# Patient Record
Sex: MALE | Race: WHITE | NOT HISPANIC OR LATINO | Employment: OTHER | URBAN - METROPOLITAN AREA
[De-identification: names, ages, dates, MRNs, and addresses within clinical notes are randomized per-mention and may not be internally consistent; named-entity substitution may affect disease eponyms.]

---

## 2018-08-30 ENCOUNTER — TRANSFERRED RECORDS (OUTPATIENT)
Dept: HEALTH INFORMATION MANAGEMENT | Facility: CLINIC | Age: 68
End: 2018-08-30

## 2019-04-17 ENCOUNTER — HOSPITAL ENCOUNTER (INPATIENT)
Facility: CLINIC | Age: 69
LOS: 12 days | Discharge: HOME | DRG: 885 | End: 2019-04-30
Attending: EMERGENCY MEDICINE | Admitting: PSYCHIATRY & NEUROLOGY
Payer: COMMERCIAL

## 2019-04-17 DIAGNOSIS — F20.0 PARANOID SCHIZOPHRENIA (H): Primary | ICD-10-CM

## 2019-04-17 DIAGNOSIS — F29 PSYCHOSIS, UNSPECIFIED PSYCHOSIS TYPE (H): ICD-10-CM

## 2019-04-17 PROCEDURE — 99285 EMERGENCY DEPT VISIT HI MDM: CPT | Mod: 25

## 2019-04-17 PROCEDURE — 90791 PSYCH DIAGNOSTIC EVALUATION: CPT

## 2019-04-17 PROCEDURE — 99285 EMERGENCY DEPT VISIT HI MDM: CPT | Mod: Z6 | Performed by: EMERGENCY MEDICINE

## 2019-04-17 ASSESSMENT — ENCOUNTER SYMPTOMS
DYSPHORIC MOOD: 1
FEVER: 0

## 2019-04-17 NOTE — ED NOTES
Bed: HW02  Expected date: 4/17/19  Expected time: 5:57 PM  Means of arrival:   Comments:  Elizabeth 518. 69M/ on hold. C&C.
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
## 2019-04-18 PROBLEM — F29 PSYCHOSIS (H): Status: ACTIVE | Noted: 2019-04-18

## 2019-04-18 LAB
ALBUMIN SERPL-MCNC: 3.7 G/DL (ref 3.4–5)
ALP SERPL-CCNC: 87 U/L (ref 40–150)
ALT SERPL W P-5'-P-CCNC: 23 U/L (ref 0–70)
ANION GAP SERPL CALCULATED.3IONS-SCNC: 4 MMOL/L (ref 3–14)
AST SERPL W P-5'-P-CCNC: 15 U/L (ref 0–45)
BASOPHILS # BLD AUTO: 0 10E9/L (ref 0–0.2)
BASOPHILS NFR BLD AUTO: 0.4 %
BILIRUB SERPL-MCNC: 0.8 MG/DL (ref 0.2–1.3)
BUN SERPL-MCNC: 16 MG/DL (ref 7–30)
CALCIUM SERPL-MCNC: 8.3 MG/DL (ref 8.5–10.1)
CHLORIDE SERPL-SCNC: 106 MMOL/L (ref 94–109)
CO2 SERPL-SCNC: 29 MMOL/L (ref 20–32)
CREAT SERPL-MCNC: 0.8 MG/DL (ref 0.66–1.25)
DIFFERENTIAL METHOD BLD: NORMAL
EOSINOPHIL # BLD AUTO: 0.2 10E9/L (ref 0–0.7)
EOSINOPHIL NFR BLD AUTO: 2 %
ERYTHROCYTE [DISTWIDTH] IN BLOOD BY AUTOMATED COUNT: 12.4 % (ref 10–15)
GFR SERPL CREATININE-BSD FRML MDRD: >90 ML/MIN/{1.73_M2}
GLUCOSE SERPL-MCNC: 93 MG/DL (ref 70–99)
HCT VFR BLD AUTO: 46.9 % (ref 40–53)
HGB BLD-MCNC: 16.2 G/DL (ref 13.3–17.7)
IMM GRANULOCYTES # BLD: 0 10E9/L (ref 0–0.4)
IMM GRANULOCYTES NFR BLD: 0.4 %
LYMPHOCYTES # BLD AUTO: 2.4 10E9/L (ref 0.8–5.3)
LYMPHOCYTES NFR BLD AUTO: 29.9 %
MCH RBC QN AUTO: 32.1 PG (ref 26.5–33)
MCHC RBC AUTO-ENTMCNC: 34.5 G/DL (ref 31.5–36.5)
MCV RBC AUTO: 93 FL (ref 78–100)
MONOCYTES # BLD AUTO: 0.8 10E9/L (ref 0–1.3)
MONOCYTES NFR BLD AUTO: 9.5 %
NEUTROPHILS # BLD AUTO: 4.6 10E9/L (ref 1.6–8.3)
NEUTROPHILS NFR BLD AUTO: 57.8 %
NRBC # BLD AUTO: 0 10*3/UL
NRBC BLD AUTO-RTO: 0 /100
PLATELET # BLD AUTO: 229 10E9/L (ref 150–450)
POTASSIUM SERPL-SCNC: 3.9 MMOL/L (ref 3.4–5.3)
PROT SERPL-MCNC: 6.7 G/DL (ref 6.8–8.8)
RBC # BLD AUTO: 5.05 10E12/L (ref 4.4–5.9)
SODIUM SERPL-SCNC: 139 MMOL/L (ref 133–144)
WBC # BLD AUTO: 8 10E9/L (ref 4–11)

## 2019-04-18 PROCEDURE — 99222 1ST HOSP IP/OBS MODERATE 55: CPT | Mod: AI | Performed by: NURSE PRACTITIONER

## 2019-04-18 PROCEDURE — 25000132 ZZH RX MED GY IP 250 OP 250 PS 637: Performed by: PSYCHIATRY & NEUROLOGY

## 2019-04-18 PROCEDURE — 12400002 ZZH R&B MH SENIOR/ADOLESCENT

## 2019-04-18 PROCEDURE — 85025 COMPLETE CBC W/AUTO DIFF WBC: CPT | Performed by: EMERGENCY MEDICINE

## 2019-04-18 PROCEDURE — 80053 COMPREHEN METABOLIC PANEL: CPT | Performed by: EMERGENCY MEDICINE

## 2019-04-18 PROCEDURE — 25000132 ZZH RX MED GY IP 250 OP 250 PS 637: Performed by: NURSE PRACTITIONER

## 2019-04-18 PROCEDURE — 99207 ZZC CDG-MDM COMPONENT: MEETS LOW - DOWN CODED: CPT | Performed by: NURSE PRACTITIONER

## 2019-04-18 RX ORDER — HALOPERIDOL 10 MG/1
10 TABLET ORAL 2 TIMES DAILY
Status: DISCONTINUED | OUTPATIENT
Start: 2019-04-18 | End: 2019-04-19

## 2019-04-18 RX ORDER — PROPRANOLOL HYDROCHLORIDE 10 MG/1
10 TABLET ORAL 3 TIMES DAILY PRN
Status: DISCONTINUED | OUTPATIENT
Start: 2019-04-18 | End: 2019-04-30 | Stop reason: HOSPADM

## 2019-04-18 RX ORDER — ASPIRIN 81 MG/1
81 TABLET, CHEWABLE ORAL DAILY
Status: DISCONTINUED | OUTPATIENT
Start: 2019-04-18 | End: 2019-04-18

## 2019-04-18 RX ORDER — OLANZAPINE 10 MG/2ML
10 INJECTION, POWDER, FOR SOLUTION INTRAMUSCULAR
Status: DISCONTINUED | OUTPATIENT
Start: 2019-04-18 | End: 2019-04-30 | Stop reason: HOSPADM

## 2019-04-18 RX ORDER — HALOPERIDOL 5 MG/1
5 TABLET ORAL AT BEDTIME
Status: DISCONTINUED | OUTPATIENT
Start: 2019-04-18 | End: 2019-04-18

## 2019-04-18 RX ORDER — TRAZODONE HYDROCHLORIDE 100 MG/1
100 TABLET ORAL AT BEDTIME
Status: ON HOLD | COMMUNITY
End: 2019-04-29

## 2019-04-18 RX ORDER — BENZTROPINE MESYLATE 0.5 MG/1
1 TABLET ORAL 2 TIMES DAILY
Status: DISCONTINUED | OUTPATIENT
Start: 2019-04-18 | End: 2019-04-30 | Stop reason: HOSPADM

## 2019-04-18 RX ORDER — LEVOTHYROXINE SODIUM 50 UG/1
50 TABLET ORAL
Status: ON HOLD | COMMUNITY
End: 2019-04-29

## 2019-04-18 RX ORDER — BENZTROPINE MESYLATE 1 MG/1
2 TABLET ORAL 2 TIMES DAILY
Status: DISCONTINUED | OUTPATIENT
Start: 2019-04-18 | End: 2019-04-18

## 2019-04-18 RX ORDER — ASPIRIN 81 MG/1
81 TABLET, CHEWABLE ORAL DAILY
Status: ON HOLD | COMMUNITY
End: 2019-04-29

## 2019-04-18 RX ORDER — BENZTROPINE MESYLATE 1 MG/1
1 TABLET ORAL 2 TIMES DAILY
Status: DISCONTINUED | OUTPATIENT
Start: 2019-04-18 | End: 2019-04-18

## 2019-04-18 RX ORDER — MINERAL OIL/I-PROP MYR/WATER
LOTION (ML) TOPICAL
Status: ON HOLD | COMMUNITY
End: 2019-04-29

## 2019-04-18 RX ORDER — POLYETHYLENE GLYCOL 3350 17 G
2-4 POWDER IN PACKET (EA) ORAL
Status: DISCONTINUED | OUTPATIENT
Start: 2019-04-18 | End: 2019-04-30 | Stop reason: HOSPADM

## 2019-04-18 RX ORDER — OLANZAPINE 10 MG/2ML
5 INJECTION, POWDER, FOR SOLUTION INTRAMUSCULAR
Status: DISCONTINUED | OUTPATIENT
Start: 2019-04-18 | End: 2019-04-18

## 2019-04-18 RX ORDER — CARBOXYMETHYLCELLULOSE SODIUM 5 MG/ML
1 SOLUTION/ DROPS OPHTHALMIC 4 TIMES DAILY
Status: DISCONTINUED | OUTPATIENT
Start: 2019-04-18 | End: 2019-04-30 | Stop reason: HOSPADM

## 2019-04-18 RX ORDER — LEVOTHYROXINE SODIUM 50 UG/1
50 TABLET ORAL
Status: DISCONTINUED | OUTPATIENT
Start: 2019-04-18 | End: 2019-04-30 | Stop reason: HOSPADM

## 2019-04-18 RX ORDER — BENZTROPINE MESYLATE 1 MG/1
1 TABLET ORAL 2 TIMES DAILY
Status: ON HOLD | COMMUNITY
End: 2019-04-29

## 2019-04-18 RX ORDER — OLANZAPINE 5 MG/1
5 TABLET ORAL
Status: DISCONTINUED | OUTPATIENT
Start: 2019-04-18 | End: 2019-04-18

## 2019-04-18 RX ORDER — ALUMINA, MAGNESIA, AND SIMETHICONE 2400; 2400; 240 MG/30ML; MG/30ML; MG/30ML
30 SUSPENSION ORAL EVERY 4 HOURS PRN
Status: DISCONTINUED | OUTPATIENT
Start: 2019-04-18 | End: 2019-04-30 | Stop reason: HOSPADM

## 2019-04-18 RX ORDER — ACETAMINOPHEN 325 MG/1
650 TABLET ORAL EVERY 4 HOURS PRN
Status: DISCONTINUED | OUTPATIENT
Start: 2019-04-18 | End: 2019-04-30 | Stop reason: HOSPADM

## 2019-04-18 RX ORDER — OLANZAPINE 10 MG/1
10 TABLET ORAL
Status: DISCONTINUED | OUTPATIENT
Start: 2019-04-18 | End: 2019-04-30 | Stop reason: HOSPADM

## 2019-04-18 RX ORDER — TRAZODONE HYDROCHLORIDE 50 MG/1
100 TABLET, FILM COATED ORAL AT BEDTIME
Status: DISCONTINUED | OUTPATIENT
Start: 2019-04-18 | End: 2019-04-30 | Stop reason: HOSPADM

## 2019-04-18 RX ORDER — CARBOXYMETHYLCELLULOSE SODIUM 5 MG/ML
1 SOLUTION/ DROPS OPHTHALMIC 4 TIMES DAILY
Status: ON HOLD | COMMUNITY
End: 2019-04-29

## 2019-04-18 RX ORDER — HALOPERIDOL 5 MG/1
5 TABLET ORAL AT BEDTIME
Status: ON HOLD | COMMUNITY
End: 2019-04-29

## 2019-04-18 RX ORDER — ASPIRIN 81 MG/1
81 TABLET ORAL DAILY
Status: DISCONTINUED | OUTPATIENT
Start: 2019-04-19 | End: 2019-04-30 | Stop reason: HOSPADM

## 2019-04-18 RX ORDER — MULTIPLE VITAMINS W/ MINERALS TAB 9MG-400MCG
1 TAB ORAL DAILY
Status: ON HOLD | COMMUNITY
End: 2019-04-29

## 2019-04-18 RX ORDER — DIPHENHYDRAMINE HCL 25 MG
25 CAPSULE ORAL 3 TIMES DAILY PRN
Status: DISCONTINUED | OUTPATIENT
Start: 2019-04-18 | End: 2019-04-30 | Stop reason: HOSPADM

## 2019-04-18 RX ORDER — BISACODYL 10 MG
10 SUPPOSITORY, RECTAL RECTAL DAILY PRN
Status: DISCONTINUED | OUTPATIENT
Start: 2019-04-18 | End: 2019-04-30 | Stop reason: HOSPADM

## 2019-04-18 RX ORDER — TRAZODONE HYDROCHLORIDE 50 MG/1
50 TABLET, FILM COATED ORAL
Status: DISCONTINUED | OUTPATIENT
Start: 2019-04-18 | End: 2019-04-30 | Stop reason: HOSPADM

## 2019-04-18 RX ORDER — BENZTROPINE MESYLATE 0.5 MG/1
1 TABLET ORAL 2 TIMES DAILY PRN
Status: DISCONTINUED | OUTPATIENT
Start: 2019-04-18 | End: 2019-04-30 | Stop reason: HOSPADM

## 2019-04-18 RX ORDER — MULTIPLE VITAMINS W/ MINERALS TAB 9MG-400MCG
1 TAB ORAL DAILY
Status: DISCONTINUED | OUTPATIENT
Start: 2019-04-18 | End: 2019-04-30 | Stop reason: HOSPADM

## 2019-04-18 RX ORDER — HYDROXYZINE HYDROCHLORIDE 25 MG/1
25 TABLET, FILM COATED ORAL EVERY 4 HOURS PRN
Status: DISCONTINUED | OUTPATIENT
Start: 2019-04-18 | End: 2019-04-30 | Stop reason: HOSPADM

## 2019-04-18 RX ADMIN — CARBOXYMETHYLCELLULOSE SODIUM 1 DROP: 5 SOLUTION/ DROPS OPHTHALMIC at 21:06

## 2019-04-18 RX ADMIN — CARBOXYMETHYLCELLULOSE SODIUM 1 DROP: 5 SOLUTION/ DROPS OPHTHALMIC at 17:06

## 2019-04-18 RX ADMIN — TRAZODONE HYDROCHLORIDE 100 MG: 50 TABLET ORAL at 21:06

## 2019-04-18 RX ADMIN — BENZTROPINE MESYLATE 1 MG: 1 TABLET ORAL at 08:23

## 2019-04-18 RX ADMIN — ASPIRIN 81 MG CHEWABLE TABLET 81 MG: 81 TABLET CHEWABLE at 08:23

## 2019-04-18 RX ADMIN — CARBOXYMETHYLCELLULOSE SODIUM 1 DROP: 5 SOLUTION/ DROPS OPHTHALMIC at 08:24

## 2019-04-18 RX ADMIN — BENZTROPINE MESYLATE 1 MG: 1 TABLET ORAL at 21:06

## 2019-04-18 RX ADMIN — MULTIPLE VITAMINS W/ MINERALS TAB 1 TABLET: TAB at 08:24

## 2019-04-18 RX ADMIN — HALOPERIDOL 10 MG: 10 TABLET ORAL at 12:06

## 2019-04-18 RX ADMIN — PROPRANOLOL HYDROCHLORIDE 10 MG: 10 TABLET ORAL at 13:11

## 2019-04-18 RX ADMIN — HALOPERIDOL 10 MG: 10 TABLET ORAL at 21:06

## 2019-04-18 ASSESSMENT — ACTIVITIES OF DAILY LIVING (ADL)
RETIRED_COMMUNICATION: 0-->UNDERSTANDS/COMMUNICATES WITHOUT DIFFICULTY
COGNITION: 0 - NO COGNITION ISSUES REPORTED
DRESS: INDEPENDENT
HYGIENE/GROOMING: INDEPENDENT
TOILETING: 0-->INDEPENDENT
TRANSFERRING: 0-->INDEPENDENT
DRESS: 0-->INDEPENDENT
FALL_HISTORY_WITHIN_LAST_SIX_MONTHS: NO
LAUNDRY: WITH SUPERVISION
BATHING: 0-->INDEPENDENT
SWALLOWING: 0-->SWALLOWS FOODS/LIQUIDS WITHOUT DIFFICULTY
AMBULATION: 0-->INDEPENDENT
RETIRED_EATING: 0-->INDEPENDENT
ORAL_HYGIENE: INDEPENDENT

## 2019-04-18 ASSESSMENT — MIFFLIN-ST. JEOR: SCORE: 1791.41
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
## 2019-04-18 NOTE — CONSULTS
"  Internal Medicine Consult - Initial Visit       Ger Bashir MRN# [id number]   YOB: 1950 Age: 69 year old   Date of Admission: 4/17/2019  PCP: Elizabeth Leo  Date of Service: 4/18/2019    Referring Provider: Raheel Doyle MD  Reason for Consult: Admission H&P       Attempted to see pt today in [facility].  He is laying on the seat watching TV.  I ask if he is Ger and he says \"No, I'm not, go away\".  Asked how he is feeling today and says \"leave me alone, go away\".  Pt is declining Internal Medicine visit/assessment at this time. Last 24 hr vitals and labs reviewed.  No recommendations at this time.  Resume home Synthroid and ASA.  Please feel free to re-consult medicine if any acute issues arise.           Iesha Garsia, CNP, APRN  Internal Medicine [facility] Hospitalist  [facility]  Pager [phone]      "
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
## 2019-04-18 NOTE — PROGRESS NOTES
Left voice message with pt's , Ramon, to determine if the ACT team plans to revoke pt's provisional discharge.  They intend to file with  The court today.

## 2019-04-18 NOTE — PLAN OF CARE
BEHAVIORAL TEAM DISCUSSION    Participants: Lexis Hester DNP, Traci Burton RN, Annie Moe, [facility], Jewels Gómez, OT  Progress: New admit  Continued Stay Criteria/Rationale: Psychosis  Medical/Physical: See medical notes  Precautions:   Behavioral Orders   Procedures    Assault precautions    Code 1 - Restrict to Unit    Elopement precautions    Fall precautions    Routine Programming     As clinically indicated    Sexual precautions    Single Room    Status 15     Every 15 minutes.     Plan: The plan is to assess the patient for mental health and medication needs.  The patient will be prescribed medications to treat the identified symptoms.  Upon discharge the patient will be referred to services as appropriate based on the assessment.  Rationale for change in precautions or plan: N/A

## 2019-04-18 NOTE — PROGRESS NOTES
"Pt presents with angry, irritable affect and mood is congruent. Pt is inconsistently medication compliant this shift- agreed to take all meds except synthroid this morning, then initially refused haldol but later agreed to take PO. Pt has been visible in the milieu watching TV most of the shift. Pt does not attend groups. Pt is not social with peers or staff. Pt declined to speak with internal med provider, and when this writer attempts to engage pt is dismissive, stating \"leave me alone, get out of here\". Pt shows substantial tremor in upper extremities for which he takes cogentin. PRN propranolol and benadryl also ordered to address this. Propranolol administered at around 1300, with some minor decrease in tremor noted around 1345. Pt also appeared calmer and less irritable at that time. Pt's BP runs near low-normal range, thus nursing will encourage fluids, continue to monitor VS and be aware of parameters for PRN use. Pt will only drink bottled water because \"the YAHIR messed with that stuff\" (the unit water machine). Diet order modified to include bottled water with each meal to ensure adequate hydration.   No other concerns at this time. Nursing will continue to monitor and assess.   "

## 2019-04-18 NOTE — PROGRESS NOTES
SPIRITUAL HEALTH SERVICES    [facility] [address]  ON-CALL VISIT/TRIAGE      REFERRAL SOURCE: Epic consult for emotional support/patient/family request at admission for chaplaincy support    Based on notes in chart of pt being newly arrived to unit, on sexual precautions and isolative/not participating in milieu, I agreed with nurse that a full 24 hours on unit before our reassessment/possible visit with patient would be most appropriate.     PLAN: Refer to on-call  for triage tomorrow with unit staff.                                                                                                                            Dawn Esposito MDiv, [facility]  Lead , Adult Behavioral Health  Pager [phone]
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
## 2019-04-19 LAB
CHOLEST SERPL-MCNC: 148 MG/DL
HDLC SERPL-MCNC: 32 MG/DL
LDLC SERPL CALC-MCNC: 89 MG/DL
NONHDLC SERPL-MCNC: 116 MG/DL
TRIGL SERPL-MCNC: 136 MG/DL
TSH SERPL DL<=0.005 MIU/L-ACNC: 1.92 MU/L (ref 0.4–4)

## 2019-04-19 PROCEDURE — 84443 ASSAY THYROID STIM HORMONE: CPT | Performed by: PSYCHIATRY & NEUROLOGY

## 2019-04-19 PROCEDURE — 25000132 ZZH RX MED GY IP 250 OP 250 PS 637: Performed by: PSYCHIATRY & NEUROLOGY

## 2019-04-19 PROCEDURE — 99232 SBSQ HOSP IP/OBS MODERATE 35: CPT | Performed by: NURSE PRACTITIONER

## 2019-04-19 PROCEDURE — G0177 OPPS/PHP; TRAIN & EDUC SERV: HCPCS

## 2019-04-19 PROCEDURE — 25000132 ZZH RX MED GY IP 250 OP 250 PS 637: Performed by: NURSE PRACTITIONER

## 2019-04-19 PROCEDURE — 80061 LIPID PANEL: CPT | Performed by: PSYCHIATRY & NEUROLOGY

## 2019-04-19 PROCEDURE — 36415 COLL VENOUS BLD VENIPUNCTURE: CPT | Performed by: PSYCHIATRY & NEUROLOGY

## 2019-04-19 PROCEDURE — 12400001 ZZH R&B MH UMMC

## 2019-04-19 RX ORDER — OLANZAPINE 10 MG/2ML
10 INJECTION, POWDER, FOR SOLUTION INTRAMUSCULAR 2 TIMES DAILY
Status: ACTIVE | OUTPATIENT
Start: 2019-04-19 | End: 2019-04-22

## 2019-04-19 RX ORDER — HALOPERIDOL 10 MG/1
10 TABLET ORAL 2 TIMES DAILY
Status: COMPLETED | OUTPATIENT
Start: 2019-04-19 | End: 2019-04-21

## 2019-04-19 RX ORDER — OLANZAPINE 10 MG/2ML
10 INJECTION, POWDER, FOR SOLUTION INTRAMUSCULAR AT BEDTIME
Status: DISCONTINUED | OUTPATIENT
Start: 2019-04-22 | End: 2019-04-30 | Stop reason: HOSPADM

## 2019-04-19 RX ORDER — HALOPERIDOL DECANOATE 100 MG/ML
100 INJECTION INTRAMUSCULAR
Status: DISCONTINUED | OUTPATIENT
Start: 2019-04-22 | End: 2019-04-29

## 2019-04-19 RX ORDER — HALOPERIDOL DECANOATE 100 MG/ML
100 INJECTION INTRAMUSCULAR
Status: DISCONTINUED | OUTPATIENT
Start: 2019-04-22 | End: 2019-04-19

## 2019-04-19 RX ORDER — HALOPERIDOL 10 MG/1
10 TABLET ORAL AT BEDTIME
Status: DISCONTINUED | OUTPATIENT
Start: 2019-04-22 | End: 2019-04-30 | Stop reason: HOSPADM

## 2019-04-19 RX ADMIN — BENZTROPINE MESYLATE 1 MG: 1 TABLET ORAL at 08:22

## 2019-04-19 RX ADMIN — HALOPERIDOL 10 MG: 10 TABLET ORAL at 19:09

## 2019-04-19 RX ADMIN — ASPIRIN 81 MG: 81 TABLET, COATED ORAL at 08:22

## 2019-04-19 RX ADMIN — CARBOXYMETHYLCELLULOSE SODIUM 1 DROP: 5 SOLUTION/ DROPS OPHTHALMIC at 12:51

## 2019-04-19 RX ADMIN — CARBOXYMETHYLCELLULOSE SODIUM 1 DROP: 5 SOLUTION/ DROPS OPHTHALMIC at 08:22

## 2019-04-19 RX ADMIN — PROPRANOLOL HYDROCHLORIDE 10 MG: 10 TABLET ORAL at 12:51

## 2019-04-19 RX ADMIN — LEVOTHYROXINE SODIUM 50 MCG: 50 TABLET ORAL at 08:21

## 2019-04-19 RX ADMIN — BENZTROPINE MESYLATE 1 MG: 1 TABLET ORAL at 19:09

## 2019-04-19 RX ADMIN — MULTIPLE VITAMINS W/ MINERALS TAB 1 TABLET: TAB at 08:21

## 2019-04-19 RX ADMIN — HALOPERIDOL 10 MG: 10 TABLET ORAL at 08:21

## 2019-04-19 RX ADMIN — OLANZAPINE 10 MG: 10 TABLET, FILM COATED ORAL at 17:24

## 2019-04-19 RX ADMIN — TRAZODONE HYDROCHLORIDE 100 MG: 50 TABLET ORAL at 20:54

## 2019-04-19 ASSESSMENT — ACTIVITIES OF DAILY LIVING (ADL)
DRESS: INDEPENDENT
ORAL_HYGIENE: INDEPENDENT
LAUNDRY: UNABLE TO COMPLETE
ORAL_HYGIENE: INDEPENDENT
HYGIENE/GROOMING: INDEPENDENT
DRESS: SCRUBS (BEHAVIORAL HEALTH)
HYGIENE/GROOMING: INDEPENDENT
LAUNDRY: UNABLE TO COMPLETE

## 2019-04-19 NOTE — PROGRESS NOTES
04/19/19 1404   Behavioral Health   Hallucinations denies / not responding to hallucinations   Thinking poor concentration   Orientation person: oriented;date: oriented;place: oriented;time: oriented   Memory baseline memory   Insight insight appropriate to situation   Judgement intact   Eye Contact at examiner   Affect full range affect   Mood mood is calm   Physical Appearance/Attire attire appropriate to age and situation   Hygiene neglected grooming - unclean body, hair, teeth   Suicidality other (see comments)  (Denies)   1. Wish to be Dead No   2. Non-Specific Active Suicidal Thoughts  No   Self Injury other (see comment)  (Denies)   Elopement   (None observed)   Activity other (see comment)  (Visible in the milieu and groups)   Speech coherent;clear   Medication Sensitivity no observed side effects;no stated side effects   Psychomotor / Gait balanced;steady   Activities of Daily Living   Hygiene/Grooming independent   Oral Hygiene independent   Dress independent   Laundry unable to complete   Room Organization independent     Ger had a calm and engaged shift this morning. He was observed spending time in the milieu, reading, writing, and socializing with peers. He attended several of the groups this morning. He denied SI, SIB, hallucinations. He denied anxiety but he did endorse depression. He rated it a 7/10. He said that the doctors continue to give him Haldol and he started a new medication as well. He is experiencing tremors and says the new medication should help reduce those. No other side effects noted. Staff had to redirect and set firm boundaries with Ger as he would lean over the desk and try to grab a pen. He is using his spiraled notebooks to write in while in the lounge with staff supervision. Ger did not have any behavioral issues today.

## 2019-04-19 NOTE — PROGRESS NOTES
"Pt is increasingly agitated. Writer approached pt asking him to come get his 1600 medications. Pt began screaming at writer saying \"I don't want it. You take it, you drink it, you eat it, I won't take it!\" Writer began leaving his room and he said \"Come in here let me see you! Get over here.\" Writer responded \"I will not respond to you when you are treating me this way.\" Pt then followed charur, began posturing and again refused his medications. Pt was then given 10 mg of zyprexa PRN and asked to stay in his room and dinner would be brought to him.   "

## 2019-04-19 NOTE — PROGRESS NOTES
Pt transferred to station 12 NB for the safety of the vulnerable patients on [facility] due to pt's intrusiveness, hypersexuality, and agitation. Pt finished dinner, Ticket to Ride was printed, and belongings were brought with pt during transfer to station 12 with security and three staff members.

## 2019-04-19 NOTE — PROGRESS NOTES
"[facility], [place]   Psychiatric Progress Note        Interim History:   From H&P: Ger Bashir is a 69 year old male with history of schizoaffective disorder, bipolar type, history of noncompliance with medications, multiple court commitments and Jarvice.  Patient was recently hospitalized at [facility] for about 3 weeks, and was discharged on April 1.  During this hospitalization, the patient's commitment and Wu were extended.  His Wu medications include Haldol, Zyprexa, Clozaril, and Invega.  During the last hospitalization, the patient was discharged on oral Haldol 10 mg at bedtime in addition to Haldol Decanoate 100 mg every 2 weeks, and Cogentin 1 mg twice a day.  His last Haldol Decanoate injection was while hospitalised on March 23.  He was scheduled for another injection on April 8, on outpatient basis however, he never received it.  He also stopped taking his oral medication.  The treatment team at [facility] suspected underlying dementia.  His ACT team also reported that the patient may not be able to live independently anymore, due to cognitive decline.  He has been having significant hand tremors that improved with decreased dose of Haldol.    The patient's care was discussed with the treatment team during the daily team meeting and/or staff's chart notes were reviewed.  Staff report patient was irritable and threatening in the morning, calm, ad appropriate in the evening. Did not attend groups. Patient is eating well and taking medications as prescribed. Slept all night and part of the evening.      Met with patient. Calmer than yesterday. Did not make treats, however repeated that he wants to be discharged no later than tomorrow. Denies having any mental health issues, including hearing voices, delusions and paranoia about the government watching him. Denies anxiety, and depression \"I am not depressed, I am suppressed\".          Medications:       aspirin  81 mg Oral " Daily     benztropine  1 mg Oral BID     carboxymethylcellulose PF  1 drop Both Eyes 4x Daily     eucerin   Topical BID     haloperidol  10 mg Oral BID    Or     OLANZapine  10 mg Intramuscular BID     levothyroxine  50 mcg Oral QAM AC     multivitamin w/minerals  1 tablet Oral Daily     traZODone  100 mg Oral At Bedtime          Allergies:     Allergies   Allergen Reactions     Peas GI Disturbance     Black eyed peas - vomiting          Labs:     Recent Results (from the past 672 hour(s))   CBC with platelets differential    Collection Time: 04/18/19 12:15 AM   Result Value Ref Range    WBC 8.0 4.0 - 11.0 10e9/L    RBC Count 5.05 4.4 - 5.9 10e12/L    Hemoglobin 16.2 13.3 - 17.7 g/dL    Hematocrit 46.9 40.0 - 53.0 %    MCV 93 78 - 100 fl    MCH 32.1 26.5 - 33.0 pg    MCHC 34.5 31.5 - 36.5 g/dL    RDW 12.4 10.0 - 15.0 %    Platelet Count 229 150 - 450 10e9/L    Diff Method Automated Method     % Neutrophils 57.8 %    % Lymphocytes 29.9 %    % Monocytes 9.5 %    % Eosinophils 2.0 %    % Basophils 0.4 %    % Immature Granulocytes 0.4 %    Nucleated RBCs 0 0 /100    Absolute Neutrophil 4.6 1.6 - 8.3 10e9/L    Absolute Lymphocytes 2.4 0.8 - 5.3 10e9/L    Absolute Monocytes 0.8 0.0 - 1.3 10e9/L    Absolute Eosinophils 0.2 0.0 - 0.7 10e9/L    Absolute Basophils 0.0 0.0 - 0.2 10e9/L    Abs Immature Granulocytes 0.0 0 - 0.4 10e9/L    Absolute Nucleated RBC 0.0    Comprehensive metabolic panel    Collection Time: 04/18/19 12:15 AM   Result Value Ref Range    Sodium 139 133 - 144 mmol/L    Potassium 3.9 3.4 - 5.3 mmol/L    Chloride 106 94 - 109 mmol/L    Carbon Dioxide 29 20 - 32 mmol/L    Anion Gap 4 3 - 14 mmol/L    Glucose 93 70 - 99 mg/dL    Urea Nitrogen 16 7 - 30 mg/dL    Creatinine 0.80 0.66 - 1.25 mg/dL    GFR Estimate >90 >60 mL/min/[1.73_m2]    GFR Estimate If Black >90 >60 mL/min/[1.73_m2]    Calcium 8.3 (L) 8.5 - 10.1 mg/dL    Bilirubin Total 0.8 0.2 - 1.3 mg/dL    Albumin 3.7 3.4 - 5.0 g/dL    Protein Total 6.7  (L) 6.8 - 8.8 g/dL    Alkaline Phosphatase 87 40 - 150 U/L    ALT 23 0 - 70 U/L    AST 15 0 - 45 U/L   Lipid panel    Collection Time: 04/19/19  6:42 AM   Result Value Ref Range    Cholesterol 148 <200 mg/dL    Triglycerides 136 <150 mg/dL    HDL Cholesterol 32 (L) >39 mg/dL    LDL Cholesterol Calculated 89 <100 mg/dL    Non HDL Cholesterol 116 <130 mg/dL            Psychiatric Examination:   Temp: 97.2  F (36.2  C) Temp src: Tympanic BP: 119/69 Pulse: 89 Heart Rate: 86 Resp: 16 SpO2: 92 % O2 Device: None (Room air)    Weight is 227 lbs 4.8 oz  Body mass index is 33.57 kg/m .    Appearance: well groomed, awake, alert, partly cooperative, mild distress and normal weight  Attitude:  somewhat cooperative  Eye Contact:  fair  Mood:  good  Affect:  intensity is heightened  Speech:  pressured speech  Psychomotor Behavior:  no evidence of tardive dyskinesia, dystonia, or tics , hand tremors are significant.   Throught Process:  illogical  Associations:  no loose associations  Thought Content:  no evidence of suicidal ideation or homicidal ideation, no auditory hallucinations present, no visual hallucinations present and delusions and paranoia are present.   Insight:  limited  Judgement:  limited  Oriented to:  time, person, and place  Attention Span and Concentration:  fair  Recent and Remote Memory:  fair         Precautions:     Behavioral Orders   Procedures     Assault precautions     Code 1 - Restrict to Unit     Elopement precautions     Fall precautions     Routine Programming     As clinically indicated     Sexual precautions     Single Room     Status 15     Every 15 minutes.          DIagnoses:   1.  Schizoaffective disorder, bipolar type, with paranoia and delusions  2.  Tobacco use disorder  3.  Alcohol use disorder in sustained remission         Plan:   The patient is a 69 years old  male who was admitted with increased paranoia, agitation, threatening behaviors, and sexual inappropriateness.  The  patient does not want to stay on the unit, he is threatening to harm the staff if they tried to give him an injection.  He is irritable and does not want to respond to questions.  Denies mental health symptoms.  Denies history of taking any medications.  The patient is aware that he is under court commitment and Wu and the staff will be giving him the injections if he refuses the oral Haldol.  Medication will include:     --Restart Haldol 10 mg twice a day. Will decrease to hs on Monday. Zyprexa will be given if the patient refuses Haldol. Patient is Jarviced.   --Will restart Haldol Decanoate 100 mg, on Monday, 4/22/19  --Will continue taking Cogentin 1 mg twice a day.    --Will order as needed Cogentin, Benadryl, and propranolol for significant hand tremors and agitation.    --Blood work was reviewed.    --The patient was consulted on nature of illness and treatment options.   --Care was coordinated with the treatment team.      Lexis ANTHONY CNP  Date: 04/19/19  Time: 3:41 PM       Disposition Plan   Reason for ongoing admission: is unable to care for self due to schizoaffective disorder decompensation  Disposition: TBD  Estimated length of stay: 7-10 days.   Legal Status:  Provisional discharge was revoked. The patient is Jarviced for Zyprexa, Haldol, Clozaril and Invega.   Discharge will be granted once the symptoms improve.     Lexis ANTHONY CNP  Date: 04/19/19  Time: 3:32 PM

## 2019-04-19 NOTE — PROGRESS NOTES
Pt had a good shift. Pt attended and participated in group. Pt spent majority of shift in his room sleeping. Pt did not receive any visits during shift. Pt was respectful to staff and other pts. Pt did not have any concerns or complaints during shift.      04/18/19 2230   Behavioral Health   Hallucinations denies / not responding to hallucinations   Thinking poor concentration   Orientation person: oriented;place: oriented;date: oriented;time: oriented   Memory baseline memory   Insight insight appropriate to situation   Judgement intact   Eye Contact at examiner   Affect blunted, flat   Mood mood is calm   Physical Appearance/Attire attire appropriate to age and situation   Suicidality other (see comments)  (Non stated)   1. Wish to be Dead No   2. Non-Specific Active Suicidal Thoughts  No   Self Injury other (see comment)  (Non stated)   Speech clear;coherent   Psychomotor / Gait balanced;steady   Activities of Daily Living   Hygiene/Grooming independent   Oral Hygiene independent   Dress independent   Laundry with supervision   Room Organization independent

## 2019-04-19 NOTE — PLAN OF CARE
INITIAL OT NOTE  Problem: OT General Care Plan  Goal: OT Goal 1  Will attend OT groups and participate actively in all OT opportunities. Will assess and set goals.    Pt attended 1 out of 2 OT groups offered. Pt actively participated in occupational therapy clinic. Pt was able to ask for assistance as needed, and initiated a novel creative expression task with assistance in sequencing and task set-up. Pt demonstrated good attention to task, and appropriately requested assistance in planning and problem solving for his task. Intermittently social with peers and writer throughout, and frequently asked for feedback on his task. Calm and cooperative throughout this group. Will continue to assess. Initial assessment to be completed upon additional group participation.

## 2019-04-19 NOTE — PROGRESS NOTES
"Writer introduced herself to pt as his nurse this evening. Pt asked what time writer got off. After responding with 11:30, pt said \"come to my room then\" with a wink.   "

## 2019-04-19 NOTE — PROGRESS NOTES
Pt requested/recieved prn propanolol 10 mg for tremors. Pt allowed manual blood pressure to be taken 124/62. Pt appears calmer and less tense than early in the shift. Pt attended OT group this AM. Pt has been in lounge most of the shift when not in programming. Pt has been watching the music channel on TV.

## 2019-04-19 NOTE — PROGRESS NOTES
"SPIRITUAL HEALTH SERVICES  [facility] ([facility] 3B  ON-CALL VISIT     REFERRAL SOURCE: I did visit patient Ger goldberg after noon per triaged referral. Pt was sitting alone writing something. When I introduced myself as the on-call , pt was so excited and said, \"Yes I do need a  support. Can you please pray for me? Today is Good Friday and I need your blessing and prayer now.\"      Patient is very alert about the Roman Catholic calendar and so interested regarding the Holy Week the Roman Catholic ritual. After a brief conversation, I offered him a prayer based on his own personal prayer request he told me to pray. After the prayer was over, pt felt connected and appreciated the  visit. I also told him that, the unit  will follow him by request and he is agree on that.     PLAN: The unit  will follow up the pt to provide spiritual care as needed.     Samaria Lozada M.Div. ([place]), M.Th., D.Min., [facility]  Staff   Pager [phone]    "

## 2019-04-19 NOTE — PROGRESS NOTES
Patient arrived to Station 12 with security.  He was eventually cooperative with a clothing search, but was paranoid of staff.  He was given a tour of the unit and requested supplies for ADLs.  Patient had no further questions for staff.    Patient presents as tense with a labile mood, but no aggression.

## 2019-04-20 PROCEDURE — 25000132 ZZH RX MED GY IP 250 OP 250 PS 637: Performed by: PSYCHIATRY & NEUROLOGY

## 2019-04-20 PROCEDURE — 25000132 ZZH RX MED GY IP 250 OP 250 PS 637: Performed by: NURSE PRACTITIONER

## 2019-04-20 PROCEDURE — 12400001 ZZH R&B MH UMMC

## 2019-04-20 RX ADMIN — BENZTROPINE MESYLATE 1 MG: 1 TABLET ORAL at 19:15

## 2019-04-20 RX ADMIN — BENZTROPINE MESYLATE 1 MG: 1 TABLET ORAL at 08:45

## 2019-04-20 RX ADMIN — WHITE PETROLATUM: 1.75 OINTMENT TOPICAL at 08:49

## 2019-04-20 RX ADMIN — ASPIRIN 81 MG: 81 TABLET, COATED ORAL at 08:44

## 2019-04-20 RX ADMIN — HALOPERIDOL 10 MG: 10 TABLET ORAL at 19:15

## 2019-04-20 RX ADMIN — CARBOXYMETHYLCELLULOSE SODIUM 1 DROP: 5 SOLUTION/ DROPS OPHTHALMIC at 15:47

## 2019-04-20 RX ADMIN — CARBOXYMETHYLCELLULOSE SODIUM 1 DROP: 5 SOLUTION/ DROPS OPHTHALMIC at 19:15

## 2019-04-20 RX ADMIN — MULTIPLE VITAMINS W/ MINERALS TAB 1 TABLET: TAB at 08:45

## 2019-04-20 RX ADMIN — LEVOTHYROXINE SODIUM 50 MCG: 50 TABLET ORAL at 08:45

## 2019-04-20 RX ADMIN — CARBOXYMETHYLCELLULOSE SODIUM 1 DROP: 5 SOLUTION/ DROPS OPHTHALMIC at 13:44

## 2019-04-20 RX ADMIN — HALOPERIDOL 10 MG: 10 TABLET ORAL at 08:44

## 2019-04-20 RX ADMIN — CARBOXYMETHYLCELLULOSE SODIUM 1 DROP: 5 SOLUTION/ DROPS OPHTHALMIC at 08:44

## 2019-04-20 ASSESSMENT — ACTIVITIES OF DAILY LIVING (ADL)
HYGIENE/GROOMING: INDEPENDENT
ORAL_HYGIENE: INDEPENDENT
DRESS: INDEPENDENT

## 2019-04-21 PROCEDURE — 25000132 ZZH RX MED GY IP 250 OP 250 PS 637: Performed by: NURSE PRACTITIONER

## 2019-04-21 PROCEDURE — 12400001 ZZH R&B MH UMMC

## 2019-04-21 PROCEDURE — 25000132 ZZH RX MED GY IP 250 OP 250 PS 637: Performed by: PSYCHIATRY & NEUROLOGY

## 2019-04-21 RX ADMIN — HALOPERIDOL 10 MG: 10 TABLET ORAL at 20:48

## 2019-04-21 RX ADMIN — CARBOXYMETHYLCELLULOSE SODIUM 1 DROP: 5 SOLUTION/ DROPS OPHTHALMIC at 20:48

## 2019-04-21 RX ADMIN — WHITE PETROLATUM: 1.75 OINTMENT TOPICAL at 16:24

## 2019-04-21 RX ADMIN — TRAZODONE HYDROCHLORIDE 100 MG: 50 TABLET ORAL at 20:48

## 2019-04-21 RX ADMIN — WHITE PETROLATUM 1 G: 1.75 OINTMENT TOPICAL at 08:37

## 2019-04-21 RX ADMIN — LEVOTHYROXINE SODIUM 50 MCG: 50 TABLET ORAL at 08:37

## 2019-04-21 RX ADMIN — MULTIPLE VITAMINS W/ MINERALS TAB 1 TABLET: TAB at 08:37

## 2019-04-21 RX ADMIN — CARBOXYMETHYLCELLULOSE SODIUM 1 DROP: 5 SOLUTION/ DROPS OPHTHALMIC at 08:37

## 2019-04-21 RX ADMIN — BENZTROPINE MESYLATE 1 MG: 1 TABLET ORAL at 20:48

## 2019-04-21 RX ADMIN — ASPIRIN 81 MG: 81 TABLET, COATED ORAL at 08:37

## 2019-04-21 RX ADMIN — HALOPERIDOL 10 MG: 10 TABLET ORAL at 08:37

## 2019-04-21 RX ADMIN — CARBOXYMETHYLCELLULOSE SODIUM 2 DROP: 5 SOLUTION/ DROPS OPHTHALMIC at 16:27

## 2019-04-21 RX ADMIN — BENZTROPINE MESYLATE 1 MG: 1 TABLET ORAL at 08:37

## 2019-04-21 ASSESSMENT — ACTIVITIES OF DAILY LIVING (ADL)
DRESS: INDEPENDENT
ORAL_HYGIENE: PROMPTS
DRESS: INDEPENDENT
HYGIENE/GROOMING: PROMPTS
HYGIENE/GROOMING: PROMPTS
ORAL_HYGIENE: INDEPENDENT
LAUNDRY: UNABLE TO COMPLETE

## 2019-04-21 NOTE — PLAN OF CARE
"48 hour nursing assessment    SI/SIB/HI: pt denies  Hallucinations: pt denies.   Depression: pt denies  Anxiety: pt denies  Other symptoms reported: none. Pt states \"I'm happy\"    Shift summary:  Pt presents as calm, pleasant and cooperative with cares. Pt is eating and drinking well, and denies any physical concerns. Pt spent most of the shift in his room, and was not observed responding to internal stimuli. Pt denies hallucinations. Pt's hand tremor appears to have decreased significantly. Pt's BP remains stable. Pt has not made any inappropriate remarks toward anyone and has shown no aggression. Pt continues to show some slight paranoia (will only drink bottled water).    Pt asked writer to contact [facility] and inquire about an OT project T-shirt he started there. Writer called the unit, and they do not have access to the OT room projects today, but will leave a message for OT tomorrow. Pt would like to continue working on the T-shirt project while here.   No other concerns at this time. Nursing will continue to monitor and assess.   "

## 2019-04-21 NOTE — PROGRESS NOTES
"Pt in room almost entire shift. Declined vitals staying, \"I have the heart of an athlete, my blood pressure is 60 over something.\" Pt initially declined to check in, but later approached staff to talk. Pt had pressured speech and asked staff rapid fire questions about where they live and what they drive. Pt had very poor insight and judgement, stating they were in hospital because, \"some bitch named Sakina is trying to get me committed because she's a gold digger.\" Pt states they are doing great and are hoping to discharge back home as soon as possible. Endorses depression he only attributes to being on unit and offers no other concerns or complaints. No behavioral     04/20/19 2100   Behavioral Health   Hallucinations denies / not responding to hallucinations   Thinking paranoid   Orientation person: oriented;place: oriented   Memory other (see comment)  (HANNY)   Insight poor   Judgement impaired   Eye Contact at examiner   Affect irritable   Mood depressed   Physical Appearance/Attire disheveled   Hygiene neglected grooming - unclean body, hair, teeth   Suicidality other (see comments)  (denies )   1. Wish to be Dead No   2. Non-Specific Active Suicidal Thoughts  No   Self Injury other (see comment)  (denies)   Activity isolative   Speech pressured;rambling   Medication Sensitivity no stated side effects;no observed side effects   Psychomotor / Gait balanced;steady   Psycho Education   Type of Intervention 1:1 intervention   Response participates, initiates socially appropriate   Hours 0.5   Treatment Detail check in    Activities of Daily Living   Hygiene/Grooming independent   Oral Hygiene independent   Dress independent   Room Organization independent   Activity   Activity Assistance Provided independent    concerns this shift.   "

## 2019-04-22 PROCEDURE — 12400001 ZZH R&B MH UMMC

## 2019-04-22 PROCEDURE — 25000132 ZZH RX MED GY IP 250 OP 250 PS 637: Performed by: NURSE PRACTITIONER

## 2019-04-22 PROCEDURE — 99233 SBSQ HOSP IP/OBS HIGH 50: CPT | Performed by: PSYCHIATRY & NEUROLOGY

## 2019-04-22 PROCEDURE — 25000132 ZZH RX MED GY IP 250 OP 250 PS 637: Performed by: PSYCHIATRY & NEUROLOGY

## 2019-04-22 PROCEDURE — 25000128 H RX IP 250 OP 636: Performed by: NURSE PRACTITIONER

## 2019-04-22 RX ADMIN — MULTIPLE VITAMINS W/ MINERALS TAB 1 TABLET: TAB at 08:04

## 2019-04-22 RX ADMIN — HALOPERIDOL DECANOATE 100 MG: 100 INJECTION INTRAMUSCULAR at 08:35

## 2019-04-22 RX ADMIN — BENZTROPINE MESYLATE 1 MG: 1 TABLET ORAL at 08:04

## 2019-04-22 RX ADMIN — HALOPERIDOL 10 MG: 10 TABLET ORAL at 19:08

## 2019-04-22 RX ADMIN — WHITE PETROLATUM: 1.75 OINTMENT TOPICAL at 08:32

## 2019-04-22 RX ADMIN — LEVOTHYROXINE SODIUM 50 MCG: 50 TABLET ORAL at 08:04

## 2019-04-22 RX ADMIN — BENZTROPINE MESYLATE 1 MG: 1 TABLET ORAL at 19:08

## 2019-04-22 RX ADMIN — CARBOXYMETHYLCELLULOSE SODIUM 1 DROP: 5 SOLUTION/ DROPS OPHTHALMIC at 19:08

## 2019-04-22 RX ADMIN — CARBOXYMETHYLCELLULOSE SODIUM 1 DROP: 5 SOLUTION/ DROPS OPHTHALMIC at 08:04

## 2019-04-22 RX ADMIN — ASPIRIN 81 MG: 81 TABLET, COATED ORAL at 08:04

## 2019-04-22 ASSESSMENT — ACTIVITIES OF DAILY LIVING (ADL)
LAUNDRY: UNABLE TO COMPLETE
DRESS: SCRUBS (BEHAVIORAL HEALTH)
HYGIENE/GROOMING: PROMPTS
ORAL_HYGIENE: PROMPTS
DRESS: SCRUBS (BEHAVIORAL HEALTH)
ORAL_HYGIENE: PROMPTS
HYGIENE/GROOMING: PROMPTS
LAUNDRY: UNABLE TO COMPLETE

## 2019-04-22 NOTE — PLAN OF CARE
Problem: Behavioral Disturbance  Goal: Behavioral Disturbance  Outcome: Improving    Patient was  calm and cooperative with staff.  He was isolative to his room, but utilizing headphones to help pass the time.  He had a flat affect.  Pressured speech at times.  He denied all mental health concerns.  He had no overtly delusional statements, but appeared guarded.  He drank water that writer provided for him (per notes, patient had been drinking bottled water).  He contracted for safety and felt safe on the unit.  He had no questions for his treatment team.  He stated that he did not like his Wu medications, but realized that he had to take them because they are court ordered.      Compliant with AM meds and to Haldol Dec.  Denied acute medical concerns and medication side effects.  No tremor observed.

## 2019-04-22 NOTE — PROGRESS NOTES
"[facility], [place]   Psychiatric Progress Note        Interim History:   The patient's care was discussed with the treatment team during the daily team meeting and/or staff's chart notes were reviewed.  Staff report patient was irritable and threatening after admission to station 3B and was subsequently transferred to station 12 on the evening of 4/19. He remains grandiose with very poor insight and judgment. He reports feeling \"great,\" and does not feel hospitalization is indicated. He endorses depression, though attributes this to being in hospital. At times, he is calm, pleasant, and cooperative with cares. He is eating and drinking well. Hand tremors appear to have decreased significantly. Adherent with Haldol Dec this AM.     Upon interview with the patient, he states that he is in the hospital because \"of a misunderstanding with Odalys.\" When asked to elaborate, he said \"She wrote up a request for me to go to the hospital because she is working for an insane criminal!\" When asked who he was referring to, he said \"Dr. Zepeda.\" He added that he recently brought \"the solutions for end stage COPD, Bipolar Disorder, and the cure for paranoid schizophrenia\" to Dr. Zepeda's office. He said \"I set them down, and you know what he did? He increased my meds.\" When asked why, he said \"Well I am sure he thought I was insane, but he just isn't used to dealing with a genius like me!\" He requests that I discontinue his Haldol Dec due to perceived side effects of tremor in his hands. He does not think it has been at all helpful. He denies AH/VH/paranoia. He denies SI/HI.          Medications:       aspirin  81 mg Oral Daily     benztropine  1 mg Oral BID     carboxymethylcellulose PF  1 drop Both Eyes 4x Daily     eucerin   Topical BID     haloperidol  10 mg Oral At Bedtime    Or     OLANZapine  10 mg Intramuscular At Bedtime     haloperidol decanoate  100 mg Intramuscular Q30 Days     " levothyroxine  50 mcg Oral QAM AC     multivitamin w/minerals  1 tablet Oral Daily     traZODone  100 mg Oral At Bedtime          Allergies:     Allergies   Allergen Reactions     Peas GI Disturbance     Black eyed peas - vomiting          Labs:     Recent Results (from the past 672 hour(s))   CBC with platelets differential    Collection Time: 04/18/19 12:15 AM   Result Value Ref Range    WBC 8.0 4.0 - 11.0 10e9/L    RBC Count 5.05 4.4 - 5.9 10e12/L    Hemoglobin 16.2 13.3 - 17.7 g/dL    Hematocrit 46.9 40.0 - 53.0 %    MCV 93 78 - 100 fl    MCH 32.1 26.5 - 33.0 pg    MCHC 34.5 31.5 - 36.5 g/dL    RDW 12.4 10.0 - 15.0 %    Platelet Count 229 150 - 450 10e9/L    Diff Method Automated Method     % Neutrophils 57.8 %    % Lymphocytes 29.9 %    % Monocytes 9.5 %    % Eosinophils 2.0 %    % Basophils 0.4 %    % Immature Granulocytes 0.4 %    Nucleated RBCs 0 0 /100    Absolute Neutrophil 4.6 1.6 - 8.3 10e9/L    Absolute Lymphocytes 2.4 0.8 - 5.3 10e9/L    Absolute Monocytes 0.8 0.0 - 1.3 10e9/L    Absolute Eosinophils 0.2 0.0 - 0.7 10e9/L    Absolute Basophils 0.0 0.0 - 0.2 10e9/L    Abs Immature Granulocytes 0.0 0 - 0.4 10e9/L    Absolute Nucleated RBC 0.0    Comprehensive metabolic panel    Collection Time: 04/18/19 12:15 AM   Result Value Ref Range    Sodium 139 133 - 144 mmol/L    Potassium 3.9 3.4 - 5.3 mmol/L    Chloride 106 94 - 109 mmol/L    Carbon Dioxide 29 20 - 32 mmol/L    Anion Gap 4 3 - 14 mmol/L    Glucose 93 70 - 99 mg/dL    Urea Nitrogen 16 7 - 30 mg/dL    Creatinine 0.80 0.66 - 1.25 mg/dL    GFR Estimate >90 >60 mL/min/[1.73_m2]    GFR Estimate If Black >90 >60 mL/min/[1.73_m2]    Calcium 8.3 (L) 8.5 - 10.1 mg/dL    Bilirubin Total 0.8 0.2 - 1.3 mg/dL    Albumin 3.7 3.4 - 5.0 g/dL    Protein Total 6.7 (L) 6.8 - 8.8 g/dL    Alkaline Phosphatase 87 40 - 150 U/L    ALT 23 0 - 70 U/L    AST 15 0 - 45 U/L   Lipid panel    Collection Time: 04/19/19  6:42 AM   Result Value Ref Range    Cholesterol 148 <200  mg/dL    Triglycerides 136 <150 mg/dL    HDL Cholesterol 32 (L) >39 mg/dL    LDL Cholesterol Calculated 89 <100 mg/dL    Non HDL Cholesterol 116 <130 mg/dL   TSH with free T4 reflex and/or T3 as indicated    Collection Time: 04/19/19  6:42 AM   Result Value Ref Range    TSH 1.92 0.40 - 4.00 mU/L            Psychiatric Examination:   Temp: 97  F (36.1  C) Temp src: Oral BP: 105/67 Pulse: 66   Resp: 16 SpO2: 95 % O2 Device: None (Room air)    Weight is 227 lbs 4.8 oz  Body mass index is 33.57 kg/m .    Appearance: well groomed, awake, alert, partly cooperative, mild distress and normal weight  Attitude:  somewhat cooperative  Eye Contact:  fair  Mood:  good  Affect:  intensity is heightened  Speech:  pressured speech  Psychomotor Behavior:  no evidence of tardive dyskinesia, dystonia, or tics , hand tremors are significant, though staff notice overall improvement.   Throught Process:  Linear, though illogical  Associations:  no loose associations  Thought Content:  no evidence of suicidal ideation or homicidal ideation, no auditory hallucinations present, no visual hallucinations present and delusions and paranoia ARE present. Patient is grandiose.  Insight:  limited  Judgement:  limited  Oriented to:  time, person, and place  Attention Span and Concentration:  fair  Recent and Remote Memory:  fair            Precautions:     Behavioral Orders   Procedures     Assault precautions     Code 1 - Restrict to Unit     Elopement precautions     Fall precautions     Routine Programming     As clinically indicated     Sexual precautions     Single Room     Status 15     Every 15 minutes.          DIagnoses:   1.  Schizoaffective disorder, bipolar type, with paranoia and delusions  2.  Tobacco use disorder  3.  Alcohol use disorder in sustained remission           Assessment/ Plan:   From H&P: Ger Bashir is a 69 year old male with history of schizoaffective disorder, bipolar type, history of noncompliance with  medications, multiple court commitments and Jarvice.  [place] [facility] and was discharged on April 1.  During this hospitalization, the patient's commitment and Wu were extended.  His Wu medications include Haldol, Zyprexa, Clozaril, and Invega.  During the last hospitalization, the patient was discharged on oral Haldol 10 mg at bedtime in addition to Haldol Decanoate 100 mg every 2 weeks, and Cogentin 1 mg twice a day.  His last Haldol Decanoate injection was while hospitalised on March 23.  He was scheduled for another injection on April 8, on outpatient basis however, he never received it.  He also stopped taking his oral medication.  [place] [facility] [place].  His ACT team also reported that the patient may not be able to live independently anymore, due to cognitive decline.  He has been having significant hand tremors that improved with decreased dose of Haldol.      Target psychiatric symptoms and interventions:  --Restarted Haldol 10 mg twice a day. Decrease to QHS starting today. Zyprexa will be given if the patient refuses Haldol. Patient is Jarvised.   --Restart Haldol Decanoate 100 mg, on Monday, 4/22/19  --Will continue Cogentin 1 mg twice a day.    --Ordered as needed Cogentin, Benadryl, and propranolol for significant hand tremors and agitation.    Medical Problems and Treatments:  Patient denies acute medical concerns    Behavioral/Psychological/Social:  Continue to encourage participation in groups    Legal: Under commitment with Wu    Safety:  - Continue precautions as noted above  - Status 15 minute checks    Disposition: Pending clinical stabilization.    Callie Prather MD  [facility] Psychiatry

## 2019-04-22 NOTE — PROGRESS NOTES
Pt spent the entire evening in his room. He was calm and cooperative but isolative. There were no concerns regarding elopement nor aggression. No SI/SIB/HI nor any AVH noted.     04/21/19 2200   Behavioral Health   Hallucinations denies / not responding to hallucinations   Orientation person: oriented   Memory baseline memory   Insight poor   Judgement impaired   Eye Contact at examiner   Affect blunted, flat   Mood mood is calm   Physical Appearance/Attire untidy   Hygiene neglected grooming - unclean body, hair, teeth   Suicidality other (see comments)  (none stated)   1. Wish to be Dead No   2. Non-Specific Active Suicidal Thoughts  No   Self Injury other (see comment)  (none stated nor observed)   Activity isolative;withdrawn;other (see comment)  (in room the whole shift)   Medication Sensitivity no stated side effects;no observed side effects   Psychomotor / Gait balanced;steady   Activities of Daily Living   Hygiene/Grooming prompts   Oral Hygiene prompts   Dress independent   Room Organization independent

## 2019-04-23 PROCEDURE — 25000132 ZZH RX MED GY IP 250 OP 250 PS 637: Performed by: NURSE PRACTITIONER

## 2019-04-23 PROCEDURE — 25000132 ZZH RX MED GY IP 250 OP 250 PS 637: Performed by: PSYCHIATRY & NEUROLOGY

## 2019-04-23 PROCEDURE — 99232 SBSQ HOSP IP/OBS MODERATE 35: CPT | Performed by: PSYCHIATRY & NEUROLOGY

## 2019-04-23 PROCEDURE — 12400001 ZZH R&B MH UMMC

## 2019-04-23 RX ADMIN — BENZTROPINE MESYLATE 1 MG: 1 TABLET ORAL at 08:39

## 2019-04-23 RX ADMIN — HALOPERIDOL 10 MG: 10 TABLET ORAL at 20:36

## 2019-04-23 RX ADMIN — MULTIPLE VITAMINS W/ MINERALS TAB 1 TABLET: TAB at 08:40

## 2019-04-23 RX ADMIN — WHITE PETROLATUM: 1.75 OINTMENT TOPICAL at 16:14

## 2019-04-23 RX ADMIN — TRAZODONE HYDROCHLORIDE 100 MG: 50 TABLET ORAL at 20:36

## 2019-04-23 RX ADMIN — CARBOXYMETHYLCELLULOSE SODIUM 1 DROP: 5 SOLUTION/ DROPS OPHTHALMIC at 16:13

## 2019-04-23 RX ADMIN — CARBOXYMETHYLCELLULOSE SODIUM 1 DROP: 5 SOLUTION/ DROPS OPHTHALMIC at 20:36

## 2019-04-23 RX ADMIN — BENZTROPINE MESYLATE 1 MG: 1 TABLET ORAL at 20:36

## 2019-04-23 RX ADMIN — CARBOXYMETHYLCELLULOSE SODIUM 1 DROP: 5 SOLUTION/ DROPS OPHTHALMIC at 11:53

## 2019-04-23 RX ADMIN — CARBOXYMETHYLCELLULOSE SODIUM 1 DROP: 5 SOLUTION/ DROPS OPHTHALMIC at 08:40

## 2019-04-23 RX ADMIN — ASPIRIN 81 MG: 81 TABLET, COATED ORAL at 08:40

## 2019-04-23 RX ADMIN — WHITE PETROLATUM: 1.75 OINTMENT TOPICAL at 08:40

## 2019-04-23 ASSESSMENT — ACTIVITIES OF DAILY LIVING (ADL)
DRESS: SCRUBS (BEHAVIORAL HEALTH)
ORAL_HYGIENE: PROMPTS;INDEPENDENT
LAUNDRY: UNABLE TO COMPLETE
ORAL_HYGIENE: INDEPENDENT
DRESS: SCRUBS (BEHAVIORAL HEALTH)
HYGIENE/GROOMING: INDEPENDENT
HYGIENE/GROOMING: INDEPENDENT;PROMPTS

## 2019-04-23 NOTE — PROGRESS NOTES
"[facility], [place], [place]   Psychiatric Progress Note  Hospital Day: 5        Interim History:   The patient's care was discussed with the treatment team during the daily team meeting and/or staff's chart notes were reviewed.  Staff report patient was isolative to his room throughout the evening.  He had no other.  Patient was overall calm and cooperative.  No seclusions or restraints.     Upon interview with the patient, he was quite pleasant and more cooperative. He said that his mood is \"excellent.\" He acknowledges that \"things are better\" than when he first arrived to the hospital. When asked to elaborate, he said \"Well the food, drinks, and TV. Also, the attention I get from staff.\" He does not believe that Haldol has been helpful and again asks me to consider discontinuing it. He said \"I am not mentally retarded or ill.\" He said \"the medication doesn't help, but whatever makes the medical community happy.\" He said that he feels ready to leave the hospital. He said \"I feel like being out in the world' I like my freedom.\" He is less agitated today. Denies side effects today, including tremors. He denies AH/VH/paranoia. He denies SI/HI.          Medications:       aspirin  81 mg Oral Daily     benztropine  1 mg Oral BID     carboxymethylcellulose PF  1 drop Both Eyes 4x Daily     eucerin   Topical BID     haloperidol  10 mg Oral At Bedtime    Or     OLANZapine  10 mg Intramuscular At Bedtime     haloperidol decanoate  100 mg Intramuscular Q30 Days     levothyroxine  50 mcg Oral QAM AC     multivitamin w/minerals  1 tablet Oral Daily     traZODone  100 mg Oral At Bedtime          Allergies:     Allergies   Allergen Reactions     Peas GI Disturbance     Black eyed peas - vomiting          Labs:   No results found for this or any previous visit (from the past 24 hour(s)).       Psychiatric Examination:     /67   Pulse 66   Temp 97  F (36.1  C) (Oral)   Resp 16   Ht 1.753 m (5' 9\")   " "Wt 103.1 kg (227 lb 4.8 oz)   SpO2 95%   BMI 33.57 kg/m    Weight is 227 lbs 4.8 oz  Body mass index is 33.57 kg/m .    Weight over time:  Vitals:    04/18/19 0209   Weight: 103.1 kg (227 lb 4.8 oz)       Orthostatic Vitals       Most Recent      Sitting Orthostatic /67 04/22 0830    Sitting Orthostatic Pulse (bpm) 66 04/22 0830            Cardiometabolic risk assessment. 04/23/19      Reviewed patient profile for cardiometabolic risk factors    Date taken /Value  REFERENCE RANGE   Abdominal Obesity  (Waist Circumference)   See nursing flowsheet Women ?35 in (88 cm)   Men ?40 in (102 cm)      Triglycerides  Triglycerides   Date Value Ref Range Status   04/19/2019 136 <150 mg/dL Final       ?150 mg/dL (1.7 mmol/L) or current treatment for elevated triglycerides   HDL cholesterol  HDL Cholesterol   Date Value Ref Range Status   04/19/2019 32 (L) >39 mg/dL Final   ]   Women <50 mg/dL (1.3 mmol/L) in women or current treatment for low HDL cholesterol  Men <40 mg/dL (1 mmol/L) in men or current treatment for low HDL cholesterol     Fasting plasma glucose (FPG) Lab Results   Component Value Date    GLC 93 04/18/2019      FPG ?100 mg/dL (5.6 mmol/L) or treatment for elevated blood glucose   Blood pressure  BP Readings from Last 3 Encounters:   04/22/19 105/67   05/25/12 119/55   04/20/12 117/78    Blood pressure ?130/85 mmHg or treatment for elevated blood pressure   Family History  See family history     Appearance: well groomed, awake, alert, lying in bed calmly watching TV  Attitude:  cooperative  Eye Contact:  good  Mood: \"excellent\"  Affect:  mood congruent  Speech: normal rate and volume  Psychomotor Behavior:  no evidence of tardive dyskinesia, dystonia, or tics , hand tremors are significant, though staff notice overall improvement.   Throught Process:  Linear, though illogical at times  Associations:  no loose associations  Thought Content:  no evidence of suicidal ideation or homicidal ideation, no " auditory hallucinations present, no visual hallucinations present and delusions and paranoia ARE present. Patient is grandiose at times.  Insight:  limited  Judgement:  fair  Oriented to:  time, person, and place  Attention Span and Concentration:  fair  Recent and Remote Memory:  fair         Clinical Global Impressions  First:  Considering your total clinical experience with this particular patient population, how severe are the patient's symptoms at this time?: 7 (04/18/19 0825)  Compared to the patient's condition at the START of treatment, this patient's condition is:: 7 (04/18/19 0825)  Most recent:  Considering your total clinical experience with this particular patient population, how severe are the patient's symptoms at this time?: 7 (04/18/19 0825)  Compared to the patient's condition at the START of treatment, this patient's condition is:: 7 (04/18/19 0825)           Precautions:     Behavioral Orders   Procedures     Assault precautions     Code 1 - Restrict to Unit     Elopement precautions     Fall precautions     Routine Programming     As clinically indicated     Sexual precautions     Single Room     Status 15     Every 15 minutes.          Diagnoses:     1.  Schizoaffective disorder, bipolar type, with paranoia and delusions  2.  Tobacco use disorder  3.  Alcohol use disorder in sustained remission         Assessment & Plan:     From H&P: Ger Bashir is a 69 year old male with history of schizoaffective disorder, bipolar type, history of noncompliance with medications, multiple court commitments and Jarvice.  Patient was recently hospitalized at [facility] [place] for about 3 weeks, and was discharged on April 1.  During this hospitalization, the patient's commitment and Wu were extended.  His Wu medications include Haldol, Zyprexa, Clozaril, and Invega.  During the last hospitalization, the patient was discharged on oral Haldol 10 mg at bedtime in addition to Haldol Decanoate 100 mg every 2  weeks, and Cogentin 1 mg twice a day.  His last Haldol Decanoate injection was while hospitalised on March 23.  He was scheduled for another injection on April 8, on outpatient basis however, he never received it.  He also stopped taking his oral medication.  [place] [facility] [place]  His ACT team also reported that the patient may not be able to live independently anymore, due to cognitive decline.  He has been having significant hand tremors that improved with decreased dose of Haldol.        Target psychiatric symptoms and interventions:  --Restarted Haldol 10 mg twice a day. Decreased to QHS starting on 4/22. Zyprexa will be given if the patient refuses Haldol. Patient is Jarvised.   --Restarted Haldol Decanoate 100 mg, on Monday, 4/22/19  --Will continue Cogentin 1 mg twice a day.    --Ordered as needed Cogentin, Benadryl, and propranolol for significant hand tremors and agitation.     Medical Problems and Treatments:  ACT team have voiced concerns about underlying dementia. May consider neuropsychological testing if patient willing to participate.      Behavioral/Psychological/Social:  Continue to encourage participation in groups     Legal: Under commitment with Wu     Safety:  - Continue precautions as noted above  - Status 15 minute checks     Disposition: Pending clinical stabilization.     Callie Prather MD  [facility] Psychiatry

## 2019-04-23 NOTE — PROGRESS NOTES
"Patient refused scheduled thyroid medication stating \" I don't take anything for my thyroid\". He did take this medication the past two days. Otherwise was pleasant with no overt grandiosity or irritability this morning but has been isolating to his room. Showering was encouraged, however patient declined.   "

## 2019-04-23 NOTE — PLAN OF CARE
"  Problem: OT General Care Plan  Goal: OT Goal 1  Description  Will attend OT groups and participate actively in all OT opportunities. Will assess and set goals.    Pt consistently attends all OT groups and continues to have good participation and has been in a pleasant mood.  Pt has discussed how his ACT team helps him, expressed appreciation of all they do for him, though acknowledged he had to learn how to work with them, and having them in his business.  Pt expressed some concern with his court date tomorrow, has no intention to attend, plus thinks it wouldn't be wise to be in the same public location as members of his ACT team incase anything were happen (in the sense he is looking out for everyone's safety).    Pt will seem to have clear thinking, then the more he shares, his paranoia becomes more evident.  Pt shared what he believes is some \"funny business\" related to the government with court dates that were set for him, and talked about how twice during the Obama administration a second was added to the time, and found a relation between these things (writer lost the connection in his explanation).     Outcome: Improving     "

## 2019-04-23 NOTE — PROGRESS NOTES
Patient was isolative to his room throughout the evening. He ate dinner and was observed leaving his room to grab refreshments from the refrigerator in the lounge. He had no visitors this evening. Overall Patient was calm; no seclusions or restraints. Patient did not shower this evening.        04/22/19 2100   Behavioral Health   Hallucinations denies / not responding to hallucinations   Orientation person: oriented   Memory baseline memory   Judgement impaired   Eye Contact at examiner   Affect blunted, flat   Mood mood is calm   Physical Appearance/Attire untidy;disheveled   Hygiene neglected grooming - unclean body, hair, teeth   Self Injury other (see comment)  (none )   Elopement   (none)   Activity isolative;withdrawn   Medication Sensitivity no stated side effects;no observed side effects   Psychomotor / Gait balanced;steady   Activities of Daily Living   Hygiene/Grooming prompts   Oral Hygiene prompts   Dress scrubs (behavioral health)   Laundry unable to complete   Activity   Activity Assistance Provided independent

## 2019-04-24 PROCEDURE — 25000132 ZZH RX MED GY IP 250 OP 250 PS 637: Performed by: PSYCHIATRY & NEUROLOGY

## 2019-04-24 PROCEDURE — 25000132 ZZH RX MED GY IP 250 OP 250 PS 637: Performed by: NURSE PRACTITIONER

## 2019-04-24 PROCEDURE — 99232 SBSQ HOSP IP/OBS MODERATE 35: CPT | Performed by: PSYCHIATRY & NEUROLOGY

## 2019-04-24 PROCEDURE — 12400001 ZZH R&B MH UMMC

## 2019-04-24 RX ADMIN — MULTIPLE VITAMINS W/ MINERALS TAB 1 TABLET: TAB at 08:07

## 2019-04-24 RX ADMIN — BENZTROPINE MESYLATE 1 MG: 1 TABLET ORAL at 19:13

## 2019-04-24 RX ADMIN — LEVOTHYROXINE SODIUM 50 MCG: 50 TABLET ORAL at 08:07

## 2019-04-24 RX ADMIN — CARBOXYMETHYLCELLULOSE SODIUM 1 DROP: 5 SOLUTION/ DROPS OPHTHALMIC at 19:12

## 2019-04-24 RX ADMIN — HALOPERIDOL 10 MG: 10 TABLET ORAL at 19:13

## 2019-04-24 RX ADMIN — CARBOXYMETHYLCELLULOSE SODIUM 1 DROP: 5 SOLUTION/ DROPS OPHTHALMIC at 08:07

## 2019-04-24 RX ADMIN — ASPIRIN 81 MG: 81 TABLET, COATED ORAL at 08:07

## 2019-04-24 RX ADMIN — CARBOXYMETHYLCELLULOSE SODIUM 1 DROP: 5 SOLUTION/ DROPS OPHTHALMIC at 15:59

## 2019-04-24 RX ADMIN — BENZTROPINE MESYLATE 1 MG: 1 TABLET ORAL at 08:07

## 2019-04-24 ASSESSMENT — ACTIVITIES OF DAILY LIVING (ADL)
ORAL_HYGIENE: INDEPENDENT
DRESS: SCRUBS (BEHAVIORAL HEALTH)
HYGIENE/GROOMING: INDEPENDENT
DRESS: SCRUBS (BEHAVIORAL HEALTH);INDEPENDENT
LAUNDRY: UNABLE TO COMPLETE
ORAL_HYGIENE: INDEPENDENT
HYGIENE/GROOMING: INDEPENDENT
LAUNDRY: UNABLE TO COMPLETE

## 2019-04-24 NOTE — PLAN OF CARE
"48 Hour Nursing Assessment    SI/SIB/HI: denies  Hallucinations: denies  Depression: denies  Anxiety: denies  Other Symptoms: Pt states that his hands \"tremble sometimes\" in response to question about medication side effects.  Writer did not observe any hand trambling.    Shift Summary: Pt spent most of time in room writing, except for dinner time when he came out to the lounge to eat.  He denies all MH symptoms.  When asked if he is anxious he states \"anxious to get out of here.\"  However, he is also \"comfortable here\" as well.  Pt was compliant with medications.  He drank water from his own bottle rather than use cup that was filled by writer.    "

## 2019-04-24 NOTE — PROGRESS NOTES
PT isolative to room withdrawn from staff and peers. Pt denies SI, SIB, HI, AH, VH. No behavioral concerns to report.      04/23/19 3849   Behavioral Health   Hallucinations denies / not responding to hallucinations   Thinking poor concentration;distractable   Orientation person: oriented;place: oriented   Memory baseline memory   Insight poor;denial of illness   Judgement impaired   Eye Contact at examiner   Affect blunted, flat   Mood mood is calm   Physical Appearance/Attire appears stated age;untidy;disheveled   Hygiene neglected grooming - unclean body, hair, teeth   Suicidality other (see comments)  (none stated, none observed)   1. Wish to be Dead No   2. Non-Specific Active Suicidal Thoughts  No   Self Injury other (see comment)  (none stated none observed)   Activity isolative;withdrawn   Speech clear;coherent   Medication Sensitivity no observed side effects;no stated side effects   Psychomotor / Gait balanced;steady   Activities of Daily Living   Hygiene/Grooming independent   Oral Hygiene independent   Dress scrubs (behavioral health)   Laundry unable to complete   Room Organization independent

## 2019-04-24 NOTE — PLAN OF CARE
Problem: Behavioral Disturbance  Goal: Behavioral Disturbance  Outcome: Improving     Patient stated ?I am doing ok.  I am the model convict.?  When writer asked the patient if he felt like a convict, he replied ?No?.  He denied all mental health concerns and would like his haldol discontinued.  He contracted for safety. He did not appear responding to IS.  He stated he felt ?Satiated? with the unit.  He stated he ?forgot? why he was admitted.    Patient was isolative to his room, lying on his bed watching TV.  He took a shower with prompts.    Patient was compliant with his AM medications.  He declined VS check.  He denied acute medical concerns and medication side effects.

## 2019-04-24 NOTE — PROGRESS NOTES
Pt spent the shift in his room, coming out to get his meals and turn in his menu. When talking with pt, pt seemed slow to respond and the conversation was very brief. Pt appeared tense but was calm and cooperative and there were no concerns regarding elopement nor aggression. No SI/SIB/HI nor any AVH noted or observed.     04/23/19 1500   Behavioral Health   Hallucinations denies / not responding to hallucinations   Thinking poor concentration;distractable   Orientation person: oriented;time: oriented   Memory baseline memory   Insight poor;denial of illness   Judgement impaired   Affect blunted, flat;tense   Mood mood is calm   Physical Appearance/Attire untidy;disheveled;appears stated age   Hygiene neglected grooming - unclean body, hair, teeth   Suicidality other (see comments)  (none stated)   1. Wish to be Dead No   2. Non-Specific Active Suicidal Thoughts  No   Self Injury other (see comment)  (none stated nor observed)   Activity withdrawn;isolative   Speech clear;coherent   Medication Sensitivity no observed side effects;no stated side effects   Psychomotor / Gait balanced;steady   Activities of Daily Living   Hygiene/Grooming independent;prompts   Oral Hygiene prompts;independent   Dress scrubs (behavioral health)   Room Organization independent

## 2019-04-24 NOTE — PROGRESS NOTES
"[facility], [place]   Psychiatric Progress Note  Ger Bashir  [id number]  04/24/19    Chief Complaint: Continued medical care          Interim History:   The patient's care was discussed with the treatment team during the daily team meeting and/or staff's chart notes were reviewed.  Staff report patient has been keeping to himself on the unit and slept about 5 hours.    Upon visiting with him he says that he is feeling good he is frustrated by being forced to be in the hospital he does not enjoy taking the haloperidol because of the tremors that he has.  He is not sure of any other medications that were potentially helpful for his symptoms yet did not give him side effects.  He is ready to go back to his apartment he is not hopeless or helpless is not interested in working with the ACT team.  He denies any thoughts of harming himself or others any sleep problems paranoia or any hopelessness or helplessness.  Denies any anxiety or hallucinations or attention or concentration issues.  He does not believe he has noticed any memory deficits per mention by the outpatient treatment team.         Medications:       aspirin  81 mg Oral Daily     benztropine  1 mg Oral BID     carboxymethylcellulose PF  1 drop Both Eyes 4x Daily     eucerin   Topical BID     haloperidol  10 mg Oral At Bedtime    Or     OLANZapine  10 mg Intramuscular At Bedtime     haloperidol decanoate  100 mg Intramuscular Q30 Days     levothyroxine  50 mcg Oral QAM AC     multivitamin w/minerals  1 tablet Oral Daily     traZODone  100 mg Oral At Bedtime          Allergies:     Allergies   Allergen Reactions     Peas GI Disturbance     Black eyed peas - vomiting          Labs:   No results found for this or any previous visit (from the past 24 hour(s)).       Psychiatric Examination:     /67   Pulse 66   Temp 97.9  F (36.6  C) (Tympanic)   Resp 16   Ht 1.753 m (5' 9\")   Wt 103.1 kg (227 lb 4.8 oz)   SpO2 95% "   BMI 33.57 kg/m    Weight is 227 lbs 4.8 oz  Body mass index is 33.57 kg/m .                Sitting Orthostatic BP: 110/66      Sitting Orthostatic Pulse: 74 bpm      Standing Orthostatic BP: 117/79      Standing Orthostatic Pulse: 89 bpm       Weight over time:  Vitals:    04/18/19 0209   Weight: 103.1 kg (227 lb 4.8 oz)       Orthostatic Vitals       Most Recent      Sitting Orthostatic /66 04/23 0830    Sitting Orthostatic Pulse (bpm) 74 04/23 0830    Standing Orthostatic /79 04/23 0830    Standing Orthostatic Pulse (bpm) 89 04/23 0830            Cardiometabolic risk assessment. 04/24/19      Reviewed patient profile for cardiometabolic risk factors    Date taken /Value  REFERENCE RANGE   Abdominal Obesity  (Waist Circumference)   See nursing flowsheet Women ?35 in (88 cm)   Men ?40 in (102 cm)      Triglycerides  Triglycerides   Date Value Ref Range Status   04/19/2019 136 <150 mg/dL Final       ?150 mg/dL (1.7 mmol/L) or current treatment for elevated triglycerides   HDL cholesterol  HDL Cholesterol   Date Value Ref Range Status   04/19/2019 32 (L) >39 mg/dL Final   ]   Women <50 mg/dL (1.3 mmol/L) in women or current treatment for low HDL cholesterol  Men <40 mg/dL (1 mmol/L) in men or current treatment for low HDL cholesterol     Fasting plasma glucose (FPG) Lab Results   Component Value Date    GLC 93 04/18/2019      FPG ?100 mg/dL (5.6 mmol/L) or treatment for elevated blood glucose   Blood pressure  BP Readings from Last 3 Encounters:   04/22/19 105/67   05/25/12 119/55   04/20/12 117/78    Blood pressure ?130/85 mmHg or treatment for elevated blood pressure   Family History  See family history         Ger is a 69-year-old male with white hair he is also overweight.  His speech is of an appropriate rate and tone in his behavior is only notable for tremors.  His affect is neutral.  His mood he describes as good.  His thought content consists of the above without thoughts of harming himself or  others and he does not describe any current delusions.  His thought process is logical without looseness of association.  Does not appear to have abnormal perceptions.  He is alert and aware of his current location and circumstances.  His attention and concentration appear adequate.  His cognition and fund of knowledge appear normal.  His long-term/short-term/remote memory appear intact.  His insight and judgment are both limited.         Precautions:     Behavioral Orders   Procedures     Assault precautions     Code 1 - Restrict to Unit     Elopement precautions     Fall precautions     Routine Programming     As clinically indicated     Sexual precautions     Single Room     Status 15     Every 15 minutes.          DIagnoses:     Schizoaffective disorder bipolar type  Tobacco use disorder  Alcohol use disorder  Rule out major neurocognitive disorder secondary to dementia         Assessment & Plan:     Ger was sent to station 12 for severely intrusive behaviors and threatening other staff on a different unit.  He has not been doing so on this unit and primarily keeps to himself.  He has received his long-acting injectable and appears to tolerating it well other than some tremor as a side effect.  Program conversations with his ACT team about discharge planning and if he will go back to his apartment.    Continue commitment with Bryce  Received haloperidol 100 mg on 4/22    The risks, benefits, alternatives and side effects have been discussed and are understood by the patient and other caregivers.      Sunil Skinner  [facility]      The following document has been created with voice recognition software and may contain unintentional word substitutions.    Non clinically relevant CMS requirements:  Clinical Global Impressions  First:  Considering your total clinical experience with this particular patient population, how severe are the patient's symptoms at this time?: 7 (04/18/19  0825)  Compared to the patient's condition at the START of treatment, this patient's condition is:: 7 (04/18/19 0825)  Most recent:  Considering your total clinical experience with this particular patient population, how severe are the patient's symptoms at this time?: 7 (04/18/19 0825)  Compared to the patient's condition at the START of treatment, this patient's condition is:: 7 (04/18/19 0825)

## 2019-04-24 NOTE — PROGRESS NOTES
This writer spoke with pt's ACT  Odalys. Phone: [phone].  She is going to send over commitment and oliver records since they are not in our system after his transfer.      She said that pt has been refusing to work with their ACT team so they are going to refer him to a new one.  However, they are not sure that will be in place by the time he discharge's.

## 2019-04-25 PROCEDURE — 25000132 ZZH RX MED GY IP 250 OP 250 PS 637: Performed by: NURSE PRACTITIONER

## 2019-04-25 PROCEDURE — 12400001 ZZH R&B MH UMMC

## 2019-04-25 PROCEDURE — 25000132 ZZH RX MED GY IP 250 OP 250 PS 637: Performed by: PSYCHIATRY & NEUROLOGY

## 2019-04-25 RX ADMIN — HALOPERIDOL 10 MG: 10 TABLET ORAL at 20:33

## 2019-04-25 RX ADMIN — CARBOXYMETHYLCELLULOSE SODIUM 1 DROP: 5 SOLUTION/ DROPS OPHTHALMIC at 10:16

## 2019-04-25 RX ADMIN — LEVOTHYROXINE SODIUM 50 MCG: 50 TABLET ORAL at 10:17

## 2019-04-25 RX ADMIN — CARBOXYMETHYLCELLULOSE SODIUM 1 DROP: 5 SOLUTION/ DROPS OPHTHALMIC at 16:12

## 2019-04-25 RX ADMIN — CARBOXYMETHYLCELLULOSE SODIUM 1 DROP: 5 SOLUTION/ DROPS OPHTHALMIC at 13:09

## 2019-04-25 RX ADMIN — ASPIRIN 81 MG: 81 TABLET, COATED ORAL at 10:17

## 2019-04-25 RX ADMIN — MULTIPLE VITAMINS W/ MINERALS TAB 1 TABLET: TAB at 10:17

## 2019-04-25 RX ADMIN — BENZTROPINE MESYLATE 1 MG: 1 TABLET ORAL at 20:33

## 2019-04-25 RX ADMIN — BENZTROPINE MESYLATE 1 MG: 1 TABLET ORAL at 10:16

## 2019-04-25 RX ADMIN — TRAZODONE HYDROCHLORIDE 100 MG: 50 TABLET ORAL at 20:33

## 2019-04-25 ASSESSMENT — ACTIVITIES OF DAILY LIVING (ADL)
HYGIENE/GROOMING: INDEPENDENT
ORAL_HYGIENE: INDEPENDENT
DRESS: SCRUBS (BEHAVIORAL HEALTH)
LAUNDRY: UNABLE TO COMPLETE

## 2019-04-25 ASSESSMENT — MIFFLIN-ST. JEOR: SCORE: 1803.65

## 2019-04-25 NOTE — PROGRESS NOTES
04/24/19 2100   Behavioral Health   Hallucinations denies / not responding to hallucinations   Thinking poor concentration   Orientation person: oriented;place: oriented;date: oriented;time: oriented   Memory baseline memory   Insight denial of illness   Judgement impaired   Eye Contact at examiner   Affect blunted, flat   Mood mood is calm   Physical Appearance/Attire untidy   Hygiene neglected grooming - unclean body, hair, teeth   Suicidality other (see comments)  (Denies)   1. Wish to be Dead No   2. Non-Specific Active Suicidal Thoughts  No   Self Injury other (see comment)  (Denies)   Activity withdrawn;isolative     Pt. Calm and Cooperative with staff. Pt. Appropriate with peers. Pt. Withdrawn and isolative to room throughout shift. Pt. Appears disheveled and older than age. Pt. Sleeping and eating habits are standard. No SI or SIB.

## 2019-04-26 PROCEDURE — 25000132 ZZH RX MED GY IP 250 OP 250 PS 637: Performed by: NURSE PRACTITIONER

## 2019-04-26 PROCEDURE — 99232 SBSQ HOSP IP/OBS MODERATE 35: CPT | Performed by: PSYCHIATRY & NEUROLOGY

## 2019-04-26 PROCEDURE — 12400001 ZZH R&B MH UMMC

## 2019-04-26 PROCEDURE — 25000132 ZZH RX MED GY IP 250 OP 250 PS 637: Performed by: PSYCHIATRY & NEUROLOGY

## 2019-04-26 RX ADMIN — MULTIPLE VITAMINS W/ MINERALS TAB 1 TABLET: TAB at 08:55

## 2019-04-26 RX ADMIN — BENZTROPINE MESYLATE 1 MG: 1 TABLET ORAL at 08:55

## 2019-04-26 RX ADMIN — HALOPERIDOL 10 MG: 10 TABLET ORAL at 20:28

## 2019-04-26 RX ADMIN — BENZTROPINE MESYLATE 1 MG: 1 TABLET ORAL at 20:28

## 2019-04-26 RX ADMIN — CARBOXYMETHYLCELLULOSE SODIUM 1 DROP: 5 SOLUTION/ DROPS OPHTHALMIC at 08:56

## 2019-04-26 RX ADMIN — LEVOTHYROXINE SODIUM 50 MCG: 50 TABLET ORAL at 08:55

## 2019-04-26 RX ADMIN — TRAZODONE HYDROCHLORIDE 100 MG: 50 TABLET ORAL at 20:28

## 2019-04-26 RX ADMIN — ASPIRIN 81 MG: 81 TABLET, COATED ORAL at 08:55

## 2019-04-26 ASSESSMENT — ACTIVITIES OF DAILY LIVING (ADL)
ORAL_HYGIENE: INDEPENDENT
ORAL_HYGIENE: INDEPENDENT
HYGIENE/GROOMING: INDEPENDENT
DRESS: SCRUBS (BEHAVIORAL HEALTH);INDEPENDENT
LAUNDRY: UNABLE TO COMPLETE
DRESS: SCRUBS (BEHAVIORAL HEALTH);INDEPENDENT
HYGIENE/GROOMING: INDEPENDENT

## 2019-04-26 NOTE — PROGRESS NOTES
Pt isolative to room, withdrawn from staff and peers. Pt denies SI, SIB, and HI. Pt eating in good amounts. Pt denies AH, VH. No behavioral concerns to report this evening.      04/25/19 2227   Behavioral Health   Hallucinations denies / not responding to hallucinations   Thinking poor concentration   Orientation person: oriented;date: oriented;place: oriented   Memory baseline memory   Insight poor   Judgement impaired   Eye Contact at examiner   Affect blunted, flat   Mood mood is calm   Physical Appearance/Attire appears stated age;attire appropriate to age and situation   Hygiene neglected grooming - unclean body, hair, teeth   Suicidality other (see comments)  (none stated none observed)   1. Wish to be Dead No   2. Non-Specific Active Suicidal Thoughts  No   Self Injury other (see comment)  (none stated none observed)   Activity withdrawn;isolative   Speech clear;coherent   Medication Sensitivity no observed side effects;no stated side effects   Psychomotor / Gait balanced;steady   Activities of Daily Living   Hygiene/Grooming independent   Oral Hygiene independent   Dress scrubs (behavioral health)   Laundry unable to complete   Room Organization independent

## 2019-04-26 NOTE — PROGRESS NOTES
This writer left  for pt's ACT team worker Odalys - informed her that this patient is doing well and we wanted to inform them we will be looking at a discharge likely next week.

## 2019-04-26 NOTE — PROGRESS NOTES
[facility], [place]   Psychiatric Progress Note  Ger Bashir  [id number]  04/26/19    Chief Complaint: Continued medical care          Interim History:   The patient's care was discussed with the treatment team during the daily team meeting and/or staff's chart notes were reviewed.  Staff report patient has been primarily staying to himself in his room slept about 2 hours overnight.    Upon meeting with him says that he is feeling all right still has problems with tremor from haloperidol.  Is still not interested in working with his ACT team outside of the hospital.  He does understand that he might lose his apartment if his behaviors and symptoms continue.  He denies any thoughts of harming himself or others any hopelessness or helplessness hallucinations or paranoia.  Denies any sadness attention or concentration issues or sleep problems and wants to go back to his apartment.  He does not have any current questions or concerns and is not anxious or scared about anything.  It does not mention any other side effects or problems while being on the unit.  Discussed with him in detriments of being in his room for much of the day that might be having a impact on his sleep at night.  We need to make sure that he is active and still getting some form of exercise.         Medications:       aspirin  81 mg Oral Daily     benztropine  1 mg Oral BID     carboxymethylcellulose PF  1 drop Both Eyes 4x Daily     eucerin   Topical BID     haloperidol  10 mg Oral At Bedtime    Or     OLANZapine  10 mg Intramuscular At Bedtime     haloperidol decanoate  100 mg Intramuscular Q30 Days     levothyroxine  50 mcg Oral QAM AC     multivitamin w/minerals  1 tablet Oral Daily     traZODone  100 mg Oral At Bedtime          Allergies:     Allergies   Allergen Reactions     Peas GI Disturbance     Black eyed peas - vomiting          Labs:   No results found for this or any previous visit (from the past 24  "hour(s)).       Psychiatric Examination:     /69   Pulse 65   Temp 98.4  F (36.9  C) (Tympanic)   Resp 16   Ht 1.753 m (5' 9\")   Wt 104.3 kg (230 lb)   SpO2 94%   BMI 33.97 kg/m    Weight is 230 lbs 0 oz  Body mass index is 33.97 kg/m .                Sitting Orthostatic BP: 110/67      Sitting Orthostatic Pulse: 90 bpm      Standing Orthostatic BP: 110/72      Standing Orthostatic Pulse: 99 bpm       Weight over time:  Vitals:    04/18/19 0209 04/25/19 0954   Weight: 103.1 kg (227 lb 4.8 oz) 104.3 kg (230 lb)       Orthostatic Vitals       Most Recent      Sitting Orthostatic /67 04/25 1900    Sitting Orthostatic Pulse (bpm) 90 04/25 1900    Standing Orthostatic /72 04/25 1900    Standing Orthostatic Pulse (bpm) 99 04/25 1900            Cardiometabolic risk assessment. 04/26/19      Reviewed patient profile for cardiometabolic risk factors    Date taken /Value  REFERENCE RANGE   Abdominal Obesity  (Waist Circumference)   See nursing flowsheet Women ?35 in (88 cm)   Men ?40 in (102 cm)      Triglycerides  Triglycerides   Date Value Ref Range Status   04/19/2019 136 <150 mg/dL Final       ?150 mg/dL (1.7 mmol/L) or current treatment for elevated triglycerides   HDL cholesterol  HDL Cholesterol   Date Value Ref Range Status   04/19/2019 32 (L) >39 mg/dL Final   ]   Women <50 mg/dL (1.3 mmol/L) in women or current treatment for low HDL cholesterol  Men <40 mg/dL (1 mmol/L) in men or current treatment for low HDL cholesterol     Fasting plasma glucose (FPG) Lab Results   Component Value Date    GLC 93 04/18/2019      FPG ?100 mg/dL (5.6 mmol/L) or treatment for elevated blood glucose   Blood pressure  BP Readings from Last 3 Encounters:   04/26/19 108/69   05/25/12 119/55   04/20/12 117/78    Blood pressure ?130/85 mmHg or treatment for elevated blood pressure   Family History  See family history         Ger is a 69-year-old male with white hair he is also overweight.  His speech is of an " appropriate rate and tone in his behavior is only notable for tremors.  His affect is neutral.  His mood he describes as good.  His thought content consists of the above without thoughts of harming himself or others and he does not describe any current delusions.  His thought process is logical without looseness of association.  Does not appear to have abnormal perceptions.  He is alert and aware of his current location and circumstances.  His attention and concentration appear adequate.  His cognition and fund of knowledge appear normal.  His long-term/short-term/remote memory appear intact.  His insight and judgment are both limited.         Precautions:     Behavioral Orders   Procedures     Assault precautions     Code 1 - Restrict to Unit     Elopement precautions     Fall precautions     Routine Programming     As clinically indicated     Sexual precautions     Single Room     Status 15     Every 15 minutes.          DIagnoses:     Schizoaffective disorder bipolar type  Tobacco use disorder  Alcohol use disorder  Rule out major neurocognitive disorder secondary to dementia         Assessment & Plan:     Ger has been doing well on station 12 after being transferred here.  He does find much of his time in his room and we discussed how it might be beneficial for him to be out of bed more during the day so he can sleep better at night.  He is still experiencing some tremors from the haloperidol but it sounds as if that is his baseline and that haloperidol does assist him with his symptoms.  Unclear as to why the ACT team will transfer him to a different team in which he will still not want to work with him.  Discussed how his apartment may kick him out if these behaviors continue.  Feel it is reasonable to discharge him back to his apartment with his ACT team.  If he does lose his housing he will likely be back in the hospital.  No changes were made during my time with him.    Continue commitment with  Bryce  Received haloperidol 100 mg on 4/22    The risks, benefits, alternatives and side effects have been discussed and are understood by the patient and other caregivers.      Suinl Skinner  [facility]      The following document has been created with voice recognition software and may contain unintentional word substitutions.    Non clinically relevant CMS requirements:  Clinical Global Impressions  First:  Considering your total clinical experience with this particular patient population, how severe are the patient's symptoms at this time?: 7 (04/18/19 0825)  Compared to the patient's condition at the START of treatment, this patient's condition is:: 7 (04/18/19 0825)  Most recent:  Considering your total clinical experience with this particular patient population, how severe are the patient's symptoms at this time?: 2 (04/26/19 0744)  Compared to the patient's condition at the START of treatment, this patient's condition is:: 2 (04/26/19 0744)

## 2019-04-26 NOTE — PLAN OF CARE
Patient presents as quiet, guarded, and socially withdrawn.  He is calm, pleasant, and cooperative on approach.  His affect is blunted.  He denies that he is experiencing any manic, psychotic, or depressive symptoms.  He did not make any overtly delusional statements in our interactions today.  He accepted all of his psychiatric medications without incident.  He continues to spend most of his time isolating in his room, napping and resting in bed.  He slept poorly last night with only 2 hours noted on the NOC shift; however, this sleep disturbance appears rooted in poor sleep hygiene.  Ger notes a bilateral hand tremor, which he attributes to Haldol; he reports that this is improving somewhat and currently not bothersome to him.  He denies any acute physical concerns.  VSS.

## 2019-04-27 PROCEDURE — 25000132 ZZH RX MED GY IP 250 OP 250 PS 637: Performed by: NURSE PRACTITIONER

## 2019-04-27 PROCEDURE — 25000132 ZZH RX MED GY IP 250 OP 250 PS 637: Performed by: PSYCHIATRY & NEUROLOGY

## 2019-04-27 PROCEDURE — 12400001 ZZH R&B MH UMMC

## 2019-04-27 RX ADMIN — HALOPERIDOL 10 MG: 10 TABLET ORAL at 20:36

## 2019-04-27 RX ADMIN — BENZTROPINE MESYLATE 1 MG: 1 TABLET ORAL at 08:22

## 2019-04-27 RX ADMIN — LEVOTHYROXINE SODIUM 50 MCG: 50 TABLET ORAL at 08:22

## 2019-04-27 RX ADMIN — MULTIPLE VITAMINS W/ MINERALS TAB 1 TABLET: TAB at 08:22

## 2019-04-27 RX ADMIN — BENZTROPINE MESYLATE 1 MG: 1 TABLET ORAL at 20:36

## 2019-04-27 RX ADMIN — TRAZODONE HYDROCHLORIDE 100 MG: 50 TABLET ORAL at 20:37

## 2019-04-27 RX ADMIN — ASPIRIN 81 MG: 81 TABLET, COATED ORAL at 08:22

## 2019-04-27 ASSESSMENT — ACTIVITIES OF DAILY LIVING (ADL)
ORAL_HYGIENE: INDEPENDENT
LAUNDRY: UNABLE TO COMPLETE
HYGIENE/GROOMING: INDEPENDENT
LAUNDRY: UNABLE TO COMPLETE
DRESS: SCRUBS (BEHAVIORAL HEALTH)
DRESS: SCRUBS (BEHAVIORAL HEALTH)
HYGIENE/GROOMING: INDEPENDENT
ORAL_HYGIENE: INDEPENDENT

## 2019-04-27 NOTE — PROGRESS NOTES
04/27/19 1456   Behavioral Health   Hallucinations denies / not responding to hallucinations;other (see comment)   Thinking distractable;other (see comment)   Orientation place: oriented;person: oriented   Memory baseline memory   Insight poor   Judgement impaired   Eye Contact at examiner   Affect blunted, flat;other (see comments)   Mood mood is calm   Hygiene other (see comment)   1. Wish to be Dead No   2. Non-Specific Active Suicidal Thoughts  No   Activities of Daily Living   Hygiene/Grooming independent   Oral Hygiene independent   Dress scrubs (behavioral health)   Laundry unable to complete   Room Organization independent   Pt was isolative in his room for most of this shift, caming out of his room only to get meals and ask for headphone. He was overall calm and was in a pleasant mood. Patient did not take a shower during this shift and he denies SI/SIB

## 2019-04-27 NOTE — PROGRESS NOTES
Pt isolated to room for majority of this shift. Pt affect was blunted & flat, mood was calm. Pt was bothered when staff asked him to leave his room during room checks, but did comply with staff request a few minutes later. Pt denies SI/SIB/HI and denies AH/VH. There were no other significant behavioral concerns to report during this shift.     04/26/19 6096   Behavioral Health   Hallucinations denies / not responding to hallucinations   Thinking distractable   Orientation person: oriented;place: oriented;date: oriented;time: oriented   Memory baseline memory   Insight poor   Judgement impaired   Eye Contact at examiner   Affect blunted, flat   Mood mood is calm   Physical Appearance/Attire disheveled;untidy   Hygiene neglected grooming - unclean body, hair, teeth   Suicidality other (see comments)  (Denies)   1. Wish to be Dead No   2. Non-Specific Active Suicidal Thoughts  No   Self Injury other (see comment)  (none stated; none observed)   Elopement Statements about wanting to leave   Activity isolative;withdrawn   Speech clear;coherent   Medication Sensitivity no stated side effects;no observed side effects   Psychomotor / Gait balanced;steady   Psycho Education   Type of Intervention 1:1 intervention   Response participates with encouragement   Hours 0.5   Treatment Detail   (check-in)   Activities of Daily Living   Hygiene/Grooming independent   Oral Hygiene independent   Dress scrubs (behavioral health);independent   Laundry unable to complete   Room Organization independent

## 2019-04-28 PROCEDURE — 25000132 ZZH RX MED GY IP 250 OP 250 PS 637: Performed by: PSYCHIATRY & NEUROLOGY

## 2019-04-28 PROCEDURE — 25000132 ZZH RX MED GY IP 250 OP 250 PS 637: Performed by: NURSE PRACTITIONER

## 2019-04-28 PROCEDURE — 12400001 ZZH R&B MH UMMC

## 2019-04-28 RX ADMIN — BENZTROPINE MESYLATE 1 MG: 1 TABLET ORAL at 21:22

## 2019-04-28 RX ADMIN — HALOPERIDOL 10 MG: 10 TABLET ORAL at 21:22

## 2019-04-28 RX ADMIN — LEVOTHYROXINE SODIUM 50 MCG: 50 TABLET ORAL at 08:03

## 2019-04-28 RX ADMIN — MULTIPLE VITAMINS W/ MINERALS TAB 1 TABLET: TAB at 08:03

## 2019-04-28 RX ADMIN — BENZTROPINE MESYLATE 1 MG: 1 TABLET ORAL at 08:03

## 2019-04-28 RX ADMIN — TRAZODONE HYDROCHLORIDE 100 MG: 50 TABLET ORAL at 21:21

## 2019-04-28 RX ADMIN — ASPIRIN 81 MG: 81 TABLET, COATED ORAL at 08:04

## 2019-04-28 ASSESSMENT — ACTIVITIES OF DAILY LIVING (ADL)
DRESS: SCRUBS (BEHAVIORAL HEALTH)
ORAL_HYGIENE: INDEPENDENT
HYGIENE/GROOMING: INDEPENDENT
ORAL_HYGIENE: INDEPENDENT
HYGIENE/GROOMING: INDEPENDENT
DRESS: SCRUBS (BEHAVIORAL HEALTH)
LAUNDRY: UNABLE TO COMPLETE

## 2019-04-28 NOTE — PLAN OF CARE
Problem: Adult Inpatient Plan of Care  Goal: Plan of Care Review  4/27/2019 2107 by Aylin Martin RN  Outcome: Improving  4/27/2019 2105 by Aylin Martin RN  Outcome: Improving  Goal: Patient-Specific Goal (Individualization)  4/27/2019 2108 by Aylin Martin RN  Outcome: Improving  4/27/2019 2107 by Aylin Martin RN  Outcome: Improving  4/27/2019 2105 by Aylin Martin RN  Outcome: Improving  Goal: Absence of Hospital-Acquired Illness or Injury  4/27/2019 2108 by Aylin Martin RN  Outcome: Improving  4/27/2019 2107 by Aylin Martin RN  Outcome: Improving  4/27/2019 2105 by Aylin Martin RN  Outcome: Improving  Goal: Optimal Comfort and Wellbeing  4/27/2019 2108 by Aylin Martin RN  Outcome: Improving  4/27/2019 2107 by Aylin Martin RN  Outcome: Improving  4/27/2019 2105 by Aylin Martin RN  Outcome: Improving  Goal: Readiness for Transition of Care  4/27/2019 2108 by Aylin Martin RN  Outcome: Improving  4/27/2019 2107 by Aylin Martin RN  Outcome: Improving  4/27/2019 2105 by Aylin Martin RN  Outcome: Improving  Goal: Rounds/Family Conference  Outcome: Improving     Problem: General Plan of Care (Inpatient Behavioral)  Goal: Individualization/Patient Specific Goal (IP Behavioral)  Description  The patient and/or their representative will achieve their patient-specific goals related to the plan of care.    The patient-specific goals include:    Reasons you are in the hospital:  1)  Brought in by police    Goals for Discharge:  I want to leave as soon as possible   Outcome: Improving  Goal: Patient Schedule  Description  Patient's Schedule:  Outcome: Improving  Goal: Release of Information  Outcome: Improving  Goal: Team Discussion  Description  Team Plan:  Outcome: Improving  Goal: Discharge Planning  Outcome: Improving  Goal: Physician Review  Outcome: Improving  Goal: Plan of Care Review (Adult,OB,Behavioral)  Description  The patient and/or their representative will  "communicate an understanding of their plan of care.  Outcome: Improving     Problem: Behavioral Disturbance  Goal: Behavioral Disturbance  Description  Signs and symptoms of listed problems will be absent or manageable by discharge or transition of care.  Pt will demonstrate reality based thought process as evidenced by :  1. Able to distinguish between reality and hallucinations/ delusions  2.Absence of delusions and paranoid thoughts.  3.Able to sleep for at least 7 hours / night.  4. Compliant with taking his medications.  5. Agreeable to treatment plan.  6. Participating in Unit programming and activities.  7.Able to socialize with staff and peers.  8.Absence of violent behavior.  9.Afetr care in place   4/27/2019 2108 by Aylin Martin RN  Outcome: Improving  4/27/2019 2107 by Aylin Martin RN  Outcome: Improving  4/27/2019 2105 by Aylin Martin RN  Outcome: Improving     Problem: Suicidal Behavior  Goal: Suicidal Behavior is Absent or Managed  4/27/2019 2107 by Aylin Martin RN  Outcome: Improving  4/27/2019 2105 by Aylin Martin RN  Outcome: Improving     Problem: Adult Behavioral Health Plan of Care  Goal: Team Discussion  Description  Team Plan:  Outcome: Improving  Goal: Plan of Care Review  Outcome: Improving  Goal: Patient-Specific Goal (Individualization)  Outcome: Improving  Goal: Adheres to Safety Considerations for Self and Others  Outcome: Improving  Goal: Optimized Coping Skills in Response to Life Stressors  Outcome: Improving  Goal: Develops/Participates in Therapeutic [place] to Support Successful Transition  Outcome: Improving  Goal: Rounds/Family Conference  Outcome: Improving    Pt has been in  room lying on bed watching TV the entire evening.  He came and got his meal and ate in room.  He is pleasant upon approach.  Does not socialize.  Does not engage in conversation.  He denies psych symptoms.  States he's doing \"Ok\".       "

## 2019-04-28 NOTE — PLAN OF CARE
Problem: Adult Inpatient Plan of Care  Goal: Plan of Care Review  4/27/2019 2107 by Aylin Martin RN  Outcome: Improving  4/27/2019 2105 by Aylin Martin RN  Outcome: Improving  Goal: Patient-Specific Goal (Individualization)  4/27/2019 2108 by Aylin Martin RN  Outcome: Improving  4/27/2019 2107 by Aylin Martin RN  Outcome: Improving  4/27/2019 2105 by Aylin Martin RN  Outcome: Improving  Goal: Absence of Hospital-Acquired Illness or Injury  4/27/2019 2108 by Aylin Martin RN  Outcome: Improving  4/27/2019 2107 by Aylin Martin RN  Outcome: Improving  4/27/2019 2105 by Aylin Martin RN  Outcome: Improving  Goal: Optimal Comfort and Wellbeing  4/27/2019 2242 by Aylin Martin RN  Outcome: Improving  4/27/2019 2108 by Aylin Martin RN  Outcome: Improving  4/27/2019 2107 by Aylin Martin RN  Outcome: Improving  4/27/2019 2105 by Aylin Martin RN  Outcome: Improving  Goal: Readiness for Transition of Care  4/27/2019 2242 by Aylin Martin RN  Outcome: Improving  4/27/2019 2108 by Aylin Martin RN  Outcome: Improving  4/27/2019 2107 by Aylin Martin RN  Outcome: Improving  4/27/2019 2105 by Aylin Martin RN  Outcome: Improving  Goal: Rounds/Family Conference  Outcome: Improving     Problem: General Plan of Care (Inpatient Behavioral)  Goal: Individualization/Patient Specific Goal (IP Behavioral)  Description  The patient and/or their representative will achieve their patient-specific goals related to the plan of care.    The patient-specific goals include:    Reasons you are in the hospital:  1)  Brought in by police    Goals for Discharge:  I want to leave as soon as possible   Outcome: Improving  Goal: Patient Schedule  Description  Patient's Schedule:  Outcome: Improving  Goal: Release of Information  Outcome: Improving  Goal: Team Discussion  Description  Team Plan:  Outcome: Improving  Goal: Discharge Planning  Outcome: Improving  Goal: Physician Review  Outcome:  Improving  Goal: Plan of Care Review (Adult,OB,Behavioral)  Description  The patient and/or their representative will communicate an understanding of their plan of care.  Outcome: Improving     Problem: Behavioral Disturbance  Goal: Behavioral Disturbance  Description  Signs and symptoms of listed problems will be absent or manageable by discharge or transition of care.  Pt will demonstrate reality based thought process as evidenced by :  1. Able to distinguish between reality and hallucinations/ delusions  2.Absence of delusions and paranoid thoughts.  3.Able to sleep for at least 7 hours / night.  4. Compliant with taking his medications.  5. Agreeable to treatment plan.  6. Participating in Unit programming and activities.  7.Able to socialize with staff and peers.  8.Absence of violent behavior.  9.Afetr care in place   4/27/2019 2242 by Aylin Martin RN  Outcome: Improving  4/27/2019 2108 by Aylin Martin RN  Outcome: Improving  4/27/2019 2107 by Aylin Martin RN  Outcome: Improving  4/27/2019 2105 by Aylin Martin RN  Outcome: Improving     Problem: Suicidal Behavior  Goal: Suicidal Behavior is Absent or Managed  4/27/2019 2242 by Aylin Martin RN  Outcome: Improving  4/27/2019 2107 by Aylin Martin RN  Outcome: Improving  4/27/2019 2105 by Aylin Martin RN  Outcome: Improving     Problem: Adult Behavioral Health Plan of Care  Goal: Team Discussion  Description  Team Plan:  Outcome: Improving  Goal: Plan of Care Review  Outcome: Improving  Goal: Patient-Specific Goal (Individualization)  Outcome: Improving  Goal: Adheres to Safety Considerations for Self and Others  Outcome: Improving  Goal: Optimized Coping Skills in Response to Life Stressors  Outcome: Improving  Goal: Develops/Participates in Therapeutic [place] to Support Successful Transition  Outcome: Improving  Goal: Rounds/Family Conference  Outcome: Improving    Pt has been in  room lying on bed watching TV the entire evening.   "He came and got his meal and ate in room.  He is pleasant upon approach.  Does not socialize.  Does not engage in conversation.  He denies psych symptoms.  States he's doing \"Ok\".         "

## 2019-04-29 PROCEDURE — 25000132 ZZH RX MED GY IP 250 OP 250 PS 637: Performed by: PSYCHIATRY & NEUROLOGY

## 2019-04-29 PROCEDURE — 99231 SBSQ HOSP IP/OBS SF/LOW 25: CPT | Performed by: PSYCHIATRY & NEUROLOGY

## 2019-04-29 PROCEDURE — 25000132 ZZH RX MED GY IP 250 OP 250 PS 637: Performed by: NURSE PRACTITIONER

## 2019-04-29 PROCEDURE — 12400001 ZZH R&B MH UMMC

## 2019-04-29 RX ORDER — HALOPERIDOL 5 MG/1
10 TABLET ORAL AT BEDTIME
Qty: 30 TABLET | Refills: 1 | Status: ON HOLD | OUTPATIENT
Start: 2019-04-29 | End: 2019-10-02

## 2019-04-29 RX ORDER — HALOPERIDOL DECANOATE 100 MG/ML
100 INJECTION INTRAMUSCULAR
Status: ON HOLD
Start: 2019-05-06 | End: 2019-10-02

## 2019-04-29 RX ORDER — LEVOTHYROXINE SODIUM 50 UG/1
50 TABLET ORAL DAILY
Qty: 30 TABLET | Refills: 1 | Status: ON HOLD | OUTPATIENT
Start: 2019-04-29 | End: 2019-10-02

## 2019-04-29 RX ORDER — HALOPERIDOL DECANOATE 100 MG/ML
100 INJECTION INTRAMUSCULAR
Status: DISCONTINUED | OUTPATIENT
Start: 2019-05-06 | End: 2019-04-30 | Stop reason: HOSPADM

## 2019-04-29 RX ORDER — TRAZODONE HYDROCHLORIDE 100 MG/1
100 TABLET ORAL AT BEDTIME
Qty: 30 TABLET | Refills: 1 | Status: ON HOLD | OUTPATIENT
Start: 2019-04-29 | End: 2019-10-02

## 2019-04-29 RX ORDER — BENZTROPINE MESYLATE 1 MG/1
1 TABLET ORAL 2 TIMES DAILY
Qty: 60 TABLET | Refills: 1 | Status: ON HOLD | OUTPATIENT
Start: 2019-04-29 | End: 2019-10-02

## 2019-04-29 RX ORDER — MULTIPLE VITAMINS W/ MINERALS TAB 9MG-400MCG
1 TAB ORAL DAILY
Qty: 30 TABLET | Refills: 1 | Status: ON HOLD | OUTPATIENT
Start: 2019-04-29 | End: 2019-10-02

## 2019-04-29 RX ORDER — ASPIRIN 81 MG/1
81 TABLET, CHEWABLE ORAL DAILY
Qty: 30 TABLET | Refills: 1 | Status: ON HOLD | OUTPATIENT
Start: 2019-04-29 | End: 2019-10-02

## 2019-04-29 RX ADMIN — MULTIPLE VITAMINS W/ MINERALS TAB 1 TABLET: TAB at 08:24

## 2019-04-29 RX ADMIN — ASPIRIN 81 MG: 81 TABLET, COATED ORAL at 08:24

## 2019-04-29 RX ADMIN — BENZTROPINE MESYLATE 1 MG: 1 TABLET ORAL at 20:38

## 2019-04-29 RX ADMIN — LEVOTHYROXINE SODIUM 50 MCG: 50 TABLET ORAL at 08:24

## 2019-04-29 RX ADMIN — HALOPERIDOL 10 MG: 10 TABLET ORAL at 20:38

## 2019-04-29 RX ADMIN — TRAZODONE HYDROCHLORIDE 100 MG: 50 TABLET ORAL at 20:38

## 2019-04-29 RX ADMIN — BENZTROPINE MESYLATE 1 MG: 1 TABLET ORAL at 08:25

## 2019-04-29 ASSESSMENT — ACTIVITIES OF DAILY LIVING (ADL)
HYGIENE/GROOMING: INDEPENDENT
HYGIENE/GROOMING: INDEPENDENT
LAUNDRY: UNABLE TO COMPLETE
DRESS: SCRUBS (BEHAVIORAL HEALTH)
ORAL_HYGIENE: INDEPENDENT
ORAL_HYGIENE: INDEPENDENT
DRESS: INDEPENDENT;SCRUBS (BEHAVIORAL HEALTH)

## 2019-04-29 NOTE — PROGRESS NOTES
"[facility] [employer]   Psychiatric Progress Note  Hospital Day: 11        Interim History:   The patient's care was discussed with the treatment team during the daily team meeting and/or staff's chart notes were reviewed.  Staff report patient was isolative to his room due to unit acuity. Patient was overall calm and cooperative, pleasant.  Patient denies SI/SIB/HI. No seclusions or restraints.     Upon interview with the patient, he was quite pleasant and cooperative. Patient is requesting discharge. He denies all mental health symptoms, stating that he feels \"great.\" He does attribute improvement to Haldol. He said that he would be willing to work with his ACT team. He understands that if he declines to meet with them or refuses to take prescribed neuroleptic medications, he may be assessed and brought back to the hospital. He was informed that it would then be likely that inpatient team would advocate for higher level of care. Pt said that he \"much rather work with the ACT team and stay in my apartment than live in a group home.\" He said \"I have no problem working with the ACT team as long as they work with me.\" He continues to report tremor in both hands, though notes improvement since addition of scheduled Cogentin and oral Haldol dose reduction. He denies AH/VH/paranoia. He denies SI/HI. Hoping for discharge tomorrow or Wednesday. Was informed that we would be coordinating with ACT team.          Medications:       aspirin  81 mg Oral Daily     benztropine  1 mg Oral BID     carboxymethylcellulose PF  1 drop Both Eyes 4x Daily     eucerin   Topical BID     haloperidol  10 mg Oral At Bedtime    Or     OLANZapine  10 mg Intramuscular At Bedtime     [START ON 5/6/2019] haloperidol decanoate  100 mg Intramuscular Q14 Days     levothyroxine  50 mcg Oral QAM AC     multivitamin w/minerals  1 tablet Oral Daily     traZODone  100 mg Oral At Bedtime          Allergies:     Allergies " "  Allergen Reactions     Peas GI Disturbance     Black eyed peas - vomiting          Labs:   No results found for this or any previous visit (from the past 24 hour(s)).       Psychiatric Examination:     BP 97/67   Pulse 68   Temp 97.8  F (36.6  C) (Oral)   Resp 18   Ht 1.753 m (5' 9\")   Wt 104.3 kg (230 lb)   SpO2 94%   BMI 33.97 kg/m    Weight is 230 lbs 0 oz  Body mass index is 33.97 kg/m .    Weight over time:  Vitals:    04/18/19 0209 04/25/19 0954   Weight: 103.1 kg (227 lb 4.8 oz) 104.3 kg (230 lb)       Orthostatic Vitals       Most Recent      Sitting Orthostatic /67 04/22 0830    Sitting Orthostatic Pulse (bpm) 66 04/22 0830            Cardiometabolic risk assessment. 04/23/19      Reviewed patient profile for cardiometabolic risk factors    Date taken /Value  REFERENCE RANGE   Abdominal Obesity  (Waist Circumference)   See nursing flowsheet Women ?35 in (88 cm)   Men ?40 in (102 cm)      Triglycerides  Triglycerides   Date Value Ref Range Status   04/19/2019 136 <150 mg/dL Final       ?150 mg/dL (1.7 mmol/L) or current treatment for elevated triglycerides   HDL cholesterol  HDL Cholesterol   Date Value Ref Range Status   04/19/2019 32 (L) >39 mg/dL Final   ]   Women <50 mg/dL (1.3 mmol/L) in women or current treatment for low HDL cholesterol  Men <40 mg/dL (1 mmol/L) in men or current treatment for low HDL cholesterol     Fasting plasma glucose (FPG) Lab Results   Component Value Date    GLC 93 04/18/2019      FPG ?100 mg/dL (5.6 mmol/L) or treatment for elevated blood glucose   Blood pressure  BP Readings from Last 3 Encounters:   04/29/19 97/67   05/25/12 119/55   04/20/12 117/78    Blood pressure ?130/85 mmHg or treatment for elevated blood pressure   Family History  See family history     Appearance: well groomed, awake, alert, lying in bed calmly watching TV, well groomed  Attitude:  cooperative, pleasant, smiling at appropriate times  Eye Contact:  good  Mood: \"great\"  Affect:  mood " congruent  Speech: normal rate and volume  Psychomotor Behavior:  no evidence of tardive dyskinesia, dystonia, or tics , hand tremors are significant, though staff notice overall improvement.   Throught Process:  Linear, goal oriented  Associations:  no loose associations  Thought Content:  no evidence of suicidal ideation or homicidal ideation, no auditory hallucinations present, no visual hallucinations present, no overt delusional thinking or paranoia.   Insight: fair, improved  Judgement:  fair, improved  Oriented to:  time, person, and place  Attention Span and Concentration:  intact  Recent and Remote Memory:  intact       Clinical Global Impressions  First:  Considering your total clinical experience with this particular patient population, how severe are the patient's symptoms at this time?: 7 (04/18/19 0825)  Compared to the patient's condition at the START of treatment, this patient's condition is:: 7 (04/18/19 0825)  Most recent:  Considering your total clinical experience with this particular patient population, how severe are the patient's symptoms at this time?: 7 (04/18/19 0825)  Compared to the patient's condition at the START of treatment, this patient's condition is:: 7 (04/18/19 0825)           Precautions:     Behavioral Orders   Procedures     Assault precautions     Code 1 - Restrict to Unit     Elopement precautions     Fall precautions     Routine Programming     As clinically indicated     Sexual precautions     Single Room     Status 15     Every 15 minutes.          Diagnoses:     1.  Schizoaffective disorder, bipolar type, with paranoia and delusions  2.  Tobacco use disorder  3.  Alcohol use disorder in sustained remission         Assessment & Plan:     From H&P: Ger Bashir is a 69 year old male with history of schizoaffective disorder, bipolar type, history of noncompliance with medications, multiple court commitments and Jarvice.  Patient [facility] for  about 3 weeks, and was discharged on April 1.  During this hospitalization, the patient's commitment and Wu were extended.  His Wu medications include Haldol, Zyprexa, Clozaril, and Invega.  During the last hospitalization, the patient was discharged on oral Haldol 10 mg at bedtime in addition to Haldol Decanoate 100 mg every 2 weeks, and Cogentin 1 mg twice a day.  His last Haldol Decanoate injection was while hospitalised on March 23.  He was scheduled for another injection on April 8, on outpatient basis however, he never received it.  He also stopped taking his oral medication.  The treatment team at [facility] suspected underlying dementia.  His ACT team also reported that the patient may not be able to live independently anymore, due to cognitive decline.  He has been having significant hand tremors that improved with decreased dose of Haldol.        Target psychiatric symptoms and interventions:  --Restarted Haldol 10 mg twice a day. Decreased to QHS starting on 4/22. Zyprexa will be given if the patient refuses Haldol. Patient is Jarvised.   --Restarted Haldol Decanoate 100 mg, on Monday, 4/22/19. Will resume 100 mg [id number].  --Will continue Cogentin 1 mg twice a day.    --Ordered as needed Cogentin, Benadryl, and propranolol for significant hand tremors and agitation.     Medical Problems and Treatments:  No acute medical concerns  ACT team have voiced concerns about underlying dementia. May consider neuropsychological testing on OP basis if patient willing to participate. Pt is alert and oriented on examination.      Behavioral/Psychological/Social:  Continue to encourage participation in groups     Legal: Under commitment with Wu     Safety:  - Continue precautions as noted above  - Status 15 minute checks     Disposition: Pt appears to be nearing his baseline. Possible discharge tomorrow vs Wednesday after coordinating with ACT team.      Callie Prather MD  [facility]

## 2019-04-29 NOTE — PLAN OF CARE
BEHAVIORAL TEAM DISCUSSION    Participants: Dr. Georgiana Prather, Anthony Jama RN, Chris Malcolm RN, ADELAIDA- Eileen Mckenna LMFT, [facility]  Progress: pt at baseline  Continued Stay Criteria/Rationale: pt will likely discharge tomorrow.  Medical/Physical: see medical chart   Precautions:   Behavioral Orders   Procedures    Assault precautions    Code 1 - Restrict to Unit    Elopement precautions    Fall precautions    Routine Programming     As clinically indicated    Sexual precautions    Single Room    Status 15     Every 15 minutes.     Plan: plan is for pt to discharge home to his apt with ACT team support.   Rationale for change in precautions or plan: no change

## 2019-04-29 NOTE — PLAN OF CARE
"Pt was isolative to his room all evening, only coming out briefly to get meal tray. Affect blunted. Reports that his mood is \"okay,\" and denies all mental health symptoms. Overall pleasant on approach, though he is quiet and guarded. He was compliant with most of his scheduled medications, though he refused eye drops and eucerin cream. Denies any medication side effects. Will continue to monitor closely.   "

## 2019-04-29 NOTE — DISCHARGE INSTRUCTIONS
Behavioral Discharge Planning and Instructions      Summary:  You were admitted on 4/17/2019  due to Schizoaffective disorder.  You were treated by Dr. Georgiana Prather MD and discharged on 4/30/2019 from [facility] Home with ACT team support.    You were dually committed to the [facility] and the [facility] on 8/8/2018.  You are being discharged on a Provisional Discharge Agreement which shall remain in effect for the duration of the Commitment.  Your Commitment expires on 8/8/2019.    You were also court ordered to take the medications the doctor ordered for you.     Principal Diagnosis:   Schizoaffective disorder bipolar type  Tobacco use disorder  Alcohol use disorder    Health Care Follow-up Appointments:   - ACT Team - [facility] ACT team  ALTON Crowder, CRC, CPRP -    [address]  [place] [address]  - [phone]  F- [phone]  [facility] TO FAX AVS     - Care Coordinator:  Radha Moore - Phone: [phone]  Attend all scheduled appointments with your outpatient providers. Call at least 24 hours in advance if you need to reschedule an appointment to ensure continued access to your outpatient providers.   Major Treatments, Procedures and Findings:  You were provided with: a psychiatric assessment, assessed for medical stability, medication evaluation and/or management and group therapy    Symptoms to Report: feeling more aggressive, increased confusion, losing more sleep, mood getting worse or thoughts of suicide    Early warning signs can include: increased depression or anxiety sleep disturbances increased thoughts or behaviors of suicide or self-harm  increased unusual thinking, such as paranoia or hearing voices    Safety and Wellness:  Take all medicines as directed.  Make no changes unless your doctor suggests them.      Follow treatment recommendations.  Refrain from alcohol and non-prescribed drugs.   "If there is a concern for safety, call 911.    Resources:   Crisis Intervention: 788.751.8722 or 285-216-0851 (TTY: 272.452.5804).  Call anytime for help.  National [place] on Mental Illness (www.[place].felicia.org): 202.717.8561 or 974-439-6168.  [place] Association for Children's Mental Health (www.[facility].org): 167.169.5399.  Alcoholics Anonymous (www.alcoholics-anonymous.org): Check your phone book for your local chapter.  Suicide Awareness Voices of Education (SAVE) (www.save.org): 798-708-NXOD (3150)  National Suicide Prevention Line (www.mentalhealthmn.org): 199-496-VFDS (8994)  Mental Health Consumer/Survivor Network of [place] (www.mhcsn.net): 336.514.9446 or 639-322-9743  Mental Health Association of [place] (www.mentalhealth.org): 152.834.5317 or 900-672-1360  Self- Management and Recovery Training., SMART-- Toll free: 358.536.2637  www.resmio.org  [facility] Crisis (COPE) Response - Adult 862 603-1389  [facility] Crisis Response - Adult 893 839-9861  Text 4 Life: txt \"LIFE\" to 66522 for immediate support and crisis intervention  Crisis text line: Text \"[place]\" to 111675. Free, confidential, 24/7.  Crisis Intervention: 463.247.6532 or 939-218-7817. Call anytime for help.   [facility] Crisis Team - Child: [phone]  [facility] Crisis Response Team - Child: [phone]      The treatment team has appreciated the opportunity to work with you. If you have any questions or concerns our unit number is [phone].  You may be receiving a follow-up phone call within the next three days from a representative from behavioral health. You have identified the best phone number to reach you as [phone].        "

## 2019-04-29 NOTE — PROGRESS NOTES
04/29/19 1430   Behavioral Health   Hallucinations denies / not responding to hallucinations   Thinking intact   Orientation place: oriented   Memory baseline memory   Insight poor   Judgement intact   Eye Contact at examiner   Affect blunted, flat   Mood mood is calm   Hygiene other (see comment)   1. Wish to be Dead No   2. Non-Specific Active Suicidal Thoughts  No   Activities of Daily Living   Hygiene/Grooming independent   Oral Hygiene independent   Dress scrubs (behavioral health)   Laundry unable to complete   Room Organization independent   Patient was isolative in his room for this shift. He is looking forward to discharge and stated he is overall doing well and feeling fine. He ate all meals without any issues and denies SI/SIB

## 2019-04-30 VITALS
DIASTOLIC BLOOD PRESSURE: 47 MMHG | OXYGEN SATURATION: 94 % | WEIGHT: 230 LBS | HEIGHT: 69 IN | HEART RATE: 42 BPM | BODY MASS INDEX: 34.07 KG/M2 | TEMPERATURE: 97.4 F | RESPIRATION RATE: 16 BRPM | SYSTOLIC BLOOD PRESSURE: 88 MMHG

## 2019-04-30 PROCEDURE — 25000132 ZZH RX MED GY IP 250 OP 250 PS 637: Performed by: NURSE PRACTITIONER

## 2019-04-30 PROCEDURE — 25000132 ZZH RX MED GY IP 250 OP 250 PS 637: Performed by: PSYCHIATRY & NEUROLOGY

## 2019-04-30 PROCEDURE — 99238 HOSP IP/OBS DSCHRG MGMT 30/<: CPT | Performed by: PSYCHIATRY & NEUROLOGY

## 2019-04-30 RX ADMIN — MULTIPLE VITAMINS W/ MINERALS TAB 1 TABLET: TAB at 08:08

## 2019-04-30 RX ADMIN — BENZTROPINE MESYLATE 1 MG: 1 TABLET ORAL at 08:08

## 2019-04-30 RX ADMIN — ASPIRIN 81 MG: 81 TABLET, COATED ORAL at 08:08

## 2019-04-30 RX ADMIN — LEVOTHYROXINE SODIUM 50 MCG: 50 TABLET ORAL at 08:08

## 2019-04-30 ASSESSMENT — ACTIVITIES OF DAILY LIVING (ADL)
HYGIENE/GROOMING: INDEPENDENT
DRESS: INDEPENDENT
ORAL_HYGIENE: INDEPENDENT
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
## 2019-04-30 NOTE — PROGRESS NOTES
Pt had an uneventful shift.   Isolative to room entire shift.  Only visible in milieu when obtaining meal/drink.  No SI/SIB observed.       04/29/19 2100   Behavioral Health   Hallucinations denies / not responding to hallucinations   Insight poor   Judgement impaired   Affect blunted, flat   Physical Appearance/Attire attire appropriate to age and situation   Hygiene neglected grooming - unclean body, hair, teeth   Suicidality other (see comments)  (none observed)   1. Wish to be Dead No   2. Non-Specific Active Suicidal Thoughts  No   Self Injury other (see comment)  (none observed)   Activity isolative;withdrawn   Medication Sensitivity no observed side effects   Psychomotor / Gait balanced;steady   Psycho Education   Type of Intervention 1:1 intervention   Response observes from a distance   Activities of Daily Living   Hygiene/Grooming independent   Oral Hygiene independent   Dress independent;scrubs (behavioral health)   Room Organization independent

## 2019-04-30 NOTE — PROGRESS NOTES
Completed Local Remote Provisional Discharge with the pt.  [facility] is in agreement with discharge home and follow-up with ACT team.     ACT team will check in with him later today.     [facility] contact who completed the Local remote PD.  Andreia Betts, [facility]  Social Work Unit Supervisor  [facility] Psychiat[facility]  [place] [address]  Office [phone]  Cell [phone]

## 2019-04-30 NOTE — PLAN OF CARE
"  Pt presents with poor concentration. Affect blunted. Pt neglecting personal hygiene. Activity isolative and withdrawn. Pt seen to spend majority of time in his room.     Pt explains his appetite is \"fine\" with no issues reported. Pt states his sleep hygiene is \"fine\" with no issues reported. In reference to adverse/side effects to medications, Pt states \"trembling hands.\" Pt seen to have mild tremor. Pt complaint with scheduled Cogentin administration.     Pt denies any acute physical ailments. When asked to rate anxiety on a numerical 0-10 scale, Pt states \"I'm getting out of here today. What do you think?\" When asked to rate depression on a 0-10 scale, Pt states \"good mood.\"     Pt denies SI/SIB/HI. Pt denies AH/VH.     Pt able to identify following support network: \"My ACT team.\"  Pt able to identify following coping skills: \"My golf video and music.\"    Pt denies access to guns.   "

## 2019-09-11 ENCOUNTER — HOSPITAL ENCOUNTER (INPATIENT)
Facility: CLINIC | Age: 69
LOS: 20 days | Discharge: HOME | DRG: 885 | End: 2019-10-02
Attending: EMERGENCY MEDICINE | Admitting: PSYCHIATRY & NEUROLOGY
Payer: COMMERCIAL

## 2019-09-11 DIAGNOSIS — F20.0 PARANOID SCHIZOPHRENIA (H): ICD-10-CM

## 2019-09-11 DIAGNOSIS — F29 PSYCHOSIS, UNSPECIFIED PSYCHOSIS TYPE (H): ICD-10-CM

## 2019-09-11 PROCEDURE — 25000132 ZZH RX MED GY IP 250 OP 250 PS 637: Performed by: EMERGENCY MEDICINE

## 2019-09-11 PROCEDURE — 99285 EMERGENCY DEPT VISIT HI MDM: CPT

## 2019-09-11 RX ORDER — OLANZAPINE 10 MG/1
10 TABLET, ORALLY DISINTEGRATING ORAL ONCE
Status: COMPLETED | OUTPATIENT
Start: 2019-09-11 | End: 2019-09-11

## 2019-09-11 RX ORDER — HALOPERIDOL 5 MG/1
10 TABLET ORAL AT BEDTIME
Status: DISCONTINUED | OUTPATIENT
Start: 2019-09-11 | End: 2019-09-12

## 2019-09-11 RX ADMIN — OLANZAPINE 10 MG: 10 TABLET, ORALLY DISINTEGRATING ORAL at 18:28

## 2019-09-11 ASSESSMENT — ENCOUNTER SYMPTOMS
CARDIOVASCULAR NEGATIVE: 1
RESPIRATORY NEGATIVE: 1
CONFUSION: 1
HYPERACTIVE: 1
MUSCULOSKELETAL NEGATIVE: 1
GASTROINTESTINAL NEGATIVE: 1

## 2019-09-11 ASSESSMENT — MIFFLIN-ST. JEOR: SCORE: 1819.52

## 2019-09-11 NOTE — ED NOTES
Bed: [facility]  Expected date:   Expected time:   Means of arrival:   Comments:  EMS here

## 2019-09-11 NOTE — ED TRIAGE NOTES
Pt lingering at [employer] today without a shirt on, muttering to himself.  [place] PD called who walked patient to his apartment which was found to be a big mess, no food. Patient telling PD that the [facility] has him under surveillance. Continuously talking out, random speech, initially patient wanted to be here then refusing to follow direction to get undressed.

## 2019-09-11 NOTE — ED PROVIDER NOTES
"  History     Chief Complaint:  Paranoid    HPI   Ger Bashir is a 69 year old male who presents to the ED via EMS for evaluation of paranoid behavior. The patient was lingering at [employer] today without a shirt on, muttering to himself. [facility] was called, took the patient back to his apartment, which was found to be a mess with no food. Patient had told PD that the [facility] has him under surveillance. The patient originally wanted to come here, but upon presentation has been refusing to get dressed or follow directions. While in the room, the patient was very random in his speech, talking about Looney Tunes and [employer]og[employer]acters. He states that he wants a cat scan for his head because his \"brain is at a 3\". The patient is continuously shaking and moving. He denies any bodily pain.     Allergies:  The patient has no known drug allergies.     Medications:    The patient reports being off of his medications for \"a while\"    81 mg Aspirin  Cogentin  Haldol  Haldol Decanoate  Synthroid  Thera-vit  Desyrel    Past Medical History:    Schizophrenia  Psychosis    Past Surgical History:    The patient does not have any pertinent past surgical history.    Family History:    No past pertinent family history.    Social History:  Current some day smoker, 2 packs per day.  Negative for alcohol use.  Negative for drug use.  Marital Status:  Single [1]     Review of Systems   Respiratory: Negative.    Cardiovascular: Negative.    Gastrointestinal: Negative.    Genitourinary: Negative.    Musculoskeletal: Negative.    Psychiatric/Behavioral: Positive for confusion. The patient is hyperactive.         Paranoid   All other systems reviewed and are negative.      Physical Exam     Patient Vitals for the past 24 hrs:   BP Temp Temp src Heart Rate Resp SpO2 Height Weight   09/11/19 1756 (!) 140/110 98.3  F (36.8  C) Oral 61 18 99 % 1.778 m (5' 10\") 104.3 kg (230 lb)     Physical Exam  General: Sitting on the floor, poorly " groomed, not wearing a shirt, clothes dirty and torn.   Head:  The scalp, face, and head appear normal  Mouth/Throat: Mucus membranes are moist  CV:  Regular rate    Normal S1 and S2  No pathological murmur   Resp:  Breath sounds clear and equal bilaterally    Non-labored, no retractions or accessory muscle use    No coarseness    No wheezing   GI:  Abdomen is soft, no rigidity    No tenderness to palpation  MS:  No evidence of self injury, no lacerations.  No evidence of trauma.  Skin:  No lacerations  Neuro:  Speech is tangential    No apparent focal deficit.      Uses all extremities equally.  Psych:  Rambling nonsensical speech, not able to participate in interview      No Suicidal thoughts.    No thoughts of harming others.    Appears to be responding to internal stimuli    Emergency Department Course   Interventions:  1828 Zyprexa ODT tab 10 mg PO    Emergency Department Course:  Nursing notes and vitals reviewed. (1809) I performed an exam of the patient as documented above.     Medicine administered as documented above.        Impression & Plan    Medical Decision Making:  Ger Bashir is a 69 year old male who presents for evaluation of paranoid behavior.  They have  a history of previous psychiatric illness and at this point appear decompensated psychiatrically.  A 72 hour hold was  placed and security watch initiated given he is actively hallucinating and does nto respond to redirection.     Currently a risk to himself.  Plan will be admission to inpatient psychiatric care pending availability.  Paperwork filled out.      The patient was seen by DEC who agrees with this assessment.     Diagnosis:    ICD-10-CM   1. Psychosis, unspecified psychosis type (H) F29       Disposition:  The patient was admitted.    Scribe Disclosure:  I, Radha Carson, am serving as a scribe on 9/11/2019 at 6:09 PM to personally document services performed by Lisy Lyman MD based on my observations and the  provider's statements to me.     Radha Carson  9/11/2019    EMERGENCY DEPARTMENT       Lisy Lyman MD  09/11/19 7406

## 2019-09-12 LAB
ALBUMIN SERPL-MCNC: 3.3 G/DL (ref 3.4–5)
ALP SERPL-CCNC: 98 U/L (ref 40–150)
ALT SERPL W P-5'-P-CCNC: 21 U/L (ref 0–70)
AMPHETAMINES UR QL SCN: NEGATIVE
ANION GAP SERPL CALCULATED.3IONS-SCNC: 4 MMOL/L (ref 3–14)
AST SERPL W P-5'-P-CCNC: 15 U/L (ref 0–45)
BARBITURATES UR QL: NEGATIVE
BASOPHILS # BLD AUTO: 0 10E9/L (ref 0–0.2)
BASOPHILS NFR BLD AUTO: 0.3 %
BENZODIAZ UR QL: NEGATIVE
BILIRUB SERPL-MCNC: 1.1 MG/DL (ref 0.2–1.3)
BUN SERPL-MCNC: 15 MG/DL (ref 7–30)
CALCIUM SERPL-MCNC: 9.4 MG/DL (ref 8.5–10.1)
CANNABINOIDS UR QL SCN: NEGATIVE
CHLORIDE SERPL-SCNC: 105 MMOL/L (ref 94–109)
CO2 SERPL-SCNC: 31 MMOL/L (ref 20–32)
COCAINE UR QL: NEGATIVE
CREAT SERPL-MCNC: 0.73 MG/DL (ref 0.66–1.25)
DIFFERENTIAL METHOD BLD: NORMAL
EOSINOPHIL # BLD AUTO: 0.2 10E9/L (ref 0–0.7)
EOSINOPHIL NFR BLD AUTO: 2.5 %
ERYTHROCYTE [DISTWIDTH] IN BLOOD BY AUTOMATED COUNT: 12.6 % (ref 10–15)
GFR SERPL CREATININE-BSD FRML MDRD: >90 ML/MIN/{1.73_M2}
GLUCOSE SERPL-MCNC: 94 MG/DL (ref 70–99)
HCT VFR BLD AUTO: 44.2 % (ref 40–53)
HGB BLD-MCNC: 15.6 G/DL (ref 13.3–17.7)
IMM GRANULOCYTES # BLD: 0 10E9/L (ref 0–0.4)
IMM GRANULOCYTES NFR BLD: 0.3 %
LYMPHOCYTES # BLD AUTO: 1.4 10E9/L (ref 0.8–5.3)
LYMPHOCYTES NFR BLD AUTO: 21.5 %
MCH RBC QN AUTO: 32.4 PG (ref 26.5–33)
MCHC RBC AUTO-ENTMCNC: 35.3 G/DL (ref 31.5–36.5)
MCV RBC AUTO: 92 FL (ref 78–100)
MONOCYTES # BLD AUTO: 0.7 10E9/L (ref 0–1.3)
MONOCYTES NFR BLD AUTO: 11.3 %
NEUTROPHILS # BLD AUTO: 4 10E9/L (ref 1.6–8.3)
NEUTROPHILS NFR BLD AUTO: 64.1 %
NRBC # BLD AUTO: 0 10*3/UL
NRBC BLD AUTO-RTO: 0 /100
OPIATES UR QL SCN: NEGATIVE
PCP UR QL SCN: NEGATIVE
PLATELET # BLD AUTO: 190 10E9/L (ref 150–450)
POTASSIUM SERPL-SCNC: 3.4 MMOL/L (ref 3.4–5.3)
PROT SERPL-MCNC: 6.6 G/DL (ref 6.8–8.8)
RBC # BLD AUTO: 4.82 10E12/L (ref 4.4–5.9)
SODIUM SERPL-SCNC: 140 MMOL/L (ref 133–144)
WBC # BLD AUTO: 6.3 10E9/L (ref 4–11)

## 2019-09-12 PROCEDURE — 99221 1ST HOSP IP/OBS SF/LOW 40: CPT | Performed by: PSYCHIATRY & NEUROLOGY

## 2019-09-12 PROCEDURE — 85025 COMPLETE CBC W/AUTO DIFF WBC: CPT | Performed by: PSYCHIATRY & NEUROLOGY

## 2019-09-12 PROCEDURE — 84443 ASSAY THYROID STIM HORMONE: CPT | Performed by: PSYCHIATRY & NEUROLOGY

## 2019-09-12 PROCEDURE — 25000132 ZZH RX MED GY IP 250 OP 250 PS 637: Performed by: PSYCHIATRY & NEUROLOGY

## 2019-09-12 PROCEDURE — 80053 COMPREHEN METABOLIC PANEL: CPT | Performed by: PSYCHIATRY & NEUROLOGY

## 2019-09-12 PROCEDURE — 80307 DRUG TEST PRSMV CHEM ANLYZR: CPT | Performed by: EMERGENCY MEDICINE

## 2019-09-12 PROCEDURE — 12400000 ZZH R&B MH

## 2019-09-12 RX ORDER — OLANZAPINE 10 MG/1
10 TABLET, ORALLY DISINTEGRATING ORAL ONCE
Status: COMPLETED | OUTPATIENT
Start: 2019-09-12 | End: 2019-09-12

## 2019-09-12 RX ORDER — LEVOTHYROXINE SODIUM 50 UG/1
50 TABLET ORAL DAILY
Status: DISCONTINUED | OUTPATIENT
Start: 2019-09-12 | End: 2019-10-02 | Stop reason: HOSPADM

## 2019-09-12 RX ORDER — OLANZAPINE 10 MG/2ML
10 INJECTION, POWDER, FOR SOLUTION INTRAMUSCULAR EVERY 6 HOURS PRN
Status: DISCONTINUED | OUTPATIENT
Start: 2019-09-12 | End: 2019-10-02 | Stop reason: HOSPADM

## 2019-09-12 RX ORDER — TRAZODONE HYDROCHLORIDE 100 MG/1
100 TABLET ORAL AT BEDTIME
Status: DISCONTINUED | OUTPATIENT
Start: 2019-09-12 | End: 2019-10-02 | Stop reason: HOSPADM

## 2019-09-12 RX ORDER — ASPIRIN 81 MG/1
81 TABLET, CHEWABLE ORAL DAILY
Status: DISCONTINUED | OUTPATIENT
Start: 2019-09-12 | End: 2019-10-02 | Stop reason: HOSPADM

## 2019-09-12 RX ORDER — MULTIPLE VITAMINS W/ MINERALS TAB 9MG-400MCG
1 TAB ORAL DAILY
Status: DISCONTINUED | OUTPATIENT
Start: 2019-09-12 | End: 2019-10-02 | Stop reason: HOSPADM

## 2019-09-12 RX ORDER — OLANZAPINE 10 MG/1
10 TABLET, ORALLY DISINTEGRATING ORAL EVERY 6 HOURS PRN
Status: DISCONTINUED | OUTPATIENT
Start: 2019-09-12 | End: 2019-10-02 | Stop reason: HOSPADM

## 2019-09-12 RX ORDER — BENZTROPINE MESYLATE 1 MG/1
1 TABLET ORAL 2 TIMES DAILY
Status: DISCONTINUED | OUTPATIENT
Start: 2019-09-12 | End: 2019-10-02 | Stop reason: HOSPADM

## 2019-09-12 RX ORDER — HALOPERIDOL 5 MG/1
10 TABLET ORAL AT BEDTIME
Status: DISCONTINUED | OUTPATIENT
Start: 2019-09-12 | End: 2019-10-02 | Stop reason: HOSPADM

## 2019-09-12 RX ADMIN — LEVOTHYROXINE SODIUM 50 MCG: 50 TABLET ORAL at 13:37

## 2019-09-12 RX ADMIN — HALOPERIDOL 10 MG: 5 TABLET ORAL at 20:02

## 2019-09-12 RX ADMIN — BENZTROPINE MESYLATE 1 MG: 1 TABLET ORAL at 20:02

## 2019-09-12 RX ADMIN — TRAZODONE HYDROCHLORIDE 100 MG: 100 TABLET ORAL at 20:02

## 2019-09-12 RX ADMIN — ASPIRIN 81 MG 81 MG: 81 TABLET ORAL at 13:36

## 2019-09-12 RX ADMIN — OLANZAPINE 10 MG: 10 TABLET, ORALLY DISINTEGRATING ORAL at 13:37

## 2019-09-12 RX ADMIN — MULTIPLE VITAMINS W/ MINERALS TAB 1 TABLET: TAB at 13:36

## 2019-09-12 ASSESSMENT — ACTIVITIES OF DAILY LIVING (ADL)
DRESS: 0-->INDEPENDENT
FALL_HISTORY_WITHIN_LAST_SIX_MONTHS: NO
DRESS: SCRUBS (BEHAVIORAL HEALTH)
COGNITION: 2 - DIFFICULTY WITH ORGANIZING THOUGHTS
ORAL_HYGIENE: INDEPENDENT
AMBULATION: 0-->INDEPENDENT
RETIRED_COMMUNICATION: 0-->UNDERSTANDS/COMMUNICATES WITHOUT DIFFICULTY
BATHING: 0-->INDEPENDENT
RETIRED_EATING: 0-->INDEPENDENT
WHICH_OF_THE_ABOVE_FUNCTIONAL_RISKS_HAD_A_RECENT_ONSET_OR_CHANGE?: COGNITION
TOILETING: 0-->INDEPENDENT
SWALLOWING: 0-->SWALLOWS FOODS/LIQUIDS WITHOUT DIFFICULTY
TRANSFERRING: 0-->INDEPENDENT
HYGIENE/GROOMING: HANDWASHING

## 2019-09-12 NOTE — PLAN OF CARE
"69 year old male received from ER in a disorganized psychotic state. Talks about hollering at [employer] and being brought in by the police. Reportedly he was making paranoid and delusional statements in ER. History of schizophrenia and medication compliance issues. Off Mental health medication\"for a while\"  Lives alone in apartment which was found to be a mess with no food. Pre-occupied in appearance. Muttering to himself. Most responses to admit questions were loose thoughts [facility] is [facility].  As he walked into unit began grabbing food off other patient trays-slightly argumentative when redirected.Denies suicidal ideation.     Welcome packet reviewed with patient. Information reviewed includes getting emergency help, preventing infections, understanding your care, using medication safely, reducing falls, preventing pressure ulcers, smoking cessation, powerful choices and Patients Bill of Rights. Pt. given tour of the unit and instruction on use of facility including emergency call light. Program schedule reviewed with patient. Questions regarding the unit addressed. Pt. Search completed and belongings inventoried.     Nursing assessment complete including patient and medication profiles. Risk assessments completed addressing suicide,fall,skin,nutrition and safety issues. Care plan initiated. Assessments reviewed with physician and admit orders received. Video monitoring in progress, Patient Informed.   "

## 2019-09-12 NOTE — PROGRESS NOTES
09/12/19 1238   Patient Belongings   Did you bring any home meds/supplements to the hospital?  No   Patient Belongings locker   Patient Belongings Put in Hospital Secure Location (Security or Locker, etc.) clothing;keys   Belongings Search Yes   Clothing Search Yes   Second Staff Mouna Noyola  Pants  Socks  [place] id Card  Jacket  Keys  Watch  JAcket  $3 ramos  Loose change                   Admission:  I am responsible for any personal items that are not sent to the safe or pharmacy.  [facility] is not responsible for loss, theft or damage of any property in my possession.    Signature:  _________________________________ Date: _______  Time: _____                                              Staff Signature:  ____________________________ Date: ________  Time: _____      2nd Staff person, if patient is unable/unwilling to sign:    Signature: ________________________________ Date: ________  Time: _____     Discharge:  [facility] has returned all of my personal belongings:    Signature: _________________________________ Date: ________  Time: _____                                          Staff Signature:  ____________________________ Date: ________  Time: _____

## 2019-09-12 NOTE — PHARMACY-ADMISSION MEDICATION HISTORY
Admission medication history interview status for the 9/11/2019  admission is complete. See EPIC admission navigator for prior to admission medications     Medication history source reliability:Good    Actions taken by pharmacist (provider contacted, etc): Interviewed patient. He stated that he only takes Aspirin, Levothyroxine, and a Multivitamin. Patient did not recall when was the last time he took his medications    Additional medication history information not noted on PTA med list : Medications were reviewed through Care Everywhere    Medication reconciliation/reorder completed by provider prior to medication history? No    Time spent in this activity: 30 mins    Prior to Admission medications    Medication Sig Last Dose Taking? Auth Provider   aspirin (ASA) 81 MG chewable tablet Take 1 tablet (81 mg) by mouth daily Unknown at Unknown time Yes Georgiana Prather MD   levothyroxine (SYNTHROID/LEVOTHROID) 50 MCG tablet Take 1 tablet (50 mcg) by mouth daily Unknown at Unknown time Yes Georgiana Prather MD   multivitamin w/minerals (THERA-VIT-M) tablet Take 1 tablet by mouth daily Unknown at Unknown time Yes Georgiana Prather MD   Skin Protectants, Misc. (EUCERIN) cream Apply topically 2 times daily   Georgiana Prather MD   benztropine (COGENTIN) 1 MG tablet Take 1 tablet (1 mg) by mouth 2 times daily   Georgiana Prather MD   haloperidol (HALDOL) 5 MG tablet Take 2 tablets (10 mg) by mouth At Bedtime   Georgiana Prather MD   haloperidol decanoate (HALDOL DECANOATE) 100 MG/ML injection Inject 100 mg into the muscle every 14 days Next dose due on 5/6/2019   Georgiana Prather MD   traZODone (DESYREL) 100 MG tablet Take 1 tablet (100 mg) by mouth At Bedtime   Georgiana Prather MD

## 2019-09-12 NOTE — CONSULTS
"Consult Date:  09/12/2019      PSYCHIATRY CONSULTATION      REASON FOR CONSULTATION:  Psychosis.      REQUESTING PHYSICIAN:  Lisy Lyman MD      PRIMARY CARE PHYSICIAN:  None given.      IDENTIFYING DATA:  Ger Bashir is a 69-year-old man who reports he has 1 child and resides in [place] by himself.  He has an established history of mental illness previously characterized as schizophrenia and was brought to [facility] ED via EMS for evaluation of paranoid behavior.  Psychiatry was consulted to render an opinion on his psychosis.  Information was gathered through direct patient contact as well as chart review.      CHIEF COMPLAINT:  \"What do you think?\"      HISTORY OF PRESENT ILLNESS:  Ger Bashir reportedly was lingering at a [employer] yesterday without a shirt on and was said to have been muttering to himself.  Consequently, the [place] Police Department was called and took the patient back to his apartment, which was then found to be in a state of disarray.  He was said to have no food at home.  On further inquiry, he told the police that the [employer] has him under surveillance.  He initially was willing to come to the hospital, but upon presentation, he refused to get dressed or follow directions.  He was noted to be somewhat disgruntled and disorganized in his manner of speaking.  ED records suggest that he was \"talking about Looney Tunes and [place] characters.\"  He indicated to them that he wanted a CAT scan of his head because his \"brain is at a 3.\"  On direct interview, the patient was a very poor historian who got easily agitated with questioning.  He did not provide much by way of the information, but did allude to the [employer] persecuting him.  He could not explain the reason why he had been partially clad at the [employer] and when pressed for more information, he became rather dismissive.  Of note, he was recently on admission at [facility] and was discharged with diagnoses of schizoaffective disorder, bipolar type, tobacco use disorder and alcohol use disorder in sustained remission.  He reportedly has a history of noncompliance with medications, has had multiple court commitments and Wu orders.  [place] [facility] [place], he was discharged with his Wu order extended and he remains on civil commitment.  He is currently Wu'd to take Haldol, Zyprexa, Clozaril and Invega.  The patient was supposed to be taking Haldol Decanoate 100 mg q.2 weeks, Cogentin 1 mg twice a day and Haldol 10 mg daily at bedtime, but the patient reports that he does not like taking Haldol.  It appears he has not been compliant with his prescribed medications and it is unclear when he last received his Haldol Decanoate.  He does admit that he has had multiple medication trials in the past and feels he did best with Zyprexa.      PAST PSYCHIATRIC HISTORY:  The patient reportedly has been hospitalized multiple times including 4 times in 2018, 3 times in 2016, 2 times in 2012 and this will probably his third psychiatric hospitalization in 2019.  He reportedly is managed by an ACT team with a psychiatrist and case management.  Prior medication trials include Zyprexa, Thorazine, Depakote, Haldol, Invega, Prolixin.  It is unclear if his civil commitment was extended after the last 08/18/2019 expiration date.      CHEMICAL USE HISTORY:  The patient admits to past abuse of alcohol, but denies any other illicit drug use.  Upon further inquiry, he tells me he abuses cocaine and opioids, but urine toxicology screen was negative.  He uses 2 packs of cigarettes per day.      PAST MEDICAL HISTORY:  The patient reports being in good physical health and denies any chronic illnesses including diabetes, hypertension, seizure disorder or head trauma.      PAST SURGICAL HISTORY:  Status post left knee surgery.       MEDICATIONS PRIOR TO ADMISSION:  The patient has not been taking any medications, but he is supposed to be on:   1.  Aspirin 81 mg p.o. daily.   2.  Synthroid 50 mcg p.o. daily.   3.  Multivitamin 1 p.o. daily.   4.  Cogentin 1 mg p.o. b.i.d.   5.  Haldol 10 mg p.o. at bedtime.   6.  Haldol Decanoate 100 mg IM every 14 days, last dose 05/06/2019.     7.  Eucerin cream topically b.i.d.   8.  Trazodone 100 mg p.o. at bedtime.      FAMILY PSYCHIATRIC HISTORY:  Unknown.      SOCIAL HISTORY:  The patient reports he resides in [place].  He tells me he has 1 sister.  He states he graduated high school, has never been  and has 1 adult child.  He is currently supported on Social Security Disability benefits.      REVIEW OF SYSTEMS:  A 10-point review of systems was negative apart from the pertinent positives in the history of present illness.      VITAL SIGNS:  Blood pressure 97/67, pulse 70, respirations 16, temperature 97.7, weight 104.3 kg.      MENTAL STATUS EXAMINATION:  This is an older gentleman with graying hair who appears his stated age.  He makes limited eye contact and he is somewhat dismissive.  He is very gruff in his responses and mumbles responses to queries and becomes loud and agitated when clarification is sought.  He averts eye contact and, at other times, he is very intense.  His mood is irritable and his affect is mood congruent with heightened intensity.  He appears internally preoccupied and is observed mumbling to himself.  He does not endorse the presence of auditory or visual hallucinations but appears to be internally preoccupied and paranoid.  His gait and station are within normal limits.  His muscle strength is adequate.  His associations are concrete.  His language is within normal limits.  His recall of recent events is marginal.  Remote recall is difficult to assess.  His impulse control is poor.  He displays poor insight and judgment.  Risk assessment at this time is considered  moderate to high on account of his poor reality testing.      DIAGNOSTIC IMPRESSION:  Jenniffer Bashir is a 69-year-old single father of 1 with established history of schizoaffective disorder, bipolar type, who has a history of medication noncompliance and reportedly has not been on his prescribed medications.  He is managed by an ACT team.  It is unclear if he is still on civil commitment as records suggest that his last commitment was to  in August.  He presents as disgruntled, intermittently agitated and disorganized.  He is paranoid, believing that the [employer] is after him.  He will benefit from psychiatric hospitalization and stabilization on station 77.      ADMITTING DIAGNOSES:   1.  Schizoaffective disorder, bipolar type, multiple episodes in acute phase.   2.  Tobacco use disorder.   3.  Alcohol use disorder in sustained remission.   4.  Medication noncompliance.        PLAN:  Admit to station 77 for psychiatric stabilization.  Resume prior to admission medications.  Offer emergency medications and contact his ACT team for recommendations.      Thanks for the consult.         CRISS YAÑEZ MD             D: 2019   T: 2019   MT: JEANIE      Name:     JENNIFFER BASHIR   MRN:      [id number]        Account:       [id number]   :      1950           Consult Date:  2019      Document: [id number]       cc: Lisy Lyman MD

## 2019-09-12 NOTE — ED PROVIDER NOTES
Received patient in signout awaiting mental health bed availability.  No issues over the night shift.  Patient's slept the majority of shift but now is awake and calmly reading his Bible in bed.  Patient understands awaiting mental health bed availability.  Calm and cooperative at this time.  Patient will be signed out to Dr. Patino for continued care and treatment.     Marium Cespedes MD  09/12/19 0536

## 2019-09-13 LAB — TSH SERPL DL<=0.005 MIU/L-ACNC: 1.41 MU/L (ref 0.4–4)

## 2019-09-13 PROCEDURE — 25000132 ZZH RX MED GY IP 250 OP 250 PS 637: Performed by: PSYCHIATRY & NEUROLOGY

## 2019-09-13 PROCEDURE — 12400000 ZZH R&B MH

## 2019-09-13 PROCEDURE — 99222 1ST HOSP IP/OBS MODERATE 55: CPT | Performed by: PHYSICIAN ASSISTANT

## 2019-09-13 RX ORDER — ACETAMINOPHEN 325 MG/1
325 TABLET ORAL EVERY 4 HOURS PRN
Status: DISCONTINUED | OUTPATIENT
Start: 2019-09-13 | End: 2019-09-13

## 2019-09-13 RX ORDER — ACETAMINOPHEN 325 MG/1
650 TABLET ORAL EVERY 4 HOURS PRN
Status: DISCONTINUED | OUTPATIENT
Start: 2019-09-13 | End: 2019-10-02 | Stop reason: HOSPADM

## 2019-09-13 RX ADMIN — BENZTROPINE MESYLATE 1 MG: 1 TABLET ORAL at 21:01

## 2019-09-13 RX ADMIN — MULTIPLE VITAMINS W/ MINERALS TAB 1 TABLET: TAB at 08:33

## 2019-09-13 RX ADMIN — ASPIRIN 81 MG 81 MG: 81 TABLET ORAL at 08:33

## 2019-09-13 RX ADMIN — LEVOTHYROXINE SODIUM 50 MCG: 50 TABLET ORAL at 08:33

## 2019-09-13 RX ADMIN — BENZTROPINE MESYLATE 1 MG: 1 TABLET ORAL at 08:33

## 2019-09-13 RX ADMIN — Medication: at 08:37

## 2019-09-13 RX ADMIN — TRAZODONE HYDROCHLORIDE 100 MG: 100 TABLET ORAL at 21:01

## 2019-09-13 ASSESSMENT — ACTIVITIES OF DAILY LIVING (ADL)
DRESS: SCRUBS (BEHAVIORAL HEALTH)
DRESS: SCRUBS (BEHAVIORAL HEALTH)
LAUNDRY: UNABLE TO COMPLETE
ORAL_HYGIENE: INDEPENDENT
LAUNDRY: WITH SUPERVISION
HYGIENE/GROOMING: INDEPENDENT
ORAL_HYGIENE: INDEPENDENT
HYGIENE/GROOMING: INDEPENDENT;PROMPTS

## 2019-09-13 NOTE — PROGRESS NOTES
"[facility] Psychiatric Progress Note       Interim History     The patient's care was discussed with the treatment team and chart notes were reviewed. Pt seen on 9/13/19 by both Dr. Conklin and Dr. Conklin's PA student. The PA student meets with patient first. On interview, the patient declares he still feels the [employer] is after him. He states, \"I am going to blow them away in court with a trillion dollars!\"  He acknowledges being brought here via ambulance.  Patient interrupting line of questioning with tangential thoughts.  Patient rambling about \"sucking two dicks, one black and one white.\" When Dr. Conklin comes into interview, the patient proceeds to demonstrate tangential thoughts and paranoia. The patient is unable to recall why he was initially presented to the hospital, stating, \"I'll have to think about that.\" Pt continues to believe the [employer] orchestrated his being sent here to the hospital.     Hospital Course   Per records Ger Bashir reportedly was lingering at a [employer] restaurant prior to admission without a shirt on and was said to have been muttering to himself.  Consequently, the [place] Police Department was called and took the patient back to his apartment, which was then found to be in a state of disarray.  He was said to have no food at home.  On further inquiry, he told the police that the [employer] has him under surveillance.  He initially was willing to come to the hospital, but upon presentation, he refused to get dressed or follow directions.  He was noted to be somewhat disgruntled and disorganized in his manner of speaking.  ED records suggest that he was \"talking about Looney Tunes and [place] characters.\"  He indicated to them that he wanted a CAT scan of his head because his \"brain is at a 3.\"  On direct interview with psychiatrist Dr. Lizbeth Louie, the patient was a very poor historian who got easily agitated with questioning.  He did not provide " much by way of the information, but did allude to the [employer] persecuting him.  He could not explain the reason why he had been partially clad at the [employer] and when pressed for more information, he became rather dismissive.  Of note, he was recently on admission at [facility] and was discharged with diagnoses of schizoaffective disorder, bipolar type, tobacco use disorder and alcohol use disorder in sustained remission.  He reportedly has a history of noncompliance with medications, has had multiple court commitments and Wu orders.  [place] [facility] [place], he was discharged with his Wu order extended and he remains on civil commitment.  He is currently Wu'd to take Haldol, Zyprexa, Clozaril and Invega.  The patient was supposed to be taking Haldol Decanoate 100 mg q.2 weeks, Cogentin 1 mg twice a day and Haldol 10 mg daily at bedtime, but the patient reports that he does not like taking Haldol.  It appears he has not been compliant with his prescribed medications and it is unclear when he last received his Haldol Decanoate.  He does admit that he has had multiple medication trials in the past and feels he did best with Zyprexa. Dr. Louie believed that patient would benefit from psychiatric hospitalization and stabilization on Station 77.      Medications     Current Facility-Administered Medications Ordered in Epic   Medication Dose Route Frequency Last Rate Last Dose     aspirin (ASA) chewable tablet 81 mg  81 mg Oral Daily   81 mg at 09/12/19 1336     benztropine (COGENTIN) tablet 1 mg  1 mg Oral BID   1 mg at 09/12/19 2002     eucerin cream   Topical BID         haloperidol (HALDOL) tablet 10 mg  10 mg Oral At Bedtime   10 mg at 09/12/19 2002     levothyroxine (SYNTHROID/LEVOTHROID) tablet 50 mcg  50 mcg Oral Daily   50 mcg at 09/12/19 1337     multivitamin w/minerals (THERA-VIT-M) tablet 1 tablet  1 tablet Oral  "Daily   1 tablet at 09/12/19 1336     nicotine (NICORETTE) gum 2-4 mg  2-4 mg Buccal Q1H PRN         OLANZapine zydis (zyPREXA) ODT tab 10 mg  10 mg Oral Q6H PRN        Or     OLANZapine (zyPREXA) injection 10 mg  10 mg Intramuscular Q6H PRN         traZODone (DESYREL) tablet 100 mg  100 mg Oral At Bedtime   100 mg at 09/12/19 2002     No current Epic-ordered outpatient medications on file.         Allergies      Allergies   Allergen Reactions     Peas GI Disturbance     Black eyed peas - vomiting        Medical Review of Systems     /65   Pulse 66   Temp 97.7  F (36.5  C) (Oral)   Resp 17   Ht 1.778 m (5' 10\")   Wt 104.3 kg (230 lb)   SpO2 95%   BMI 33.00 kg/m    Body mass index is 33 kg/m .  A 10-point review of systems was performed by Dominguez Conklin MD and is negative, no new findings.      Psychiatric Examination     Appearance Sitting in chair, dressed in hospital scrubs. Appears stated age.   Attitude Cooperative   Orientation Oriented to person, place, time   Eye Contact Poor   Speech Regular rate, rhythm, volume and tone   Language Normal   Psychomotor Behavior Normal   Mood Fine   Affect Irritable    Thought Process Impaired   Associations Intact   Thought Content Patient is currently negative for suicidal ideation, negative for plan or intent, able to contract no self harm and identify barriers to suicide. Positive for psychosis.     Fund of Knowledge Impaired   Insight Impaired   Judgement Impaired   Attention Span & Concentration Poor   Recent & Remote Memory Intact   Gait Normal   Muscle Tone Intact        Labs     Labs reviewed.  Recent Results (from the past 24 hour(s))   Drug abuse screen urine    Collection Time: 09/12/19  9:40 AM   Result Value Ref Range    Amphetamine Qual Urine Negative NEG^Negative    Barbiturates Qual Urine Negative NEG^Negative    Benzodiazepine Qual Urine Negative NEG^Negative    Cannabinoids Qual Urine Negative NEG^Negative    Cocaine Qual Urine Negative " NEG^Negative    Opiates Qualitative Urine Negative NEG^Negative    PCP Qual Urine Negative NEG^Negative   CBC with platelets differential    Collection Time: 09/12/19 10:40 AM   Result Value Ref Range    WBC 6.3 4.0 - 11.0 10e9/L    RBC Count 4.82 4.4 - 5.9 10e12/L    Hemoglobin 15.6 13.3 - 17.7 g/dL    Hematocrit 44.2 40.0 - 53.0 %    MCV 92 78 - 100 fl    MCH 32.4 26.5 - 33.0 pg    MCHC 35.3 31.5 - 36.5 g/dL    RDW 12.6 10.0 - 15.0 %    Platelet Count 190 150 - 450 10e9/L    Diff Method Automated Method     % Neutrophils 64.1 %    % Lymphocytes 21.5 %    % Monocytes 11.3 %    % Eosinophils 2.5 %    % Basophils 0.3 %    % Immature Granulocytes 0.3 %    Nucleated RBCs 0 0 /100    Absolute Neutrophil 4.0 1.6 - 8.3 10e9/L    Absolute Lymphocytes 1.4 0.8 - 5.3 10e9/L    Absolute Monocytes 0.7 0.0 - 1.3 10e9/L    Absolute Eosinophils 0.2 0.0 - 0.7 10e9/L    Absolute Basophils 0.0 0.0 - 0.2 10e9/L    Abs Immature Granulocytes 0.0 0 - 0.4 10e9/L    Absolute Nucleated RBC 0.0    Comprehensive metabolic panel    Collection Time: 09/12/19 10:40 AM   Result Value Ref Range    Sodium 140 133 - 144 mmol/L    Potassium 3.4 3.4 - 5.3 mmol/L    Chloride 105 94 - 109 mmol/L    Carbon Dioxide 31 20 - 32 mmol/L    Anion Gap 4 3 - 14 mmol/L    Glucose 94 70 - 99 mg/dL    Urea Nitrogen 15 7 - 30 mg/dL    Creatinine 0.73 0.66 - 1.25 mg/dL    GFR Estimate >90 >60 mL/min/[1.73_m2]    GFR Estimate If Black >90 >60 mL/min/[1.73_m2]    Calcium 9.4 8.5 - 10.1 mg/dL    Bilirubin Total 1.1 0.2 - 1.3 mg/dL    Albumin 3.3 (L) 3.4 - 5.0 g/dL    Protein Total 6.6 (L) 6.8 - 8.8 g/dL    Alkaline Phosphatase 98 40 - 150 U/L    ALT 21 0 - 70 U/L    AST 15 0 - 45 U/L        Impression   This is a 69 year old male with a previous psychiatric diagnosis that includes schizoaffective disorder, bipolar type, who has a history of medication noncompliance. He was initially presented to [facility] ED on 9/11/19 for evaluation of paranoid behavior and was deemed  appropriate for inpatient level of care. Patient was seen today on Station 77 by Dr. Conklin. The patient demonstrated signs and symptoms of psychosis that consisted of racing thoughts, pressured speech, tangential, disorganized, grandiose delusions, and emotionally liable. At this time, Dr. Conklin will make no medication adjustments, will have patient continue on current regimen.      Diagnoses     1. Schizoaffective disorder, bipolar type, multiple episodes in acute phase  2. Alcohol Use Disorder, in sustained remission      Plan     1. Explained side effects, benefits, and complications of medications to the patient, Pt gave verbal consent.  2. Medication changes: Continue medications.   3. Discussed treatment plan with patient and team.  4. Projected length of stay: 7+ days  5. Contact patients ACT team       Attestation:   I, Luda Patino, am serving as a scribe on 9/13/2019 to document services personally performed by Dominguez Conklin MD based on my observations and the provider's statements to me.    Patient ID:  Name: Ger Bashir    MRN: [id number]  Admission: 9/11/2019   YOB: 1950

## 2019-09-13 NOTE — PROGRESS NOTES
received a call this morning from Merced Velasquez with patient's [facility] ACT team ([phone]). She stated that she was planning on stopping by to see patient sometime today. She did confirm that patient does have his own apartment.  was unable to connect with her when she was here today, so  called and left a message for her to call back to discuss patient's case.  awaiting call back.

## 2019-09-13 NOTE — H&P
Admitted:     09/11/2019      CHIEF COMPLAINT:  Psychosis.      HISTORY OF PRESENT ILLNESS:  Mr. Ger Bashir is a 69-year-old gentleman with a past medical history significant for schizoaffective disorder, polysubstance abuse, ongoing tobacco use, dyslipidemia, hypothyroidism and medication noncompliance, who was brought to the Emergency Department by EMS for evaluation of paranoia.  Per report, the patient was found lingering at [employer] without a shirt on, speaking to himself.  [place] Police were notified and took the patient home to his apartment, which was found to be in disarray.  They observed that the patient seemed paranoid and thought the YAHIR had him under surveillance.  They brought him to the Emergency Department voluntarily where he was placed on a 72-hour hold and admission to the Psychiatry Service was recommended due to active psychosis.  The patient is presently evaluated in his room in the ITC portion of the inpatient Psychiatry Unit.  He is currently eating breakfast on my arrival.  He is pleasant and cooperative.  He reports he has not been taking his medications.  When asked specifically whether he is taking thyroid medicine, he says sometimes in the past.  He says he does follow regularly with an Internal Medicine provider through [facility].  Unclear when he was last seen.  Of note, he has a history of multiple court commitments and Wu order for psychiatric medications.  The last admission I see in his chart was from 03/2019.  He denies any recent illnesses or injuries and denies any current chest pain, shortness of breath, nausea, vomiting, or visual changes.  He is a somewhat difficult historian as he is disorganized.      REVIEW OF SYSTEMS:  A 10-point review of systems was conducted and is negative aside from the information in the HPI.      PAST MEDICAL HISTORY:   1.  Hypothyroidism.   2.  Ongoing tobacco use.   3.  Suspected chronic obstructive pulmonary disease, per chart review.    4.  Hyperlipidemia.   5.  History of polysubstance abuse.   6.  Schizoaffective disorder.      PAST SURGICAL HISTORY:  ACL reconstruction.      ALLERGIES:  PEAS.      PRIOR TO ADMISSION MEDICATIONS:    Medications Prior to Admission   Medication Sig Dispense Refill Last Dose     aspirin (ASA) 81 MG chewable tablet Take 1 tablet (81 mg) by mouth daily 30 tablet 1 Unknown at Unknown time     levothyroxine (SYNTHROID/LEVOTHROID) 50 MCG tablet Take 1 tablet (50 mcg) by mouth daily 30 tablet 1 Unknown at Unknown time     multivitamin w/minerals (THERA-VIT-M) tablet Take 1 tablet by mouth daily 30 tablet 1 Unknown at Unknown time     benztropine (COGENTIN) 1 MG tablet Take 1 tablet (1 mg) by mouth 2 times daily 60 tablet 1      haloperidol (HALDOL) 5 MG tablet Take 2 tablets (10 mg) by mouth At Bedtime 30 tablet 1      haloperidol decanoate (HALDOL DECANOATE) 100 MG/ML injection Inject 100 mg into the muscle every 14 days Next dose due on 5/6/2019        Skin Protectants, Misc. (EUCERIN) cream Apply topically 2 times daily 2 g 1      traZODone (DESYREL) 100 MG tablet Take 1 tablet (100 mg) by mouth At Bedtime 30 tablet 1            FAMILY HISTORY:  Reviewed and noncontributory.      SOCIAL HISTORY:  The patient reports he lives in [place].  He denies any alcohol or drug use.  He is a current every day smoker, smoking 2 packs per day.  He says he does not use alcohol or drugs as he cannot afford it right now.      LABORATORY DATA:  CMP, TSH, CBC largely unremarkable.  UDS is negative.  TSH is 1.14.      PHYSICAL EXAMINATION:   VITAL SIGNS:  Temperature 98 degrees, heart rate 67, blood pressure 112/74, respiratory rate 16, oxygen saturation is 95% on room air.   GENERAL:  The patient is alert, oriented to self.  Does not answer other orientation questions.  He appears comfortable and cooperative.  Eating breakfast at the time of my interview.   HEENT:  EOMI.   ENT:  Mucous membranes are moist.  Poor dentition.    CARDIOVASCULAR:  Regular rate and rhythm, no murmurs appreciated.   RESPIRATORY:  Lungs are clear to auscultation bilaterally, though somewhat diminished throughout.  No increased work of breathing or wheezing.   GASTROINTESTINAL:  Positive bowel sounds.  Abdomen is soft and nontender.   MUSCULOSKELETAL:  The patient moves all 4 extremities spontaneously, no gross deformity.   SKIN:  Warm and dry.   NEUROLOGIC:  No obvious focal deficits.  Face is symmetric.  Speech is clear.      ASSESSMENT AND PLAN:  Mr. Ger jack is a 69-year-old gentleman with a history of schizoaffective disorder, polysubstance abuse, hyperlipidemia, tobacco use and hypothyroidism who was admitted to Inpatient Psychiatry Service after presenting with EMS for concerns of paranoia and psychosis.  Hospitalist Service was consulted for medical evaluation.   1.  Psychosis in the setting of schizoaffective disorder and medication noncompliance.  The patient reports he has not been taking his medications.  Per chart review, he is currently on a [employer] order for Haldol, Zyprexa, Clozaril and Invega.  Laboratory evaluation is largely unremarkable.  Note he did have a CT of his head without contrast in 03/2019 without significant findings.   -- Management is per Psychiatry.   2.  Hypothyroidism.  Unclear whether the patient is being compliant with his levothyroxine, but TSH is within normal limits.  We will continue PTA dose.   3.  Dyslipidemia.  Lipid panel last checked in 04/2019, HDL was slightly low at 32, otherwise without significant abnormality.  He is not on medication.   4.  Ongoing tobacco use with suspected underlying COPD.  This is noted per chart review.  Currently without wheezing or increased work of breathing and sats are 95% on room air.  We will monitor clinically.  Encouraged cessation.  The patient declines nicotine replacement at this time.   5.  Primary prevention.  Continue PTA aspirin.  I do not see a history of heart  disease.   6.  Deep venous thrombosis prophylaxis.  Ambulate.      Hospitalist Service will sign off.  The patient is medically stable.  Please call with any questions or concerns.         KIRSTEN RECINOS MD       As dictated by MYRTLE ENRIQUEZ PA-C            D: 2019   T: 2019   MT: MAREN      Name:     JENNIFFER NEGRO   MRN:      [id number]        Account:      [id number]   :      1950        Admitted:     2019                   Document: [id number]       cc: [facility]

## 2019-09-13 NOTE — PLAN OF CARE
"Pt is very tangential in mood and anxious affect. Thought process and insight is still very disorganized. Pt is paranoid and delusional . He things the [employer] is still coming after him and that he was going to knock them out  in court with a million dollars. During encounter with student doctor and Dr. Conklin, Patient rambling about \"sucking two dicks, one black and one white.\" . Pt has been isolative to his room for most of the shift only coming out for meals.   "

## 2019-09-13 NOTE — PLAN OF CARE
"Blunted, flat brightens upon approach.incoherent words during 1:1, explained he \"Solved his apartment problem by writing an argumentative letter\" He also breaks out into song lyrics during conversation. Spent the majority of shift in room playing solitaire. Ate dinner. Medication compliant.   "

## 2019-09-14 PROCEDURE — 25000132 ZZH RX MED GY IP 250 OP 250 PS 637: Performed by: PSYCHIATRY & NEUROLOGY

## 2019-09-14 PROCEDURE — 12400000 ZZH R&B MH

## 2019-09-14 RX ADMIN — BENZTROPINE MESYLATE 1 MG: 1 TABLET ORAL at 08:12

## 2019-09-14 RX ADMIN — LEVOTHYROXINE SODIUM 50 MCG: 50 TABLET ORAL at 08:12

## 2019-09-14 RX ADMIN — Medication: at 15:43

## 2019-09-14 RX ADMIN — TRAZODONE HYDROCHLORIDE 100 MG: 100 TABLET ORAL at 20:33

## 2019-09-14 RX ADMIN — ACETAMINOPHEN 650 MG: 325 TABLET ORAL at 02:42

## 2019-09-14 RX ADMIN — HALOPERIDOL 10 MG: 5 TABLET ORAL at 20:33

## 2019-09-14 RX ADMIN — OLANZAPINE 10 MG: 10 TABLET, ORALLY DISINTEGRATING ORAL at 01:53

## 2019-09-14 RX ADMIN — MULTIPLE VITAMINS W/ MINERALS TAB 1 TABLET: TAB at 08:13

## 2019-09-14 RX ADMIN — BENZTROPINE MESYLATE 1 MG: 1 TABLET ORAL at 20:33

## 2019-09-14 RX ADMIN — ASPIRIN 81 MG 81 MG: 81 TABLET ORAL at 08:13

## 2019-09-14 ASSESSMENT — ACTIVITIES OF DAILY LIVING (ADL)
LAUNDRY: UNABLE TO COMPLETE
ORAL_HYGIENE: INDEPENDENT
DRESS: SCRUBS (BEHAVIORAL HEALTH)
HYGIENE/GROOMING: INDEPENDENT

## 2019-09-14 NOTE — PLAN OF CARE
Pt presented with a tense affect and calm mood. Insight:poor, thought process: intact, behavior:appropriate. Pt spent time watching TV in the lounge. At times, pt would hit his hair comb on the table repetitively. However, he is redirectable. Respectful to staffs. Denied SI, was med and food compliant.

## 2019-09-14 NOTE — PLAN OF CARE
Towards the end of the shift, pt became more irritable. Pt wants the TV channel to be set to a football channel and refuses to compromise with other patients. Pt was instructed to leave the lounge and go to his room. Pt was told that if he cannot compromise with other patients, he will not be allowed to watch the television any longer.

## 2019-09-14 NOTE — PLAN OF CARE
"Patient presents with a tense affect and calm mood. Thought process disorganized. Insight and judgement impaird. He has been pleasant with staff. Isolative to his room. Playing cards in bed. Writer encouraged pt clean his room and offered to help, but pt responded, \"the cleaning staff will do it.\" Pt was given pizza, but writer found the slice in the corner of his room on the floor.   "

## 2019-09-15 PROCEDURE — 12400000 ZZH R&B MH

## 2019-09-15 PROCEDURE — 25000132 ZZH RX MED GY IP 250 OP 250 PS 637: Performed by: PSYCHIATRY & NEUROLOGY

## 2019-09-15 RX ADMIN — ASPIRIN 81 MG 81 MG: 81 TABLET ORAL at 08:59

## 2019-09-15 RX ADMIN — OLANZAPINE 10 MG: 10 TABLET, ORALLY DISINTEGRATING ORAL at 02:23

## 2019-09-15 RX ADMIN — BENZTROPINE MESYLATE 1 MG: 1 TABLET ORAL at 08:59

## 2019-09-15 RX ADMIN — Medication: at 09:00

## 2019-09-15 RX ADMIN — MULTIPLE VITAMINS W/ MINERALS TAB 1 TABLET: TAB at 08:59

## 2019-09-15 RX ADMIN — LEVOTHYROXINE SODIUM 50 MCG: 50 TABLET ORAL at 09:00

## 2019-09-15 RX ADMIN — HALOPERIDOL 10 MG: 5 TABLET ORAL at 20:46

## 2019-09-15 RX ADMIN — TRAZODONE HYDROCHLORIDE 100 MG: 100 TABLET ORAL at 20:46

## 2019-09-15 RX ADMIN — BENZTROPINE MESYLATE 1 MG: 1 TABLET ORAL at 20:46

## 2019-09-15 ASSESSMENT — ACTIVITIES OF DAILY LIVING (ADL)
ORAL_HYGIENE: INDEPENDENT
DRESS: SCRUBS (BEHAVIORAL HEALTH)
LAUNDRY: UNABLE TO COMPLETE
HYGIENE/GROOMING: INDEPENDENT

## 2019-09-15 NOTE — PLAN OF CARE
Moderately anxious, mood stable and no insight. Thought process disorganized. Behavior appropiate. Watching TV in lounge first few hours of shift , then bed resting, talking to himself and playing card [employer]. Quiet, pleasant, respectful , withdrawn and minimally social . Denied SI . Refused HS Haldol.

## 2019-09-15 NOTE — PLAN OF CARE
Shift Update: Pt mood is calm, affect is tense. Thought process is impaired. Insight is poor. Pt denied suicidal ideation. Pt the day watching football in the lounge. Pt cleaned his room and took a shower upon encouragement. Denied SI, was med and food compliant.    ** Please do not allow the pt to eat his meal in the bedroom.

## 2019-09-16 PROCEDURE — 12400000 ZZH R&B MH

## 2019-09-16 PROCEDURE — 25000132 ZZH RX MED GY IP 250 OP 250 PS 637: Performed by: PSYCHIATRY & NEUROLOGY

## 2019-09-16 RX ADMIN — Medication: at 08:22

## 2019-09-16 RX ADMIN — ASPIRIN 81 MG 81 MG: 81 TABLET ORAL at 08:22

## 2019-09-16 RX ADMIN — MULTIPLE VITAMINS W/ MINERALS TAB 1 TABLET: TAB at 08:22

## 2019-09-16 RX ADMIN — ACETAMINOPHEN 650 MG: 325 TABLET ORAL at 03:47

## 2019-09-16 RX ADMIN — HALOPERIDOL 10 MG: 5 TABLET ORAL at 20:24

## 2019-09-16 RX ADMIN — BENZTROPINE MESYLATE 1 MG: 1 TABLET ORAL at 20:24

## 2019-09-16 RX ADMIN — TRAZODONE HYDROCHLORIDE 100 MG: 100 TABLET ORAL at 20:24

## 2019-09-16 RX ADMIN — BENZTROPINE MESYLATE 1 MG: 1 TABLET ORAL at 08:22

## 2019-09-16 RX ADMIN — OLANZAPINE 10 MG: 10 TABLET, ORALLY DISINTEGRATING ORAL at 03:47

## 2019-09-16 RX ADMIN — LEVOTHYROXINE SODIUM 50 MCG: 50 TABLET ORAL at 08:22

## 2019-09-16 NOTE — PLAN OF CARE
Mood is stable . Affect mildly anxious and sometimes a little irritable. Suspicious if things moved in his room. Responds well to attention and comfort measures . Thought process disorganized. Sometimes talks to himself . Insight is poor . Denied SI. Spent evening watching football. Withdrawn, quiet and minimally social.

## 2019-09-16 NOTE — PLAN OF CARE
BEHAVIORAL TEAM DISCUSSION    Participants: MD, RN, CM, OT, PA  Progress: anxious, irritable, disorganized, isolative, does not socialize with anyone  Anticipated length of stay: unknown  Continued Stay Criteria/Rationale: medication management   Medical/Physical: NA  Precautions:   Behavioral Orders   Procedures    Code 1 - Restrict to Unit    Routine Programming     As clinically indicated    Status 15     Every 15 minutes.     Plan: medication management  Rationale for change in precautions or plan: NA

## 2019-09-16 NOTE — PLAN OF CARE
Anxious and irritable, disorganized, evasive when questions asked, internally preoccupied, refuses to answer questions about how he feels, how he lives and are his future plans. Does not participate in the Mil[facility] , he is isolative.  He is angry.

## 2019-09-16 NOTE — PROGRESS NOTES
"[facility] Psychiatric Progress Note       Interim History     The patient's care was discussed with the treatment team and chart notes were reviewed. Over the weekend, the patient was compliant with medications, minimally social, denied suicidal ideation, and spent majority of his time either watching TV in lounge or bed resting. Pt seen on 9/16/19 by Dr. Conklin. Patient reports he is doing good today and believes he has improved since previous interview (9/13/19). When asked why the patient denied his Haldol medication, the patient states, \"it's the worst excuse for a drug. It makes you shake and your lips tremor.\" Our  will need to check on legal/commitment status. If patient is still under full commitment and Wu, he cannot deny Wu medications.      Hospital Course   Per records Ger Bashir reportedly was lingering at a [employer] restaurant prior to admission without a shirt on and was said to have been muttering to himself.  Consequently, the [place] Police Department was called and took the patient back to his apartment, which was then found to be in a state of disarray.  He was said to have no food at home.  On further inquiry, he told the police that the [employer] has him under surveillance.  He initially was willing to come to the hospital, but upon presentation, he refused to get dressed or follow directions.  He was noted to be somewhat disgruntled and disorganized in his manner of speaking.  ED records suggest that he was \"talking about Looney Tunes and Roby characters.\"  He indicated to them that he wanted a CAT scan of his head because his \"brain is at a 3.\"  On direct interview with psychiatrist Dr. Lizbeth Louie, the patient was a very poor historian who got easily agitated with questioning.  He did not provide much by way of the information, but did allude to the Y[employer] persecuting him.  He could not explain the reason why he had been partially clad " at the [employer] and when pressed for more information, he became rather dismissive.  Of note, he was recently on admission at [facility] and was discharged with diagnoses of schizoaffective disorder, bipolar type, tobacco use disorder and alcohol use disorder in sustained remission.  He reportedly has a history of noncompliance with medications, has had multiple court commitments and Wu orders.  During his last hospitalization at [facility] for about 3 weeks, he was discharged with his Wu order extended and he remains on civil commitment.  He is currently Wu'd to take Haldol, Zyprexa, Clozaril and Invega.  The patient was supposed to be taking Haldol Decanoate 100 mg q.2 weeks, Cogentin 1 mg twice a day and Haldol 10 mg daily at bedtime, but the patient reports that he does not like taking Haldol.  It appears he has not been compliant with his prescribed medications and it is unclear when he last received his Haldol Decanoate.  He does admit that he has had multiple medication trials in the past and feels he did best with Zyprexa. Dr. Louie believed that patient would benefit from psychiatric hospitalization and stabilization on [address].      Medications     Current Facility-Administered Medications Ordered in Epic   Medication Dose Route Frequency Last Rate Last Dose     acetaminophen (TYLENOL) tablet 650 mg  650 mg Oral Q4H PRN   650 mg at 09/16/19 0347     aspirin (ASA) chewable tablet 81 mg  81 mg Oral Daily   81 mg at 09/16/19 0822     benztropine (COGENTIN) tablet 1 mg  1 mg Oral BID   1 mg at 09/16/19 0822     eucerin cream   Topical BID         haloperidol (HALDOL) tablet 10 mg  10 mg Oral At Bedtime   10 mg at 09/15/19 2046     levothyroxine (SYNTHROID/LEVOTHROID) tablet 50 mcg  50 mcg Oral Daily   50 mcg at 09/16/19 0822     multivitamin w/minerals (THERA-VIT-M) tablet 1 tablet  1 tablet Oral Daily   1 tablet at 09/16/19 0822     nicotine  "(NICORETTE) gum 2-4 mg  2-4 mg Buccal Q1H PRN         OLANZapine zydis (zyPREXA) ODT tab 10 mg  10 mg Oral Q6H PRN   10 mg at 09/16/19 0347    Or     OLANZapine (zyPREXA) injection 10 mg  10 mg Intramuscular Q6H PRN         traZODone (DESYREL) tablet 100 mg  100 mg Oral At Bedtime   100 mg at 09/15/19 2046     No current Epic-ordered outpatient medications on file.         Allergies      Allergies   Allergen Reactions     Peas GI Disturbance     Black eyed peas - vomiting        Medical Review of Systems     /66   Pulse 55   Temp 97.3  F (36.3  C) (Oral)   Resp 17   Ht 1.778 m (5' 10\")   Wt 104.3 kg (230 lb)   SpO2 94%   BMI 33.00 kg/m    Body mass index is 33 kg/m .  A 10-point review of systems was performed by Dominguez Conklin MD and is negative, no new findings.      Psychiatric Examination     Appearance Sitting in chair, dressed in hospital scrubs. Appears stated age.   Attitude Cooperative   Orientation Oriented to person, place, time   Eye Contact Poor   Speech Regular rate, rhythm, volume and tone   Language Normal   Psychomotor Behavior Normal   Mood Fine   Affect Irritable    Thought Process Impaired   Associations Intact   Thought Content Patient is currently negative for suicidal ideation, negative for plan or intent, able to contract no self harm and identify barriers to suicide. Positive for psychosis.     Fund of Knowledge Impaired   Insight Impaired   Judgement Impaired   Attention Span & Concentration Poor   Recent & Remote Memory Intact   Gait Normal   Muscle Tone Intact        Labs     Labs reviewed.  No results found for this or any previous visit (from the past 24 hour(s)).     Impression   This is a 69 year old male with a previous psychiatric diagnosis that includes schizoaffective disorder, bipolar type, who has a history of medication noncompliance. He was initially presented to [facility] ED on 9/11/19 for evaluation of paranoid behavior and was deemed appropriate for inpatient " level of care. While meeting with Dr. Conklin this morning, the patient proceeded to demonstrate signs and symptoms of psychosis. He was tangential and his thought process was quite disorganized. We will need to confirm patients commitment/legal status. If patient is currently under full commitment and Wu, he will need to be administered certain medications. Will have  address this.      Diagnoses     1. Schizoaffective disorder, bipolar type, multiple episodes in acute phase  2. Alcohol Use Disorder, in sustained remission      Plan     1. Explained side effects, benefits, and complications of medications to the patient, Pt gave verbal consent.  2. Medication changes: None   3. Discussed treatment plan with patient and team.  4. Projected length of stay: 7+ days    Attestation:   I, Luda Patino, am serving as a scribe on 9/16/2019 to document services personally performed by Dominguez Conklin MD based on my observations and the provider's statements to me.    Patient ID:  Name: Ger Bashir    MRN: [id number]  Admission: 9/11/2019   YOB: 1950

## 2019-09-16 NOTE — PROGRESS NOTES
Michelle Arora RN, [facility] [phone] called for an update.  She will be out to see patient today.  She stated his case was given to [facility] a couple of months ago.  Michelle believes he may have been on an injection medication in the past but apparently he became aggressive and difficult with the nurse administering it.  Patient has been given an oral supply of Invega and Cogentin but refuses to take his medications and has not been taking them for a long time.  She stated it is very difficult for them to assess him because he is angry and swears at them.  They can observe that he is paranoid and suspicious.  His apartment is a mess and his hygiene is poor.  Michelle called him and stated she was coming to see him, he told her he would have to put on his pants.  When she got there his pants were not on.  She stated his shirt was long enough to cover private areas but she still could not stay when he was not appropriately dressed.  Patient's last MI commitment was in February 2018.  The case has been closed.  Michelle believes patient would benefit from injection medication as she feels he will not take oral medications.

## 2019-09-16 NOTE — PROGRESS NOTES
Odalis MANN from ACT Team visited with patient this evening. She told staff outpatient nurse, social work and psychiatrist would like to be part of discharge planning. PEG for ACT team signed.  Psychiatrist: Dr. Fox

## 2019-09-17 PROCEDURE — 25000132 ZZH RX MED GY IP 250 OP 250 PS 637: Performed by: PSYCHIATRY & NEUROLOGY

## 2019-09-17 PROCEDURE — 12400000 ZZH R&B MH

## 2019-09-17 RX ADMIN — BENZTROPINE MESYLATE 1 MG: 1 TABLET ORAL at 20:46

## 2019-09-17 RX ADMIN — BENZTROPINE MESYLATE 1 MG: 1 TABLET ORAL at 07:44

## 2019-09-17 RX ADMIN — ASPIRIN 81 MG 81 MG: 81 TABLET ORAL at 07:44

## 2019-09-17 RX ADMIN — TRAZODONE HYDROCHLORIDE 100 MG: 100 TABLET ORAL at 20:46

## 2019-09-17 RX ADMIN — MULTIPLE VITAMINS W/ MINERALS TAB 1 TABLET: TAB at 07:44

## 2019-09-17 RX ADMIN — LEVOTHYROXINE SODIUM 50 MCG: 50 TABLET ORAL at 07:44

## 2019-09-17 RX ADMIN — HALOPERIDOL 10 MG: 5 TABLET ORAL at 20:46

## 2019-09-17 ASSESSMENT — ACTIVITIES OF DAILY LIVING (ADL)
HYGIENE/GROOMING: INDEPENDENT
DRESS: INDEPENDENT
LAUNDRY: WITH SUPERVISION
ORAL_HYGIENE: INDEPENDENT
HYGIENE/GROOMING: INDEPENDENT
DRESS: INDEPENDENT
ORAL_HYGIENE: INDEPENDENT

## 2019-09-17 ASSESSMENT — MIFFLIN-ST. JEOR: SCORE: 1743.77

## 2019-09-17 NOTE — PROGRESS NOTES
"[facility] Psychiatric Progress Note       Interim History     The patient's care was discussed with the treatment team and chart notes were reviewed. Pt seen on 9/17/19 by Dr. Conklin.      Hospital Course   Per records Ger Bashir reportedly was lingering at a [employer] prior to admission without a shirt on and was said to have been muttering to himself.  Consequently, the [place] [employer] was called and took the patient back to his apartment, which was then found to be in a state of disarray.  He was said to have no food at home.  On further inquiry, he told the police that the [facility] has him under surveillance.  He initially was willing to come to the hospital, but upon presentation, he refused to get dressed or follow directions.  He was noted to be somewhat disgruntled and disorganized in his manner of speaking.  ED records suggest that he was \"talking about Looney Tunes and [place] characters.\"  He indicated to them that he wanted a CAT scan of his head because his \"brain is at a 3.\"  On direct interview with psychiatrist Dr. Lizbeth Louie, the patient was a very poor historian who got easily agitated with questioning.  He did not provide much by way of the information, but did allude to the [employer] persecuting him.  He could not explain the reason why he had been partially clad at the [employer] and when pressed for more information, he became rather dismissive.  Of note, he was recently on admission at [facility] and was discharged with diagnoses of schizoaffective disorder, bipolar type, tobacco use disorder and alcohol use disorder in sustained remission.  He reportedly has a history of noncompliance with medications, has had multiple court commitments and Wu orders.  During his last hospitalization at [facility] for about 3 weeks, he was discharged with his Wu order extended and he remains on civil " commitment.  He is currently Wu'd to take Haldol, Zyprexa, Clozaril and Invega.  The patient was supposed to be taking Haldol Decanoate 100 mg q.2 weeks, Cogentin 1 mg twice a day and Haldol 10 mg daily at bedtime, but the patient reports that he does not like taking Haldol.  It appears he has not been compliant with his prescribed medications and it is unclear when he last received his Haldol Decanoate.  He does admit that he has had multiple medication trials in the past and feels he did best with Zyprexa. Dr. Louie believed that patient would benefit from psychiatric hospitalization and stabilization on [address].      Medications     Current Facility-Administered Medications Ordered in Epic   Medication Dose Route Frequency Last Rate Last Dose     acetaminophen (TYLENOL) tablet 650 mg  650 mg Oral Q4H PRN   650 mg at 09/16/19 0347     aspirin (ASA) chewable tablet 81 mg  81 mg Oral Daily   81 mg at 09/16/19 0822     benztropine (COGENTIN) tablet 1 mg  1 mg Oral BID   1 mg at 09/16/19 2024     eucerin cream   Topical BID         haloperidol (HALDOL) tablet 10 mg  10 mg Oral At Bedtime   10 mg at 09/16/19 2024     levothyroxine (SYNTHROID/LEVOTHROID) tablet 50 mcg  50 mcg Oral Daily   50 mcg at 09/16/19 0822     multivitamin w/minerals (THERA-VIT-M) tablet 1 tablet  1 tablet Oral Daily   1 tablet at 09/16/19 0822     nicotine (NICORETTE) gum 2-4 mg  2-4 mg Buccal Q1H PRN         OLANZapine zydis (zyPREXA) ODT tab 10 mg  10 mg Oral Q6H PRN   10 mg at 09/16/19 0347    Or     OLANZapine (zyPREXA) injection 10 mg  10 mg Intramuscular Q6H PRN         traZODone (DESYREL) tablet 100 mg  100 mg Oral At Bedtime   100 mg at 09/16/19 2024     No current [facility]-ordered outpatient medications on file.         Allergies      Allergies   Allergen Reactions     Peas GI Disturbance     Black eyed peas - vomiting        Medical Review of Systems     /73   Pulse 74   Temp 98  F (36.7  C) (Oral)   Resp 16   Ht 1.778 m  "(5' 10\")   Wt 104.3 kg (230 lb)   SpO2 95%   BMI 33.00 kg/m    Body mass index is 33 kg/m .  A 10-point review of systems was performed by Dominguez Conklin MD and is negative, no new findings.      Psychiatric Examination     Appearance Sitting in chair, dressed in hospital scrubs. Appears stated age.   Attitude Cooperative   Orientation Oriented to person, place, time   Eye Contact Poor   Speech Regular rate, rhythm, volume and tone   Language Normal   Psychomotor Behavior Normal   Mood Fine   Affect Irritable    Thought Process Impaired   Associations Intact   Thought Content Patient is currently negative for suicidal ideation, negative for plan or intent, able to contract no self harm and identify barriers to suicide. Positive for psychosis.     Fund of Knowledge Impaired   Insight Impaired   Judgement Impaired   Attention Span & Concentration Poor   Recent & Remote Memory Intact   Gait Normal   Muscle Tone Intact        Labs     Labs reviewed.  No results found for this or any previous visit (from the past 24 hour(s)).     Impression   This is a 69 year old male with a previous psychiatric diagnosis that includes schizoaffective disorder, bipolar type, who has a history of medication noncompliance. He was initially presented to [facility] ED on 9/11/19 for evaluation of paranoid behavior and was deemed appropriate for inpatient level of care.      Diagnoses     1. Schizoaffective disorder, bipolar type, multiple episodes in acute phase  2. Alcohol Use Disorder, in sustained remission      Plan     1. Explained side effects, benefits, and complications of medications to the patient, Pt gave verbal consent.  2. Medication changes: None   3. Discussed treatment plan with patient and team.  4. Projected length of stay: 7+ days    Attestation:   Luda NGUYEN, am serving as a scribe on 9/17/2019 to document services personally performed by Dominguez Conklin MD based on my observations and the provider's " statements to me.    Patient ID:  Name: Ger Bashir    MRN: [id number]  Admission: 9/11/2019   YOB: 1950

## 2019-09-17 NOTE — PLAN OF CARE
Shift Update: Pt mood is calm. Affect is tense. Thought process is impaired. Insight is poor. Pt denied suicidal ideation. Pt spent the entire shift bed resting. Would only come out for meals and to use the rest room.

## 2019-09-17 NOTE — PLAN OF CARE
Problem: Psychotic Symptoms  Goal: Psychotic Symptoms  Description  Signs and symptoms of listed problems will be absent or manageable.  Outcome: No Change  Flowsheets (Taken 9/17/2019 2790)  Psychotic Symptoms Assessed: anxiety; insight; thought process; mood; affect  Psychotic Symptoms Present: anxiety; thought process; mood; affect; insight  Note:   Pt has been present on the unit and has been watching TV in the lounge. Pt is very superficial with staff and peers and has been cooperative with staff. Upon interaction pt was more delusional and paranoid stating he killed JFK and blew his head off along with stating he was the holy spirit put here by God. Pt denies hearing voices but admits to hearing them in his head. Pt is convoluted  and his thought process is disorganized. Pt has been med complaint here in the hospital but has had touble with taking his medications once discharged Pt has been cooperative and pleasant with no outburst or inappropriate behavior. Nursing to continue to monitor.

## 2019-09-17 NOTE — PROGRESS NOTES
"[facility] Psychiatric Progress Note       Interim History     The patient's care was discussed with the treatment team and chart notes were reviewed. It should be noted that our  spoke to a nurse, Michelle Arora, from [facility] yesterday afternoon to further discuss treatment plan. The patient has been followed by [facility] for the past few months now. At this time, Michelle believes patient would benefit most from injection medications since the patient has been noncompliant with oral medications in the past. Pt seen on 9/17/19 by Dr. Conklin. On interview, the patient states everything is fine and there are no issues. He denies any complications or attaining concerns in regards of his medications. Dr. Conklin will not make any medication adjustments today.      Hospital Course   Per records Ger Bashir reportedly was lingering at a [employer] restaurant prior to admission without a shirt on and was said to have been muttering to himself.  Consequently, the [place] Police Department was called and took the patient back to his apartment, which was then found to be in a state of disarray.  He was said to have no food at home.  On further inquiry, he told the police that the [employer] has him under surveillance.  He initially was willing to come to the hospital, but upon presentation, he refused to get dressed or follow directions.  He was noted to be somewhat disgruntled and disorganized in his manner of speaking.  ED records suggest that he was \"talking about Looney Tunes and [place] characters.\"  He indicated to them that he wanted a CAT scan of his head because his \"brain is at a 3.\"  On direct interview with psychiatrist Dr. Lizbeth Louie, the patient was a very poor historian who got easily agitated with questioning.  He did not provide much by way of the information, but did allude to the [facility] persecuting him.  He could not explain the reason why he had been partially clad " at the [employer] and when pressed for more information, he became rather dismissive.  Of note, he was recently on admission at [facility] and was discharged with diagnoses of schizoaffective disorder, bipolar type, tobacco use disorder and alcohol use disorder in sustained remission.  He reportedly has a history of noncompliance with medications, has had multiple court commitments and Wu orders.  During his [facility] about [facility], he was discharged with his Wu order extended and he remains on civil commitment.  He is currently Wu'd to take Haldol, Zyprexa, Clozaril and Invega.  The patient was supposed to be taking Haldol Decanoate 100 mg q.2 weeks, Cogentin 1 mg twice a day and Haldol 10 mg daily at bedtime, but the patient reports that he does not like taking Haldol.  It appears he has not been compliant with his prescribed medications and it is unclear when he last received his Haldol Decanoate.  He does admit that he has had multiple medication trials in the past and feels he did best with Zyprexa. Dr. Louie believed that patient would benefit from psychiatric hospitalization and stabilization on [address].      Medications     Current Facility-Administered Medications Ordered in Epic   Medication Dose Route Frequency Last Rate Last Dose     acetaminophen (TYLENOL) tablet 650 mg  650 mg Oral Q4H PRN   650 mg at 09/16/19 0347     aspirin (ASA) chewable tablet 81 mg  81 mg Oral Daily   81 mg at 09/16/19 0822     benztropine (COGENTIN) tablet 1 mg  1 mg Oral BID   1 mg at 09/16/19 2024     eucerin cream   Topical BID         haloperidol (HALDOL) tablet 10 mg  10 mg Oral At Bedtime   10 mg at 09/16/19 2024     levothyroxine (SYNTHROID/LEVOTHROID) tablet 50 mcg  50 mcg Oral Daily   50 mcg at 09/16/19 0822     multivitamin w/minerals (THERA-VIT-M) tablet 1 tablet  1 tablet Oral Daily   1 tablet at 09/16/19 0822     nicotine  "(NICORETTE) gum 2-4 mg  2-4 mg Buccal Q1H PRN         OLANZapine zydis (zyPREXA) ODT tab 10 mg  10 mg Oral Q6H PRN   10 mg at 09/16/19 0347    Or     OLANZapine (zyPREXA) injection 10 mg  10 mg Intramuscular Q6H PRN         traZODone (DESYREL) tablet 100 mg  100 mg Oral At Bedtime   100 mg at 09/16/19 2024     No current Epic-ordered outpatient medications on file.         Allergies      Allergies   Allergen Reactions     Peas GI Disturbance     Black eyed peas - vomiting        Medical Review of Systems     /73   Pulse 74   Temp 98  F (36.7  C) (Oral)   Resp 16   Ht 1.778 m (5' 10\")   Wt 104.3 kg (230 lb)   SpO2 95%   BMI 33.00 kg/m    Body mass index is 33 kg/m .  A 10-point review of systems was performed by Dominguez Conklin MD and is negative, no new findings.      Psychiatric Examination     Appearance Sitting in chair, dressed in hospital scrubs. Appears stated age.   Attitude Cooperative   Orientation Oriented to person, place, time   Eye Contact Poor   Speech Regular rate, rhythm, volume and tone   Language Normal   Psychomotor Behavior Normal   Mood Fine   Affect Irritable    Thought Process Impaired   Associations Intact   Thought Content Patient is currently negative for suicidal ideation, negative for plan or intent, able to contract no self harm and identify barriers to suicide. Positive for psychosis.     Fund of Knowledge Impaired   Insight Impaired   Judgement Impaired   Attention Span & Concentration Poor   Recent & Remote Memory Intact   Gait Normal   Muscle Tone Intact        Labs     Labs reviewed.  No results found for this or any previous visit (from the past 24 hour(s)).     Impression   This is a 69 year old male with a previous psychiatric diagnosis that includes schizoaffective disorder, bipolar type, who has a history of medication noncompliance. He was initially presented to [facility] ED on 9/11/19 for evaluation of paranoid behavior and was deemed appropriate for inpatient " level of care. While meeting with Dr. Reddy this morning, the patient continued to present with an impaired and disorganized thought process. His speech was rambled, tangential, and difficult to understand at times, however he was able to deny obtaining any issues or concerns in regards of his current medication regimen. Dr. Conklin made no changes today. It should be noted that patient is no longer under legal commitment however is currently followed by [facility]. Staff members from [facility] believe that patient would benefit most from injection medications due to patient being noncompliant with oral medications in the past.      Diagnoses     1. Schizoaffective disorder, bipolar type, multiple episodes in acute phase  2. Alcohol Use Disorder, in sustained remission      Plan     1. Explained side effects, benefits, and complications of medications to the patient, Pt gave verbal consent.  2. Medication changes: None   3. Discussed treatment plan with patient and team.  4. Projected length of stay: 7+ days    Attestation:   ILuda, am serving as a scribe on 9/17/2019 to document services personally performed by Dominguez Conklin MD based on my observations and the provider's statements to me.    Patient ID:  Name: Ger Bashir    MRN: [id number]  Admission: 9/11/2019   YOB: 1950

## 2019-09-18 PROCEDURE — 12400000 ZZH R&B MH

## 2019-09-18 PROCEDURE — 25000128 H RX IP 250 OP 636: Performed by: PSYCHIATRY & NEUROLOGY

## 2019-09-18 PROCEDURE — 25000132 ZZH RX MED GY IP 250 OP 250 PS 637: Performed by: PSYCHIATRY & NEUROLOGY

## 2019-09-18 RX ADMIN — BENZTROPINE MESYLATE 1 MG: 1 TABLET ORAL at 20:11

## 2019-09-18 RX ADMIN — ASPIRIN 81 MG 81 MG: 81 TABLET ORAL at 08:49

## 2019-09-18 RX ADMIN — PALIPERIDONE PALMITATE 156 MG: 156 INJECTION INTRAMUSCULAR at 15:15

## 2019-09-18 RX ADMIN — BENZTROPINE MESYLATE 1 MG: 1 TABLET ORAL at 08:49

## 2019-09-18 RX ADMIN — HALOPERIDOL 10 MG: 5 TABLET ORAL at 20:11

## 2019-09-18 RX ADMIN — LEVOTHYROXINE SODIUM 50 MCG: 50 TABLET ORAL at 08:49

## 2019-09-18 RX ADMIN — TRAZODONE HYDROCHLORIDE 100 MG: 100 TABLET ORAL at 20:11

## 2019-09-18 RX ADMIN — MULTIPLE VITAMINS W/ MINERALS TAB 1 TABLET: TAB at 08:49

## 2019-09-18 ASSESSMENT — ACTIVITIES OF DAILY LIVING (ADL)
DRESS: INDEPENDENT
LAUNDRY: WITH SUPERVISION
DRESS: INDEPENDENT
ORAL_HYGIENE: INDEPENDENT
ORAL_HYGIENE: INDEPENDENT
LAUNDRY: WITH SUPERVISION
HYGIENE/GROOMING: INDEPENDENT
HYGIENE/GROOMING: INDEPENDENT

## 2019-09-18 NOTE — PLAN OF CARE
Problem: Psychotic Symptoms  Goal: Psychotic Symptoms  Description  Signs and symptoms of listed problems will be absent or manageable.  Outcome: Improving  Flowsheets  Taken 9/17/2019 3644 by Misael Witt  Psychotic Symptoms Assessed: all  Taken 9/18/2019 1420 by Sundar Lawrence  Psychotic Symptoms Present: affect;mood;insight;thought process;orientation;memory deficits  Note:   Pt presents with a blunt affect and calm mood. Pt spent the majority of the shift resting or playing cards in his bed. Pt denies hearing voices but appears to be responding to internal stimuli throughout the shift. Pt has superficial interaction with peers. Pt appears much more lucid but still has disorganized thoughts and delusional statements. Pt ate all of his food and was med compliant. Pt shows difficulty in memory recall. Pt denies Si.

## 2019-09-18 NOTE — PROGRESS NOTES
"[facility] Psychiatric Progress Note       Interim History     The patient's care was discussed with the treatment team and chart notes were reviewed. According to nursing and psych associate notes from yesterday, the patient was somewhat isolative and withdrawal to his room for majority of yesterday. He appeared internally preoccupied, disorganized in thought, with no insight. He proceeded to be compliant with medications and overall cooperative. Pt seen on 9/18/19 by Dr. Conklin. On interview, the patient reports he is doing fine this morning. He has been spending his time watching TV this morning. Dr. Conklin informs the patient about the [facility] Care teams suggestion: patient being administered injections rather than oral medications. The patient is agreeable to this, confirming that he is willing to be compliant with injections. From this, Dr. Conklin will order Invega Sustenna 156mg.      Hospital Course   Per records Ger Bashir reportedly was lingering at a [employer] restaurant prior to admission without a shirt on and was said to have been muttering to himself.  Consequently, the [place] Police Department was called and took the patient back to his apartment, which was then found to be in a state of disarray.  He was said to have no food at home.  On further inquiry, he told the police that the [employer] has him under surveillance.  He initially was willing to come to the hospital, but upon presentation, he refused to get dressed or follow directions.  He was noted to be somewhat disgruntled and disorganized in his manner of speaking.  ED records suggest that he was \"talking about [place]oney Tunes and [place] characters.\"  He indicated to them that he wanted a CAT scan of his head because his \"brain is at a 3.\"  On direct interview with psychiatrist Dr. Lizbeth Louie, the patient was a very poor historian who got easily agitated with questioning.  He did not provide much by way " of the information, but did allude to the [employer] persecuting him.  He could not explain the reason why he had been partially clad at the [employer] and when pressed for more information, he became rather dismissive.  Of note, he was recently on admission at [facility] and was discharged with diagnoses of schizoaffective disorder, bipolar type, tobacco use disorder and alcohol use disorder in sustained remission.  He reportedly has a history of noncompliance with medications, has had multiple court commitments and Wu orders.  During his last hospitalization at [facility] for about 3 weeks, he was discharged with his Wu order extended and he remains on civil commitment.  He is currently Wu'd to take Haldol, Zyprexa, Clozaril and Invega.  The patient was supposed to be taking Haldol Decanoate 100 mg q.2 weeks, Cogentin 1 mg twice a day and Haldol 10 mg daily at bedtime, but the patient reports that he does not like taking Haldol.  It appears he has not been compliant with his prescribed medications and it is unclear when he last received his Haldol Decanoate.  He does admit that he has had multiple medication trials in the past and feels he did best with Zyprexa. Dr. Louie believed that patient would benefit from psychiatric hospitalization and stabilization on [address]. It should be noted that the treatment team was able to solidify that patient is no longer under legal commitment, however is currently followed by [facility]. Staff members from [facility] reported to [address] staff that they believe that patient would benefit most from injection medications due to patient being noncompliant with oral medications in the past.      Medications     Current Facility-Administered Medications Ordered in Epic   Medication Dose Route Frequency Last Rate Last Dose     acetaminophen (TYLENOL) tablet 650 mg  650 mg Oral Q4H PRN   650 mg at 09/16/19 2778     aspirin (ASA)  "chewable tablet 81 mg  81 mg Oral Daily   81 mg at 09/17/19 0744     benztropine (COGENTIN) tablet 1 mg  1 mg Oral BID   1 mg at 09/17/19 2046     eucerin cream   Topical BID         haloperidol (HALDOL) tablet 10 mg  10 mg Oral At Bedtime   10 mg at 09/17/19 2046     levothyroxine (SYNTHROID/LEVOTHROID) tablet 50 mcg  50 mcg Oral Daily   50 mcg at 09/17/19 0744     multivitamin w/minerals (THERA-VIT-M) tablet 1 tablet  1 tablet Oral Daily   1 tablet at 09/17/19 0744     nicotine (NICORETTE) gum 2-4 mg  2-4 mg Buccal Q1H PRN         OLANZapine zydis (zyPREXA) ODT tab 10 mg  10 mg Oral Q6H PRN   10 mg at 09/16/19 0347    Or     OLANZapine (zyPREXA) injection 10 mg  10 mg Intramuscular Q6H PRN         traZODone (DESYREL) tablet 100 mg  100 mg Oral At Bedtime   100 mg at 09/17/19 2046     No current Epic-ordered outpatient medications on file.         Allergies      Allergies   Allergen Reactions     Peas GI Disturbance     Black eyed peas - vomiting        Medical Review of Systems     /74   Pulse 74   Temp 97.8  F (36.6  C) (Oral)   Resp 16   Ht 1.778 m (5' 10\")   Wt 96.8 kg (213 lb 4.8 oz)   SpO2 96%   BMI 30.61 kg/m    Body mass index is 30.61 kg/m .  A 10-point review of systems was performed by Dominguez Conklin MD and is negative, no new findings.      Psychiatric Examination     Appearance Sitting in chair, dressed in hospital scrubs. Appears stated age.   Attitude Cooperative   Orientation Oriented to person, place, time   Eye Contact Poor   Speech Regular rate, rhythm, volume and tone   Language Normal   Psychomotor Behavior Normal   Mood Fine   Affect Irritable    Thought Process Impaired   Associations Intact   Thought Content Patient is currently negative for suicidal ideation, negative for plan or intent, able to contract no self harm and identify barriers to suicide. Positive for psychosis.     Fund of Knowledge Impaired   Insight Impaired   Judgement Impaired   Attention Span & " Concentration Poor   Recent & Remote Memory Intact   Gait Normal   Muscle Tone Intact        Labs     Labs reviewed.  No results found for this or any previous visit (from the past 24 hour(s)).     Impression   This is a 69 year old male with a previous psychiatric diagnosis that includes schizoaffective disorder, bipolar type, who has a history of medication noncompliance. He was initially presented to [facility] ED on 9/11/19 for evaluation of paranoid behavior and was deemed appropriate for inpatient level of care. While meeting with Dr. Conklin this morning, the patient was pleasant and cooperative, however continued to display signs and symptoms of psychosis. Dr. Conklin discussed medications in detail today with patient, noting that the [facility] team would like the patient to be on injections rather than oral medications. The patient was in agreement with this plan, thus Dr. Conklin has ordered Invega Sustenna 156mg.      Diagnoses     1. Schizoaffective disorder, bipolar type, multiple episodes in acute phase  2. Alcohol Use Disorder, in sustained remission      Plan     1. Explained side effects, benefits, and complications of medications to the patient, Pt gave verbal consent.  2. Medication changes: Ordered Invega Sustenna 156mg  3. Discussed treatment plan with patient and team.  4. Projected length of stay: 7+ days    Attestation:   I, Luda Patino, am serving as a scribe on 9/18/2019 to document services personally performed by Dominguez Conklin MD based on my observations and the provider's statements to me.    Patient ID:  Name: Ger Bashir    MRN: [id number]  Admission: 9/11/2019   YOB: 1950

## 2019-09-18 NOTE — PLAN OF CARE
"  Problem: Psychotic Symptoms  Goal: Psychotic Symptoms  Description  Signs and symptoms of listed problems will be absent or manageable.  9/17/2019 2254 by Misael Witt  Flowsheets (Taken 9/17/2019 2254)  Psychotic Symptoms Assessed: all  Psychotic Symptoms Present: insight; thought process; affect; speech  Note:   Patient was withdrawn for most of the shift. Patient was visited by an outpatient treatment staff member around 16:00 however when unit staff asked him how his visit went patient could not recall the encounter. Patient continues to lack insight since when staff asked him about how he was feeling he stated that his main problem was that he \"had not had pussy in 35 years.\" Patient presents with a flat affect and appears tense. Patient continues to present disorganized thoughts and appears internally preoccupied. Patient was medication complaint.     "

## 2019-09-19 PROCEDURE — 25000132 ZZH RX MED GY IP 250 OP 250 PS 637: Performed by: PSYCHIATRY & NEUROLOGY

## 2019-09-19 PROCEDURE — 12400000 ZZH R&B MH

## 2019-09-19 RX ADMIN — ASPIRIN 81 MG 81 MG: 81 TABLET ORAL at 07:43

## 2019-09-19 RX ADMIN — LEVOTHYROXINE SODIUM 50 MCG: 50 TABLET ORAL at 07:43

## 2019-09-19 RX ADMIN — TRAZODONE HYDROCHLORIDE 100 MG: 100 TABLET ORAL at 22:55

## 2019-09-19 RX ADMIN — Medication: at 16:00

## 2019-09-19 RX ADMIN — HALOPERIDOL 10 MG: 5 TABLET ORAL at 22:55

## 2019-09-19 RX ADMIN — BENZTROPINE MESYLATE 1 MG: 1 TABLET ORAL at 07:43

## 2019-09-19 RX ADMIN — MULTIPLE VITAMINS W/ MINERALS TAB 1 TABLET: TAB at 07:43

## 2019-09-19 RX ADMIN — BENZTROPINE MESYLATE 1 MG: 1 TABLET ORAL at 22:54

## 2019-09-19 ASSESSMENT — ACTIVITIES OF DAILY LIVING (ADL)
ORAL_HYGIENE: INDEPENDENT
ORAL_HYGIENE: INDEPENDENT
HYGIENE/GROOMING: INDEPENDENT
DRESS: INDEPENDENT
LAUNDRY: UNABLE TO COMPLETE
DRESS: INDEPENDENT
HYGIENE/GROOMING: INDEPENDENT
LAUNDRY: UNABLE TO COMPLETE

## 2019-09-19 NOTE — PLAN OF CARE
"Pt presents with flat blunt affect and depressed mood. Pt showered without prompts and was visible in the lounge watching tv for most of the day. Polite towards staff and peers alike. Needed to be cut off from excess coffee and sugar intake. When asking Pt how he feels he stated \"better than ever.\" Med compliant, no SI stated.  "

## 2019-09-19 NOTE — PLAN OF CARE
"Patient spent some time in the lounge. Minimally social with peers. Patient asked about leaving this shift and was frustrated that he was not leaving today. He then spent the rest of the shift in his room bed resting. He states he is \"fine\" on 1:1 and did not elaborate further. He denies Voices,SI and HI. Mood is calm. Affect is flat   "

## 2019-09-19 NOTE — PROGRESS NOTES
"[facility] Psychiatric Progress Note       Interim History     The patient's care was discussed with the treatment team and chart notes were reviewed. Pt seen on 9/19/19 by Dr. Conklin. On interview, the patient reports he is doing fine this afternoon. Dr. Conklin informs the patient that requesting to leave the hospital needs to stop. Patient is very much in need of inpatient level of care at this time for further stabilization. Patient acknowledges this and reports he will stop requesting to leave or sign a 12-hour intent. Aside from this, patient denies experiencing any side effects from Invega injection that was administered yesterday evening. When asked if there are any concerns or complications with his overall care here, the patient states, \"I'm doing just fine doctor.\"     Hospital Course   Per records Ger Bashir reportedly was lingering at a [employer] restaurant prior to admission without a shirt on and was said to have been muttering to himself.  Consequently, the [place] [employer] was called and took the patient back to his apartment, which was then found to be in a state of disarray.  He was said to have no food at home.  On further inquiry, he told the police that the [employer] has him under surveillance.  He initially was willing to come to the hospital, but upon presentation, he refused to get dressed or follow directions.  He was noted to be somewhat disgruntled and disorganized in his manner of speaking.  ED records suggest that he was \"talking about Looney Tunes and [place] characters.\"  He indicated to them that he wanted a CAT scan of his head because his \"brain is at a 3.\"  On direct interview with psychiatrist Dr. Lizbeth Louie, the patient was a very poor historian who got easily agitated with questioning.  He did not provide much by way of the information, but did allude to the [employer] persecuting him.  He could not explain the reason why he had been " partially clad at the [employer] and when pressed for more information, he became rather dismissive.  Of note, he was recently on admission at [facility] and was discharged with diagnoses of schizoaffective disorder, bipolar type, tobacco use disorder and alcohol use disorder in sustained remission.  He reportedly has a history of noncompliance with medications, has had multiple court commitments and Wu orders.  During his last hospitalization at [facility] for about 3 weeks, he was discharged with his Wu order extended and he remains on civil commitment.  He is currently Wu'd to take Haldol, Zyprexa, Clozaril and Invega.  The patient was supposed to be taking Haldol Decanoate 100 mg q.2 weeks, Cogentin 1 mg twice a day and Haldol 10 mg daily at bedtime, but the patient reports that he does not like taking Haldol.  It appears he has not been compliant with his prescribed medications and it is unclear when he last received his Haldol Decanoate.  He does admit that he has had multiple medication trials in the past and feels he did best with Zyprexa. Dr. Louie believed that patient would benefit from psychiatric hospitalization and stabilization on [facility] [address]. It should be noted that the treatment team was able to solidify that patient is no longer under legal commitment, however is currently followed by [facility]. Staff members from [facility] reported to [facility] staff that they believe that patient would benefit most from injection medications due to patient being noncompliant with oral medications in the past. From this, Dr. Conklin ordered Invega Sustenna 156mg injection. Patient agreed to be compliant with this injection, thus was administered this injection on 9/18/19.      Medications     Current Facility-Administered Medications Ordered in Epic   Medication Dose Route Frequency Last Rate Last Dose     acetaminophen (TYLENOL) tablet 650 mg  650 mg Oral  "Q4H PRN   650 mg at 09/16/19 0347     aspirin (ASA) chewable tablet 81 mg  81 mg Oral Daily   81 mg at 09/19/19 0743     benztropine (COGENTIN) tablet 1 mg  1 mg Oral BID   1 mg at 09/19/19 0743     eucerin cream   Topical BID         haloperidol (HALDOL) tablet 10 mg  10 mg Oral At Bedtime   10 mg at 09/18/19 2011     levothyroxine (SYNTHROID/LEVOTHROID) tablet 50 mcg  50 mcg Oral Daily   50 mcg at 09/19/19 0743     multivitamin w/minerals (THERA-VIT-M) tablet 1 tablet  1 tablet Oral Daily   1 tablet at 09/19/19 0743     nicotine (NICORETTE) gum 2-4 mg  2-4 mg Buccal Q1H PRN         OLANZapine zydis (zyPREXA) ODT tab 10 mg  10 mg Oral Q6H PRN   10 mg at 09/16/19 0347    Or     OLANZapine (zyPREXA) injection 10 mg  10 mg Intramuscular Q6H PRN         traZODone (DESYREL) tablet 100 mg  100 mg Oral At Bedtime   100 mg at 09/18/19 2011     No current Epic-ordered outpatient medications on file.         Allergies      Allergies   Allergen Reactions     Peas GI Disturbance     Black eyed peas - vomiting        Medical Review of Systems     /72   Pulse 74   Temp 97.4  F (36.3  C) (Oral)   Resp 16   Ht 1.778 m (5' 10\")   Wt 96.8 kg (213 lb 4.8 oz)   SpO2 96%   BMI 30.61 kg/m    Body mass index is 30.61 kg/m .  A 10-point review of systems was performed by Dominguez Conklin MD and is negative, no new findings.      Psychiatric Examination     Appearance Sitting in chair, dressed in hospital scrubs. Appears stated age.   Attitude Cooperative   Orientation Oriented to person, place, time   Eye Contact Poor   Speech Regular rate, rhythm, volume and tone   Language Normal   Psychomotor Behavior Normal   Mood Fine   Affect Irritable    Thought Process Impaired   Associations Intact   Thought Content Patient is currently negative for suicidal ideation, negative for plan or intent, able to contract no self harm and identify barriers to suicide. Positive for psychosis.     Fund of Knowledge Impaired   Insight Impaired "   Judgement Impaired   Attention Span & Concentration Poor   Recent & Remote Memory Intact   Gait Normal   Muscle Tone Intact        Labs     Labs reviewed.  No results found for this or any previous visit (from the past 24 hour(s)).     Impression   This is a 69 year old male with a previous psychiatric diagnosis that includes schizoaffective disorder, bipolar type, who has a history of medication noncompliance. He was initially presented to [facility] ED on 9/11/19 for evaluation of paranoid behavior and was deemed appropriate for inpatient level of care. While meeting with Dr. Conklin this afternoon, the patient denied any issues, concerns, or complications with his overall care or medications. He specifically denied experiencing any side effects from the Invega Sustenna injection administered yesterday evening. Dr. Conklin made no medication adjustments today, continue with scheduled Haldol and Trazodone medications.       Diagnoses     1. Schizoaffective disorder, bipolar type, multiple episodes in acute phase  2. Alcohol Use Disorder, in sustained remission      Plan     1. Explained side effects, benefits, and complications of medications to the patient, Pt gave verbal consent.  2. Medication changes: None   3. Discussed treatment plan with patient and team.  4. Projected length of stay: 7+ days    Attestation:   ILuda, am serving as a scribe on 9/19/2019 to document services personally performed by Dominguez Conklin MD based on my observations and the provider's statements to me.    Patient ID:  Name: Ger Bashir    MRN: [id number]  Admission: 9/11/2019   YOB: 1950

## 2019-09-20 PROCEDURE — 12400000 ZZH R&B MH

## 2019-09-20 PROCEDURE — 25000132 ZZH RX MED GY IP 250 OP 250 PS 637: Performed by: PSYCHIATRY & NEUROLOGY

## 2019-09-20 RX ADMIN — BENZTROPINE MESYLATE 1 MG: 1 TABLET ORAL at 08:32

## 2019-09-20 RX ADMIN — OLANZAPINE 10 MG: 10 TABLET, ORALLY DISINTEGRATING ORAL at 08:32

## 2019-09-20 RX ADMIN — ASPIRIN 81 MG 81 MG: 81 TABLET ORAL at 08:32

## 2019-09-20 RX ADMIN — Medication: at 15:59

## 2019-09-20 RX ADMIN — BENZTROPINE MESYLATE 1 MG: 1 TABLET ORAL at 21:03

## 2019-09-20 RX ADMIN — MULTIPLE VITAMINS W/ MINERALS TAB 1 TABLET: TAB at 08:32

## 2019-09-20 RX ADMIN — HALOPERIDOL 10 MG: 5 TABLET ORAL at 21:03

## 2019-09-20 RX ADMIN — TRAZODONE HYDROCHLORIDE 100 MG: 100 TABLET ORAL at 21:03

## 2019-09-20 RX ADMIN — LEVOTHYROXINE SODIUM 50 MCG: 50 TABLET ORAL at 08:32

## 2019-09-20 ASSESSMENT — ACTIVITIES OF DAILY LIVING (ADL)
HYGIENE/GROOMING: INDEPENDENT
DRESS: INDEPENDENT
ORAL_HYGIENE: INDEPENDENT

## 2019-09-20 NOTE — PLAN OF CARE
Blunt, less irritable, stayed in his room the majority of time, comes out for meals, does not interact with peers.   Complaint with medications.

## 2019-09-20 NOTE — PROGRESS NOTES
"[facility] Psychiatric Progress Note       Interim History     The patient's care was discussed with the treatment team and chart notes were reviewed. Patient continues to spend majority of his time watching TV in the [facility] lounge. He has been isolated and withdrawn, yet pleasant and polite to staff members. Pt seen on 9/20/19 by Dr. Conklin. Upon interview, the patient is found sitting in a chair staring out the window. He has no complaints and even states, \"I like it here, but I rather be free.\" Dr. Conklin asks if patient has ever been tried on the medication Clozapine in the past. Patient is unable to recall. Dr. Conklin reviews this medication in detail, such as its possible side effects, weekly blood draws, [place] company, and its many side effects. After this, the patient states, \"I rather skip that one. Drawing blood is weird.\"       Hospital Course   Per records Ger Bashir reportedly was lingering at a [employer]'s restaurant prior to admission without a shirt on and was said to have been muttering to himself.  Consequently, the [place] Police [employer] was called and took the patient back to his apartment, which was then found to be in a state of disarray.  He was said to have no food at home.  On further inquiry, he told the police that the [facility] has him under surveillance.  He initially was willing to come to the hospital, but upon presentation, he refused to get dressed or follow directions.  He was noted to be somewhat disgruntled and disorganized in his manner of speaking.  ED records suggest that he was \"talking about Looney Tunes and [place] characters.\"  He indicated to them that he wanted a CAT scan of his head because his \"brain is at a 3.\"  On direct interview with psychiatrist Dr. Lizbeth Louie, the patient was a very poor historian who got easily agitated with questioning.  He did not provide much by way of the information, but did allude to the [employer] " persecuting him.  He could not explain the reason why he had been partially clad at the [employer] and when pressed for more information, he became rather dismissive.  Of note, he was recently on admission at [facility] and was discharged with diagnoses of schizoaffective disorder, bipolar type, tobacco use disorder and alcohol use disorder in sustained remission.  He reportedly has a history of noncompliance with medications, has had multiple court commitments and Wu orders.  During his last hospitalization at [facility] for about 3 weeks, he was discharged with his Wu order extended and he remains on civil commitment.  He is currently Wu'd to take Haldol, Zyprexa, Clozaril and Invega.  The patient was supposed to be taking Haldol Decanoate 100 mg q.2 weeks, Cogentin 1 mg twice a day and Haldol 10 mg daily at bedtime, but the patient reports that he does not like taking Haldol.  It appears he has not been compliant with his prescribed medications and it is unclear when he last received his Haldol Decanoate.  He does admit that he has had multiple medication trials in the past and feels he did best with Zyprexa. Dr. Louie believed that patient would benefit from psychiatric hospitalization and stabilization on [facility]. It should be noted that the treatment team was able to solidify that patient is no longer under legal commitment, however is currently followed by [facility]. Staff members from [facility] reported to [facility] staff that they believe that patient would benefit most from injection medications due to patient being noncompliant with oral medications in the past. From this, Dr. Conklin ordered Invega Sustenna 156mg injection. Patient agreed to be compliant with this injection, thus was administered this injection on 9/18/19.      Medications     Current Facility-Administered Medications Ordered in Epic   Medication Dose Route Frequency Last Rate  "Last Dose     acetaminophen (TYLENOL) tablet 650 mg  650 mg Oral Q4H PRN   650 mg at 09/16/19 0347     aspirin (ASA) chewable tablet 81 mg  81 mg Oral Daily   81 mg at 09/19/19 0743     benztropine (COGENTIN) tablet 1 mg  1 mg Oral BID   1 mg at 09/19/19 2254     eucerin cream   Topical BID         haloperidol (HALDOL) tablet 10 mg  10 mg Oral At Bedtime   10 mg at 09/19/19 2255     levothyroxine (SYNTHROID/LEVOTHROID) tablet 50 mcg  50 mcg Oral Daily   50 mcg at 09/19/19 0743     multivitamin w/minerals (THERA-VIT-M) tablet 1 tablet  1 tablet Oral Daily   1 tablet at 09/19/19 0743     nicotine (NICORETTE) gum 2-4 mg  2-4 mg Buccal Q1H PRN         OLANZapine zydis (zyPREXA) ODT tab 10 mg  10 mg Oral Q6H PRN   10 mg at 09/16/19 0347    Or     OLANZapine (zyPREXA) injection 10 mg  10 mg Intramuscular Q6H PRN         traZODone (DESYREL) tablet 100 mg  100 mg Oral At Bedtime   100 mg at 09/19/19 2255     No current Epic-ordered outpatient medications on file.         Allergies      Allergies   Allergen Reactions     Peas GI Disturbance     Black eyed peas - vomiting        Medical Review of Systems     /82   Pulse 73   Temp 97.5  F (36.4  C) (Oral)   Resp 16   Ht 1.778 m (5' 10\")   Wt 96.8 kg (213 lb 4.8 oz)   SpO2 95%   BMI 30.61 kg/m    Body mass index is 30.61 kg/m .  A 10-point review of systems was performed by Dominguez Conklin MD and is negative, no new findings.      Psychiatric Examination     Appearance Sitting in chair, dressed in hospital scrubs. Appears stated age.   Attitude Cooperative   Orientation Oriented to person, place, time   Eye Contact Fair    Speech Regular rate, rhythm, volume and tone   Language Normal   Psychomotor Behavior Normal   Mood Good    Affect Pleasant    Thought Process Impaired   Associations Intact   Thought Content Patient is currently negative for suicidal ideation, negative for plan or intent, able to contract no self harm and identify barriers to suicide. " Positive for psychosis.     Fund of Knowledge Impaired   Insight Impaired   Judgement Impaired   Attention Span & Concentration Poor   Recent & Remote Memory Intact   Gait Normal   Muscle Tone Intact        Labs     Labs reviewed.  No results found for this or any previous visit (from the past 24 hour(s)).     Impression   This is a 69 year old male with a previous psychiatric diagnosis that includes schizoaffective disorder, bipolar type, who has a history of medication noncompliance. He was initially presented to [facility] ED on 9/11/19 for evaluation of paranoid behavior and was deemed appropriate for inpatient level of care. The patient was pleasant, calm, and cooperative throughout today's interview with Dr. Conklin. He denied attaining any complaints or issues, even reporting that he liked it here on [facility] [address]. Dr. Conklin suggested the medication Clozapine during interview, however the patient was against it. No medication adjustments were made today.      Diagnoses     1. Schizoaffective disorder, bipolar type, multiple episodes in acute phase  2. Alcohol Use Disorder, in sustained remission      Plan     1. Explained side effects, benefits, and complications of medications to the patient, Pt gave verbal consent.  2. Medication changes: None   3. Discussed treatment plan with patient and team.  4. Projected length of stay: 7+ days    Attestation:   I, Luda Patino, am serving as a scribe on 9/20/2019 to document services personally performed by Dominguez Conklin MD based on my observations and the provider's statements to me.    Patient ID:  Name: Ger Bashir    MRN: [id number]  Admission: 9/11/2019   YOB: 1950

## 2019-09-20 NOTE — PROGRESS NOTES
called and spoke with Michelle Arora ([phone]) who is a nurse with patient's [facility] ACT team to update her regarding the Invega Sustenna injection which was given on Wednesday. She stated that they have been stopping by to see patient at least twice a week, and she will be coming to the hospital on Monday to re-evaluate patient.  will check in with her after she has seen patient to assess when the ACT team feels that patient is ready to discharge home.

## 2019-09-20 NOTE — PLAN OF CARE
Blunt affect and mood stable . Was visible in lounge watching TV most of the shift. Sometimes in room resting. Withdrawn and quiet. Polite and respectful. No irritability. Had no complaints . Med compliant and denies SI.

## 2019-09-21 PROCEDURE — 25000132 ZZH RX MED GY IP 250 OP 250 PS 637: Performed by: PSYCHIATRY & NEUROLOGY

## 2019-09-21 PROCEDURE — 12400000 ZZH R&B MH

## 2019-09-21 RX ADMIN — ASPIRIN 81 MG 81 MG: 81 TABLET ORAL at 08:31

## 2019-09-21 RX ADMIN — MULTIPLE VITAMINS W/ MINERALS TAB 1 TABLET: TAB at 08:31

## 2019-09-21 RX ADMIN — HALOPERIDOL 10 MG: 5 TABLET ORAL at 20:46

## 2019-09-21 RX ADMIN — LEVOTHYROXINE SODIUM 50 MCG: 50 TABLET ORAL at 08:31

## 2019-09-21 RX ADMIN — BENZTROPINE MESYLATE 1 MG: 1 TABLET ORAL at 20:46

## 2019-09-21 RX ADMIN — BENZTROPINE MESYLATE 1 MG: 1 TABLET ORAL at 08:31

## 2019-09-21 RX ADMIN — Medication: at 08:31

## 2019-09-21 RX ADMIN — TRAZODONE HYDROCHLORIDE 100 MG: 100 TABLET ORAL at 20:46

## 2019-09-21 ASSESSMENT — ACTIVITIES OF DAILY LIVING (ADL)
HYGIENE/GROOMING: INDEPENDENT
ORAL_HYGIENE: INDEPENDENT
DRESS: SCRUBS (BEHAVIORAL HEALTH)

## 2019-09-21 NOTE — PLAN OF CARE
Problem: Psychotic Symptoms  Goal: Psychotic Symptoms  Description  Signs and symptoms of listed problems will be absent or manageable.  9/21/2019 1131 by Raymond Carson RN  Outcome: No Change  Flowsheets (Taken 9/21/2019 1129)  Psychotic Symptoms Assessed: anxiety; insight; thought process; affect; mood; speech; orientation  9/20/2019 2227 by Jovana Coleman  Outcome: No Change  Patient is A&O, presents as distractible, mood is stable, with limited affect. Speech is rambling and tangential. Appears to be responding to internal stimuli. He denies any auditory hallucinations this shift. Displays appropriate behavior.  Pt's ACT TEAM  Nadia STEELE came to visit him this morning and told this writer she thinks patient is at his baseline. She offered suggestions on activities the patient likes to engage in including football and music. He was encourage not to isolate to his room. Pt was agreeable and became more visible in the [facility] lounge. Lacks insight into his situation. Denies any sucidal ideation. Regular diet. Up ad shaila. Denies pain.

## 2019-09-21 NOTE — PLAN OF CARE
"Pt presents with a blunt affect and a calm mood. Pt was isolative and withdrawn and spent the entire shift in his room bed resting except for coming out of his room for a meal. Pt was prompted to use the shower but declined. Pt did endorse auditory hallucinations, stating \"ya I guess I hear the voices\". Pt denies HI and SI.  Impaired thinking, judgement, and insight.   "

## 2019-09-22 PROCEDURE — 25000132 ZZH RX MED GY IP 250 OP 250 PS 637: Performed by: PSYCHIATRY & NEUROLOGY

## 2019-09-22 PROCEDURE — 12400000 ZZH R&B MH

## 2019-09-22 RX ADMIN — Medication: at 09:06

## 2019-09-22 RX ADMIN — HALOPERIDOL 10 MG: 5 TABLET ORAL at 20:44

## 2019-09-22 RX ADMIN — BENZTROPINE MESYLATE 1 MG: 1 TABLET ORAL at 09:04

## 2019-09-22 RX ADMIN — Medication: at 15:37

## 2019-09-22 RX ADMIN — MULTIPLE VITAMINS W/ MINERALS TAB 1 TABLET: TAB at 09:04

## 2019-09-22 RX ADMIN — BENZTROPINE MESYLATE 1 MG: 1 TABLET ORAL at 20:44

## 2019-09-22 RX ADMIN — LEVOTHYROXINE SODIUM 50 MCG: 50 TABLET ORAL at 09:04

## 2019-09-22 RX ADMIN — ASPIRIN 81 MG 81 MG: 81 TABLET ORAL at 09:04

## 2019-09-22 RX ADMIN — TRAZODONE HYDROCHLORIDE 100 MG: 100 TABLET ORAL at 20:44

## 2019-09-22 ASSESSMENT — ACTIVITIES OF DAILY LIVING (ADL)
DRESS: INDEPENDENT
HYGIENE/GROOMING: INDEPENDENT
ORAL_HYGIENE: INDEPENDENT

## 2019-09-22 NOTE — PLAN OF CARE
"Affect blunt, mood calm. Denied feeling depressed or anxious. Denies VAH but hums and grunts to self periodically. Poor insight with impaired judgement and thought process. Stated he had pain in RUE this AM from \"where the [employer] pulled it out of the socket. But you won't believe me so I won't talk about it\". Later denied pain. Isolative in AM, but in lounge to watch \"the greatest \" and [employer] game after. Pt will respond when directly spoken to, but otherwise does not engage with peers or staff. Compliant with medications. Denies SI.    "

## 2019-09-22 NOTE — PLAN OF CARE
Pt presents with blunt affect and calm mood. Pt was isolative early in the shift, but pt came out into the lounge to watch sports. Pt was med compliant and ate all of  his meals. Pt was responding to internal stimuli and was hard talking to himself in his room. Pt's insight is poor. Judgement is impaired. Thought process is impaired.

## 2019-09-23 PROCEDURE — 12400000 ZZH R&B MH

## 2019-09-23 PROCEDURE — 90853 GROUP PSYCHOTHERAPY: CPT

## 2019-09-23 PROCEDURE — 25000132 ZZH RX MED GY IP 250 OP 250 PS 637: Performed by: PSYCHIATRY & NEUROLOGY

## 2019-09-23 RX ADMIN — BENZTROPINE MESYLATE 1 MG: 1 TABLET ORAL at 21:24

## 2019-09-23 RX ADMIN — HALOPERIDOL 10 MG: 5 TABLET ORAL at 21:24

## 2019-09-23 RX ADMIN — Medication: at 15:36

## 2019-09-23 RX ADMIN — TRAZODONE HYDROCHLORIDE 100 MG: 100 TABLET ORAL at 21:24

## 2019-09-23 RX ADMIN — BENZTROPINE MESYLATE 1 MG: 1 TABLET ORAL at 08:30

## 2019-09-23 RX ADMIN — MULTIPLE VITAMINS W/ MINERALS TAB 1 TABLET: TAB at 08:30

## 2019-09-23 RX ADMIN — LEVOTHYROXINE SODIUM 50 MCG: 50 TABLET ORAL at 08:30

## 2019-09-23 RX ADMIN — ASPIRIN 81 MG 81 MG: 81 TABLET ORAL at 08:30

## 2019-09-23 ASSESSMENT — ACTIVITIES OF DAILY LIVING (ADL)
HYGIENE/GROOMING: INDEPENDENT
ORAL_HYGIENE: INDEPENDENT
DRESS: INDEPENDENT
LAUNDRY: WITH SUPERVISION
HYGIENE/GROOMING: INDEPENDENT
ORAL_HYGIENE: INDEPENDENT
LAUNDRY: UNABLE TO COMPLETE
DRESS: INDEPENDENT

## 2019-09-23 NOTE — PROGRESS NOTES
"[facility] Psychiatric Progress Note       Interim History     The patient's care was discussed with the treatment team and chart notes were reviewed. Pt seen on 9/23/19 by Dr. Conklin. When asked if patient has any questions or complaints, the patient states he would like to have a CAT scan done. He states, \"so you can see how many nerve ganglions I have..I've got 27 nerve ganglions and normal people have 3. I was hoping I could see the CAT scan.\" Patients [facility] team nurse, Michelle Arora, presents herself during interview. Michelle states she has worked with the patient for some time now and feels she knows him best out of all the Res[facility] team. At this time, she does not think the patient has been communicating enough to tell where his baseline is. She states, \"we have struggled to see him. He'll go home and say everything is fine, then when I come to administer shots, he refuses.\" Dr. Conklin reviews the longer lasting Invega injection that lasts about 3 months, which could be a better option for patient in the future. Both patient and Michelle agree on this.      Hospital Course   Per records Ger Bashir reportedly was lingering at a [employer] restaurant prior to admission without a shirt on and was said to have been muttering to himself.  Consequently, the [place] [employer] was called and took the patient back to his apartment, which was then found to be in a state of disarray.  He was said to have no food at home.  On further inquiry, he told the police that the [facility] has him under surveillance.  He initially was willing to come to the hospital, but upon presentation, he refused to get dressed or follow directions.  He was noted to be somewhat disgruntled and disorganized in his manner of speaking.  ED records suggest that he was \"talking about Looney Tunes and Roby characters.\"  He indicated to them that he wanted a CAT scan of his head because his \"brain is at a 3.\"  On " direct interview with psychiatrist Dr. Lizbeth Louie, the patient was a very poor historian who got easily agitated with questioning.  He did not provide much by way of the information, but did allude to the [employer] persecuting him.  He could not explain the reason why he had been partially clad at the [employer] and when pressed for more information, he became rather dismissive.  Of note, he was recently on admission at [facility] and was discharged with diagnoses of schizoaffective disorder, bipolar type, tobacco use disorder and alcohol use disorder in sustained remission.  He reportedly has a history of noncompliance with medications, has had multiple court commitments and Wu orders.  During his last hospitalization at [facility] for about 3 weeks, he was discharged with his Wu order extended and he remains on civil commitment.  He is currently Wu'd to take Haldol, Zyprexa, Clozaril and Invega.  The patient was supposed to be taking Haldol Decanoate 100 mg q.2 weeks, Cogentin 1 mg twice a day and Haldol 10 mg daily at bedtime, but the patient reports that he does not like taking Haldol.  It appears he has not been compliant with his prescribed medications and it is unclear when he last received his Haldol Decanoate.  He does admit that he has had multiple medication trials in the past and feels he did best with Zyprexa. Dr. Louie believed that patient would benefit from psychiatric hospitalization and stabilization on [facility]. It should be noted that the treatment team was able to solidify that patient is no longer under legal commitment, however is currently followed by [facility]. Staff members from [facility] reported to [facility] staff that they believe that patient would benefit most from injection medications due to patient being noncompliant with oral medications in the past. From this, Dr. Conklin ordered Invega Sustenna 156mg injection. Patient  "agreed to be compliant with this injection, thus was administered this injection on 9/18/19.      Medications     Current Facility-Administered Medications Ordered in Epic   Medication Dose Route Frequency Last Rate Last Dose     acetaminophen (TYLENOL) tablet 650 mg  650 mg Oral Q4H PRN   650 mg at 09/16/19 0347     aspirin (ASA) chewable tablet 81 mg  81 mg Oral Daily   81 mg at 09/23/19 0830     benztropine (COGENTIN) tablet 1 mg  1 mg Oral BID   1 mg at 09/23/19 0830     eucerin cream   Topical BID         haloperidol (HALDOL) tablet 10 mg  10 mg Oral At Bedtime   10 mg at 09/22/19 2044     levothyroxine (SYNTHROID/LEVOTHROID) tablet 50 mcg  50 mcg Oral Daily   50 mcg at 09/23/19 0830     multivitamin w/minerals (THERA-VIT-M) tablet 1 tablet  1 tablet Oral Daily   1 tablet at 09/23/19 0830     nicotine (NICORETTE) gum 2-4 mg  2-4 mg Buccal Q1H PRN         OLANZapine zydis (zyPREXA) ODT tab 10 mg  10 mg Oral Q6H PRN   10 mg at 09/20/19 0832    Or     OLANZapine (zyPREXA) injection 10 mg  10 mg Intramuscular Q6H PRN         traZODone (DESYREL) tablet 100 mg  100 mg Oral At Bedtime   100 mg at 09/22/19 2044     No current [facility]-ordered outpatient medications on file.         Allergies      Allergies   Allergen Reactions     Peas GI Disturbance     Black eyed peas - vomiting        Medical Review of Systems     BP 96/55   Pulse 62   Temp 98.4  F (36.9  C) (Oral)   Resp 16   Ht 1.778 m (5' 10\")   Wt 96.8 kg (213 lb 4.8 oz)   SpO2 95%   BMI 30.61 kg/m    Body mass index is 30.61 kg/m .  A 10-point review of systems was performed by Dominguez Conklin MD and is negative, no new findings.      Psychiatric Examination     Appearance Sitting in chair, dressed in hospital scrubs. Appears stated age.   Attitude Cooperative   Orientation Oriented to person, place, time   Eye Contact Fair    Speech Regular rate, rhythm, volume and tone   Language Normal   Psychomotor Behavior Normal   Mood Good    Affect Pleasant  "   Thought Process Impaired   Associations Intact   Thought Content Patient is currently negative for suicidal ideation, negative for plan or intent, able to contract no self harm and identify barriers to suicide. Positive for psychosis.     Fund of Knowledge Impaired   Insight Impaired   Judgement Impaired   Attention Span & Concentration Poor   Recent & Remote Memory Intact   Gait Normal   Muscle Tone Intact        Labs     Labs reviewed.  No results found for this or any previous visit (from the past 24 hour(s)).     Impression   This is a 69 year old male with a previous psychiatric diagnosis that includes schizoaffective disorder, bipolar type, who has a history of medication noncompliance. He was initially presented to [facility] ED on 9/11/19 for evaluation of paranoid behavior and was deemed appropriate for inpatient level of care. The patients [facility] nurse, Michelle Arora, was present during today's interview with Dr. Conklin. She had stressed that she did not deem patient appropriate for discharge at this time, being concerned that patient has not been communicating appropriately while here inpatient. Michelle notes that patient has a tendency to declare he is doing well while in the hospital, but then goes home and denies medications, specifically injections. Dr. Conklin suggested that patient be administered a longer last form of Invega injection in the future. Both mei and Michelle were in agreement with this idea.      Diagnoses     1. Schizoaffective disorder, bipolar type, multiple episodes in acute phase  2. Alcohol Use Disorder, in sustained remission      Plan     1. Explained side effects, benefits, and complications of medications to the patient, Pt gave verbal consent.  2. Medication changes: None   3. Discussed treatment plan with patient and team.  4. Projected length of stay: 7+ days  5. Person to contact in regards of patients aftercare plans is Michelle Arora, nurse through [facility], at  [phone]    Attestation:   I, Luda Patino, am serving as a scribe on 9/23/2019 to document services personally performed by Dominguez Conklin MD based on my observations and the provider's statements to me.    Patient ID:  Name: Ger Bashir    MRN: [id number]  Admission: 9/11/2019   YOB: 1950

## 2019-09-23 NOTE — PLAN OF CARE
Pt presents with flat blunt affect and tense mood. Pt was happy to receive headphones and since receiving them has been listening to music alone in his room staring out the window. Pt attended Focus group and has been respectful towards staff/peers throughout the shift. No concerns at this time. Med compliant, no SI stated.

## 2019-09-23 NOTE — PLAN OF CARE
Pt presents with a blunt affect and a calm mood. Pt spent most of the shift watching TV in the lounge. Pt was pleasant with spoken to but only gave short answers to questions. Pt denied feeling anxious or depressed. Pt denies SI. Pt ate meals and was med compliant. Pt's insight is poor and his judgement is impaired.    Breath sounds clear and equal bilaterally.

## 2019-09-23 NOTE — PLAN OF CARE
BEHAVIORAL TEAM DISCUSSION    Participants: MD, RN, PA, OT, CM  Progress: Psychotic, poor insight and judgement, does not initiate conversation with others  Anticipated length of stay: Unknown  Continued Stay Criteria/Rationale: Medication management  Medical/Physical: NA  Precautions:   Behavioral Orders   Procedures    Code 1 - Restrict to Unit    Routine Programming     As clinically indicated    Status 15     Every 15 minutes.     Plan: Investigate longer acting injection medication, encourage group attendance and participation, medication management  Rationale for change in precautions or plan: NA

## 2019-09-24 PROCEDURE — 25000132 ZZH RX MED GY IP 250 OP 250 PS 637: Performed by: PSYCHIATRY & NEUROLOGY

## 2019-09-24 PROCEDURE — 90853 GROUP PSYCHOTHERAPY: CPT

## 2019-09-24 PROCEDURE — 12400000 ZZH R&B MH

## 2019-09-24 RX ADMIN — BENZTROPINE MESYLATE 1 MG: 1 TABLET ORAL at 20:54

## 2019-09-24 RX ADMIN — BENZTROPINE MESYLATE 1 MG: 1 TABLET ORAL at 08:33

## 2019-09-24 RX ADMIN — ASPIRIN 81 MG 81 MG: 81 TABLET ORAL at 08:33

## 2019-09-24 RX ADMIN — MULTIPLE VITAMINS W/ MINERALS TAB 1 TABLET: TAB at 08:33

## 2019-09-24 RX ADMIN — Medication: at 08:33

## 2019-09-24 RX ADMIN — LEVOTHYROXINE SODIUM 50 MCG: 50 TABLET ORAL at 08:33

## 2019-09-24 RX ADMIN — TRAZODONE HYDROCHLORIDE 100 MG: 100 TABLET ORAL at 20:54

## 2019-09-24 ASSESSMENT — ACTIVITIES OF DAILY LIVING (ADL)
HYGIENE/GROOMING: INDEPENDENT
DRESS: PROMPTS
DRESS: INDEPENDENT
LAUNDRY: UNABLE TO COMPLETE
ORAL_HYGIENE: INDEPENDENT
LAUNDRY: WITH SUPERVISION
ORAL_HYGIENE: PROMPTS
HYGIENE/GROOMING: PROMPTS

## 2019-09-24 NOTE — PLAN OF CARE
Pt presents with full range affect and calm mood. Pt spent a majority of the shift isolated in his room. Pt makes his requests known to staff without issue and is polite during all interactions. Needs prompts for all ADLs and doesn't show self-sufficiency in this area. Refused all groups. Pt states he will shower tonight. Med compliant, no SI stated.

## 2019-09-24 NOTE — PROGRESS NOTES
"[facility] Psychiatric Progress Note       Interim History     The patient's care was discussed with the treatment team and chart notes were reviewed. Pt seen on 9/24/19 by Dr. Conklin. Patient reports he is doing well this morning. He is again found staring out his bedroom window, however appears content. He denies obtaining any complications, issues, or complaints with his medications or overall care while here on Station [address]. Dr. Conklin again reminds the patient about the 3 month version of Invega. Patient proceeds to be in agreement with this plan. Will need to speak to pharmacy in regards of this injection.      Hospital Course   Per records Ger Bashir reportedly was lingering at a [employer] prior to admission without a shirt on and was said to have been muttering to himself.  Consequently, the [place] Police Department was called and took the patient back to his apartment, which was then found to be in a state of disarray.  He was said to have no food at home.  On further inquiry, he told the police that the [employer] has him under surveillance.  He initially was willing to come to the hospital, but upon presentation, he refused to get dressed or follow directions.  He was noted to be somewhat disgruntled and disorganized in his manner of speaking.  ED records suggest that he was \"talking about Looney Tunes and [place] characters.\"  He indicated to them that he wanted a CAT scan of his head because his \"brain is at a 3.\"  On direct interview with psychiatrist Dr. Lizbeth Louie, the patient was a very poor historian who got easily agitated with questioning.  He did not provide much by way of the information, but did allude to the [employer] persecuting him.  He could not explain the reason why he had been partially clad at the [employer] and when pressed for more information, he became rather dismissive.  Of note, he was recently on admission at [facility] and was discharged with diagnoses of schizoaffective disorder, bipolar type, tobacco use disorder and alcohol use disorder in sustained remission.  He reportedly has a history of noncompliance with medications, has had multiple court commitments and Wu orders.  During his last hospitalization at [facility] for about 3 weeks, he was discharged with his Wu order extended and he remains on civil commitment.  He is currently Wu'd to take Haldol, Zyprexa, Clozaril and Invega.  The patient was supposed to be taking Haldol Decanoate 100 mg q.2 weeks, Cogentin 1 mg twice a day and Haldol 10 mg daily at bedtime, but the patient reports that he does not like taking Haldol.  It appears he has not been compliant with his prescribed medications and it is unclear when he last received his Haldol Decanoate.  He does admit that he has had multiple medication trials in the past and feels he did best with Zyprexa. Dr. Louie believed that patient would benefit from psychiatric hospitalization and stabilization on [facility]. It should be noted that the treatment team was able to solidify that patient is no longer under legal commitment, however is currently followed by [facility]. Staff members from [facility] reported to [facility] staff that they believe that patient would benefit most from injection medications due to patient being noncompliant with oral medications in the past. From this, Dr. Conklin ordered Invega Sustenna 156mg injection. Patient agreed to be compliant with this injection, thus was administered this injection on 9/18/19. On 9/23, the patients [facility] nurse, Michelle Arora, was present during interview with Dr. Conklin. She had stressed that she did not deem patient appropriate for discharge at that time, being concerned that patient had not been communicating appropriately while inpatient. Michelle noted that patient has a tendency to declare he is doing well while in the  "hospital, but then goes home and denies medications, specifically injections. Dr. Conklin suggested that patient be administered a longer last form of Invega injection in the future. Both patient and Michelle were in agreement with this idea.      Medications     Current Facility-Administered Medications Ordered in Epic   Medication Dose Route Frequency Last Rate Last Dose     acetaminophen (TYLENOL) tablet 650 mg  650 mg Oral Q4H PRN   650 mg at 09/16/19 0347     aspirin (ASA) chewable tablet 81 mg  81 mg Oral Daily   81 mg at 09/24/19 0833     benztropine (COGENTIN) tablet 1 mg  1 mg Oral BID   1 mg at 09/24/19 0833     eucerin cream   Topical BID         haloperidol (HALDOL) tablet 10 mg  10 mg Oral At Bedtime   10 mg at 09/23/19 2124     levothyroxine (SYNTHROID/LEVOTHROID) tablet 50 mcg  50 mcg Oral Daily   50 mcg at 09/24/19 0833     multivitamin w/minerals (THERA-VIT-M) tablet 1 tablet  1 tablet Oral Daily   1 tablet at 09/24/19 0833     nicotine (NICORETTE) gum 2-4 mg  2-4 mg Buccal Q1H PRN         OLANZapine zydis (zyPREXA) ODT tab 10 mg  10 mg Oral Q6H PRN   10 mg at 09/20/19 0832    Or     OLANZapine (zyPREXA) injection 10 mg  10 mg Intramuscular Q6H PRN         traZODone (DESYREL) tablet 100 mg  100 mg Oral At Bedtime   100 mg at 09/23/19 2124     No current [facility]-ordered outpatient medications on file.         Allergies      Allergies   Allergen Reactions     Peas GI Disturbance     Black eyed peas - vomiting        Medical Review of Systems     /79   Pulse 63   Temp 97.7  F (36.5  C) (Oral)   Resp 16   Ht 1.778 m (5' 10\")   Wt 96.8 kg (213 lb 4.8 oz)   SpO2 93%   BMI 30.61 kg/m    Body mass index is 30.61 kg/m .  A 10-point review of systems was performed by Dominguez Conklin MD and is negative, no new findings.      Psychiatric Examination     Appearance Sitting in chair, dressed in hospital scrubs. Appears stated age.   Attitude Cooperative   Orientation Oriented to person, place, time "   Eye Contact Fair    Speech Regular rate, rhythm, volume and tone   Language Normal   Psychomotor Behavior Normal   Mood Good    Affect Pleasant    Thought Process Impaired   Associations Intact   Thought Content Patient is currently negative for suicidal ideation, negative for plan or intent, able to contract no self harm and identify barriers to suicide. Positive for psychosis.     Fund of Knowledge Impaired   Insight Impaired   Judgement Impaired   Attention Span & Concentration Poor   Recent & Remote Memory Intact   Gait Normal   Muscle Tone Intact        Labs     Labs reviewed.  No results found for this or any previous visit (from the past 24 hour(s)).     Impression   This is a 69 year old male with a previous psychiatric diagnosis that includes schizoaffective disorder, bipolar type, who has a history of medication noncompliance. He was initially presented to [facility] ED on 9/11/19 for evaluation of paranoid behavior and was deemed appropriate for inpatient level of care. Patient denied any complications, issues, or complaints this morning. Dr. Conklin and patient again discussed the possibility of patient being administered the 3 month version of Invega. Patient continues to be in agreement with this plan. We will need to be in contact with pharmacy in regards of this.      Diagnoses     1. Schizoaffective disorder, bipolar type, multiple episodes in acute phase  2. Alcohol Use Disorder, in sustained remission      Plan     1. Explained side effects, benefits, and complications of medications to the patient, Pt gave verbal consent.  2. Medication changes: None   3. Discussed treatment plan with patient and team.  4. Projected length of stay: 7+ days  5. Person to contact in regards of patients aftercare plans is Michelle Arora nurse through [facility], at [phone]     Attestation:   Luda NGUYEN, am serving as a scribe on 9/24/2019 to document services personally performed by Dominguez Conklin MD  based on my observations and the provider's statements to me.    Patient ID:  Name: Gre Bashir    MRN: [id number]  Admission: 9/11/2019   YOB: 1950

## 2019-09-24 NOTE — PLAN OF CARE
Problem: Psychotic Symptoms  Goal: Psychotic Symptoms  Description  Signs and symptoms of listed problems will be absent or manageable.  9/23/2019 2219 by Sundar Lawrence  Outcome: Improving  Flowsheets (Taken 9/23/2019 2217)  Psychotic Symptoms Assessed: all  Psychotic Symptoms Present: insight; thought process  Note:   Pt presents with a blunt affect and calm mood. Pt spent the majority of the shift either listening to the headphones in his room or watching tv. Pt was much more social and pleasant this shift than past days. Pt still has mumbling with heavy accent that can be incoherent at times. Pt seemed to be responding to internal stimuli and conversing with himself. Pt ate all of his food and was med compliant. Pt denies Si.

## 2019-09-25 PROCEDURE — 25000132 ZZH RX MED GY IP 250 OP 250 PS 637: Performed by: PSYCHIATRY & NEUROLOGY

## 2019-09-25 PROCEDURE — 12400000 ZZH R&B MH

## 2019-09-25 RX ADMIN — LEVOTHYROXINE SODIUM 50 MCG: 50 TABLET ORAL at 07:39

## 2019-09-25 RX ADMIN — Medication: at 07:40

## 2019-09-25 RX ADMIN — BENZTROPINE MESYLATE 1 MG: 1 TABLET ORAL at 07:39

## 2019-09-25 RX ADMIN — MULTIPLE VITAMINS W/ MINERALS TAB 1 TABLET: TAB at 07:39

## 2019-09-25 RX ADMIN — TRAZODONE HYDROCHLORIDE 100 MG: 100 TABLET ORAL at 20:48

## 2019-09-25 RX ADMIN — ASPIRIN 81 MG 81 MG: 81 TABLET ORAL at 07:39

## 2019-09-25 RX ADMIN — HALOPERIDOL 10 MG: 5 TABLET ORAL at 20:48

## 2019-09-25 RX ADMIN — BENZTROPINE MESYLATE 1 MG: 1 TABLET ORAL at 20:48

## 2019-09-25 ASSESSMENT — ACTIVITIES OF DAILY LIVING (ADL)
ORAL_HYGIENE: INDEPENDENT
HYGIENE/GROOMING: INDEPENDENT
LAUNDRY: WITH SUPERVISION
ORAL_HYGIENE: INDEPENDENT
DRESS: INDEPENDENT
LAUNDRY: WITH SUPERVISION
DRESS: INDEPENDENT
HYGIENE/GROOMING: INDEPENDENT

## 2019-09-25 NOTE — PLAN OF CARE
Pt presents with calm mood and isolative to room and only coming out for meals and request. Pt remains with blunt affect but polite. Pt however still have poor insight and impaired thought process. Pt is in agreement with taking long acting Invega every three months. Act team came to assess pt and results still awaited. Pt stated all they want was for me to accept the Invega every three months. Pt was med compliant except haldol. Denies SI nor any hallucination.

## 2019-09-25 NOTE — PLAN OF CARE
Pt showed this morning. Has been withdrawn to room except for meals and bathroom. Pt denies SI. Was med compliant. Mood is calm, affect is flat with pressured speech. Pt denies AVH, listened to music via headphone all shift. Will continue to monitor for safety.

## 2019-09-26 PROCEDURE — 12400000 ZZH R&B MH

## 2019-09-26 PROCEDURE — 90853 GROUP PSYCHOTHERAPY: CPT

## 2019-09-26 PROCEDURE — 25000132 ZZH RX MED GY IP 250 OP 250 PS 637: Performed by: PSYCHIATRY & NEUROLOGY

## 2019-09-26 RX ADMIN — MULTIPLE VITAMINS W/ MINERALS TAB 1 TABLET: TAB at 08:21

## 2019-09-26 RX ADMIN — ASPIRIN 81 MG 81 MG: 81 TABLET ORAL at 08:21

## 2019-09-26 RX ADMIN — HALOPERIDOL 10 MG: 5 TABLET ORAL at 20:24

## 2019-09-26 RX ADMIN — LEVOTHYROXINE SODIUM 50 MCG: 50 TABLET ORAL at 08:21

## 2019-09-26 RX ADMIN — Medication: at 08:23

## 2019-09-26 RX ADMIN — TRAZODONE HYDROCHLORIDE 100 MG: 100 TABLET ORAL at 20:24

## 2019-09-26 RX ADMIN — BENZTROPINE MESYLATE 1 MG: 1 TABLET ORAL at 20:24

## 2019-09-26 RX ADMIN — BENZTROPINE MESYLATE 1 MG: 1 TABLET ORAL at 08:21

## 2019-09-26 NOTE — PLAN OF CARE
Problem: Psychotic Symptoms  Goal: Psychotic Symptoms  Description  Signs and symptoms of listed problems will be absent or manageable.  Outcome: No Change  Flowsheets (Taken 9/25/2019 2133)  Psychotic Symptoms Assessed: all  Psychotic Symptoms Present: insight; thought process  Note:   Pt presents with full range affect and calm mood. Pt spent the majority of the shift in his room listening to his headphones and looking out the window. Pt is pleasant and cooperative with staff, but withdrawn and minimally social. Pt declined evening programming because he said he just would rather listen to the radio. Pt denies all hallucinations and Si. Pt ate all of his food and was med compliant.

## 2019-09-26 NOTE — PLAN OF CARE
Pt presented with a tense affect and calm mood. Insight:poor, thinking process:impaired, behavior: appropriate. Denied SI and A/V hallucinations. Spent most of his time in his bedroom staring out the window.

## 2019-09-26 NOTE — PROGRESS NOTES
"[facility] Psychiatric Progress Note       Interim History     The patient's care was discussed with the treatment team and chart notes were reviewed. According to nursing and psych associate staff member notes from yesterday, the patient proceeds to be withdrawn to his room. He spends his time listening to music via headphones and looking out his bedroom window. Has been medication compliant, cooperative in overall care, calm in mood, and has denied auditory or visual hallucinations. Pt seen on 9/26/19 by Dr. Conklin. The patient reports he is doing well this afternoon. He denies any issues or complications with his current medications or overall care on Station 77. Dr. Carrera encourages the patient to attend more group sessions throughout the day and would like the patient to be moved to the SDU. Patient is at first hesitant and reports he would like to stay in his ITC room because he has a good view of the road. Dr. Conklin reminds the patient that that is not an appropriate reason to stay on ITC since he is doing much better. Patient finally acknowledges and agrees to move over to SDU.      Hospital Course   Per records Ger Bashir reportedly was lingering at a [employer] restaurant prior to admission without a shirt on and was said to have been muttering to himself.  Consequently, the [place] [employer] was called and took the patient back to his apartment, which was then found to be in a state of disarray.  He was said to have no food at home.  On further inquiry, he told the police that the [facility] has him under surveillance.  He initially was willing to come to the hospital, but upon presentation, he refused to get dressed or follow directions.  He was noted to be somewhat disgruntled and disorganized in his manner of speaking.  ED records suggest that he was \"talking about Looney Tunes and [place] characters.\"  He indicated to them that he wanted a CAT scan of his " "head because his \"brain is at a 3.\"  On direct interview with psychiatrist Dr. Lizbeth Louie, the patient was a very poor historian who got easily agitated with questioning.  He did not provide much by way of the information, but did allude to the [employer] persecuting him.  He could not explain the reason why he had been partially clad at the [employer] and when pressed for more information, he became rather dismissive.  Of note, he was recently on admission at [facility] and was discharged with diagnoses of schizoaffective disorder, bipolar type, tobacco use disorder and alcohol use disorder in sustained remission.  He reportedly has a history of noncompliance with medications, has had multiple court commitments and Wu orders.  During his last hospitalization at [facility] for about 3 weeks, he was discharged with his Wu order extended and he remains on civil commitment.  He is currently Wu'd to take Haldol, Zyprexa, Clozaril and Invega.  The patient was supposed to be taking Haldol Decanoate 100 mg q.2 weeks, Cogentin 1 mg twice a day and Haldol 10 mg daily at bedtime, but the patient reports that he does not like taking Haldol.  It appears he has not been compliant with his prescribed medications and it is unclear when he last received his Haldol Decanoate.  He does admit that he has had multiple medication trials in the past and feels he did best with Zyprexa. Dr. Louie believed that patient would benefit from psychiatric hospitalization and stabilization on Station [address]. It should be noted that the treatment team was able to solidify that patient is no longer under legal commitment, however is currently followed by [facility]. Staff members from [facility] reported to Station [address] staff that they believe that patient would benefit most from injection medications due to patient being noncompliant with oral medications in the past. From this, Dr. Conklin ordered " Invega Sustenna 156mg injection. Patient agreed to be compliant with this injection, thus was administered this injection on 9/18/19. On 9/23, the patients [facility] nurse, Michelle Arora, was present during interview with Dr. Conklin. She had stressed that she did not deem patient appropriate for discharge at that time, being concerned that patient had not been communicating appropriately while inpatient. Michelle noted that patient has a tendency to declare he is doing well while in the hospital, but then goes home and denies medications, specifically injections. Dr. Conklin suggested that patient be administered a longer last form of Invega injection in the future. Both patient and Michelle were in agreement with this idea.      Medications     Current Facility-Administered Medications Ordered in Epic   Medication Dose Route Frequency Last Rate Last Dose     acetaminophen (TYLENOL) tablet 650 mg  650 mg Oral Q4H PRN   650 mg at 09/16/19 0347     aspirin (ASA) chewable tablet 81 mg  81 mg Oral Daily   81 mg at 09/26/19 0821     benztropine (COGENTIN) tablet 1 mg  1 mg Oral BID   1 mg at 09/26/19 0821     eucerin cream   Topical BID         haloperidol (HALDOL) tablet 10 mg  10 mg Oral At Bedtime   10 mg at 09/25/19 2048     levothyroxine (SYNTHROID/LEVOTHROID) tablet 50 mcg  50 mcg Oral Daily   50 mcg at 09/26/19 0821     multivitamin w/minerals (THERA-VIT-M) tablet 1 tablet  1 tablet Oral Daily   1 tablet at 09/26/19 0821     nicotine (NICORETTE) gum 2-4 mg  2-4 mg Buccal Q1H PRN         OLANZapine zydis (zyPREXA) ODT tab 10 mg  10 mg Oral Q6H PRN   10 mg at 09/20/19 0832    Or     OLANZapine (zyPREXA) injection 10 mg  10 mg Intramuscular Q6H PRN         traZODone (DESYREL) tablet 100 mg  100 mg Oral At Bedtime   100 mg at 09/25/19 2048     No current [facility]-ordered outpatient medications on file.         Allergies      Allergies   Allergen Reactions     Peas GI Disturbance     Black eyed peas - vomiting        Medical  "Review of Systems     BP 98/67   Pulse 62   Temp 97.9  F (36.6  C) (Oral)   Resp 16   Ht 1.778 m (5' 10\")   Wt 96.8 kg (213 lb 4.8 oz)   SpO2 93%   BMI 30.61 kg/m    Body mass index is 30.61 kg/m .  A 10-point review of systems was performed by Dominguez Conklin MD and is negative, no new findings.      Psychiatric Examination     Appearance Sitting in chair, dressed in hospital scrubs. Appears stated age.   Attitude Cooperative   Orientation Oriented to person, place, time   Eye Contact Fair    Speech Regular rate, rhythm, volume and tone   Language Normal   Psychomotor Behavior Normal   Mood Good    Affect Pleasant    Thought Process Impaired   Associations Intact   Thought Content Patient is currently negative for suicidal ideation, negative for plan or intent, able to contract no self harm and identify barriers to suicide. Positive for psychosis.     Fund of Knowledge Impaired   Insight Impaired   Judgement Impaired   Attention Span & Concentration Poor   Recent & Remote Memory Intact   Gait Normal   Muscle Tone Intact        Labs     Labs reviewed.  No results found for this or any previous visit (from the past 24 hour(s)).     Impression   This is a 69 year old male with a previous psychiatric diagnosis that includes schizoaffective disorder, bipolar type, who has a history of medication noncompliance. He was initially presented to [facility] ED on 9/11/19 for evaluation of paranoid behavior and was deemed appropriate for inpatient level of care. While meeting with Dr. Conklin this afternoon, the patient was cooperative and pleasant. He denied obtaining any issues or concerns in regards of his medications and overall care while on Station 77. There were no medication adjustments made today. Dr. Conklin proposed that the patient to be moved over to the SDU. Patient was at first hesitant about this, but finally agreed.      Diagnoses     1. Schizoaffective disorder, bipolar type, multiple episodes in " acute phase  2. Alcohol Use Disorder, in sustained remission      Plan     1. Explained side effects, benefits, and complications of medications to the patient, Pt gave verbal consent.  2. Medication changes: None   3. Discussed treatment plan with patient and team.  4. Projected length of stay: 7+ days  5. Person to contact in regards of patients aftercare plans is Michelle Arora, nurse through [facility], at [phone]     Attestation:   I, Luda Patino, am serving as a scribe on 9/26/2019 to document services personally performed by Dominguez Conklin MD based on my observations and the provider's statements to me.    Patient ID:  Name: Ger Bashir    MRN: [id number]  Admission: 9/11/2019   YOB: 1950

## 2019-09-26 NOTE — PLAN OF CARE
BEHAVIORAL TEAM DISCUSSION    Participants: MD, RN, CM, PA, OT   Progress: Improving   Anticipated length of stay: 7+ days  Continued Stay Criteria/Rationale: Need to coordinate aftercare plans with the [facility] ACT team  Medical/Physical: Per hospitalist consult  Precautions: None  Behavioral Orders   Procedures    Code 1 - Restrict to Unit    Routine Programming     As clinically indicated    Status 15     Every 15 minutes.     Plan: Transition to 3 month Invega injection following discharge. Will contact [facility] to assess patient for readiness to discharge.   Rationale for change in precautions or plan: Patient improving and nearing readiness to discharge.

## 2019-09-27 PROCEDURE — 12400000 ZZH R&B MH

## 2019-09-27 PROCEDURE — 25000132 ZZH RX MED GY IP 250 OP 250 PS 637: Performed by: PSYCHIATRY & NEUROLOGY

## 2019-09-27 RX ADMIN — ASPIRIN 81 MG 81 MG: 81 TABLET ORAL at 08:10

## 2019-09-27 RX ADMIN — MULTIPLE VITAMINS W/ MINERALS TAB 1 TABLET: TAB at 08:10

## 2019-09-27 RX ADMIN — LEVOTHYROXINE SODIUM 50 MCG: 50 TABLET ORAL at 08:10

## 2019-09-27 RX ADMIN — BENZTROPINE MESYLATE 1 MG: 1 TABLET ORAL at 08:10

## 2019-09-27 RX ADMIN — HALOPERIDOL 10 MG: 5 TABLET ORAL at 20:44

## 2019-09-27 RX ADMIN — TRAZODONE HYDROCHLORIDE 100 MG: 100 TABLET ORAL at 20:44

## 2019-09-27 RX ADMIN — BENZTROPINE MESYLATE 1 MG: 1 TABLET ORAL at 20:44

## 2019-09-27 RX ADMIN — Medication: at 08:20

## 2019-09-27 ASSESSMENT — ACTIVITIES OF DAILY LIVING (ADL)
LAUNDRY: WITH SUPERVISION
HYGIENE/GROOMING: INDEPENDENT
DRESS: INDEPENDENT
ORAL_HYGIENE: INDEPENDENT
DRESS: INDEPENDENT
LAUNDRY: WITH SUPERVISION
ORAL_HYGIENE: INDEPENDENT
HYGIENE/GROOMING: INDEPENDENT

## 2019-09-27 NOTE — PROGRESS NOTES
"[facility] Psychiatric Progress Note       Interim History     The patient's care was discussed with the treatment team and chart notes were reviewed. Pt seen on 9/27/19 by Dr. Conklin. Patient reports he is doing well this morning. He denies any complications, concerns, or issues with his medications or overall care on Station 77. Dr. Conklin does not wish to make any medication adjustments today. He proceeds to be in agreement with the 3 month Invega injection after discharge. Our  will be in contact with patients ACT team in regards of this.      Hospital Course   Per records Ger Bashir reportedly was lingering at a [employer] prior to admission without a shirt on and was said to have been muttering to himself.  Consequently, the [place] [employer] was called and took the patient back to his apartment, which was then found to be in a state of disarray.  He was said to have no food at home.  On further inquiry, he told the police that the [employer] has him under surveillance.  He initially was willing to come to the hospital, but upon presentation, he refused to get dressed or follow directions.  He was noted to be somewhat disgruntled and disorganized in his manner of speaking.  ED records suggest that he was \"talking about Looney Tunes and [place] characters.\"  He indicated to them that he wanted a CAT scan of his head because his \"brain is at a 3.\"  On direct interview with psychiatrist Dr. Lizbeth Louie, the patient was a very poor historian who got easily agitated with questioning.  He did not provide much by way of the information, but did allude to the [employer] persecuting him.  He could not explain the reason why he had been partially clad at the [employer] and when pressed for more information, he became rather dismissive.  Of note, he was recently on admission at [facility] and was discharged with " diagnoses of schizoaffective disorder, bipolar type, tobacco use disorder and alcohol use disorder in sustained remission.  He reportedly has a history of noncompliance with medications, has had multiple court commitments and Wu orders.  During his last hospitalization at [facility] for about [facility] [place], he was discharged with his Wu order extended and he remains on civil commitment.  He is currently Wu'd to take Haldol, Zyprexa, Clozaril and Invega.  The patient was supposed to be taking Haldol Decanoate 100 mg q.2 weeks, Cogentin 1 mg twice a day and Haldol 10 mg daily at bedtime, but the patient reports that he does not like taking Haldol.  It appears he has not been compliant with his prescribed medications and it is unclear when he last received his Haldol Decanoate.  He does admit that he has had multiple medication trials in the past and feels he did best with Zyprexa. Dr. Louie believed that patient would benefit from psychiatric hospitalization and stabilization on Station 77. It should be noted that the treatment team was able to solidify that patient is no longer under legal commitment, however is currently followed by [facility]. Staff members from [facility] reported to Station [address] staff that they believe that patient would benefit most from injection medications due to patient being noncompliant with oral medications in the past. From this, Dr. Conklin ordered Invega Sustenna 156mg injection. Patient agreed to be compliant with this injection, thus was administered this injection on 9/18/19. On 9/23, the patients [facility] nurse, Michelle Arora, was present during interview with Dr. Conklin. She had stressed that she did not deem patient appropriate for discharge at that time, being concerned that patient had not been communicating appropriately while inpatient. Michelle noted that patient has a tendency to declare he is doing well while in the hospital, but then goes home and denies medications,  "specifically injections. Dr. Conklin suggested that patient be administered a longer last form of Invega injection in the future. Both patient and Michelle were in agreement with this idea.      Medications     Current Facility-Administered Medications Ordered in Epic   Medication Dose Route Frequency Last Rate Last Dose     acetaminophen (TYLENOL) tablet 650 mg  650 mg Oral Q4H PRN   650 mg at 09/16/19 0347     aspirin (ASA) chewable tablet 81 mg  81 mg Oral Daily   81 mg at 09/26/19 0821     benztropine (COGENTIN) tablet 1 mg  1 mg Oral BID   1 mg at 09/26/19 2024     eucerin cream   Topical BID         haloperidol (HALDOL) tablet 10 mg  10 mg Oral At Bedtime   10 mg at 09/26/19 2024     levothyroxine (SYNTHROID/LEVOTHROID) tablet 50 mcg  50 mcg Oral Daily   50 mcg at 09/26/19 0821     multivitamin w/minerals (THERA-VIT-M) tablet 1 tablet  1 tablet Oral Daily   1 tablet at 09/26/19 0821     nicotine (NICORETTE) gum 2-4 mg  2-4 mg Buccal Q1H PRN         OLANZapine zydis (zyPREXA) ODT tab 10 mg  10 mg Oral Q6H PRN   10 mg at 09/20/19 0832    Or     OLANZapine (zyPREXA) injection 10 mg  10 mg Intramuscular Q6H PRN         traZODone (DESYREL) tablet 100 mg  100 mg Oral At Bedtime   100 mg at 09/26/19 2024     No current [facility]-ordered outpatient medications on file.         Allergies      Allergies   Allergen Reactions     Peas GI Disturbance     Black eyed peas - vomiting        Medical Review of Systems     /58   Pulse 70   Temp 97.5  F (36.4  C) (Oral)   Resp 16   Ht 1.778 m (5' 10\")   Wt 96.8 kg (213 lb 4.8 oz)   SpO2 94%   BMI 30.61 kg/m    Body mass index is 30.61 kg/m .  A 10-point review of systems was performed by Dominguez Conklin MD and is negative, no new findings.      Psychiatric Examination     Appearance Sitting in chair, dressed in hospital scrubs. Appears stated age.   Attitude Cooperative   Orientation Oriented to person, place, time   Eye Contact Fair    Speech Regular rate, rhythm, " volume and tone   Language Normal   Psychomotor Behavior Normal   Mood Good    Affect Pleasant    Thought Process Impaired   Associations Intact   Thought Content Patient is currently negative for suicidal ideation, negative for plan or intent, able to contract no self harm and identify barriers to suicide. Positive for psychosis.     Fund of Knowledge Impaired   Insight Impaired   Judgement Impaired   Attention Span & Concentration Poor   Recent & Remote Memory Intact   Gait Normal   Muscle Tone Intact        Labs     Labs reviewed.  No results found for this or any previous visit (from the past 24 hour(s)).     Impression   This is a 69 year old male with a previous psychiatric diagnosis that includes schizoaffective disorder, bipolar type, who has a history of medication noncompliance. He was initially presented to [facility] ED on 9/11/19 for evaluation of paranoid behavior and was deemed appropriate for inpatient level of care. While meeting with Dr. Conklin this morning, the patient was pleasant, cooperative, and calm. He denied any issues, complications, or concerns in regards of his medications or overall care here on Station 77. There were no medication adjustments made today. Plan continues to be for the patient to be administered the longer lasting Invega injection after discharge. Patient proceeds to be in agreement with this plan. Our  will be in contact with patients ACT team in regards of this.      Diagnoses     1. Schizoaffective disorder, bipolar type, multiple episodes in acute phase  2. Alcohol Use Disorder, in sustained remission      Plan     1. Explained side effects, benefits, and complications of medications to the patient, Pt gave verbal consent.  2. Medication changes: None   3. Discussed treatment plan with patient and team.  4. Projected length of stay: 7+ days  5. Person to contact in regards of patients aftercare plans is Michelle Arora, nurse through [facility], at  [phone]     Attestation:   I, Luda Patino, am serving as a scribe on 9/27/2019 to document services personally performed by Dominguez Conklin MD based on my observations and the provider's statements to me.    Patient ID:  Name: Ger Bashir    MRN: [id number]  Admission: 9/11/2019   YOB: 1950

## 2019-09-27 NOTE — PLAN OF CARE
Mood calm, slightly restless at times. Insight and concentration is poor with impaired judgement. Isolative, spent most of shift in room listening to headphones or staring out window. Showered. Will converse when directly spoken to, but does not otherwise engage with peers; instead talking to himself at times. Continues to deny DAVE SI. Compliant with medications.

## 2019-09-27 NOTE — PROGRESS NOTES
Trista, one of the members of patient's [facility] ACT team, came to the hospital today to see patient. She believes that patient is close to his baseline. She feels that the three month injectable medication would be a positive step in helping patient maintain his wellness as medication compliance has been difficult at times. She feels that patient could discharge back to his home next week. Tentative plan is for patient to discharge home on Tuesday, so that his ACT team has time to get things in place to follow him when he returns home. She stated that medications for patient should be sent to [facility]. She said that the nurse would be following up regarding the medications and discharge plans on Monday.

## 2019-09-27 NOTE — PLAN OF CARE
Shift Update: Pt mood is calm. Thought process is impaired. Insight is poor. Pt denies suicidal ideation. Pt attended the activity group and was med,food compliant.

## 2019-09-28 PROCEDURE — 12400000 ZZH R&B MH

## 2019-09-28 PROCEDURE — 25000132 ZZH RX MED GY IP 250 OP 250 PS 637: Performed by: PSYCHIATRY & NEUROLOGY

## 2019-09-28 RX ADMIN — Medication: at 08:00

## 2019-09-28 RX ADMIN — LEVOTHYROXINE SODIUM 50 MCG: 50 TABLET ORAL at 08:00

## 2019-09-28 RX ADMIN — MULTIPLE VITAMINS W/ MINERALS TAB 1 TABLET: TAB at 08:00

## 2019-09-28 RX ADMIN — BENZTROPINE MESYLATE 1 MG: 1 TABLET ORAL at 08:00

## 2019-09-28 RX ADMIN — TRAZODONE HYDROCHLORIDE 100 MG: 100 TABLET ORAL at 20:46

## 2019-09-28 RX ADMIN — ASPIRIN 81 MG 81 MG: 81 TABLET ORAL at 08:00

## 2019-09-28 RX ADMIN — HALOPERIDOL 10 MG: 5 TABLET ORAL at 20:46

## 2019-09-28 RX ADMIN — BENZTROPINE MESYLATE 1 MG: 1 TABLET ORAL at 20:46

## 2019-09-28 ASSESSMENT — ACTIVITIES OF DAILY LIVING (ADL)
ORAL_HYGIENE: INDEPENDENT
HYGIENE/GROOMING: INDEPENDENT
DRESS: INDEPENDENT
DRESS: INDEPENDENT
HYGIENE/GROOMING: INDEPENDENT
ORAL_HYGIENE: INDEPENDENT
LAUNDRY: WITH SUPERVISION

## 2019-09-28 NOTE — PLAN OF CARE
Pt was calm, cooperative and med compliant. Isolated to room for most of the morning. Only came out for food, bathroom privilege and  meds. Was heard singing and giving self motivation speech to himself while paring out the window. Pt denies SI, Pain. Decline showering this shift. Will continue to monitor for safety.

## 2019-09-28 NOTE — PLAN OF CARE
Mood stable with blunt affect.Insight poor.Speech rambling.Isolative and withdrawn.Spent shift in bed listening to headphones or staring out the window.Room messy.  Talking to self at times but denies auditory  hallucinations. Denies SI .  Med compliant.

## 2019-09-29 PROCEDURE — 25000132 ZZH RX MED GY IP 250 OP 250 PS 637: Performed by: PSYCHIATRY & NEUROLOGY

## 2019-09-29 PROCEDURE — 12400000 ZZH R&B MH

## 2019-09-29 RX ADMIN — LEVOTHYROXINE SODIUM 50 MCG: 50 TABLET ORAL at 07:59

## 2019-09-29 RX ADMIN — BENZTROPINE MESYLATE 1 MG: 1 TABLET ORAL at 20:38

## 2019-09-29 RX ADMIN — ASPIRIN 81 MG 81 MG: 81 TABLET ORAL at 07:59

## 2019-09-29 RX ADMIN — TRAZODONE HYDROCHLORIDE 100 MG: 100 TABLET ORAL at 20:38

## 2019-09-29 RX ADMIN — BENZTROPINE MESYLATE 1 MG: 1 TABLET ORAL at 07:59

## 2019-09-29 RX ADMIN — HALOPERIDOL 10 MG: 5 TABLET ORAL at 20:38

## 2019-09-29 RX ADMIN — Medication: at 07:59

## 2019-09-29 RX ADMIN — MULTIPLE VITAMINS W/ MINERALS TAB 1 TABLET: TAB at 07:59

## 2019-09-29 ASSESSMENT — ACTIVITIES OF DAILY LIVING (ADL)
LAUNDRY: WITH SUPERVISION
DRESS: INDEPENDENT
ORAL_HYGIENE: INDEPENDENT
DRESS: INDEPENDENT
HYGIENE/GROOMING: INDEPENDENT
HYGIENE/GROOMING: INDEPENDENT
ORAL_HYGIENE: INDEPENDENT

## 2019-09-29 NOTE — PLAN OF CARE
Patient pleasant and cooperative. Compliant with medications. Isolative to room, listening to music with headphones.

## 2019-09-29 NOTE — PLAN OF CARE
Pt was isolated and withdrawn to his room all day listening to the radio via headphone. Pt denied all psychosis. Was heard talk to self. Was pleasant and cooperative. Mood is calm.

## 2019-09-30 PROCEDURE — 25000132 ZZH RX MED GY IP 250 OP 250 PS 637: Performed by: PSYCHIATRY & NEUROLOGY

## 2019-09-30 PROCEDURE — 12400000 ZZH R&B MH

## 2019-09-30 RX ADMIN — HALOPERIDOL 10 MG: 5 TABLET ORAL at 20:49

## 2019-09-30 RX ADMIN — Medication: at 16:26

## 2019-09-30 RX ADMIN — LEVOTHYROXINE SODIUM 50 MCG: 50 TABLET ORAL at 09:19

## 2019-09-30 RX ADMIN — ASPIRIN 81 MG 81 MG: 81 TABLET ORAL at 09:20

## 2019-09-30 RX ADMIN — BENZTROPINE MESYLATE 1 MG: 1 TABLET ORAL at 09:19

## 2019-09-30 RX ADMIN — BENZTROPINE MESYLATE 1 MG: 1 TABLET ORAL at 20:50

## 2019-09-30 RX ADMIN — TRAZODONE HYDROCHLORIDE 100 MG: 100 TABLET ORAL at 20:50

## 2019-09-30 RX ADMIN — MULTIPLE VITAMINS W/ MINERALS TAB 1 TABLET: TAB at 09:19

## 2019-09-30 ASSESSMENT — ACTIVITIES OF DAILY LIVING (ADL)
ORAL_HYGIENE: INDEPENDENT
HYGIENE/GROOMING: INDEPENDENT
LAUNDRY: WITH SUPERVISION
DRESS: INDEPENDENT

## 2019-09-30 NOTE — PLAN OF CARE
Patient spent the shift in his room listening to the [place] game on headphones. He is pleasant and cooperative with staff.  States he is looking forward to hopefully discharging next week. States he feels he is ready . Mood is calm.Affect is flat but brightens on approach.

## 2019-09-30 NOTE — PROGRESS NOTES
"[facility] Psychiatric Progress Note       Interim History     The patient's care was discussed with the treatment team and chart notes were reviewed. The patient was cooperative, pleasant, medication compliant, and isolative to his room for majority of the weekend. A [facility] ACT team member met with patient on 9/27/19, where it was declared that patient appeared to be at his baseline. The ACT team continues to be in agreement with the three month Invega injection and deem patient ready for discharge on Tuesday. Patient will return back to his home.      Hospital Course   Per records Ger Bashir reportedly was lingering at a [employer] prior to admission without a shirt on and was said to have been muttering to himself.  Consequently, the [place] [employer] was called and took the patient back to his apartment, which was then found to be in a state of disarray.  He was said to have no food at home.  On further inquiry, he told the police that the [employer] has him under surveillance.  He initially was willing to come to the hospital, but upon presentation, he refused to get dressed or follow directions.  He was noted to be somewhat disgruntled and disorganized in his manner of speaking.  ED records suggest that he was \"talking about Looney Tunes and [place] characters.\"  He indicated to them that he wanted a CAT scan of his head because his \"brain is at a 3.\"  On direct interview with psychiatrist Dr. Lizbeth Louie, the patient was a very poor historian who got easily agitated with questioning.  He did not provide much by way of the information, but did allude to the [employer] persecuting him.  He could not explain the reason why he had been partially clad at the [employer] and when pressed for more information, he became rather dismissive.  Of note, he was recently on admission at [facility] and was discharged with diagnoses of " schizoaffective disorder, bipolar type, tobacco use disorder and alcohol use disorder in sustained remission.  He reportedly has a history of noncompliance with medications, has had multiple court commitments and Wu orders.  During his last hospitalization at [facility] for about 3 weeks, he was discharged with his Wu order extended and he remains on civil commitment.  He is currently Wu'd to take Haldol, Zyprexa, Clozaril and Invega.  The patient was supposed to be taking Haldol Decanoate 100 mg q.2 weeks, Cogentin 1 mg twice a day and Haldol 10 mg daily at bedtime, but the patient reports that he does not like taking Haldol.  It appears he has not been compliant with his prescribed medications and it is unclear when he last received his Haldol Decanoate.  He does admit that he has had multiple medication trials in the past and feels he did best with Zyprexa. Dr. Louie believed that patient would benefit from psychiatric hospitalization and stabilization on [address]. It should be noted that the treatment team was able to solidify that patient is no longer under legal commitment, however is currently followed by [facility]. Staff members from [facility] reported to [facility] staff that they believe that patient would benefit most from injection medications due to patient being noncompliant with oral medications in the past. From this, Dr. Conklin ordered Invega Sustenna 156mg injection. Patient agreed to be compliant with this injection, thus was administered this injection on 9/18/19. On 9/23, the patients [facility] nurse, Michelle Arora, was present during interview with Dr. Conklin. She had stressed that she did not deem patient appropriate for discharge at that time, being concerned that patient had not been communicating appropriately while inpatient. Michelle noted that patient has a tendency to declare he is doing well while in the hospital, but then goes home and denies medications, specifically  "injections. Dr. Conklin suggested that patient be administered a longer last form of Invega injection in the future. Both patient and Michelle were in agreement with this idea.      Medications     Current Facility-Administered Medications Ordered in Epic   Medication Dose Route Frequency Last Rate Last Dose     acetaminophen (TYLENOL) tablet 650 mg  650 mg Oral Q4H PRN   650 mg at 09/16/19 0347     aspirin (ASA) chewable tablet 81 mg  81 mg Oral Daily   81 mg at 09/29/19 0759     benztropine (COGENTIN) tablet 1 mg  1 mg Oral BID   1 mg at 09/29/19 2038     eucerin cream   Topical BID         haloperidol (HALDOL) tablet 10 mg  10 mg Oral At Bedtime   10 mg at 09/29/19 2038     levothyroxine (SYNTHROID/LEVOTHROID) tablet 50 mcg  50 mcg Oral Daily   50 mcg at 09/29/19 0759     multivitamin w/minerals (THERA-VIT-M) tablet 1 tablet  1 tablet Oral Daily   1 tablet at 09/29/19 0759     nicotine (NICORETTE) gum 2-4 mg  2-4 mg Buccal Q1H PRN         OLANZapine zydis (zyPREXA) ODT tab 10 mg  10 mg Oral Q6H PRN   10 mg at 09/20/19 0832    Or     OLANZapine (zyPREXA) injection 10 mg  10 mg Intramuscular Q6H PRN         traZODone (DESYREL) tablet 100 mg  100 mg Oral At Bedtime   100 mg at 09/29/19 2038     No current [facility]-ordered outpatient medications on file.         Allergies      Allergies   Allergen Reactions     Peas GI Disturbance     Black eyed peas - vomiting        Medical Review of Systems     /82   Pulse 77   Temp 98.7  F (37.1  C) (Oral)   Resp 16   Ht 1.778 m (5' 10\")   Wt 96.8 kg (213 lb 4.8 oz)   SpO2 97%   BMI 30.61 kg/m    Body mass index is 30.61 kg/m .  A 10-point review of systems was performed by Dominguez Conklin MD and is negative, no new findings.      Psychiatric Examination     Appearance Sitting in chair, dressed in hospital scrubs. Appears stated age.   Attitude Cooperative   Orientation Oriented to person, place, time   Eye Contact Fair    Speech Regular rate, rhythm, volume and tone "   Language Normal   Psychomotor Behavior Normal   Mood Good    Affect Pleasant    Thought Process Impaired   Associations Intact   Thought Content Patient is currently negative for suicidal ideation, negative for plan or intent, able to contract no self harm and identify barriers to suicide. Positive for psychosis.     Fund of Knowledge Impaired   Insight Impaired   Judgement Impaired   Attention Span & Concentration Poor   Recent & Remote Memory Intact   Gait Normal   Muscle Tone Intact        Labs     Labs reviewed.  No results found for this or any previous visit (from the past 24 hour(s)).     Impression   This is a 69 year old male with a previous psychiatric diagnosis that includes schizoaffective disorder, bipolar type, who has a history of medication noncompliance. He was initially presented to [facility] ED on 9/11/19 for evaluation of paranoid behavior and was deemed appropriate for inpatient level of care. A [facility] ACT team member met with patient on 9/27/19, where it was declared that patient appeared to be at his baseline. The ACT team continues to be in agreement with the three month Invega injection and deem patient ready for discharge on Tuesday. Patient will return back to his home.      Diagnoses     1. Schizoaffective disorder, bipolar type, multiple episodes in acute phase  2. Alcohol Use Disorder, in sustained remission      Plan     1. Explained side effects, benefits, and complications of medications to the patient, Pt gave verbal consent.  2. Medication changes: None   3. Discussed treatment plan with patient and team.  4. Projected length of stay: 7+ days  5. Person to contact in regards of patients aftercare plans is Michelle Arora nurse through [facility], at [phone]  6. Medications for patient should be sent to [facility]     Attestation:   Luda NGUYEN, am serving as a scribe on 9/30/2019 to document services personally performed by Dominguez Conklin MD based on my  observations and the provider's statements to me.    Patient ID:  Name: Ger Bashir    MRN: [id number]  Admission: 9/11/2019   YOB: 1950

## 2019-09-30 NOTE — PLAN OF CARE
Full range affect with calm mood. Pleasant and cooperative with staff, but does not engage with peers. Spent most of shift in room listening to music on headphones. Showered. Compliant with medications. Duaneies DEVAUGHN ACOSTA. States he is looking forward to discharge.

## 2019-09-30 NOTE — PLAN OF CARE
BEHAVIORAL TEAM DISCUSSION    Participants: MD, CM, OT, PA, RN  Progress: Nearing baseline  Anticipated length of stay: unknown  Continued Stay Criteria/Rationale: isolative, withdrawn  Medical/Physical: NA  Precautions:   Behavioral Orders   Procedures    Code 1 - Restrict to Unit    Routine Programming     As clinically indicated    Status 15     Every 15 minutes.     Plan: Medication management, will encourage groups  Rationale for change in precautions or plan: NA

## 2019-10-01 PROCEDURE — 12400000 ZZH R&B MH

## 2019-10-01 PROCEDURE — 25000132 ZZH RX MED GY IP 250 OP 250 PS 637: Performed by: PSYCHIATRY & NEUROLOGY

## 2019-10-01 RX ADMIN — Medication: at 15:38

## 2019-10-01 RX ADMIN — HALOPERIDOL 10 MG: 5 TABLET ORAL at 20:13

## 2019-10-01 RX ADMIN — MULTIPLE VITAMINS W/ MINERALS TAB 1 TABLET: TAB at 07:57

## 2019-10-01 RX ADMIN — TRAZODONE HYDROCHLORIDE 100 MG: 100 TABLET ORAL at 20:13

## 2019-10-01 RX ADMIN — BENZTROPINE MESYLATE 1 MG: 1 TABLET ORAL at 20:13

## 2019-10-01 RX ADMIN — LEVOTHYROXINE SODIUM 50 MCG: 50 TABLET ORAL at 07:57

## 2019-10-01 RX ADMIN — Medication: at 07:57

## 2019-10-01 RX ADMIN — ASPIRIN 81 MG 81 MG: 81 TABLET ORAL at 07:57

## 2019-10-01 RX ADMIN — BENZTROPINE MESYLATE 1 MG: 1 TABLET ORAL at 07:57

## 2019-10-01 ASSESSMENT — ACTIVITIES OF DAILY LIVING (ADL)
DRESS: STREET CLOTHES
LAUNDRY: WITH SUPERVISION
ORAL_HYGIENE: INDEPENDENT
DRESS: SCRUBS (BEHAVIORAL HEALTH)
HYGIENE/GROOMING: INDEPENDENT
LAUNDRY: WITH SUPERVISION
HYGIENE/GROOMING: INDEPENDENT
ORAL_HYGIENE: INDEPENDENT

## 2019-10-01 ASSESSMENT — MIFFLIN-ST. JEOR: SCORE: 1749.22

## 2019-10-01 NOTE — PLAN OF CARE
Shift Update: Pt mood is calm. Thought process is fair. Insight is poor. Pt denies suicidal ideation. Pt denies hearing voices and  has a calm affect,med compliant, listens to head phones. Pt states he is looking forward to going home.

## 2019-10-01 NOTE — PLAN OF CARE
Pt presents isolative and withdrawn only coming out of room to use the bathroom and return meal tray. Affect is calm and flat. Pt engages with staff when prompted but does not brighten upon approach. Did state he is looking forward to discharge on 11/1 (Tuesday).

## 2019-10-01 NOTE — PROGRESS NOTES
Had discussed discharge plans with Rhiannon in the morning. When discussed with Dr. Conklin, there were changes in medications and regarding ride home. Spoke with Michelle Arora R.N. for the team- who was actually in [place]. She was clear about the injection. Left messages for 3 different team members regarding the injection and the ride home issue. At 4:30 pm no one from the team had called back to clarify these issues, so discharge tomorrow is the plan.

## 2019-10-01 NOTE — DISCHARGE SUMMARY
"[facility] Psychiatric Discharge Summary      DATE OF ADMISSION: 9/11/2019     DATE OF DISCHARGE: 10/02/19    PRIMARY CARE PHYSICIAN: No Ref-Primary, Physician    IDENTIFICATION: For history, see dictation by Dr. Louie on 9/12/19. For physical summary, see dictation by Trista Fuller PA-C on 9/13/19.     HOSPITAL COURSE:   Ger Bashir reportedly was lingering at a [employer] restaurant prior to admission without a shirt on and was said to have been muttering to himself. Consequently, the [place] Police Department was called and took the patient back to his apartment, which was then found to be in a state of disarray. He was said to have no food at home. On further inquiry, he told the police that the [employer] has him under surveillance. He initially was willing to come to the hospital, but upon presentation, he refused to get dressed or follow directions. He was noted to be somewhat disgruntled and disorganized in his manner of speaking. ED records suggest that he was \"talking about Looney Tunes and [place] characters.\" He indicated to them that he wanted a CAT scan of his head because his \"brain is at a 3.\" On direct interview with psychiatrist Dr. Lizbeth Louie, the patient was a very poor historian who got easily agitated with questioning. He did not provide much by way of the information, but did allude to the [employer] persecuting him. He could not explain the reason why he had been partially clad at the [employer] and when pressed for more information, he became rather dismissive. Of note, he was recently on admission at [facility] and was discharged with diagnoses of schizoaffective disorder, bipolar type, tobacco use disorder and alcohol use disorder in sustained remission. He reportedly has a history of noncompliance with medications, has had multiple court commitments and Wu orders. During his last hospitalization at [facility] for " about 3 weeks, he was discharged with his Wu order extended and he remains on civil commitment. He is currently Wu'd to take Haldol, Zyprexa, Clozaril and Invega. The patient was supposed to be taking Haldol Decanoate 100 mg q.2 weeks, Cogentin 1 mg twice a day and Haldol 10 mg daily at bedtime, but the patient reported that he does not like taking Haldol. It appeared he had not been compliant with his prescribed medications and it was unclear when he last received his Haldol Decanoate. He did admit that he has had multiple medication trials in the past and felt he did best with Zyprexa. Dr. Louie believed that patient would benefit from psychiatric hospitalization and stabilization on [facility]. It should be noted that the treatment team was able to solidify that patient is no longer under legal commitment, however is currently followed [facility]. [facility] that they believe that patient would benefit most from injection medications due to patient being noncompliant with oral medications in the past. From this, Dr. Conklin ordered Invega Sustenna 156mg injection. Patient agreed to be compliant with this injection, thus it was administered on 9/18/19. On 9/23, the patients [facility] nurse, Michelle Arora, was present during interview with Dr. Conklin. She had stressed that she did not deem patient appropriate for discharge at that time, being concerned that patient had not been communicating appropriately while inpatient. Michelle noted that patient has a tendency to declare he is doing well while in the hospital, but then goes home and denies medications, specifically injections. Dr. Conklin suggested that patient be administered a longer last form of Invega injection in the future (Invega Trinza). Both mei and Michelle were in agreement with this idea.     DISCHARGE MENTAL STATUS EXAMINATION:  Appearance Sitting in chair, dressed in hospital scrubs.  "Appears stated age.   Attitude Cooperative   Orientation Oriented to person, place, time   Eye Contact Fair    Speech Regular rate, rhythm, volume and tone   Language Normal   Psychomotor Behavior Normal   Mood Good    Affect Pleasant    Thought Process Impaired   Associations Intact   Thought Content Patient is currently negative for suicidal ideation, negative for plan or intent, able to contract no self harm and identify barriers to suicide. Positive for psychosis.     Fund of Knowledge Impaired   Insight Impaired   Judgement Impaired   Attention Span & Concentration Poor   Recent & Remote Memory Intact   Gait Normal   Muscle Tone Intact       LABORATORY DATA:    Refer to hospitalist admission dictation.  No results found for this or any previous visit (from the past 24 hour(s)).     BP 99/65   Pulse 67   Temp 97.4  F (36.3  C) (Oral)   Resp 18   Ht 1.778 m (5' 10\")   Wt 97.3 kg (214 lb 8 oz)   SpO2 99%   BMI 30.78 kg/m       DISCHARGE MEDICATIONS:      Review of your medicines      UNREVIEWED medicines. Ask your doctor about these medicines      Dose / Directions   aspirin 81 MG chewable tablet  Commonly known as:  ASA  Used for:  Paranoid schizophrenia (H)      Dose:  81 mg  Take 1 tablet (81 mg) by mouth daily  Quantity:  30 tablet  Refills:  1     benztropine 1 MG tablet  Commonly known as:  COGENTIN  Used for:  Paranoid schizophrenia (H)      Dose:  1 mg  Take 1 tablet (1 mg) by mouth 2 times daily  Quantity:  60 tablet  Refills:  1     eucerin cream  Used for:  Paranoid schizophrenia (H)      Apply topically 2 times daily  Quantity:  2 g  Refills:  1     haloperidol 5 MG tablet  Commonly known as:  HALDOL  Used for:  Paranoid schizophrenia (H)      Dose:  10 mg  Take 2 tablets (10 mg) by mouth At Bedtime  Quantity:  30 tablet  Refills:  1     haloperidol decanoate 100 MG/ML injection  Commonly known as:  HALDOL DECANOATE  Used for:  Paranoid schizophrenia (H)      Dose:  100 mg  Inject 100 mg into " the muscle every 14 days Next dose due on 5/6/2019  Refills:  0     levothyroxine 50 MCG tablet  Commonly known as:  SYNTHROID/LEVOTHROID  Used for:  Paranoid schizophrenia (H)      Dose:  50 mcg  Take 1 tablet (50 mcg) by mouth daily  Quantity:  30 tablet  Refills:  1     multivitamin w/minerals tablet  Used for:  Paranoid schizophrenia (H)      Dose:  1 tablet  Take 1 tablet by mouth daily  Quantity:  30 tablet  Refills:  1     traZODone 100 MG tablet  Commonly known as:  DESYREL  Used for:  Paranoid schizophrenia (H)  Ask about: Which instructions should I use?      Dose:  100 mg  Take 1 tablet (100 mg) by mouth At Bedtime  Quantity:  30 tablet  Refills:  1            DISCHARGE DIAGNOSES:  1. Schizoaffective disorder, bipolar type, multiple episodes in acute phase  2. Alcohol Use Disorder, in sustained remission     DISCHARGE PLAN:  1. Continue medications: Haldol 10mg at bedtime and Trazodone 100mg at bedtime  2. Invega Trinza injection due on 10/18/19  3. [facility]. Your ACT team nurse is Michelle Arora RN. Your ACT team  is Merced Velasquez.    DISCHARGE FOLLOW-UP:  See Reconciliation Note     Attestation:   I, Luda Patino, am serving as a scribe on 10/01/2019 to document services personally performed by Dominguez Conklin MD based on my observations and the provider's statements to me.      Patient ID:    Name: Ger Bashir MRN: [id number]  Admission: 9/11/2019  YOB: 1950

## 2019-10-02 VITALS
BODY MASS INDEX: 30.71 KG/M2 | RESPIRATION RATE: 17 BRPM | DIASTOLIC BLOOD PRESSURE: 59 MMHG | HEART RATE: 64 BPM | WEIGHT: 214.5 LBS | OXYGEN SATURATION: 97 % | SYSTOLIC BLOOD PRESSURE: 87 MMHG | TEMPERATURE: 97.5 F | HEIGHT: 70 IN

## 2019-10-02 PROCEDURE — 25000132 ZZH RX MED GY IP 250 OP 250 PS 637: Performed by: PSYCHIATRY & NEUROLOGY

## 2019-10-02 RX ORDER — HALOPERIDOL 5 MG/1
10 TABLET ORAL AT BEDTIME
Qty: 30 TABLET | Refills: 1 | Status: ON HOLD | OUTPATIENT
Start: 2019-10-02 | End: 2019-12-13

## 2019-10-02 RX ORDER — ASPIRIN 81 MG/1
81 TABLET, CHEWABLE ORAL DAILY
Qty: 30 TABLET | Refills: 0 | Status: ON HOLD | OUTPATIENT
Start: 2019-10-02 | End: 2019-11-27

## 2019-10-02 RX ORDER — BENZTROPINE MESYLATE 1 MG/1
1 TABLET ORAL 2 TIMES DAILY
Qty: 60 TABLET | Refills: 0 | Status: ON HOLD | OUTPATIENT
Start: 2019-10-02 | End: 2019-12-13

## 2019-10-02 RX ORDER — TRAZODONE HYDROCHLORIDE 100 MG/1
100 TABLET ORAL AT BEDTIME
Qty: 30 TABLET | Refills: 0 | Status: ON HOLD | OUTPATIENT
Start: 2019-10-02 | End: 2019-12-13

## 2019-10-02 RX ORDER — LEVOTHYROXINE SODIUM 50 UG/1
50 TABLET ORAL DAILY
Qty: 30 TABLET | Refills: 0 | Status: ON HOLD | OUTPATIENT
Start: 2019-10-02 | End: 2019-12-13

## 2019-10-02 RX ORDER — MULTIPLE VITAMINS W/ MINERALS TAB 9MG-400MCG
1 TAB ORAL DAILY
Qty: 30 TABLET | Refills: 0 | Status: ON HOLD | OUTPATIENT
Start: 2019-10-02 | End: 2019-12-13

## 2019-10-02 RX ADMIN — LEVOTHYROXINE SODIUM 50 MCG: 50 TABLET ORAL at 07:49

## 2019-10-02 RX ADMIN — BENZTROPINE MESYLATE 1 MG: 1 TABLET ORAL at 07:50

## 2019-10-02 RX ADMIN — ASPIRIN 81 MG 81 MG: 81 TABLET ORAL at 07:50

## 2019-10-02 RX ADMIN — MULTIPLE VITAMINS W/ MINERALS TAB 1 TABLET: TAB at 07:49

## 2019-10-02 NOTE — PROGRESS NOTES
Spoke with Rhiannon and clarified discharge plan. She is looking into transportation back home for patient and will call back with that information.

## 2019-10-02 NOTE — PROGRESS NOTES
Patient denies suicidal ideation.  Reviewed discharge instructions with the patient and with ACT TEAM member that picked up the patient.  Patient and team member have a copy of the AVS and both verbalize understanding of them. Discharged to home with the staff at 1130.  Belongings returned to patient.

## 2019-10-02 NOTE — PLAN OF CARE
Shift Update: Pt mood is calm, affect is tense. Thought process is impaired. Insight is poor. Pt denied suicidal ideation. Spent most of his time isolative in the bedroom. Was med and food compliant.

## 2019-10-12 ENCOUNTER — HOSPITAL ENCOUNTER (INPATIENT)
Facility: CLINIC | Age: 69
End: 2019-10-12
Attending: PSYCHIATRY & NEUROLOGY | Admitting: PSYCHIATRY & NEUROLOGY
Payer: COMMERCIAL

## 2019-10-12 ENCOUNTER — HOSPITAL ENCOUNTER (EMERGENCY)
Facility: CLINIC | Age: 69
Discharge: PSYCHIATRIC OR SUBSTANCE USE FACILITY | End: 2019-10-12
Attending: EMERGENCY MEDICINE | Admitting: EMERGENCY MEDICINE
Payer: COMMERCIAL

## 2019-10-12 VITALS
TEMPERATURE: 98.5 F | BODY MASS INDEX: 30.06 KG/M2 | OXYGEN SATURATION: 94 % | HEIGHT: 70 IN | WEIGHT: 210 LBS | RESPIRATION RATE: 18 BRPM | DIASTOLIC BLOOD PRESSURE: 66 MMHG | HEART RATE: 64 BPM | SYSTOLIC BLOOD PRESSURE: 91 MMHG

## 2019-10-12 DIAGNOSIS — F29 PSYCHOSIS, UNSPECIFIED PSYCHOSIS TYPE (H): ICD-10-CM

## 2019-10-12 PROBLEM — F17.200 TOBACCO USE DISORDER: Status: ACTIVE | Noted: 2018-02-19

## 2019-10-12 LAB — ALCOHOL BREATH TEST: 0 (ref 0–0.01)

## 2019-10-12 PROCEDURE — 96372 THER/PROPH/DIAG INJ SC/IM: CPT

## 2019-10-12 PROCEDURE — 99285 EMERGENCY DEPT VISIT HI MDM: CPT | Mod: 25

## 2019-10-12 PROCEDURE — 25000128 H RX IP 250 OP 636: Performed by: EMERGENCY MEDICINE

## 2019-10-12 PROCEDURE — 90791 PSYCH DIAGNOSTIC EVALUATION: CPT

## 2019-10-12 PROCEDURE — 82075 ASSAY OF BREATH ETHANOL: CPT

## 2019-10-12 RX ORDER — OLANZAPINE 10 MG/2ML
5 INJECTION, POWDER, FOR SOLUTION INTRAMUSCULAR 2 TIMES DAILY PRN
Status: DISCONTINUED | OUTPATIENT
Start: 2019-10-12 | End: 2019-10-13 | Stop reason: HOSPADM

## 2019-10-12 RX ORDER — OLANZAPINE 5 MG/1
5 TABLET ORAL 2 TIMES DAILY
Status: DISCONTINUED | OUTPATIENT
Start: 2019-10-12 | End: 2019-10-13 | Stop reason: HOSPADM

## 2019-10-12 RX ADMIN — OLANZAPINE 5 MG: 10 INJECTION, POWDER, LYOPHILIZED, FOR SOLUTION INTRAMUSCULAR at 14:12

## 2019-10-12 ASSESSMENT — ENCOUNTER SYMPTOMS: VOMITING: 0

## 2019-10-12 ASSESSMENT — MIFFLIN-ST. JEOR: SCORE: 1728.8

## 2019-10-12 NOTE — ED NOTES
[facility] [place] I called to give report - Rn stated that I needed to call central intake.  Per DEC and central intake - 12 is going back and fourth with supervisors about this placement stating that they have high acuity at 12.

## 2019-10-12 NOTE — ED NOTES
Bed: ED18  Expected date: 10/12/19  Expected time: 11:36 AM  Means of arrival: Ambulance  Comments:  514 69m erratic behavior  ETA 1142

## 2019-10-12 NOTE — ED PROVIDER NOTES
Decompensated schizophrenia.  Awaiting admission. Medically cleared. DONNIE.  Has PRN zyprexa ordered.  Patient was sleeping resting in bed through the duration of my shift after PRN Zyprexa given.  He was transferred via [facility] to [facility] for inpatient psychiatric stabilization.    Jacoby Joiner MD  [employer]  2:58 PM 10/12/19        Jacoby Joiner MD  10/12/19 3151

## 2019-10-12 NOTE — ED PROVIDER NOTES
"  History     Chief Complaint:  Psychiatric Evaluation     The history is provided by the EMS personnel. The history is limited by the condition of the patient.      Ger Bashir is a 69 year old male with a history of schizophrenia and psychosis, who presents via EMS for a psychiatric evaluation. Per EMS, police were called to the patient's apartment after he was banging on other residents doors and harassing residents. After the second call to police, EMS was called to transport the patient here for evaluation. On EMS arrival, the patient's apartment was found to be in disarray, with a strobe light going off and a mattress on the floor of his apartment. Patient did not resist coming into the ED today, though refuses to wear scrubs.     Here, he states that police made an \"error in judgement\" and is asking for a psychiatrist. He reports only taking low dose aspirin daily. He endorses tobacco use and states he consumed [employer] 45 minutes ago, but denies street drugs or marijuana use. Denies vomiting. He denies any other concerns. History is difficult to obtain due to patient's psychosis and flight of ideas and disorganization.     To note, the patient was recently admitted to the hospital on 9/11/19 and discharged on 10/2/19 for mental health.     Allergies:  Haloperidol     Medications:    Aspirin  Cogentin  Haldol  Synthroid  Desyrel     Past Medical History:    Schizophrenia  Psychosis   Alcohol abuse  GERD  Morbid obesity   Dyslipidemia   Hypothyroidism     Past Surgical History:    ACL reconstruction     Family History:    Bipolar     Social History:  Smoking status: current everyday smoker   Alcohol use: no   Drug use: no   PCP: Physician No Ref-Primary  Marital Status:  Single      Review of Systems   Unable to perform ROS: Psychiatric disorder   Gastrointestinal: Negative for vomiting.   Psychiatric/Behavioral: Positive for behavioral problems.       Physical Exam     Patient Vitals for the " "past 24 hrs:   BP Temp Temp src Pulse Resp SpO2 Height Weight   10/12/19 1156 126/75 98.5  F (36.9  C) Oral 103 18 99 % 1.778 m (5' 10\") 95.3 kg (210 lb)        Physical Exam  General: Disheveled, unkempt. Nontoxic.   Head:  Scalp, face, and head appear normal, atraumatic   Eyes:  Pupils are equal, round    Conjunctivae non-injected and sclerae white  ENT:    The external nose is normal    Pinnae are normal    The oropharynx is normal, mucous membranes moist    Uvula is in the midline  Neck:  Normal range of motion    There is no rigidity noted    Trachea is in the midline  CV:  Regular rate and rhythm     Normal S1/S2, no S3/S4    No murmur or rub  Resp:  Lungs are clear and equal bilaterally    There is no tachypnea    No increased work of breathing    No rales, wheezing, or rhonchi  GI:  Abdomen is soft, no rigidity or guarding    No distension, or mass    No tenderness or rebound tenderness   MS:  Normal muscular tone. Full painless ROM of all extremities. Extremities non tender to palpation and atraumatic.     Symmetric motor strength    No lower extremity edema  Skin:  No rash or acute skin lesions noted  Neuro: Awake and alert. No facial droop. Strength 5/5 throughout. Gait normal.     Speech is normal and fluent    Moves all extremities spontaneously  Psych:  Bizarre affect. Patient is grossly disorganized. Significant flight of ideas. Has no insight into current condition. Unable to maintain a train of thought. Patient does not respond to questions about suicidal or homicidal ideation. No definite evidence of response to internal stimuli. Mild psychomotor agitation though patient is redirectable.      Emergency Department Course     Laboratory:  Alcohol POCT: 0.00    Drug abuse screen 77 urine: pending       Emergency Department Course:  1147: Nursing notes and vitals reviewed. I performed an exam of the patient as documented above.     The patient was placed on a hold.     DEC evaluated the patient. "     The patient provided a urine sample here in the emergency department. This was sent for laboratory testing, findings above.      1320: I rechecked the patient and discussed the results of his workup thus far.     The patient will be signed out to my colleague pending psych admission.     Impression & Plan      Medical Decision Making:  Ger Bashir is a 69 year old male with a history of psychiatric disease as noted above, recently discharged from the hospital where he was treated for psychosis, who presents today with completely disorganized behavior, kang psychosis, and unable to care for himself. On my interview with the patient, he was really unable to produce any appropriate thoughts. He was unable to follow a train of thought and had significant flight of ideas and inappropriate responses. Patient was redirectable but gets mildly agitated at times. Patient was previously treated with injection invega when he was in the hospital and has not since had a repeat dose of this. The patient was evaluated by DEC who agreed the patient would require inpatient admission due to his severe disorganization. The patient was placed on a hold by myself. PRN medications for agitation were ordered and the patient was awaiting inpatient psychiatry placement. The patient does not have any evidence of traumatic injury or of other primary organic medical process. The patient was signed out to my partner pending psychiatry admission.     Critical Care time:  none    Diagnosis:    ICD-10-CM    1. Psychosis, unspecified psychosis type (H) F29        Disposition:  Signed out to the oncoming physician.     Melissa NGUYEN, am serving as a scribe at 11:47 AM on 10/12/2019 to document services personally performed by Chris Landaverde MD based on my observations and the provider's statements to me.       Melissa Ingram  10/12/2019    EMERGENCY DEPARTMENT       Chris Landaverde MD  10/12/19 2020

## 2019-10-13 ENCOUNTER — HOSPITAL ENCOUNTER (INPATIENT)
Facility: HOSPITAL | Age: 69
LOS: 17 days | Discharge: HOME | DRG: 885 | End: 2019-10-30
Attending: PSYCHIATRY & NEUROLOGY | Admitting: PSYCHIATRY & NEUROLOGY
Payer: COMMERCIAL

## 2019-10-13 PROCEDURE — 25000128 H RX IP 250 OP 636: Performed by: NURSE PRACTITIONER

## 2019-10-13 PROCEDURE — 99223 1ST HOSP IP/OBS HIGH 75: CPT | Mod: AI | Performed by: NURSE PRACTITIONER

## 2019-10-13 PROCEDURE — 25000132 ZZH RX MED GY IP 250 OP 250 PS 637: Performed by: NURSE PRACTITIONER

## 2019-10-13 PROCEDURE — 12400000 ZZH R&B MH

## 2019-10-13 RX ORDER — LORAZEPAM 2 MG/ML
1 INJECTION INTRAMUSCULAR EVERY 6 HOURS PRN
Status: DISCONTINUED | OUTPATIENT
Start: 2019-10-13 | End: 2019-10-30 | Stop reason: HOSPADM

## 2019-10-13 RX ORDER — HALOPERIDOL 5 MG/ML
10 INJECTION INTRAMUSCULAR EVERY 6 HOURS PRN
Status: DISCONTINUED | OUTPATIENT
Start: 2019-10-13 | End: 2019-10-16

## 2019-10-13 RX ORDER — LEVOTHYROXINE SODIUM 25 UG/1
50 TABLET ORAL
Status: DISCONTINUED | OUTPATIENT
Start: 2019-10-14 | End: 2019-10-30 | Stop reason: HOSPADM

## 2019-10-13 RX ORDER — LORAZEPAM 1 MG/1
1 TABLET ORAL EVERY 6 HOURS PRN
Status: DISCONTINUED | OUTPATIENT
Start: 2019-10-13 | End: 2019-10-30 | Stop reason: HOSPADM

## 2019-10-13 RX ORDER — LORAZEPAM 2 MG/ML
1 INJECTION INTRAMUSCULAR EVERY 6 HOURS PRN
Status: DISCONTINUED | OUTPATIENT
Start: 2019-10-13 | End: 2019-10-13

## 2019-10-13 RX ORDER — ALUMINA, MAGNESIA, AND SIMETHICONE 2400; 2400; 240 MG/30ML; MG/30ML; MG/30ML
30 SUSPENSION ORAL EVERY 4 HOURS PRN
Status: DISCONTINUED | OUTPATIENT
Start: 2019-10-13 | End: 2019-10-30 | Stop reason: HOSPADM

## 2019-10-13 RX ORDER — OLANZAPINE 10 MG/1
10 TABLET, ORALLY DISINTEGRATING ORAL 3 TIMES DAILY PRN
Status: DISCONTINUED | OUTPATIENT
Start: 2019-10-13 | End: 2019-10-16

## 2019-10-13 RX ORDER — TRAZODONE HYDROCHLORIDE 100 MG/1
100 TABLET ORAL AT BEDTIME
Status: DISCONTINUED | OUTPATIENT
Start: 2019-10-13 | End: 2019-10-30 | Stop reason: HOSPADM

## 2019-10-13 RX ORDER — HYDROXYZINE HYDROCHLORIDE 25 MG/1
25-50 TABLET, FILM COATED ORAL EVERY 4 HOURS PRN
Status: DISCONTINUED | OUTPATIENT
Start: 2019-10-13 | End: 2019-10-30 | Stop reason: HOSPADM

## 2019-10-13 RX ORDER — BENZTROPINE MESYLATE 1 MG/1
1 TABLET ORAL 2 TIMES DAILY
Status: DISCONTINUED | OUTPATIENT
Start: 2019-10-13 | End: 2019-10-30 | Stop reason: HOSPADM

## 2019-10-13 RX ORDER — ACETAMINOPHEN 325 MG/1
650 TABLET ORAL EVERY 4 HOURS PRN
Status: DISCONTINUED | OUTPATIENT
Start: 2019-10-13 | End: 2019-10-30 | Stop reason: HOSPADM

## 2019-10-13 RX ORDER — HALOPERIDOL 10 MG/1
10 TABLET ORAL EVERY 6 HOURS PRN
Status: DISCONTINUED | OUTPATIENT
Start: 2019-10-13 | End: 2019-10-16

## 2019-10-13 RX ORDER — MULTIPLE VITAMINS W/ MINERALS TAB 9MG-400MCG
1 TAB ORAL DAILY
Status: DISCONTINUED | OUTPATIENT
Start: 2019-10-14 | End: 2019-10-30 | Stop reason: HOSPADM

## 2019-10-13 RX ORDER — HALOPERIDOL 10 MG/1
10 TABLET ORAL EVERY 6 HOURS PRN
Status: DISCONTINUED | OUTPATIENT
Start: 2019-10-13 | End: 2019-10-13

## 2019-10-13 RX ORDER — OLANZAPINE 10 MG/2ML
10 INJECTION, POWDER, FOR SOLUTION INTRAMUSCULAR 3 TIMES DAILY PRN
Status: DISCONTINUED | OUTPATIENT
Start: 2019-10-13 | End: 2019-10-16

## 2019-10-13 RX ORDER — HALOPERIDOL 10 MG/1
10 TABLET ORAL AT BEDTIME
Status: DISCONTINUED | OUTPATIENT
Start: 2019-10-13 | End: 2019-10-30 | Stop reason: HOSPADM

## 2019-10-13 RX ORDER — ASPIRIN 81 MG/1
81 TABLET, CHEWABLE ORAL DAILY
Status: DISCONTINUED | OUTPATIENT
Start: 2019-10-14 | End: 2019-10-30 | Stop reason: HOSPADM

## 2019-10-13 RX ADMIN — OLANZAPINE 10 MG: 10 TABLET, ORALLY DISINTEGRATING ORAL at 16:06

## 2019-10-13 RX ADMIN — HALOPERIDOL LACTATE 10 MG: 5 INJECTION, SOLUTION INTRAMUSCULAR at 10:35

## 2019-10-13 RX ADMIN — LORAZEPAM 1 MG: 2 INJECTION INTRAMUSCULAR; INTRAVENOUS at 10:35

## 2019-10-13 RX ADMIN — LORAZEPAM 1 MG: 2 INJECTION INTRAMUSCULAR; INTRAVENOUS at 19:20

## 2019-10-13 RX ADMIN — ALUMINUM HYDROXIDE, MAGNESIUM HYDROXIDE, AND DIMETHICONE 30 ML: 400; 400; 40 SUSPENSION ORAL at 19:05

## 2019-10-13 RX ADMIN — OLANZAPINE 10 MG: 10 TABLET, ORALLY DISINTEGRATING ORAL at 04:06

## 2019-10-13 RX ADMIN — HALOPERIDOL LACTATE 10 MG: 5 INJECTION, SOLUTION INTRAMUSCULAR at 19:20

## 2019-10-13 ASSESSMENT — ACTIVITIES OF DAILY LIVING (ADL)
BATHING: 0-->INDEPENDENT
AMBULATION: 0-->INDEPENDENT
DRESS: 0-->INDEPENDENT
DRESS: INDEPENDENT
COGNITION: 2 - DIFFICULTY WITH ORGANIZING THOUGHTS
RETIRED_COMMUNICATION: 0-->UNDERSTANDS/COMMUNICATES WITHOUT DIFFICULTY
FALL_HISTORY_WITHIN_LAST_SIX_MONTHS: NO
WHICH_OF_THE_ABOVE_FUNCTIONAL_RISKS_HAD_A_RECENT_ONSET_OR_CHANGE?: COGNITION
SWALLOWING: 0-->SWALLOWS FOODS/LIQUIDS WITHOUT DIFFICULTY
RETIRED_EATING: 0-->INDEPENDENT
HYGIENE/GROOMING: INDEPENDENT
ORAL_HYGIENE: INDEPENDENT
TRANSFERRING: 0-->INDEPENDENT
TOILETING: 0-->INDEPENDENT

## 2019-10-13 ASSESSMENT — MIFFLIN-ST. JEOR: SCORE: 1717.92

## 2019-10-13 NOTE — PLAN OF CARE
"ADMISSION NOTE    Reason for admission: bizarre behavior, psychosis.  Safety concerns: high fall risk.  Risk for or history of violence: unknown at this time.   Full skin assessment: completed, no open areas, no tattoos.     Patient arrived on unit from [facility] accompanied by security and ambulance drivers on 10/13/2019  1:48 AM.   Status on arrival: disheveled, malodorous, disorganized, labile, refusing to participate in assessment  BP 93/62   Temp 98.4  F (36.9  C) (Tympanic)   Resp 16   Ht 1.778 m (5' 10\")   Wt 94.2 kg (207 lb 9.6 oz)   SpO2 (!) 91%   BMI 29.79 kg/m    Patient given tour of unit and Welcome to  unit papers given to patient, wanding completed, belongings inventoried, and admission assessment completed.   Patient's legal status on arrival is 72 hour hold ending 10/17/19 at 00:01am. Appropriate legal rights discussed with and copy given to patient. Patient Bill of Rights discussed with and copy given to patient.   Patient denies SI, HI, and thoughts of self harm and contracts for safety while on unit.      Admit Note:  Patient admitted to the unit and was at cooperative at times but then at times became uncooperative. He laughed at one moment and then would instantly become agitated then shortly after start to laugh. He said he smokes a lot and when offered a nicotine patch, he said \"fuck you.\" He said also responded this way when offered gum. Patient was told he could sit on his bed during admit questions and he jumped on the bed and laid down. Patient moves quickly and impulsively. He has poor boundaries and will quickly walk up to staff and talk very close to them. At one time, he was observed punching the air in the direction of staff but did not seem agitated. Patient has one visible tooth but when asked if he requires a special diet he said \"fuck no.\" He refused to sign papers and refused to answer questions but would turn it around and questioned this writer. Patient " said he was hungry and was brought a sandwich, pudding and juice. He was asked for his watch and became very irritable. He gave staff the watch and then slammed his door and threw all his food around the room.     04:20 Update: Patient was observed pounding on the glass of the ICU to the nurses station. Just before this he had been yelling but staff couldn't hear what he was saying. Patient agreed to take a 10mg Zyprexa PO. He said he appreciated not having to get a shot. He also had spilled his apple juice all over the floor but had no recollection of it.     06:00 Update: Patient slept very little the shift. Patient slept approximate 1 hour with position changes noted. No complaints of pain and no requests for PRNs. 15 minute checks were completed throughout the night.       07:30 Update: Face to face end of shift report communicated to oncoming RN. Will verbalize that patient continues to be a high fall risk.    Problem: Adult Behavioral Health Plan of Care  Goal: Patient-Specific Goal (Individualization)  Description  Patient will sleep 6-8 hours each night.  Patient will maintain daily ADLs without prompting.     Weaning Care Plan: 10/13/19 Patient is not appropriate to wean at this time. Treatment team to reassess daily.     Outcome: No Change     Problem: Thought Process Alteration  Goal: Optimal Thought Clarity  Description  Patient will participate in nursing assessment daily.  Patient will show some insight into mental health symptoms.  Patient will remain calm and have no impulsive, aggressive outbursts.    Outcome: No Change     Problem: Fall Injury Risk  Goal: Absence of Fall and Fall-Related Injury  Description  Patient will be free of injury from falls while on the unit.    Outcome: No Change

## 2019-10-13 NOTE — PROGRESS NOTES
The original documentation of this note has been permanently suppressed in response to a request for correction.

## 2019-10-13 NOTE — PLAN OF CARE
"  Problem: Adult Behavioral Health Plan of Care  Goal: Patient-Specific Goal (Individualization)  Description  Patient will sleep 6-8 hours each night.  Patient will maintain daily ADLs without prompting.     Weaning Care Plan: 10/13/19 Patient is not appropriate to wean at this time. Treatment team to reassess daily.     10/13/2019 1004 by Jillian Cardenas RN  Outcome: Improving  Note:   Shift Summery:  Patient is up on the unit. He has a labile mood and hyperactive behaviors. He had to be redirected several times from standing on the furniture. Ate well for breakfast meal but took exception to the paper spoon. Began yelling loudly at the television and patient is difficulty to understand as he has no teeth. Patient changing mood and behavior refusing to do something and then agrees to. Making numerous requests of staff. When approached by writer he said \"go away and come back later, I'm busy.\"    1035 Patient agreeable to Haldol 10 mg IM and Ativan 1 mg IM RUOG for high level anxiety and hypomanic behaviors causing risk for harm to self.    1200 cup for urine collection given with instructions on the test. Patient verbalized understanding.    Face to face end of shift report communicated to evening shift RN.     Jillian Cardenas RN  10/13/2019  2:46 PM        Patient's Stated Goal for Shift:  \"no stated goal\"    Goal Status:  Not Met       Problem: Thought Process Alteration  Goal: Optimal Thought Clarity  Description  Patient will participate in nursing assessment daily.  Patient will show some insight into mental health symptoms.  Patient will remain calm and have no impulsive, aggressive outbursts.    10/13/2019 1004 by Jillian Cardenas RN  Outcome: No Change     Problem: Fall Injury Risk  Goal: Absence of Fall and Fall-Related Injury  Description  Patient will be free of injury from falls while on the unit.    10/13/2019 1004 by Jillian Cardenas RN  Outcome: No Change     "

## 2019-10-13 NOTE — ED NOTES
Admit to: [facility] in [place]    Unit: [address]    Phone No.: [phone]    Accepting MD: Dr. Mathur    Report Called: Yes    Ayana Duong RN,.......................................... 10/12/2019   8:38 PM

## 2019-10-13 NOTE — ED NOTES
Pt awake and watching tv. Advised that he will be admitted to Facility ([facility]) in [place]. Pt okay with this plan but declined to sign EMTALA.   At this time pt adamantly refusing to give a urine sample.  Report called and transport.  Ayana Duong RN,.......................................... 10/12/2019   9:00 PM

## 2019-10-13 NOTE — PROVIDER NOTIFICATION
10/13/19 0326   Patient Belongings   Did you bring any home meds/supplements to the hospital?  No   Patient Belongings locker;sent to security per site process   Patient Belongings Remaining with Patient none   Patient Belongings Put in Hospital Secure Location (Security or Locker, etc.) watch;clothing;keys;shoes   Belongings Search Yes   Clothing Search Yes   Second Staff Parviz [employer]     Blue athletic pants, Red Polo Shirt with black thin stripes, white pair of socks, white tennis shoes    Sent to safe:   Black [employer] watch, 3 keys

## 2019-10-13 NOTE — H&P
"Psychiatric Eval/H&P  Patient Name: Ger Bashir   YOB: 1950  Age: 69 year old  [id number]    Primary Physician: No Ref-Primary, Physician   Completed By: Arielle Walker NP     CC: Admitted for psychiatric decompensation    Patient is unable to complete assessment due to level of disorganization. Information is obtained from a brief interview with the patient and review of available records.    HPI   Ger Bashir is a 69 year old single  male who was brought to the [facility] ED by EMS for evaluation of psychosis. Brought in by EMS after police had been called to his apartment building twice due to him disrupting his neighbors. He was going around and banging on doors and harassing other residents. His apartment was found to be in disarray with a strobe light going off and mattress in the middle of the floor. Was recently hospitalized at [facility] and discharged 10/2/19. He has not been compliant with medications since discharge. Noncompliant with assessments in the ED, was verbally agitated when asked numerous questions. Was disorganized and unable to finish his thoughts or track conversation. Was placed on a hold and transferred to behavioral health for further evaluation.     He has been labile since arriving on the unit, observed to be hyperactive and impulsive. Has had to be redirected several times to not stand on the furniture and maintain appropriate boundaries with others. Has been yelling out at the television. He did agree to receive IM Haldol and Ativan this morning for increased agitation and unpredictable behavior. He is laying in bed when I go to see him today. He is very difficult to understand and speech is very mumbled. He does state that his brain is \"an [employer] computer at a 27, and yours is a 3\". He then lifts his hand and gestures to the door \"now get the hell out\". He is unable to provide any meaningful information except to tell me that he " does not take any medications. It appears that on 10/2/19 he was discharged on Haldol 10 mg at bedtime, Cogentin 1 mg BID, and haldol decanoate injection. It is unclear when he received his last injection, will need to clarify tomorrow. He apparently developed a hand tremor when on Haldol 10 mg BID which is said to have greatly improved once dose was cut to just once at bedtime and Cogentin was added.               Medical Review of Systems:   Medical History and ROS  Prior to Admission medications    Medication Sig Start Date End Date Taking? Authorizing Provider   aspirin (ASA) 81 MG chewable tablet Take 1 tablet (81 mg) by mouth daily 10/2/19 11/1/19 Yes Dominguez Conklin MD   benztropine (COGENTIN) 1 MG tablet Take 1 tablet (1 mg) by mouth 2 times daily 10/2/19 11/1/19 Yes Dominguez Conklin MD   haloperidol (HALDOL) 5 MG tablet Take 2 tablets (10 mg) by mouth At Bedtime 10/2/19  Yes Dominguez Conklin MD   levothyroxine (SYNTHROID/LEVOTHROID) 50 MCG tablet Take 1 tablet (50 mcg) by mouth daily 10/2/19  Yes Dominguez Conklin MD   multivitamin w/minerals (THERA-VIT-M) tablet Take 1 tablet by mouth daily 10/2/19 11/1/19 Yes Dominguez Conklin MD   Skin Protectants, Misc. (EUCERIN) cream Apply topically 2 times daily 4/29/19  Yes Georgiana Prather MD   traZODone (DESYREL) 100 MG tablet Take 1 tablet (100 mg) by mouth At Bedtime 10/2/19 11/1/19 Yes Dominguez Conklin MD     Allergies   Allergen Reactions     Peas GI Disturbance     Black eyed peas - vomiting     Haloperidol Anxiety     Past Medical History:   Diagnosis Date     Schizophrenia (H)      No past surgical history on file.    The Review of Systems is negative other than noted in the [facility]     Past Psychiatric History:   Has been hospitalized several times, 4 times in 2018 and I believe that this is his 4th admission in 2019. Was discharged from [facility] on 10/2/19. 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 Previous medications include Zyprexa, Thorazine, Depakote, Haldol decanoate, Clozaril, Abilify, Geodon, Prolixin, Wellbutrin, Effexor, Invega, Prolixin decanoate. History of commitment and rocha, it appears that his most recent commitment ended 8/8/19.       Social History:   Was raised in [place] by his mother. Resides in [place]. Has 1 sister. Graduated high school. Has never been  and has no children. Receives SSDI.      Chemical Use History:   Records show remote history of alcohol and cocaine abuse.      Family Psychiatric History:   Unknown.        Physical Exam- completed 10/12/19 by Dr. Landaverde at [facility]. Reviewed with no notable changes.    Physical Exam  General:          Disheveled, unkempt. Nontoxic.   Head:              Scalp, face, and head appear normal, atraumatic   Eyes:               Pupils are equal, round                          Conjunctivae non-injected and sclerae white  ENT:                The external nose is normal                          Pinnae are normal                          The oropharynx is normal, mucous membranes moist                          Uvula is in the midline  Neck:              Normal range of motion                          There is no rigidity noted                          Trachea is in the midline  CV:                  Regular rate and rhythm                           Normal S1/S2, no S3/S4                          No murmur or rub  Resp:              Lungs are clear and equal bilaterally                          There is no tachypnea                          No increased work of breathing                          No rales, wheezing, or rhonchi  GI:                   Abdomen is soft, no rigidity or guarding                          No distension, or mass                          No tenderness or rebound tenderness   MS:                  Normal muscular tone. Full painless ROM of all extremities. Extremities non tender to palpation  "and atraumatic.                           Symmetric motor strength                          No lower extremity edema  Skin:               No rash or acute skin lesions noted  Neuro: Awake and alert. No facial droop. Strength 5/5 throughout. Gait normal.                           Speech is normal and fluent                          Moves all extremities spontaneously      Review of Systems:  Unable to assess due to level of disorganization.          MSE/PSYCH  PSYCHIATRIC EXAM  /67   Pulse 81   Temp 97.6  F (36.4  C) (Tympanic)   Resp 12   Ht 1.778 m (5' 10\")   Wt 94.2 kg (207 lb 9.6 oz)   SpO2 94%   BMI 29.79 kg/m    -Appearance/Behavior:  Poorly groomed, Disheveled and Appears stated age    -Attitude: intermittent cooperation, irritable  -Motor: normal or unremarkable, difficult to assess as he is in bed  -Gait: not observed as he is in bed   -Abnormal involuntary movements: none observed.  -Mood: irritable and labile.  -Affect: mood congruent  -Speech: mumbled, difficult to understand                -Thought process/associations: Flight of ideas, Loose associations and Rambling.  -Thought content: bizarre delusions  -Perceptual disturbances: denies hallucinations though appears preoccupied              -Suicidal/Homicidal Ideation: denying any suicidal or homicidal ideation  -Judgment: Limited.  -Insight: Limited.  *Orientation: person.  *Memory: likely impaired d/t psychosis  *Attention: easily distracted, poor attention  *Language: fluent, no aphasias, able to repeat phrases and name objects. Vocab intact.  *Fund of information: appropriate for education  *Cognitive functioning estimate: 1 - slightly impaired.     Labs:   Results for orders placed or performed during the hospital encounter of 10/12/19   Alcohol breath test POCT   Result Value Ref Range    Alcohol Breath Test 0.00 0.00 - 0.01          Assessment/Impression: This is a 69 year old yo male with a history of schizoaffective disorder " bipolar type. He was brought to the ED for evaluation of psychosis after police were called on him twice due to disorganized and disruptive behavior at his apartment. He continues to present as disorganized today and is unable to provide me with much meaningful information. He reports that he does not take any medications. He was recently discharged from [facility] on 10/2/19 on Haldol oral at bedtime and Haldol decanoate, though reportedly has not been compliant with these since his discharge. 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 Will need to clarify when next Haldol decanoate injection is due. May also need to consider petition for commitment and possible rocha order, his most recent commitment/rocha  in 2019.     Educated regarding medication indications, risks, benefits, side effects, contraindications and possible interactions. Verbally expressed understanding.     DX:  Schizoaffective disorder- bipolar type     Plan:  Admit to Unit: [facility]  Attending: Arielle Walker CNP  Patient is on a 72 hour mental health hold  Monitor for target symptoms: decrease in psychotic symptoms  Provide a safe environment and therapeutic milieu.   Continue Haldol 10 mg at bedtime  Continue Cogentin 1 mg BID  Call and clarify when next Haldol decanoate injection is due  Labs: Utox, CBC, CMP, TSH  Consider petition for commitment and possible rocha order if refusing medication    Anticipated length of stay: likely > 5 days       GABRIELLE Carlson, CNP

## 2019-10-14 LAB
ALBUMIN SERPL-MCNC: 3.6 G/DL (ref 3.4–5)
ALP SERPL-CCNC: 84 U/L (ref 40–150)
ALT SERPL W P-5'-P-CCNC: 20 U/L (ref 0–70)
ANION GAP SERPL CALCULATED.3IONS-SCNC: 4 MMOL/L (ref 3–14)
AST SERPL W P-5'-P-CCNC: 29 U/L (ref 0–45)
BILIRUB SERPL-MCNC: 0.7 MG/DL (ref 0.2–1.3)
BUN SERPL-MCNC: 12 MG/DL (ref 7–30)
CALCIUM SERPL-MCNC: 8.3 MG/DL (ref 8.5–10.1)
CHLORIDE SERPL-SCNC: 103 MMOL/L (ref 94–109)
CO2 SERPL-SCNC: 28 MMOL/L (ref 20–32)
CREAT SERPL-MCNC: 0.83 MG/DL (ref 0.66–1.25)
ERYTHROCYTE [DISTWIDTH] IN BLOOD BY AUTOMATED COUNT: 12.5 % (ref 10–15)
GFR SERPL CREATININE-BSD FRML MDRD: 90 ML/MIN/{1.73_M2}
GLUCOSE SERPL-MCNC: 100 MG/DL (ref 70–99)
HCT VFR BLD AUTO: 42.9 % (ref 40–53)
HGB BLD-MCNC: 15.3 G/DL (ref 13.3–17.7)
MCH RBC QN AUTO: 31.7 PG (ref 26.5–33)
MCHC RBC AUTO-ENTMCNC: 35.7 G/DL (ref 31.5–36.5)
MCV RBC AUTO: 89 FL (ref 78–100)
PLATELET # BLD AUTO: 213 10E9/L (ref 150–450)
POTASSIUM SERPL-SCNC: 3.9 MMOL/L (ref 3.4–5.3)
PROT SERPL-MCNC: 6.3 G/DL (ref 6.8–8.8)
RBC # BLD AUTO: 4.82 10E12/L (ref 4.4–5.9)
SODIUM SERPL-SCNC: 135 MMOL/L (ref 133–144)
TSH SERPL DL<=0.005 MIU/L-ACNC: 1.97 MU/L (ref 0.4–4)
WBC # BLD AUTO: 6.7 10E9/L (ref 4–11)

## 2019-10-14 PROCEDURE — 36415 COLL VENOUS BLD VENIPUNCTURE: CPT | Performed by: NURSE PRACTITIONER

## 2019-10-14 PROCEDURE — 84443 ASSAY THYROID STIM HORMONE: CPT | Performed by: NURSE PRACTITIONER

## 2019-10-14 PROCEDURE — 85027 COMPLETE CBC AUTOMATED: CPT | Performed by: NURSE PRACTITIONER

## 2019-10-14 PROCEDURE — 80053 COMPREHEN METABOLIC PANEL: CPT | Performed by: NURSE PRACTITIONER

## 2019-10-14 PROCEDURE — 12400000 ZZH R&B MH

## 2019-10-14 PROCEDURE — 25000132 ZZH RX MED GY IP 250 OP 250 PS 637: Performed by: NURSE PRACTITIONER

## 2019-10-14 PROCEDURE — 99233 SBSQ HOSP IP/OBS HIGH 50: CPT | Performed by: NURSE PRACTITIONER

## 2019-10-14 RX ADMIN — ASPIRIN 81 MG 81 MG: 81 TABLET ORAL at 08:18

## 2019-10-14 RX ADMIN — OLANZAPINE 10 MG: 10 TABLET, ORALLY DISINTEGRATING ORAL at 16:49

## 2019-10-14 RX ADMIN — LORAZEPAM 1 MG: 1 TABLET ORAL at 23:22

## 2019-10-14 RX ADMIN — LEVOTHYROXINE SODIUM 50 MCG: 25 TABLET ORAL at 07:40

## 2019-10-14 RX ADMIN — HALOPERIDOL 10 MG: 10 TABLET ORAL at 20:48

## 2019-10-14 RX ADMIN — BENZTROPINE MESYLATE 1 MG: 1 TABLET ORAL at 20:48

## 2019-10-14 RX ADMIN — BENZTROPINE MESYLATE 1 MG: 1 TABLET ORAL at 08:18

## 2019-10-14 RX ADMIN — TRAZODONE HYDROCHLORIDE 100 MG: 100 TABLET ORAL at 20:47

## 2019-10-14 RX ADMIN — MULTIPLE VITAMINS W/ MINERALS TAB 1 TABLET: TAB at 08:18

## 2019-10-14 ASSESSMENT — ACTIVITIES OF DAILY LIVING (ADL)
DRESS: INDEPENDENT
ORAL_HYGIENE: INDEPENDENT
DRESS: SCRUBS (BEHAVIORAL HEALTH);INDEPENDENT
LAUNDRY: UNABLE TO COMPLETE
HYGIENE/GROOMING: INDEPENDENT
ORAL_HYGIENE: INDEPENDENT
HYGIENE/GROOMING: INDEPENDENT

## 2019-10-14 NOTE — PLAN OF CARE
BEHAVIORAL TEAM DISCUSSION    Participants: Arielle Walker NP, Meera Hitchcock LSW,  Adriana Lynch LSW, Deon Ladd LSW, Nitza Mathis RN, Chu Watson RN, Ayala El RN, Anali Ralph OT, Ceci Da Silva OT, Mona Graham OT, Chapito Cardozo [facility]  Progress: new pt, recent discharge from [facility] on 10/3/19  Continued Stay Criteria/Rationale: psychosis, disorganized   Medical/Physical: hypothyroid- synthroid medication   Precautions:   Falls precaution?: Yes, care plan in place  Behavioral Orders   Procedures     Code 1 - Restrict to Unit     Routine Programming     As clinically indicated     Status 15     Every 15 minutes.     Plan: restart medications (was taking haldol and haldol dec), ACT team follows him, last commit/rocha  in August.  Provider may file petition for civil commitment.  May need a rocha.  Rationale for change in precautions or plan:     Active Problems:    Psychosis (H)     Current Facility-Administered Medications:      acetaminophen (TYLENOL) tablet 650 mg, 650 mg, Oral, Q4H PRN, Arielle Walker NP     alum & mag hydroxide-simethicone (MYLANTA ES/MAALOX  ES) suspension 30 mL, 30 mL, Oral, Q4H PRN, Arielle Walker NP, 30 mL at 10/13/19 1905     aspirin (ASA) chewable tablet 81 mg, 81 mg, Oral, Daily, Arielle Walker NP, 81 mg at 10/14/19 0818     benztropine (COGENTIN) tablet 1 mg, 1 mg, Oral, BID, Arielle Walker NP, 1 mg at 10/14/19 0818     eucerin cream, , Topical, BID PRN, Arielle Walker NP     haloperidol lactate (HALDOL) injection 10 mg, 10 mg, Intramuscular, Q6H PRN, 10 mg at 10/13/19 1920 **OR** haloperidol (HALDOL) tablet 10 mg, 10 mg, Oral, Q6H PRN, Arielle Walker NP     haloperidol (HALDOL) tablet 10 mg, 10 mg, Oral, At Bedtime, Arielle Walker NP     hydrOXYzine (ATARAX) tablet 25-50 mg, 25-50 mg, Oral, Q4H PRN, Arielle Walker NP     levothyroxine (SYNTHROID/LEVOTHROID) tablet 50 mcg, 50 mcg, Oral, QAM ACDenise Nicole, NP, 50 mcg at 10/14/19  0740     LORazepam (ATIVAN) injection 1 mg, 1 mg, Intramuscular, Q6H PRN, 1 mg at 10/13/19 1920 **OR** LORazepam (ATIVAN) tablet 1 mg, 1 mg, Oral, Q6H PRN, Arielle Walker NP     magnesium hydroxide (MILK OF MAGNESIA) suspension 30 mL, 30 mL, Oral, At Bedtime PRN, Arielle Walker NP     multivitamin w/minerals (THERA-VIT-M) tablet 1 tablet, 1 tablet, Oral, Daily, Arielle Walker NP, 1 tablet at 10/14/19 0818     nicotine (NICORETTE) gum 2-4 mg, 2-4 mg, Buccal, Q1H PRN, Arielle Walker NP     OLANZapine (zyPREXA) injection 10 mg, 10 mg, Intramuscular, TID PRN **OR** OLANZapine zydis (zyPREXA) ODT tab 10 mg, 10 mg, Oral, TID PRN, Arielle Walker NP, 10 mg at 10/13/19 1606     traZODone (DESYREL) tablet 100 mg, 100 mg, Oral, At Bedtime, Arielle Walker NP

## 2019-10-14 NOTE — PLAN OF CARE
"  Problem: Adult Behavioral Health Plan of Care  Goal: Patient-Specific Goal (Individualization)  Description  Patient will sleep 6-8 hours each night.  Patient will maintain daily ADLs without prompting.     Weaning Care Plan: 10/14/19 Patient is not appropriate to wean at this time. Treatment team to reassess daily.      10/14/2019 1242 by Jillian Cardenas RN  Outcome: Improving  Note:   Shift Summery:  Patient is up on the unit in MHICU. Patient behavior is hyperactive and speech is mumbly and difficult to understand. Patient continues to refuse to give urine for sample of UDS. Patient did allow am blood draw, ate well at his meals. Calmer this morning than previous morning. Patient denies physical problems and pain. Patient did shower this morning. Able to have a conversation with writer. Denies drug use but admits to occassional alcohol use.    Face to face end of shift report communicated to evening shift RN.     Jillian Cardenas RN  10/14/2019  2:21 PM        Patient's Stated Goal for Shift:  \"no stated goal\"    Goal Status:  Met       Problem: Thought Process Alteration  Goal: Optimal Thought Clarity  Description  Patient will participate in nursing assessment daily.  Patient will show some insight into mental health symptoms.  Patient will remain calm and have no impulsive, aggressive outbursts.    10/14/2019 1242 by Jillian Cardenas, RN  Outcome: Improving     "

## 2019-10-14 NOTE — PLAN OF CARE
Face to face end of shift report received from Conchita PEDRAZA RN. Rounding completed and patient observed lying in bed with eyes closed, breathing regular and unlabored.    06:00 Update: Patient appeared to sleep soundly throughout the shift until about 4:30am.  Patient slept approximate 6.5 hours with position changes noted. No requests for any other PRNs. 15 minute checks were completed throughout the night.       07:30 Update: Face to face end of shift report communicated to oncoming RN. Will verbalize that patient continues to be a high fall risk.     Problem: Adult Behavioral Health Plan of Care  Goal: Patient-Specific Goal (Individualization)  Description  Patient will sleep 6-8 hours each night.  Patient will maintain daily ADLs without prompting.     Weaning Care Plan: 10/13/19 Patient is not appropriate to wean at this time. Treatment team to reassess daily.     10/14/2019 0042 by Avila Mckeon RN  Outcome: No Change     Problem: Thought Process Alteration  Goal: Optimal Thought Clarity  Description  Patient will participate in nursing assessment daily.  Patient will show some insight into mental health symptoms.  Patient will remain calm and have no impulsive, aggressive outbursts.    10/14/2019 0042 by Avila Mckeon, RN  Outcome: No Change     Problem: Fall Injury Risk  Goal: Absence of Fall and Fall-Related Injury  Description  Patient will be free of injury from falls while on the unit.    10/14/2019 0042 by Avila Mckeon, RN  Outcome: Improving

## 2019-10-14 NOTE — PLAN OF CARE
"Pt remains in MHICU and is not appropriate for weaning. Pt with garbled,rambling speech which makes it difficult to understand what he is sayig. . By 1530 pt was given po xyprexa for agitation, throwing papers around in room, shower door on floor.  1745 pt ate supper and also threw rice around on floor in room and spilled coffee on wall and floor.Pt went out to tv area and was taking to the tv, at 1905 pt knocking on window and stated \"heartburn heartburn heartburn\" Pt was medicated with maalox for this complaint. 1915: pt yelling and swinging his room door open with force to slam it into the wall, pt did this repeatedly. Security guards and nurses approached pt with prn haldol and ativan. Pt was lying on his bed in his room and instructed staff to \"roll me over and give it to me\" Pt did not resist as staff assisted him onto his side and gave medication. 2000: Pt asleep in his room on his bed, respirations non labored.2100 pt asleep with O2 sats 92% on room air, respirations non labored.2200:pt asleep with even, non labored respirations. Face to face report will be communicated to night shift RN.  "

## 2019-10-14 NOTE — PROGRESS NOTES
"[facility]  Psychiatric Progress Note      Impression:    This is a 69 year old yo male with a history of schizoaffective disorder bipolar type.    Ger is in his room in the ICU when I go to see him today. He has removed his shower door and placed it on the floor of his room. He gestures to this tell me to \"have a seat\" on it when I come in the room. His speech is rapid and rambling. He does tell me today that he does not need to take medications because \"they are poison to the brain\". He is not as irritable today. However he has received 2 doses of PRN Haldol for agitation in the last 24 hours. He did sleep about 6.5 hours last night. Yesterday he had been throwing food around in his room. There was a peanut butter and jelly sandwich scattered throughout his room yesterday when I spoke to him. Last night he threw rice around the room and spilled coffee on the walls and floor. He seems to be having a easier time keeping his food on his tray today. Due to his level of continued disorganization and refusal to take medications I will file a petition for commitment and likely a rocha will be needed.      Educated regarding medication indications, risks, benefits, side effects, contraindications and possible interactions. Verbally expressed understanding.        DIagnoses:   Schizoaffective disorder- bipolar type      Attestation:  Patient has been seen and evaluated by me,  Arielle Walker, BERT          Interim History:   The patient's care was discussed with the treatment team and chart notes were reviewed.          Medications:     Current Facility-Administered Medications Ordered in Epic   Medication Dose Route Frequency Last Rate Last Dose     acetaminophen (TYLENOL) tablet 650 mg  650 mg Oral Q4H PRN         alum & mag hydroxide-simethicone (MYLANTA ES/MAALOX  ES) suspension 30 mL  30 mL Oral Q4H PRN   30 mL at 10/13/19 1905     aspirin (ASA) chewable tablet 81 mg  81 mg Oral Daily   81 mg at " "10/14/19 0818     benztropine (COGENTIN) tablet 1 mg  1 mg Oral BID   1 mg at 10/14/19 0818     eucerin cream   Topical BID PRN         haloperidol lactate (HALDOL) injection 10 mg  10 mg Intramuscular Q6H PRN   10 mg at 10/13/19 1920    Or     haloperidol (HALDOL) tablet 10 mg  10 mg Oral Q6H PRN         haloperidol (HALDOL) tablet 10 mg  10 mg Oral At Bedtime         hydrOXYzine (ATARAX) tablet 25-50 mg  25-50 mg Oral Q4H PRN         levothyroxine (SYNTHROID/LEVOTHROID) tablet 50 mcg  50 mcg Oral QAM AC   50 mcg at 10/14/19 0740     LORazepam (ATIVAN) injection 1 mg  1 mg Intramuscular Q6H PRN   1 mg at 10/13/19 1920    Or     LORazepam (ATIVAN) tablet 1 mg  1 mg Oral Q6H PRN         magnesium hydroxide (MILK OF MAGNESIA) suspension 30 mL  30 mL Oral At Bedtime PRN         multivitamin w/minerals (THERA-VIT-M) tablet 1 tablet  1 tablet Oral Daily   1 tablet at 10/14/19 0818     nicotine (NICORETTE) gum 2-4 mg  2-4 mg Buccal Q1H PRN         OLANZapine (zyPREXA) injection 10 mg  10 mg Intramuscular TID PRN        Or     OLANZapine zydis (zyPREXA) ODT tab 10 mg  10 mg Oral TID PRN   10 mg at 10/13/19 1606     traZODone (DESYREL) tablet 100 mg  100 mg Oral At Bedtime         No current Epic-ordered outpatient medications on file.          10 point ROS reviewed- unable to assess d/t disorganization       Allergies:     Allergies   Allergen Reactions     Peas GI Disturbance     Black eyed peas - vomiting     Haloperidol Anxiety            Psychiatric Examination:   /65   Pulse 84   Temp 97.8  F (36.6  C) (Tympanic)   Resp 16   Ht 1.778 m (5' 10\")   Wt 94.2 kg (207 lb 9.6 oz)   SpO2 (!) 91%   BMI 29.79 kg/m    Weight is 207 lbs 9.6 oz  Body mass index is 29.79 kg/m .    Appearance:  awake, alert, dressed in hospital scrubs and appeared as age stated  Attitude: little more cooperative today  Eye Contact:  fair  Mood: labile  Affect:  mood congruent  Speech:  Mumbling and rambling  Psychomotor Behavior:  no " evidence of tardive dyskinesia, dystonia, or tics  Thought Process:  disorganized and illogical  Associations:  loosening of associations present  Thought Content:  no evidence of suicidal ideation or homicidal ideation and patient appears to be responding to internal stimuli, grandiose  Insight:  limited  Judgment:  limited  Oriented to: person, place  Attention Span and Concentration:  limited d/t mental illness  Recent and Remote Memory:  fair  Fund of Knowledge: appropriate for education  Muscle Strength and Tone: normal  Gait and Station: Normal           Labs:     Results for orders placed or performed during the hospital encounter of 10/13/19 (from the past 24 hour(s))   CBC with platelets   Result Value Ref Range    WBC 6.7 4.0 - 11.0 10e9/L    RBC Count 4.82 4.4 - 5.9 10e12/L    Hemoglobin 15.3 13.3 - 17.7 g/dL    Hematocrit 42.9 40.0 - 53.0 %    MCV 89 78 - 100 fl    MCH 31.7 26.5 - 33.0 pg    MCHC 35.7 31.5 - 36.5 g/dL    RDW 12.5 10.0 - 15.0 %    Platelet Count 213 150 - 450 10e9/L   Comprehensive metabolic panel   Result Value Ref Range    Sodium 135 133 - 144 mmol/L    Potassium 3.9 3.4 - 5.3 mmol/L    Chloride 103 94 - 109 mmol/L    Carbon Dioxide 28 20 - 32 mmol/L    Anion Gap 4 3 - 14 mmol/L    Glucose 100 (H) 70 - 99 mg/dL    Urea Nitrogen 12 7 - 30 mg/dL    Creatinine 0.83 0.66 - 1.25 mg/dL    GFR Estimate 90 >60 mL/min/[1.73_m2]    GFR Estimate If Black >90 >60 mL/min/[1.73_m2]    Calcium 8.3 (L) 8.5 - 10.1 mg/dL    Bilirubin Total 0.7 0.2 - 1.3 mg/dL    Albumin 3.6 3.4 - 5.0 g/dL    Protein Total 6.3 (L) 6.8 - 8.8 g/dL    Alkaline Phosphatase 84 40 - 150 U/L    ALT 20 0 - 70 U/L    AST 29 0 - 45 U/L   TSH   Result Value Ref Range    TSH 1.97 0.40 - 4.00 mU/L            Plan:   Continue current medications- Haldol  Will file petition for commitment and rocha    ELOS: likely > 5 days

## 2019-10-14 NOTE — PLAN OF CARE
Social Service Psychosocial Assessment    Presenting Problem:   Ger Bashir is a 69 year old single  male who was brought to the [facility] ED by EMS for evaluation of psychosis. Brought in by EMS after police had been called to his apartment building twice due to him disrupting his neighbors. He was going around and banging on doors and harassing other residents. His apartment was found to be in disarray with a strobe light going off and mattress in the middle of the floor. Was recently hospitalized at [facility] and discharged 10/2/19. He has not been compliant with medications since discharge. Noncompliant with assessments in the ED, was verbally agitated when asked numerous questions. Was disorganized and unable to finish his thoughts or track conversation. Was placed on a hold and transferred to behavioral health for further evaluation.   Unable to complete assessment on patient, he is disorganized. The following is based on medical notes.   Marital Status:   Single   Spouse / Children:   unknown  Psychiatric TX HX:  Schizoaffective disorder- bipolar type  Suicide Risk Assessment: Patient stated he was not SI today  Access to Lethal Means (explain):  Patient denies access to lethal means  Family Psych HX:   Unknown  A & Ox:  X3  Medication Adherence:  See H&P  Medical Issues:   See H&P  Visual -Motor Functioning:  Good  Communication Skills /Needs:  Disorganized, Answers questions with numbers and aplphabet  Ethnicity:   White     Spirituality/Catholic Affiliation:   unknown   Clergy Request:   No   History:  unknown  Living Situation: patient just stated [facility] s:   Education: Unknown  Financial Situation:   Patient stated social security disability   Occupation:  Unemployed  Leisure & Recreation:  Unknown  Childhood History:   unknown  Trauma Abuse HX:   unknown  Relationship / Sexuality:   unknown  Substance Use/ Abuse:  The patient admits to past abuse  of alcohol, but denies any other illicit drug use.  Upon further inquiry, he tells me he abuses cocaine and opioids, but urine toxicology screen was negative.  He uses 2 packs of cigarettes per day.   Chemical Dependency Treatment HX:  Records show remote history of alcohol and cocaine abuse. The patient admits to past abuse of alcohol, but denies any other illicit drug use.  Upon further inquiry, he tells me he abuses cocaine and opioids, but urine toxicology screen was negative.  He uses 2 packs of cigarettes per day.     Legal Issues:  Unknown  Significant Life Events: unknown   Strengths:   Receives services from ACT TEAM, lives in own Apartment,    Challenges /Limitation:  MH, Disorganized   Patient Support Contact (Include name, relationship, number, and summary of conversation):  None listed  Interventions:       Community-Based Programs ACT TEAM    Medical/Dental Care no primary listed    Home Care - might benefit    CD Evaluation/Rule 25/Aftercare    Medication Management - might benefit     Housing/Placement Lives in an apartment     Case Management Patient stated he has a  and mumbled numbers    Insurance Coverage - [facility]    Financial Assistance receives Social Security disability      Commit/Wu Screening Paper work file with [facility], they verified they received and are going to screen patient     Suicide Risk Assessment - patient denied SI, denied today and during assessment, stated he does not have access to lethal means    High Risk Safety Plan- Talk to supports; Call crisis lines; Go to local ER if feeling suicidal.       GEOVANNI Morse  10/14/2019  12:05 PM

## 2019-10-15 PROCEDURE — 12400000 ZZH R&B MH

## 2019-10-15 PROCEDURE — 25000132 ZZH RX MED GY IP 250 OP 250 PS 637: Performed by: NURSE PRACTITIONER

## 2019-10-15 PROCEDURE — 99233 SBSQ HOSP IP/OBS HIGH 50: CPT | Performed by: NURSE PRACTITIONER

## 2019-10-15 RX ADMIN — HALOPERIDOL 10 MG: 10 TABLET ORAL at 22:15

## 2019-10-15 RX ADMIN — LEVOTHYROXINE SODIUM 50 MCG: 25 TABLET ORAL at 06:36

## 2019-10-15 RX ADMIN — OLANZAPINE 10 MG: 10 TABLET, ORALLY DISINTEGRATING ORAL at 13:10

## 2019-10-15 RX ADMIN — BENZTROPINE MESYLATE 1 MG: 1 TABLET ORAL at 22:15

## 2019-10-15 RX ADMIN — TRAZODONE HYDROCHLORIDE 100 MG: 100 TABLET ORAL at 22:15

## 2019-10-15 RX ADMIN — MULTIPLE VITAMINS W/ MINERALS TAB 1 TABLET: TAB at 08:41

## 2019-10-15 RX ADMIN — ASPIRIN 81 MG 81 MG: 81 TABLET ORAL at 08:40

## 2019-10-15 RX ADMIN — OLANZAPINE 10 MG: 10 TABLET, ORALLY DISINTEGRATING ORAL at 00:16

## 2019-10-15 RX ADMIN — BENZTROPINE MESYLATE 1 MG: 1 TABLET ORAL at 08:40

## 2019-10-15 RX ADMIN — HALOPERIDOL 10 MG: 10 TABLET ORAL at 17:04

## 2019-10-15 ASSESSMENT — ACTIVITIES OF DAILY LIVING (ADL)
ORAL_HYGIENE: INDEPENDENT
DRESS: SCRUBS (BEHAVIORAL HEALTH)
HYGIENE/GROOMING: INDEPENDENT

## 2019-10-15 NOTE — PROGRESS NOTES
"[facility]  Psychiatric Progress Note      Impression:    This is a 69 year old yo male with a history of schizoaffective disorder bipolar type.    Ger is resting in bed when I see him today. He remains in the MHICU due to impulsive behavior. He has been more directable today. However when I attempt to speak with him about medications he does become more agitated stating \"I don't take medication. That's poison\". He is also irritable in conversation when I attempt to find out who his  is. He initially tells me that it is a secret, then states \"he's probably in alf. Or he should be in alf\". He has been unable to provide me information and apparently we have not been able to contact anyone from the [facility] or his ACT team, though I believe messages have been left. It is also unclear whether he can return to his apartment, as he tells me that he is unable to go back \"I have to pack my stuff and move to [facility]\". Due to his level of disorganization, his impulsivity, and his refusal to take medications a petition for commitment and rocha was filed today.     Educated regarding medication indications, risks, benefits, side effects, contraindications and possible interactions. Verbally expressed understanding.        DIagnoses:   Schizoaffective disorder- bipolar type      Attestation:  Patient has been seen and evaluated by me,  Arielle Walker NP          Interim History:   The patient's care was discussed with the treatment team and chart notes were reviewed.          Medications:     Current Facility-Administered Medications Ordered in Epic   Medication Dose Route Frequency Last Rate Last Dose     acetaminophen (TYLENOL) tablet 650 mg  650 mg Oral Q4H PRN         alum & mag hydroxide-simethicone (MYLANTA ES/MAALOX  ES) suspension 30 mL  30 mL Oral Q4H PRN   30 mL at 10/13/19 1905     aspirin (ASA) chewable tablet 81 mg  81 mg Oral Daily   81 mg at 10/14/19 0818     benztropine " "(COGENTIN) tablet 1 mg  1 mg Oral BID   1 mg at 10/14/19 0818     eucerin cream   Topical BID PRN         haloperidol lactate (HALDOL) injection 10 mg  10 mg Intramuscular Q6H PRN   10 mg at 10/13/19 1920    Or     haloperidol (HALDOL) tablet 10 mg  10 mg Oral Q6H PRN         haloperidol (HALDOL) tablet 10 mg  10 mg Oral At Bedtime         hydrOXYzine (ATARAX) tablet 25-50 mg  25-50 mg Oral Q4H PRN         levothyroxine (SYNTHROID/LEVOTHROID) tablet 50 mcg  50 mcg Oral QAM AC   50 mcg at 10/14/19 0740     LORazepam (ATIVAN) injection 1 mg  1 mg Intramuscular Q6H PRN   1 mg at 10/13/19 1920    Or     LORazepam (ATIVAN) tablet 1 mg  1 mg Oral Q6H PRN         magnesium hydroxide (MILK OF MAGNESIA) suspension 30 mL  30 mL Oral At Bedtime PRN         multivitamin w/minerals (THERA-VIT-M) tablet 1 tablet  1 tablet Oral Daily   1 tablet at 10/14/19 0818     nicotine (NICORETTE) gum 2-4 mg  2-4 mg Buccal Q1H PRN         OLANZapine (zyPREXA) injection 10 mg  10 mg Intramuscular TID PRN        Or     OLANZapine zydis (zyPREXA) ODT tab 10 mg  10 mg Oral TID PRN   10 mg at 10/13/19 1606     traZODone (DESYREL) tablet 100 mg  100 mg Oral At Bedtime         No current Epic-ordered outpatient medications on file.          10 point ROS reviewed- unable to assess d/t disorganization       Allergies:     Allergies   Allergen Reactions     Peas GI Disturbance     Black eyed peas - vomiting     Haloperidol Anxiety            Psychiatric Examination:   /91   Pulse 84   Temp 97.8  F (36.6  C) (Tympanic)   Resp 18   Ht 1.778 m (5' 10\")   Wt 94.2 kg (207 lb 9.6 oz)   SpO2 (!) 91%   BMI 29.79 kg/m    Weight is 207 lbs 9.6 oz  Body mass index is 29.79 kg/m .    Appearance: awake, alert, somewhat disheveled  Attitude: intermittently cooperative  Eye Contact:  fair  Mood: labile  Affect: labile  Speech:  Mumbling and rambling  Psychomotor Behavior:  no evidence of tardive dyskinesia, dystonia, or tics  Thought Process:  " disorganized and illogical, little more linear today  Associations:  loosening of associations present  Thought Content:  no evidence of suicidal ideation or homicidal ideation and patient appears to be responding to internal stimuli, grandiose  Insight:  limited  Judgment:  limited  Oriented to: person, place  Attention Span and Concentration:  limited d/t mental illness  Recent and Remote Memory:  fair  Fund of Knowledge: appropriate for education  Muscle Strength and Tone: normal  Gait and Station: Normal           Labs:     No results found for this or any previous visit (from the past 24 hour(s)).         Plan:   Continue current medications- Haldol at HS  Utilize PRN medications for agitation  Will file petition for commitment and rocha- not screened yet    ELOS: likely > 5 days, remains disorganized

## 2019-10-15 NOTE — PLAN OF CARE
"  Problem: Adult Behavioral Health Plan of Care  Goal: Patient-Specific Goal (Individualization)  Description  Patient will sleep 6-8 hours each night.  Patient will maintain daily ADLs without prompting.     Weaning Care Plan: 10/14/19 Patient is not appropriate to wean at this time. Treatment team to reassess daily.      10/15/2019 0955 by Jillian Cardenas, RN  Outcome: Improving  Note:   Shift Summery:  Patient remains in his room this morning and behavior is appropriate. Steady on his feet with no falls. Patient denies physical problems including pain. Patient denies being depressed or hearing voices. Patient is hopeful that he can have headphones for listening to music when he is able to wean from MHICU. Patient ate well for breakfast meal and behavior is calmer than on previous day. He follows redirection given by staff and tells me that he \"feels excellent.\" Patient continues to require MHICU for unpredictable behavior.    1310 Patient slamming hand into other hand and denies that he is threatening offered patient Zyprexa 10 mg po at this time and he accepted.    Received a call from [facility] who states she has been looking for patient for the past three days as he is seen daily by them. Requested that we acquire a PEG for them and she would send us information. On attempt to do so, Patient became increasingly agitated and threw the paper and pen at writer and refused to sign. Passed the PEG and phone numbers on to Deon TEMPLE.    Face to face end of shift report communicated to evening shift RN.     Jillian Cardenas, RN  10/15/2019  1:22 PM        Patient's Stated Goal for Shift:  \"get to use headphones\"    Goal Status:  In Process       Problem: Adult Behavioral Health Plan of Care  Goal: Patient-Specific Goal (Individualization)  Description  Patient will sleep 6-8 hours each night.  Patient will maintain daily ADLs without prompting.     Weaning Care Plan: 10/14/19 Patient is not appropriate to wean at " "this time. Treatment team to reassess daily.      10/15/2019 0955 by Jillian Cardenas, RN  Outcome: Improving  Note:   Shift Summery:  Patient remains in his room this morning and behavior is appropriate. Steady on his feet with no falls. Patient denies physical problems including pain. Patient denies being depressed or hearing voices. Patient is hopeful that he can have headphones for listening to music when he is able to wean from MHICU. Patient ate well for breakfast meal and behavior is calmer than on previous day. He follows redirection given by staff and tells me that he \"feels excellent.\" Patient continues to require MHICU for unpredictable behavior.    Patient's Stated Goal for Shift:  \"get to use headphones\"    Goal Status:  In Process       Problem: Thought Process Alteration  Goal: Optimal Thought Clarity  Description  Patient will participate in nursing assessment daily.  Patient will show some insight into mental health symptoms.  Patient will remain calm and have no impulsive, aggressive outbursts.    10/15/2019 0955 by Jillian Cardenas, RN  Outcome: Improving     Problem: Fall Injury Risk  Goal: Absence of Fall and Fall-Related Injury  Description  Patient will be free of injury from falls while on the unit.    10/15/2019 0955 by Jlilian Cardenas, RN  Outcome: Improving     "

## 2019-10-15 NOTE — PLAN OF CARE
"  Problem: Adult Behavioral Health Plan of Care  Goal: Patient-Specific Goal (Individualization)  Description  Patient will sleep 6-8 hours each night.  Patient will maintain daily ADLs without prompting.     Weaning Care Plan: 10/14/19 Patient is not appropriate to wean at this time. Treatment team to reassess daily.      10/14/2019 2306 by Mel Storm, RN  Outcome: No Change  Note:          Problem: Adult Behavioral Health Plan of Care  Goal: Optimized Coping Skills in Response to Life Stressors  Outcome: No Change     Problem: Thought Process Alteration  Goal: Optimal Thought Clarity  Description  Patient will participate in nursing assessment daily.  Patient will show some insight into mental health symptoms.  Patient will remain calm and have no impulsive, aggressive outbursts.    10/14/2019 2306 by Mel Storm, RN  Outcome: No Change     Problem: Adult Behavioral Health Plan of Care  Goal: Adheres to Safety Considerations for Self and Others  Outcome: Improving     Problem: Fall Injury Risk  Goal: Absence of Fall and Fall-Related Injury  Description  Patient will be free of injury from falls while on the unit.    Outcome: Improving    Patient is seen at start of the shift in MHICU. Patient is in his room, he is asking what city he is in, and would like a letter head, so this was given to him, an envelope. Patient is very thankful. Patient does talk quite a bit whenever nurse speaks with him, however the majority of the conversation is not able to be understood. Patient does say thank you for everything that is given to him. Patient does get up and watch TV, he watches football and get very animated as he watches. Patient does get a dose of Zyprexa 10 mg at 1649 for some agitation, he does well with this and states \" I'll take whatever you give me \". Patient does a fist bump followed by elbow bump, and states  \"right on, come back anytime.\" Patient does eat his dinner, and is able to choose his food for " breakfast. Though patient is very animated and at times overactive, he is pleasant and cooperative.     End of shift hand off report to be given to oncoming RN

## 2019-10-15 NOTE — PLAN OF CARE
"Face to face end of shift report received from Parviz COBB RN. Rounding completed. Patient observed.     Parviz Lewis RN  10/15/2019  12:36 AM      Problem: Adult Behavioral Health Plan of Care  Goal: Patient-Specific Goal (Individualization)  Description  Patient will sleep 6-8 hours each night.  Patient will maintain daily ADLs without prompting.     Weaning Care Plan: 10/14/19 Patient is not appropriate to wean at this time. Treatment team to reassess daily.      10/15/2019 0036 by Parviz Lewis RN  Outcome: No Change  Note:   Pt yelling out in his room not excepting redirection. Pt swearing at staff. 2322 pt accepted Zyprexa PO for agitation. Pt stated \"where is my fucking watch, you better go get it now.\" When told where it was he stated \"well fucking get it.\"    By 0115 pt appeared to be sleeping with eyes closed and respirations regular.     This AM when pt got up he continued to have rambling with pressured speech but has been redirectable.    0700- Pt attempted to hid hair brush in sock. When staff asked for brush back he stated \"I don't know where it went.\" When writer pointed out where brush was pt gave brush back to staff after hesitation.     Problem: Fall Injury Risk  Goal: Absence of Fall and Fall-Related Injury  Description  Patient will be free of injury from falls while on the unit.    10/15/2019 0036 by Parviz Lewis RN  Outcome: Improving   Will continue to monitor.     Face to face end of shift report will be given to oncoming RN.     Parviz Lewis RN  10/15/2019  6:55 AM        "

## 2019-10-15 NOTE — PLAN OF CARE
BEHAVIORAL TEAM DISCUSSION     Participants: Arielle Walker NP, Meera Hitchcock LSW,  Adriana Lynch LSW, Deon Ladd LSW,  Anali Ralph OT, Ceci Da Silva OT, Mona Graham OT  Progress: new pt, recent discharge from [facility] on 10/3/19  Continued Stay Criteria/Rationale: psychosis, disorganized   Medical/Physical: hypothyroid- synthroid medication   Precautions:   Falls precaution?: Yes, care plan in place       Behavioral Orders   Procedures    Code 1 - Restrict to Unit    Routine Programming       As clinically indicated    Status 15       Every 15 minutes.      Plan: restart medications (was taking haldol and haldol dec), ACT team follows him, last commit/rocha  in August.  Provider is filing petition for civil commitment and a rocha.  Rational for change in precautions or plan:      Active Problems:    Psychosis (H)    Current Facility-Administered Medications:     acetaminophen (TYLENOL) tablet 650 mg, 650 mg, Oral, Q4H PRN, Arielle Walker NP    alum & mag hydroxide-simethicone (MYLANTA ES/MAALOX  ES) suspension 30 mL, 30 mL, Oral, Q4H PRN, Arielle Walker NP, 30 mL at 10/13/19 1905    aspirin (ASA) chewable tablet 81 mg, 81 mg, Oral, Daily, Arielle Walker NP, 81 mg at 10/15/19 0840    benztropine (COGENTIN) tablet 1 mg, 1 mg, Oral, BID, Arielle Walker NP, 1 mg at 10/15/19 0840    eucerin cream, , Topical, BID PRN, Arielle Walker NP    haloperidol lactate (HALDOL) injection 10 mg, 10 mg, Intramuscular, Q6H PRN, 10 mg at 10/13/19 1920 **OR** haloperidol (HALDOL) tablet 10 mg, 10 mg, Oral, Q6H PRN, Arielle Walker NP    haloperidol (HALDOL) tablet 10 mg, 10 mg, Oral, At Bedtime, Arielle Walker NP, 10 mg at 10/14/19 2048    hydrOXYzine (ATARAX) tablet 25-50 mg, 25-50 mg, Oral, Q4H PRN, Arielle Walker NP    levothyroxine (SYNTHROID/LEVOTHROID) tablet 50 mcg, 50 mcg, Oral, QAM SHERRY, Arielle Walker, NP, 50 mcg at 10/15/19 0636    LORazepam (ATIVAN) injection 1 mg, 1 mg,  Intramuscular, Q6H PRN, 1 mg at 10/13/19 1920 **OR** LORazepam (ATIVAN) tablet 1 mg, 1 mg, Oral, Q6H PRN, Arielle Walker NP, 1 mg at 10/14/19 2322    magnesium hydroxide (MILK OF MAGNESIA) suspension 30 mL, 30 mL, Oral, At Bedtime PRN, Arielle Walker NP    multivitamin w/minerals (THERA-VIT-M) tablet 1 tablet, 1 tablet, Oral, Daily, Arielle Walker NP, 1 tablet at 10/15/19 0841    nicotine (NICORETTE) gum 2-4 mg, 2-4 mg, Buccal, Q1H PRN, Arielle Walker NP    OLANZapine (zyPREXA) injection 10 mg, 10 mg, Intramuscular, TID PRN **OR** OLANZapine zydis (zyPREXA) ODT tab 10 mg, 10 mg, Oral, TID PRN, Arielle Walker NP, 10 mg at 10/15/19 0016    traZODone (DESYREL) tablet 100 mg, 100 mg, Oral, At Bedtime, Arielle Walker NP, 100 mg at 10/14/19 2047

## 2019-10-16 PROCEDURE — 99233 SBSQ HOSP IP/OBS HIGH 50: CPT | Performed by: NURSE PRACTITIONER

## 2019-10-16 PROCEDURE — 12400000 ZZH R&B MH

## 2019-10-16 PROCEDURE — 25000132 ZZH RX MED GY IP 250 OP 250 PS 637: Performed by: NURSE PRACTITIONER

## 2019-10-16 RX ORDER — OLANZAPINE 10 MG/2ML
10 INJECTION, POWDER, FOR SOLUTION INTRAMUSCULAR 2 TIMES DAILY PRN
Status: DISCONTINUED | OUTPATIENT
Start: 2019-10-16 | End: 2019-10-30 | Stop reason: HOSPADM

## 2019-10-16 RX ORDER — HALOPERIDOL 5 MG/ML
10 INJECTION INTRAMUSCULAR 2 TIMES DAILY PRN
Status: DISCONTINUED | OUTPATIENT
Start: 2019-10-16 | End: 2019-10-30 | Stop reason: HOSPADM

## 2019-10-16 RX ORDER — OLANZAPINE 10 MG/1
10 TABLET, ORALLY DISINTEGRATING ORAL 2 TIMES DAILY PRN
Status: DISCONTINUED | OUTPATIENT
Start: 2019-10-16 | End: 2019-10-30 | Stop reason: HOSPADM

## 2019-10-16 RX ORDER — HALOPERIDOL 10 MG/1
10 TABLET ORAL 2 TIMES DAILY PRN
Status: DISCONTINUED | OUTPATIENT
Start: 2019-10-16 | End: 2019-10-30 | Stop reason: HOSPADM

## 2019-10-16 RX ORDER — HALOPERIDOL 5 MG/1
5 TABLET ORAL
Status: DISCONTINUED | OUTPATIENT
Start: 2019-10-17 | End: 2019-10-30 | Stop reason: HOSPADM

## 2019-10-16 RX ADMIN — OLANZAPINE 10 MG: 10 TABLET, ORALLY DISINTEGRATING ORAL at 02:39

## 2019-10-16 RX ADMIN — TRAZODONE HYDROCHLORIDE 100 MG: 100 TABLET ORAL at 21:38

## 2019-10-16 RX ADMIN — LEVOTHYROXINE SODIUM 50 MCG: 25 TABLET ORAL at 08:09

## 2019-10-16 RX ADMIN — BENZTROPINE MESYLATE 1 MG: 1 TABLET ORAL at 08:09

## 2019-10-16 RX ADMIN — HALOPERIDOL 10 MG: 10 TABLET ORAL at 10:26

## 2019-10-16 RX ADMIN — HALOPERIDOL 10 MG: 10 TABLET ORAL at 21:38

## 2019-10-16 RX ADMIN — ASPIRIN 81 MG 81 MG: 81 TABLET ORAL at 08:09

## 2019-10-16 RX ADMIN — BENZTROPINE MESYLATE 1 MG: 1 TABLET ORAL at 21:38

## 2019-10-16 RX ADMIN — LORAZEPAM 1 MG: 1 TABLET ORAL at 18:11

## 2019-10-16 RX ADMIN — OLANZAPINE 10 MG: 10 TABLET, ORALLY DISINTEGRATING ORAL at 14:27

## 2019-10-16 RX ADMIN — MULTIPLE VITAMINS W/ MINERALS TAB 1 TABLET: TAB at 08:09

## 2019-10-16 RX ADMIN — LORAZEPAM 1 MG: 1 TABLET ORAL at 05:54

## 2019-10-16 ASSESSMENT — ACTIVITIES OF DAILY LIVING (ADL)
DRESS: SCRUBS (BEHAVIORAL HEALTH);INDEPENDENT;PROMPTS
LAUNDRY: UNABLE TO COMPLETE
ORAL_HYGIENE: INDEPENDENT;PROMPTS
DRESS: SCRUBS (BEHAVIORAL HEALTH);INDEPENDENT
HYGIENE/GROOMING: INDEPENDENT;PROMPTS
HYGIENE/GROOMING: INDEPENDENT
ORAL_HYGIENE: INDEPENDENT

## 2019-10-16 NOTE — PLAN OF CARE
Face to face end of shift report received from Adriana ROSAS RN. Rounding completed. Patient observed.     Parviz Lewis RN  10/16/2019  7:41 AM    Problem: Adult Behavioral Health Plan of Care  Goal: Patient-Specific Goal (Individualization)  Description  Patient will sleep 6-8 hours each night.  Patient will maintain daily ADLs without prompting.     Weaning Care Plan: 10/14/19 Patient is not appropriate to wean at this time. Treatment team to reassess daily.      10/16/2019 0741 by Parviz Lewis RN  Outcome: No Change  Note:   Pt yelled in his room and was irritable/agitated with staff at times. Pt did not accept verbal redirection and received PRN Zyprexa at 0239.    Pt appeared to be sleeping for about 3 hours this shift. This AM pt was yelling out and received PRN 0554 ativan for anxiety.     Problem: Fall Injury Risk  Goal: Absence of Fall and Fall-Related Injury  Description  Patient will be free of injury from falls while on the unit.    10/16/2019 0741 by Parviz Lewis RN  Outcome: Improving   Will continue to monitor.     Face to face end of shift report will be given to oncoming PORTIA Lewis RN  10/16/2019  7:42 AM

## 2019-10-16 NOTE — PLAN OF CARE
"Face to face end of shift report communicated from Parviz COBB RN. Pt in MHICU at the start of the shift.     Leticia Araya RN  10/16/2019  7:58 AM       Problem: Adult Behavioral Health Plan of Care  Goal: Patient-Specific Goal (Individualization)  Description  Patient will sleep 6-8 hours each night.  Patient will maintain daily ADLs without prompting.     Weaning Care Plan: 10/14/19 Patient is not appropriate to wean at this time. Treatment team to reassess daily.        Pt in MHICU this shift, pt continues to bang on the window to get staff's attention. Pt was told not to bang on the window, pt was reminded that staff comes to check on him every 15 minutes. Pt stated \" I'm not waiting for them when I can get you and started knocking on the door again.      1130-Pt laying on bed when his NP came to talk to him. Pt calm at this time, tells NP that the haldol has nothing to do with his calm mood. Pt became agitated about not knowing what time it is because his watch was taken away. Pt threatened to tell the  YAHIR.   10/16/2019 0758 by Leticia Araya, RN  Outcome: Improving     Problem: Thought Process Alteration  Goal: Optimal Thought Clarity  Description  Patient will participate in nursing assessment daily.  Patient will show some insight into mental health symptoms.  Patient will remain calm and have no impulsive, aggressive outbursts.      Pt unable to carry on conversation based in reality. Pt asked for coffee this morning, when told that we are out of coffee at the time, pt yelled \" I am God, then asked why staff can't go to the store and get him some.    Pt continued to bang on glass, make loud noises and demand staff to bring him coffee, hairbrush etc. Pt given haldol 10mg at 1026.         10/16/2019 0758 by Leticia Araya, RN  Outcome: Improving     Problem: Fall Injury Risk  Goal: Absence of Fall and Fall-Related Injury  Description  Patient will be free of injury from falls while on the unit.  "     Pt has been free of falls this shift.  10/16/2019 0758 by Leticia Araya, RN  Outcome: Improving    Face to face end of shift report communicated to oncoming RN. Reported that pt is a fall risk.     Leticia Araya, RN  10/16/2019  8:00 AM

## 2019-10-16 NOTE — PLAN OF CARE
Assistant care coordinator Su dixon  called from [facility], and stated that Ger's care coordinator through [facility] is Kira Caicedo [phone]

## 2019-10-16 NOTE — PLAN OF CARE
"Face to face shift report received from MACKENZIE Walsh.     Problem: Adult Behavioral Health Plan of Care  Goal: Patient-Specific Goal (Individualization)  Description  Patient will sleep 6-8 hours each night.  Patient will maintain daily ADLs without prompting.     Weaning Care Plan: 10/14/19 Patient is not appropriate to wean at this time. Treatment team to reassess daily.      10/15/2019 1918 by Adriana Delgado RN  Outcome: No Change   Pt remains in  ICU due to unpredictable and labile behavior. After speaking with screener for [facility] pt became agitated. Observed to be yelling in room and slamming door.  Administered Haldol 10mg PO, after encouragement from staff, at 1700 for agitation. After this pt noticeably calmer. Observed to smile at staff. Speech garbled and hard to understand. Grandiose during conversation, making statements such as he has an \"800 IQ\" and that he is \"one of three gods\". Pt also makes frequent mention of the \"YAHIR\" during conversation. Medication compliant with HS medication after much encouragement. No reports of pain. Good appetite.     Problem: Thought Process Alteration  Goal: Optimal Thought Clarity  Description  Patient will participate in nursing assessment daily.  Patient will show some insight into mental health symptoms.  Patient will remain calm and have no impulsive, aggressive outbursts.    10/15/2019 1918 by Adriana Delgado RN  Outcome: No Change  Poor insight. Agitation this shift.      Problem: Fall Injury Risk  Goal: Absence of Fall and Fall-Related Injury  Description  Patient will be free of injury from falls while on the unit.    10/15/2019 1918 by Adriana Delgado RN  Outcome: Improving  Free from injury this shift.     Face to face end of shift report to be communicated to oncoming RN.            "

## 2019-10-16 NOTE — PROGRESS NOTES
"[facility]  Psychiatric Progress Note      Impression:    This is a 69 year old yo male with a history of schizoaffective disorder bipolar type.    Patient interviewed in room with RN present. He is lying in bed during interview.  Becomes upset about not having his watch and \"have 20/50 vision I can't see that!\" when redirected where he could view time. Stated he was going to \"let the YAHIR know and they'll get you [staff]\".  Conversation is rambling and paranoid in content.  He stated he does not need medication other than the Sun to naturally heal.  When told by staff he had decreased irritability with haldol, patient argumentative about this.  Continues to endorse oral medication as poison and not natural. Unable to wean out of MHICU due to lability , impulsivity and disorganization. Staff report patient was screen by [facility] late yesterday afternoon. Review of records notes patient has taken scheduled and PRN medications. Will change PRN zyprexa and increase haldol to twice a day.     Educated regarding medication indications, risks, benefits, side effects, contraindications and possible interactions. Verbally expressed understanding.        DIagnoses:   Schizoaffective disorder, bipolar type    Attestation:  Patient has been seen and evaluated by me,  GABRIELLE Serrano CNP          Interim History:   The patient's care was discussed with the treatment team and chart notes were reviewed.          Medications:     Current Facility-Administered Medications   Medication     acetaminophen (TYLENOL) tablet 650 mg     alum & mag hydroxide-simethicone (MYLANTA ES/MAALOX  ES) suspension 30 mL     aspirin (ASA) chewable tablet 81 mg     benztropine (COGENTIN) tablet 1 mg     eucerin cream     haloperidol lactate (HALDOL) injection 10 mg    Or     haloperidol (HALDOL) tablet 10 mg     haloperidol (HALDOL) tablet 10 mg     [START ON 10/17/2019] haloperidol (HALDOL) tablet 5 mg     hydrOXYzine (ATARAX) " "tablet 25-50 mg     levothyroxine (SYNTHROID/LEVOTHROID) tablet 50 mcg     LORazepam (ATIVAN) injection 1 mg    Or     LORazepam (ATIVAN) tablet 1 mg     magnesium hydroxide (MILK OF MAGNESIA) suspension 30 mL     multivitamin w/minerals (THERA-VIT-M) tablet 1 tablet     nicotine (NICORETTE) gum 2-4 mg     OLANZapine (zyPREXA) injection 10 mg    Or     OLANZapine zydis (zyPREXA) ODT tab 10 mg     traZODone (DESYREL) tablet 100 mg     No current Epic-ordered outpatient medications on file.      10 point ROS reviewed, negative       Allergies:     Allergies   Allergen Reactions     Peas GI Disturbance     Black eyed peas - vomiting     Haloperidol Anxiety          Psychiatric Examination:   /76   Pulse 76   Temp 97.8  F (36.6  C) (Tympanic)   Resp 14   Ht 1.778 m (5' 10\")   Wt 94.2 kg (207 lb 9.6 oz)   SpO2 95%   BMI 29.79 kg/m    Weight is 207 lbs 9.6 oz  Body mass index is 29.79 kg/m .    Appearance: awake, alert, somewhat disheveled  Attitude: intermittently cooperative  Eye Contact:  fair  Mood: labile  Affect: congruent to mood  Speech:  Mumbling and rambling  Psychomotor Behavior:  no evidence of tardive dyskinesia, dystonia, or tics  Thought Process:  disorganized and illogical, little more linear today  Associations:  loosening of associations present  Thought Content:  no evidence of suicidal ideation or homicidal ideation and patient appears to be responding to internal stimuli, grandiose  Insight:  limited  Judgment:  limited  Oriented to: person, place  Attention Span and Concentration:  limited d/t mental illness  Recent and Remote Memory:  fair  Fund of Knowledge: appropriate for education  Muscle Strength and Tone: normal  Gait and Station: Normal         Labs:     No results found for this or any previous visit (from the past 24 hour(s)).         Plan:   Continue Inpatient Hospitalization  Change Haldol to 5 mg in am and 10 mg at HS  Utilize PRN medications for agitation, decrease prn " times    Petition for commitment and rocah filed, reportedly screen late yesterday afternoon    ELOS: likely > 5 days, for civil commitment process, paranoid and disorganized thoughts along impulsive behaviors

## 2019-10-16 NOTE — PLAN OF CARE
BEHAVIORAL TEAM DISCUSSION     Participants: Naomie Benavidez NP, Meera Hitchcock LSW,  Adriana Lynch LSW, Deon Ladd LSW, [facility] Center[facility], Anali Ralph OT, Ceci Da Silva OT, Mona Graham OT Jessa Mcclure RN, Ayala El RN  Progress: Limited - is complaint with meds, good appetite  Continued Stay Criteria/Rationale: psychosis, disorganized, unpredictable, labile, agitated, yelling, slamming doors, speech garbled and hard to understand, grandiose.   Medical/Physical: hypothyroid- synthroid medication   Precautions:   Falls precaution?: Yes, care plan in place       Behavioral Orders   Procedures     Code 1 - Restrict to Unit     Routine Programming       As clinically indicated     Status 15       Every 15 minutes.      Plan: restart medications (was taking haldol and haldol dec), ACT team follows him, last commit/rocha  in August.  Provider is filing petition for civil commitment and a rocha.  Rational for change in precautions or plan:      Active Problems:    Psychosis (H)    Current Facility-Administered Medications:      acetaminophen (TYLENOL) tablet 650 mg, 650 mg, Oral, Q4H PRN, Arielle Walker, NP     alum & mag hydroxide-simethicone (MYLANTA ES/MAALOX  ES) suspension 30 mL, 30 mL, Oral, Q4H PRN, Arielle Walker, NP, 30 mL at 10/13/19 1905     aspirin (ASA) chewable tablet 81 mg, 81 mg, Oral, Daily, Arielle Walker, NP, 81 mg at 10/16/19 0809     benztropine (COGENTIN) tablet 1 mg, 1 mg, Oral, BID, Arielle Walker, NP, 1 mg at 10/16/19 0809     eucerin cream, , Topical, BID PRN, Arielle Walker, NP     haloperidol lactate (HALDOL) injection 10 mg, 10 mg, Intramuscular, Q6H PRN, 10 mg at 10/13/19 1920 **OR** haloperidol (HALDOL) tablet 10 mg, 10 mg, Oral, Q6H PRN, GranArielle daniel, NP, 10 mg at 10/15/19 1704     haloperidol (HALDOL) tablet 10 mg, 10 mg, Oral, At Bedtime, Aubrey Walkerle, NP, 10 mg at 10/15/19 2215     hydrOXYzine (ATARAX) tablet 25-50 mg, 25-50 mg, Oral, Q4H PRN, Denise,  BERT Guzmán     levothyroxine (SYNTHROID/LEVOTHROID) tablet 50 mcg, 50 mcg, Oral, QAM AC, Arielle Walker NP, 50 mcg at 10/16/19 0809     LORazepam (ATIVAN) injection 1 mg, 1 mg, Intramuscular, Q6H PRN, 1 mg at 10/13/19 1920 **OR** LORazepam (ATIVAN) tablet 1 mg, 1 mg, Oral, Q6H PRN, Arielle Walker NP, 1 mg at 10/16/19 0554     magnesium hydroxide (MILK OF MAGNESIA) suspension 30 mL, 30 mL, Oral, At Bedtime PRN, Arielle Walker NP     multivitamin w/minerals (THERA-VIT-M) tablet 1 tablet, 1 tablet, Oral, Daily, Arielle Walker NP, 1 tablet at 10/16/19 0809     nicotine (NICORETTE) gum 2-4 mg, 2-4 mg, Buccal, Q1H PRN, Arielle Walker NP     OLANZapine (zyPREXA) injection 10 mg, 10 mg, Intramuscular, TID PRN **OR** OLANZapine zydis (zyPREXA) ODT tab 10 mg, 10 mg, Oral, TID PRN, Arielle Walker NP, 10 mg at 10/16/19 0239     traZODone (DESYREL) tablet 100 mg, 100 mg, Oral, At Bedtime, Arielle Walker NP, 100 mg at 10/15/19 3064

## 2019-10-17 PROCEDURE — 25000132 ZZH RX MED GY IP 250 OP 250 PS 637: Performed by: NURSE PRACTITIONER

## 2019-10-17 PROCEDURE — 12400000 ZZH R&B MH

## 2019-10-17 PROCEDURE — 99233 SBSQ HOSP IP/OBS HIGH 50: CPT | Performed by: NURSE PRACTITIONER

## 2019-10-17 RX ADMIN — MULTIPLE VITAMINS W/ MINERALS TAB 1 TABLET: TAB at 08:00

## 2019-10-17 RX ADMIN — ASPIRIN 81 MG 81 MG: 81 TABLET ORAL at 08:01

## 2019-10-17 RX ADMIN — OLANZAPINE 10 MG: 10 TABLET, ORALLY DISINTEGRATING ORAL at 13:03

## 2019-10-17 RX ADMIN — HALOPERIDOL 10 MG: 10 TABLET ORAL at 20:32

## 2019-10-17 RX ADMIN — HALOPERIDOL 5 MG: 5 TABLET ORAL at 08:00

## 2019-10-17 RX ADMIN — LEVOTHYROXINE SODIUM 50 MCG: 25 TABLET ORAL at 06:36

## 2019-10-17 RX ADMIN — ACETAMINOPHEN 650 MG: 325 TABLET, FILM COATED ORAL at 13:03

## 2019-10-17 RX ADMIN — LORAZEPAM 1 MG: 1 TABLET ORAL at 16:12

## 2019-10-17 RX ADMIN — BENZTROPINE MESYLATE 1 MG: 1 TABLET ORAL at 20:32

## 2019-10-17 RX ADMIN — TRAZODONE HYDROCHLORIDE 100 MG: 100 TABLET ORAL at 20:32

## 2019-10-17 RX ADMIN — LORAZEPAM 1 MG: 1 TABLET ORAL at 08:01

## 2019-10-17 RX ADMIN — BENZTROPINE MESYLATE 1 MG: 1 TABLET ORAL at 08:01

## 2019-10-17 ASSESSMENT — ACTIVITIES OF DAILY LIVING (ADL)
ORAL_HYGIENE: INDEPENDENT
DRESS: SCRUBS (BEHAVIORAL HEALTH);INDEPENDENT
ORAL_HYGIENE: INDEPENDENT;PROMPTS
HYGIENE/GROOMING: INDEPENDENT
HYGIENE/GROOMING: INDEPENDENT;PROMPTS
DRESS: SCRUBS (BEHAVIORAL HEALTH)

## 2019-10-17 NOTE — PLAN OF CARE
BEHAVIORAL TEAM DISCUSSION     Participants: Naomie Benavidez NP, Meera Hitchcock LSW,  Adriana Lynch LSW, Deon Ladd LSW, G[facility] Benito [facility], Anali Ralph OT, Mona Graham OT, Ayala El RN  Progress: Limited - is complaint with meds, good appetite  Continued Stay Criteria/Rationale: psychosis, disorganized, unpredictable, labile, agitated, yelling, slamming doors, speech garbled and hard to understand, grandiose. Delusional.  Medical/Physical: hypothyroid- synthroid medication   Precautions:   Falls precaution?: Yes, care plan in place          Behavioral Orders   Procedures    Code 1 - Restrict to Unit    Routine Programming       As clinically indicated    Status 15       Every 15 minutes.      Plan: restart medications (was taking haldol and haldol dec), ACT team follows him, last commit/rocha  in August.  Provider is filing petition for civil commitment and a rocha.  Rational for change in precautions or plan:      Active Problems:    Psychosis (H)      Current Facility-Administered Medications:     acetaminophen (TYLENOL) tablet 650 mg, 650 mg, Oral, Q4H PRN, Arielle Walker NP    alum & mag hydroxide-simethicone (MYLANTA ES/MAALOX  ES) suspension 30 mL, 30 mL, Oral, Q4H PRN, Arielle Walker NP, 30 mL at 10/13/19 1905    aspirin (ASA) chewable tablet 81 mg, 81 mg, Oral, Daily, Arielle Walker NP, 81 mg at 10/17/19 0801    benztropine (COGENTIN) tablet 1 mg, 1 mg, Oral, BID, Arielle Walker NP, 1 mg at 10/17/19 0801    eucerin cream, , Topical, BID PRN, Arielle Walker NP    haloperidol lactate (HALDOL) injection 10 mg, 10 mg, Intramuscular, BID PRN **OR** haloperidol (HALDOL) tablet 10 mg, 10 mg, Oral, BID PRN, Naomie Benavidez Mc, APRN CNP    haloperidol (HALDOL) tablet 10 mg, 10 mg, Oral, At Bedtime, Arielle Walker NP, 10 mg at 10/16/19 2138    haloperidol (HALDOL) tablet 5 mg, 5 mg, Oral, Daily with breakfast, Naomie Benavidez Mc, APRN CNP, 5 mg at 10/17/19 0800    hydrOXYzine (ATARAX)  tablet 25-50 mg, 25-50 mg, Oral, Q4H PRN, Arielle Walker NP    levothyroxine (SYNTHROID/LEVOTHROID) tablet 50 mcg, 50 mcg, Oral, QAM AC, Arielle Walker NP, 50 mcg at 10/17/19 0636    LORazepam (ATIVAN) injection 1 mg, 1 mg, Intramuscular, Q6H PRN, 1 mg at 10/13/19 1920 **OR** LORazepam (ATIVAN) tablet 1 mg, 1 mg, Oral, Q6H PRN, Arielle Walker NP, 1 mg at 10/17/19 0801    magnesium hydroxide (MILK OF MAGNESIA) suspension 30 mL, 30 mL, Oral, At Bedtime PRN, Arielle Walker NP    multivitamin w/minerals (THERA-VIT-M) tablet 1 tablet, 1 tablet, Oral, Daily, Arielle Walker NP, 1 tablet at 10/17/19 0800    nicotine (NICORETTE) gum 2-4 mg, 2-4 mg, Buccal, Q1H PRN, Arielle Walker NP    OLANZapine (zyPREXA) injection 10 mg, 10 mg, Intramuscular, BID PRN **OR** OLANZapine zydis (zyPREXA) ODT tab 10 mg, 10 mg, Oral, BID PRN, Naomie Benavidez Mc, APRN CNP, 10 mg at 10/16/19 1427    traZODone (DESYREL) tablet 100 mg, 100 mg, Oral, At Bedtime, Arielle Walker NP, 100 mg at 10/16/19 2446

## 2019-10-17 NOTE — PROGRESS NOTES
[facility]  Psychiatric Progress Note      Impression:    This is a 69 year old yo male with a history of schizoaffective disorder bipolar type.    Review of chart notes patient has preliminary Court Hearing and Examiner screen scheduled for 10/21/19 and Commitment hearing scheduled for 10/24/19. Patient previously on Civil Commitment. He was observed to sleep approximately 6.75 hours.  Speech continues to be rambling and rapid.  He did receive PRN medication for agitation and anxiety.  Staff observed patient to have mood lability and intermittent anger outbursts.       Review of chart and call from [facility] confirmed patient was to receive Invega Trinza on 10/18/19. Charting notes he agreed to continue Invega CARRASCO and was started on Invega Sustenna 156 mg IM on 9/18/19.  Due to patient tolerating this medication and it being prior to admission, plan to continue Sustenna for four monthly doses prior to considering Trinza start, per Up to Date recommendations.  Will order Invega Sustenna at 234 mg once starting 10/18/19 for every 28 days.    Educated regarding medication indications, risks, benefits, side effects, contraindications and possible interactions. Verbally expressed understanding.        Diagnoses:   Schizoaffective disorder, bipolar type    Attestation:  Patient has been seen and evaluated by me,  GABRIELLE Serrano CNP          Interim History:   The patient's care was discussed with the treatment team and chart notes were reviewed.          Medications:     Current Facility-Administered Medications   Medication     acetaminophen (TYLENOL) tablet 650 mg     alum & mag hydroxide-simethicone (MYLANTA ES/MAALOX  ES) suspension 30 mL     aspirin (ASA) chewable tablet 81 mg     benztropine (COGENTIN) tablet 1 mg     eucerin cream     haloperidol lactate (HALDOL) injection 10 mg    Or     haloperidol (HALDOL) tablet 10 mg     haloperidol (HALDOL) tablet 10 mg     haloperidol (HALDOL) tablet 5 mg      "hydrOXYzine (ATARAX) tablet 25-50 mg     levothyroxine (SYNTHROID/LEVOTHROID) tablet 50 mcg     LORazepam (ATIVAN) injection 1 mg    Or     LORazepam (ATIVAN) tablet 1 mg     magnesium hydroxide (MILK OF MAGNESIA) suspension 30 mL     multivitamin w/minerals (THERA-VIT-M) tablet 1 tablet     nicotine (NICORETTE) gum 2-4 mg     OLANZapine (zyPREXA) injection 10 mg    Or     OLANZapine zydis (zyPREXA) ODT tab 10 mg     [START ON 10/18/2019] paliperidone (INVEGA SUSTENNA) injection CHRISTOS 234 mg     traZODone (DESYREL) tablet 100 mg     No current Epic-ordered outpatient medications on file.      10 point ROS reviewed, negative       Allergies:     Allergies   Allergen Reactions     Peas GI Disturbance     Black eyed peas - vomiting     Haloperidol Anxiety          Psychiatric Examination:   /61   Pulse 79   Temp 98.9  F (37.2  C) (Tympanic)   Resp 16   Ht 1.778 m (5' 10\")   Wt 94.2 kg (207 lb 9.6 oz)   SpO2 94%   BMI 29.79 kg/m    Weight is 207 lbs 9.6 oz  Body mass index is 29.79 kg/m .    Appearance: awake, alert, somewhat disheveled  Attitude: intermittently cooperative  Eye Contact:  fair  Mood: labile  Affect: congruent to mood  Speech:  Mumbling and rambling  Psychomotor Behavior:  no evidence of tardive dyskinesia, dystonia, or tics  Thought Process:  disorganized and illogical  Associations:  loosening of associations present  Thought Content:  no evidence of suicidal ideation or homicidal ideation and patient appears to be responding to internal stimuli, grandiose  Insight:  limited  Judgment:  limited  Oriented to: person, place  Attention Span and Concentration:  limited   Recent and Remote Memory:  fair  Fund of Knowledge: appropriate for education  Muscle Strength and Tone: normal  Gait and Station: Normal         Labs:     No results found for this or any previous visit (from the past 24 hour(s)).         Plan:   Continue Inpatient Hospitalization  Change Haldol to 5 mg in am and 10 mg at " HS  Continue Invega Sustenna at 234 mg IM. Last given on 9/18/19 at 156 mg IM.    Patient has court examination scheduled on 10/21/19 and Commitment hearing on 10/24/19    ELOS: likely > 5 days, for civil commitment process, paranoid and disorganized thoughts along impulsive behaviors

## 2019-10-17 NOTE — PLAN OF CARE
Patient has preliminary Court Hearing and Examiner screening scheduled in [facility] on 10/21/2019 at 2:15 pm     Patient has Commitment Hearing  scheduled 10/24/2019 and time TBD in [facility]

## 2019-10-17 NOTE — PLAN OF CARE
"Problem: Adult Behavioral Health Plan of Care  Goal: Patient-Specific Goal (Individualization)  Description  Patient will sleep 6-8 hours each night.  Patient will maintain daily ADLs without prompting.     Weaning Care Plan: 10/16/19 Patient is not appropriate to wean at this time. Treatment team to reassess daily.       Outcome: No Change  Pt spent most of the shift lying in bed, awake, in the MHICU. Extreme mood lability persists. Pt continues with garbled, rambling speech. Ate 100% of supper meal. Increased agitation and temper outbursts after supper/early evening. PRN Ativan 1 mg PO given at approximately 1810 with fair effect. Slept only approximately 3 hours last night. Did brush his teeth this shift, otherwise presents as disheveled and unkempt.  Received a telephone call from Rhiannon with [facility] and a fax confirming pt's prior to admission medications. Rhiannon did state that pt is due for an injection \"soon,\" but this is not listed on the medication list. According to the Discharge Summary from [facility] on 10/02/19, pt is due for an Invega Trinza injection on 10/18/19.     Rhiannon: [phone]    10/16/19 Received a telephone call from Kelly with the [place] [facility] ([facility]). Per Kelly, pt is on a \"court hold\" as of 1610 today (10/16/19) and he will have a probable cause hearing on Monday (10/21/19). Court will be faxing paperwork with court dates/times.    Problem: Thought Process Alteration  Goal: Optimal Thought Clarity  Description  Patient will participate in nursing assessment daily.  Patient will show some insight into mental health symptoms.  Patient will remain calm and have no impulsive, aggressive outbursts.    Outcome: No Change    Problem: Fall Injury Risk  Goal: Absence of Fall and Fall-Related Injury  Description  Patient will be free of injury from falls while on the unit.    Outcome: No Change  Gait is steady when up in room and MHICU. Pt has remained free from " fall(s) and/or injury this shift.

## 2019-10-17 NOTE — PLAN OF CARE
Face to face end of shift report received from Cody. Rounding completed. Patient observed.     Parviz Lewis RN  10/17/2019  12:23 AM    Problem: Adult Behavioral Health Plan of Care  Goal: Patient-Specific Goal (Individualization)  Description  Patient will sleep 6-8 hours each night.  Patient will maintain daily ADLs without prompting.     Weaning Care Plan: 10/16/19 Patient is not appropriate to wean at this time. Treatment team to reassess daily.       10/17/2019 0023 by Parviz Lewis RN  Outcome: No Change   Pt has been in bed with eyes closed and regular respirations observed all night. Will continue to monitor.    Problem: Fall Injury Risk  Goal: Absence of Fall and Fall-Related Injury  Description  Patient will be free of injury from falls while on the unit.    10/17/2019 0023 by Parviz Lewis RN  Outcome: Improving   Will continue to monitor.    Face to face end of shift report will be given to oncoming RN.     Parviz Lewis RN  10/17/2019  6:34 AM

## 2019-10-17 NOTE — PLAN OF CARE
Problem: Adult Behavioral Health Plan of Care  Goal: Patient-Specific Goal (Individualization)  Description  Patient will sleep 6-8 hours each night.  Patient will maintain daily ADLs without prompting.     Weaning Care Plan: 10/16/19 Patient is not appropriate to wean at this time. Treatment team to reassess daily.       10/17/2019 0946 by Florida Dorantes RN  Outcome: No Change  Note:   Pt. Slept 6.75 hours last night, refusing to shower at this time, is not appropriate to wean to open unit due to unpredictable behaviors, will continue to monitor, has been resting in bed most of morning, ate 100% of breakfast, speech is mumbled, rambling and rapid, difficult to understand most of the time.  0801- Pt. Medicated with ativan 1 mg po for anxiety/agitation.  1303- Pt. Requested/received tylenol 650 mg po for tooth pain, unable to rate.  Medicated with zyprexa 10 mg po for yelling out/agitation.     Problem: Thought Process Alteration  Goal: Optimal Thought Clarity  Description  Patient will participate in nursing assessment daily.  Patient will show some insight into mental health symptoms.  Patient will remain calm and have no impulsive, aggressive outbursts.    10/17/2019 0946 by Florida Dorantes RN  Outcome: No Change   Pt. Knocks on window occasionally so far this shift, occasional loud voice, no aggressive behaviors yet this shift, no insight into mental illness.  Problem: Fall Injury Risk  Goal: Absence of Fall and Fall-Related Injury  Description  Patient will be free of injury from falls while on the unit.    10/17/2019 0946 by Floriad Dorantes RN  Outcome: Improving   Pt. Has remained free from falls, gait is slow and steady.  Face to face end of shift report will be communicated to oncoming afternoon shift RN.     Florida Dorantes RN  10/17/2019  11:28 AM

## 2019-10-18 PROCEDURE — 25000132 ZZH RX MED GY IP 250 OP 250 PS 637: Performed by: NURSE PRACTITIONER

## 2019-10-18 PROCEDURE — 12400000 ZZH R&B MH

## 2019-10-18 PROCEDURE — 99233 SBSQ HOSP IP/OBS HIGH 50: CPT | Performed by: NURSE PRACTITIONER

## 2019-10-18 PROCEDURE — 25000128 H RX IP 250 OP 636: Performed by: NURSE PRACTITIONER

## 2019-10-18 RX ADMIN — HALOPERIDOL 10 MG: 10 TABLET ORAL at 20:26

## 2019-10-18 RX ADMIN — PALIPERIDONE PALMITATE 234 MG: 234 INJECTION INTRAMUSCULAR at 08:57

## 2019-10-18 RX ADMIN — Medication: at 13:57

## 2019-10-18 RX ADMIN — ASPIRIN 81 MG 81 MG: 81 TABLET ORAL at 08:03

## 2019-10-18 RX ADMIN — BENZTROPINE MESYLATE 1 MG: 1 TABLET ORAL at 08:03

## 2019-10-18 RX ADMIN — LEVOTHYROXINE SODIUM 50 MCG: 25 TABLET ORAL at 06:03

## 2019-10-18 RX ADMIN — OLANZAPINE 10 MG: 10 TABLET, ORALLY DISINTEGRATING ORAL at 13:51

## 2019-10-18 RX ADMIN — BENZTROPINE MESYLATE 1 MG: 1 TABLET ORAL at 20:27

## 2019-10-18 RX ADMIN — ACETAMINOPHEN 650 MG: 325 TABLET, FILM COATED ORAL at 02:19

## 2019-10-18 RX ADMIN — ACETAMINOPHEN 650 MG: 325 TABLET, FILM COATED ORAL at 06:23

## 2019-10-18 RX ADMIN — TRAZODONE HYDROCHLORIDE 100 MG: 100 TABLET ORAL at 20:26

## 2019-10-18 RX ADMIN — HALOPERIDOL 5 MG: 5 TABLET ORAL at 08:03

## 2019-10-18 RX ADMIN — MULTIPLE VITAMINS W/ MINERALS TAB 1 TABLET: TAB at 08:03

## 2019-10-18 RX ADMIN — LORAZEPAM 1 MG: 1 TABLET ORAL at 05:33

## 2019-10-18 ASSESSMENT — ACTIVITIES OF DAILY LIVING (ADL)
HYGIENE/GROOMING: INDEPENDENT;PROMPTS
ORAL_HYGIENE: INDEPENDENT;PROMPTS
DRESS: SCRUBS (BEHAVIORAL HEALTH);INDEPENDENT;PROMPTS
ORAL_HYGIENE: PROMPTS
DRESS: SCRUBS (BEHAVIORAL HEALTH)
HYGIENE/GROOMING: PROMPTS

## 2019-10-18 NOTE — PROGRESS NOTES
"[facility]  Psychiatric Progress Note      Impression:    This is a 69 year old yo male with a history of schizoaffective disorder bipolar type.    Patient interviewed and continues to be labile.  He was observed by staff to hit mirror in room, delusional about there being something/someone behind it.  He is disheveled and demanding coffee.  Patient did take Invega Prolixin today and reports injection site is sore.  When asked if he had taken oral Invega in past when had gotten his shot, stated \"I should be taking oral\" then demonstrates sniffing/snorting imaginary substance from his hand.  Some sleeping noted. Appetite is good.    Educated regarding medication indications, risks, benefits, side effects, contraindications and possible interactions. Verbally expressed understanding.        Diagnoses:   Schizoaffective disorder, bipolar type    Attestation:  Patient has been seen and evaluated by me,  GABRIELLE Serrano CNP          Interim History:   The patient's care was discussed with the treatment team and chart notes were reviewed.          Medications:     Current Facility-Administered Medications   Medication     acetaminophen (TYLENOL) tablet 650 mg     alum & mag hydroxide-simethicone (MYLANTA ES/MAALOX  ES) suspension 30 mL     aspirin (ASA) chewable tablet 81 mg     benztropine (COGENTIN) tablet 1 mg     eucerin cream     haloperidol lactate (HALDOL) injection 10 mg    Or     haloperidol (HALDOL) tablet 10 mg     haloperidol (HALDOL) tablet 10 mg     haloperidol (HALDOL) tablet 5 mg     hydrOXYzine (ATARAX) tablet 25-50 mg     levothyroxine (SYNTHROID/LEVOTHROID) tablet 50 mcg     LORazepam (ATIVAN) injection 1 mg    Or     LORazepam (ATIVAN) tablet 1 mg     magnesium hydroxide (MILK OF MAGNESIA) suspension 30 mL     multivitamin w/minerals (THERA-VIT-M) tablet 1 tablet     nicotine (NICORETTE) gum 2-4 mg     OLANZapine (zyPREXA) injection 10 mg    Or     OLANZapine zydis (zyPREXA) ODT tab 10 " "mg     paliperidone (INVEGA SUSTENNA) injection CHRISTOS 234 mg     traZODone (DESYREL) tablet 100 mg     No current Epic-ordered outpatient medications on file.      10 point ROS reviewed, negative       Allergies:     Allergies   Allergen Reactions     Peas GI Disturbance     Black eyed peas - vomiting     Haloperidol Anxiety          Psychiatric Examination:   /73   Pulse 75   Temp 97.1  F (36.2  C) (Tympanic)   Resp 14   Ht 1.778 m (5' 10\")   Wt 94.2 kg (207 lb 9.6 oz)   SpO2 97%   BMI 29.79 kg/m    Weight is 207 lbs 9.6 oz  Body mass index is 29.79 kg/m .    Appearance: awake, alert, somewhat disheveled  Attitude: intermittently cooperative  Eye Contact:  fair  Mood: labile  Affect: congruent to mood  Speech:  Mumbling and rambling  Psychomotor Behavior:  no evidence of tardive dyskinesia, dystonia, or tics  Thought Process:  disorganized and illogical  Associations:  loosening of associations present  Thought Content:  no evidence of suicidal ideation or homicidal ideation and patient appears to be responding to internal stimuli, grandiose  Insight:  limited  Judgment:  limited  Oriented to: person, place  Attention Span and Concentration:  limited   Recent and Remote Memory:  fair  Fund of Knowledge: appropriate for education  Muscle Strength and Tone: normal  Gait and Station: Normal         Labs:     No results found for this or any previous visit (from the past 24 hour(s)).         Plan:   Continue Inpatient Hospitalization  Continue Haldol 5 mg in am and 10 mg at HS  Continue Invega Sustenna at 234 mg IM every days. Next dose due 11/14/19    Patient has court examination scheduled on 10/21/19 and Commitment hearing on 10/24/19    ELOS: likely > 5 days, for civil commitment process, paranoid and disorganized thoughts along impulsive behaviors    "

## 2019-10-18 NOTE — PLAN OF CARE
"Face to face end of shift report received from Rae COBB RN. Rounding completed. Patient observed.     Parviz Lewis RN  10/18/2019  12:27 AM    Problem: Adult Behavioral Health Plan of Care  Goal: Patient-Specific Goal (Individualization)  Description  Patient will sleep 6-8 hours each night.  Patient will maintain daily ADLs without prompting.     Weaning Care Plan: 10/17/19 Patient is not appropriate to wean at this time. Treatment team to reassess daily.        10/18/2019 0027 by Parviz Lewis RN  Outcome: No Change  Note:   Pt was resting in bed with eyes closed and respirations regular at the beginning of this shift.    0219- Pt reported having tooth pain. Unable to understand what pt would rate it as but did request PRN tylenol for he pain which he did receive.      Pt returned to bed shortly after receiving PRN medication. Pt remained in bed until 0530 and at this time pt requested more pain medication for tooth pain. Pt was informed that he did not had any other medications for pain at this time. Pt was okay with this. Pt did hit his mirror and when staff asked what he hit his mirror for he stated that he was not \"going to hurt nobody.\" He showed staff mirror and mumbled about something being behind his mirror and when he \"knocked\" on the mirror something moved. Pt was given PRN ativan at 0553.     0623- Pt received PRN tylenol for 3 out of 10 upper right tooth pain.     Pt did not having any episodes of yelling like prior nights.     Problem: Fall Injury Risk  Goal: Absence of Fall and Fall-Related Injury  Description  Patient will be free of injury from falls while on the unit.    10/18/2019 0027 by Parviz Lewis RN  Outcome: Improving   Will continue to monitor.     Face to face end of shift report will be given to jannet MANN.     Parviz Lewis RN  10/18/2019  5:59 AM        "

## 2019-10-18 NOTE — PLAN OF CARE
Problem: Adult Behavioral Health Plan of Care  Goal: Patient-Specific Goal (Individualization)  Description  Patient will sleep 6-8 hours each night.  Patient will maintain daily ADLs without prompting.     Weaning Care Plan: 10/17/19 Patient is not appropriate to wean at this time. Treatment team to reassess daily.        10/17/2019 2200 by Rae Gonzalez RN  Outcome: No Change  Pt spent much of the shift in his room in the MHICU. Did not wean to the open unit due to continued extreme mood lability and unpredictable behavior. PRN Ativan 1 mg PO given at approximately 1615 with fair effect. Compliant with medications as prescribed. Pt ate 100% of supper meal. Slept approximately 6.75 hours last night. Remains disheveled and unkempt. Declined shower supplies and prompts from staff to shower.     Problem: Thought Process Alteration  Goal: Optimal Thought Clarity  Description  Patient will participate in nursing assessment daily.  Patient will show some insight into mental health symptoms.  Patient will remain calm and have no impulsive, aggressive outbursts.    10/17/2019 2200 by Rae Gonzalez RN  Outcome: No Change  Continued mumbled, rambling speech. Mood lability persists. Intermittent, explosive outbursts continue.    Problem: Fall Injury Risk  Goal: Absence of Fall and Fall-Related Injury  Description  Patient will be free of injury from falls while on the unit.    10/17/2019 2200 by Rae Gonzalez, RN  Outcome: No Change  Gait remains steady and balanced. Pt has remained free from fall(s) and/or injury this shift.

## 2019-10-18 NOTE — PLAN OF CARE
Left message with Ruddy Henry - patient's [facility]  concerning patient hearing for Monday and transport.    Faxed Ruddy Henry - patient's [facility]  updated notes.    [facility] will be her 10/21/2019 between 10:00 am and 11:00 am to pick patient up for court.

## 2019-10-18 NOTE — PLAN OF CARE
Problem: Thought Process Alteration  Goal: Optimal Thought Clarity  Description  Patient will participate in nursing assessment daily.  Patient will show some insight into mental health symptoms.  Patient will remain calm and have no impulsive, aggressive outbursts.    10/18/2019 0926 by Florida Dorantes RN  Outcome: No Change   Pt. Semi cooperative with assessments, irritable at times, shows no insight into his mental illness,  Has had no behavioral outbursts yet this shift.  Problem: Fall Injury Risk  Goal: Absence of Fall and Fall-Related Injury  Description  Patient will be free of injury from falls while on the unit.    10/18/2019 0926 by Florida Dorantes RN  Outcome: Improving   Pt. Has remained free from falls, gait is slow and steady.  Face to face end of shift report will be communicated to oncoming afternoon shift RN.     Florida Dorantes RN  10/18/2019  11:08 AM

## 2019-10-18 NOTE — PLAN OF CARE
BEHAVIORAL TEAM DISCUSSION    Participants: Naomie Benavidez NP, Meera Hitchcock LSW,  Adriana Lynch LSW, Deon Ladd LSW , Nitza Mathis RN, [facility], Anali Ralph OT, Ceci Da Silva OT, Mona Graham OT, Chapito Cardozo [facility][note 3]   Progress: no change   Continued Stay Criteria/Rationale: mumbling, disorganized, disheveled, psychosis   Medical/Physical: hypothyproid  Precautions:   Falls precaution?: YES care planned   Behavioral Orders   Procedures    Code 1 - Restrict to Unit    Routine Programming     As clinically indicated    Status 15     Every 15 minutes.     Plan: [facility] medical examiner meeting on Monday, Has commitment court next week.   Rationale for change in precautions or plan:     Principal Problem:    Schizoaffective disorder, bipolar type (H)    Active Problems:    Psychosis (H)    Current Facility-Administered Medications:     acetaminophen (TYLENOL) tablet 650 mg, 650 mg, Oral, Q4H PRN, Arielle Walker, BERT, 650 mg at 10/18/19 0623    alum & mag hydroxide-simethicone (MYLANTA ES/MAALOX  ES) suspension 30 mL, 30 mL, Oral, Q4H PRN, Arielle Walker, NP, 30 mL at 10/13/19 1905    aspirin (ASA) chewable tablet 81 mg, 81 mg, Oral, Daily, Arielle Walker NP, 81 mg at 10/18/19 0803    benztropine (COGENTIN) tablet 1 mg, 1 mg, Oral, BID, Arielle Walker, BERT, 1 mg at 10/18/19 0803    eucerin cream, , Topical, BID PRN, Arielle Walker NP    haloperidol lactate (HALDOL) injection 10 mg, 10 mg, Intramuscular, BID PRN **OR** haloperidol (HALDOL) tablet 10 mg, 10 mg, Oral, BID PRN, Naomie Benavidez Mc, APRN CNP    haloperidol (HALDOL) tablet 10 mg, 10 mg, Oral, At Bedtime, Arielle Walker NP, 10 mg at 10/17/19 2032    haloperidol (HALDOL) tablet 5 mg, 5 mg, Oral, Daily with breakfast, Naomie Benavidez Mc, APRN CNP, 5 mg at 10/18/19 0803    hydrOXYzine (ATARAX) tablet 25-50 mg, 25-50 mg, Oral, Q4H PRN, Arielle Walker, NP    levothyroxine (SYNTHROID/LEVOTHROID) tablet 50 mcg, 50 mcg, Oral, QAM AC,  Arielle Walker NP, 50 mcg at 10/18/19 0603    LORazepam (ATIVAN) injection 1 mg, 1 mg, Intramuscular, Q6H PRN, 1 mg at 10/13/19 1920 **OR** LORazepam (ATIVAN) tablet 1 mg, 1 mg, Oral, Q6H PRN, Arielle Walker NP, 1 mg at 10/18/19 0533    magnesium hydroxide (MILK OF MAGNESIA) suspension 30 mL, 30 mL, Oral, At Bedtime PRN, Arielle Walker NP    multivitamin w/minerals (THERA-VIT-M) tablet 1 tablet, 1 tablet, Oral, Daily, Arielle Walker NP, 1 tablet at 10/18/19 0803    nicotine (NICORETTE) gum 2-4 mg, 2-4 mg, Buccal, Q1H PRN, Arielle Walker NP    OLANZapine (zyPREXA) injection 10 mg, 10 mg, Intramuscular, BID PRN **OR** OLANZapine zydis (zyPREXA) ODT tab 10 mg, 10 mg, Oral, BID PRN, Naomie Benavidez Mc, APRN CNP, 10 mg at 10/17/19 1303    paliperidone (INVEGA SUSTENNA) injection CHRISTOS 234 mg, 234 mg, Intramuscular, Q28 Days, Naomie Benavidez Mc, APRN CNP, 234 mg at 10/18/19 0857    traZODone (DESYREL) tablet 100 mg, 100 mg, Oral, At Bedtime, Arielle Walker NP, 100 mg at 10/17/19 2032

## 2019-10-19 PROCEDURE — 25000132 ZZH RX MED GY IP 250 OP 250 PS 637: Performed by: NURSE PRACTITIONER

## 2019-10-19 PROCEDURE — 12400000 ZZH R&B MH

## 2019-10-19 RX ADMIN — MULTIPLE VITAMINS W/ MINERALS TAB 1 TABLET: TAB at 08:19

## 2019-10-19 RX ADMIN — LEVOTHYROXINE SODIUM 50 MCG: 25 TABLET ORAL at 06:32

## 2019-10-19 RX ADMIN — ASPIRIN 81 MG 81 MG: 81 TABLET ORAL at 08:20

## 2019-10-19 RX ADMIN — TRAZODONE HYDROCHLORIDE 100 MG: 100 TABLET ORAL at 21:19

## 2019-10-19 RX ADMIN — BENZTROPINE MESYLATE 1 MG: 1 TABLET ORAL at 08:20

## 2019-10-19 RX ADMIN — BENZTROPINE MESYLATE 1 MG: 1 TABLET ORAL at 21:20

## 2019-10-19 RX ADMIN — HALOPERIDOL 10 MG: 10 TABLET ORAL at 21:19

## 2019-10-19 RX ADMIN — HALOPERIDOL 5 MG: 5 TABLET ORAL at 08:19

## 2019-10-19 ASSESSMENT — ACTIVITIES OF DAILY LIVING (ADL)
DRESS: SCRUBS (BEHAVIORAL HEALTH)
ORAL_HYGIENE: PROMPTS
DRESS: SCRUBS (BEHAVIORAL HEALTH)
LAUNDRY: UNABLE TO COMPLETE
LAUNDRY: UNABLE TO COMPLETE
HYGIENE/GROOMING: PROMPTS
ORAL_HYGIENE: PROMPTS
HYGIENE/GROOMING: PROMPTS

## 2019-10-19 NOTE — PLAN OF CARE
Problem: Adult Behavioral Health Plan of Care  Goal: Patient-Specific Goal (Individualization)  Description  Patient will sleep 6-8 hours each night.  Patient will maintain daily ADLs without prompting.     Weaning Care Plan: 10/17/19 Patient is not appropriate to wean at this time. Treatment team to reassess daily.        10/18/2019 2313 by Rae Gonzalez, RN  Outcome: No Change  Pt spent much of the shift in his bed in the MHICU. Isolative and withdrawn. Irritable and minimally cooperative with assessment. Decreased mood lability this evening. Denied pain. Ate 100% of supper meal. Remains disheveled and unkempt in appearance. Compliant with medications as prescribed. Slept approximately 5 hours last night.     Problem: Thought Process Alteration  Goal: Optimal Thought Clarity  Description  Patient will participate in nursing assessment daily.  Patient will show some insight into mental health symptoms.  Patient will remain calm and have no impulsive, aggressive outbursts.    10/18/2019 2313 by Rae Gonzalez, RN  Outcome: No Change  Speech remains mumbled/garbled and rambling. Minimally cooperative with assessment.     Problem: Fall Injury Risk  Goal: Absence of Fall and Fall-Related Injury  Description  Patient will be free of injury from falls while on the unit.    10/18/2019 2313 by Rae Gonzalez RN  Outcome: No Change  Gait is steady and balanced when up in room. Pt remained free from fall(s) and/or injury this shift.

## 2019-10-19 NOTE — PLAN OF CARE
Face to face end of shift report will be communicated to oncoming RN.     Problem: Adult Behavioral Health Plan of Care  Goal: Patient-Specific Goal (Individualization)  Description  Patient will sleep 6-8 hours each night.  Patient will maintain daily ADLs without prompting.     Weaning Care Plan: 10/17/19 Patient is not appropriate to wean at this time. Treatment team to reassess daily.        10/19/2019 0618 by Marium Daniel RN  Outcome: No Change     Problem: Fall Injury Risk  Goal: Absence of Fall and Fall-Related Injury  Description  Patient will be free of injury from falls while on the unit.    10/19/2019 0618 by Marium Daniel RN  Outcome: Improving    Pt awake most of the night. Pt slept 1.5 hours. Pt is pleasant. Pt appears to be responding. 15 minutes and PRN safety checks completed with no noted complains. No falls noted or reported so far this shift. Will continue to monitor.

## 2019-10-19 NOTE — PLAN OF CARE
Face to face end of shift report obtained from MACKENZIE Mcbride. Pt observed awake resting in bed.     KAITLIN JAMES RN  10/19/2019  12:32 AM

## 2019-10-19 NOTE — PLAN OF CARE
"Face to face end of shift report communicated to oncoming evening shift RN.     Marilu Cox RN  10/19/2019  3:09 PM            1400- pt apologized to writer for his behavior this morning.  He seemed a little more clear and was sitting up in bed.      Problem: Adult Behavioral Health Plan of Care  Goal: Patient-Specific Goal (Individualization)  Description  Patient will sleep 6-8 hours each night.  Patient will maintain daily ADLs without prompting.     Weaning Care Plan: 10/17/19 Patient is not appropriate to wean at this time. Treatment team to reassess daily.        10/19/2019 1410 by Marilu Cox RN  Note:   Pt was not compliant with nursing assessment.  He stayed in bed all of day shift.  He made a mess on the floor with juice and food.  Pt told writer to \"get out of my room.\"  He is irritable.         Problem: Fall Injury Risk  Goal: Absence of Fall and Fall-Related Injury  Description  Patient will be free of injury from falls while on the unit.    10/19/2019 1410 by Marilu Cox, RN  Note:   Pt did not fall this day shift.      "

## 2019-10-20 PROCEDURE — 25000132 ZZH RX MED GY IP 250 OP 250 PS 637: Performed by: NURSE PRACTITIONER

## 2019-10-20 PROCEDURE — 12400000 ZZH R&B MH

## 2019-10-20 PROCEDURE — 99233 SBSQ HOSP IP/OBS HIGH 50: CPT | Performed by: NURSE PRACTITIONER

## 2019-10-20 RX ADMIN — MULTIPLE VITAMINS W/ MINERALS TAB 1 TABLET: TAB at 08:17

## 2019-10-20 RX ADMIN — HALOPERIDOL 10 MG: 10 TABLET ORAL at 20:58

## 2019-10-20 RX ADMIN — HALOPERIDOL 5 MG: 5 TABLET ORAL at 08:17

## 2019-10-20 RX ADMIN — ASPIRIN 81 MG 81 MG: 81 TABLET ORAL at 08:17

## 2019-10-20 RX ADMIN — TRAZODONE HYDROCHLORIDE 100 MG: 100 TABLET ORAL at 20:58

## 2019-10-20 RX ADMIN — BENZTROPINE MESYLATE 1 MG: 1 TABLET ORAL at 20:58

## 2019-10-20 RX ADMIN — BENZTROPINE MESYLATE 1 MG: 1 TABLET ORAL at 08:17

## 2019-10-20 RX ADMIN — LEVOTHYROXINE SODIUM 50 MCG: 25 TABLET ORAL at 06:31

## 2019-10-20 ASSESSMENT — MIFFLIN-ST. JEOR: SCORE: 1726.99

## 2019-10-20 ASSESSMENT — ACTIVITIES OF DAILY LIVING (ADL)
DRESS: SCRUBS (BEHAVIORAL HEALTH)
LAUNDRY: UNABLE TO COMPLETE
DRESS: SCRUBS (BEHAVIORAL HEALTH)
LAUNDRY: UNABLE TO COMPLETE
HYGIENE/GROOMING: PROMPTS
ORAL_HYGIENE: PROMPTS
ORAL_HYGIENE: PROMPTS
HYGIENE/GROOMING: PROMPTS

## 2019-10-20 NOTE — PLAN OF CARE
Problem: Adult Behavioral Health Plan of Care  Goal: Patient-Specific Goal (Individualization)  Description  Patient will sleep 6-8 hours each night.  Patient will maintain daily ADLs without prompting.     Weaning Care Plan: 10/17/19 Patient is not appropriate to wean at this time. Treatment team to reassess daily.        10/19/2019 2002 by Judith Padilla, RN  Outcome: No Change  Note:   Problem: Thought Process Alteration  Goal: Optimal Thought Clarity  Description  Patient will participate in nursing assessment daily.  Patient will show some insight into mental health symptoms.  Patient will remain calm and have no impulsive, aggressive outbursts.    10/19/2019 2002 by Judith Padilla, RN  Outcome: No Change  Rambles Disheveled Denies SI or depression  Disorganized thinking      Problem: Fall Injury Risk  Goal: Absence of Fall and Fall-Related Injury  Description  Patient will be free of injury from falls while on the unit.    10/19/2019 2002 by Judith Padilla, RN  Outcome: Improving   Steady on feet  No outbursts

## 2019-10-20 NOTE — PLAN OF CARE
Face to face end of shift report obtained from MACKENZIE Wong. Pt observed resting in bed.    KAITLIN JAMES RN  10/20/2019  12:24 AM

## 2019-10-20 NOTE — PLAN OF CARE
"  Problem: Adult Behavioral Health Plan of Care  Goal: Patient-Specific Goal (Individualization)  Description  Patient will sleep 6-8 hours each night.  Patient will maintain daily ADLs without prompting.     Weaning Care Plan: 10/20/19 Patient is not appropriate to wean at this time. Treatment team to reassess daily.    Patient labile and irritable. Speech is garbled with flight of ideas. Denies all criteria. States \"here comes the Haldol train, I don't like you, you give me Haldol\". Did take his medications as prescribed. Has been appropriate with staff and peers.     1345-patient in bed, sleeping.     Face to face end of shift report communicated to oncoming shift RN.     Mouna Meyer RN  10/20/2019  2:42 PM              10/20/2019 1135 by Mouna Meyer RN  Outcome: No Change    Problem: Thought Process Alteration  Goal: Optimal Thought Clarity  Description  Patient will participate in nursing assessment daily.  Patient will show some insight into mental health symptoms.  Patient will remain calm and have no impulsive, aggressive outbursts.      Patient paranoid and delusional, has no real insight, but has remained in control of impulsive and aggressive behaviors.   10/20/2019 1135 by Mouna Meyer RN  Outcome: No Change     Problem: Fall Injury Risk  Goal: Absence of Fall and Fall-Related Injury  Description  Patient will be free of injury from falls while on the unit.      Patient has been steady on his feet, has remained free from falls.  10/20/2019 1135 by Mouna Meyer RN  Outcome: Improving            "

## 2019-10-20 NOTE — PROGRESS NOTES
"[facility]  Psychiatric Progress Note      Impression:    This is a 69 year old yo male with a history of schizoaffective disorder bipolar type.    Patient interviewed in his room in MHICU with RN present.  He is more alert and awake today.  Pleasant and cooperative during interview, unlike prior conversations.  Patient continues to have illogical thoughts, stated \"I cured Muscular Dystrophy - I'm not a doctor but after you hear this you are going to think I am\".  At one point raises fist in air and yells at unseen person.  Stated the \"YAHIR caused this years ago\".  Although denied auditory and visual hallucinations, patient does appear to be responding to internal stimuli.  Encouraged to be up and out of bed more. Appetite is good. Multiple empty coffee and juice cups observed in room. Patient has been sleeping at night. Denies side effects of medications when asked and none observed by this writer.     Educated regarding medication indications, risks, benefits, side effects, contraindications and possible interactions. Verbally expressed understanding.        Diagnoses:   Schizoaffective disorder, bipolar type    Attestation:  Patient has been seen and evaluated by me,  GABRIELLE Serrano CNP          Interim History:   The patient's care was discussed with the treatment team and chart notes were reviewed.          Medications:     Current Facility-Administered Medications   Medication     acetaminophen (TYLENOL) tablet 650 mg     alum & mag hydroxide-simethicone (MYLANTA ES/MAALOX  ES) suspension 30 mL     aspirin (ASA) chewable tablet 81 mg     benztropine (COGENTIN) tablet 1 mg     eucerin cream     haloperidol lactate (HALDOL) injection 10 mg    Or     haloperidol (HALDOL) tablet 10 mg     haloperidol (HALDOL) tablet 10 mg     haloperidol (HALDOL) tablet 5 mg     hydrOXYzine (ATARAX) tablet 25-50 mg     levothyroxine (SYNTHROID/LEVOTHROID) tablet 50 mcg     LORazepam (ATIVAN) injection 1 mg    Or " "    LORazepam (ATIVAN) tablet 1 mg     magnesium hydroxide (MILK OF MAGNESIA) suspension 30 mL     multivitamin w/minerals (THERA-VIT-M) tablet 1 tablet     nicotine (NICORETTE) gum 2-4 mg     OLANZapine (zyPREXA) injection 10 mg    Or     OLANZapine zydis (zyPREXA) ODT tab 10 mg     paliperidone (INVEGA SUSTENNA) injection CHRISTOS 234 mg     traZODone (DESYREL) tablet 100 mg     No current Epic-ordered outpatient medications on file.      10 point ROS reviewed, negative       Allergies:     Allergies   Allergen Reactions     Peas GI Disturbance     Black eyed peas - vomiting     Haloperidol Anxiety          Psychiatric Examination:   BP 94/63   Pulse 76   Temp 97.5  F (36.4  C) (Tympanic)   Resp 14   Ht 1.778 m (5' 10\")   Wt 95.1 kg (209 lb 9.6 oz)   SpO2 95%   BMI 30.07 kg/m    Weight is 209 lbs 9.6 oz  Body mass index is 30.07 kg/m .    Appearance: awake, alert, somewhat disheveled  Attitude: cooperative  Eye Contact:  fair, improved  Mood: labile  Affect: congruent to mood  Speech:  Mumbling  Psychomotor Behavior:  no evidence of tardive dyskinesia, dystonia, or tics  Thought Process:  illogical and tangental  Associations:  loosening of associations present  Thought Content:  no evidence of suicidal ideation or homicidal ideation and patient appears to be responding to internal stimuli, grandiose  Insight:  limited  Judgment:  limited  Oriented to: person, place  Attention Span and Concentration:  limited   Recent and Remote Memory:  fair  Fund of Knowledge: appropriate for education  Muscle Strength and Tone: normal  Gait and Station: Normal         Labs:     No results found for this or any previous visit (from the past 24 hour(s)).         Plan:   Continue Inpatient Hospitalization  Continue Haldol 5 mg in am and 10 mg at HS  Continue Invega Sustenna at 234 mg IM every days. Next dose due 11/14/19    Patient has court examination scheduled on 10/21/19 and Commitment hearing on 10/24/19    ELOS: likely > " 5 days, for civil commitment process, paranoid and disorganized thoughts along impulsive behaviors

## 2019-10-20 NOTE — PLAN OF CARE
Face to face end of shift report will be communicated to oncoming RN.      Problem: Adult Behavioral Health Plan of Care  Goal: Patient-Specific Goal (Individualization)  Description  Patient will sleep 6-8 hours each night.  Patient will maintain daily ADLs without prompting.     Weaning Care Plan: 10/17/19 Patient is not appropriate to wean at this time. Treatment team to reassess daily.        10/20/2019 0451 by Marium Daniel RN  Outcome: No Change     Problem: Thought Process Alteration  Goal: Optimal Thought Clarity  Description  Patient will participate in nursing assessment daily.  Patient will show some insight into mental health symptoms.  Patient will remain calm and have no impulsive, aggressive outbursts.    10/20/2019 0451 by Marium Daniel RN  Outcome: No Change     Problem: Fall Injury Risk  Goal: Absence of Fall and Fall-Related Injury  Description  Patient will be free of injury from falls while on the unit.    10/20/2019 0451 by Marium Daniel RN  Outcome: Improving   Pt appears to be responding. Pt resting in bed having regular respirations and position changes. Pt slept approximately 3.5 hours. 15 minutes and PRN safety checks completed with no noted complains with exception of requesting snack. Will continue to monitor.

## 2019-10-21 PROCEDURE — 12400000 ZZH R&B MH

## 2019-10-21 PROCEDURE — 25000132 ZZH RX MED GY IP 250 OP 250 PS 637: Performed by: NURSE PRACTITIONER

## 2019-10-21 RX ADMIN — ASPIRIN 81 MG 81 MG: 81 TABLET ORAL at 08:20

## 2019-10-21 RX ADMIN — HALOPERIDOL 5 MG: 5 TABLET ORAL at 08:20

## 2019-10-21 RX ADMIN — MULTIPLE VITAMINS W/ MINERALS TAB 1 TABLET: TAB at 08:20

## 2019-10-21 RX ADMIN — BENZTROPINE MESYLATE 1 MG: 1 TABLET ORAL at 21:00

## 2019-10-21 RX ADMIN — LEVOTHYROXINE SODIUM 50 MCG: 25 TABLET ORAL at 06:32

## 2019-10-21 RX ADMIN — HALOPERIDOL 10 MG: 10 TABLET ORAL at 21:00

## 2019-10-21 RX ADMIN — BENZTROPINE MESYLATE 1 MG: 1 TABLET ORAL at 08:20

## 2019-10-21 RX ADMIN — TRAZODONE HYDROCHLORIDE 100 MG: 100 TABLET ORAL at 21:00

## 2019-10-21 ASSESSMENT — ACTIVITIES OF DAILY LIVING (ADL)
ORAL_HYGIENE: INDEPENDENT;PROMPTS
LAUNDRY: UNABLE TO COMPLETE
HYGIENE/GROOMING: INDEPENDENT;PROMPTS
HYGIENE/GROOMING: INDEPENDENT
DRESS: SCRUBS (BEHAVIORAL HEALTH);INDEPENDENT
ORAL_HYGIENE: INDEPENDENT

## 2019-10-21 NOTE — PLAN OF CARE
BEHAVIORAL TEAM DISCUSSION    Participants:  Naomie Benavidez NP, Marsha Hitchcock LSW, Adriana Lynch LSW, Deon Ladd LSW, Ceci Da Silva OT, Mona Graham OT,Parviz Lewis RN, Chu Watson RN  Progress: minimal, slight better grooming  Continued Stay Criteria/Rationale: mumbling, disorganized, disheveled, psychosis, nonsensical   Medical/Physical: hypothyproid  Precautions:   Falls precaution?: YES care planned        Behavioral Orders   Procedures    Code 1 - Restrict to Unit    Routine Programming       As clinically indicated    Status 15       Every 15 minutes.      Plan: [facility] medical examiner meeting today, has commitment court today in [facility].  No med changes today.  Rationale for change in precautions or plan:      Principal Problem:    Schizoaffective disorder, bipolar type (H)     Active Problems:    Psychosis (H)      Current Facility-Administered Medications:     acetaminophen (TYLENOL) tablet 650 mg, 650 mg, Oral, Q4H PRN, Arielle Walker NP, 650 mg at 10/18/19 0623    alum & mag hydroxide-simethicone (MYLANTA ES/MAALOX  ES) suspension 30 mL, 30 mL, Oral, Q4H PRN, Arielle Walker NP, 30 mL at 10/13/19 1905    aspirin (ASA) chewable tablet 81 mg, 81 mg, Oral, Daily, Arielle Walker NP, 81 mg at 10/21/19 0820    benztropine (COGENTIN) tablet 1 mg, 1 mg, Oral, BID, Arielle Walker NP, 1 mg at 10/21/19 0820    eucerin cream, , Topical, BID PRN, Arielle Walker NP    haloperidol lactate (HALDOL) injection 10 mg, 10 mg, Intramuscular, BID PRN **OR** haloperidol (HALDOL) tablet 10 mg, 10 mg, Oral, BID PRN, Naomie Benavidez Mc, APRN CNP    haloperidol (HALDOL) tablet 10 mg, 10 mg, Oral, At Bedtime, Arielle Walker NP, 10 mg at 10/20/19 2058    haloperidol (HALDOL) tablet 5 mg, 5 mg, Oral, Daily with breakfast, Naomie Benavidez Mc, APRN CNP, 5 mg at 10/21/19 0820    hydrOXYzine (ATARAX) tablet 25-50 mg, 25-50 mg, Oral, Q4H PRN, Arielle Walker, NP    levothyroxine (SYNTHROID/LEVOTHROID) tablet 50 mcg, 50  mcg, Oral, QAM AC, Arielle Walker NP, 50 mcg at 10/21/19 0632    LORazepam (ATIVAN) injection 1 mg, 1 mg, Intramuscular, Q6H PRN, 1 mg at 10/13/19 1920 **OR** LORazepam (ATIVAN) tablet 1 mg, 1 mg, Oral, Q6H PRN, Arielle Walker NP, 1 mg at 10/18/19 0533    magnesium hydroxide (MILK OF MAGNESIA) suspension 30 mL, 30 mL, Oral, At Bedtime PRN, Arielle Walker NP    multivitamin w/minerals (THERA-VIT-M) tablet 1 tablet, 1 tablet, Oral, Daily, Arielle Walker NP, 1 tablet at 10/21/19 0820    nicotine (NICORETTE) gum 2-4 mg, 2-4 mg, Buccal, Q1H PRN, Arielle Walker NP    OLANZapine (zyPREXA) injection 10 mg, 10 mg, Intramuscular, BID PRN **OR** OLANZapine zydis (zyPREXA) ODT tab 10 mg, 10 mg, Oral, BID PRN, Naomie Benavidez Mc, APRN CNP, 10 mg at 10/18/19 1351    paliperidone (INVEGA SUSTENNA) injection CHRISTOS 234 mg, 234 mg, Intramuscular, Q28 Days, Naomie Benavidez Mc, GABRIELLE CNP, 234 mg at 10/18/19 0857    traZODone (DESYREL) tablet 100 mg, 100 mg, Oral, At Bedtime, Arielle Walker NP, 100 mg at 10/20/19 6938

## 2019-10-21 NOTE — PLAN OF CARE
Problem: Adult Behavioral Health Plan of Care  Goal: Patient-Specific Goal (Individualization)  Description  Patient will sleep 6-8 hours each night.  Patient will maintain daily ADLs without prompting.     Weaning Care Plan: 10/17/19 Patient is not appropriate to wean at this time. Treatment team to reassess daily.        10/21/2019 1520 by Mel Storm RN  Outcome: No Change  Note:          Problem: Adult Behavioral Health Plan of Care  Goal: Adheres to Safety Considerations for Self and Others  Outcome: No Change     Problem: Adult Behavioral Health Plan of Care  Goal: Optimized Coping Skills in Response to Life Stressors  Outcome: No Change     Problem: Thought Process Alteration  Goal: Optimal Thought Clarity  Description  Patient will participate in nursing assessment daily.  Patient will show some insight into mental health symptoms.  Patient will remain calm and have no impulsive, aggressive outbursts.    10/21/2019 1520 by Mel Storm RN  Outcome: No Change     Problem: Fall Injury Risk  Goal: Absence of Fall and Fall-Related Injury  Description  Patient will be free of injury from falls while on the unit.    10/21/2019 1520 by Mel Storm RN  Outcome: No Change     Patient remains in the MHICU, he is redirectable and cooperative during this shift. Patient remains confused, and has periods of hyperactivity t/o the day. Patient does not have any periods of yelling out this shift, he is compliant with medications. Patient was scheduled to have court today, however it was cancelled d/t transportation issues. Court has been rescheduled for ITV here on the 24th @ 1 PM.     End of shift hand off report to be given to oncoming RN

## 2019-10-21 NOTE — PLAN OF CARE
Problem: Adult Behavioral Health Plan of Care  Goal: Patient-Specific Goal (Individualization)  Description  Patient will sleep 6-8 hours each night.  Patient will maintain daily ADLs without prompting.     Weaning Care Plan: 10/17/19 Patient is not appropriate to wean at this time. Treatment team to reassess daily.        10/20/2019 1938 by Judith Padilla, RN  Note:   Pt is disorganized in his thinking and has flight of ideas  Hard to understand as his speech is pressured  Ate well for supper  Talks to self as if talking to someone else  Has not been using profanitys tonight  Denies depression or SI              Problem: Fall Injury Risk  Goal: Absence of Fall and Fall-Related Injury  Description  Patient will be free of injury from falls while on the unit.    10/20/2019 1938 by Judith Padilla, RN  Outcome: Improving  Pt steady on feet

## 2019-10-21 NOTE — PLAN OF CARE
[facility] transport called and stated that transport has been cancelled for patient because court and the exam had to be rescheduled.       [facility] administration called. Patient's court hearing has been rescheduled for 10/24/2019 between 1:00 pm and 2:30 pm.  Court administration is going to call Arielle Walker NP between 1:00 pm and 2:30 pm for her expert testimony.     Left message for Arielle Walker NP letting her know of [facility] expected call time.

## 2019-10-21 NOTE — PLAN OF CARE
Problem: Adult Behavioral Health Plan of Care  Goal: Patient-Specific Goal (Individualization)  Description  Patient will sleep 6-8 hours each night.  Patient will maintain daily ADLs without prompting.     Weaning Care Plan: 10/17/19 Patient is not appropriate to wean at this time. Treatment team to reassess daily.        10/21/2019 1648 by Olga Martinez RN  Outcome: No Change  Note: pt is not weaning at this time    Problem: Thought Process Alteration  Goal: Optimal Thought Clarity  Description  Patient will participate in nursing assessment daily.  Patient will show some insight into mental health symptoms.  Patient will remain calm and have no impulsive, aggressive outbursts.    10/21/2019 1648 by Olga Martinez RN  Outcome: No Change   Pt has flight of ideas and rambling thoughts, but is smiling and happy at this time. States he feels good and is content back in MICU. Requesting coffee with no other requests.  2100- pt took medications willingly  Problem: Fall Injury Risk  Goal: Absence of Fall and Fall-Related Injury  Description  Patient will be free of injury from falls while on the unit.    10/21/2019 1648 by Olga Martinez RN  Outcome: Improving   Pt remains safe from falls   Face to face end of shift report communicated to MACKENZIE Martinez RN  10/21/2019  11:34 PM

## 2019-10-21 NOTE — PLAN OF CARE
Face to face end of shift report obtained from MACKENZIE Wong. Pt observed resting in bed.     KAITLIN JAMES RN  10/21/2019  12:21 AM

## 2019-10-21 NOTE — PLAN OF CARE
Face to face end of shift report will be communicated to oncoming RN.     Problem: Adult Behavioral Health Plan of Care  Goal: Patient-Specific Goal (Individualization)  Description  Patient will sleep 6-8 hours each night.  Patient will maintain daily ADLs without prompting.     Weaning Care Plan: 10/17/19 Patient is not appropriate to wean at this time. Treatment team to reassess daily.        10/21/2019 0246 by Marium Daniel RN  Outcome: No Change  Note:   Problem: Thought Process Alteration  Goal: Optimal Thought Clarity  Description  Patient will participate in nursing assessment daily.  Patient will show some insight into mental health symptoms.  Patient will remain calm and have no impulsive, aggressive outbursts.    10/21/2019 0246 by Marium Daniel RN  Outcome: No Change     Problem: Fall Injury Risk  Goal: Absence of Fall and Fall-Related Injury  Description  Patient will be free of injury from falls while on the unit.    10/21/2019 0246 by Marium Daniel RN  Outcome: Improving   Pt appears to be sleeping in bed with eyes closed, having regular respirations and position changes. 15 minutes and PRN safety checks completed with no noted complains. No falls noted or reported so far this shift. Will continue to monitor.   0600- Pt woke up at 0330 with no complains. Pt remain pleasant and cooperative.

## 2019-10-22 PROCEDURE — 25000132 ZZH RX MED GY IP 250 OP 250 PS 637: Performed by: NURSE PRACTITIONER

## 2019-10-22 PROCEDURE — 99233 SBSQ HOSP IP/OBS HIGH 50: CPT | Performed by: NURSE PRACTITIONER

## 2019-10-22 PROCEDURE — 12400000 ZZH R&B MH

## 2019-10-22 RX ORDER — MINERAL OIL/HYDROPHIL PETROLAT
OINTMENT (GRAM) TOPICAL 2 TIMES DAILY PRN
Status: DISCONTINUED | OUTPATIENT
Start: 2019-10-22 | End: 2019-10-30 | Stop reason: HOSPADM

## 2019-10-22 RX ADMIN — BENZTROPINE MESYLATE 1 MG: 1 TABLET ORAL at 08:32

## 2019-10-22 RX ADMIN — LEVOTHYROXINE SODIUM 50 MCG: 25 TABLET ORAL at 06:25

## 2019-10-22 RX ADMIN — TRAZODONE HYDROCHLORIDE 100 MG: 100 TABLET ORAL at 20:22

## 2019-10-22 RX ADMIN — HALOPERIDOL 10 MG: 10 TABLET ORAL at 20:22

## 2019-10-22 RX ADMIN — BENZTROPINE MESYLATE 1 MG: 1 TABLET ORAL at 20:22

## 2019-10-22 RX ADMIN — MULTIPLE VITAMINS W/ MINERALS TAB 1 TABLET: TAB at 08:32

## 2019-10-22 RX ADMIN — ASPIRIN 81 MG 81 MG: 81 TABLET ORAL at 08:33

## 2019-10-22 RX ADMIN — HALOPERIDOL 5 MG: 5 TABLET ORAL at 08:33

## 2019-10-22 ASSESSMENT — ACTIVITIES OF DAILY LIVING (ADL)
LAUNDRY: UNABLE TO COMPLETE
ORAL_HYGIENE: INDEPENDENT
HYGIENE/GROOMING: INDEPENDENT
DRESS: SCRUBS (BEHAVIORAL HEALTH)
ORAL_HYGIENE: INDEPENDENT
HYGIENE/GROOMING: INDEPENDENT
DRESS: SCRUBS (BEHAVIORAL HEALTH);INDEPENDENT

## 2019-10-22 NOTE — PHARMACY
Credited the patient for the Invega Sustenna 234 mg injection administered on 10/18/19 due to receiving drug from the 's free trial program.     Abi Velasquez, PharmD on 10/22/2019

## 2019-10-22 NOTE — PLAN OF CARE
Face to face end of shift report obtained from MACKENZIE Espinoza. Pt observed resting in bed.    KAITLIN JAMES RN  10/22/2019  12:24 AM

## 2019-10-22 NOTE — PLAN OF CARE
Problem: Adult Behavioral Health Plan of Care  Goal: Patient-Specific Goal (Individualization)  Description  Patient will sleep 6-8 hours each night.  Patient will maintain daily ADLs without prompting.     Weaning Care Plan: 10/22/19 Patient is not appropriate to wean at this time. Treatment team to reassess daily.        10/22/2019 1654 by Florida Dorantes RN  Outcome: No Change  Note:   Pt. Slept 6 hours last night, not weaning to open unit at this time, will reassess tomorrow at treatment team.        Problem: Thought Process Alteration  Goal: Optimal Thought Clarity  Description  Patient will participate in nursing assessment daily.  Patient will show some insight into mental health symptoms.  Patient will remain calm and have no impulsive, aggressive outbursts.    10/22/2019 1654 by Florida Dorantes RN  Outcome: No Change   Pt. Answering some assessment questions, showing no insight into mental illness, behaviors have been calm, no aggressive outbursts noted or reported yet this shift.  Problem: Fall Injury Risk  Goal: Absence of Fall and Fall-Related Injury  Description  Patient will be free of injury from falls while on the unit.    10/22/2019 1654 by Florida Dorantes RN  Outcome: Improving   Pt. Has remained free from injury/falls, gait is steady and balanced.  Face to face end of shift report will be communicated to oncoming night shift RN.     Florida Dorantes RN  10/22/2019  6:50 PM

## 2019-10-22 NOTE — PLAN OF CARE
Gely Meyer from [facility] is calling [facility]  department to setup a ITV hearing for patient's hearing 10/24/2019 between 1:00 pm and 3:30 pm.

## 2019-10-22 NOTE — PLAN OF CARE
Face to face end of shift report will be communicated to oncoming RN.     Problem: Adult Behavioral Health Plan of Care  Goal: Patient-Specific Goal (Individualization)  Description  Patient will sleep 6-8 hours each night.  Patient will maintain daily ADLs without prompting.     Weaning Care Plan: 10/17/19 Patient is not appropriate to wean at this time. Treatment team to reassess daily.        10/22/2019 0150 by Marium Daniel RN  Outcome: No Change     Problem: Thought Process Alteration  Goal: Optimal Thought Clarity  Description  Patient will participate in nursing assessment daily.  Patient will show some insight into mental health symptoms.  Patient will remain calm and have no impulsive, aggressive outbursts.    10/22/2019 0150 by Marium Daniel RN  Outcome: No Change     Problem: Fall Injury Risk  Goal: Absence of Fall and Fall-Related Injury  Description  Patient will be free of injury from falls while on the unit.    10/22/2019 0150 by Marium Daniel RN  Outcome: Improving   Pt appears to be sleeping in bed with eyes closed, having regular respirations and position changes. 15 minutes and PRN safety checks completed with no noted complains. No falls noted or reported so far this shift. Will continue to monitor.   0600- Pt had a good night of sleep. Woke up at 0545. Pt pleasant.

## 2019-10-22 NOTE — PLAN OF CARE
BEHAVIORAL TEAM DISCUSSION     Participants: Naomie Benavidez NP,  Bernice Benito LICSW, Meera Hitchcock LSW,  Adriana Lynch LSW, Deon Ladd LSW, Anali Ralph OT, Ceci Da Silva OT, Mona Graham OT, [facility] [place] [place]   Progress: minimal, slight better grooming  Continued Stay Criteria/Rationale: mumbling, disorganized, disheveled, psychosis, nonsensical   Medical/Physical: hypothyproid  Precautions:   Falls precaution?: YES care planned           Behavioral Orders   Procedures    Code 1 - Restrict to Unit    Routine Programming       As clinically indicated    Status 15       Every 15 minutes.      Plan: Court rescheduled for 10/24/2019.  Rationale for change in precautions or plan:      Principal Problem:    Schizoaffective disorder, bipolar type (H)     Active Problems:    Psychosis (H)      Current Facility-Administered Medications:     acetaminophen (TYLENOL) tablet 650 mg, 650 mg, Oral, Q4H PRN, Arielle Walker, BERT, 650 mg at 10/18/19 0623    alum & mag hydroxide-simethicone (MYLANTA ES/MAALOX  ES) suspension 30 mL, 30 mL, Oral, Q4H PRN, Arielle Walker, NP, 30 mL at 10/13/19 1905    aspirin (ASA) chewable tablet 81 mg, 81 mg, Oral, Daily, Arielle Walker NP, 81 mg at 10/22/19 0833    benztropine (COGENTIN) tablet 1 mg, 1 mg, Oral, BID, Arielle Walker, BERT, 1 mg at 10/22/19 0832    eucerin cream, , Topical, BID PRN, Arielle Walker NP    haloperidol lactate (HALDOL) injection 10 mg, 10 mg, Intramuscular, BID PRN **OR** haloperidol (HALDOL) tablet 10 mg, 10 mg, Oral, BID PRN, Naomie Benavidez Mc, APRN CNP    haloperidol (HALDOL) tablet 10 mg, 10 mg, Oral, At Bedtime, Arielle Walker NP, 10 mg at 10/21/19 2100    haloperidol (HALDOL) tablet 5 mg, 5 mg, Oral, Daily with breakfast, Naomie Benavidez Mc, GABRIELLE CNP, 5 mg at 10/22/19 0833    hydrOXYzine (ATARAX) tablet 25-50 mg, 25-50 mg, Oral, Q4H PRN, Arielle Walker, NP    levothyroxine (SYNTHROID/LEVOTHROID) tablet 50 mcg, 50 mcg, Oral, QAM AC, Arielle Walker,  NP, 50 mcg at 10/22/19 0625    LORazepam (ATIVAN) injection 1 mg, 1 mg, Intramuscular, Q6H PRN, 1 mg at 10/13/19 1920 **OR** LORazepam (ATIVAN) tablet 1 mg, 1 mg, Oral, Q6H PRN, Arielle Walker NP, 1 mg at 10/18/19 0533    magnesium hydroxide (MILK OF MAGNESIA) suspension 30 mL, 30 mL, Oral, At Bedtime PRN, Arielle Walker NP    mineral oil-hydrophilic petrolatum (AQUAPHOR), , Topical, BID PRN, Naomie Benavidez Mc, APRN CNP    multivitamin w/minerals (THERA-VIT-M) tablet 1 tablet, 1 tablet, Oral, Daily, Arielle Walker NP, 1 tablet at 10/22/19 0832    nicotine (NICORETTE) gum 2-4 mg, 2-4 mg, Buccal, Q1H PRN, Arielle Walker NP    OLANZapine (zyPREXA) injection 10 mg, 10 mg, Intramuscular, BID PRN **OR** OLANZapine zydis (zyPREXA) ODT tab 10 mg, 10 mg, Oral, BID PRN, Naomie Benavidez Mc, APRN CNP, 10 mg at 10/18/19 1351    paliperidone (INVEGA SUSTENNA) injection CHRISTOS 234 mg, 234 mg, Intramuscular, Q28 Days, Naomie Benavidez Mc, APRN CNP, 234 mg at 10/18/19 0857    traZODone (DESYREL) tablet 100 mg, 100 mg, Oral, At Bedtime, Arielle Walker NP, 100 mg at 10/21/19 2100

## 2019-10-22 NOTE — PROGRESS NOTES
"[facility]  Psychiatric Progress Note      Impression:    This is a 69 year old yo male with a history of schizoaffective disorder bipolar type.    Patient interviewed in his room in [facility] with RN present.  He is orientated to date. Patient did not attend court yesterday due to transportation issues and will have court on Thursday.  He is pleasant and cooperative during interview again today.  When asked, patient denied auditory hallucinations. When asked who he talks to out loud in his room, he does not give specific answer.  Demanded RN uncross her arms \"don't cross your arms at me!\" he stated.  Encouraged to be up and out of bed more. Discussed weaning out of [facility] to be with other patients to which he responded \"absolute not!\".  Appetite is good. Denies side effects of medications when asked and none observed by this writer. Continues on daily haldol and Invega Sustenna every 28 days. Reports dry face by right eye and nose, stated he has dermatitis and wanted Cortaid. Was agreeable to try Aquaphor lotion first.     Educated regarding medication indications, risks, benefits, side effects, contraindications and possible interactions. Verbally expressed understanding.        Diagnoses:   Schizoaffective disorder, bipolar type    Attestation:  Patient has been seen and evaluated by me,  GABRIELLE Serrano CNP          Interim History:   The patient's care was discussed with the treatment team and chart notes were reviewed.          Medications:     Current Facility-Administered Medications   Medication     acetaminophen (TYLENOL) tablet 650 mg     alum & mag hydroxide-simethicone (MYLANTA ES/MAALOX  ES) suspension 30 mL     aspirin (ASA) chewable tablet 81 mg     benztropine (COGENTIN) tablet 1 mg     eucerin cream     haloperidol lactate (HALDOL) injection 10 mg    Or     haloperidol (HALDOL) tablet 10 mg     haloperidol (HALDOL) tablet 10 mg     haloperidol (HALDOL) tablet 5 mg     hydrOXYzine " "(ATARAX) tablet 25-50 mg     levothyroxine (SYNTHROID/LEVOTHROID) tablet 50 mcg     LORazepam (ATIVAN) injection 1 mg    Or     LORazepam (ATIVAN) tablet 1 mg     magnesium hydroxide (MILK OF MAGNESIA) suspension 30 mL     mineral oil-hydrophilic petrolatum (AQUAPHOR)     multivitamin w/minerals (THERA-VIT-M) tablet 1 tablet     nicotine (NICORETTE) gum 2-4 mg     OLANZapine (zyPREXA) injection 10 mg    Or     OLANZapine zydis (zyPREXA) ODT tab 10 mg     paliperidone (INVEGA SUSTENNA) injection CHRISTOS 234 mg     traZODone (DESYREL) tablet 100 mg     No current Epic-ordered outpatient medications on file.      10 point ROS reviewed, negative       Allergies:     Allergies   Allergen Reactions     Peas GI Disturbance     Black eyed peas - vomiting     Haloperidol Anxiety          Psychiatric Examination:   BP 96/53   Pulse 67   Temp 97.3  F (36.3  C) (Tympanic)   Resp 14   Ht 1.778 m (5' 10\")   Wt 95.1 kg (209 lb 9.6 oz)   SpO2 94%   BMI 30.07 kg/m    Weight is 209 lbs 9.6 oz  Body mass index is 30.07 kg/m .    Appearance: awake, alert, somewhat disheveled  Attitude: cooperative  Eye Contact:  fair, improved  Mood: labile  Affect: congruent to mood  Speech:  Mumbling  Psychomotor Behavior:  no evidence of tardive dyskinesia, dystonia, or tics  Thought Process:  illogical and tangental  Associations:  loosening of associations present  Thought Content:  no evidence of suicidal ideation or homicidal ideation and patient appears to be responding to internal stimuli, grandiose  Insight:  limited  Judgment:  limited  Oriented to: person, place  Attention Span and Concentration:  limited   Recent and Remote Memory:  fair  Fund of Knowledge: appropriate for education  Muscle Strength and Tone: normal  Gait and Station: Normal         Labs:     No results found for this or any previous visit (from the past 24 hour(s)).         Plan:   Continue Inpatient Hospitalization  Continue Haldol 5 mg in am and 10 mg at " HS  Continue Invega Sustenna at 234 mg IM every days. Next dose due 11/14/19  Start Aquaphor for dry face.    Patient has court examination re-scheduled and Commitment hearing on 10/24/19    ELOS: likely > 5 days, for civil commitment process, paranoid and disorganized thoughts along impulsive behaviors

## 2019-10-22 NOTE — PLAN OF CARE
"  Problem: Adult Behavioral Health Plan of Care  Goal: Patient-Specific Goal (Individualization)  Description  Patient will sleep 6-8 hours each night.  Patient will maintain daily ADLs without prompting.     Weaning Care Plan: 10/22/19 Patient is not appropriate to wean at this time. Treatment team to reassess daily.        10/22/2019 1549 by Aylin Peralta, RN  Note:   Patient's mood is labile today.  Patient was pleasant during morning rounds.  When administering AM medications, patient became upset he was receiving po Haldol.  He did take the Haldol but asks that the provider \"go by that other doctor's order.  Not this doctor.\".  Patient was dismissive when asked for further explanation.  Patient refused the ability to wean when being assessed by provider.  Patient was slightly more labile in the afternoon.  He was refusing to eat the brownie that came on lunch tray stating it looked like a \"nigger\".  Patient was rambling about this topic and had flight of ideas.  He began singing and laughing happily.  Patient denies hallucinations.  He was not observed talking to himself this morning, as he was in the past.  Patient mumbles when he talks.         Problem: Thought Process Alteration  Goal: Optimal Thought Clarity  Description  Patient will participate in nursing assessment daily.  Patient will show some insight into mental health symptoms.  Patient will remain calm and have no impulsive, aggressive outbursts.    10/22/2019 1549 by Aylin Peralta, RN  Outcome: No Change  Note:   Patient is labile, has flight of ideas, and is disorganized this shift. He is able to hold reality based conversation for very short periods of time.       Problem: Fall Injury Risk  Goal: Absence of Fall and Fall-Related Injury  Description  Patient will be free of injury from falls while on the unit.    10/22/2019 1549 by Aylin Peralta, RN  Outcome: Improving  Note:   Patient had no noted falls this shift.  His gait is " balanced and steady.      Face to face end of shift report communicated to oncoming shift.     Aylin Peralta RN  10/22/2019  4:02 PM

## 2019-10-23 PROCEDURE — 12400000 ZZH R&B MH

## 2019-10-23 PROCEDURE — 25000132 ZZH RX MED GY IP 250 OP 250 PS 637: Performed by: NURSE PRACTITIONER

## 2019-10-23 PROCEDURE — 99233 SBSQ HOSP IP/OBS HIGH 50: CPT | Performed by: NURSE PRACTITIONER

## 2019-10-23 RX ADMIN — TRAZODONE HYDROCHLORIDE 100 MG: 100 TABLET ORAL at 20:23

## 2019-10-23 RX ADMIN — BENZTROPINE MESYLATE 1 MG: 1 TABLET ORAL at 08:57

## 2019-10-23 RX ADMIN — HALOPERIDOL 10 MG: 10 TABLET ORAL at 20:23

## 2019-10-23 RX ADMIN — LEVOTHYROXINE SODIUM 50 MCG: 25 TABLET ORAL at 06:44

## 2019-10-23 RX ADMIN — HALOPERIDOL 5 MG: 5 TABLET ORAL at 08:57

## 2019-10-23 RX ADMIN — BENZTROPINE MESYLATE 1 MG: 1 TABLET ORAL at 20:23

## 2019-10-23 RX ADMIN — MULTIPLE VITAMINS W/ MINERALS TAB 1 TABLET: TAB at 08:57

## 2019-10-23 RX ADMIN — ASPIRIN 81 MG 81 MG: 81 TABLET ORAL at 08:57

## 2019-10-23 ASSESSMENT — ACTIVITIES OF DAILY LIVING (ADL)
LAUNDRY: UNABLE TO COMPLETE
HYGIENE/GROOMING: INDEPENDENT
ORAL_HYGIENE: INDEPENDENT
ORAL_HYGIENE: INDEPENDENT
HYGIENE/GROOMING: INDEPENDENT
DRESS: SCRUBS (BEHAVIORAL HEALTH);INDEPENDENT
DRESS: SCRUBS (BEHAVIORAL HEALTH)

## 2019-10-23 NOTE — PLAN OF CARE
Face to face end of shift report will be communicated to oncoming RN.    Problem: Adult Behavioral Health Plan of Care  Goal: Patient-Specific Goal (Individualization)  Description  Patient will sleep 6-8 hours each night.  Patient will maintain daily ADLs without prompting.     Weaning Care Plan: 10/22/19 Patient is not appropriate to wean at this time. Treatment team to reassess daily.        10/23/2019 0318 by Marium Daniel RN  Outcome: No Change     Problem: Thought Process Alteration  Goal: Optimal Thought Clarity  Description  Patient will participate in nursing assessment daily.  Patient will show some insight into mental health symptoms.  Patient will remain calm and have no impulsive, aggressive outbursts.    10/23/2019 0318 by Marium Daniel RN  Outcome: No Change     Problem: Fall Injury Risk  Goal: Absence of Fall and Fall-Related Injury  Description  Patient will be free of injury from falls while on the unit.    10/23/2019 0318 by Marium Daniel RN  Outcome: Improving   Pt appears to be sleeping in bed with eyes closed, having regular respirations and position changes. 15 minutes and PRN safety checks completed with no noted complains. No falls noted or reported so far this shift. Will continue to monitor.

## 2019-10-23 NOTE — PLAN OF CARE
"  Problem: Adult Behavioral Health Plan of Care  Goal: Patient-Specific Goal (Individualization)  Description  Patient will sleep 6-8 hours each night.  Patient will maintain daily ADLs without prompting.     Weaning Care Plan: 10/23/19 Patient is not appropriate to wean at this time. Treatment team to reassess daily.        10/23/2019 1338 by Aylin Peralta, RN  Note:   Patient was polite and cooperative with medications and assessment at start of shift.  Patient was elated and singing.  [facility] and returned to his room slamming the door.  When approached by this writer, patient did not remember doing this. Offered to attempt to wean patient when he wanted to make a phone call.  Patient refuses to do so and asks staff to bring him \"the damn phone\". At 1330, patient asked to talk to a psychiatrist \"I need to know when I've reached an acceptable level to leave.\".  He is apparently talking about medications.  Patient did raise his voice .  Explained provider here could talk to him regarding medications.  Patient has court tomorrow for confinement and is not able to discharge.    Patient's mood is labile.  He raises his voice a times.    Patient's  was on her way to meet with patient yesterday but got a flat tire and was unable to do so.  Patient knew this.  He requested to call his  today.  When the phone was brought to him in the ICU, patient told his  to \"eat shit and die.\".           Problem: Thought Process Alteration  Goal: Optimal Thought Clarity  Description  Patient will participate in nursing assessment daily.  Patient will show some insight into mental health symptoms.  Patient will remain calm and have no impulsive, aggressive outbursts.    10/23/2019 1338 by Aylin Peralta, RN  Outcome: No Change  Note:   Patient is labile and disorganized at this time.      Problem: Fall Injury Risk  Goal: Absence of Fall and Fall-Related " Injury  Description  Patient will be free of injury from falls while on the unit.    10/23/2019 1338 by Aylin Peralta, RN  Outcome: Improving  Note:   Patient had no noted falls this shift.   Will continue to monitor.

## 2019-10-23 NOTE — PLAN OF CARE
Problem: Adult Behavioral Health Plan of Care  Goal: Patient-Specific Goal (Individualization)  Description  Patient will sleep 6-8 hours each night.  Patient will maintain daily ADLs without prompting.     Weaning Care Plan: 10/23/19 Patient is not appropriate to wean at this time. Treatment team to reassess daily.        10/23/2019 1632 by Florida Dorantes RN  Outcome: No Change  Note:   Pt. Slept 6 hours last night, is not weaning to open unit yet today, will reassess tomorrow at treatment team meeting, has been polite and cooperative with cares so far this shift, will continue to monitor.       Problem: Thought Process Alteration  Goal: Optimal Thought Clarity  Description  Patient will participate in nursing assessment daily.  Patient will show some insight into mental health symptoms.  Patient will remain calm and have no impulsive, aggressive outbursts.    10/23/2019 1632 by Florida Dorantes RN  Outcome: No Change   Pt. Semi cooperative with assessment questions, showing no insight into mental illness, no aggressive or impulsive behaviors so far this shift.  Problem: Fall Injury Risk  Goal: Absence of Fall and Fall-Related Injury  Description  Patient will be free of injury from falls while on the unit.    10/23/2019 1632 by Florida Dorantes RN  Outcome: Improving   Pt. Has had no falls, gait is steady and balanced.  Face to face end of shift report will be communicated to oncoming night shift RN.     Florida Dorantes RN  10/23/2019  8:29 PM

## 2019-10-23 NOTE — PROGRESS NOTES
"[facility]  Psychiatric Progress Note      Impression:    This is a 69 year old yo male with a history of schizoaffective disorder bipolar type.    Ger is resting in bed when I see him today. He is mildly irritable though redirectable. He asks this writer \"when can I leave? Is my 72 hour hold up yet?\" Attempted to discuss that he will have his confinement hearing tomorrow via [employer] and would likely have his commitment hearing next week, so would not be discharging today. He then asked whether his  would see him. Apparently his  was coming to meet with him yesterday and got a flat tire on the way up so did not see him. He then demands that I bring him the phone. \"She better come. I'm not gonna let her pass on messages to the court without me\". After I left his room he did apparently speak to his  on the phone and told her \"eat shit and die\". He has been sleeping well at night and has been compliant with medications. He has been denying any side effects and staff have not observed any EPS. Received Invega Harjeet on 10/18/19.    Educated regarding medication indications, risks, benefits, side effects, contraindications and possible interactions. Verbally expressed understanding.        Diagnoses:   Schizoaffective disorder, bipolar type    Attestation:  Patient has been seen and evaluated by me,  Arielle Walker NP          Interim History:   The patient's care was discussed with the treatment team and chart notes were reviewed.          Medications:     Current Facility-Administered Medications   Medication     acetaminophen (TYLENOL) tablet 650 mg     alum & mag hydroxide-simethicone (MYLANTA ES/MAALOX  ES) suspension 30 mL     aspirin (ASA) chewable tablet 81 mg     benztropine (COGENTIN) tablet 1 mg     eucerin cream     haloperidol lactate (HALDOL) injection 10 mg    Or     haloperidol (HALDOL) tablet 10 mg     haloperidol (HALDOL) tablet 10 mg     haloperidol (HALDOL) tablet " "5 mg     hydrOXYzine (ATARAX) tablet 25-50 mg     levothyroxine (SYNTHROID/LEVOTHROID) tablet 50 mcg     LORazepam (ATIVAN) injection 1 mg    Or     LORazepam (ATIVAN) tablet 1 mg     magnesium hydroxide (MILK OF MAGNESIA) suspension 30 mL     mineral oil-hydrophilic petrolatum (AQUAPHOR)     multivitamin w/minerals (THERA-VIT-M) tablet 1 tablet     nicotine (NICORETTE) gum 2-4 mg     OLANZapine (zyPREXA) injection 10 mg    Or     OLANZapine zydis (zyPREXA) ODT tab 10 mg     paliperidone (INVEGA SUSTENNA) injection CHRISTOS 234 mg     traZODone (DESYREL) tablet 100 mg     No current Epic-ordered outpatient medications on file.      10 point ROS- no reported concerns       Allergies:     Allergies   Allergen Reactions     Peas GI Disturbance     Black eyed peas - vomiting     Haloperidol Anxiety          Psychiatric Examination:   /77   Pulse 80   Temp 98.5  F (36.9  C) (Tympanic)   Resp 14   Ht 1.778 m (5' 10\")   Wt 95.1 kg (209 lb 9.6 oz)   SpO2 96%   BMI 30.07 kg/m    Weight is 209 lbs 9.6 oz  Body mass index is 30.07 kg/m .    Appearance: awake, alert, casually groomed, dressed in hospital scrubs  Attitude: cooperative  Eye Contact: fair  Mood: labile  Affect: congruent to mood  Speech:  Mumbling  Psychomotor Behavior:  no evidence of tardive dyskinesia, dystonia, or tics  Thought Process:  illogical and tangental  Associations:  loosening of associations present  Thought Content:  no evidence of suicidal ideation or homicidal ideation and patient appears to be responding to internal stimuli, grandiose  Insight:  limited  Judgment:  limited  Oriented to: person, place  Attention Span and Concentration:  limited   Recent and Remote Memory:  fair  Fund of Knowledge: appropriate for education  Muscle Strength and Tone: normal  Gait and Station: Normal         Labs:     No results found for this or any previous visit (from the past 24 hour(s)).         Plan:   Continue Inpatient Hospitalization  Continue " Haldol 5 mg in am and 10 mg at HS  Continue Invega Sustenna at 234 mg IM every 28 days. Next dose due 11/14/19  Start Aquaphor for dry face.    Confinement hearing rescheduled for tomorrow 10/24/19    ELOS: likely > 5 days, for civil commitment process, paranoid and disorganized thoughts along with impulsive behaviors

## 2019-10-23 NOTE — PLAN OF CARE
BEHAVIORAL TEAM DISCUSSION    Participants:  Arielle Walker NP, Meera Hitchcock LSW, Adriana Lynch LSW, Deon Ladd LSW, [facility], Anali Ralph OT, Ceci Da Silva OT, Mona Graham OT, Chu Watson RN, Aylin Peralta RN  Progress: minimal  Continued Stay Criteria/Rationale: disorganized, psychosis, nonsensical, delusional  Medical/Physical: hypothyroid  Precautions:   Falls precaution?: YES, care plan in place  Behavioral Orders   Procedures    Code 1 - Restrict to Unit    Routine Programming     As clinically indicated    Status 15     Every 15 minutes.     Plan: Invega Sustenna on the 18th, [place] court hearing tomorrow  Rationale for change in precautions or plan: None    Principal Problem:    Schizoaffective disorder, bipolar type (H)  Active Problems:    Psychosis (H)       Current Facility-Administered Medications:     acetaminophen (TYLENOL) tablet 650 mg, 650 mg, Oral, Q4H PRN, Arielle Walekr NP, 650 mg at 10/18/19 0623    alum & mag hydroxide-simethicone (MYLANTA ES/MAALOX  ES) suspension 30 mL, 30 mL, Oral, Q4H PRN, Arielle Walker NP, 30 mL at 10/13/19 1905    aspirin (ASA) chewable tablet 81 mg, 81 mg, Oral, Daily, Arielle Walker NP, 81 mg at 10/23/19 0857    benztropine (COGENTIN) tablet 1 mg, 1 mg, Oral, BID, Arielle Walker NP, 1 mg at 10/23/19 0857    eucerin cream, , Topical, BID PRN, Arielle Walker NP    haloperidol lactate (HALDOL) injection 10 mg, 10 mg, Intramuscular, BID PRN **OR** haloperidol (HALDOL) tablet 10 mg, 10 mg, Oral, BID PRN, Naomie Benavidez Mc, APRN CNP    haloperidol (HALDOL) tablet 10 mg, 10 mg, Oral, At Bedtime, Arielle Walker NP, 10 mg at 10/22/19 2022    haloperidol (HALDOL) tablet 5 mg, 5 mg, Oral, Daily with breakfast, Naomie Benavidez Mc, APRN CNP, 5 mg at 10/23/19 0857    hydrOXYzine (ATARAX) tablet 25-50 mg, 25-50 mg, Oral, Q4H PRN, Arielle Walker, NP    levothyroxine (SYNTHROID/LEVOTHROID) tablet 50 mcg, 50 mcg, Oral, QAM Denise POWELL,  BERT Guzmán, 50 mcg at 10/23/19 0644    LORazepam (ATIVAN) injection 1 mg, 1 mg, Intramuscular, Q6H PRN, 1 mg at 10/13/19 1920 **OR** LORazepam (ATIVAN) tablet 1 mg, 1 mg, Oral, Q6H PRN, rAielle Walker NP, 1 mg at 10/18/19 0533    magnesium hydroxide (MILK OF MAGNESIA) suspension 30 mL, 30 mL, Oral, At Bedtime PRN, Arielle Walker NP    mineral oil-hydrophilic petrolatum (AQUAPHOR), , Topical, BID PRN, Naomie Benavidez Mc, APRN CNP    multivitamin w/minerals (THERA-VIT-M) tablet 1 tablet, 1 tablet, Oral, Daily, Arielle Walker NP, 1 tablet at 10/23/19 0857    nicotine (NICORETTE) gum 2-4 mg, 2-4 mg, Buccal, Q1H PRN, Arielle Walker NP    OLANZapine (zyPREXA) injection 10 mg, 10 mg, Intramuscular, BID PRN **OR** OLANZapine zydis (zyPREXA) ODT tab 10 mg, 10 mg, Oral, BID PRN, Naomie Benavidez Mc, APRN CNP, 10 mg at 10/18/19 1351    paliperidone (INVEGA SUSTENNA) injection CHRISTOS 234 mg, 234 mg, Intramuscular, Q28 Days, Naomie Benavidez Mc, APRN CNP, 234 mg at 10/18/19 0857    traZODone (DESYREL) tablet 100 mg, 100 mg, Oral, At Bedtime, Arielle Walker NP, 100 mg at 10/22/19 2022

## 2019-10-23 NOTE — PLAN OF CARE
Face to face end of shift report obtained from MACKENZIE Bartholomew. Pt observed resting in bed.    KAITLIN JAMES RN  10/23/2019  12:33 AM

## 2019-10-24 PROCEDURE — 25000132 ZZH RX MED GY IP 250 OP 250 PS 637: Performed by: NURSE PRACTITIONER

## 2019-10-24 PROCEDURE — 99232 SBSQ HOSP IP/OBS MODERATE 35: CPT | Performed by: NURSE PRACTITIONER

## 2019-10-24 PROCEDURE — 12400000 ZZH R&B MH

## 2019-10-24 RX ADMIN — HALOPERIDOL 5 MG: 5 TABLET ORAL at 08:26

## 2019-10-24 RX ADMIN — WHITE PETROLATUM: 1.75 OINTMENT TOPICAL at 13:51

## 2019-10-24 RX ADMIN — MULTIPLE VITAMINS W/ MINERALS TAB 1 TABLET: TAB at 08:25

## 2019-10-24 RX ADMIN — TRAZODONE HYDROCHLORIDE 100 MG: 100 TABLET ORAL at 20:51

## 2019-10-24 RX ADMIN — LEVOTHYROXINE SODIUM 50 MCG: 25 TABLET ORAL at 06:45

## 2019-10-24 RX ADMIN — BENZTROPINE MESYLATE 1 MG: 1 TABLET ORAL at 20:51

## 2019-10-24 RX ADMIN — HALOPERIDOL 10 MG: 10 TABLET ORAL at 20:51

## 2019-10-24 RX ADMIN — ASPIRIN 81 MG 81 MG: 81 TABLET ORAL at 08:26

## 2019-10-24 RX ADMIN — BENZTROPINE MESYLATE 1 MG: 1 TABLET ORAL at 08:25

## 2019-10-24 ASSESSMENT — ACTIVITIES OF DAILY LIVING (ADL)
ORAL_HYGIENE: INDEPENDENT
HYGIENE/GROOMING: INDEPENDENT
DRESS: INDEPENDENT;SCRUBS (BEHAVIORAL HEALTH)
LAUNDRY: UNABLE TO COMPLETE

## 2019-10-24 NOTE — PLAN OF CARE
Problem: Adult Behavioral Health Plan of Care  Goal: Patient-Specific Goal (Individualization)  Description  Patient will sleep 6-8 hours each night.  Patient will maintain daily ADLs without prompting.     Weaning Care Plan: 10/23/19 Patient is not appropriate to wean at this time. Treatment team to reassess daily.        10/24/2019 0340 by Daisy Cash RN  Outcome: No Change     Pt has been in bed with eyes closed and regular respirations x 5 hours this noc shift. 15 minute and PRN checks all night. No complaints offered. Will continue to monitor.    Problem: Fall Injury Risk  Goal: Absence of Fall and Fall-Related Injury  Description  Patient will be free of injury from falls while on the unit.    10/24/2019 0340 by Daisy Cash RN  Outcome: Improving   No falls noted at this time.     Face to face end of shift report to be communicated to oncoming RN.     Daisy Cash RN  10/24/2019

## 2019-10-24 NOTE — PLAN OF CARE
"  Problem: Adult Behavioral Health Plan of Care  Goal: Patient-Specific Goal (Individualization)  Description  Patient will sleep 6-8 hours each night.  Patient will maintain daily ADLs without prompting.     Weaning Care Plan: 10/23/19 Patient is not appropriate to wean at this time. Treatment team to reassess daily.        10/24/2019 1315 by Arleth Alamo, RN  Outcome: No Change  Note:   Pt in room most of shift today. Does come out occasionally to lounge. He is cooperative with this writer during nursing assessment. Mood labile. Did get a little irritated this morning when writer brought haldol medication. He stated, \"Tell the nurse practitioner to take it.\" Pt did take it after with no further issues. Speech is rapid, flight of ideas. He does mumble at times. He states he is \"relieved\" about not having court today as he wanted to be at his hearing in person. Denies mental health criteria. He denies illness. Wants to go home. He denies bowel or bladder issues.   1358: Pt weaning at this time.     1530 End of shift report given to AM RN.         Problem: Fall Injury Risk  Goal: Absence of Fall and Fall-Related Injury  Description  Patient will be free of injury from falls while on the unit.    10/24/2019 1315 by Arleth Alamo RN  Note:   Pt free from falls thus far in shift. Steady and balanced.       Problem: Thought Process Alteration  Goal: Optimal Thought Clarity  Description  Patient will participate in nursing assessment daily.  Patient will show some insight into mental health symptoms.  Patient will remain calm and have no impulsive, aggressive outbursts.    Outcome: No Change  Note:   Pt denies illness. Denies mental health criteria. He states he is better off going home.            "

## 2019-10-24 NOTE — PLAN OF CARE
BEHAVIORAL TEAM DISCUSSION    Participants: Arielle Walker NP, Bernice Benito LICSW, Meera Hitchcock LSW, Adriana Lynch LSW, Deon Ladd LSW, [facility], Anali Ralph OT, Ceci Da Silva OT, Chu Watson RN, Howard Mcclure RN  Progress: fair, more polite  Continued Stay Criteria/Rationale: disorganized, psychosis  Medical/Physical: hypothyroid  Precautions:   Falls precaution?: YES, care plan in place  Behavioral Orders   Procedures    Code 1 - Restrict to Unit    Routine Programming     As clinically indicated    Status 15     Every 15 minutes.     Plan:  Invega Sustenna on the 18th, [place] hearing today at 12:45  Rationale for change in precautions or plan: None    Principal Problem:    Schizoaffective disorder, bipolar type (H)  Active Problems:    Psychosis (H)      Current Facility-Administered Medications:     acetaminophen (TYLENOL) tablet 650 mg, 650 mg, Oral, Q4H PRN, Arielle Walker NP, 650 mg at 10/18/19 0623    alum & mag hydroxide-simethicone (MYLANTA ES/MAALOX  ES) suspension 30 mL, 30 mL, Oral, Q4H PRN, Arielle Walker NP, 30 mL at 10/13/19 1905    aspirin (ASA) chewable tablet 81 mg, 81 mg, Oral, Daily, Arielle Walker NP, 81 mg at 10/24/19 0826    benztropine (COGENTIN) tablet 1 mg, 1 mg, Oral, BID, Arielle Walker NP, 1 mg at 10/24/19 0825    eucerin cream, , Topical, BID PRN, Arielle Walker NP    haloperidol lactate (HALDOL) injection 10 mg, 10 mg, Intramuscular, BID PRN **OR** haloperidol (HALDOL) tablet 10 mg, 10 mg, Oral, BID PRN, Naomie Benavidez Mc, APRN CNP    haloperidol (HALDOL) tablet 10 mg, 10 mg, Oral, At Bedtime, Arielle Walker NP, 10 mg at 10/23/19 2023    haloperidol (HALDOL) tablet 5 mg, 5 mg, Oral, Daily with breakfast, Naomie Benavidez Mc, APRN CNP, 5 mg at 10/24/19 0826    hydrOXYzine (ATARAX) tablet 25-50 mg, 25-50 mg, Oral, Q4H PRN, Arielle Walker, NP    levothyroxine (SYNTHROID/LEVOTHROID) tablet 50 mcg, 50 mcg, Oral, QAM AC, Arielle Walker,  NP, 50 mcg at 10/24/19 0645    LORazepam (ATIVAN) injection 1 mg, 1 mg, Intramuscular, Q6H PRN, 1 mg at 10/13/19 1920 **OR** LORazepam (ATIVAN) tablet 1 mg, 1 mg, Oral, Q6H PRN, Arielle Walker NP, 1 mg at 10/18/19 0533    magnesium hydroxide (MILK OF MAGNESIA) suspension 30 mL, 30 mL, Oral, At Bedtime PRN, Arielle Walker NP    mineral oil-hydrophilic petrolatum (AQUAPHOR), , Topical, BID PRN, Naomie Benavidez Mc, APRN CNP    multivitamin w/minerals (THERA-VIT-M) tablet 1 tablet, 1 tablet, Oral, Daily, Arielle Walker NP, 1 tablet at 10/24/19 0825    nicotine (NICORETTE) gum 2-4 mg, 2-4 mg, Buccal, Q1H PRN, Arielle Walker NP    OLANZapine (zyPREXA) injection 10 mg, 10 mg, Intramuscular, BID PRN **OR** OLANZapine zydis (zyPREXA) ODT tab 10 mg, 10 mg, Oral, BID PRN, Naomie Benavidez Mc, APRN CNP, 10 mg at 10/18/19 1351    paliperidone (INVEGA SUSTENNA) injection CHRISTOS 234 mg, 234 mg, Intramuscular, Q28 Days, Naomie Benavidez Mc, APRN CNP, 234 mg at 10/18/19 0857    traZODone (DESYREL) tablet 100 mg, 100 mg, Oral, At Bedtime, Arielle Walker NP, 100 mg at 10/23/19 2023

## 2019-10-24 NOTE — PROGRESS NOTES
"[facility]  Psychiatric Progress Note      Impression:    This is a 69 year old yo male with a history of schizoaffective disorder bipolar type.    Ger is weaning to the open unit when I see him today. He is sitting up in the lounge listening to music with headphones. He appears more relaxed, though speech remains somewhat pressured and difficult to understand. Apparently his court hearing that had been scheduled [place] today was canceled and the  will call later this afternoon with the new time and date. This is now the second time it has been rescheduled. He does state that he is happy it was canceled as they plan on transporting him to [facility] when it is rescheduled. He has been compliant with medications, though does get quite irritable when given the neuroleptic. When given this Haldol this morning he told the nurse \"tell the nurse practitioner to take it\". Denies any side effects and no side effects have been observed or reported by staff. He can continue to wean from the MHICU to the open unit as appropriate.       Educated regarding medication indications, risks, benefits, side effects, contraindications and possible interactions. Verbally expressed understanding.        Diagnoses:   Schizoaffective disorder, bipolar type    Attestation:  Patient has been seen and evaluated by me,  Arielle Walker NP          Interim History:   The patient's care was discussed with the treatment team and chart notes were reviewed.          Medications:     Current Facility-Administered Medications   Medication     acetaminophen (TYLENOL) tablet 650 mg     alum & mag hydroxide-simethicone (MYLANTA ES/MAALOX  ES) suspension 30 mL     aspirin (ASA) chewable tablet 81 mg     benztropine (COGENTIN) tablet 1 mg     eucerin cream     haloperidol lactate (HALDOL) injection 10 mg    Or     haloperidol (HALDOL) tablet 10 mg     haloperidol (HALDOL) tablet 10 mg     haloperidol (HALDOL) tablet 5 mg " "    hydrOXYzine (ATARAX) tablet 25-50 mg     levothyroxine (SYNTHROID/LEVOTHROID) tablet 50 mcg     LORazepam (ATIVAN) injection 1 mg    Or     LORazepam (ATIVAN) tablet 1 mg     magnesium hydroxide (MILK OF MAGNESIA) suspension 30 mL     mineral oil-hydrophilic petrolatum (AQUAPHOR)     multivitamin w/minerals (THERA-VIT-M) tablet 1 tablet     nicotine (NICORETTE) gum 2-4 mg     OLANZapine (zyPREXA) injection 10 mg    Or     OLANZapine zydis (zyPREXA) ODT tab 10 mg     paliperidone (INVEGA SUSTENNA) injection CHRISTOS 234 mg     traZODone (DESYREL) tablet 100 mg     No current Epic-ordered outpatient medications on file.      10 point ROS- dry skin on face       Allergies:     Allergies   Allergen Reactions     Peas GI Disturbance     Black eyed peas - vomiting     Haloperidol Anxiety          Psychiatric Examination:   /70   Pulse 71   Temp 97.8  F (36.6  C) (Tympanic)   Resp 14   Ht 1.778 m (5' 10\")   Wt 95.1 kg (209 lb 9.6 oz)   SpO2 95%   BMI 30.07 kg/m    Weight is 209 lbs 9.6 oz  Body mass index is 30.07 kg/m .    Appearance: awake, alert, casually groomed, dressed in hospital scrubs  Attitude: has been cooperative and pleasant  Eye Contact: fair  Mood: less labile  Affect: less tense or irritable  Speech:  Mumbling, somewhat pressured still  Psychomotor Behavior:  no evidence of tardive dyskinesia, dystonia, or tics  Thought Process:  illogical and tangental  Associations:  loosening of associations present  Thought Content: denies suicidal or homicidal ideation, denies hallucinations, appears less preoccupied  Insight:  limited  Judgment:  limited  Oriented to: person, place  Attention Span and Concentration:  limited   Recent and Remote Memory:  fair  Fund of Knowledge: appropriate for education  Muscle Strength and Tone: normal  Gait and Station: Normal         Labs:     No results found for this or any previous visit (from the past 24 hour(s)).         Plan:   Continue Inpatient " Hospitalization  Continue Haldol 5 mg in am and 10 mg at HS  Continue Invega Sustenna at 234 mg IM every 28 days. Next dose due 11/14/19    Confinement hearing has been rescheduled- waiting for new date    ELOS: likely > 5 days, for civil commitment process, paranoid and disorganized thoughts along with impulsive behaviors

## 2019-10-24 NOTE — PLAN OF CARE
[facility] Assistant   Marcos Ramon called. Patient's court Hearing has been cancelled for today, and they will be calling later today to give the new time and date. Patient called the county yesterday and voiced he wanted to be transported and to have court in person.

## 2019-10-25 PROCEDURE — 12400000 ZZH R&B MH

## 2019-10-25 PROCEDURE — 99232 SBSQ HOSP IP/OBS MODERATE 35: CPT | Performed by: NURSE PRACTITIONER

## 2019-10-25 PROCEDURE — 25000132 ZZH RX MED GY IP 250 OP 250 PS 637: Performed by: NURSE PRACTITIONER

## 2019-10-25 RX ADMIN — BENZTROPINE MESYLATE 1 MG: 1 TABLET ORAL at 20:44

## 2019-10-25 RX ADMIN — MULTIPLE VITAMINS W/ MINERALS TAB 1 TABLET: TAB at 08:22

## 2019-10-25 RX ADMIN — ASPIRIN 81 MG 81 MG: 81 TABLET ORAL at 08:22

## 2019-10-25 RX ADMIN — TRAZODONE HYDROCHLORIDE 100 MG: 100 TABLET ORAL at 20:44

## 2019-10-25 RX ADMIN — LEVOTHYROXINE SODIUM 50 MCG: 25 TABLET ORAL at 06:31

## 2019-10-25 RX ADMIN — HALOPERIDOL 10 MG: 10 TABLET ORAL at 20:44

## 2019-10-25 RX ADMIN — HALOPERIDOL 5 MG: 5 TABLET ORAL at 08:22

## 2019-10-25 RX ADMIN — BENZTROPINE MESYLATE 1 MG: 1 TABLET ORAL at 08:22

## 2019-10-25 ASSESSMENT — ACTIVITIES OF DAILY LIVING (ADL)
HYGIENE/GROOMING: INDEPENDENT
DRESS: SCRUBS (BEHAVIORAL HEALTH)
ORAL_HYGIENE: PROMPTS
HYGIENE/GROOMING: PROMPTS
ORAL_HYGIENE: INDEPENDENT
LAUNDRY: UNABLE TO COMPLETE
DRESS: SCRUBS (BEHAVIORAL HEALTH);INDEPENDENT
LAUNDRY: UNABLE TO COMPLETE

## 2019-10-25 NOTE — PLAN OF CARE
Problem: Adult Behavioral Health Plan of Care  Goal: Patient-Specific Goal (Individualization)  Description  Patient will sleep 6-8 hours each night.  Patient will maintain daily ADLs without prompting.     Weaning Care Plan: 10/24/19 Patient able to wean as long as behavior remains appropriate. Treatment team to reassess daily.     10/25/2019 0940 by Arleth Alamo, RN  Outcome: No Change  Note:   Patient calm, cooperative with this writer during nursing assessment. He denies SI, SIB, HI, anxiety, or pain. Present with full range affect. Speech continues to be pressured, mumbles at times making it difficult at times to understand. He is compliant with medication administration. Weaning out today in [facility] area. Observed watching TV and listening to music using headphones. He reports sleeping well. Documentation reports sleeping 5 hours. He denies bowel or bladder issues. Vitals WNL.   1045: Pt moved to 512.   1430; [facility] of shift. Pleasant and cooperative. Listening to music.        Problem: Thought Process Alteration  Goal: Optimal Thought Clarity  Description  Patient will participate in nursing assessment daily.  Patient will show some insight into mental health symptoms.  Patient will remain calm and have no impulsive, aggressive outbursts.    10/25/2019 0940 by Arleth Alamo, RN  Note:   Pt able to have reality based conversation. Continues to deny illness however. Has maintained impulsive behavior.      Problem: Fall Injury Risk  Goal: Absence of Fall and Fall-Related Injury  Description  Patient will be free of injury from falls while on the unit.    10/25/2019 0940 by Arleth Alamo, RN  Note:   Pt steady and balanced. Free from falls thus far in shift.

## 2019-10-25 NOTE — PLAN OF CARE
BEHAVIORAL TEAM DISCUSSION     Participants: Arielle Walker NP, Meera Hitchcock LSW, Adriana Lynch LSW, Deon Ladd LSW,  Anali Ralph OT, Mile Reyna  RN, Howard Mcclure RN  Progress: fair,- move out of MHICU  Continued Stay Criteria/Rationale: disorganized, psychosis  Medical/Physical: hypothyroid  Precautions:   Falls precaution?: YES, care plan in place       Behavioral Orders   Procedures    Code 1 - Restrict to Unit    Routine Programming       As clinically indicated    Status 15       Every 15 minutes.      Plan:  Continue Haldol 5 mg in am and 10 mg at HS  Continue Invega Sustenna at 234 mg IM every 28 days. Next dose due 11/14/19, Court hearing on Oct 30th in [facility]    Rationale for change in precautions or plan: None     Principal Problem:    Schizoaffective disorder, bipolar type (H)  Active Problems:    Psychosis (H)        Current Facility-Administered Medications:     acetaminophen (TYLENOL) tablet 650 mg, 650 mg, Oral, Q4H PRN, Arielle Walker NP, 650 mg at 10/18/19 0623    alum & mag hydroxide-simethicone (MYLANTA ES/MAALOX  ES) suspension 30 mL, 30 mL, Oral, Q4H PRN, Arielle Walker NP, 30 mL at 10/13/19 1905    aspirin (ASA) chewable tablet 81 mg, 81 mg, Oral, Daily, Arielle Walker NP, 81 mg at 10/24/19 0826    benztropine (COGENTIN) tablet 1 mg, 1 mg, Oral, BID, Arielle Walker NP, 1 mg at 10/24/19 0825    eucerin cream, , Topical, BID PRN, Arielle Walker NP    haloperidol lactate (HALDOL) injection 10 mg, 10 mg, Intramuscular, BID PRN **OR** haloperidol (HALDOL) tablet 10 mg, 10 mg, Oral, BID PRN, Naomie Benavidez Mc, APRN CNP    haloperidol (HALDOL) tablet 10 mg, 10 mg, Oral, At Bedtime, Arielle Walker NP, 10 mg at 10/23/19 2023    haloperidol (HALDOL) tablet 5 mg, 5 mg, Oral, Daily with breakfast, Naomie Benavidez Mc, APRN CNP, 5 mg at 10/24/19 4182    hydrOXYzine (ATARAX) tablet 25-50 mg, 25-50 mg, Oral, Q4H PRN, Arielle Walker, NP    levothyroxine (SYNTHROID/LEVOTHROID)  tablet 50 mcg, 50 mcg, Oral, QAM AC, Arielle Walker, NP, 50 mcg at 10/24/19 0645    LORazepam (ATIVAN) injection 1 mg, 1 mg, Intramuscular, Q6H PRN, 1 mg at 10/13/19 1920 **OR** LORazepam (ATIVAN) tablet 1 mg, 1 mg, Oral, Q6H PRN, Arielle Walker, NP, 1 mg at 10/18/19 0533    magnesium hydroxide (MILK OF MAGNESIA) suspension 30 mL, 30 mL, Oral, At Bedtime PRN, Arielle Walker, NP    mineral oil-hydrophilic petrolatum (AQUAPHOR), , Topical, BID PRN, Naomie Benavidez Mc, APRN CNP    multivitamin w/minerals (THERA-VIT-M) tablet 1 tablet, 1 tablet, Oral, Daily, Arielle Walker NP, 1 tablet at 10/24/19 0825    nicotine (NICORETTE) gum 2-4 mg, 2-4 mg, Buccal, Q1H PRN, Arielle Walker NP    OLANZapine (zyPREXA) injection 10 mg, 10 mg, Intramuscular, BID PRN **OR** OLANZapine zydis (zyPREXA) ODT tab 10 mg, 10 mg, Oral, BID PRN, Naomie Benavidez Mc, APRN CNP, 10 mg at 10/18/19 1351    paliperidone (INVEGA SUSTENNA) injection CHRISTOS 234 mg, 234 mg, Intramuscular, Q28 Days, Naomie Benavidez Mc, APRN CNP, 234 mg at 10/18/19 0857    traZODone (DESYREL) tablet 100 mg, 100 mg, Oral, At Bedtime, Arielle Walker, NP, 100 mg at 10/23/19 2023

## 2019-10-25 NOTE — PLAN OF CARE
[facility] Attorney called and patient's rescheduled court hearing is Wednesday10/30/2019 at 1:30 pm. [facility]'s Department will transport patient to and from court.      Court Hearing Wednesday 10/30/2019 is patient's preliminary hearing. They are requesting contact information of the assigned provider that day.

## 2019-10-25 NOTE — PLAN OF CARE
Face to face end of shift report received from Josette PEDRAZA RN. Rounding completed and patient observed lying in bed with eyes closed, breathing regular and unlabored.     06:30am Update: Patient appeared to sleep off and on throughout the night with position changes noted. Patient slept approximately 5 hours. Patient had no complaints of pain and no requests for any other PRNs.     07:30 Update: Face to face end of shift report communicated to oncoming RN.      Problem: Adult Behavioral Health Plan of Care  Goal: Patient-Specific Goal (Individualization)  Description  Patient will sleep 6-8 hours each night.  Patient will maintain daily ADLs without prompting.     Weaning Care Plan: 10/24/19 Patient able to wean as long as behavior remains appropriate. Treatment team to reassess daily.     10/25/2019 0541 by Avila Mckeon RN  Outcome: No Change     Problem: Thought Process Alteration  Goal: Optimal Thought Clarity  Description  Patient will participate in nursing assessment daily.  Patient will show some insight into mental health symptoms.  Patient will remain calm and have no impulsive, aggressive outbursts.    10/25/2019 0541 by Avila Mckeon RN  Outcome: No Change     Problem: Fall Injury Risk  Goal: Absence of Fall and Fall-Related Injury  Description  Patient will be free of injury from falls while on the unit.    Outcome: Improving

## 2019-10-25 NOTE — PLAN OF CARE
"Face to face end of shift report communicated to oncoming shift.     Josette Sidhu RN  10/24/2019  9:33 PM      Patient requires prompting with ADL's.  Patient reports no difficulties with sleep.    Patient requires much prompting and redirection with nursing assessment.  Patient requests to wean to \"listen to the music\" on one check then quickly changes his mind and \"I will watch tv\"  patient sitting in MHICU in the chair closed to the television watching tv.    Patient denies SI , HI , AH and VH  Patient compliant with all medication admin this shift.  Will continue to monitor.      Problem: Adult Behavioral Health Plan of Care  Goal: Patient-Specific Goal (Individualization)  Description  Patient will sleep 6-8 hours each night.  Patient will maintain daily ADLs without prompting.     Weaning Care Plan: 10/24/19 Patient able to wean as long as behavior remains appropriate. Treatment team to reassess daily.     10/24/2019 2125 by Josette Sidhu RN  Outcome: No Change  Note:   Problem: Thought Process Alteration  Goal: Optimal Thought Clarity  Description  Patient will participate in nursing assessment daily.  Patient will show some insight into mental health symptoms.  Patient will remain calm and have no impulsive, aggressive outbursts.    10/24/2019 2125 by Josette Sidhu RN  Outcome: No Change     "

## 2019-10-25 NOTE — PROGRESS NOTES
"[facility]  Psychiatric Progress Note      Impression:    This is a 69 year old yo male with a history of schizoaffective disorder bipolar type.    Ger was moved from the ICU to a private room on the open unit today. He has been tolerating this change well. This is the calmest and most relaxed I have seen him so far this admission. Does spend time in the lounge, though tends to keep to himself. Enjoys listening to music or watching tv. His speech is clearer today, he is able to have a relatively linear conversation today. 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 He does read this to me and it is logical and makes sense. He has been compliant with medications, denies any side effects. Has been sleeping well. Boundaries have been appropriate. Confinement hearing was rescheduled to next week.       Educated regarding medication indications, risks, benefits, side effects, contraindications and possible interactions. Verbally expressed understanding.        Diagnoses:   Schizoaffective disorder, bipolar type    Attestation:  Patient has been seen and evaluated by me,  Arielle Walker, BERT          Interim History:   The patient's care was discussed with the treatment team and chart notes were reviewed.          Medications:     Current Facility-Administered Medications   Medication     acetaminophen (TYLENOL) tablet 650 mg     alum & mag hydroxide-simethicone (MYLANTA ES/MAALOX  ES) suspension 30 mL     aspirin (ASA) chewable tablet 81 mg     benztropine (COGENTIN) tablet 1 mg     eucerin cream     haloperidol lactate (HALDOL) injection 10 mg    Or     haloperidol (HALDOL) tablet 10 mg     haloperidol (HALDOL) tablet 10 mg     haloperidol (HALDOL) tablet 5 mg     hydrOXYzine (ATARAX) tablet 25-50 mg     levothyroxine (SYNTHROID/LEVOTHROID) tablet 50 mcg     LORazepam (ATIVAN) injection 1 mg    Or     LORazepam (ATIVAN) " "tablet 1 mg     magnesium hydroxide (MILK OF MAGNESIA) suspension 30 mL     mineral oil-hydrophilic petrolatum (AQUAPHOR)     multivitamin w/minerals (THERA-VIT-M) tablet 1 tablet     nicotine (NICORETTE) gum 2-4 mg     OLANZapine (zyPREXA) injection 10 mg    Or     OLANZapine zydis (zyPREXA) ODT tab 10 mg     paliperidone (INVEGA SUSTENNA) injection CHRISTOS 234 mg     traZODone (DESYREL) tablet 100 mg     No current Epic-ordered outpatient medications on file.      10 point ROS- dry skin on face       Allergies:     Allergies   Allergen Reactions     Peas GI Disturbance     Black eyed peas - vomiting     Haloperidol Anxiety          Psychiatric Examination:   /64   Pulse 68   Temp 96.6  F (35.9  C) (Tympanic)   Resp 14   Ht 1.778 m (5' 10\")   Wt 95.1 kg (209 lb 9.6 oz)   SpO2 95%   BMI 30.07 kg/m    Weight is 209 lbs 9.6 oz  Body mass index is 30.07 kg/m .    Appearance: awake, alert, dressed in hospital scrubs  Attitude: pleasant and cooperative  Eye Contact: fair  Mood: \"fine\", no longer as labile  Affect: more relaxed  Speech: seems more clear today  Psychomotor Behavior:  no evidence of tardive dyskinesia, dystonia, or tics  Thought Process: more logical and linear today  Associations:  loosening of associations present  Thought Content: denies suicidal or homicidal ideation, denies hallucinations or paranoia  Insight:  limited  Judgment:  limited  Oriented to: person, place, time  Attention Span and Concentration:  limited   Recent and Remote Memory:  fair  Fund of Knowledge: appropriate for education  Muscle Strength and Tone: normal  Gait and Station: Normal         Labs:     No results found for this or any previous visit (from the past 24 hour(s)).         Plan:   Continue Inpatient Hospitalization  Continue Haldol 5 mg in am and 10 mg at HS  Continue Invega Sustenna at 234 mg IM every 28 days. Next dose due 11/14/19    Confinement hearing has been rescheduled to 10/30/19    ELOS: > 5 days, for " civil commitment process, paranoid and disorganized thoughts along with impulsive behaviors

## 2019-10-26 PROCEDURE — 12400000 ZZH R&B MH

## 2019-10-26 PROCEDURE — 25000132 ZZH RX MED GY IP 250 OP 250 PS 637: Performed by: NURSE PRACTITIONER

## 2019-10-26 RX ADMIN — BENZTROPINE MESYLATE 1 MG: 1 TABLET ORAL at 08:48

## 2019-10-26 RX ADMIN — LEVOTHYROXINE SODIUM 50 MCG: 25 TABLET ORAL at 06:46

## 2019-10-26 RX ADMIN — HALOPERIDOL 5 MG: 5 TABLET ORAL at 08:48

## 2019-10-26 RX ADMIN — HALOPERIDOL 10 MG: 10 TABLET ORAL at 20:21

## 2019-10-26 RX ADMIN — MULTIPLE VITAMINS W/ MINERALS TAB 1 TABLET: TAB at 08:48

## 2019-10-26 RX ADMIN — ASPIRIN 81 MG 81 MG: 81 TABLET ORAL at 08:48

## 2019-10-26 RX ADMIN — TRAZODONE HYDROCHLORIDE 100 MG: 100 TABLET ORAL at 20:20

## 2019-10-26 RX ADMIN — BENZTROPINE MESYLATE 1 MG: 1 TABLET ORAL at 20:20

## 2019-10-26 ASSESSMENT — ACTIVITIES OF DAILY LIVING (ADL)
ORAL_HYGIENE: PROMPTS
DRESS: SCRUBS (BEHAVIORAL HEALTH);INDEPENDENT;PROMPTS
LAUNDRY: UNABLE TO COMPLETE
HYGIENE/GROOMING: PROMPTS

## 2019-10-26 NOTE — PLAN OF CARE
"  Problem: Fall Injury Risk  Goal: Absence of Fall and Fall-Related Injury  Description  Patient will be free of injury from falls while on the unit.    10/26/2019 0900 by Rashel Pedroza RN  Outcome: Improving  Pt has been up and stable     Problem: Thought Process Alteration  Goal: Optimal Thought Clarity  Description  Patient will participate in nursing assessment daily.  Patient will show some insight into mental health symptoms.  Patient will remain calm and have no impulsive, aggressive outbursts.    10/26/2019 0900 by Rashel Pedroza RN  Outcome: Improving  Pt is able to have superficial conversation that is organized.  He has been calm and in control of his impulses, though does not have insight into his Mental health issues.  Pt cooperated with the nursing assessment     Problem: Adult Behavioral Health Plan of Care  Goal: Patient-Specific Goal (Individualization)  Description  Patient will sleep 6-8 hours each night.  Patient will maintain daily ADLs without prompting.         10/26/2019 0900 by Rashel Pedroza RN  Outcome: Improving  Note:   Shift Summery:      Patient's Stated Goal for Shift:  \"relax and enjoy sun\"    Goal Status:  In Process    0730 Face to face rounding complete.  Pt introduced to nursing for the shift.     Pt was up this morning and has spent time in the dayroom listning to music and writing jingles for advertisement. He was encouraged to shower but has not.  He says he will shower in the morning.  He denies hearing voices but does appear preoccupied. He shared his jingles with me and they were organized and pretty good.  Pt does have significantly poor hygiene and bad body odor.    1500 Face to face end of shift report communicated to Evening shift RN's along with Pt's fall risk.     Rashel Pedroza RN  10/26/2019       "

## 2019-10-26 NOTE — PLAN OF CARE
Problem: Adult Behavioral Health Plan of Care  Goal: Patient-Specific Goal (Individualization)  Description  Patient will sleep 6-8 hours each night.  Patient will maintain daily ADLs without prompting.     10/26/2019 1631 by Mile Reyna, RN  Outcome: No Change  Note:   Isolates to room, he did come out to eat his supper.  Pt had emesis on floor of room. Asked if he felt ok . He stated that he was just coughing really hard and threw up a little.  He denies having SI HI hallucinations depression anxiety and pain at this time.      Problem: Adult Behavioral Health Plan of Care  Goal: Adheres to Safety Considerations for Self and Others  Outcome: No Change   no thoughts to harm self or others.   Problem: Adult Behavioral Health Plan of Care  Goal: Optimized Coping Skills in Response to Life Stressors  Outcome: No Change   pt is encouraged to participate Inunit programming.   Problem: Thought Process Alteration  Goal: Optimal Thought Clarity  Description  Patient will participate in nursing assessment daily.  Patient will show some insight into mental health symptoms.  Patient will remain calm and have no impulsive, aggressive outbursts.    10/26/2019 1631 by Mile Reyna, RN  Outcome: No Change     Problem: Fall Injury Risk  Goal: Absence of Fall and Fall-Related Injury  Description  Patient will be free of injury from falls while on the unit.    10/26/2019 1631 by Mile Reyna, RN  Outcome: Improving   no falls noted this shift.

## 2019-10-26 NOTE — PLAN OF CARE
"  Problem: Fall Injury Risk  Goal: Absence of Fall and Fall-Related Injury  Description  Patient will be free of injury from falls while on the unit.    10/25/2019 2224 by Lexy Awad RN  Outcome: Improving   Patient is balanced and steady om his feet.  Free from falls this shift.   Problem: Thought Process Alteration  Goal: Optimal Thought Clarity  Description  Patient will participate in nursing assessment daily.  Patient will show some insight into mental health symptoms.  Patient will remain calm and have no impulsive, aggressive outbursts.    10/25/2019 2224 by Lexy Awad RN  Outcome: Improving   Patient rambles and mumbles during nursing assessment but is polite and calm when talking with this writer.  He does talk about how medications don't help him but then says \"or maybe they do?\"  Very little insight into mental health issues. No impulsive or aggressive behavior this shift.     Problem: Adult Behavioral Health Plan of Care  Goal: Patient-Specific Goal (Individualization)  Description  Patient will sleep 6-8 hours each night.  Patient will maintain daily ADLs without prompting.         10/25/2019 2224 by Lexy Awad RN  Outcome: Improving  Note:     Patient isolative and withdrawn to room much of the shift.  He listened to headphones and was cheerful.  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  Denies SI, HI, AH, and VH but does appear to be responding to internal stimuli.  Often seen talking to self in room. No complaints of pain.  VS WNL. Face to face end of shift report communicated to night shift RN.     Lexy Awad RN  10/25/2019  10:31 PM          "

## 2019-10-26 NOTE — PLAN OF CARE
Observed pt lying in a supine position - eyes closed - non-labored breathing noted - is wearing head phones - music quiet enough as not to disturb anyone.  Slept all night without issue.  Face to face end of shift report communicated to oncoming RN     Cris Arteaga RN  10/26/2019  6:07 AM

## 2019-10-27 PROCEDURE — 25000132 ZZH RX MED GY IP 250 OP 250 PS 637: Performed by: NURSE PRACTITIONER

## 2019-10-27 PROCEDURE — 99232 SBSQ HOSP IP/OBS MODERATE 35: CPT | Performed by: NURSE PRACTITIONER

## 2019-10-27 PROCEDURE — 12400000 ZZH R&B MH

## 2019-10-27 RX ADMIN — ASPIRIN 81 MG 81 MG: 81 TABLET ORAL at 08:47

## 2019-10-27 RX ADMIN — LEVOTHYROXINE SODIUM 50 MCG: 25 TABLET ORAL at 06:55

## 2019-10-27 RX ADMIN — TRAZODONE HYDROCHLORIDE 100 MG: 100 TABLET ORAL at 20:54

## 2019-10-27 RX ADMIN — BENZTROPINE MESYLATE 1 MG: 1 TABLET ORAL at 08:47

## 2019-10-27 RX ADMIN — HALOPERIDOL 5 MG: 5 TABLET ORAL at 08:47

## 2019-10-27 RX ADMIN — HALOPERIDOL 10 MG: 10 TABLET ORAL at 20:53

## 2019-10-27 RX ADMIN — MULTIPLE VITAMINS W/ MINERALS TAB 1 TABLET: TAB at 08:47

## 2019-10-27 RX ADMIN — BENZTROPINE MESYLATE 1 MG: 1 TABLET ORAL at 20:54

## 2019-10-27 ASSESSMENT — ACTIVITIES OF DAILY LIVING (ADL)
DRESS: SCRUBS (BEHAVIORAL HEALTH);INDEPENDENT
HYGIENE/GROOMING: PROMPTS
ORAL_HYGIENE: PROMPTS
LAUNDRY: UNABLE TO COMPLETE

## 2019-10-27 ASSESSMENT — MIFFLIN-ST. JEOR: SCORE: 1733.79

## 2019-10-27 NOTE — PROGRESS NOTES
"[facility]  Psychiatric Progress Note      Impression:    This is a 69 year old yo male with a history of schizoaffective disorder bipolar type.    Ger has been doing well since being moved to the open unit. He is sleeping well at night. He has been compliant with his medications, reports no side effects. Denies hallucinations. He is much less labile, is pleasant in conversation though offers very little except to tell me that he feels \"fine\" today. Has been listening to music in his room, which he seems to enjoy. Will have court this week in [facility].     Educated regarding medication indications, risks, benefits, side effects, contraindications and possible interactions. Verbally expressed understanding.        Diagnoses:   Schizoaffective disorder, bipolar type    Attestation:  Patient has been seen and evaluated by me,  Arielle Walker NP          Interim History:   The patient's care was discussed with the treatment team and chart notes were reviewed.          Medications:     Current Facility-Administered Medications   Medication     acetaminophen (TYLENOL) tablet 650 mg     alum & mag hydroxide-simethicone (MYLANTA ES/MAALOX  ES) suspension 30 mL     aspirin (ASA) chewable tablet 81 mg     benztropine (COGENTIN) tablet 1 mg     eucerin cream     haloperidol lactate (HALDOL) injection 10 mg    Or     haloperidol (HALDOL) tablet 10 mg     haloperidol (HALDOL) tablet 10 mg     haloperidol (HALDOL) tablet 5 mg     hydrOXYzine (ATARAX) tablet 25-50 mg     levothyroxine (SYNTHROID/LEVOTHROID) tablet 50 mcg     LORazepam (ATIVAN) injection 1 mg    Or     LORazepam (ATIVAN) tablet 1 mg     magnesium hydroxide (MILK OF MAGNESIA) suspension 30 mL     mineral oil-hydrophilic petrolatum (AQUAPHOR)     multivitamin w/minerals (THERA-VIT-M) tablet 1 tablet     nicotine (NICORETTE) gum 2-4 mg     OLANZapine (zyPREXA) injection 10 mg    Or     OLANZapine zydis (zyPREXA) ODT tab 10 mg     paliperidone " "(INVEGA SUSTENNA) injection CHRISTOS 234 mg     traZODone (DESYREL) tablet 100 mg     No current Epic-ordered outpatient medications on file.      10 point ROS- none reported today       Allergies:     Allergies   Allergen Reactions     Peas GI Disturbance     Black eyed peas - vomiting     Haloperidol Anxiety          Psychiatric Examination:   BP 98/65   Pulse 71   Temp 97.7  F (36.5  C) (Tympanic)   Resp 18   Ht 1.778 m (5' 10\")   Wt 95.8 kg (211 lb 1.6 oz)   SpO2 97%   BMI 30.29 kg/m    Weight is 211 lbs 1.6 oz  Body mass index is 30.29 kg/m .    Appearance: awake, alert, dressed in hospital scrubs, unkempt  Attitude: pleasant and cooperative  Eye Contact: fair  Mood: \"fine\", less labile  Affect: more relaxed  Speech: mumbled, normal volume  Psychomotor Behavior:  no evidence of tardive dyskinesia, dystonia, or tics  Thought Process: more logical and linear today though offers little  Associations: no loosening of associations noted though engages in very superficial conversation  Thought Content: denies suicidal or homicidal ideation, denies hallucinations or paranoia  Insight:  limited  Judgment:  limited  Oriented to: person, place, time  Attention Span and Concentration:  limited   Recent and Remote Memory:  fair  Fund of Knowledge: appropriate for education  Muscle Strength and Tone: normal  Gait and Station: Normal         Labs:     No results found for this or any previous visit (from the past 24 hour(s)).         Plan:   Continue Inpatient Hospitalization  Continue Haldol 5 mg in am and 10 mg at HS  Continue Invega Sustenna at 234 mg IM every 28 days. Next dose due 11/14/19    Confinement hearing has been rescheduled to 10/30/19    ELOS: > 5 days, for civil commitment process, paranoid and disorganized thoughts along with impulsive behaviors    "

## 2019-10-27 NOTE — PLAN OF CARE
Observed pt lying in a supine position - eyes closed - non-labored breathing noted. Slept all noc without issue - Face to face end of shift report communicated to oncoming RN.     Cris Arteaga RN  10/27/2019  6:20 AM

## 2019-10-27 NOTE — PLAN OF CARE
Problem: Adult Behavioral Health Plan of Care  Goal: Patient-Specific Goal (Individualization)  Description  Patient will sleep 6-8 hours each night.  Patient will maintain daily ADLs without prompting.       10/27/2019 1721 by Mile Reyna, RN  Outcome: No Change  Note:   Pt isolative in room he states he is going to shower after supper. He denies SI I hallucinations depression anxiety and pain.    Problem: Adult Behavioral Health Plan of Care  Goal: Adheres to Safety Considerations for Self and Others  Outcome: No Change   No thoughts to harm self or others  Problem: Adult Behavioral Health Plan of Care  Goal: Optimized Coping Skills in Response to Life Stressors  Outcome: No Change   Encouraged to participate in unit programming  Problem: Thought Process Alteration  Goal: Optimal Thought Clarity  Description  Patient will participate in nursing assessment daily.  Patient will show some insight into mental health symptoms.  Patient will remain calm and have no impulsive, aggressive outbursts.    10/27/2019 1721 by Mile Reyna, RN  Outcome: No Change   Pt is encouraged to particpate in unit programming. No impulsive or aggreassive outbursts noted.   Problem: Fall Injury Risk  Goal: Absence of Fall and Fall-Related Injury  Description  Patient will be free of injury from falls while on the unit.    10/27/2019 1721 by Mile Reyna, RN  Outcome: Improving   No falls or injuries noted this shift.     Pt showered after supper this shift. He isolated all shift.pt thoughts disorganized intermittently.

## 2019-10-28 PROCEDURE — 25000132 ZZH RX MED GY IP 250 OP 250 PS 637: Performed by: NURSE PRACTITIONER

## 2019-10-28 PROCEDURE — 99232 SBSQ HOSP IP/OBS MODERATE 35: CPT | Performed by: NURSE PRACTITIONER

## 2019-10-28 PROCEDURE — 12400000 ZZH R&B MH

## 2019-10-28 RX ADMIN — LEVOTHYROXINE SODIUM 50 MCG: 25 TABLET ORAL at 06:30

## 2019-10-28 RX ADMIN — TRAZODONE HYDROCHLORIDE 100 MG: 100 TABLET ORAL at 20:20

## 2019-10-28 RX ADMIN — HALOPERIDOL 10 MG: 10 TABLET ORAL at 20:21

## 2019-10-28 RX ADMIN — ASPIRIN 81 MG 81 MG: 81 TABLET ORAL at 08:25

## 2019-10-28 RX ADMIN — HALOPERIDOL 5 MG: 5 TABLET ORAL at 08:25

## 2019-10-28 RX ADMIN — BENZTROPINE MESYLATE 1 MG: 1 TABLET ORAL at 20:20

## 2019-10-28 RX ADMIN — BENZTROPINE MESYLATE 1 MG: 1 TABLET ORAL at 08:25

## 2019-10-28 RX ADMIN — MULTIPLE VITAMINS W/ MINERALS TAB 1 TABLET: TAB at 08:25

## 2019-10-28 ASSESSMENT — ACTIVITIES OF DAILY LIVING (ADL)
ORAL_HYGIENE: PROMPTS
DRESS: SCRUBS (BEHAVIORAL HEALTH)
HYGIENE/GROOMING: PROMPTS
LAUNDRY: UNABLE TO COMPLETE
ORAL_HYGIENE: PROMPTS
HYGIENE/GROOMING: PROMPTS
DRESS: INDEPENDENT

## 2019-10-28 NOTE — PLAN OF CARE
Problem: Adult Behavioral Health Plan of Care  Goal: Patient-Specific Goal (Individualization)  Description  Patient will sleep 6-8 hours each night.  Patient will maintain daily ADLs without prompting.        10/28/2019 0008 by Daisy Cash RN  Outcome: No Change     Pt has been in bed with eyes closed and regular respirations x 6 hours this noc shift. 15 minute and PRN checks all night. No complaints offered. Will continue to monitor.      Problem: Fall Injury Risk  Goal: Absence of Fall and Fall-Related Injury  Description  Patient will be free of injury from falls while on the unit.    10/28/2019 0008 by Daisy Cash RN  Outcome: Improving   Pt. Free from falls at this time.     Face to face end of shift report to be communicated to oncoming RN.     Daisy Cash RN  10/28/2019

## 2019-10-28 NOTE — PLAN OF CARE
Face to face shift report received from Daisy HOWE RN. Rounding completed, pt observed.  Ayala El RN  10/28/2019  7:52 AM  Problem: Adult Behavioral Health Plan of Care  Goal: Patient-Specific Goal (Individualization)  Description  Patient will sleep 6-8 hours each night.  Patient will maintain daily ADLs without prompting.         10/28/2019 0751 by Ayala El RN  Outcome: Improving  Patient was awake in his room at the start of this shift.  Patient declined breakfast meal tray.  Patient is oriented x4.  States frustration over his hospitalization and legal status.  Denies pain or unwanted side effects.     Problem: Thought Process Alteration  Goal: Optimal Thought Clarity  Description  Patient will participate in nursing assessment daily.  Patient will show some insight into mental health symptoms.  Patient will remain calm and have no impulsive, aggressive outbursts.    Outcome: Improving   Patient answered all questions with appropriate responses.    Problem: Fall Injury Risk  Goal: Absence of Fall and Fall-Related Injury  Description  Patient will be free of injury from falls while on the unit.    10/28/2019 0751 by Ayala El RN  Outcome: Improving  Steady and balanced on feet.  Non skid slippers provided.     Face to face end of shift report will be communicated to oncoming shift.

## 2019-10-28 NOTE — PLAN OF CARE
BEHAVIORAL TEAM DISCUSSION     Participants: Meera Hitchcock LSW, Adriana Lynch LSW, Deon Ladd LSW,  Anali Ralph OT, Ayala El RN, [place] Dorantes RN,  Progress: fair  Continued Stay Criteria/Rationale: disorganized, psychosis  Medical/Physical: hypothyroid  Precautions:   Falls precaution?: YES, care plan in place          Behavioral Orders   Procedures    Code 1 - Restrict to Unit    Routine Programming       As clinically indicated    Status 15       Every 15 minutes.      Plan:  Continue Haldol 5 mg in am and 10 mg at HS  Continue Invega Sustenna at 234 mg IM every 28 days. Next dose due 11/14/19, Court hearing on Oct 30th in [facility]    Rationale for change in precautions or plan: None     Principal Problem:    Schizoaffective disorder, bipolar type (H)  Active Problems:    Psychosis (H)          Current Facility-Administered Medications:     acetaminophen (TYLENOL) tablet 650 mg, 650 mg, Oral, Q4H PRN, Granberg, Arielle, NP, 650 mg at 10/18/19 0623    alum & mag hydroxide-simethicone (MYLANTA ES/MAALOX  ES) suspension 30 mL, 30 mL, Oral, Q4H PRN, Granberg, Arielle, NP, 30 mL at 10/13/19 1905    aspirin (ASA) chewable tablet 81 mg, 81 mg, Oral, Daily, GranArielle daniel, NP, 81 mg at 10/28/19 0825    benztropine (COGENTIN) tablet 1 mg, 1 mg, Oral, BID, Granberg, Arielle, NP, 1 mg at 10/28/19 0825    eucerin cream, , Topical, BID PRN, Granberg Arielle, NP    haloperidol lactate (HALDOL) injection 10 mg, 10 mg, Intramuscular, BID PRN **OR** haloperidol (HALDOL) tablet 10 mg, 10 mg, Oral, BID PRN, Naomie Benavidez Mc, APRN CNP    haloperidol (HALDOL) tablet 10 mg, 10 mg, Oral, At Bedtime, Arielle Walker, NP, 10 mg at 10/27/19 2053    haloperidol (HALDOL) tablet 5 mg, 5 mg, Oral, Daily with breakfast, Naomie Benavidez Mc, APRN CNP, 5 mg at 10/28/19 0825    hydrOXYzine (ATARAX) tablet 25-50 mg, 25-50 mg, Oral, Q4H PRN, Arielle Walker, NP    levothyroxine (SYNTHROID/LEVOTHROID) tablet 50 mcg, 50 mcg,  Oral, QAM AC, Arielle Walker NP, 50 mcg at 10/28/19 0630    LORazepam (ATIVAN) injection 1 mg, 1 mg, Intramuscular, Q6H PRN, 1 mg at 10/13/19 1920 **OR** LORazepam (ATIVAN) tablet 1 mg, 1 mg, Oral, Q6H PRN, Arielle Walker NP, 1 mg at 10/18/19 0533    magnesium hydroxide (MILK OF MAGNESIA) suspension 30 mL, 30 mL, Oral, At Bedtime PRN, Arielle Walker NP    mineral oil-hydrophilic petrolatum (AQUAPHOR), , Topical, BID PRN, Naomie Benavidez Mc, APRN CNP    multivitamin w/minerals (THERA-VIT-M) tablet 1 tablet, 1 tablet, Oral, Daily, Arielle Walker NP, 1 tablet at 10/28/19 0825    nicotine (NICORETTE) gum 2-4 mg, 2-4 mg, Buccal, Q1H PRN, Arielle Walker NP    OLANZapine (zyPREXA) injection 10 mg, 10 mg, Intramuscular, BID PRN **OR** OLANZapine zydis (zyPREXA) ODT tab 10 mg, 10 mg, Oral, BID PRN, Naomie Benavidez Mc, APRN CNP, 10 mg at 10/18/19 1351    paliperidone (INVEGA SUSTENNA) injection CHRISTOS 234 mg, 234 mg, Intramuscular, Q28 Days, Naomie Benavidez Mc, APRN CNP, 234 mg at 10/18/19 0857    traZODone (DESYREL) tablet 100 mg, 100 mg, Oral, At Bedtime, Arielle Walker NP, 100 mg at 10/27/19 2054

## 2019-10-28 NOTE — PROGRESS NOTES
"[facility]  Psychiatric Progress Note      Impression:    This is a 69 year old yo male with a history of schizoaffective disorder bipolar type.    Ger is in his room when I go to see him today. He initially tells me that he does not want to talk to me because he is listening to music, though then takes the headphones off and asks \"what do you want?\" He reports that he is doing \"horrible\" today. He tells me that he is going on a \"hunger strike\". When asked why he replies \"because they are all going to lie about me in the court room\". He verbalizes frustration with the commitment process and feels it will be an \"unfair trial\". He then dismisses me from his room. Seems more irritable today compared to previous days. He has been compliant with taking Haldol, though does questions whether he has to take it or not. Historically compliance has been poor once he is outside of a structured setting.    Educated regarding medication indications, risks, benefits, side effects, contraindications and possible interactions. Verbally expressed understanding.        Diagnoses:   Schizoaffective disorder, bipolar type    Attestation:  Patient has been seen and evaluated by me,  Arielle Walker NP          Interim History:   The patient's care was discussed with the treatment team and chart notes were reviewed.          Medications:     Current Facility-Administered Medications   Medication     acetaminophen (TYLENOL) tablet 650 mg     alum & mag hydroxide-simethicone (MYLANTA ES/MAALOX  ES) suspension 30 mL     aspirin (ASA) chewable tablet 81 mg     benztropine (COGENTIN) tablet 1 mg     eucerin cream     haloperidol lactate (HALDOL) injection 10 mg    Or     haloperidol (HALDOL) tablet 10 mg     haloperidol (HALDOL) tablet 10 mg     haloperidol (HALDOL) tablet 5 mg     hydrOXYzine (ATARAX) tablet 25-50 mg     levothyroxine (SYNTHROID/LEVOTHROID) tablet 50 mcg     LORazepam (ATIVAN) injection 1 mg    Or     LORazepam " "(ATIVAN) tablet 1 mg     magnesium hydroxide (MILK OF MAGNESIA) suspension 30 mL     mineral oil-hydrophilic petrolatum (AQUAPHOR)     multivitamin w/minerals (THERA-VIT-M) tablet 1 tablet     nicotine (NICORETTE) gum 2-4 mg     OLANZapine (zyPREXA) injection 10 mg    Or     OLANZapine zydis (zyPREXA) ODT tab 10 mg     paliperidone (INVEGA SUSTENNA) injection CHRISTOS 234 mg     traZODone (DESYREL) tablet 100 mg     No current Epic-ordered outpatient medications on file.      10 point ROS- none reported today       Allergies:     Allergies   Allergen Reactions     Peas GI Disturbance     Black eyed peas - vomiting     Haloperidol Anxiety          Psychiatric Examination:   /75   Pulse 75   Temp 96.9  F (36.1  C) (Tympanic)   Resp 14   Ht 1.778 m (5' 10\")   Wt 95.8 kg (211 lb 1.6 oz)   SpO2 97%   BMI 30.29 kg/m    Weight is 211 lbs 1.6 oz  Body mass index is 30.29 kg/m .    Appearance: awake, alert, dressed in hospital scrubs, casually groomed  Attitude: cooperative though more irritable  Eye Contact: fair  Mood: irritable today  Affect: mood congruent  Speech: mumbled, normal volume  Psychomotor Behavior:  no evidence of tardive dyskinesia, dystonia, or tics  Thought Process: perseverative, linear though illogical  Associations: no loosening of associations noted  Thought Content: denies suicidal or homicidal ideation, denies hallucinations or paranoia  Insight:  limited  Judgment:  limited  Oriented to: person, place, time  Attention Span and Concentration:  limited   Recent and Remote Memory:  fair  Fund of Knowledge: appropriate for education  Muscle Strength and Tone: normal  Gait and Station: Normal         Labs:     No results found for this or any previous visit (from the past 24 hour(s)).         Plan:   Continue Inpatient Hospitalization  Continue Haldol 5 mg in am and 10 mg at HS  Continue Invega Sustenna at 234 mg IM every 28 days. Next dose due 11/14/19    Commitment hearing has been rescheduled " to 10/30/19    ELOS: > 5 days, for civil commitment process, paranoid and disorganized thoughts along with impulsive behaviors

## 2019-10-29 PROCEDURE — 25000132 ZZH RX MED GY IP 250 OP 250 PS 637: Performed by: NURSE PRACTITIONER

## 2019-10-29 PROCEDURE — 12400000 ZZH R&B MH

## 2019-10-29 PROCEDURE — 99232 SBSQ HOSP IP/OBS MODERATE 35: CPT | Performed by: NURSE PRACTITIONER

## 2019-10-29 RX ADMIN — HALOPERIDOL 10 MG: 10 TABLET ORAL at 20:38

## 2019-10-29 RX ADMIN — HALOPERIDOL 5 MG: 5 TABLET ORAL at 08:53

## 2019-10-29 RX ADMIN — BENZTROPINE MESYLATE 1 MG: 1 TABLET ORAL at 20:38

## 2019-10-29 RX ADMIN — ASPIRIN 81 MG 81 MG: 81 TABLET ORAL at 08:54

## 2019-10-29 RX ADMIN — WHITE PETROLATUM: 1.75 OINTMENT TOPICAL at 09:10

## 2019-10-29 RX ADMIN — TRAZODONE HYDROCHLORIDE 100 MG: 100 TABLET ORAL at 20:38

## 2019-10-29 RX ADMIN — BENZTROPINE MESYLATE 1 MG: 1 TABLET ORAL at 08:54

## 2019-10-29 RX ADMIN — LEVOTHYROXINE SODIUM 50 MCG: 25 TABLET ORAL at 06:27

## 2019-10-29 RX ADMIN — MULTIPLE VITAMINS W/ MINERALS TAB 1 TABLET: TAB at 08:53

## 2019-10-29 ASSESSMENT — ACTIVITIES OF DAILY LIVING (ADL)
HYGIENE/GROOMING: INDEPENDENT
DRESS: INDEPENDENT;SCRUBS (BEHAVIORAL HEALTH)

## 2019-10-29 NOTE — PLAN OF CARE
Problem: Adult Behavioral Health Plan of Care  Goal: Patient-Specific Goal (Individualization)  Description  Patient will sleep 6-8 hours each night.  Patient will maintain daily ADLs without prompting.   Outcome: No Change     Pt has been in bed with eyes closed and regular respirations x 7 hours this noc shift. 15 minute and PRN checks all night. No complaints offered. Will continue to monitor.        Problem: Fall Injury Risk  Goal: Absence of Fall and Fall-Related Injury  Description  Patient will be free of injury from falls while on the unit.    Outcome: Improving   Pt. Free from falls at this time.      Face to face end of shift report to be communicated to oncoming RN.      Daisy Cash RN  10/29/2019

## 2019-10-29 NOTE — PLAN OF CARE
"  Problem: Adult Behavioral Health Plan of Care  Goal: Patient-Specific Goal (Individualization)  Description  Patient will sleep 6-8 hours each night.  Patient will maintain daily ADLs without prompting.       10/29/2019 1125 by Yelena Marley, RN  Outcome: No Change   Pt up on the unit today, came out of room during breakfast, but again refused to eat. Pt stated he is \"protesting\" because he has concerns about court tomorrow, but he is taking in fluids. Pleasant in conversation, quite focused on court papers and denied all mental health criteria. 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 Denied physical complaints, is taking medication as ordered. Not attending group, when encouraged, pt said \"I'M not crazy, they're all out of their minds.\"    Problem: Adult Behavioral Health Plan of Care  Goal: Adheres to Safety Considerations for Self and Others  Outcome: No Change     Problem: Thought Process Alteration  Goal: Optimal Thought Clarity  Description  Patient will participate in nursing assessment daily.  Patient will show some insight into mental health symptoms.  Patient will remain calm and have no impulsive, aggressive outbursts.    Outcome: No Change     Problem: Fall Injury Risk  Goal: Absence of Fall and Fall-Related Injury  Description  Patient will be free of injury from falls while on the unit.    10/29/2019 1125 by Yelena Marley, RN  Outcome: Improving  Remains injury-free.      "

## 2019-10-29 NOTE — PLAN OF CARE
"  Problem: Fall Injury Risk  Goal: Absence of Fall and Fall-Related Injury  Description  Patient will be free of injury from falls while on the unit.    10/28/2019 1928 by Lexy Awad RN  Outcome: Improving   Patient is balanced and steady on his feet.  No falls this shift.   Problem: Thought Process Alteration  Goal: Optimal Thought Clarity  Description  Patient will participate in nursing assessment daily.  Patient will show some insight into mental health symptoms.  Patient will remain calm and have no impulsive, aggressive outbursts.    10/28/2019 1928 by Lexy Awad RN  Outcome: No Change   Patient continues to make odd statements and having active hallucinations.  He is often talking to himself or others while he is alone in his room.    Problem: Adult Behavioral Health Plan of Care  Goal: Patient-Specific Goal (Individualization)  Description  Patient will sleep 6-8 hours each night.  Patient will maintain daily ADLs without prompting.         10/28/2019 1928 by Lexy Awad RN  Outcome: No Change  Note:     Patient has been isolative and withdraw to his room.  He refused to eat supper stating he is on a \"hunger strike.\"  Denies all mental health criteria but appears responding to internal stimuli.  No complaints of pain.  VS WNL. Face to face end of shift report communicated to other evening shift RN as this writing is leaving shift early.   Evening shift RN will report off to night shift MACKENZIE LOPEZ. MACKENZIE Awad  10/28/2019  8:35 PM           "

## 2019-10-29 NOTE — PLAN OF CARE
BEHAVIORAL TEAM DISCUSSION    Participants: Arielle Walker NP, Bernice Benito [facility], Meera Hitchcock LSW,  Adriana Lynch LSW, Deon Ladd LSW, [facility], Ceci Da Silva OT, Mona Graham OT, Chapito Cardozo [facility], Howard Mcclure RN, Tuyet Chan RN   Progress: fair  Continued Stay Criteria/Rationale: disorganized, psychosis - hunger strike - apparently has not eaten for a few days.   Medical/Physical: hypothyroid  Precautions:   Falls precaution?: YES, care plan in place          Behavioral Orders   Procedures    Code 1 - Restrict to Unit    Routine Programming       As clinically indicated    Status 15       Every 15 minutes.      Plan:  Next dose due 11/14/19, Court hearing on Oct 30th in [facility] with aj -   Rationale for change in precautions or plan: None     Principal Problem:    Schizoaffective disorder, bipolar type (H)  Active Problems:    Psychosis (H)           Current Facility-Administered Medications:     acetaminophen (TYLENOL) tablet 650 mg, 650 mg, Oral, Q4H PRN, Arielle Walker NP, 650 mg at 10/18/19 0623    alum & mag hydroxide-simethicone (MYLANTA ES/MAALOX  ES) suspension 30 mL, 30 mL, Oral, Q4H PRN, Arielle Walker NP, 30 mL at 10/13/19 1905    aspirin (ASA) chewable tablet 81 mg, 81 mg, Oral, Daily, Arielle Walker NP, 81 mg at 10/28/19 0825    benztropine (COGENTIN) tablet 1 mg, 1 mg, Oral, BID, Arielle Walker NP, 1 mg at 10/28/19 0825    eucerin cream, , Topical, BID PRN, Arielle Walker NP    haloperidol lactate (HALDOL) injection 10 mg, 10 mg, Intramuscular, BID PRN **OR** haloperidol (HALDOL) tablet 10 mg, 10 mg, Oral, BID PRN, Naomie Benavidez Mc, APRN CNP    haloperidol (HALDOL) tablet 10 mg, 10 mg, Oral, At Bedtime, Arielle Walker NP, 10 mg at 10/27/19 2053    haloperidol (HALDOL) tablet 5 mg, 5 mg, Oral, Daily with breakfast, Naomie Benavidez Mc, APRN CNP, 5 mg at 10/28/19 0809    hydrOXYzine (ATARAX) tablet 25-50 mg, 25-50 mg, Oral, Q4H PRNDenise,  BERT Guzmán    levothyroxine (SYNTHROID/LEVOTHROID) tablet 50 mcg, 50 mcg, Oral, QAM AC, Arielle Walker NP, 50 mcg at 10/28/19 0630    LORazepam (ATIVAN) injection 1 mg, 1 mg, Intramuscular, Q6H PRN, 1 mg at 10/13/19 1920 **OR** LORazepam (ATIVAN) tablet 1 mg, 1 mg, Oral, Q6H PRN, Arielle Walker NP, 1 mg at 10/18/19 0533    magnesium hydroxide (MILK OF MAGNESIA) suspension 30 mL, 30 mL, Oral, At Bedtime PRN, Arielle Walker NP    mineral oil-hydrophilic petrolatum (AQUAPHOR), , Topical, BID PRN, Naomie Benavidez Mc, GABRIELLE BRANDON    multivitamin w/minerals (THERA-VIT-M) tablet 1 tablet, 1 tablet, Oral, Daily, Arielle Walker NP, 1 tablet at 10/28/19 0825    nicotine (NICORETTE) gum 2-4 mg, 2-4 mg, Buccal, Q1H PRN, Arielle Walker NP    OLANZapine (zyPREXA) injection 10 mg, 10 mg, Intramuscular, BID PRN **OR** OLANZapine zydis (zyPREXA) ODT tab 10 mg, 10 mg, Oral, BID PRN, Naomie Benavidez Mc, GABRIELLE CNP, 10 mg at 10/18/19 1351    paliperidone (INVEGA SUSTENNA) injection CHRISTOS 234 mg, 234 mg, Intramuscular, Q28 Days, Naomie Benavidez Mc, GABRIELLE CNP, 234 mg at 10/18/19 0857    traZODone (DESYREL) tablet 100 mg, 100 mg, Oral, At Bedtime, Arielle Walker NP, 100 mg at 10/27/19 2054

## 2019-10-30 VITALS
OXYGEN SATURATION: 95 % | HEIGHT: 70 IN | RESPIRATION RATE: 16 BRPM | BODY MASS INDEX: 30.22 KG/M2 | HEART RATE: 83 BPM | SYSTOLIC BLOOD PRESSURE: 103 MMHG | TEMPERATURE: 97.8 F | DIASTOLIC BLOOD PRESSURE: 69 MMHG | WEIGHT: 211.1 LBS

## 2019-10-30 PROCEDURE — 99239 HOSP IP/OBS DSCHRG MGMT >30: CPT | Performed by: NURSE PRACTITIONER

## 2019-10-30 PROCEDURE — 25000132 ZZH RX MED GY IP 250 OP 250 PS 637: Performed by: NURSE PRACTITIONER

## 2019-10-30 RX ORDER — HALOPERIDOL 5 MG/1
5 TABLET ORAL
Status: ON HOLD | COMMUNITY
Start: 2019-10-31 | End: 2019-12-13

## 2019-10-30 RX ADMIN — LEVOTHYROXINE SODIUM 50 MCG: 25 TABLET ORAL at 05:49

## 2019-10-30 RX ADMIN — BENZTROPINE MESYLATE 1 MG: 1 TABLET ORAL at 08:16

## 2019-10-30 RX ADMIN — LORAZEPAM 1 MG: 1 TABLET ORAL at 09:20

## 2019-10-30 RX ADMIN — HALOPERIDOL 5 MG: 5 TABLET ORAL at 08:16

## 2019-10-30 RX ADMIN — MULTIPLE VITAMINS W/ MINERALS TAB 1 TABLET: TAB at 08:16

## 2019-10-30 RX ADMIN — ASPIRIN 81 MG 81 MG: 81 TABLET ORAL at 08:16

## 2019-10-30 ASSESSMENT — ACTIVITIES OF DAILY LIVING (ADL)
DRESS: INDEPENDENT;SCRUBS (BEHAVIORAL HEALTH)
HYGIENE/GROOMING: INDEPENDENT

## 2019-10-30 NOTE — PLAN OF CARE
Problem: Adult Behavioral Health Plan of Care  Goal: Patient-Specific Goal (Individualization)  Description  Patient will sleep 6-8 hours each night.  Patient will maintain daily ADLs without prompting.         10/30/2019 0321 by Daisy Cash RN  Outcome: No Change  Note:     Pt has been in bed with eyes closed and regular respirations x 5 hours this noc shift. 15 minute and PRN checks all night. No complaints offered. Will continue to monitor.       Problem: Fall Injury Risk  Goal: Absence of Fall and Fall-Related Injury  Description  Patient will be free of injury from falls while on the unit.    Outcome: Improving   Pt. Free from falls at this time.     Face to face end of shift report to be communicated to oncoming RN.     Daisy Cash RN  10/30/2019  3:22 AM

## 2019-10-30 NOTE — PLAN OF CARE
Discharge Note    Patient Discharged to home after court in [facility] on 10/30/2019. Court personnel contacted Behavioral Health unit at approximately  3:00 PM to state pt was delivered to home via private car after court. This writer unable to provide discharge instructions. Patient was stable at time of transfer to court at 9:55 am today. Patient denied SI, HI, and thoughts of self harm at time of transfer. All personal belongings returned to patient upon leaving unit this morning. No discharge prescriptions provided. Psych evaluation, history and physical, AVS, and discharge summary was not faxed to next level of care, as pt was transferred from unit to court.     Yelena Marley RN  10/30/2019  3:05 PM

## 2019-10-30 NOTE — PLAN OF CARE
"    Faxed updated notes to , and left message for  concerning patient and updates.    Left another message with pt's case manger Ruddy Henry asking about long term plan for patient.     Spoke with [facility] Attorney Marcos Dempsey. Let him know that the patient's ACT team has not been involved in patient's plan and no one has touched base for case management. Marcos Dempsey said, \" I am sorry I have not been spending as much time on Ger's case that I should have, and I am usually more actively involved in thses cases.\" Marcos Dempsey provided an ACT Team member number to contact Masoud Hernandes. Left message.    Suzanne FSilvana With the [facility]  ACT TEAM called and said, \" Sorry we have not been in touch sooner.\" She stated that she feels that the pt should discharge back to his apartment and [facility]. She went into further stating that the patient seems to be at baseline after reading his medical notes. Suzanne said, we do not want the patient to loose his funding by going to a facility out of [facility] and it would be in the patient's best interest to have a chance to live in the community.     Faxed the [facility] ACT Team updated MAR upon their request to review, they were planning on having him discharge to his apartment after court.     Informed Suzanne Quiroz from ACT Team they would be responsible for patient's discharge and if he is not transported back to the hospital they would be having to do his provisional discharge.    Informed by email from [facility] Attorney Charlie Dempsey that patient was discharged to with Suzanne Quiroz from the ACT Team Home. Suzanne Quiroz emailed confirming what Charlie Dempsey stated.  Suzanne ststed that she transported pt home and he was safe.   "

## 2019-10-30 NOTE — PROGRESS NOTES
"[facility]  Psychiatric Progress Note      Impression:    This is a 69 year old yo male with a history of schizoaffective disorder bipolar type.    Ger is resting in bed listening to music when I see him today. He is not as irritable today. Spends much of his time in his room. 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 They are logical and thoughts are linear. He states that he plans to read these in court tomorrow. He has reportedly been refusing to eat, stating that he is on a \"hunger strike\" because of his \"trial coming up\". He has been drinking an adequate amount of fluids, will place him on I & O to monitor intake. Has been compliant with oral medications, though questions why he needs to take them. He will have commitment and rocha hearing tomorrow afternoon.     Educated regarding medication indications, risks, benefits, side effects, contraindications and possible interactions. Verbally expressed understanding.        Diagnoses:   Schizoaffective disorder, bipolar type    Attestation:  Patient has been seen and evaluated by me,  Arielle Walker NP          Interim History:   The patient's care was discussed with the treatment team and chart notes were reviewed.          Medications:     Current Facility-Administered Medications   Medication     acetaminophen (TYLENOL) tablet 650 mg     alum & mag hydroxide-simethicone (MYLANTA ES/MAALOX  ES) suspension 30 mL     aspirin (ASA) chewable tablet 81 mg     benztropine (COGENTIN) tablet 1 mg     eucerin cream     haloperidol lactate (HALDOL) injection 10 mg    Or     haloperidol (HALDOL) tablet 10 mg     haloperidol (HALDOL) tablet 10 mg     haloperidol (HALDOL) tablet 5 mg     hydrOXYzine (ATARAX) tablet 25-50 mg     levothyroxine (SYNTHROID/LEVOTHROID) tablet 50 mcg     LORazepam (ATIVAN) injection 1 mg    Or     LORazepam (ATIVAN) tablet 1 mg     magnesium hydroxide (MILK OF MAGNESIA) " "suspension 30 mL     mineral oil-hydrophilic petrolatum (AQUAPHOR)     multivitamin w/minerals (THERA-VIT-M) tablet 1 tablet     nicotine (NICORETTE) gum 2-4 mg     OLANZapine (zyPREXA) injection 10 mg    Or     OLANZapine zydis (zyPREXA) ODT tab 10 mg     paliperidone (INVEGA SUSTENNA) injection CHRISTOS 234 mg     traZODone (DESYREL) tablet 100 mg     No current Epic-ordered outpatient medications on file.      10 point ROS- none reported today       Allergies:     Allergies   Allergen Reactions     Peas GI Disturbance     Black eyed peas - vomiting     Haloperidol Anxiety          Psychiatric Examination:   /69   Pulse 83   Temp 97.8  F (36.6  C) (Tympanic)   Resp 16   Ht 1.778 m (5' 10\")   Wt 95.8 kg (211 lb 1.6 oz)   SpO2 95%   BMI 30.29 kg/m    Weight is 211 lbs 1.6 oz  Body mass index is 30.29 kg/m .    Appearance: awake, alert, dressed in hospital scrubs, mildly disheveled  Attitude: cooperative  Eye Contact: fair  Mood: less irritable today  Affect: mood congruent  Speech: mumbled, normal volume  Psychomotor Behavior:  no evidence of tardive dyskinesia, dystonia, or tics  Thought Process: perseverative, linear though illogical  Associations: no loosening of associations noted  Thought Content: denies suicidal or homicidal ideation, denies hallucinations or paranoia  Insight:  limited  Judgment:  limited  Oriented to: person, place, time  Attention Span and Concentration:  limited   Recent and Remote Memory:  fair  Fund of Knowledge: appropriate for education  Muscle Strength and Tone: normal  Gait and Station: Normal         Labs:     No results found for this or any previous visit (from the past 24 hour(s)).         Plan:   Continue Inpatient Hospitalization  Continue Haldol 5 mg in am and 10 mg at HS  Continue Invega Sustenna at 234 mg IM every 28 days. Next dose due 11/14/19    Commitment hearing has been rescheduled to 10/30/19    ELOS: > 5 days, for civil commitment process, paranoid and " disorganized thoughts along with impulsive behaviors

## 2019-10-30 NOTE — PLAN OF CARE
"  Problem: Adult Behavioral Health Plan of Care  Goal: Patient-Specific Goal (Individualization)  Description  Patient will sleep 6-8 hours each night.  Patient will maintain daily ADLs without prompting.  Pt will have improved nutrition, eating at least 50% of meals and drinking Ensure TID.     10/30/2019 1218 by Yelena Marley, RN  Outcome: No Change  Pt up on unit early, noted some anxiety R/T court today. Denied all mental health criteria, only minimally cooperative with writer's assessment. Pt again refused breakfast, stating he is protesting \"against the use of handcuffs.\" Taking liquids freely and new order received for Ensure on meal trays. Pt off unit at 0955 to be transported by [employer] to court today in [facility]. Was offered and accepted PRN Ativan for anxiety before leaving for court. Denied further complaint.   Problem: Adult Behavioral Health Plan of Care  Goal: Adheres to Safety Considerations for Self and Others  Outcome: No Change     Problem: Thought Process Alteration  Goal: Optimal Thought Clarity  Description  Patient will participate in nursing assessment daily.  Patient will show some insight into mental health symptoms.  Patient will remain calm and have no impulsive, aggressive outbursts.    Outcome: No Change     Problem: Fall Injury Risk  Goal: Absence of Fall and Fall-Related Injury  Description  Patient will be free of injury from falls while on the unit.    10/30/2019 1218 by Yelena Marley, RN  Outcome: Improving     Problem: Oral Intake Inadequate  Goal: Improved Oral Intake  Outcome: Completed          "

## 2019-10-30 NOTE — PLAN OF CARE
BEHAVIORAL TEAM DISCUSSION    Participants: Arielle Walker NP,   Meera Hitchcock LSW,  Adriana Lynch LSW, Deon Ladd LSW, Howard Mcclure RN, Tuyet Chan RN, Anali Ralph OT, Ceci Da Silva OT, Mona Graham OT, Chapito Cardozo [facility]   Progress: fair  Continued Stay Criteria/Rationale: disorganized, psychosis - hunger strike - apparently has not eaten for a few days.   Medical/Physical: hypothyroid, add ensure to tray to see if he will drink  Precautions:   Falls precaution?: YES, care plan in place          Behavioral Orders   Procedures    Code 1 - Restrict to Unit    Routine Programming       As clinically indicated    Status 15       Every 15 minutes.      Plan:  Next dose due 11/14/19, Court hearing today in [facility] with aj -   Rationale for change in precautions or plan: None     Principal Problem:    Schizoaffective disorder, bipolar type (H)  Active Problems:    Psychosis (H)     Current Facility-Administered Medications:     acetaminophen (TYLENOL) tablet 650 mg, 650 mg, Oral, Q4H PRN, Arielle Walker NP, 650 mg at 10/18/19 0623    alum & mag hydroxide-simethicone (MYLANTA ES/MAALOX  ES) suspension 30 mL, 30 mL, Oral, Q4H PRN, Arielle Walker NP, 30 mL at 10/13/19 1905    aspirin (ASA) chewable tablet 81 mg, 81 mg, Oral, Daily, Arielle Walker NP, 81 mg at 10/30/19 0816    benztropine (COGENTIN) tablet 1 mg, 1 mg, Oral, BID, Arielle Walker NP, 1 mg at 10/30/19 0816    eucerin cream, , Topical, BID PRN, Arielle Walker NP    haloperidol lactate (HALDOL) injection 10 mg, 10 mg, Intramuscular, BID PRN **OR** haloperidol (HALDOL) tablet 10 mg, 10 mg, Oral, BID PRN, Naomie Benavidez Mc, APRN CNP    haloperidol (HALDOL) tablet 10 mg, 10 mg, Oral, At Bedtime, Arielle Walker NP, 10 mg at 10/29/19 2038    haloperidol (HALDOL) tablet 5 mg, 5 mg, Oral, Daily with breakfast, Naomie Benavidez Mc, APRN CNP, 5 mg at 10/30/19 0816    hydrOXYzine (ATARAX) tablet 25-50 mg, 25-50 mg, Oral, Q4H PRN, Denise,  BERT Guzmán    levothyroxine (SYNTHROID/LEVOTHROID) tablet 50 mcg, 50 mcg, Oral, QAM AC, Arielle Walker NP, 50 mcg at 10/30/19 0549    LORazepam (ATIVAN) injection 1 mg, 1 mg, Intramuscular, Q6H PRN, 1 mg at 10/13/19 1920 **OR** LORazepam (ATIVAN) tablet 1 mg, 1 mg, Oral, Q6H PRN, Arielle Walker NP, 1 mg at 10/30/19 0920    magnesium hydroxide (MILK OF MAGNESIA) suspension 30 mL, 30 mL, Oral, At Bedtime PRN, Arielle Walker NP    mineral oil-hydrophilic petrolatum (AQUAPHOR), , Topical, BID PRN, Naomie Benavidez Mc, GABRIELLE BRANDON    multivitamin w/minerals (THERA-VIT-M) tablet 1 tablet, 1 tablet, Oral, Daily, Arielle Walker NP, 1 tablet at 10/30/19 0816    nicotine (NICORETTE) gum 2-4 mg, 2-4 mg, Buccal, Q1H PRN, Arielle Walker NP    OLANZapine (zyPREXA) injection 10 mg, 10 mg, Intramuscular, BID PRN **OR** OLANZapine zydis (zyPREXA) ODT tab 10 mg, 10 mg, Oral, BID PRN, Naomie Benavidez Mc, GABRIELLE CNP, 10 mg at 10/18/19 1351    paliperidone (INVEGA SUSTENNA) injection CHRISTOS 234 mg, 234 mg, Intramuscular, Q28 Days, Naomie Benavidez Mc, GABRIELLE CNP, 234 mg at 10/18/19 0857    traZODone (DESYREL) tablet 100 mg, 100 mg, Oral, At Bedtime, Arielle Walker NP, 100 mg at 10/29/19 2038

## 2019-10-30 NOTE — DISCHARGE SUMMARY
"Psychiatric Discharge Summary    Ger Bashir MRN# [id number]   Age: 69 year old YOB: 1950     Date of Admission:  10/13/2019  Date of Discharge:  10/30/2019  Admitting Physician:  Jacoby Mathur MD  Discharge Physician:  Arielle Walker CNP         Event Leading to Hospitalization and Hospital Stay   Admission: Ger Bashir is a 69 year old single  male who was brought to the [facility] ED by EMS for evaluation of psychosis. Brought in by EMS after police had been called to his apartment building twice due to him disrupting his neighbors. He was going around and banging on doors and harassing other residents. His apartment was found to be in disarray with a strobe light going off and mattress in the middle of the floor. Was recently hospitalized at [facility] and discharged 10/2/19. He has not been compliant with medications since discharge. Noncompliant with assessments in the ED, was verbally agitated when asked numerous questions. Was disorganized and unable to finish his thoughts or track conversation. Was placed on a hold and transferred to behavioral health for further evaluation.      He has been labile since arriving on the unit, observed to be hyperactive and impulsive. Has had to be redirected several times to not stand on the furniture and maintain appropriate boundaries with others. Has been yelling out at the television. He did agree to receive IM Haldol and Ativan this morning for increased agitation and unpredictable behavior. He is laying in bed when I go to see him today. He is very difficult to understand and speech is very mumbled. He does state that his brain is \"an [employer] computer at a 27, and yours is a 3\". He then lifts his hand and gestures to the door \"now get the hell out\". He is unable to provide any meaningful information except to tell me that he does not take any medications. It appears that on 10/2/19 he was discharged on Haldol 10 mg " "at bedtime, Cogentin 1 mg BID, and haldol decanoate injection. It is unclear when he received his last injection, will need to clarify tomorrow. He apparently developed a hand tremor when on Haldol 10 mg BID which is said to have greatly improved once dose was cut to just once at bedtime and Cogentin was added.      Hospital stay: Ger was admitted to the behavioral health unit on a 72 hour hold. For the first 1.5 weeks he remained labile, impulsive, and paranoid. Was in MHICU due to his level of disorganization. Was throwing food around in his room and did become verbally aggressive at times. A petition for commitment and rocha was filed with [facility] due to his refusal to take medication and apparent inability to care for himself. He did not require any restraint or seclusion, though did need frequent redirection when exhibiting impulsive behavior, like jumping or standing on furniture. Oral Haldol was continued and a morning dose was added. In reviewing records it appeared that he had responded well to Haldol in the past. He also eventually agreed to the Invega Sustenna long-acting injection, which he received on 10/18/19 at 234 mg dose. His next injection will be due 11/15/19. Plan is for eventual switch to Invega Trinza for every three month injections due to his long history of medication noncompliance when outside of the hospital. He tolerated these medications, he denied side effects and no side effects were observed. He did become less labile and paranoid over the course of his hospital stay. 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 He did start refusing to eat on Sunday, reporting he was on a \"hunger strike\" due to the commitment. Was drinking adequate fluids. He was transported to [facility] today for his commitment hearing. He was discharged back to his apartment from court. He does work " with the ACT team, who did transport him home. His ACT team worker did report that Ger is back to baseline and would be appropriate to return to his apartment with close ACT team follow-up.             At time of discharge, there is no evidence that patient is in immediate danger of self or others.        DIagnoses:   Schizoaffective disorder, bipolar type           Labs:     Results for orders placed or performed during the hospital encounter of 10/13/19   CBC with platelets   Result Value Ref Range    WBC 6.7 4.0 - 11.0 10e9/L    RBC Count 4.82 4.4 - 5.9 10e12/L    Hemoglobin 15.3 13.3 - 17.7 g/dL    Hematocrit 42.9 40.0 - 53.0 %    MCV 89 78 - 100 fl    MCH 31.7 26.5 - 33.0 pg    MCHC 35.7 31.5 - 36.5 g/dL    RDW 12.5 10.0 - 15.0 %    Platelet Count 213 150 - 450 10e9/L   Comprehensive metabolic panel   Result Value Ref Range    Sodium 135 133 - 144 mmol/L    Potassium 3.9 3.4 - 5.3 mmol/L    Chloride 103 94 - 109 mmol/L    Carbon Dioxide 28 20 - 32 mmol/L    Anion Gap 4 3 - 14 mmol/L    Glucose 100 (H) 70 - 99 mg/dL    Urea Nitrogen 12 7 - 30 mg/dL    Creatinine 0.83 0.66 - 1.25 mg/dL    GFR Estimate 90 >60 mL/min/[1.73_m2]    GFR Estimate If Black >90 >60 mL/min/[1.73_m2]    Calcium 8.3 (L) 8.5 - 10.1 mg/dL    Bilirubin Total 0.7 0.2 - 1.3 mg/dL    Albumin 3.6 3.4 - 5.0 g/dL    Protein Total 6.3 (L) 6.8 - 8.8 g/dL    Alkaline Phosphatase 84 40 - 150 U/L    ALT 20 0 - 70 U/L    AST 29 0 - 45 U/L   TSH   Result Value Ref Range    TSH 1.97 0.40 - 4.00 mU/L            Discharge Medications:     Current Discharge Medication List      START taking these medications    Details   !! haloperidol (HALDOL) 5 MG tablet Take 1 tablet (5 mg) by mouth daily (with breakfast)      paliperidone (INVEGA SUSTENNA) 234 MG/1.5ML CHRISTOS Inject 1.5 mLs (234 mg) into the muscle every 28 days Next dose due 11/15/19       !! - Potential duplicate medications found. Please discuss with provider.      CONTINUE these medications which have  NOT CHANGED    Details   aspirin (ASA) 81 MG chewable tablet Take 1 tablet (81 mg) by mouth daily  Qty: 30 tablet, Refills: 0    Associated Diagnoses: Paranoid schizophrenia (H)      benztropine (COGENTIN) 1 MG tablet Take 1 tablet (1 mg) by mouth 2 times daily  Qty: 60 tablet, Refills: 0    Associated Diagnoses: Paranoid schizophrenia (H)      !! haloperidol (HALDOL) 5 MG tablet Take 2 tablets (10 mg) by mouth At Bedtime  Qty: 30 tablet, Refills: 1    Associated Diagnoses: Paranoid schizophrenia (H)      levothyroxine (SYNTHROID/LEVOTHROID) 50 MCG tablet Take 1 tablet (50 mcg) by mouth daily  Qty: 30 tablet, Refills: 0    Associated Diagnoses: Paranoid schizophrenia (H)      multivitamin w/minerals (THERA-VIT-M) tablet Take 1 tablet by mouth daily  Qty: 30 tablet, Refills: 0    Associated Diagnoses: Paranoid schizophrenia (H)      Skin Protectants, Misc. (EUCERIN) cream Apply topically 2 times daily  Qty: 2 g, Refills: 1    Associated Diagnoses: Paranoid schizophrenia (H)      traZODone (DESYREL) 100 MG tablet Take 1 tablet (100 mg) by mouth At Bedtime  Qty: 30 tablet, Refills: 0    Associated Diagnoses: Paranoid schizophrenia (H)       !! - Potential duplicate medications found. Please discuss with provider.          Justification for dual anti-psychotic use: admitted on dual neuroleptics. Taking Invega Sustenna long acting injection along with oral Haldol for treatment of psychotic symptoms.       Psychiatric Examination:   Appearance:  awake, alert and appeared as age stated  Attitude:  cooperative and guarded  Eye Contact:  fair  Mood:  better, irritable about court  Affect:  mood congruent  Speech:  Mumbled, normal volume  Psychomotor Behavior:  no evidence of tardive dyskinesia, dystonia, or tics  Thought Process:  linear and illogical  Associations:  no loose associations  Thought Content:  no evidence of suicidal ideation or homicidal ideation and patient appears to be responding to internal  stimuli  Insight:  limited  Judgment:  limited  Oriented to:  time, person, and place  Attention Span and Concentration:  limited  Recent and Remote Memory:  fair  Fund of Knowledge: appropriate for education  Muscle Strength and Tone: normal  Gait and Station: Normal         Discharge Plan:   Psychiatry Follow-up:     [facility]     - Ruddy Carl cell [phone]  [address] [place] [address]  [phone]   Fax [phone]    ACT TEAM   [facility] [phone]    [facility] care coordinator Kira Caicedo [phone]  [facility] ride  Phone: [phone] or [phone]      Attestation:  The patient has been seen and evaluated by me,  Arielle Walker, BERT         Discharge Services Provided:    45 minutes spent on discharge services, including:  Final examination of patient.  Review and discussion of Hospital stay.  Instructions for continued outpatient care/goals.  Preparation of discharge records.  Preparation of medications refills and new prescriptions.

## 2019-10-30 NOTE — PLAN OF CARE
Problem: Adult Behavioral Health Plan of Care  Goal: Patient-Specific Goal (Individualization)  Description  Patient will sleep 6-8 hours each night.  Patient will maintain daily ADLs without prompting.         10/29/2019 1943 by Macy Thompson RN  Outcome: No Change   Sleeping well. Showered and shaved tonight. Refused supper but is drinking fluids.  Problem: Thought Process Alteration  Goal: Optimal Thought Clarity  Description  Patient will participate in nursing assessment daily.  Patient will show some insight into mental health symptoms.  Patient will remain calm and have no impulsive, aggressive outbursts.    10/29/2019 1943 by Macy Thompson RN  Outcome: No Change   Polite cooperative. Calm. Still refuses to eat or discuss why. Did ask for coffee and drank that. [facility].  Face to face end of shift report communicated to night shift RN.     Macy Thompson RN  10/29/2019  9:36 PM

## 2019-11-08 ENCOUNTER — MEDICAL CORRESPONDENCE (OUTPATIENT)
Dept: HEALTH INFORMATION MANAGEMENT | Facility: CLINIC | Age: 69
End: 2019-11-08

## 2019-11-22 ENCOUNTER — HOSPITAL ENCOUNTER (EMERGENCY)
Facility: CLINIC | Age: 69
Discharge: PSYCHIATRIC OR SUBSTANCE USE FACILITY | End: 2019-11-22
Attending: EMERGENCY MEDICINE | Admitting: EMERGENCY MEDICINE
Payer: COMMERCIAL

## 2019-11-22 ENCOUNTER — HOSPITAL ENCOUNTER (INPATIENT)
Facility: CLINIC | Age: 69
LOS: 34 days | Discharge: PSYCHIATRIC OR SUBSTANCE USE FACILITY | DRG: 885 | End: 2019-12-26
Attending: PSYCHIATRY & NEUROLOGY | Admitting: PSYCHIATRY & NEUROLOGY
Payer: COMMERCIAL

## 2019-11-22 ENCOUNTER — HOSPITAL ENCOUNTER (INPATIENT)
Facility: CLINIC | Age: 69
End: 2019-11-22
Attending: PSYCHIATRY & NEUROLOGY | Admitting: PSYCHIATRY & NEUROLOGY
Payer: COMMERCIAL

## 2019-11-22 VITALS
SYSTOLIC BLOOD PRESSURE: 129 MMHG | DIASTOLIC BLOOD PRESSURE: 86 MMHG | OXYGEN SATURATION: 97 % | TEMPERATURE: 98.5 F | RESPIRATION RATE: 16 BRPM

## 2019-11-22 DIAGNOSIS — F25.0 SCHIZOAFFECTIVE DISORDER, BIPOLAR TYPE (H): ICD-10-CM

## 2019-11-22 DIAGNOSIS — F20.0 PARANOID SCHIZOPHRENIA (H): ICD-10-CM

## 2019-11-22 DIAGNOSIS — Z00.00 ENCOUNTER FOR ROUTINE ADULT HEALTH EXAMINATION WITHOUT ABNORMAL FINDINGS: Primary | ICD-10-CM

## 2019-11-22 PROBLEM — F25.9 SCHIZOAFFECTIVE DISORDER (H): Status: ACTIVE | Noted: 2019-11-22

## 2019-11-22 LAB
AMPHETAMINES UR QL SCN: NEGATIVE
BARBITURATES UR QL: NEGATIVE
BENZODIAZ UR QL: NEGATIVE
CANNABINOIDS UR QL SCN: NEGATIVE
COCAINE UR QL: NEGATIVE
OPIATES UR QL SCN: NEGATIVE
PCP UR QL SCN: NEGATIVE

## 2019-11-22 PROCEDURE — 99291 CRITICAL CARE FIRST HOUR: CPT | Mod: 25

## 2019-11-22 PROCEDURE — 96372 THER/PROPH/DIAG INJ SC/IM: CPT

## 2019-11-22 PROCEDURE — 80307 DRUG TEST PRSMV CHEM ANLYZR: CPT | Performed by: EMERGENCY MEDICINE

## 2019-11-22 PROCEDURE — 12400001 ZZH R&B MH UMMC

## 2019-11-22 PROCEDURE — 90791 PSYCH DIAGNOSTIC EVALUATION: CPT

## 2019-11-22 PROCEDURE — 25000125 ZZHC RX 250

## 2019-11-22 PROCEDURE — 25000128 H RX IP 250 OP 636: Performed by: EMERGENCY MEDICINE

## 2019-11-22 RX ORDER — HALOPERIDOL 5 MG/1
5 TABLET ORAL
Status: DISCONTINUED | OUTPATIENT
Start: 2019-11-23 | End: 2019-11-22 | Stop reason: HOSPADM

## 2019-11-22 RX ORDER — NICOTINE 21 MG/24HR
1 PATCH, TRANSDERMAL 24 HOURS TRANSDERMAL DAILY
Status: DISCONTINUED | OUTPATIENT
Start: 2019-11-23 | End: 2019-12-18

## 2019-11-22 RX ORDER — HYDROXYZINE HYDROCHLORIDE 25 MG/1
25 TABLET, FILM COATED ORAL EVERY 4 HOURS PRN
Status: DISCONTINUED | OUTPATIENT
Start: 2019-11-22 | End: 2019-12-26 | Stop reason: HOSPADM

## 2019-11-22 RX ORDER — WATER 10 ML/10ML
INJECTION INTRAMUSCULAR; INTRAVENOUS; SUBCUTANEOUS
Status: DISCONTINUED
Start: 2019-11-22 | End: 2019-11-22 | Stop reason: HOSPADM

## 2019-11-22 RX ORDER — TRAZODONE HYDROCHLORIDE 100 MG/1
100 TABLET ORAL AT BEDTIME
Status: DISCONTINUED | OUTPATIENT
Start: 2019-11-22 | End: 2019-11-22 | Stop reason: HOSPADM

## 2019-11-22 RX ORDER — OLANZAPINE 5 MG/1
5 TABLET ORAL
Status: DISCONTINUED | OUTPATIENT
Start: 2019-11-22 | End: 2019-11-23

## 2019-11-22 RX ORDER — OLANZAPINE 5 MG/1
5 TABLET ORAL 2 TIMES DAILY
Status: DISCONTINUED | OUTPATIENT
Start: 2019-11-22 | End: 2019-11-22 | Stop reason: HOSPADM

## 2019-11-22 RX ORDER — ASPIRIN 81 MG/1
81 TABLET, CHEWABLE ORAL DAILY
Status: DISCONTINUED | OUTPATIENT
Start: 2019-11-23 | End: 2019-12-26 | Stop reason: HOSPADM

## 2019-11-22 RX ORDER — LEVOTHYROXINE SODIUM 50 UG/1
50 TABLET ORAL DAILY
Status: DISCONTINUED | OUTPATIENT
Start: 2019-11-22 | End: 2019-11-22 | Stop reason: HOSPADM

## 2019-11-22 RX ORDER — OLANZAPINE 2.5 MG/1
2.5 TABLET, FILM COATED ORAL
Status: DISCONTINUED | OUTPATIENT
Start: 2019-11-22 | End: 2019-11-23

## 2019-11-22 RX ORDER — BISACODYL 10 MG
10 SUPPOSITORY, RECTAL RECTAL DAILY PRN
Status: DISCONTINUED | OUTPATIENT
Start: 2019-11-22 | End: 2019-12-26 | Stop reason: HOSPADM

## 2019-11-22 RX ORDER — MULTIPLE VITAMINS W/ MINERALS TAB 9MG-400MCG
1 TAB ORAL DAILY
Status: DISCONTINUED | OUTPATIENT
Start: 2019-11-23 | End: 2019-12-26 | Stop reason: HOSPADM

## 2019-11-22 RX ORDER — HALOPERIDOL 10 MG/1
10 TABLET ORAL AT BEDTIME
Status: DISCONTINUED | OUTPATIENT
Start: 2019-11-22 | End: 2019-11-25

## 2019-11-22 RX ORDER — OLANZAPINE 10 MG/2ML
10 INJECTION, POWDER, FOR SOLUTION INTRAMUSCULAR DAILY PRN
Status: DISCONTINUED | OUTPATIENT
Start: 2019-11-22 | End: 2019-11-22 | Stop reason: HOSPADM

## 2019-11-22 RX ORDER — BENZTROPINE MESYLATE 0.5 MG/1
1 TABLET ORAL 2 TIMES DAILY
Status: DISCONTINUED | OUTPATIENT
Start: 2019-11-22 | End: 2019-12-26 | Stop reason: HOSPADM

## 2019-11-22 RX ORDER — WATER 10 ML/10ML
INJECTION INTRAMUSCULAR; INTRAVENOUS; SUBCUTANEOUS
Status: COMPLETED
Start: 2019-11-22 | End: 2019-11-22

## 2019-11-22 RX ORDER — BENZTROPINE MESYLATE 1 MG/1
1 TABLET ORAL 2 TIMES DAILY
Status: DISCONTINUED | OUTPATIENT
Start: 2019-11-22 | End: 2019-11-22 | Stop reason: HOSPADM

## 2019-11-22 RX ORDER — ACETAMINOPHEN 325 MG/1
650 TABLET ORAL EVERY 4 HOURS PRN
Status: DISCONTINUED | OUTPATIENT
Start: 2019-11-22 | End: 2019-12-26 | Stop reason: HOSPADM

## 2019-11-22 RX ORDER — OLANZAPINE 10 MG/2ML
5 INJECTION, POWDER, FOR SOLUTION INTRAMUSCULAR
Status: DISCONTINUED | OUTPATIENT
Start: 2019-11-22 | End: 2019-11-23

## 2019-11-22 RX ORDER — HALOPERIDOL 5 MG/1
10 TABLET ORAL AT BEDTIME
Status: DISCONTINUED | OUTPATIENT
Start: 2019-11-22 | End: 2019-11-22 | Stop reason: HOSPADM

## 2019-11-22 RX ORDER — OLANZAPINE 10 MG/1
10 TABLET, ORALLY DISINTEGRATING ORAL AT BEDTIME
Status: DISCONTINUED | OUTPATIENT
Start: 2019-11-22 | End: 2019-11-22

## 2019-11-22 RX ORDER — OLANZAPINE 10 MG/2ML
2.5 INJECTION, POWDER, FOR SOLUTION INTRAMUSCULAR
Status: DISCONTINUED | OUTPATIENT
Start: 2019-11-22 | End: 2019-11-23

## 2019-11-22 RX ORDER — ACETAMINOPHEN 325 MG/1
650 TABLET ORAL EVERY 4 HOURS PRN
Status: DISCONTINUED | OUTPATIENT
Start: 2019-11-22 | End: 2019-11-22 | Stop reason: HOSPADM

## 2019-11-22 RX ORDER — ALUMINA, MAGNESIA, AND SIMETHICONE 2400; 2400; 240 MG/30ML; MG/30ML; MG/30ML
30 SUSPENSION ORAL EVERY 4 HOURS PRN
Status: DISCONTINUED | OUTPATIENT
Start: 2019-11-22 | End: 2019-12-26 | Stop reason: HOSPADM

## 2019-11-22 RX ORDER — OLANZAPINE 10 MG/1
10 TABLET, ORALLY DISINTEGRATING ORAL
Status: DISCONTINUED | OUTPATIENT
Start: 2019-11-22 | End: 2019-11-22

## 2019-11-22 RX ORDER — HALOPERIDOL 5 MG/1
5 TABLET ORAL
Status: DISCONTINUED | OUTPATIENT
Start: 2019-11-23 | End: 2019-11-26

## 2019-11-22 RX ORDER — TRAZODONE HYDROCHLORIDE 100 MG/1
100 TABLET ORAL AT BEDTIME
Status: DISCONTINUED | OUTPATIENT
Start: 2019-11-22 | End: 2019-12-26 | Stop reason: HOSPADM

## 2019-11-22 RX ORDER — OLANZAPINE 10 MG/2ML
5 INJECTION, POWDER, FOR SOLUTION INTRAMUSCULAR 2 TIMES DAILY PRN
Status: DISCONTINUED | OUTPATIENT
Start: 2019-11-22 | End: 2019-11-22 | Stop reason: HOSPADM

## 2019-11-22 RX ORDER — LANOLIN ALCOHOL/MO/W.PET/CERES
3 CREAM (GRAM) TOPICAL
Status: DISCONTINUED | OUTPATIENT
Start: 2019-11-22 | End: 2019-11-22 | Stop reason: HOSPADM

## 2019-11-22 RX ORDER — LEVOTHYROXINE SODIUM 25 UG/1
50 TABLET ORAL DAILY
Status: DISCONTINUED | OUTPATIENT
Start: 2019-11-23 | End: 2019-12-26 | Stop reason: HOSPADM

## 2019-11-22 RX ADMIN — OLANZAPINE 10 MG: 10 INJECTION, POWDER, FOR SOLUTION INTRAMUSCULAR at 10:47

## 2019-11-22 RX ADMIN — WATER 10 ML: 1 INJECTION INTRAMUSCULAR; INTRAVENOUS; SUBCUTANEOUS at 10:47

## 2019-11-22 RX ADMIN — OLANZAPINE 10 MG: 10 INJECTION, POWDER, FOR SOLUTION INTRAMUSCULAR at 20:55

## 2019-11-22 ASSESSMENT — ENCOUNTER SYMPTOMS: AGITATION: 1

## 2019-11-22 NOTE — ED PROVIDER NOTES
Patient was accepted to [address] Doyle.  The patient was placed on a 72-hour hold as he is unable to make decisions for himself due to his unstable mental health condition.     Glenis Bourgeois MD  11/22/19 0281

## 2019-11-22 NOTE — ED NOTES
"Pt increasingly verbally aggressive toward staff.  Writer attempted to administer PO medication to pt at bedside with 1  outside the door.  Pt began yelling at the writer \"I don't want any damn pills, give me that water now!\"  Writer obliged and when writer and security were back in the staff area the pt threw the cup out of the door of his room into commons area.  Team of 3 security officers, 1 EDT, and Writer was established so pt could be given an IM injection of Zyprexa.  Medication administered by writer to pt without incident.  Pt remains on an DONNIE with staff and security watch  "

## 2019-11-22 NOTE — ED TRIAGE NOTES
EMS arrived at  apt where the pt psychiatrist was on scene as well as [place] [employer].  Collateral information from psychiatrist is pt is schizophrenic and has been off his medications.  Pt on commitment

## 2019-11-22 NOTE — ED NOTES
"Pt asked for \"Water and the horse I rode in on\"  Writer assured pt we could get him some ice water.  Writer and security at bedside. When writer arrived with the water pt stated \"silly straw\" and threw it out the door as writer and security walked out of the room.  Pt then started to clap his hands and sing.  Remains on DONNIE with staff and security watch  "

## 2019-11-22 NOTE — ED NOTES
Bed: BH3  Expected date:   Expected time:   Means of arrival:   Comments:  Elizabeth  515 61 male schiz/off meds

## 2019-11-22 NOTE — ED PROVIDER NOTES
History     Chief Complaint:  Psychiatric Evaluation    The history is limited by the condition of the patient.      Ger Bashir is a 69 year old male with a history of schizophrenia who presents for psychiatric evaluation. The patient has been throwing garbage at his neighbors, pounding on doors, and having difficulty caring for himself. These behaviors were noted this morning upon a home visit from his psychiatrist. He has also reportedly not been taking his medications. He is currently under civil commitment. Upon arrival of EMS at his home, the patient began yelling but was overall cooperative. He arrived to the Emergency Department in restraints and is now presents talking gibnellaish. Of note, the patient has been seen/admitted multiple times for psychosis in the past few months.     Emergency transport hold order by a peace or health officer's statement  Evidence of mental illness:  History of schizophrenia. He is on a civil commitment and not taking medications. Agitated, threatening neighbors. Police were called and found to be completely incoherent and disorganized.   Evidence of dangerousness:   Unable to care for himself. Apartment is in disarray. Threatening neighbors, throwing trash on side neighbors door, and banging his door.     On chart review, this patient has a longstanding history of mental health problems with frequent hospitalizations. He has a history of paranoia, disorganized thinking, agitation. He carries a diagnosis of schizoaffective disorder, bipolar type. Noncompliance with medications. Multiple court commitments and Wu orders. He was Jarvised to take Haldol, Zyprexa, Clozaril, and Invega, as well as Haldol injection every 2 weeks, although he does not like taking Haldol.     Allergies:  Peas - GI disturbance  Haloperidol - anxiety     Medications:    benztropine   haloperidol   levothyroxine  Invega  trazodone  Clozaril  Zyprexa    Past Medical History:    History of  psychosis  Schizoaffective disorder, bipolar type  GERD  Hypothyroidism (acquired atrophy)  Dyslipidemia  Depression     Past Surgical History:    Left ACL reconstruction    Family History:    Mother - bipolar     Social History:  Tobacco use: current every day smoker  Negative for current alcohol use - history of abuse.   Negative for current drug use - history of cocaine abuse.   Marital Status:  Single [1]    Review of Systems   Unable to perform ROS: Acuity of condition   Psychiatric/Behavioral: Positive for agitation and behavioral problems.       Physical Exam     Patient Vitals for the past 24 hrs:   BP Temp Temp src Heart Rate Resp SpO2   11/22/19 0950 128/79 98.5  F (36.9  C) Oral 98 14 94 %        Physical Exam    Physical Exam   Constitutional:  Mildly agitated, laying in the gurney, in soft arm restraints. Disheveled.   HENT:   Mouth/Throat:   Oropharynx is clear and tacky .  Poor dentition   Eyes:    Conjunctivae normal and EOM are normal. Pupils are equal, round, and reactive to light.   Neck:    Normal range of motion.   Cardiovascular: Normal rate, regular rhythm and normal heart sounds.  Exam reveals no gallop and no friction rub.  No murmur heard.  Pulmonary/Chest:  Effort normal and breath sounds normal. Patient has no wheezes. Patient has no rales.   Abdominal:   Soft. Bowel sounds are normal. Patient exhibits no mass. There is no tenderness. There is no rebound and no guarding.   Musculoskeletal:  Normal range of motion. Patient exhibits no edema.   Neurological:   Patient is alert and oriented to person, place, and time. Patient has normal strength. No cranial nerve deficit or sensory deficit. GCS 15  Skin:   No evidence of rash or self injury.   Psychiatric:   Flat affect, disorganized thinking, mumbling, moderately agitated.       Emergency Department Course     Laboratory:  Laboratory findings were communicated with the patient who voiced understanding of the findings.    Drug abuse screen  urine: Pending    Interventions:  1047 Zyprexa 10 mg IM (with 10 mL sterile water)     Emergency Department Course:  Past medical records, nursing notes, and vitals reviewed.    0936: I performed an exam of the patient as documented above.   1048: Patient received IM Zyprexa without incidenece.   1118: Recheck the patient.  He was sleeping comfortably on bed, snoring.  1356: Spoke with treating psychiatrist, Dr. Fox, regarding the patient's presentation.  He states that they are trying to revoke his provisional discharge under his commitment.  He is requesting the patient be admitted until that is done.  He states the patient usually discontinues his medications 2 days after discharge and repetitively cycles downward from that.         Impression & Plan     Medical Decision Making:  Ger Bashir is a 69 year old male who presents to the emergency department today with psychiatric issues due to him not being compliant with his medications.  He is moderately agitated but easily directable.  He has not required physical restraints while in the emergency department here.  EMS placed him in soft restraints on the hands for transport.  He is supposed to be on Zyprexa as 1 of his medications.  It is not clear how long he has been off of this.  He did not want to take a pill so we gave him an injection of Zyprexa without incident.  On review of his chart it appears as though he has very similar presentations in the past.  He does not have any signs of injury.  His vital signs are stable.  From a medical standpoint I do not see any broad testing that needs to be performed.    At this time the patient is clearly unstable from a mental health point of view.  I feel that he is unable to make decisions and care for himself.  He is not on his medications which is been a problem in the past.    DEC assessed him and agreed that he met inpatient criteria.  The patient psychiatrist did call and speak with me directly.  They  are attempting to revoke his provisional discharge under his civil commitment.  He is hoping that the patient would be admitted and kept until that time.  I relayed this to the patient's nurse to pass on to the treatment team.  He will be signed out to my partner pending bed placement.    Discharge Diagnosis:    ICD-10-CM    1. Schizoaffective disorder, bipolar type (H) F25.0        Disposition:  Admit to mental health bed.      Scribe Disclosure:  I, Sharona Philip, am serving as a scribe at 10:51 AM on 11/22/2019 to document services personally performed by Glenis Bourgeois MD based on my observations and the provider's statements to me.       Glenis Bourgeois MD  11/22/19 2495

## 2019-11-23 PROCEDURE — 99223 1ST HOSP IP/OBS HIGH 75: CPT | Mod: AI | Performed by: PSYCHIATRY & NEUROLOGY

## 2019-11-23 PROCEDURE — 25000128 H RX IP 250 OP 636: Performed by: PSYCHIATRY & NEUROLOGY

## 2019-11-23 PROCEDURE — 25000132 ZZH RX MED GY IP 250 OP 250 PS 637: Performed by: PSYCHIATRY & NEUROLOGY

## 2019-11-23 PROCEDURE — 12400001 ZZH R&B MH UMMC

## 2019-11-23 RX ORDER — LORAZEPAM 2 MG/1
2 TABLET ORAL 3 TIMES DAILY PRN
Status: DISCONTINUED | OUTPATIENT
Start: 2019-11-23 | End: 2019-11-24

## 2019-11-23 RX ORDER — DIPHENHYDRAMINE HCL 50 MG
50 CAPSULE ORAL 3 TIMES DAILY PRN
Status: DISCONTINUED | OUTPATIENT
Start: 2019-11-23 | End: 2019-11-23

## 2019-11-23 RX ORDER — DIPHENHYDRAMINE HYDROCHLORIDE 50 MG/ML
INJECTION INTRAMUSCULAR; INTRAVENOUS
Status: DISCONTINUED
Start: 2019-11-23 | End: 2019-11-23 | Stop reason: HOSPADM

## 2019-11-23 RX ORDER — HALOPERIDOL 10 MG/1
10 TABLET ORAL 3 TIMES DAILY PRN
Status: DISCONTINUED | OUTPATIENT
Start: 2019-11-23 | End: 2019-12-26 | Stop reason: HOSPADM

## 2019-11-23 RX ORDER — OLANZAPINE 10 MG/2ML
5-10 INJECTION, POWDER, FOR SOLUTION INTRAMUSCULAR
Status: DISCONTINUED | OUTPATIENT
Start: 2019-11-23 | End: 2019-12-26 | Stop reason: HOSPADM

## 2019-11-23 RX ORDER — HALOPERIDOL 5 MG/ML
10 INJECTION INTRAMUSCULAR 3 TIMES DAILY PRN
Status: DISCONTINUED | OUTPATIENT
Start: 2019-11-23 | End: 2019-11-23

## 2019-11-23 RX ORDER — LORAZEPAM 2 MG/1
2 TABLET ORAL 3 TIMES DAILY PRN
Status: DISCONTINUED | OUTPATIENT
Start: 2019-11-23 | End: 2019-11-23

## 2019-11-23 RX ORDER — HALOPERIDOL 10 MG/1
10 TABLET ORAL 3 TIMES DAILY PRN
Status: DISCONTINUED | OUTPATIENT
Start: 2019-11-23 | End: 2019-11-23

## 2019-11-23 RX ORDER — OLANZAPINE 5 MG/1
5-10 TABLET ORAL
Status: DISCONTINUED | OUTPATIENT
Start: 2019-11-23 | End: 2019-12-26 | Stop reason: HOSPADM

## 2019-11-23 RX ORDER — HALOPERIDOL 5 MG/ML
INJECTION INTRAMUSCULAR
Status: DISCONTINUED
Start: 2019-11-23 | End: 2019-11-23 | Stop reason: HOSPADM

## 2019-11-23 RX ORDER — HALOPERIDOL 5 MG/ML
10 INJECTION INTRAMUSCULAR 3 TIMES DAILY PRN
Status: DISCONTINUED | OUTPATIENT
Start: 2019-11-23 | End: 2019-12-26 | Stop reason: HOSPADM

## 2019-11-23 RX ORDER — HALOPERIDOL 2 MG/1
2 TABLET ORAL EVERY 6 HOURS PRN
Status: DISCONTINUED | OUTPATIENT
Start: 2019-11-23 | End: 2019-11-23

## 2019-11-23 RX ORDER — LORAZEPAM 2 MG/ML
INJECTION INTRAMUSCULAR
Status: DISCONTINUED
Start: 2019-11-23 | End: 2019-11-23 | Stop reason: HOSPADM

## 2019-11-23 RX ORDER — LORAZEPAM 2 MG/ML
2 INJECTION INTRAMUSCULAR 3 TIMES DAILY PRN
Status: DISCONTINUED | OUTPATIENT
Start: 2019-11-23 | End: 2019-11-23

## 2019-11-23 RX ORDER — DIPHENHYDRAMINE HCL 50 MG
50 CAPSULE ORAL 3 TIMES DAILY PRN
Status: DISCONTINUED | OUTPATIENT
Start: 2019-11-23 | End: 2019-12-26 | Stop reason: HOSPADM

## 2019-11-23 RX ORDER — DIPHENHYDRAMINE HYDROCHLORIDE 50 MG/ML
50 INJECTION INTRAMUSCULAR; INTRAVENOUS 3 TIMES DAILY PRN
Status: DISCONTINUED | OUTPATIENT
Start: 2019-11-23 | End: 2019-11-23

## 2019-11-23 RX ORDER — DIPHENHYDRAMINE HYDROCHLORIDE 50 MG/ML
50 INJECTION INTRAMUSCULAR; INTRAVENOUS 3 TIMES DAILY PRN
Status: DISCONTINUED | OUTPATIENT
Start: 2019-11-23 | End: 2019-12-26 | Stop reason: HOSPADM

## 2019-11-23 RX ORDER — LORAZEPAM 2 MG/ML
2 INJECTION INTRAMUSCULAR 3 TIMES DAILY PRN
Status: DISCONTINUED | OUTPATIENT
Start: 2019-11-23 | End: 2019-11-24

## 2019-11-23 RX ADMIN — ASPIRIN 81 MG CHEWABLE TABLET 81 MG: 81 TABLET CHEWABLE at 08:57

## 2019-11-23 RX ADMIN — MULTIPLE VITAMINS W/ MINERALS TAB 1 TABLET: TAB at 08:57

## 2019-11-23 RX ADMIN — LORAZEPAM 2 MG: 2 INJECTION, SOLUTION INTRAMUSCULAR; INTRAVENOUS at 18:30

## 2019-11-23 RX ADMIN — HALOPERIDOL 10 MG: 10 TABLET ORAL at 02:50

## 2019-11-23 RX ADMIN — OLANZAPINE 5 MG: 10 INJECTION, POWDER, LYOPHILIZED, FOR SOLUTION INTRAMUSCULAR at 11:55

## 2019-11-23 RX ADMIN — TRAZODONE HYDROCHLORIDE 100 MG: 100 TABLET ORAL at 02:51

## 2019-11-23 RX ADMIN — HALOPERIDOL LACTATE 10 MG: 5 INJECTION, SOLUTION INTRAMUSCULAR at 18:30

## 2019-11-23 RX ADMIN — OLANZAPINE 10 MG: 5 TABLET, FILM COATED ORAL at 16:15

## 2019-11-23 RX ADMIN — BENZTROPINE MESYLATE 1 MG: 0.5 TABLET ORAL at 08:57

## 2019-11-23 RX ADMIN — LEVOTHYROXINE SODIUM 50 MCG: 25 TABLET ORAL at 08:57

## 2019-11-23 RX ADMIN — HALOPERIDOL 5 MG: 5 TABLET ORAL at 08:57

## 2019-11-23 RX ADMIN — BENZTROPINE MESYLATE 1 MG: 0.5 TABLET ORAL at 02:50

## 2019-11-23 RX ADMIN — DIPHENHYDRAMINE HYDROCHLORIDE 50 MG: 50 INJECTION, SOLUTION INTRAMUSCULAR; INTRAVENOUS at 18:30

## 2019-11-23 RX ADMIN — HYDROXYZINE HYDROCHLORIDE 25 MG: 25 TABLET ORAL at 17:40

## 2019-11-23 ASSESSMENT — ACTIVITIES OF DAILY LIVING (ADL)
ORAL_HYGIENE: INDEPENDENT
LAUNDRY: WITH SUPERVISION
HYGIENE/GROOMING: INDEPENDENT
DRESS: INDEPENDENT

## 2019-11-23 NOTE — PROGRESS NOTES
11/22/19 2245   Valuables   Patient Belongings locker   Patient Belongings Put in Hospital Secure Location (Security or Locker, etc.) clothing;shoes;other (see comments)   Did you bring any home meds/supplements to the hospital?  No   Locker:  -Watch  -lighter  -$25 check  -[employer] go card  -pants  -shoes  -shirt    A               Admission:  I am responsible for any personal items that are not sent to the safe or pharmacy.  [facility] is not responsible for loss, theft or damage of any property in my possession.    Signature:  _________________________________ Date: _______  Time: _____                                              Staff Signature:  ____________________________ Date: ________  Time: _____      2nd Staff person, if patient is unable/unwilling to sign:    Signature: ________________________________ Date: ________  Time: _____     Discharge:  [facility] has returned all of my personal belongings:    Signature: _________________________________ Date: ________  Time: _____                                          Staff Signature:  ____________________________ Date: ________  Time: _____

## 2019-11-23 NOTE — PROGRESS NOTES
"Pt woke up about 0235, he was heard banging on the wall and yelling loudly. Appeared to be actively hallucinating and responding to some stimuli. Made a remark to a male staff asking something in the line of \"do you want to be my enemy\". Staff attempted to redirect but pt able to briefly self regulate. A few minutes later staff approached pt with  his HS medications; Haldol, Cogentin and trazodone. He stumped out of his room into the hallway then sat on the floor, took the medications then violently crumble the cup before throwing it on the floor. Pt was offered snack which he accepted but threw some of them in the hallway as he walked to his room. He also kicked the laundry hamper and some chairs in the hallway before violently slamming his door for a second time.    Pt noted to be nonsensical and not making much sense, speech was pretty gabbled. He has been able to stay asleep with no further behavior dysregulation after medications were administered.  "

## 2019-11-23 NOTE — ED NOTES
Patient requiring restraints to give IM Zyprexa.  Yelling incoherently at staff when they enter the room.  Has thrown the urinal when asked for a urine sample (more than once).  Continues to decline urine sample.  Whole tray of food thrown on the the floor.  Patient not cooperative with medications either.    Urine sample collected via straight cath, patient has not urinated nor patient is not drinking or eating the food that he was given.

## 2019-11-23 NOTE — PROGRESS NOTES
Initial Psychosocial Assessment    I have reviewed the chart, met with the patient, and developed Care Plan.  Information for assessment was obtained from:     Chart Review, unable to interview Pt he has been too ill.    Presenting Problem: 72 hh and Civil Commitment with [facility]  Pt is admitted to [facility] under the care of Olga Lidia lopez due to non-compliance with medicine.  Pt's ACT psychiatrist visited his home and found him to be psychotic and disorganized.  EMs was called and he was transported to the ED.    Per ED Note:  Ger Bashir is a 69 year old male with a history of schizophrenia who presents for psychiatric evaluation. The patient has been throwing garbage at his neighbors, pounding on doors, and having difficulty caring for himself. These behaviors were noted this morning upon a home visit from his psychiatrist. He has also reportedly not been taking his medications. He is currently under civil commitment. Upon arrival of EMS at his home, the patient began yelling but was overall cooperative. He arrived to the Emergency Department in restraints and is now presents talking gibberish. Of note, the patient has been seen/admitted multiple times for psychosis in the past few months.      History of Mental Health and Chemical Dependency:  5th hospitalization in 2019; last at [facility] in October 2019   Pt is committed through [facility] as of October 30, 2019.  Previous diagnoses are: Schizoaffective disorder and bi-polar disorder.    Family Description (Constellation, Family Psychiatric History):  Per chart; pt raised in [place] by his mother, has one sibling, never  and no children.    Significant Life Events (Illness, Abuse, Trauma, Death):  Unable to assess    Living Situation:  Lives alone with support from ACT team    Educational Background:  Completed 12 years of school    Occupational History:  Unable to assess    Financial Status:  Social Security  Disability    Legal Issues:  Unable to assess     Ethnic/Cultural Issues:    Unable to assess    Spiritual Orientation:  Unable to assess     Service History:    Unable to assess    Social Functioning (organization, interests):  Unable to assess    Current Treatment Providers are:  PCP- [phone]    [facility]- - Ruddy Henry-[phone]    ACT Team- [phone]    Social Service Assessment/Plan:  Pt is in need of psychiatric evaluation and stabilization.  His medicines will be reviewed and adjusted if indicated.  Pt will participate in therapeutic milieu, attend group therapy, and join in other unit activities.  CTC will coordinate aftercare planning.

## 2019-11-23 NOTE — ED NOTES
Patient now sleeping.  Resting quietly.  Equal chest rise & fall.  Restraints discontinued without incident.

## 2019-11-23 NOTE — ED NOTES
"Meal on the floor, pt. Mumbles and saying things that do not make sense. Very disorganized.  Pt. Encouraged to give a urine and states \"I'm not using that, get outta here.\" Then starts mumbling again about the mess on the floor.  "

## 2019-11-23 NOTE — ED NOTES
Report given to Station 10 [facility] MACKENZIE Mendoza. They are requesting a UTOX collected before sending over. PtSilvana Lora and refuses to give a sample at this time.

## 2019-11-23 NOTE — PLAN OF CARE
"Problem: Social, Occupational or Functional Impairment (Psychotic Signs/Symptoms)  Goal: Improved Social, Occupational and Functional Skills (Psychotic Signs/Symptoms)  Outcome: No Change    Patient's speech is rambling, tangential and is hard to understand due to patient's lack of teeth. He frequently talks to himself while pacing the halls. He took his scheduled morning medications and then threw the cup and nicotine patch at writer. He was yelling and swearing, repeatedly stating \"fuck you\" and \"this is bull shit\" but was agreeable to stay in his room with the door closed. He declined his breakfast but ate a sandwich and ate lunch.     Patient started to become more agitated at about 11:30. He was swearing in the rocha and came to the desk and threw water at a psych associate before going to his room. Writer offered PRN zyprexa 5 mg PO but he put the pill in the cup of water and then threw them both at writer stating he would only take medication if his psychiatrist personally told him to. A code green was called. Patient was in the lounge at this time and his pants fell down and he sat on the chair with his bare buttocks. Oral zyprexa was again offered to patient and he declined telling writer to take the pill myself and tell him how it tasted. Writer explained that due to his aggressive behavior an injection would need to be given if he didn't take the oral medication. He turned around and leaned on the chair with his pants down and yelled \"well give it to me then!\" It was explained that patient would need to go to his room for his privacy. Patient walked to his room accompanied by staff and IM zyprexa 5 mg was given. Patient was able to remain in room so seclusion was not necessary. Medication was somewhat effective in reducing agitation and patient has not had any more aggressive behavior this shift. Patient declined to talk with writer regarding his mental health symptoms.   "

## 2019-11-23 NOTE — ED NOTES
Pt. Given meal tray, slams door into room. Pt. Easily redirectible. Slams door when going in and out of room.

## 2019-11-23 NOTE — ED NOTES
Attempted to talk to patient regarding urine sample.   Patient yells at RN, unintelligible, disorganized.  Patient did not get out of bed.    Charge RN Willam notified regarding urine tox status.

## 2019-11-24 PROCEDURE — 12400001 ZZH R&B MH UMMC

## 2019-11-24 PROCEDURE — 25000132 ZZH RX MED GY IP 250 OP 250 PS 637: Performed by: PSYCHIATRY & NEUROLOGY

## 2019-11-24 RX ORDER — LORAZEPAM 1 MG/1
1-2 TABLET ORAL 3 TIMES DAILY PRN
Status: DISCONTINUED | OUTPATIENT
Start: 2019-11-24 | End: 2019-12-26 | Stop reason: HOSPADM

## 2019-11-24 RX ORDER — LORAZEPAM 2 MG/ML
1-2 INJECTION INTRAMUSCULAR 3 TIMES DAILY PRN
Status: DISCONTINUED | OUTPATIENT
Start: 2019-11-24 | End: 2019-12-26 | Stop reason: HOSPADM

## 2019-11-24 RX ADMIN — LORAZEPAM 2 MG: 2 TABLET ORAL at 05:27

## 2019-11-24 RX ADMIN — HALOPERIDOL 10 MG: 10 TABLET ORAL at 04:23

## 2019-11-24 RX ADMIN — TRAZODONE HYDROCHLORIDE 100 MG: 100 TABLET ORAL at 20:15

## 2019-11-24 RX ADMIN — LEVOTHYROXINE SODIUM 50 MCG: 25 TABLET ORAL at 08:33

## 2019-11-24 RX ADMIN — BENZTROPINE MESYLATE 1 MG: 0.5 TABLET ORAL at 04:22

## 2019-11-24 RX ADMIN — HALOPERIDOL 5 MG: 5 TABLET ORAL at 08:33

## 2019-11-24 RX ADMIN — OLANZAPINE 10 MG: 5 TABLET, FILM COATED ORAL at 17:33

## 2019-11-24 RX ADMIN — MULTIPLE VITAMINS W/ MINERALS TAB 1 TABLET: TAB at 08:36

## 2019-11-24 RX ADMIN — HALOPERIDOL 10 MG: 10 TABLET ORAL at 20:15

## 2019-11-24 RX ADMIN — LORAZEPAM 1 MG: 1 TABLET ORAL at 20:15

## 2019-11-24 RX ADMIN — BENZTROPINE MESYLATE 1 MG: 0.5 TABLET ORAL at 20:15

## 2019-11-24 RX ADMIN — ASPIRIN 81 MG CHEWABLE TABLET 81 MG: 81 TABLET CHEWABLE at 08:33

## 2019-11-24 RX ADMIN — BENZTROPINE MESYLATE 1 MG: 0.5 TABLET ORAL at 08:33

## 2019-11-24 RX ADMIN — NICOTINE 1 PATCH: 14 PATCH, EXTENDED RELEASE TRANSDERMAL at 08:32

## 2019-11-24 ASSESSMENT — ACTIVITIES OF DAILY LIVING (ADL)
DRESS: SCRUBS (BEHAVIORAL HEALTH)
HYGIENE/GROOMING: PROMPTS
ORAL_HYGIENE: PROMPTS
ORAL_HYGIENE: PROMPTS
LAUNDRY: UNABLE TO COMPLETE
HYGIENE/GROOMING: PROMPTS
DRESS: SCRUBS (BEHAVIORAL HEALTH)
LAUNDRY: UNABLE TO COMPLETE

## 2019-11-24 NOTE — PROVIDER NOTIFICATION
11/23/19 1830   Justification   Clinical Justification All   Pt was very agitated-yelling and swearing loudly pacing in and out of his bedroom at early part of shift. Staff tried to reorient, decreased stimuli by redirecting to his bedroom and offered some snack however he was still disruptive to the milieu. Offered a PRN Zyprexa 10 mg, grabbed med cup with pills in it and aggressively threw it at staff. Later took the medication and threw the med cup aggressively at staff again at 1615. He was still in and out of his bedroom, still agitated, with rambling speech, difficult to understand. Later, offered a PRN Hydroxyzine 25 mg at 1740. He took it and threw the med cup on the floor. At past 1800, he was still agitated, staff was unable to redirect him. He was yelling, swearing loudly and very disruptive to the milieu. When staff tried to offer help, pt became assaultive. He postured and threatened to hit staff. He angered other peers in the lounge, PRN medications and Nicotine gums were being requested by other patients due to patient's behavior. Thus, Code 21 was called. Obtained orders from Dr. Doyle. Pt was placed in Restraints and Seclusion safely when enough staff came to the unit. Charge RN administered PRN Benadryl, Haldol and Ativan IM. Transferred pt to Station 12 in the seclusion room. Station 12 staff on 1:1 with pt at this time.

## 2019-11-24 NOTE — PROGRESS NOTES
Pt was offered his scheduled AM medications. He initially did not take them but with encouragement he finally put them in his mouth. Pt was encouraged to drink water because the pills were still visible in his mouth.  Instead of drinking water he spit them on his blanket. Writer again had to spend a great deal of time encouraging patient to  the pills and swallow them which he finally did.   Patient was jabbering incoherently the entire time writer was present with patient. He was  exhibiting strange behaviors such as kneeling on floor then crawling over the bed.  Writer monitored patient for approximately 30 minutes as pt is on SIO. He appeared to be sleeping on his mattress that was on the floor between the bed and the door. As he awakened he began making fists and swinging his arms wildly. He was sprawled out half on the mattress and half on the floor.  He was mumbling incoherently at that time.  He then began kicking the door to his room.  He stopped after a couple minutes.  There were episodes of screaming but they decreased as the day went on.  Patient was escorted to the shower but it was unclear home successful he was with actually showering once he was in shower. In the afternoon patient played a game of chess with staff and seemed to be functioning a little better than in the morning.  He was also speaking a little more clearly and could be understood some of the time.

## 2019-11-24 NOTE — H&P
"Admitted:     11/22/2019      LEGAL STATUS AND REASON FOR ADMISSION:  The patient was admitted on a 72-hour hold after being referred to the Emergency Department by his ACT psychiatrist due to increase in psychosis and mood dysregulation in the context of medication noncompliance.      CHIEF COMPLAINT:  \"Are you the doctor?\"  \"You guys need to get away from me.\"      SOURCES FOR INTAKE:  The patient's interview and review of available medical records.  Majority of the history was obtained via reviewing medical records due to patient being uncooperative and quite dysregulated.      HISTORY OF PRESENT ILLNESS:  Ger Bashir is a 69-year-old  male with a history of schizoaffective disorder, bipolar type, history of noncompliance and recurrent hospitalization who was referred to the Emergency Department by his ACT team due to increase in behavioral dysregulation and psychosis in the context of medication noncompliance.  Reportedly, the patient was being evaluated at his place of residence by the [facility] psychiatrist.  He had been noncompliant with his medications.  Report indicated that the patient had been throwing garbage at his neighbor, pounding on their doors and had been having difficulty caring for himself.  He is reportedly under civil commitment with Wu orders.  While at the Emergency Department, the patient was quite dysregulated, hostile, agitated and possibly paranoid.  PRN medications were utilized and subdued his agitation.  Upon arrival to the unit, the patient was quite verbally combative.  He has been needing a great deal of prompting to comply with p.o. medications.  Behavioral outbursts continued requiring multiple PRNs and records due to safety concerns.  He reportedly had been delusional, suspicious, and tangential and at times, disorganized.        The patient was pacing in the hallways when approached by this provider.  He was quite loud, uttering nonsensical statements, yelling, " using profanities.  He was directed to his room, but his agitation continued.  He refused p.o. medications.  Subsequently, a code 21 was called and IM neuroleptics were given.  The patient was placed in seclusion due to safety concerns.  He seemed to have very poor insight into his behavior.  He was dismissive of all symptoms, denying depression, anxiety, suicidal thoughts and homicidal ideations, despite exhibiting a threatening demeanor toward staff.  Internal stimuli and paranoia were suspected.  Also appears to be expressing some delusional thoughts.  He was unable to answer questions regarding the event that led to this hospitalization, demanding discharge and expressed desire not to comply with care and medications if offered.      PAST PSYCHIATRIC HISTORY:  The patient has been hospitalized numerous times.  This is, I believe, his 4th hospitalization in 2019.  He was recently at the Behavioral Health Unit in [facility] in October.  He is under commitment and Wu.  He is followed by [facility] team.  He is on long-acting injectables, but it is unclear whether he has been compliant with the dose at this time.        His current psychotropic medications based on records include Cogentin 1 mg twice a day, Haldol 10 mg at bedtime and 5 mg in the morning, Trazodone 100 mg at bedtime, Invega 234 mg q.4 weeks.  Record also indicated that he was on Haldol Decanoate in the past.  He was hospitalized 4 times in 2008, 3 times in 2016 and twice in 2012.  He is currently under civil commitment, which was extended in August of this year with Wu orders.  Based on record, he has been tried on Thorazine, Prolixin, Depakote and Zyprexa as well as in the past.  It is unclear if the patient has been tried on Clozaril or whether he had ECTs in the past.      SUBSTANCE HISTORY:  The patient has remote history of alcohol abuse but it is unclear whether he has history of other illicit drug use.  He smokes 2 packs of  tobacco per day.      PAST MEDICAL HISTORY:  Based on medical records, the patient has had no recent acute or chronic medical illnesses.  No surgical history.  Based on record, he is allergic to Haldol, causing anxiety, although has been prescribed Haldol prior to admission without any documented side effects.  He is allergic to peas.  The patient possibly has hypothyroidism, as he has been prescribed Synthroid in the past.      FAMILY HISTORY:  The patient's family history of mental illness is unknown.      SOCIAL HISTORY:  The patient resides in [place].  He lives alone.  He is unemployed.  He has an ACT team.  He is currently on civil commitments.  Based on record, he was raised in [place] by his mother with 1 sibling.  He has never , has no kids.  He completed 12 years of school.  He is currently on Social Security Disability.  He lived in [facility] facilities in the past.      PHYSICAL EXAMINATION:  Please refer to the physical exam done by Glenis Bourgeois, done on 11/22/2019.      REVIEW OF SYSTEMS:  Unable to obtain due to increasing agitation and disorganized thought process, although no specific physical symptoms were expressed.      LABORATORY DATA:  Urine drug screen was negative.      VITAL SIGNS:  Temperature is 98.5, respiratory rate 16, heart rate 88, blood pressure 96/68.      PSYCHIATRIC EXAMINATION:  A 69-year-old  male, appears older than stated age, disheveled and exhibited limited hygiene, quite agitated, verbally hostile, using profanities, but also at times nonsensical, pressured, loud.  Psychomotor agitations, but no tics or tremors were noted.  Gait and muscle tone within normal limits.  Mood appears to be angry with labile and heightened affect.  His thought process is disorganized and somewhat impoverished.  Thought content:  No suicidal or homicidal ideation reported, although has exhibited threatening demeanor.  He appears to be internally preoccupied, suspicious and  expressed delusional thoughts earlier.  Sensorium was clear.  and memory:  Unable to assess.  Language, fund of knowledge, orientation and memory:  Unable to assess at the current.  Concentration and focus, poor.  Insight and judgment limited but adequate for safety at the current level of care.      DIAGNOSES:   1.  Schizoaffective disorder, bipolar type with exacerbation of sherie and psychosis.   2.  Historical diagnosis of cognitive decline.      PLAN AND RECOMMENDATIONS:  The patient was admitted after being brought to the Emergency Department due to exacerbation of sherie and psychosis in the context of medication noncompliance.  The patient is currently on commitment with Wu orders.  His ACT team intends to revoke his PD.  He is currently on a 72-hour hold.  He is prescribed Haldol, Invega Sustenna, Cogentin and trazodone, but has been noncompliant with medication.  It is not clear when he last received Invega Sustenna.  After reviewing proposed treatment plan, the patient was informed of the followin.  Cogentin continued at 1 mg twice a day.   2.  Haldol continued at 10 mg at bedtime and 5 mg in the morning.  Trazodone continued at 100 mg at bedtime, may consider adding p.o. Invega, as long-acting injectables are not available in our hospital if mood lability persists versus titrating Haldol to a higher dose.   3.  PRN Haldol, Ativan and Benadryl for severe behavioral outbursts.  Zyprexa for psychosis and mild agitation, as well as hydroxyzine for anxiety will be offered.   4.  Psychosocial assessment was obtained by our clinical  who will assist with setting up post-discharge care.  The patient has had recurrent recent hospitalizations.      DISPOSITION:  To be discussed with his ACT team, although recent history is suggestive that patient has been unable to care for self and has failed independent living.  Discharge will be granted once patient completed achieved mood stabilization,  remission of active psychosis.  The patient will be transferred to station [address] [place] [address] due to ongoing agitation, hostile demeanor and being disruptive to the milieu.         GRECIA AUSTIN MD             D: 2019   T: 2019   MT: RAFAELA      Name:     JENNIFFER NEGRO   MRN:      [id number]        Account:      [id number]   :      1950        Admitted:     2019                   Document: [id number]

## 2019-11-24 NOTE — PROVIDER NOTIFICATION
11/23/19 1830   Seclusion or Restraint Order   In Person Face to Face Assessment Conducted Yes-Eval of pt's immediate situation, reaction to intervention, complete review of systems assessment, behavioral assessment & review/assessment of hx, drugs & meds, recent labs, etc, behavioral condition, need to continue/terminate restraint/seclusion   Patient Experienced No adverse physical outcome from seclusion/restraint initiation   Continuation of Seclus/Restraint indicated at this time Yes     In person face to face assessment conducted and no adverse physical outcome from restraint initiation reported or observed. Continuation of restraints/seclusion is indicated at this time to maintain pt's and others safety. The provider was notified of the results of the face to face.

## 2019-11-24 NOTE — PROGRESS NOTES
Patient is extremely confused and his speech is nearly incoherent. Patient has shown brief periods of agitation that would appear to be due to confusion as opposed to behaviorally acting out.     11/24/19 1431   Sleep/Rest/Relaxation   Day/Evening Time Hours napping;resting in bed   Number of hours napping 2 hours   Number of hours resting in bed 3 hours   Behavioral Health   Hallucinations other (see comment)  (ian)   Thinking confused   Insight poor   Judgement impaired   Eye Contact staring;at examiner   Affect blunted, flat;tense;irritable   Mood labile   Physical Appearance/Attire disheveled   Hygiene other (see comment)  (showered,,,, maybe)   Suicidality other (see comments)  (ian)   Self Injury other (see comment)  (nothing witnessed)   Elopement   (confused)   Activity hyperactive (agitated, impulsive)   Speech incoherent   Medication Sensitivity no stated side effects   Psychomotor / Gait unsteady   Overt Aggression Scale   Verbal Aggression 0   Aggression against Property 1   Auto-Aggression 0   Physical Aggression 0   Overt Aggression Total Score 1   Psycho Education   Type of Intervention 1:1 intervention   Response unavailable   Hours 0.5   Treatment Detail   (extremely confused)   Daily Care   Activity up ad shaila   Activities of Daily Living   Hygiene/Grooming prompts   Oral Hygiene prompts   Dress scrubs (behavioral health)   Laundry unable to complete   Room Organization prompts   Activity   Activity Assistance Provided independent

## 2019-11-24 NOTE — PROGRESS NOTES
"Patient remains yelling loudly in his room and at times appears to be trying to take his room's window to the outside apart.  Patient now given Ativan 2 mg. PO PRN, due to his increased agitation despite having received the Haldol earlier.  Remains on 1:1 staffing for safety of other patients and himself.  Patient is not oriented to anything other than self, and frequently yelling nonsensical phrases such as \"I could get anything - where is it and I can go farther than that! Bring me 3 pillows! I need to sit on my ass!\"  "

## 2019-11-24 NOTE — PROGRESS NOTES
11/23/19 1900   Behavioral Health   Hallucinations appears responding   Thinking confused;paranoid;poor concentration;distractable   Orientation person: oriented   Memory baseline memory   Insight poor   Judgement impaired   Eye Contact staring;into space   Affect angry;tense;irritable   Mood irritable   Physical Appearance/Attire disheveled;untidy   Hygiene neglected grooming - unclean body, hair, teeth   Suicidality other (see comments)  (Unable to assess)   Self Injury other (see comment)  (Unable to assess)   Activity hyperactive (agitated, impulsive);isolative   Speech incoherent;flight of ideas;pressured;rambling;tangential;word salad   Medication Sensitivity no observed side effects   Psychomotor / Gait paces;fast;balanced;steady   -Pt was constantly rambling, often yelling in room and being disruptive with his voice in milieu. Pt had to be taken to seclusion at 6:30, where he slept for most of his time there. Pt is hard to understand, but did take meds early in the evening shift with a fuss. Unable to assess SI, SIB, anxiety or depression. Pt is paranoid and delusional, talking about how the YAHIR is in the hospital watching him.

## 2019-11-24 NOTE — PROVIDER NOTIFICATION
11/23/19 6084   Debriefing   Debriefing DO   Pt calmed down and fell asleep. Pt was unable to verbalize understanding of the reason why he was put in seclusion. Will continue to monitor.

## 2019-11-24 NOTE — PROGRESS NOTES
Write spoke with Lucia Helms RN.  Pt remains incoherent she advises interviewing him would most likely not result in productive information.  Writer observed him through he window in his room.  He was pounding on the floor keshav with his hands.  Writer unable to complete Psychosocial,  2nd attempt.

## 2019-11-24 NOTE — PROGRESS NOTES
Patient had been sleeping until now - awake, agitated, and appears confused also besides appearing to almost attempt to threaten staff.  Patient given his missed HS Haldol 10 mg plus his missed HS Cogentin 1 mg po, and resettled in his room after using the bathroom.  Patient has an open blister on his (R) lateral hand (by the small finger & knuckle). Cleaned with a sterile 2 x 2 and sterile band-aid applied to same.  Continue with 1:1 staffing.

## 2019-11-25 PROCEDURE — 25000132 ZZH RX MED GY IP 250 OP 250 PS 637: Performed by: PSYCHIATRY & NEUROLOGY

## 2019-11-25 PROCEDURE — 99233 SBSQ HOSP IP/OBS HIGH 50: CPT | Performed by: PSYCHIATRY & NEUROLOGY

## 2019-11-25 PROCEDURE — G0177 OPPS/PHP; TRAIN & EDUC SERV: HCPCS

## 2019-11-25 PROCEDURE — 12400001 ZZH R&B MH UMMC

## 2019-11-25 PROCEDURE — H2032 ACTIVITY THERAPY, PER 15 MIN: HCPCS

## 2019-11-25 RX ORDER — HALOPERIDOL 5 MG/1
10 TABLET ORAL AT BEDTIME
Status: DISCONTINUED | OUTPATIENT
Start: 2019-11-25 | End: 2019-11-26

## 2019-11-25 RX ADMIN — LEVOTHYROXINE SODIUM 50 MCG: 25 TABLET ORAL at 08:06

## 2019-11-25 RX ADMIN — HALOPERIDOL 10 MG: 5 TABLET ORAL at 19:46

## 2019-11-25 RX ADMIN — TRAZODONE HYDROCHLORIDE 100 MG: 100 TABLET ORAL at 22:31

## 2019-11-25 RX ADMIN — MULTIPLE VITAMINS W/ MINERALS TAB 1 TABLET: TAB at 08:06

## 2019-11-25 RX ADMIN — NICOTINE 1 PATCH: 14 PATCH, EXTENDED RELEASE TRANSDERMAL at 08:06

## 2019-11-25 RX ADMIN — BENZTROPINE MESYLATE 1 MG: 0.5 TABLET ORAL at 08:06

## 2019-11-25 RX ADMIN — HALOPERIDOL 5 MG: 5 TABLET ORAL at 08:06

## 2019-11-25 RX ADMIN — ASPIRIN 81 MG CHEWABLE TABLET 81 MG: 81 TABLET CHEWABLE at 08:06

## 2019-11-25 RX ADMIN — BENZTROPINE MESYLATE 1 MG: 0.5 TABLET ORAL at 19:47

## 2019-11-25 ASSESSMENT — ACTIVITIES OF DAILY LIVING (ADL)
LAUNDRY: UNABLE TO COMPLETE
ORAL_HYGIENE: PROMPTS
HYGIENE/GROOMING: PROMPTS
ORAL_HYGIENE: PROMPTS;INDEPENDENT
LAUNDRY: UNABLE TO COMPLETE
DRESS: SCRUBS (BEHAVIORAL HEALTH);INDEPENDENT
HYGIENE/GROOMING: PROMPTS;INDEPENDENT;SHOWER
DRESS: PROMPTS

## 2019-11-25 NOTE — PROGRESS NOTES
Patient transferred to station 12 from station 10 for aggressive behavior after being processed out of seclusion by patient's RN on station 10. Patient was placed on SIO 1:1 due to aggressive behaviors once transferred to station 12, orders received from MD Raheel Doyle. Patient walked to his room from seclusion room on station 12 where he laid down and began resting. Patient presents as disheveled, disorganized and with garbled speech. Will continue to monitor for safety.

## 2019-11-25 NOTE — PROGRESS NOTES
Writer left ACT team ([phone]) a message regarding hospitalization and additional information. Writer also left message with REJI Henry ([phone]) stating pt is on a 72 hour hold and shared that Dr. Prather would like the PD to be revoked.     Writer received call from Rhiannon the care coordinator for the ACT team. She directed writer to Jacquelyn Macias ([phone]) at [facility]. Jacquelyn stated that [employer] removed their name and responsibility from the petition and now pt is committed to the Commissioner only. She will need to do a remote PD upon discharge. Jacquelyn stated pt is committed which should override the 72 hour hold.

## 2019-11-25 NOTE — PROGRESS NOTES
"[facility], [place]   Psychiatric Progress Note  Hospital Day: 3        Interim History:   The patient's care was discussed with the treatment team during the daily team meeting and/or staff's chart notes were reviewed.  Staff report patient was transferred from station 10 to station 12 on 11/23 following aggressive behavior necessitating seclusion. Patient has continued to demonstrate severe symptoms of psychosis, including bizarre behaviors such as kneeling on the floor and crawling to his bed, paranoia, delusional thinking. He began making fists and swinging his arms wildly upon awakening yesterday AM. Speech is nearly incoherent and rambling. Pt appears confused. Pt became extremely agitated again last evening and was given 10 mg Zyprexa orally, as well as 1 mg of Ativan.     Upon interview, the patient initially sat directly next to a patient I was speaking with, and was quite intrusive as he began interrupting our conversation. He did respond well to redirection and went into his room. I interviewed him in his room, and he began making nonsensical statements about events leading to hospitalization. He mentioned a neighbor who is \"a pig; a \" who he suspects called the police. Otherwise, he does not know why he is in the hospital. He was oriented to person and place, though he could not recall the month or the year. He believed it was Sunday, and said \"Oh shit I am missing the game! Get out of here!\" He looked at his watch and quickly stood up at which time writer exited patient's room, and patient walked out into [facility].     The patient denies SI, SIB, and HI.  Patient denies medication side effects and acute medical concerns.  Patient had no further requests or concerns.          Medications:       aspirin  81 mg Oral Daily     benztropine  1 mg Oral BID     haloperidol  10 mg Oral At Bedtime     haloperidol  5 mg Oral Daily with breakfast     levothyroxine  50 mcg " Oral Daily     multivitamin w/minerals  1 tablet Oral Daily     nicotine  1 patch Transdermal Daily     nicotine   Transdermal Q8H     nicotine   Transdermal Daily     traZODone  100 mg Oral At Bedtime          Allergies:     Allergies   Allergen Reactions     Peas GI Disturbance     Black eyed peas - vomiting     Haloperidol Anxiety          Labs:   No results found for this or any previous visit (from the past 24 hour(s)).       Psychiatric Examination:     /75   Pulse 78   Temp 98.4  F (36.9  C) (Tympanic)   Resp 18   Wt 88.9 kg (196 lb)   SpO2 94%   BMI 28.12 kg/m    Weight is 196 lbs 0 oz  Body mass index is 28.12 kg/m .    Weight over time:  Vitals:    11/22/19 2100   Weight: 88.9 kg (196 lb)       Orthostatic Vitals       Most Recent      Sitting Orthostatic BP 91/57 11/24 1900    Sitting Orthostatic Pulse (bpm) 47 11/24 1900            Cardiometabolic risk assessment. 11/25/19      Reviewed patient profile for cardiometabolic risk factors    Date taken /Value  REFERENCE RANGE   Abdominal Obesity  (Waist Circumference)   See nursing flowsheet Women ?35 in (88 cm)   Men ?40 in (102 cm)      Triglycerides  Triglycerides   Date Value Ref Range Status   04/19/2019 136 <150 mg/dL Final       ?150 mg/dL (1.7 mmol/L) or current treatment for elevated triglycerides   HDL cholesterol  HDL Cholesterol   Date Value Ref Range Status   04/19/2019 32 (L) >39 mg/dL Final   ]   Women <50 mg/dL (1.3 mmol/L) in women or current treatment for low HDL cholesterol  Men <40 mg/dL (1 mmol/L) in men or current treatment for low HDL cholesterol     Fasting plasma glucose (FPG) Lab Results   Component Value Date     10/14/2019      FPG ?100 mg/dL (5.6 mmol/L) or treatment for elevated blood glucose   Blood pressure  BP Readings from Last 3 Encounters:   11/25/19 101/75   11/22/19 129/86   10/30/19 103/69    Blood pressure ?130/85 mmHg or treatment for elevated blood pressure   Family History  See family history      A 69-year-old  male, appears older than stated age, disheveled and exhibited limited hygiene, irritable, nonsensical, pressured, loud.  Psychomotor agitation, but no tics or tremors were noted.  Gait and muscle tone within normal limits.  Mood appears to be irritable with labile and heightened affect.  His thought process is disorganized and somewhat impoverished.  Thought content:  No suicidal or homicidal ideation reported.  He appears to be internally preoccupied, suspicious and expressed delusional thoughts earlier.  Sensorium was clear.  and memory:  Unable to assess.  Language, fund of knowledge, orientation and memory:  Unable to assess at the current.  Concentration and focus, poor.  Insight and judgment limited but adequate for safety at the current level of care.     Clinical Global Impressions  First:  Considering your total clinical experience with this particular patient population, how severe are the patient's symptoms at this time?: 7 (11/25/19 1311)  Compared to the patient's condition at the START of treatment, this patient's condition is:: 4 (11/25/19 1311)  Most recent:  Considering your total clinical experience with this particular patient population, how severe are the patient's symptoms at this time?: 7 (11/25/19 1311)  Compared to the patient's condition at the START of treatment, this patient's condition is:: 4 (11/25/19 1311)           Precautions:     Behavioral Orders   Procedures     Assault precautions     Code 1 - Restrict to Unit     Elopement precautions     Routine Programming     As clinically indicated     Single Room     Status 15     Every 15 minutes.          Diagnoses:     1.  Schizoaffective disorder, bipolar type with exacerbation of sherie and psychosis.   2.  Historical diagnosis of cognitive decline         Assessment & Plan:     Assessment and hospital summary:  Ger Bashir is a 69-year-old  male with a history of schizoaffective disorder,  bipolar type, history of noncompliance and recurrent hospitalization who was referred to the Emergency Department by his ACT team due to increase in behavioral dysregulation and psychosis in the context of medication noncompliance. Report indicated that the patient had been throwing garbage at his neighbor, pounding on their doors and had been having difficulty caring for himself.      Target psychiatric symptoms and interventions:  1.  Cogentin continued at 1 mg twice a day.   2.  Haldol continued at 10 mg at bedtime and 5 mg in the morning.  Trazodone continued at 100 mg at bedtime, may consider adding p.o. Invega, as long-acting injectables are not available in our hospital if mood lability persists versus titrating Haldol to a higher dose.   3.  PRN Haldol, Ativan and Benadryl for severe behavioral outbursts.  Zyprexa for psychosis and mild agitation, as well as hydroxyzine for anxiety will be offered.   4.  Psychosocial assessment was obtained by our clinical  who will assist with setting up post-discharge care.  The patient has had recurrent recent hospitalizations.     Medical Problems and Treatments:  No acute medical concerns    Behavioral/Psychological/Social:  Encourage participating in unit programming    Legal: 72 hr hold. OP team intends to revoke PD.     Safety:  - Continue precautions as noted above  - Status 15 minute checks    Disposition: Pending clinical stabilization. Likely will pursue higher level of care.     Callie Prather MD  [facility] Psychiatry

## 2019-11-25 NOTE — PROGRESS NOTES
BEHAVIORAL TEAM DISCUSSION    Participants: Dr. Prather, RN Anthony Jama, PA Everett Dowling, CTC Soraya Mandujano  Progress: new admission  Anticipated length of stay: 2 weeks  Continued Stay Criteria/Rationale: 72 hour hold  Medical/Physical: possible hypothyroidism (prescribed synthroid in the past)  Precautions:   Behavioral Orders   Procedures     Assault precautions     Code 1 - Restrict to Unit     Elopement precautions     Routine Programming     As clinically indicated     Single Room     Status 15     Every 15 minutes.     Plan: CTC to contact ACT team for additional information, medication to be ordered and managed per psychiatry, staff to provide safe environment.  Rationale for change in precautions or plan: no change

## 2019-11-25 NOTE — PLAN OF CARE
Pt was transferred from station 10 N to yesterday due to agitation, threatening behaviors, and behavioral outbursts. He spent few hours in seclusion yesterday evening before moving to 12. Pt has hx of schizoaffective disorder, bipolar type, hx of medication noncompliance, and recurrent hospitalization.    Pt is disorganized, delusional and confused this evening. He is alert and oriented to name only. He had several episodes of agitation and threatening statements towards staff.  Pt was yelling in the hallway while maintaining poor boundaries with peers. Pt is having difficulty following redirection and became more agitated when redirected. Pt tried to communicate with staff but was mostly nonsensical.  Pt speech is incoherent as he mumbled his words.      At around 5:30 pm, pt was extremely agitated. Pt was shadowboxing, yelling, and screaming; PRN Zyprexa 10 mg, oral, was given for agitation. Pt took HS scheduled medication, and PRN Ativan 1 mg was also given due to agitation. No seclusion/restraints this evening. Hygiene maintenance remains poor. Good appetite.

## 2019-11-25 NOTE — PROGRESS NOTES
"Pt was awake in bed when writer offered him an opportunity to attend group.  Ended up being the only person in group.  Pt was given a variety of pictures to choose from that writer would help him transfer to a large canvas.  Talkative throughout the process, mostly garbled speech, though suddenly had very clear speech for a moment.  Occasionally writer would prompt him to speak clearly so I could understand him, clarity would improve, though not as much as it was for that moment.    Pt seemed most interested in watching writer trace the picture to transfer it to the canvas, though when prompted to do it himself, he was generally able to do so.    Engaged pt in a bit of conversation about his interests, his living situation, what he likes to cook.  Pt showed writer his teeth (basically one remaining tooth) Toward the end of group, when writer stepped into the office for a moment to grab a pencil, he wrote \"fuck tooth\" with the sharpie marker on the canvas he had been carefully working on. Attempted to clarify why he did that - if he was mad about his tooth, or if his tooth hurt, but he pointed to a feather on the owl he was drawing, and appeared to be mad at himself that he abdi it too big (and it was oval/tooth shaped) and he wasn't pleased with it.  "

## 2019-11-25 NOTE — PROGRESS NOTES
Pt's room was switched from Pod 1 to Pod 2. Writer assisted in moving pt's belongings. Pt appeared very happy about the switch. When writer was opening up the blinds in pt's room, she turned around and the pt wrapped his arms around writer in an embrace. Pt's words were hard to understand, but it appeared as though he was thanking writer. Shortly thereafter, pt was sitting on his bed with his door open, and writer was in the hallway. Pt motioned writer into his room and then patted his bed for writer to sit next to him. Writer told pt that was inappropriate, and he laughed. RN informed.

## 2019-11-25 NOTE — PROGRESS NOTES
"Pt up early visible in the lounge, pt was disorganized/labile/irritable/redirectable, pt kept calm/controlled behavior with no outburst, pt at times get loud in lounge area/afternoon group/also sits on the hallway floor/front of room door, during 1:1 pt reported \" 72 hour questions/pt kept talking about police something \" pt was not clear/hard to understand, pt denies of mental health symptoms, pt said appetite/sleep good, pt agreed to take shower in between pt talked with Doctor, pt refused shower, pt room was switch/went well, pt needs are offered/known.  "

## 2019-11-26 PROCEDURE — 12400001 ZZH R&B MH UMMC

## 2019-11-26 PROCEDURE — 99233 SBSQ HOSP IP/OBS HIGH 50: CPT | Performed by: PSYCHIATRY & NEUROLOGY

## 2019-11-26 PROCEDURE — 25000132 ZZH RX MED GY IP 250 OP 250 PS 637: Performed by: PSYCHIATRY & NEUROLOGY

## 2019-11-26 RX ORDER — HALOPERIDOL 5 MG/ML
5 INJECTION INTRAMUSCULAR DAILY
Status: DISCONTINUED | OUTPATIENT
Start: 2019-11-27 | End: 2019-12-05

## 2019-11-26 RX ORDER — HALOPERIDOL 5 MG/1
5 TABLET ORAL DAILY
Status: DISCONTINUED | OUTPATIENT
Start: 2019-11-27 | End: 2019-12-05

## 2019-11-26 RX ORDER — HALOPERIDOL 5 MG/ML
10 INJECTION INTRAMUSCULAR AT BEDTIME
Status: DISCONTINUED | OUTPATIENT
Start: 2019-11-26 | End: 2019-11-29

## 2019-11-26 RX ORDER — HALOPERIDOL 5 MG/1
10 TABLET ORAL AT BEDTIME
Status: DISCONTINUED | OUTPATIENT
Start: 2019-11-26 | End: 2019-11-29

## 2019-11-26 RX ADMIN — TRAZODONE HYDROCHLORIDE 100 MG: 100 TABLET ORAL at 19:41

## 2019-11-26 RX ADMIN — BENZTROPINE MESYLATE 1 MG: 0.5 TABLET ORAL at 09:08

## 2019-11-26 RX ADMIN — BENZTROPINE MESYLATE 1 MG: 0.5 TABLET ORAL at 19:41

## 2019-11-26 RX ADMIN — HALOPERIDOL 5 MG: 5 TABLET ORAL at 09:08

## 2019-11-26 RX ADMIN — LEVOTHYROXINE SODIUM 50 MCG: 25 TABLET ORAL at 09:08

## 2019-11-26 RX ADMIN — HALOPERIDOL 10 MG: 5 TABLET ORAL at 19:41

## 2019-11-26 RX ADMIN — ASPIRIN 81 MG CHEWABLE TABLET 81 MG: 81 TABLET CHEWABLE at 09:09

## 2019-11-26 RX ADMIN — NICOTINE 1 PATCH: 14 PATCH, EXTENDED RELEASE TRANSDERMAL at 09:09

## 2019-11-26 RX ADMIN — MULTIPLE VITAMINS W/ MINERALS TAB 1 TABLET: TAB at 09:09

## 2019-11-26 ASSESSMENT — ACTIVITIES OF DAILY LIVING (ADL)
DRESS: SCRUBS (BEHAVIORAL HEALTH);INDEPENDENT
ORAL_HYGIENE: INDEPENDENT
HYGIENE/GROOMING: INDEPENDENT
LAUNDRY: UNABLE TO COMPLETE

## 2019-11-26 NOTE — PROGRESS NOTES
"Patient has an assigned Care Coordinator through [facility], Kira Caicedo [phone].      Amended commitment order has been received and copy placed in chart, noting that patient is committed to \"Comissioner only\".  Per Jacquelyn Macias at [facility] ([phone]) patient never signed a Provisional Discharge after his commitment order was placed.  He has been and still is under current commitment.  "

## 2019-11-26 NOTE — PLAN OF CARE
"Pt is still disorganized, labile, and irritable. Pt engaging in bizarre behaviors and need staff redirection. Pt is very intrusive at this time. Pt initially refused Haldol because the color was green. \"I am not going to take any green pills\". Pharmacy notified, and they provide Haldol in a different color. No behavioral outburst this evening.   "

## 2019-11-26 NOTE — PROGRESS NOTES
"[facility], [place]   Psychiatric Progress Note  Hospital Day: 4        Interim History:   The patient's care was discussed with the treatment team during the daily team meeting and/or staff's chart notes were reviewed.  Staff report patient was transferred from station 10 to station 12 on 11/23 following aggressive behavior necessitating seclusion. Patient remains disorganized, labile, and irritable. He initially refused oral Haldol because he stated that he would not take green pills. He did take Haldol once it was provided in white color.       Upon interview, the patient again stated that he would not take the green pills. He said that he was taking his medications in the community. At times, he made nonsensical statements. He denies experiencing mental health symptoms and reports that he is feeling \"good.\" He denied side effects from medications. He inquired about my credentialing and schooling. At times, he would begin to sing, and then apologize. He would not answer questions about psychotic symptoms, with exception of stating that he only sees me when asked about VH. The patient denies SI, SIB, and HI.  Patient denies medication side effects and acute medical concerns.  Patient had no further requests or concerns.          Medications:       aspirin  81 mg Oral Daily     benztropine  1 mg Oral BID     haloperidol  10 mg Oral At Bedtime     haloperidol  5 mg Oral Daily with breakfast     levothyroxine  50 mcg Oral Daily     multivitamin w/minerals  1 tablet Oral Daily     nicotine  1 patch Transdermal Daily     nicotine   Transdermal Q8H     nicotine   Transdermal Daily     traZODone  100 mg Oral At Bedtime          Allergies:     Allergies   Allergen Reactions     Peas GI Disturbance     Black eyed peas - vomiting     Haloperidol Anxiety          Labs:   No results found for this or any previous visit (from the past 24 hour(s)).       Psychiatric Examination:     BP (!) 147/90 " (BP Location: Left arm)   Pulse 89   Temp 98.4  F (36.9  C) (Tympanic)   Resp 16   Wt 88.9 kg (196 lb)   SpO2 94%   BMI 28.12 kg/m    Weight is 196 lbs 0 oz  Body mass index is 28.12 kg/m .    Weight over time:  Vitals:    11/22/19 2100   Weight: 88.9 kg (196 lb)       Orthostatic Vitals       Most Recent      Sitting Orthostatic BP 91/57 11/24 1900    Sitting Orthostatic Pulse (bpm) 47 11/24 1900            Cardiometabolic risk assessment. 11/25/19      Reviewed patient profile for cardiometabolic risk factors    Date taken /Value  REFERENCE RANGE   Abdominal Obesity  (Waist Circumference)   See nursing flowsheet Women ?35 in (88 cm)   Men ?40 in (102 cm)      Triglycerides  Triglycerides   Date Value Ref Range Status   04/19/2019 136 <150 mg/dL Final       ?150 mg/dL (1.7 mmol/L) or current treatment for elevated triglycerides   HDL cholesterol  HDL Cholesterol   Date Value Ref Range Status   04/19/2019 32 (L) >39 mg/dL Final   ]   Women <50 mg/dL (1.3 mmol/L) in women or current treatment for low HDL cholesterol  Men <40 mg/dL (1 mmol/L) in men or current treatment for low HDL cholesterol     Fasting plasma glucose (FPG) Lab Results   Component Value Date     10/14/2019      FPG ?100 mg/dL (5.6 mmol/L) or treatment for elevated blood glucose   Blood pressure  BP Readings from Last 3 Encounters:   11/25/19 (!) 147/90   11/22/19 129/86   10/30/19 103/69    Blood pressure ?130/85 mmHg or treatment for elevated blood pressure   Family History  See family history     A 69-year-old  male, appears older than stated age, disheveled and exhibited limited hygiene, irritable, nonsensical, pressured, loud.  Psychomotor agitation, but no tics or tremors were noted.  Gait and muscle tone within normal limits.  Mood appears to be irritable with labile and heightened affect.  His thought process is disorganized and somewhat impoverished.  Thought content:  No suicidal or homicidal ideation reported.  He  appears to be internally preoccupied, suspicious and expressed delusional thoughts earlier.  Sensorium was clear.  and memory:  Unable to assess.  Language, fund of knowledge, orientation and memory:  Unable to assess at the current.  Concentration and focus, poor.  Insight and judgment limited but adequate for safety at the current level of care.     Clinical Global Impressions  First:  Considering your total clinical experience with this particular patient population, how severe are the patient's symptoms at this time?: 7 (11/25/19 1311)  Compared to the patient's condition at the START of treatment, this patient's condition is:: 4 (11/25/19 1311)  Most recent:  Considering your total clinical experience with this particular patient population, how severe are the patient's symptoms at this time?: 7 (11/25/19 1311)  Compared to the patient's condition at the START of treatment, this patient's condition is:: 4 (11/25/19 1311)           Precautions:     Behavioral Orders   Procedures     Assault precautions     Code 1 - Restrict to Unit     Elopement precautions     Routine Programming     As clinically indicated     Single Room     Status 15     Every 15 minutes.          Diagnoses:     1.  Schizoaffective disorder, bipolar type with exacerbation of sherie and psychosis.   2.  Historical diagnosis of cognitive decline         Assessment & Plan:     Assessment and hospital summary:  Ger Bashir is a 69-year-old  male with a history of schizoaffective disorder, bipolar type, history of noncompliance and recurrent hospitalization who was referred to the Emergency Department by his ACT team due to increase in behavioral dysregulation and psychosis in the context of medication noncompliance. Report indicated that the patient had been throwing garbage at his neighbor, pounding on their doors and had been having difficulty caring for himself.      Target psychiatric symptoms and interventions:  1.  Cogentin  continued at 1 mg twice a day.   2.  Haldol continued at 10 mg at bedtime and 5 mg in the morning.  Trazodone continued at 100 mg at bedtime  3.  PRN Haldol, Ativan and Benadryl for severe behavioral outbursts.  Zyprexa for psychosis and mild agitation, as well as hydroxyzine for anxiety will be offered.   4.  Psychosocial assessment was obtained by our clinical  who will assist with setting up post-discharge care.  The patient has had recurrent recent hospitalizations.   5. Attempting to confirm dose and last administration date of Invega Satinderenna with ACT team. Awaiting c/b.    Medical Problems and Treatments:  No acute medical concerns    Behavioral/Psychological/Social:  Encourage participating in unit programming    Legal: Committed with Wu    Safety:  - Continue precautions as noted above  - Status 15 minute checks    Disposition: Pending clinical stabilization. Likely will pursue higher level of care.     Callie Prather MD  [facility] Psychiatry

## 2019-11-26 NOTE — PLAN OF CARE
Problem: Mental State/Mood Impairment (Psychotic Signs/Symptoms)  Goal: Improved Mental State and Mood (Psychotic Signs/Symptoms)  Outcome: No Change   Patient continues to be disorganized and confused. He did sleep in today until 1100. He has been in his room most of the shift watching TV. He has been medication compliant. His ADL's are poor. He is eating and drinking in good amounts. He is alert, oriented to self only. No aggressive behavior noted this shift.

## 2019-11-27 PROCEDURE — 25000132 ZZH RX MED GY IP 250 OP 250 PS 637: Performed by: PSYCHIATRY & NEUROLOGY

## 2019-11-27 PROCEDURE — 12400001 ZZH R&B MH UMMC

## 2019-11-27 PROCEDURE — 25000132 ZZH RX MED GY IP 250 OP 250 PS 637: Performed by: STUDENT IN AN ORGANIZED HEALTH CARE EDUCATION/TRAINING PROGRAM

## 2019-11-27 PROCEDURE — G0177 OPPS/PHP; TRAIN & EDUC SERV: HCPCS

## 2019-11-27 RX ORDER — ASPIRIN 81 MG/1
81 TABLET, CHEWABLE ORAL DAILY
Status: ON HOLD | COMMUNITY
End: 2019-12-13

## 2019-11-27 RX ORDER — CARBOXYMETHYLCELLULOSE SODIUM 5 MG/ML
1 SOLUTION/ DROPS OPHTHALMIC 3 TIMES DAILY PRN
Status: DISCONTINUED | OUTPATIENT
Start: 2019-11-27 | End: 2019-12-26 | Stop reason: HOSPADM

## 2019-11-27 RX ADMIN — BENZTROPINE MESYLATE 1 MG: 0.5 TABLET ORAL at 19:11

## 2019-11-27 RX ADMIN — HALOPERIDOL 5 MG: 5 TABLET ORAL at 08:15

## 2019-11-27 RX ADMIN — ASPIRIN 81 MG CHEWABLE TABLET 81 MG: 81 TABLET CHEWABLE at 08:15

## 2019-11-27 RX ADMIN — NICOTINE 1 PATCH: 14 PATCH, EXTENDED RELEASE TRANSDERMAL at 08:15

## 2019-11-27 RX ADMIN — HYDROXYZINE HYDROCHLORIDE 25 MG: 25 TABLET ORAL at 03:53

## 2019-11-27 RX ADMIN — HALOPERIDOL 10 MG: 5 TABLET ORAL at 19:11

## 2019-11-27 RX ADMIN — LEVOTHYROXINE SODIUM 50 MCG: 25 TABLET ORAL at 08:15

## 2019-11-27 RX ADMIN — OLANZAPINE 10 MG: 5 TABLET, FILM COATED ORAL at 04:46

## 2019-11-27 RX ADMIN — MULTIPLE VITAMINS W/ MINERALS TAB 1 TABLET: TAB at 08:15

## 2019-11-27 RX ADMIN — CARBOXYMETHYLCELLULOSE SODIUM 1 DROP: 5 SOLUTION/ DROPS OPHTHALMIC at 13:30

## 2019-11-27 RX ADMIN — BENZTROPINE MESYLATE 1 MG: 0.5 TABLET ORAL at 08:15

## 2019-11-27 RX ADMIN — TRAZODONE HYDROCHLORIDE 100 MG: 100 TABLET ORAL at 22:23

## 2019-11-27 ASSESSMENT — ACTIVITIES OF DAILY LIVING (ADL)
HYGIENE/GROOMING: INDEPENDENT
BATHING: 0-->INDEPENDENT
ORAL_HYGIENE: INDEPENDENT
AMBULATION: 0-->INDEPENDENT
DRESS: 0-->INDEPENDENT
FALL_HISTORY_WITHIN_LAST_SIX_MONTHS: NO
LAUNDRY: UNABLE TO COMPLETE
WHICH_OF_THE_ABOVE_FUNCTIONAL_RISKS_HAD_A_RECENT_ONSET_OR_CHANGE?: COGNITION
RETIRED_COMMUNICATION: 2-->DIFFICULTY UNDERSTANDING AND SPEAKING (NOT RELATED TO LANGUAGE BARRIER)
TRANSFERRING: 0-->INDEPENDENT
COGNITION: 2 - DIFFICULTY WITH ORGANIZING THOUGHTS
ORAL_HYGIENE: INDEPENDENT
TOILETING: 0-->INDEPENDENT
DRESS: SCRUBS (BEHAVIORAL HEALTH)
SWALLOWING: 0-->SWALLOWS FOODS/LIQUIDS WITHOUT DIFFICULTY
DRESS: SCRUBS (BEHAVIORAL HEALTH)
RETIRED_EATING: 0-->INDEPENDENT
HYGIENE/GROOMING: PROMPTS
LAUNDRY: UNABLE TO COMPLETE

## 2019-11-27 NOTE — PROGRESS NOTES
No change in presentation. Pt mood is still labile and irritable. He had several episodes of agitation of yelling and screaming at staff. Pt is visible in the milieu but mostly isolative to his room. He appears disorganized but alert to name and place. Insight into mental health remains poor. Pt speech is rambled and pressured. It s sometimes difficult for staff to understand pt. Pt initially refused his HS medication and threw the meds on the floor  I am not taking Haldol . He later took the meds when told that his Wu and the IM Haldol will be administered. Pt is not on SIO but does well with staff redirection.

## 2019-11-27 NOTE — PHARMACY-ADMISSION MEDICATION HISTORY
Admission Medication History by pharmacy is complete.  See EPIC admission navigator for Prior to Admission medications.     Medication History Sources:  Patient interview and MAR provided by ACT Team (Nurse: Michelle)      Medication History Source Reliability: Good     Medication Adherence: Good    Current Outpatient Pharmacy: [facility] [id number]     Changes made to PTA medication list (reason)  Added: NONE  Deleted: NONE  Changed: NONE     Additional medication history information (including reliability of information, actions taken by pharmacist):     ACT TEAM:     Invega: Patient received the Invega injection on 11/2/2019. Patient declined second injection on 11/15/2019. Patient states that he does not like injectables and doesn't feel like the medication helped.     All other medications: Patient recently had all his other prescription refilled and ACT Team nurse confirmed patient adherence.     Prior to Admission medications    Medication Sig Last Dose Taking? Auth Provider   aspirin (ASA) 81 MG chewable tablet Take 81 mg by mouth daily Past Week Yes Unknown, Entered By History   benztropine (COGENTIN) 1 MG tablet Take 1 tablet (1 mg) by mouth 2 times daily Past Week Yes Dominguez Conklin MD   haloperidol (HALDOL) 5 MG tablet Take 1 tablet (5 mg) by mouth daily (with breakfast) Past Week Yes Arielle Walker NP   haloperidol (HALDOL) 5 MG tablet Take 2 tablets (10 mg) by mouth At Bedtime Past Week Yes Dominguez Conklin MD   levothyroxine (SYNTHROID/LEVOTHROID) 50 MCG tablet Take 1 tablet (50 mcg) by mouth daily Past Week Yes Dominguze Conklin MD   multivitamin w/minerals (THERA-VIT-M) tablet Take 1 tablet by mouth daily Past Week Yes Dominguez Conklin MD   paliperidone (INVEGA SUSTENNA) 234 MG/1.5ML CHRISTOS Inject 1.5 mLs (234 mg) into the muscle every 28 days Next dose due 11/15/19 Past Week Yes Arielle Walker NP   traZODone (DESYREL) 100 MG tablet  Take 1 tablet (100 mg) by mouth At Bedtime Past Week Yes Dominguez Conklin MD       Time spent in this activity: 20 minutes     Medication history completed by: Maira Oakes, Student Pharmacist

## 2019-11-27 NOTE — PLAN OF CARE
"Pt mood labile and irritable. He was initially irritable when presented with scheduled morning medications, though he calmed when he discovered that none of his pills were green. \"I don't take green pills!\" Pt speech is rambling, incoherent, and pressured. He was visible in the milieu throughout the shift, though he was minimally social with peers and staff. Tense and irritable on approach, but did not have any major verbal outbursts or behavioral issues. Compliant with scheduled medications. Pt's pta dose of Invega Sustenna was ordered by Dr Prather, though pt is adamantly refusing to take. Will continue to monitor.  "

## 2019-11-27 NOTE — PROGRESS NOTES
Pt isolated to his room for the majority of this shift. Pt refused meal and refused vital signs. Pt did have snack. Pt mood was labile, and vacillated between irritability and full-range affect. Pt was pleasant during check-in, although writer found it difficult to understand pt. Pt denied SI/SIB, and denied AH/VH, although he appears at times to be responding to AH by yelling loudly in his room to himself. There were no other behavioral concerns of note to writer during this shift.     11/26/19 2102   Behavioral Health   Hallucinations appears responding;denies / not responding to hallucinations;auditory   Thinking poor concentration   Orientation person: oriented   Memory baseline memory   Insight poor   Judgement impaired   Eye Contact at examiner   Affect irritable;full range affect   Mood labile   Physical Appearance/Attire disheveled;untidy   Hygiene neglected grooming - unclean body, hair, teeth   Suicidality other (see comments)  (denies)   1. Wish to be Dead (Recent) No   2. Non-Specific Active Suicidal Thoughts (Recent) No   Self Injury other (see comment)  (denies; none observed)   Elopement   (none observed)   Activity isolative;withdrawn   Speech pressured;incoherent;other (see comments)  (unclear, mumbling)   Medication Sensitivity no stated side effects;no observed side effects   Psychomotor / Gait balanced;steady   Psycho Education   Type of Intervention 1:1 intervention   Response participates with encouragement   Hours 0.5   Treatment Detail   (check-in)   Activities of Daily Living   Hygiene/Grooming independent   Oral Hygiene independent   Dress scrubs (behavioral health);independent   Laundry unable to complete   Room Organization independent

## 2019-11-27 NOTE — PROGRESS NOTES
Pt played MARYLOU with writer (was the only person in the morning group).  Followed along without a problem - even slight joking and laughing.  Speech remains difficult to understand, though improved since 2 days ago.

## 2019-11-27 NOTE — PHARMACY
Pharmacy consult note to assess conditions to be met prior to continuing Invega Sustenna while hospitalized.     The following conditions must be met to dispense Invega Sustenna:   1. Patient has been on Invega Sustenna at least 3 weeks prior to admission.   2. Patient has received both initiation doses.   3. Dose will be administered one week after the next scheduled outpatient maintenance dose is due.   4. The patient must still be hospitalized on the administration date determined by the pharmacy consult (i.e., one week after the next scheduled outpatient dose).   5. Order must include  dispense as written.    6. If six months or more have passed since the last maintenance dose, do not restart Invega Sustenna in the hospital, defer to outpatient treatment.     The criteria listed above allow for continuation of Invega Sustenna while this patient is hospitalized. The last dose of Invega Sustenna 234 mg was administered on 10/18/19 prior to admission (Dr. Prather confirmed last date of administration with her ACT team). The next dose was due on 11/15/19, and will be scheduled for today- 11/27/19 (one week after the next scheduled outpatient dose) as listed above in condition #3. The ACT team is not able to bring his Invega Sustenna at this time but may be able to for future doses.    Please contact pharmacy with any questions.  Judith Quiñones, PharmD, [facility]  Behavioral Health Pharmacist  [facility])  [facility] Ascom: [phone] or [phone]  [facility] Phone: [phone]    Addendum - New information received from the ACT via fax says he got a dose of Invega Sustenna on 11/2/19 but decline his injection on 11/15/19. Reached out to Dr. Prather on how to proceeding with timing of future Invega Sustenna injections.    11/29/19 - Dr. Prather was able to confirm with staff from the ACT team that his last date of administration on his Invega Sustenna  was 10/18/19 not 11/2/19 as reported on the fax from the ACT team.

## 2019-11-28 PROCEDURE — 25000132 ZZH RX MED GY IP 250 OP 250 PS 637: Performed by: STUDENT IN AN ORGANIZED HEALTH CARE EDUCATION/TRAINING PROGRAM

## 2019-11-28 PROCEDURE — 25000132 ZZH RX MED GY IP 250 OP 250 PS 637: Performed by: PSYCHIATRY & NEUROLOGY

## 2019-11-28 PROCEDURE — 12400001 ZZH R&B MH UMMC

## 2019-11-28 RX ADMIN — TRAZODONE HYDROCHLORIDE 100 MG: 100 TABLET ORAL at 19:15

## 2019-11-28 RX ADMIN — BENZTROPINE MESYLATE 1 MG: 0.5 TABLET ORAL at 19:15

## 2019-11-28 RX ADMIN — MULTIPLE VITAMINS W/ MINERALS TAB 1 TABLET: TAB at 09:25

## 2019-11-28 RX ADMIN — LEVOTHYROXINE SODIUM 50 MCG: 25 TABLET ORAL at 09:24

## 2019-11-28 RX ADMIN — NICOTINE 1 PATCH: 14 PATCH, EXTENDED RELEASE TRANSDERMAL at 09:24

## 2019-11-28 RX ADMIN — HALOPERIDOL 10 MG: 5 TABLET ORAL at 19:15

## 2019-11-28 RX ADMIN — HALOPERIDOL 5 MG: 5 TABLET ORAL at 09:24

## 2019-11-28 RX ADMIN — ASPIRIN 81 MG CHEWABLE TABLET 81 MG: 81 TABLET CHEWABLE at 09:24

## 2019-11-28 RX ADMIN — BENZTROPINE MESYLATE 1 MG: 0.5 TABLET ORAL at 09:25

## 2019-11-28 RX ADMIN — CARBOXYMETHYLCELLULOSE SODIUM 1 DROP: 5 SOLUTION/ DROPS OPHTHALMIC at 13:38

## 2019-11-28 ASSESSMENT — ACTIVITIES OF DAILY LIVING (ADL)
ORAL_HYGIENE: INDEPENDENT
LAUNDRY: UNABLE TO COMPLETE
DRESS: SCRUBS (BEHAVIORAL HEALTH);INDEPENDENT
DRESS: SCRUBS (BEHAVIORAL HEALTH)
HYGIENE/GROOMING: INDEPENDENT
LAUNDRY: UNABLE TO COMPLETE
ORAL_HYGIENE: INDEPENDENT
HYGIENE/GROOMING: INDEPENDENT

## 2019-11-28 NOTE — PROGRESS NOTES
Pt scheduled Invega Sustenna was placed on hold by the pharmacist until they can get hold of pt s Act team for more clarification on Friday.     Pt spent most of this evening in his room watching TV. His mood is labile and irritable upon approach. Pt remains disorganized and mostly needs redirection from staff when visible in the milieu. Pt took scheduled HS Haldol without any issue. Pt refused dinner but snacks for most of this evening. No behavioral outburst this shift.

## 2019-11-28 NOTE — PLAN OF CARE
Problem: Adult Behavioral Health Plan of Care  Goal: Develops/Participates in Therapeutic [place] Meeting to Support Successful Transition  Outcome: No Change   Pt declined 1:1. Labile mood. Became irritable and somewhat confused when [facility]. Stated he didn't want to go because staff would go in his room when he wasn't there. He also said that being asked to [facility]  was a trick to have him get a shot. Pt was reassured he was only going to [place] [id number] for a few minutes, that he was not getting a shot, and that staff would not be going into his room. Pt then cooperated and followed other patients to [place] [id number]. Later he was isolative to his room and watched football. He declined music therapy group.

## 2019-11-28 NOTE — PROGRESS NOTES
11/27/19 2200   Behavioral Health   Hallucinations appears responding   Thinking poor concentration;distractable   Orientation place: oriented;person: oriented   Memory other (see comment)  (ian)   Insight poor   Judgement impaired   Eye Contact at examiner   Affect irritable   Mood labile;other (see comments)  (isolated)   Physical Appearance/Attire disheveled   Hygiene neglected grooming - unclean body, hair, teeth   Suicidality other (see comments)  (denies)   1. Wish to be Dead (Recent) No   2. Non-Specific Active Suicidal Thoughts (Recent) No   Self Injury other (see comment)  (denies)   Elopement   (no value)   Activity isolative   Speech clear;coherent   Medication Sensitivity no observed side effects;no stated side effects   Psychomotor / Gait balanced;steady   Activities of Daily Living   Hygiene/Grooming independent   Oral Hygiene independent   Dress scrubs (behavioral health)   Laundry unable to complete   Room Organization prompts     Patient is in room for all of the shift. Stayed isolated and did not want to check and denied vitals. Pt did not want to check in and so unable to assess any mental health concerns.

## 2019-11-29 PROCEDURE — 99233 SBSQ HOSP IP/OBS HIGH 50: CPT | Performed by: PSYCHIATRY & NEUROLOGY

## 2019-11-29 PROCEDURE — 25000132 ZZH RX MED GY IP 250 OP 250 PS 637: Performed by: PSYCHIATRY & NEUROLOGY

## 2019-11-29 PROCEDURE — 25000128 H RX IP 250 OP 636: Performed by: PSYCHIATRY & NEUROLOGY

## 2019-11-29 PROCEDURE — 12400001 ZZH R&B MH UMMC

## 2019-11-29 RX ORDER — HALOPERIDOL 5 MG/1
5 TABLET ORAL AT BEDTIME
Status: DISCONTINUED | OUTPATIENT
Start: 2019-11-29 | End: 2019-12-06

## 2019-11-29 RX ORDER — HALOPERIDOL 5 MG/ML
10 INJECTION INTRAMUSCULAR AT BEDTIME
Status: DISCONTINUED | OUTPATIENT
Start: 2019-11-29 | End: 2019-12-06

## 2019-11-29 RX ADMIN — MULTIPLE VITAMINS W/ MINERALS TAB 1 TABLET: TAB at 08:31

## 2019-11-29 RX ADMIN — PALIPERIDONE PALMITATE 234 MG: 234 INJECTION INTRAMUSCULAR at 14:36

## 2019-11-29 RX ADMIN — TRAZODONE HYDROCHLORIDE 100 MG: 100 TABLET ORAL at 19:39

## 2019-11-29 RX ADMIN — HALOPERIDOL 5 MG: 5 TABLET ORAL at 19:39

## 2019-11-29 RX ADMIN — BENZTROPINE MESYLATE 1 MG: 0.5 TABLET ORAL at 19:39

## 2019-11-29 RX ADMIN — ASPIRIN 81 MG CHEWABLE TABLET 81 MG: 81 TABLET CHEWABLE at 08:31

## 2019-11-29 RX ADMIN — NICOTINE 1 PATCH: 14 PATCH, EXTENDED RELEASE TRANSDERMAL at 08:31

## 2019-11-29 RX ADMIN — LEVOTHYROXINE SODIUM 50 MCG: 25 TABLET ORAL at 08:31

## 2019-11-29 RX ADMIN — HALOPERIDOL 5 MG: 5 TABLET ORAL at 08:31

## 2019-11-29 RX ADMIN — BENZTROPINE MESYLATE 1 MG: 0.5 TABLET ORAL at 08:31

## 2019-11-29 ASSESSMENT — ACTIVITIES OF DAILY LIVING (ADL)
HYGIENE/GROOMING: INDEPENDENT
LAUNDRY: UNABLE TO COMPLETE
ORAL_HYGIENE: INDEPENDENT
DRESS: SCRUBS (BEHAVIORAL HEALTH)

## 2019-11-29 NOTE — PROGRESS NOTES
"[facility], [place]   Psychiatric Progress Note  Hospital Day: 7        Interim History:   The patient's care was discussed with the treatment team during the daily team meeting and/or staff's chart notes were reviewed.  Staff report patient was transferred from station 10 to station 12 on 11/23 following aggressive behavior necessitating seclusion. Patient remains disorganized, labile, and irritable.  Patient neglecting ADLs and has been mostly isolative to his room.  Sleep has been poor.  Patient appears to be responding to internal stimuli.  He was noted to be singing and dancing down the rocha earlier this morning.    Upon interview, the patient is irritable. He immediately said \"When are you calling a taxi cab. Im leaving today!\" He inquired about his legal status (72 hr hold vs commitment). He became acutely agitated when informed that he is currently under a commitment that is a duration of 6 months. He continued to escalate when informed that he was on Haldol. He said that he has \"the shakes,\" however, he suspected that \"the kitchen staff put cum in my coffee! Someone is putting shit in my coffee!\" He does appear to have worsening tremor on examination today. Discussed plan to reduce dose of Haldol and initiate Invega Sustenna. He said that the only way he would accept the Invega Sustenna is if he is not given any additional Haldol. Discussed strong recommendation to taper Haldol to prevent further decompensation. Patient did not appear to comprehend information discussed. The patient denies SI, SIB, and HI.  Patient denies medication side effects and acute medical concerns.  Patient had no further requests or concerns.          Medications:       aspirin  81 mg Oral Daily     benztropine  1 mg Oral BID     haloperidol  10 mg Oral At Bedtime    Or     haloperidol lactate  10 mg Intramuscular At Bedtime     haloperidol  5 mg Oral Daily    Or     haloperidol lactate  5 mg " Intramuscular Daily     levothyroxine  50 mcg Oral Daily     multivitamin w/minerals  1 tablet Oral Daily     nicotine  1 patch Transdermal Daily     nicotine   Transdermal Q8H     nicotine   Transdermal Daily     traZODone  100 mg Oral At Bedtime          Allergies:     Allergies   Allergen Reactions     Peas GI Disturbance     Black eyed peas - vomiting     Haloperidol Anxiety          Labs:   No results found for this or any previous visit (from the past 24 hour(s)).       Psychiatric Examination:     /80 (BP Location: Left arm)   Pulse 72   Temp 97.3  F (36.3  C) (Tympanic)   Resp 16   Wt 88.9 kg (196 lb)   SpO2 96%   BMI 28.12 kg/m    Weight is 196 lbs 0 oz  Body mass index is 28.12 kg/m .    Weight over time:  Vitals:    11/22/19 2100   Weight: 88.9 kg (196 lb)       Orthostatic Vitals       Most Recent      Sitting Orthostatic BP 91/57 11/24 1900    Sitting Orthostatic Pulse (bpm) 47 11/24 1900            Cardiometabolic risk assessment. 11/25/19      Reviewed patient profile for cardiometabolic risk factors    Date taken /Value  REFERENCE RANGE   Abdominal Obesity  (Waist Circumference)   See nursing flowsheet Women ?35 in (88 cm)   Men ?40 in (102 cm)      Triglycerides  Triglycerides   Date Value Ref Range Status   04/19/2019 136 <150 mg/dL Final       ?150 mg/dL (1.7 mmol/L) or current treatment for elevated triglycerides   HDL cholesterol  HDL Cholesterol   Date Value Ref Range Status   04/19/2019 32 (L) >39 mg/dL Final   ]   Women <50 mg/dL (1.3 mmol/L) in women or current treatment for low HDL cholesterol  Men <40 mg/dL (1 mmol/L) in men or current treatment for low HDL cholesterol     Fasting plasma glucose (FPG) Lab Results   Component Value Date     10/14/2019      FPG ?100 mg/dL (5.6 mmol/L) or treatment for elevated blood glucose   Blood pressure  BP Readings from Last 3 Encounters:   11/27/19 116/80   11/22/19 129/86   10/30/19 103/69    Blood pressure ?130/85 mmHg or treatment  for elevated blood pressure   Family History  See family history     A 69-year-old  male, appears older than stated age, disheveled and exhibited limited hygiene, irritable, nonsensical, pressured, loud.  Psychomotor agitation and more visible tremors in hands bilaterally, but no tics.  Gait and muscle tone within normal limits.  Mood appears to be irritable with labile and heightened affect.  His thought process is disorganized and somewhat impoverished.  Thought content:  No suicidal or homicidal ideation reported.  He appears to be internally preoccupied, suspicious and expressed delusional thoughts earlier.  Sensorium was clear.  and memory:  Unable to assess.  Language, fund of knowledge, orientation and memory:  Unable to assess at the current.  Concentration and focus, poor.  Insight and judgment limited but adequate for safety at the current level of care.     Clinical Global Impressions  First:  Considering your total clinical experience with this particular patient population, how severe are the patient's symptoms at this time?: 7 (11/25/19 1311)  Compared to the patient's condition at the START of treatment, this patient's condition is:: 4 (11/25/19 1311)  Most recent:  Considering your total clinical experience with this particular patient population, how severe are the patient's symptoms at this time?: 7 (11/25/19 1311)  Compared to the patient's condition at the START of treatment, this patient's condition is:: 4 (11/25/19 1311)           Precautions:     Behavioral Orders   Procedures     Assault precautions     Code 1 - Restrict to Unit     Elopement precautions     Routine Programming     As clinically indicated     Single Room     Status 15     Every 15 minutes.          Diagnoses:     1.  Schizoaffective disorder, bipolar type with exacerbation of sherie and psychosis.   2.  Historical diagnosis of cognitive decline         Assessment & Plan:     Assessment and hospital summary:  Ger  Hai Bashir is a 69-year-old  male with a history of schizoaffective disorder, bipolar type, history of noncompliance and recurrent hospitalization who was referred to the Emergency Department by his ACT team due to increase in behavioral dysregulation and psychosis in the context of medication noncompliance. Report indicated that the patient had been throwing garbage at his neighbor, pounding on their doors and had been having difficulty caring for himself.      Target psychiatric symptoms and interventions:  1.  Cogentin continued at 1 mg twice a day.   2.  Haldol will be reduced to 5 mg BID due to evidence of EPSE on examination today.  Trazodone continued at 100 mg at bedtime  3.  PRN Haldol, Ativan and Benadryl for severe behavioral outbursts.  Zyprexa for psychosis and mild agitation, as well as hydroxyzine for anxiety will be offered.   4.  Psychosocial assessment was obtained by our clinical  who will assist with setting up post-discharge care.  The patient has had recurrent recent hospitalizations.   5. Will administer Invega Sustenna 234 mg today. Again discussed with nursing staff from ACT team who confirms that last Invega Sustenna dose was administered on 10/18/19. Spoke with PharmD who gave O.K. to administer injection today.    Medical Problems and Treatments:  No acute medical concerns    Behavioral/Psychological/Social:  Encourage participating in unit programming    Legal: Committed with [facility]    Safety:  - Continue precautions as noted above  - Status 15 minute checks    Disposition: Pending clinical stabilization. Likely will pursue higher level of care given demonstrated inability to maintain stability in the community despite ACT team supports.    Callie Prather MD  [facility] Psychiatry

## 2019-11-29 NOTE — PROGRESS NOTES
Pt refused to check in with this writer. Pt refused to ask to use restroom but instead pointed towards the bathroom and stared at this writer. Pt ate dinner in his room. Pt neglected ADLs. Pt was isolative to room throughout shift.      11/28/19 2232   Behavioral Health   Hallucinations denies / not responding to hallucinations   Thinking intact   Orientation time: oriented;date: oriented;place: oriented;person: oriented   Memory baseline memory   Insight poor   Judgement impaired   Eye Contact at examiner   Affect irritable   Mood irritable   Physical Appearance/Attire disheveled;untidy   Hygiene neglected grooming - unclean body, hair, teeth   Suicidality other (see comments)  (client refused)   Elopement   (no observed behaviors)   Activity withdrawn;isolative   Speech clear   Medication Sensitivity no observed side effects   Psychomotor / Gait balanced;steady   Activities of Daily Living   Hygiene/Grooming independent   Oral Hygiene independent   Dress scrubs (behavioral health);independent   Laundry unable to complete   Room Organization independent

## 2019-11-29 NOTE — PROGRESS NOTES
Pt woke up at 3 am and did not fall back asleep. He came out of his room and was irritable and demanding. Once back in his room, he loudly responded to internal stimuli for the rest of the night. He intermittently came out of his room, making various requests while singing and dancing down the rocha.

## 2019-11-30 PROCEDURE — 25000132 ZZH RX MED GY IP 250 OP 250 PS 637: Performed by: PSYCHIATRY & NEUROLOGY

## 2019-11-30 PROCEDURE — 12400001 ZZH R&B MH UMMC

## 2019-11-30 RX ADMIN — MULTIPLE VITAMINS W/ MINERALS TAB 1 TABLET: TAB at 09:21

## 2019-11-30 RX ADMIN — BENZTROPINE MESYLATE 1 MG: 0.5 TABLET ORAL at 09:21

## 2019-11-30 RX ADMIN — HALOPERIDOL 5 MG: 5 TABLET ORAL at 09:20

## 2019-11-30 RX ADMIN — HALOPERIDOL 5 MG: 5 TABLET ORAL at 19:04

## 2019-11-30 RX ADMIN — ASPIRIN 81 MG CHEWABLE TABLET 81 MG: 81 TABLET CHEWABLE at 09:20

## 2019-11-30 RX ADMIN — BENZTROPINE MESYLATE 1 MG: 0.5 TABLET ORAL at 19:04

## 2019-11-30 RX ADMIN — LEVOTHYROXINE SODIUM 50 MCG: 25 TABLET ORAL at 09:21

## 2019-11-30 RX ADMIN — TRAZODONE HYDROCHLORIDE 100 MG: 100 TABLET ORAL at 19:04

## 2019-11-30 RX ADMIN — NICOTINE 1 PATCH: 14 PATCH, EXTENDED RELEASE TRANSDERMAL at 09:21

## 2019-11-30 ASSESSMENT — ACTIVITIES OF DAILY LIVING (ADL)
HYGIENE/GROOMING: INDEPENDENT
LAUNDRY: UNABLE TO COMPLETE
LAUNDRY: UNABLE TO COMPLETE
DRESS: SCRUBS (BEHAVIORAL HEALTH)
ORAL_HYGIENE: INDEPENDENT
DRESS: SCRUBS (BEHAVIORAL HEALTH);INDEPENDENT
ORAL_HYGIENE: INDEPENDENT
HYGIENE/GROOMING: INDEPENDENT

## 2019-11-30 NOTE — PROGRESS NOTES
11/30/19 1446   Behavioral Health   Hallucinations denies / not responding to hallucinations   Thinking poor concentration;distractable;confused   Orientation person: oriented;place: oriented   Memory baseline memory   Insight poor   Judgement impaired   Eye Contact at examiner   Affect full range affect   Mood mood is calm   Physical Appearance/Attire untidy;disheveled   Hygiene neglected grooming - unclean body, hair, teeth   Suicidality other (see comments)  (none noted)   1. Wish to be Dead (Recent)   (none noted)   2. Non-Specific Active Suicidal Thoughts (Recent)   (none noted)   Self Injury other (see comment)  (none noted)   Elopement   (none noted)   Activity withdrawn;isolative   Speech rambling;pressured   Medication Sensitivity no stated side effects;no observed side effects   Psychomotor / Gait balanced;steady   Overt Aggression Scale   Verbal Aggression 0   Aggression against Property 0   Auto-Aggression 0   Physical Aggression 0   Overt Aggression Total Score 0   Activities of Daily Living   Hygiene/Grooming independent   Oral Hygiene independent   Dress scrubs (behavioral health);independent   Laundry unable to complete   Room Organization independent     Pt was mostly isolative to his room during the day. Pt came out a few times for meals and to use the restroom. Pt was difficult to understand, and appeared in a fine mood. Pt could be heard singing to himself during the day in his room. No behavioral concerns.

## 2019-11-30 NOTE — PROGRESS NOTES
Pt denied thoughts of SI/SIB. Pt denied having any hallucinations. Pt was withdrawn in room for majority of the shift talking to himself. Pt refused to take a shower or complete other daily cares.     11/29/19 2232   Behavioral Health   Hallucinations denies / not responding to hallucinations   Thinking confused;delusional   Orientation person: oriented;time: oriented   Memory baseline memory   Insight poor   Judgement impaired   Eye Contact at examiner   Affect irritable   Mood irritable   Physical Appearance/Attire untidy;disheveled   Hygiene neglected grooming - unclean body, hair, teeth   Suicidality other (see comments)  (denied)   1. Wish to be Dead (Recent) No   2. Non-Specific Active Suicidal Thoughts (Recent) No   Activity isolative;withdrawn   Speech rambling   Psychomotor / Gait balanced;steady   Activities of Daily Living   Hygiene/Grooming independent   Oral Hygiene independent   Dress scrubs (behavioral health)   Laundry unable to complete   Room Organization independent

## 2019-11-30 NOTE — PROVIDER NOTIFICATION
11/30/19 0600   Sleep/Rest/Relaxation   Sleep/Rest/Relaxation (WDL) WDL   Sleep/Rest/Relaxation difficulty falling asleep   Night Time # Hours 5.75 hours     Patient noted as sleeping most of the night without any disturbances or maladaptive behaviors tonight; presently still asleep. Will continue to monitor and to provide for his safety and comfort per policy as needed

## 2019-12-01 PROCEDURE — 12400001 ZZH R&B MH UMMC

## 2019-12-01 PROCEDURE — 25000132 ZZH RX MED GY IP 250 OP 250 PS 637: Performed by: PSYCHIATRY & NEUROLOGY

## 2019-12-01 RX ADMIN — MULTIPLE VITAMINS W/ MINERALS TAB 1 TABLET: TAB at 08:46

## 2019-12-01 RX ADMIN — BENZTROPINE MESYLATE 1 MG: 0.5 TABLET ORAL at 08:46

## 2019-12-01 RX ADMIN — TRAZODONE HYDROCHLORIDE 100 MG: 100 TABLET ORAL at 19:39

## 2019-12-01 RX ADMIN — ASPIRIN 81 MG CHEWABLE TABLET 81 MG: 81 TABLET CHEWABLE at 08:46

## 2019-12-01 RX ADMIN — HALOPERIDOL 5 MG: 5 TABLET ORAL at 08:46

## 2019-12-01 RX ADMIN — LEVOTHYROXINE SODIUM 50 MCG: 25 TABLET ORAL at 08:46

## 2019-12-01 RX ADMIN — BENZTROPINE MESYLATE 1 MG: 0.5 TABLET ORAL at 19:39

## 2019-12-01 RX ADMIN — HALOPERIDOL 5 MG: 5 TABLET ORAL at 19:39

## 2019-12-01 RX ADMIN — NICOTINE 1 PATCH: 14 PATCH, EXTENDED RELEASE TRANSDERMAL at 08:46

## 2019-12-01 RX ADMIN — ACETAMINOPHEN 650 MG: 325 TABLET, FILM COATED ORAL at 06:24

## 2019-12-01 ASSESSMENT — ACTIVITIES OF DAILY LIVING (ADL)
HYGIENE/GROOMING: INDEPENDENT
ORAL_HYGIENE: INDEPENDENT
DRESS: SCRUBS (BEHAVIORAL HEALTH);INDEPENDENT
LAUNDRY: UNABLE TO COMPLETE

## 2019-12-01 NOTE — PLAN OF CARE
"  Problem: Mental State/Mood Impairment (Psychotic Signs/Symptoms)  Goal: Improved Mental State and Mood (Psychotic Signs/Symptoms)  Outcome: No Change   Pt has been isolative to his room for the majority of the evening. When out in the milieu, he does not socialize with peers or staff. Mood labile. Overall less irritable today, though he continues to make inappropriate and rude comments to peers and staff. Pt was cooperative with moving to a new room on pod 1. He was compliant with scheduled medications. Refuses to complete formal check in with writer, but reports \"I'm fine, damnit!\" No SI/SIB statements observed. Declined therapeutic group when offered.   "

## 2019-12-01 NOTE — PROGRESS NOTES
12/01/19 1406   Behavioral [facility]   Hallucinations denies / not responding to hallucinations   Thinking poor concentration;distractable   Orientation person: oriented;place: oriented   Memory baseline memory   Insight poor   Judgement impaired   Eye Contact at examiner   Affect blunted, flat   Mood mood is calm   Physical Appearance/Attire untidy;disheveled   Hygiene neglected grooming - unclean body, hair, teeth   Suicidality other (see comments)  (none noted)   1. Wish to be Dead (Recent)   (none noted)   2. Non-Specific Active Suicidal Thoughts (Recent)   (none noted)   Self Injury other (see comment)  (none noted)   Elopement   (none noted)   Activity withdrawn;isolative   Speech clear;coherent   Medication Sensitivity no stated side effects;no observed side effects   Psychomotor / Gait balanced;steady   Overt Aggression Scale   Verbal Aggression 0   Aggression against Property 0   Auto-Aggression 0   Physical Aggression 0   Overt Aggression Total Score 0   Activities of Daily Living   Hygiene/Grooming independent   Oral Hygiene independent   Dress scrubs (behavioral health);independent   Laundry unable to complete   Room Organization independent     Pt was isolative and withdrawn in his room for the entirety of the day.

## 2019-12-02 PROCEDURE — 99233 SBSQ HOSP IP/OBS HIGH 50: CPT | Performed by: PSYCHIATRY & NEUROLOGY

## 2019-12-02 PROCEDURE — 12400001 ZZH R&B MH UMMC

## 2019-12-02 PROCEDURE — 25000132 ZZH RX MED GY IP 250 OP 250 PS 637: Performed by: PSYCHIATRY & NEUROLOGY

## 2019-12-02 PROCEDURE — H2032 ACTIVITY THERAPY, PER 15 MIN: HCPCS

## 2019-12-02 RX ADMIN — LEVOTHYROXINE SODIUM 50 MCG: 25 TABLET ORAL at 08:30

## 2019-12-02 RX ADMIN — HALOPERIDOL 5 MG: 5 TABLET ORAL at 08:30

## 2019-12-02 RX ADMIN — BENZTROPINE MESYLATE 1 MG: 0.5 TABLET ORAL at 08:30

## 2019-12-02 RX ADMIN — MULTIPLE VITAMINS W/ MINERALS TAB 1 TABLET: TAB at 08:30

## 2019-12-02 RX ADMIN — NICOTINE 1 PATCH: 14 PATCH, EXTENDED RELEASE TRANSDERMAL at 08:30

## 2019-12-02 RX ADMIN — HALOPERIDOL 5 MG: 5 TABLET ORAL at 20:31

## 2019-12-02 RX ADMIN — BENZTROPINE MESYLATE 1 MG: 0.5 TABLET ORAL at 20:31

## 2019-12-02 RX ADMIN — TRAZODONE HYDROCHLORIDE 100 MG: 100 TABLET ORAL at 20:31

## 2019-12-02 RX ADMIN — ASPIRIN 81 MG CHEWABLE TABLET 81 MG: 81 TABLET CHEWABLE at 08:30

## 2019-12-02 ASSESSMENT — ACTIVITIES OF DAILY LIVING (ADL)
DRESS: SCRUBS (BEHAVIORAL HEALTH)
LAUNDRY: UNABLE TO COMPLETE
ORAL_HYGIENE: PROMPTS
HYGIENE/GROOMING: PROMPTS
ORAL_HYGIENE: PROMPTS
DRESS: SCRUBS (BEHAVIORAL HEALTH)
HYGIENE/GROOMING: PROMPTS

## 2019-12-02 NOTE — PROGRESS NOTES
Writer left VM for Ruddy Henry (patient's CCM, [phone]) introducing self as patient's new CTC and requesting to discuss provider's recommendation of an IRTS facility once patient has stabilized. Writer also expressed wanting to discuss options regarding keeping patient medication compliant once they have discharged.

## 2019-12-02 NOTE — PROGRESS NOTES
12/02/19 1511   Significant Event   Significant Event Other (see comments)  (shift summary)   Pt appears to be markedly improved since the last time writer saw him (Thursday) as evidenced by increased orientation to situation/events and place/people. He was in and out of the milieu appropriately. He was responsive to questions and request for vitals. He actively participated in groups as an appropriate participant. He spent some time playing the keyboard in the lounge. Speech is still somewhat pressured and disorganized though he is sensical. No behavioral issues.
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
## 2019-12-02 NOTE — PLAN OF CARE
"Patient remains isolated to room majority of shift, generally only coming out of room for meals, medication or to use the restroom or get coffee. Minimal interaction with peers and staff. Patient was pleasant with writer when approached in his room, reported, \"Come on in!\" smiled at writer and gave the thumbs up sign after writer went over plan for the evening. Overall appears to be improving. Patient was compliant with scheduled medications. Patient reported mood as, \"fantastic!\" and denies SI/SIB.  Continues to have rambling, pressured, mumbling speech that at times is incoherent but again appears to be improving and sounds clearer than previous shifts. Patient is able to make needs known. Medication compliant. Will continue to monitor for safety.  "

## 2019-12-02 NOTE — PLAN OF CARE
BEHAVIORAL TEAM DISCUSSION    Participants: Tommy John RN, Georgiana Prather MD, Stefanie Noble LPC, [facility] [place]  Progress: Minimal  Anticipated length of stay: Unknown  Continued Stay Criteria/Rationale: Patient continues to present with unmanaged symptoms of Schizoaffective Disorder including delusions of grandeur, paranoid and tangential thought process and lack of insight into his mental illness. His provider and team are still working on determining future plans for housing and course of action to encourage medication compliance.   Medical/Physical: EPSE (tremors)  Precautions:   Behavioral Orders   Procedures     Assault precautions     Code 1 - Restrict to Unit     Elopement precautions     Routine Programming     As clinically indicated     Single Room     Status 15     Every 15 minutes.     Plan: CTC will work with ACT team CM to determine appropriate placement following stabilization of mental health symptoms. Provider will work with ACT team psychiatrist to discuss possibility of switching to 3 month long acting injectable of Invega.   Rationale for change in precautions or plan: No change in precautions or plan at the present time.

## 2019-12-03 PROCEDURE — 99233 SBSQ HOSP IP/OBS HIGH 50: CPT | Performed by: PSYCHIATRY & NEUROLOGY

## 2019-12-03 PROCEDURE — 25000132 ZZH RX MED GY IP 250 OP 250 PS 637: Performed by: PSYCHIATRY & NEUROLOGY

## 2019-12-03 PROCEDURE — 12400001 ZZH R&B MH UMMC

## 2019-12-03 RX ADMIN — MULTIPLE VITAMINS W/ MINERALS TAB 1 TABLET: TAB at 08:28

## 2019-12-03 RX ADMIN — HALOPERIDOL 5 MG: 5 TABLET ORAL at 08:43

## 2019-12-03 RX ADMIN — TRAZODONE HYDROCHLORIDE 100 MG: 100 TABLET ORAL at 19:38

## 2019-12-03 RX ADMIN — LEVOTHYROXINE SODIUM 50 MCG: 25 TABLET ORAL at 08:28

## 2019-12-03 RX ADMIN — BENZTROPINE MESYLATE 1 MG: 0.5 TABLET ORAL at 19:38

## 2019-12-03 RX ADMIN — BENZTROPINE MESYLATE 1 MG: 0.5 TABLET ORAL at 08:28

## 2019-12-03 RX ADMIN — HALOPERIDOL 5 MG: 5 TABLET ORAL at 19:38

## 2019-12-03 RX ADMIN — NICOTINE 1 PATCH: 14 PATCH, EXTENDED RELEASE TRANSDERMAL at 08:28

## 2019-12-03 ASSESSMENT — ACTIVITIES OF DAILY LIVING (ADL)
HYGIENE/GROOMING: INDEPENDENT
HYGIENE/GROOMING: INDEPENDENT
LAUNDRY: UNABLE TO COMPLETE
DRESS: INDEPENDENT;SCRUBS (BEHAVIORAL HEALTH)
ORAL_HYGIENE: INDEPENDENT
ORAL_HYGIENE: INDEPENDENT
DRESS: INDEPENDENT

## 2019-12-03 NOTE — PROGRESS NOTES
Patient has continued to improve, he is oriented in all spheres and denies AH/SI/SIB. He continues to ramble somewhat put is able to focus more on a train of thought. Patient has peen cordial to both peers and staff.     12/02/19 1943   Sleep/Rest/Relaxation   Day/Evening Time Hours up all shift   Behavioral Health   Hallucinations denies / not responding to hallucinations   Thinking distractable   Orientation person: oriented;place: oriented;date: oriented   Memory baseline memory   Insight poor   Judgement impaired   Eye Contact at examiner   Affect full range affect   Mood mood is calm   Physical Appearance/Attire untidy;disheveled   Hygiene other (see comment)  (adequate)   Suicidality other (see comments)  (Denies)   1. Wish to be Dead (Recent) No   2. Non-Specific Active Suicidal Thoughts (Recent) No   Self Injury other (see comment)  (Nothing observed)   Elopement   (No behaviors)   Activity restless   Speech clear;other (see comments)  (clearer, some rambling, tails off )   Medication Sensitivity no stated side effects;no observed side effects   Psychomotor / Gait balanced;steady   Substance Withdrawal Interventions   Social and Therapeutic Interventions (Substance Withdrawal) encourage socialization with peers;encourage effective boundaries with peers;encourage participation in therapeutic groups and milieu activities   Overt Aggression Scale   Verbal Aggression 0   Aggression against Property 0   Auto-Aggression 0   Physical Aggression 0   Overt Aggression Total Score 0   Psycho Education   Type of Intervention 1:1 intervention   Response participates, initiates socially appropriate   Hours 0.5   Treatment Detail Triggers  (Check in)   Daily Care   Activity up ad shaila   Activities of Daily Living   Hygiene/Grooming prompts   Oral Hygiene prompts   Dress scrubs (behavioral health)   Laundry unable to complete   Room Organization prompts

## 2019-12-03 NOTE — PROGRESS NOTES
Writer provided an update to Kira, patient's health insurance CM. Let her know that there is no discharge plans at this point due to ongoing mental health symptoms.

## 2019-12-03 NOTE — PROGRESS NOTES
Pt has been pleasantly declining group, though will come out to the [facility] area and socialize with writer.  Pt did ask for the keyboard, and was excited to be able to use it in his room today.  Appeared suprised that he could request it anytime, and was encouraged to do so.
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
## 2019-12-03 NOTE — PROGRESS NOTES
Writer contacted [facility] Attorney's Office @ [phone] re: status of request for change on commitment order (addition of [facility]).  [facility] has been added to the commitment as of 11/27/2019, order amending commitment added to pt's chart.

## 2019-12-04 PROCEDURE — 25000132 ZZH RX MED GY IP 250 OP 250 PS 637: Performed by: PSYCHIATRY & NEUROLOGY

## 2019-12-04 PROCEDURE — 12400001 ZZH R&B MH UMMC

## 2019-12-04 RX ADMIN — NICOTINE 1 PATCH: 14 PATCH, EXTENDED RELEASE TRANSDERMAL at 09:09

## 2019-12-04 RX ADMIN — LEVOTHYROXINE SODIUM 50 MCG: 25 TABLET ORAL at 08:56

## 2019-12-04 RX ADMIN — TRAZODONE HYDROCHLORIDE 100 MG: 100 TABLET ORAL at 20:40

## 2019-12-04 RX ADMIN — HALOPERIDOL 5 MG: 5 TABLET ORAL at 20:40

## 2019-12-04 RX ADMIN — ASPIRIN 81 MG CHEWABLE TABLET 81 MG: 81 TABLET CHEWABLE at 08:56

## 2019-12-04 RX ADMIN — BENZTROPINE MESYLATE 1 MG: 0.5 TABLET ORAL at 20:40

## 2019-12-04 RX ADMIN — BENZTROPINE MESYLATE 1 MG: 0.5 TABLET ORAL at 08:56

## 2019-12-04 RX ADMIN — HALOPERIDOL 5 MG: 5 TABLET ORAL at 08:56

## 2019-12-04 RX ADMIN — MULTIPLE VITAMINS W/ MINERALS TAB 1 TABLET: TAB at 08:56

## 2019-12-04 ASSESSMENT — ACTIVITIES OF DAILY LIVING (ADL)
HYGIENE/GROOMING: INDEPENDENT
DRESS: SCRUBS (BEHAVIORAL HEALTH)
HYGIENE/GROOMING: INDEPENDENT
LAUNDRY: UNABLE TO COMPLETE
ORAL_HYGIENE: INDEPENDENT

## 2019-12-04 NOTE — PROGRESS NOTES
"Pt declined invitation to attend group, though asked about using the keyboard again.  Friendly interaction, remembered conversation from yesterday about his watch, and briefly initiated revisiting that conversation.  Pt was encouraged to \"put on a music recital\" for us later, though he laughed and stated \"oh no, you wouldn't want that!\"  "

## 2019-12-04 NOTE — PROGRESS NOTES
Pt was intermittently active and visible in the milieu, but spent most of the shift isolated to his room. Pt ate his meal and cooperated with vital sign checks. Pt continues to have some mumbling/rambling speech at times, but is much improved in the past few days. Pt denies SI/SIB, denies AH/VH. There were no other behavioral concerns of note to this writer during this shift.     12/03/19 2103   Behavioral Health   Hallucinations denies / not responding to hallucinations   Thinking poor concentration;distractable   Orientation person: oriented;place: oriented;date: oriented;time: oriented   Memory baseline memory   Insight poor   Judgement impaired   Eye Contact at examiner   Affect blunted, flat;full range affect   Mood mood is calm   Physical Appearance/Attire attire appropriate to age and situation   Hygiene other (see comment)  (adequate)   Suicidality other (see comments)  (denies)   1. Wish to be Dead (Recent) No   2. Non-Specific Active Suicidal Thoughts (Recent) No   Self Injury other (see comment)  (denies)   Elopement   (none observed)   Activity isolative   Speech rambling;clear   Medication Sensitivity no stated side effects;no observed side effects   Psychomotor / Gait balanced;steady   Psycho Education   Type of Intervention 1:1 intervention   Response participates, initiates socially appropriate   Hours 0.5   Treatment Detail   (check-in)   Activities of Daily Living   Hygiene/Grooming independent   Oral Hygiene independent   Dress independent;scrubs (behavioral health)   Laundry unable to complete   Room Organization independent

## 2019-12-04 NOTE — PROGRESS NOTES
spoke with Michelle from patient's ACT team who reports that they are in support of patient going to an IRTS facility when they have stabilized. Since they are a part of [facility] they indicated that they will reach out to find him a bed in one of their IRTS facilities when the time comes.  and Michelle ([phone]) agreed to check in from time to time.

## 2019-12-04 NOTE — PLAN OF CARE
"Pt has been isolative to his room, including for meals and focus rounding with staff. Pt declined programming offered on the unit, however did utilize some music therapy in his room. Pt A/O x4. Pt's speech remains mumbled at times, however dietician is significantly impaired. Pt denies SI/SIB/HI. Pt denies A/V hallucinations. Pt responded well to humor and was able to carry out a reality based conversation about travel and snorkeling/diving. Bilateral hand tremor is observable and pt attributes this to Haldol, however his outpatient provider states it is present even when not taking Haldol. Pt denies all further physical complaints. Pt endorses depression, but states that it is secondary to \"having to be held here without cigarettes\". Pt denies anxiety. Will continue to monitor.   "

## 2019-12-05 PROCEDURE — 25000132 ZZH RX MED GY IP 250 OP 250 PS 637: Performed by: PSYCHIATRY & NEUROLOGY

## 2019-12-05 PROCEDURE — 12400001 ZZH R&B MH UMMC

## 2019-12-05 PROCEDURE — 99233 SBSQ HOSP IP/OBS HIGH 50: CPT | Performed by: PSYCHIATRY & NEUROLOGY

## 2019-12-05 RX ORDER — DIVALPROEX SODIUM 500 MG/1
1000 TABLET, EXTENDED RELEASE ORAL AT BEDTIME
Status: DISCONTINUED | OUTPATIENT
Start: 2019-12-05 | End: 2019-12-26 | Stop reason: HOSPADM

## 2019-12-05 RX ADMIN — HALOPERIDOL 5 MG: 5 TABLET ORAL at 09:35

## 2019-12-05 RX ADMIN — HALOPERIDOL 5 MG: 5 TABLET ORAL at 19:33

## 2019-12-05 RX ADMIN — MULTIPLE VITAMINS W/ MINERALS TAB 1 TABLET: TAB at 09:35

## 2019-12-05 RX ADMIN — BENZTROPINE MESYLATE 1 MG: 0.5 TABLET ORAL at 09:35

## 2019-12-05 RX ADMIN — TRAZODONE HYDROCHLORIDE 100 MG: 100 TABLET ORAL at 19:33

## 2019-12-05 RX ADMIN — DIVALPROEX SODIUM 1000 MG: 500 TABLET, EXTENDED RELEASE ORAL at 19:33

## 2019-12-05 RX ADMIN — NICOTINE 1 PATCH: 14 PATCH, EXTENDED RELEASE TRANSDERMAL at 09:35

## 2019-12-05 RX ADMIN — LEVOTHYROXINE SODIUM 50 MCG: 25 TABLET ORAL at 09:35

## 2019-12-05 RX ADMIN — BENZTROPINE MESYLATE 1 MG: 0.5 TABLET ORAL at 19:33

## 2019-12-05 RX ADMIN — ASPIRIN 81 MG CHEWABLE TABLET 81 MG: 81 TABLET CHEWABLE at 09:35

## 2019-12-05 ASSESSMENT — ACTIVITIES OF DAILY LIVING (ADL)
ORAL_HYGIENE: INDEPENDENT
DRESS: SCRUBS (BEHAVIORAL HEALTH)
LAUNDRY: UNABLE TO COMPLETE
HYGIENE/GROOMING: INDEPENDENT
HYGIENE/GROOMING: INDEPENDENT
DRESS: SCRUBS (BEHAVIORAL HEALTH)
ORAL_HYGIENE: INDEPENDENT
LAUNDRY: UNABLE TO COMPLETE

## 2019-12-05 NOTE — PROGRESS NOTES
Pt has been isolative and withdrawn in his room during morning shift. Pt was seen out of his room only to receive medications at the [facility]. Pt raises concern as he refused breakfast and lunch, however pt was seen grabbing 2 milk cartons out of the lounge fridge. Pt remains isolative in room listening to headphones and watching television. Pt did not attend group activity. No further concern.

## 2019-12-05 NOTE — PROGRESS NOTES
Pt isolative to room, withdrawn from staff and peers unless making a request. Pt denies SIB, SI, HI, AH, VH. Pt is pleasant and cooperative when making needs met, speech is pressured and mumbled, so pt can be difficult to understand at times. No behavioral concerns this evening.      12/04/19 2105   Behavioral Health   Hallucinations denies / not responding to hallucinations   Thinking poor concentration;distractable   Orientation person: oriented;place: oriented;date: oriented;time: oriented   Memory baseline memory   Insight poor   Judgement impaired   Eye Contact at examiner   Affect full range affect   Mood mood is calm   Physical Appearance/Attire untidy   Hygiene neglected grooming - unclean body, hair, teeth   Suicidality other (see comments)  (none stated none observed)   1. Wish to be Dead (Recent) No   2. Non-Specific Active Suicidal Thoughts (Recent) No   Self Injury other (see comment)  (none stated none observed)   Activity withdrawn;isolative   Speech pressured  (mumbled)   Psychomotor / Gait balanced;steady;tremors   Activities of Daily Living   Hygiene/Grooming independent   Oral Hygiene independent   Dress scrubs (behavioral health)   Laundry unable to complete   Room Organization independent

## 2019-12-05 NOTE — PROGRESS NOTES
"[facility], [place]   Psychiatric Progress Note  Hospital Day: 13        Interim History:   The patient's care was discussed with the treatment team during the daily team meeting and/or staff's chart notes were reviewed.  Staff report patient was transferred from station 10 to station 12 on 11/23 following aggressive behavior necessitating seclusion. Patient remains disorganized with ongoing evidence of delusional thought content, though he has been more cooperative with noted improvement in agitation/irritability. He has been very sexually inappropriate with female staff on the unit. More isolative to his room within the past two days.     Upon interview, the patient was pleasant and cooperative, though continues to experience symptoms of psychosis as evidenced by his ongoing concerns about the YAHIR. Pt again makes references to \"a murder that took place in [place].\" He said that his tremor is worsening and would like Haldol discontinued. He does not feel it has been helpful. Discussed my review of his records while noting that he had been on Depakote in the past with benefit. He said \"Well then let's give it a go again.\" Discussed R/B/A. He said that he is feeling \"excellent.\" Reports good sleep and appetite. Denies SI/HI. Patient had no further requests or concerns.          Medications:       aspirin  81 mg Oral Daily     benztropine  1 mg Oral BID     divalproex sodium extended-release  1,000 mg Oral At Bedtime     haloperidol  5 mg Oral At Bedtime    Or     haloperidol lactate  10 mg Intramuscular At Bedtime     levothyroxine  50 mcg Oral Daily     multivitamin w/minerals  1 tablet Oral Daily     nicotine  1 patch Transdermal Daily     nicotine   Transdermal Q8H     nicotine   Transdermal Daily     traZODone  100 mg Oral At Bedtime          Allergies:     Allergies   Allergen Reactions     Peas GI Disturbance     Black eyed peas - vomiting          Labs:   No results found for this or " "any previous visit (from the past 24 hour(s)).       Psychiatric Examination:     /74   Pulse 92   Temp 98.8  F (37.1  C) (Oral)   Resp 16   Wt 89.8 kg (198 lb)   SpO2 97%   BMI 28.41 kg/m    Weight is 198 lbs 0 oz  Body mass index is 28.41 kg/m .    Weight over time:  Vitals:    11/22/19 2100 11/30/19 0900   Weight: 88.9 kg (196 lb) 89.8 kg (198 lb)       Orthostatic Vitals       Most Recent      Sitting Orthostatic BP 91/57 11/24 1900    Sitting Orthostatic Pulse (bpm) 47 11/24 1900            Cardiometabolic risk assessment. 11/25/19      Reviewed patient profile for cardiometabolic risk factors    Date taken /Value  REFERENCE RANGE   Abdominal Obesity  (Waist Circumference)   See nursing flowsheet Women ?35 in (88 cm)   Men ?40 in (102 cm)      Triglycerides  Triglycerides   Date Value Ref Range Status   04/19/2019 136 <150 mg/dL Final       ?150 mg/dL (1.7 mmol/L) or current treatment for elevated triglycerides   HDL cholesterol  HDL Cholesterol   Date Value Ref Range Status   04/19/2019 32 (L) >39 mg/dL Final   ]   Women <50 mg/dL (1.3 mmol/L) in women or current treatment for low HDL cholesterol  Men <40 mg/dL (1 mmol/L) in men or current treatment for low HDL cholesterol     Fasting plasma glucose (FPG) Lab Results   Component Value Date     10/14/2019      FPG ?100 mg/dL (5.6 mmol/L) or treatment for elevated blood glucose   Blood pressure  BP Readings from Last 3 Encounters:   12/04/19 112/74   11/22/19 129/86   10/30/19 103/69    Blood pressure ?130/85 mmHg or treatment for elevated blood pressure   Family History  See family history     A 69-year-old  male, appears older than stated age, disheveled and exhibited limited hygiene, nonsensical, rapid speech, soft volume.  Visible tremors in hands bilaterally, but no tics.  Gait and muscle tone within normal limits.  Mood appears to be \"\" and heightened affect.  His thought process is disorganized and somewhat impoverished. "  Thought content:  No suicidal or homicidal ideation reported.  He appears to be internally preoccupied, suspicious and expressed delusional thoughts earlier.  Sensorium was clear.  and memory:  Unable to assess.  Language, fund of knowledge, orientation and memory:  Unable to assess at the current.  Concentration and focus, poor.  Insight and judgment limited but adequate for safety at the current level of care.     Clinical Global Impressions  First:  Considering your total clinical experience with this particular patient population, how severe are the patient's symptoms at this time?: 7 (11/25/19 1311)  Compared to the patient's condition at the START of treatment, this patient's condition is:: 4 (11/25/19 1311)  Most recent:  Considering your total clinical experience with this particular patient population, how severe are the patient's symptoms at this time?: 7 (11/25/19 1311)  Compared to the patient's condition at the START of treatment, this patient's condition is:: 4 (11/25/19 1311)           Precautions:     Behavioral Orders   Procedures     Assault precautions     Code 1 - Restrict to Unit     DISCUS     Elopement precautions     Routine Programming     As clinically indicated     Sexual precautions     Single Room     Status 15     Every 15 minutes.          Diagnoses:     1.  Schizoaffective disorder, bipolar type with exacerbation of sherie and psychosis.   2.  Historical diagnosis of cognitive decline         Assessment & Plan:     Assessment and hospital summary:  Ger Bashir is a 69-year-old  male with a history of schizoaffective disorder, bipolar type, history of noncompliance and recurrent hospitalization who was referred to the Emergency Department by his ACT team due to increase in behavioral dysregulation and psychosis in the context of medication noncompliance. Report indicated that the patient had been throwing garbage at his neighbor, pounding on their doors and had been  having difficulty caring for himself.      Target psychiatric symptoms and interventions:  1.  Cogentin continued at 1 mg twice a day.   2.  Haldol was initially restarted at higher than PTA dose (5 mg to 15 mg daily) on admission, though subsequently reduced to 5 mg BID due to evidence of EPSE on examination. However, Dr. Fox from ACT team noted that patient continued to exhibit tremors despite being off of Haldol for extended period of time.  Trazodone continued at 100 mg at bedtime. Due to ongoing tremor, will reduce Haldol to 5 mg at bedtime. Will also obtain baseline DISCUS.  3.  PRN Haldol, Ativan and Benadryl for severe behavioral outbursts.  Zyprexa for psychosis and mild agitation, as well as hydroxyzine for anxiety will be offered.   4.  Psychosocial assessment was obtained by our clinical  who will assist with setting up post-discharge care.  The patient has had recurrent recent hospitalizations.   5. Administered Invega Sustenna 234 mg on 11/29. Again discussed with nursing staff from ACT team who confirms that last Invega Sustenna dose was last administered on 10/18/19. Patient refused dose scheduled for 11/15/19, which resulted in his decompensation.  6. Will discuss possibility of Invega Trinza with ACT team prior to discharge.  7. Initiate Depakote ER 1,000 mg at bedtime to target symptoms of hypomania and due to noted improvement on Depakote in the past.     Medical Problems and Treatments:  No acute medical concerns    Behavioral/Psychological/Social:  Encourage participating in unit programming    Legal: Committed with [facility]    Safety:  - Continue precautions as noted above  - Status 15 minute checks    Disposition: Pending clinical stabilization. Likely will pursue higher level of care given demonstrated inability to maintain stability in the community despite ACT team supports.    Callie Prather MD  [facility] Psychiatry

## 2019-12-05 NOTE — PROGRESS NOTES
"Pt making sexually inappropriate comments to writer.  Pt requested anew pair of headphones after receiving HS medications from writer. Writer brought these to him, he took writers hand with the headphones in it, and stated, \"I wrote a song for you\" and then sang, \"Hand job. Hand job.\" Pt then requested writer to come into his room for various reasons. He then wrote down a list of love songs to be given to writer. Appropriate boundaries encouraged. Care/needs met by other staff members for remainder of the shift.     Pt placed on sexual precautions.  "

## 2019-12-06 PROCEDURE — 12400001 ZZH R&B MH UMMC

## 2019-12-06 PROCEDURE — 99233 SBSQ HOSP IP/OBS HIGH 50: CPT | Performed by: PSYCHIATRY & NEUROLOGY

## 2019-12-06 PROCEDURE — 25000132 ZZH RX MED GY IP 250 OP 250 PS 637: Performed by: PSYCHIATRY & NEUROLOGY

## 2019-12-06 RX ORDER — HALOPERIDOL 5 MG/1
5 TABLET ORAL AT BEDTIME
Status: DISCONTINUED | OUTPATIENT
Start: 2019-12-06 | End: 2019-12-26 | Stop reason: HOSPADM

## 2019-12-06 RX ORDER — HALOPERIDOL 5 MG/ML
5 INJECTION INTRAMUSCULAR AT BEDTIME
Status: DISCONTINUED | OUTPATIENT
Start: 2019-12-06 | End: 2019-12-26 | Stop reason: HOSPADM

## 2019-12-06 RX ADMIN — BENZTROPINE MESYLATE 1 MG: 0.5 TABLET ORAL at 08:14

## 2019-12-06 RX ADMIN — TRAZODONE HYDROCHLORIDE 100 MG: 100 TABLET ORAL at 19:38

## 2019-12-06 RX ADMIN — ACETAMINOPHEN 650 MG: 325 TABLET, FILM COATED ORAL at 03:22

## 2019-12-06 RX ADMIN — ASPIRIN 81 MG CHEWABLE TABLET 81 MG: 81 TABLET CHEWABLE at 08:14

## 2019-12-06 RX ADMIN — HALOPERIDOL 5 MG: 5 TABLET ORAL at 19:38

## 2019-12-06 RX ADMIN — BENZTROPINE MESYLATE 1 MG: 0.5 TABLET ORAL at 19:38

## 2019-12-06 RX ADMIN — NICOTINE 1 PATCH: 14 PATCH, EXTENDED RELEASE TRANSDERMAL at 08:14

## 2019-12-06 RX ADMIN — LEVOTHYROXINE SODIUM 50 MCG: 25 TABLET ORAL at 08:14

## 2019-12-06 RX ADMIN — MULTIPLE VITAMINS W/ MINERALS TAB 1 TABLET: TAB at 08:14

## 2019-12-06 RX ADMIN — ACETAMINOPHEN 650 MG: 325 TABLET, FILM COATED ORAL at 08:40

## 2019-12-06 RX ADMIN — ACETAMINOPHEN 650 MG: 325 TABLET, FILM COATED ORAL at 16:54

## 2019-12-06 RX ADMIN — DIVALPROEX SODIUM 1000 MG: 500 TABLET, EXTENDED RELEASE ORAL at 19:38

## 2019-12-06 ASSESSMENT — ACTIVITIES OF DAILY LIVING (ADL)
DRESS: SCRUBS (BEHAVIORAL HEALTH)
ORAL_HYGIENE: INDEPENDENT
HYGIENE/GROOMING: INDEPENDENT
ORAL_HYGIENE: INDEPENDENT
DRESS: SCRUBS (BEHAVIORAL HEALTH)
HYGIENE/GROOMING: INDEPENDENT

## 2019-12-06 NOTE — PLAN OF CARE
Pt is calm and pleasant this evening. He was isolative and withdrawn. He spent most of this evening watching TV and listening to music in his room. No agitation/aggressive behaviors observed this shift. No hypersexual behaviors. Pt remains disorganized with delusional thought. He took scheduled medication without any issue. Good appetite. No SI/SIB.

## 2019-12-06 NOTE — PROGRESS NOTES
Pt was isolative and withdrawn to his room all day, refusing to go to groups or socialize. Pt denied SI/SIB/HI and AVH, and there were no concerns regarding elopement nor aggression.     12/06/19 1440   Behavioral Health   Hallucinations denies / not responding to hallucinations   Thinking distractable;poor concentration   Orientation person: oriented;time: oriented   Memory baseline memory   Insight poor   Judgement impaired   Eye Contact at examiner   Affect blunted, flat;irritable   Mood irritable   Physical Appearance/Attire disheveled;untidy;appears stated age   Hygiene neglected grooming - unclean body, hair, teeth   Suicidality other (see comments)  (denies)   1. Wish to be Dead (Recent) No   2. Non-Specific Active Suicidal Thoughts (Recent) No   Self Injury other (see comment)  (denies; none obserevd)   Elopement   (no concerns)   Activity isolative;withdrawn   Speech pressured;rambling   Medication Sensitivity no stated side effects;no observed side effects   Psychomotor / Gait balanced;steady   Activities of Daily Living   Hygiene/Grooming independent   Oral Hygiene independent   Dress scrubs (behavioral health)   Room Organization independent      English

## 2019-12-06 NOTE — PROGRESS NOTES
[facility], [place]   Psychiatric Progress Note  Hospital Day: 14        Interim History:   The patient's care was discussed with the treatment team during the daily team meeting and/or staff's chart notes were reviewed.  Staff report patient was transferred from station 10 to station 12 on 11/23 following aggressive behavior necessitating seclusion. Patient remains disorganized with ongoing evidence of delusional thought content, though he has been more cooperative with noted improvement in agitation/irritability. No hypersexual behaviors overnight. Good appetite. No SI/SIB.     Upon interview, the patient was pleasant and cooperative, though continues to experience symptoms of psychosis as evidenced by his ongoing concerns about the YAHIR. He said that he does not have a mental illness when asked about symptoms. He said that he would like to return home upon discharge. Denied tooth pain. Reports good sleep and appetite. Denies SI/HI. Patient had no further requests or concerns.          Medications:       aspirin  81 mg Oral Daily     benztropine  1 mg Oral BID     divalproex sodium extended-release  1,000 mg Oral At Bedtime     haloperidol  5 mg Oral At Bedtime    Or     haloperidol lactate  10 mg Intramuscular At Bedtime     levothyroxine  50 mcg Oral Daily     multivitamin w/minerals  1 tablet Oral Daily     nicotine  1 patch Transdermal Daily     nicotine   Transdermal Q8H     nicotine   Transdermal Daily     traZODone  100 mg Oral At Bedtime          Allergies:     Allergies   Allergen Reactions     Peas GI Disturbance     Black eyed peas - vomiting          Labs:   No results found for this or any previous visit (from the past 24 hour(s)).       Psychiatric Examination:     BP 99/64   Pulse 72   Temp 98.2  F (36.8  C) (Oral)   Resp 16   Wt 89.8 kg (198 lb)   SpO2 97%   BMI 28.41 kg/m    Weight is 198 lbs 0 oz  Body mass index is 28.41 kg/m .    Weight over time:  Vitals:     "11/22/19 2100 11/30/19 0900   Weight: 88.9 kg (196 lb) 89.8 kg (198 lb)       Orthostatic Vitals       Most Recent      Sitting Orthostatic BP 91/57 11/24 1900    Sitting Orthostatic Pulse (bpm) 47 11/24 1900            Cardiometabolic risk assessment. 11/25/19      Reviewed patient profile for cardiometabolic risk factors    Date taken /Value  REFERENCE RANGE   Abdominal Obesity  (Waist Circumference)   See nursing flowsheet Women ?35 in (88 cm)   Men ?40 in (102 cm)      Triglycerides  Triglycerides   Date Value Ref Range Status   04/19/2019 136 <150 mg/dL Final       ?150 mg/dL (1.7 mmol/L) or current treatment for elevated triglycerides   HDL cholesterol  HDL Cholesterol   Date Value Ref Range Status   04/19/2019 32 (L) >39 mg/dL Final   ]   Women <50 mg/dL (1.3 mmol/L) in women or current treatment for low HDL cholesterol  Men <40 mg/dL (1 mmol/L) in men or current treatment for low HDL cholesterol     Fasting plasma glucose (FPG) Lab Results   Component Value Date     10/14/2019      FPG ?100 mg/dL (5.6 mmol/L) or treatment for elevated blood glucose   Blood pressure  BP Readings from Last 3 Encounters:   12/05/19 99/64   11/22/19 129/86   10/30/19 103/69    Blood pressure ?130/85 mmHg or treatment for elevated blood pressure   Family History  See family history     A 69-year-old  male, appears older than stated age, disheveled and exhibited limited hygiene, nonsensical, rapid speech, soft volume.  Visible tremors in hands bilaterally, but no tics.  Gait and muscle tone within normal limits.  Mood appears to be \"\" and heightened affect.  His thought process is disorganized and somewhat impoverished.  Thought content:  No suicidal or homicidal ideation reported.  He appears to be internally preoccupied, suspicious and expressed delusional thoughts earlier.  Sensorium was clear.  and memory:  Unable to assess.  Language, fund of knowledge, orientation and memory:  Unable to assess at the " current.  Concentration and focus, poor.  Insight and judgment limited but adequate for safety at the current level of care.     Clinical Global Impressions  First:  Considering your total clinical experience with this particular patient population, how severe are the patient's symptoms at this time?: 7 (11/25/19 1311)  Compared to the patient's condition at the START of treatment, this patient's condition is:: 4 (11/25/19 1311)  Most recent:  Considering your total clinical experience with this particular patient population, how severe are the patient's symptoms at this time?: 7 (11/25/19 1311)  Compared to the patient's condition at the START of treatment, this patient's condition is:: 4 (11/25/19 1311)           Precautions:     Behavioral Orders   Procedures     Assault precautions     Code 1 - Restrict to Unit     DISCUS     Elopement precautions     Routine Programming     As clinically indicated     Sexual precautions     Single Room     Status 15     Every 15 minutes.          Diagnoses:     1.  Schizoaffective disorder, bipolar type with exacerbation of sherie and psychosis.   2.  Historical diagnosis of cognitive decline         Assessment & Plan:     Assessment and hospital summary:  Ger Bashir is a 69-year-old  male with a history of schizoaffective disorder, bipolar type, history of noncompliance and recurrent hospitalization who was referred to the Emergency Department by his ACT team due to increase in behavioral dysregulation and psychosis in the context of medication noncompliance. Report indicated that the patient had been throwing garbage at his neighbor, pounding on their doors and had been having difficulty caring for himself.      Target psychiatric symptoms and interventions:  1.  Cogentin continued at 1 mg twice a day.   2.  Haldol was initially restarted at higher than PTA dose (5 mg to 15 mg daily) on admission, though subsequently reduced to 5 mg BID due to evidence of  EPSE on examination. However, Dr. Fox from ACT team noted that patient continued to exhibit tremors despite being off of Haldol for extended period of time.  Trazodone continued at 100 mg at bedtime. Due to ongoing tremor, again reduced Haldol to 5 mg at bedtime. Obtained baseline DISCUS, which was scored at 3 on 12/5.  3.  PRN Haldol, Ativan and Benadryl for severe behavioral outbursts.  Zyprexa for psychosis and mild agitation, as well as hydroxyzine for anxiety will be offered.   4.  Psychosocial assessment was obtained by our clinical  who will assist with setting up post-discharge care.  The patient has had recurrent recent hospitalizations.   5. Administered Invega Sustenna 234 mg on 11/29. Again discussed with nursing staff from ACT team who confirms that last Invega Sustenna dose was last administered on 10/18/19. Patient refused dose scheduled for 11/15/19, which resulted in his decompensation.  6. Will discuss possibility of Invega Trinza with ACT team prior to discharge.  7. Initiated Depakote ER 1,000 mg at bedtime on 12/5 to target symptoms of hypomania and due to noted improvement on Depakote in the past.     Medical Problems and Treatments:  No acute medical concerns    Behavioral/Psychological/Social:  Encourage participating in unit programming    Legal: Committed with [facility]    Safety:  - Continue precautions as noted above  - Status 15 minute checks    Disposition: Pending clinical stabilization. Likely will pursue higher level of care given demonstrated inability to maintain stability in the community despite ACT team supports.    Callie Prather MD  [facility] Psychiatry

## 2019-12-07 PROCEDURE — 25000132 ZZH RX MED GY IP 250 OP 250 PS 637: Performed by: PSYCHIATRY & NEUROLOGY

## 2019-12-07 PROCEDURE — 12400001 ZZH R&B MH UMMC

## 2019-12-07 RX ADMIN — LEVOTHYROXINE SODIUM 50 MCG: 25 TABLET ORAL at 08:20

## 2019-12-07 RX ADMIN — ASPIRIN 81 MG CHEWABLE TABLET 81 MG: 81 TABLET CHEWABLE at 08:19

## 2019-12-07 RX ADMIN — MULTIPLE VITAMINS W/ MINERALS TAB 1 TABLET: TAB at 08:20

## 2019-12-07 RX ADMIN — BENZTROPINE MESYLATE 1 MG: 0.5 TABLET ORAL at 19:03

## 2019-12-07 RX ADMIN — HALOPERIDOL 5 MG: 5 TABLET ORAL at 19:03

## 2019-12-07 RX ADMIN — ACETAMINOPHEN 650 MG: 325 TABLET, FILM COATED ORAL at 00:06

## 2019-12-07 RX ADMIN — ACETAMINOPHEN 650 MG: 325 TABLET, FILM COATED ORAL at 04:38

## 2019-12-07 RX ADMIN — BENZTROPINE MESYLATE 1 MG: 0.5 TABLET ORAL at 08:19

## 2019-12-07 RX ADMIN — TRAZODONE HYDROCHLORIDE 100 MG: 100 TABLET ORAL at 19:03

## 2019-12-07 RX ADMIN — NICOTINE 1 PATCH: 14 PATCH, EXTENDED RELEASE TRANSDERMAL at 08:19

## 2019-12-07 RX ADMIN — DIVALPROEX SODIUM 1000 MG: 500 TABLET, EXTENDED RELEASE ORAL at 19:03

## 2019-12-07 ASSESSMENT — ACTIVITIES OF DAILY LIVING (ADL)
DRESS: SCRUBS (BEHAVIORAL HEALTH)
ORAL_HYGIENE: INDEPENDENT
LAUNDRY: UNABLE TO COMPLETE
DRESS: SCRUBS (BEHAVIORAL HEALTH)
HYGIENE/GROOMING: INDEPENDENT
HYGIENE/GROOMING: INDEPENDENT
ORAL_HYGIENE: INDEPENDENT

## 2019-12-07 NOTE — PROGRESS NOTES
12/06/19 2005   Behavioral Health   Hallucinations denies / not responding to hallucinations   Thinking poor concentration;confused   Orientation person: oriented;place: oriented   Insight insight appropriate to situation   Judgement impaired   Eye Contact at examiner   Affect irritable;blunted, flat   Mood anxious;irritable   Physical Appearance/Attire appears stated age   Suicidality other (see comments)  (pt denies)   1. Wish to be Dead (Recent) No   2. Non-Specific Active Suicidal Thoughts (Recent) No   Activity isolative   Speech pressured;rambling;incoherent   Medication Sensitivity other (see comment)  (see comment)   Psychomotor / Gait slow   Activities of Daily Living   Hygiene/Grooming independent   Oral Hygiene independent   Dress scrubs (behavioral health)   Room Organization independent     Pt had a good shift overall today. Pt appeared isolative and remained in room for majority of shift. During check in, pt wanted to know when he would be discharged and asked writer to pass this question on to nurse/doctor. Pt also mentioned he dislikes Haldol because it makes his hand tremor worse and would like to stop taking it. Pt denies AH/VH as well as SI.

## 2019-12-07 NOTE — PLAN OF CARE
Patient showing some improvement as evidenced by fewer episodes of swearing at staff and general behavioral outbursts. Patient has remained  in his room majority of shift. Patient was cooperative with taking his scheduled AM meds.   Patient denies suicidal/ homicidal ideation.  Patient showed no significant behavioral issues although in his room most of shift so difficult to assess.

## 2019-12-07 NOTE — PROGRESS NOTES
"0005- pt comes out of room requesting tylenol. When asked what he needs this for, pt rambles about how \"the YAHIR took all of my teeth except 1 and they are going to loosen my last tooth up with a pliers because they are smart that way and they've been poking me in the left arm and in the ribs and it hurts\". Pt given PRN tylenol.     0435- pt again insistent on getting tylenol for pain in the right rib area. Pt not making sense in explanation of pain or how long he has had this pain. This writer attempts to educate pt on limits of amount of tylenol he is able to take per day but pt still insistent. Given PRN tylenol again.     Pt did sleep between 2 doses of tylenol. 3.75 hours total.  "

## 2019-12-08 PROCEDURE — 12400001 ZZH R&B MH UMMC

## 2019-12-08 PROCEDURE — 25000132 ZZH RX MED GY IP 250 OP 250 PS 637: Performed by: PSYCHIATRY & NEUROLOGY

## 2019-12-08 RX ADMIN — BENZTROPINE MESYLATE 1 MG: 0.5 TABLET ORAL at 08:30

## 2019-12-08 RX ADMIN — MULTIPLE VITAMINS W/ MINERALS TAB 1 TABLET: TAB at 08:30

## 2019-12-08 RX ADMIN — DIVALPROEX SODIUM 1000 MG: 500 TABLET, EXTENDED RELEASE ORAL at 20:14

## 2019-12-08 RX ADMIN — LEVOTHYROXINE SODIUM 50 MCG: 25 TABLET ORAL at 08:30

## 2019-12-08 RX ADMIN — TRAZODONE HYDROCHLORIDE 100 MG: 100 TABLET ORAL at 20:14

## 2019-12-08 RX ADMIN — HALOPERIDOL 5 MG: 5 TABLET ORAL at 20:14

## 2019-12-08 RX ADMIN — ASPIRIN 81 MG CHEWABLE TABLET 81 MG: 81 TABLET CHEWABLE at 08:30

## 2019-12-08 RX ADMIN — NICOTINE 1 PATCH: 14 PATCH, EXTENDED RELEASE TRANSDERMAL at 08:30

## 2019-12-08 RX ADMIN — BENZTROPINE MESYLATE 1 MG: 0.5 TABLET ORAL at 20:14

## 2019-12-08 ASSESSMENT — ACTIVITIES OF DAILY LIVING (ADL)
ORAL_HYGIENE: INDEPENDENT
HYGIENE/GROOMING: INDEPENDENT
LAUNDRY: UNABLE TO COMPLETE
HYGIENE/GROOMING: INDEPENDENT
LAUNDRY: UNABLE TO COMPLETE
DRESS: INDEPENDENT
DRESS: SCRUBS (BEHAVIORAL HEALTH)
ORAL_HYGIENE: INDEPENDENT

## 2019-12-08 NOTE — PROGRESS NOTES
Pt isolative to his room the entire shift, ate dinner, declined snacks. Pt calm upon observation, interacts very little with other patients and staff. No behavioral concerns to be noted this shift.       12/07/19 2238   Behavioral Health   Hallucinations denies / not responding to hallucinations   Thinking poor concentration   Orientation person: oriented;place: oriented   Memory baseline memory   Insight poor   Judgement impaired   Eye Contact at examiner   Affect blunted, flat   Mood mood is calm   Physical Appearance/Attire disheveled   Hygiene neglected grooming - unclean body, hair, teeth   Suicidality other (see comments)  (none stated)   1. Wish to be Dead (Recent) No   2. Non-Specific Active Suicidal Thoughts (Recent) No   Self Injury   (none observed)   Elopement   (none)   Activity isolative   Speech clear;coherent   Psychomotor / Gait balanced;steady   Activities of Daily Living   Hygiene/Grooming independent   Oral Hygiene independent   Dress scrubs (behavioral health)   Laundry unable to complete   Room Organization independent

## 2019-12-08 NOTE — PROGRESS NOTES
Patient continues to show no aggression and is pleasant with most interactions. He has been isolative to room. Patient has had no overt behavioral issues to report.

## 2019-12-09 PROCEDURE — 25000132 ZZH RX MED GY IP 250 OP 250 PS 637: Performed by: PSYCHIATRY & NEUROLOGY

## 2019-12-09 PROCEDURE — 99233 SBSQ HOSP IP/OBS HIGH 50: CPT | Performed by: PSYCHIATRY & NEUROLOGY

## 2019-12-09 PROCEDURE — H2032 ACTIVITY THERAPY, PER 15 MIN: HCPCS

## 2019-12-09 PROCEDURE — 12400001 ZZH R&B MH UMMC

## 2019-12-09 PROCEDURE — G0177 OPPS/PHP; TRAIN & EDUC SERV: HCPCS

## 2019-12-09 RX ADMIN — BENZTROPINE MESYLATE 1 MG: 0.5 TABLET ORAL at 08:36

## 2019-12-09 RX ADMIN — LEVOTHYROXINE SODIUM 50 MCG: 25 TABLET ORAL at 08:36

## 2019-12-09 RX ADMIN — ASPIRIN 81 MG CHEWABLE TABLET 81 MG: 81 TABLET CHEWABLE at 08:36

## 2019-12-09 RX ADMIN — MULTIPLE VITAMINS W/ MINERALS TAB 1 TABLET: TAB at 08:36

## 2019-12-09 RX ADMIN — HALOPERIDOL 5 MG: 5 TABLET ORAL at 19:16

## 2019-12-09 RX ADMIN — NICOTINE 1 PATCH: 14 PATCH, EXTENDED RELEASE TRANSDERMAL at 08:36

## 2019-12-09 RX ADMIN — TRAZODONE HYDROCHLORIDE 100 MG: 100 TABLET ORAL at 19:16

## 2019-12-09 RX ADMIN — BENZTROPINE MESYLATE 1 MG: 0.5 TABLET ORAL at 19:16

## 2019-12-09 RX ADMIN — DIVALPROEX SODIUM 1000 MG: 500 TABLET, EXTENDED RELEASE ORAL at 19:16

## 2019-12-09 ASSESSMENT — ACTIVITIES OF DAILY LIVING (ADL)
DRESS: SCRUBS (BEHAVIORAL HEALTH)
LAUNDRY: UNABLE TO COMPLETE
ORAL_HYGIENE: INDEPENDENT
ORAL_HYGIENE: INDEPENDENT
HYGIENE/GROOMING: INDEPENDENT
DRESS: SCRUBS (BEHAVIORAL HEALTH)
HYGIENE/GROOMING: INDEPENDENT

## 2019-12-09 NOTE — PROGRESS NOTES
Pt attended group with two other peers present.  Chose to make a beaded bracelet.  Set up with bead board and beads, and spent time arranging selected beads into a pattern.  Friendly and cooperative, occasional brief conversation.  Not pleased after meeting with his doctor and finding out he needed to go somewhere, per report, for 1-3 months, stated he just wants to go home, though remained pleasant.

## 2019-12-09 NOTE — PROGRESS NOTES
Patient showing more focus in his conversations. He has been pleasant in all interactions. He has had no behavioral issues to report. Oriented x3, denies AH/SI/HI.     12/09/19 1316   Sleep/Rest/Relaxation   Day/Evening Time Hours napping;resting in bed   Number of hours napping 1 hours   Number of hours resting in bed 3 hours   Behavioral Health   Hallucinations denies / not responding to hallucinations   Thinking delusional   Orientation place: oriented;date: oriented;time: oriented;person: oriented   Memory baseline memory   Insight poor   Judgement impaired   Eye Contact at examiner   Affect blunted, flat   Mood mood is calm   Physical Appearance/Attire disheveled   Hygiene other (see comment)  (showered today -adequate hygiene)   Suicidality other (see comments)  (Denies)   1. Wish to be Dead (Recent) No   2. Non-Specific Active Suicidal Thoughts (Recent) No   Self Injury other (see comment)  (Nothing observed)   Elopement   (No behaviors)   Activity isolative;withdrawn;restless   Speech other (see comments)  (clearer, more focused)   Medication Sensitivity no stated side effects;no observed side effects   Psychomotor / Gait balanced;steady   Substance Withdrawal Interventions   Social and Therapeutic Interventions (Substance Withdrawal) encourage socialization with peers;encourage participation in therapeutic groups and milieu activities   Overt Aggression Scale   Verbal Aggression 0   Aggression against Property 0   Auto-Aggression 0   Physical Aggression 0   Overt Aggression Total Score 0   Psycho Education   Type of Intervention 1:1 intervention   Response participates with encouragement   Hours 0.5   Treatment Detail Triggers   Daily Care   Activity up ad shaila   Activities of Daily Living   Hygiene/Grooming independent   Oral Hygiene independent   Dress scrubs (behavioral health)   Laundry unable to complete   Room Organization prompts   Activity   Activity Assistance Provided independent

## 2019-12-09 NOTE — PLAN OF CARE
BEHAVIORAL TEAM DISCUSSION    Participants: Stefanie Noble LPC, [facility], Anthony Jama RN, Georgiana Prather MD.   Progress: Improving  Anticipated length of stay: 2-3 weeks  Continued Stay Criteria/Rationale: Patient has continued to display improvement in regard to insight about his mental illness, and decreased symptoms of irritability and agitation. Team feels he is appropriate to refer to an IRTS facility at the present time, and this information was shared with his [facility] ACT Team who indicated they would start looking for a bed within their IRTS programs. When a bed does become available patient will be discharged. ACT team and [facility] provider does not feel patient is ready for discharge to his apartment at present time, as he has been hospitalized 6 times in the past year.   Medical/Physical: No acute concerns noted.   Precautions:   Behavioral Orders   Procedures     Assault precautions     Code 1 - Restrict to Unit     DISCUS     Elopement precautions     Routine Programming     As clinically indicated     Sexual precautions     Single Room     Status 15     Every 15 minutes.     Plan: CTC will periodically check in with patients ACT Team about an IRTS facility bed availability. If patient continues to stabilize and remain compliant with recommendations he will be discharged to an IRTS program when a bed becomes available.   Rationale for change in precautions or plan: No current change in precautions, plan was changed based off patients improvement of mental health issues.

## 2019-12-09 NOTE — PROGRESS NOTES
"[facility], [place]   Psychiatric Progress Note  Hospital Day: 17        Interim History:   The patient's care was discussed with the treatment team during the daily team meeting and/or staff's chart notes were reviewed.  Staff report patient was transferred from station 10 to station 12 on 11/23 following aggressive behavior necessitating seclusion. Patient remains disorganized with ongoing evidence of delusional thought content, though he has been more cooperative with noted improvement in agitation/irritability. No evidence of aggressive or violent behavior. Isolative to his room. Good appetite. No SI/SIB.     Upon interview, the patient was pleasant and cooperative, though continues to experience symptoms of psychosis as evidenced by his ongoing delusional thought content about the YAHIR (may be baseline for him). Patient made a comment about \"not getting laid for 26 years,\" which he attributes to the YAHIR, though did not elaborate. He again denies that he has any mental health symptoms. He said \"Just because my mom had mental issues doesn't mean I do.\" He is oriented x 3. He said that he has had longstanding short term memory loss, though no recent changes in his memory. He was quite upset when informed that he would be going to an IRTS. However, affect brightened when informed that he would likely be able to smoke and go outside at times. Later in the morning he approached me and said \"We are going to have to take this to court. I am not going to this 1 month to 3 month long place.\" He said that he would like to return home upon discharge. Reports good sleep and appetite. Denies SI/HI. Patient had no further requests or concerns.          Medications:       aspirin  81 mg Oral Daily     benztropine  1 mg Oral BID     divalproex sodium extended-release  1,000 mg Oral At Bedtime     haloperidol lactate  5 mg Intramuscular At Bedtime    Or     haloperidol  5 mg Oral At Bedtime     " levothyroxine  50 mcg Oral Daily     multivitamin w/minerals  1 tablet Oral Daily     nicotine  1 patch Transdermal Daily     nicotine   Transdermal Q8H     nicotine   Transdermal Daily     traZODone  100 mg Oral At Bedtime          Allergies:     Allergies   Allergen Reactions     Peas GI Disturbance     Black eyed peas - vomiting          Labs:   No results found for this or any previous visit (from the past 24 hour(s)).       Psychiatric Examination:     /74 (BP Location: Right arm)   Pulse 84   Temp 98.2  F (36.8  C) (Oral)   Resp 16   Wt 89.8 kg (198 lb)   SpO2 99%   BMI 28.41 kg/m    Weight is 198 lbs 0 oz  Body mass index is 28.41 kg/m .    Weight over time:  Vitals:    11/22/19 2100 11/30/19 0900   Weight: 88.9 kg (196 lb) 89.8 kg (198 lb)       Orthostatic Vitals       Most Recent      Sitting Orthostatic BP 91/57 11/24 1900    Sitting Orthostatic Pulse (bpm) 47 11/24 1900            Cardiometabolic risk assessment. 11/25/19      Reviewed patient profile for cardiometabolic risk factors    Date taken /Value  REFERENCE RANGE   Abdominal Obesity  (Waist Circumference)   See nursing flowsheet Women ?35 in (88 cm)   Men ?40 in (102 cm)      Triglycerides  Triglycerides   Date Value Ref Range Status   04/19/2019 136 <150 mg/dL Final       ?150 mg/dL (1.7 mmol/L) or current treatment for elevated triglycerides   HDL cholesterol  HDL Cholesterol   Date Value Ref Range Status   04/19/2019 32 (L) >39 mg/dL Final   ]   Women <50 mg/dL (1.3 mmol/L) in women or current treatment for low HDL cholesterol  Men <40 mg/dL (1 mmol/L) in men or current treatment for low HDL cholesterol     Fasting plasma glucose (FPG) Lab Results   Component Value Date     10/14/2019      FPG ?100 mg/dL (5.6 mmol/L) or treatment for elevated blood glucose   Blood pressure  BP Readings from Last 3 Encounters:   12/08/19 107/74   11/22/19 129/86   10/30/19 103/69    Blood pressure ?130/85 mmHg or treatment for elevated blood  "pressure   Family History  See family history     A 69-year-old  male, appears older than stated age, disheveled and exhibited limited hygiene, more organized speech, soft volume.  Visible tremors in hands bilaterally, but no tics and improved since last evaluation.  Gait and muscle tone within normal limits.  Mood appears to be \"great\" and heightened affect.  His thought process is linear at times though also disorganized and somewhat impoverished.  Thought content:  No suicidal or homicidal ideation reported.  Thought content is delusional in nature.  Sensorium was clear.  and memory:  Intact.  Language, fund of knowledge, orientation and memory:  Intact, oriented x 3.  Concentration and focus, poor.  Insight and judgment limited but adequate for safety at the current level of care.     Clinical Global Impressions  First:  Considering your total clinical experience with this particular patient population, how severe are the patient's symptoms at this time?: 7 (11/25/19 1311)  Compared to the patient's condition at the START of treatment, this patient's condition is:: 4 (11/25/19 1311)  Most recent:  Considering your total clinical experience with this particular patient population, how severe are the patient's symptoms at this time?: 7 (11/25/19 1311)  Compared to the patient's condition at the START of treatment, this patient's condition is:: 4 (11/25/19 1311)           Precautions:     Behavioral Orders   Procedures     Assault precautions     Code 1 - Restrict to Unit     DISCUS     Elopement precautions     Routine Programming     As clinically indicated     Sexual precautions     Single Room     Status 15     Every 15 minutes.          Diagnoses:     1.  Schizoaffective disorder, bipolar type with exacerbation of sherie and psychosis.   2.  Historical diagnosis of cognitive decline         Assessment & Plan:     Assessment and hospital summary:  Ger Bashir is a 69-year-old  male with " a history of schizoaffective disorder, bipolar type, history of noncompliance and recurrent hospitalization who was referred to the Emergency Department by his ACT team due to increase in behavioral dysregulation and psychosis in the context of medication noncompliance. Report indicated that the patient had been throwing garbage at his neighbor, pounding on their doors and had been having difficulty caring for himself.      Target psychiatric symptoms and interventions:  1.  Cogentin continued at 1 mg twice a day.   2.  Haldol was initially restarted at higher than PTA dose (5 mg to 15 mg daily) on admission, though subsequently reduced to 5 mg BID due to evidence of EPSE on examination. However, Dr. Fox from ACT team noted that patient continued to exhibit tremors despite being off of Haldol for extended period of time.  Trazodone continued at 100 mg at bedtime. Due to ongoing tremor, again reduced Haldol to 5 mg at bedtime. Obtained baseline DISCUS, which was scored at 3 on 12/5.  3.  PRN Haldol, Ativan and Benadryl for severe behavioral outbursts.  Zyprexa for psychosis and mild agitation, as well as hydroxyzine for anxiety will be offered.   4.  Psychosocial assessment was obtained by our clinical  who will assist with setting up post-discharge care.  The patient has had recurrent recent hospitalizations.   5. Administered Invega Sustenna 234 mg on 11/29. Again discussed with nursing staff from ACT team who confirms that last Invega Sustenna dose was last administered on 10/18/19. Patient refused dose scheduled for 11/15/19, which resulted in his decompensation.  6. Will discuss possibility of Invega Trinza with ACT team prior to discharge.  7. Initiated Depakote ER 1,000 mg at bedtime on 12/5 to target symptoms of hypomania and due to noted improvement on Depakote in the past. Level will be checked in AM.     Medical Problems and Treatments:  No acute medical  concerns    Behavioral/Psychological/Social:  Encourage participating in unit programming    Legal: Committed with [facility]    Safety:  - Continue precautions as noted above  - Status 15 minute checks    Disposition: Pending clinical stabilization. Likely will pursue higher level of care given demonstrated inability to maintain stability in the community despite ACT team supports.    Callie Prather MD  [facility] Psychiatry

## 2019-12-09 NOTE — PROGRESS NOTES
Writer informed patient's ACT Team nurse that it was determined in Team meeting this morning that patient is appropriate to be referred to an IRTS facility. Nurse states that they will initiate a search within the [facility] organization to find an available bed.

## 2019-12-09 NOTE — PROGRESS NOTES
"While pt was in the shower, writer and another PA cleaned up pt's room and put new bedding on his bed. Of note, he had a significant amount of snacks and condiments in his room. His bedding was covered in food stains. When pt returned to his room, he was very pleased with his clean space. \"Wow, that looks really nice. Thank you, I'm impressed.\"   "

## 2019-12-09 NOTE — PLAN OF CARE
"Patient remains isolated to his room, withdrawn. Patient appears disheveled, with poor hygiene and grooming. Writer asked patient when the last time he showered was, he replied, \"last Saturday night.\" Patient reported he would shower in the morning, at 0730. Writer educated patient on importance of ADL's, patient agreed understanding. Patient appears with blunted affect, calm mood. Patient mumbles when speaking, at times can be difficult to understand. Patient reports he feels, \"excellent,\" when asked about mood. Denial of illness as evidenced by when asked if he feels the medications are working he reports, \"I don't need medication.\"  Writer asked patient if he was having AH or VH and he reported, \"You can't tell me people have hallucinations.\" Denies SI/SIB/HI. Patient reports he is eating and drinking fine, and is frequently seen snacking in room throughout shift. Patient did not display any aggressive behavior or cursing this shift. Will continue to monitor for safety.  "

## 2019-12-09 NOTE — PROGRESS NOTES
Writer spoke with Jacquelyn,  through the state who was calling from [employer] to determine if patient would require a remote provisional discharge anytime soon. Writer let them know that Malaika was added to his commitment on 11/27/19, so there remote PD would not be necessary. Jacquelyn asked for writer to provide her with an update about when/where patient would discharge to, in order to take him off the list for [facility].

## 2019-12-10 LAB
ALBUMIN SERPL-MCNC: 3.3 G/DL (ref 3.4–5)
ALP SERPL-CCNC: 85 U/L (ref 40–150)
ALT SERPL W P-5'-P-CCNC: 26 U/L (ref 0–70)
ANION GAP SERPL CALCULATED.3IONS-SCNC: 2 MMOL/L (ref 3–14)
AST SERPL W P-5'-P-CCNC: 11 U/L (ref 0–45)
BASOPHILS # BLD AUTO: 0 10E9/L (ref 0–0.2)
BASOPHILS NFR BLD AUTO: 0.3 %
BILIRUB SERPL-MCNC: 0.6 MG/DL (ref 0.2–1.3)
BUN SERPL-MCNC: 15 MG/DL (ref 7–30)
CALCIUM SERPL-MCNC: 8.7 MG/DL (ref 8.5–10.1)
CHLORIDE SERPL-SCNC: 102 MMOL/L (ref 94–109)
CO2 SERPL-SCNC: 34 MMOL/L (ref 20–32)
CREAT SERPL-MCNC: 0.71 MG/DL (ref 0.66–1.25)
DIFFERENTIAL METHOD BLD: NORMAL
EOSINOPHIL # BLD AUTO: 0.1 10E9/L (ref 0–0.7)
EOSINOPHIL NFR BLD AUTO: 2.2 %
ERYTHROCYTE [DISTWIDTH] IN BLOOD BY AUTOMATED COUNT: 12.2 % (ref 10–15)
GFR SERPL CREATININE-BSD FRML MDRD: >90 ML/MIN/{1.73_M2}
GLUCOSE SERPL-MCNC: 90 MG/DL (ref 70–99)
HCT VFR BLD AUTO: 45.3 % (ref 40–53)
HGB BLD-MCNC: 15.6 G/DL (ref 13.3–17.7)
IMM GRANULOCYTES # BLD: 0 10E9/L (ref 0–0.4)
IMM GRANULOCYTES NFR BLD: 0.2 %
LYMPHOCYTES # BLD AUTO: 1.9 10E9/L (ref 0.8–5.3)
LYMPHOCYTES NFR BLD AUTO: 31.3 %
MCH RBC QN AUTO: 32 PG (ref 26.5–33)
MCHC RBC AUTO-ENTMCNC: 34.4 G/DL (ref 31.5–36.5)
MCV RBC AUTO: 93 FL (ref 78–100)
MONOCYTES # BLD AUTO: 0.6 10E9/L (ref 0–1.3)
MONOCYTES NFR BLD AUTO: 9.8 %
NEUTROPHILS # BLD AUTO: 3.3 10E9/L (ref 1.6–8.3)
NEUTROPHILS NFR BLD AUTO: 56.2 %
NRBC # BLD AUTO: 0 10*3/UL
NRBC BLD AUTO-RTO: 0 /100
PLATELET # BLD AUTO: 194 10E9/L (ref 150–450)
POTASSIUM SERPL-SCNC: 3.9 MMOL/L (ref 3.4–5.3)
PROT SERPL-MCNC: 6.4 G/DL (ref 6.8–8.8)
RBC # BLD AUTO: 4.88 10E12/L (ref 4.4–5.9)
SODIUM SERPL-SCNC: 138 MMOL/L (ref 133–144)
VALPROATE SERPL-MCNC: 67 MG/L (ref 50–100)
WBC # BLD AUTO: 5.9 10E9/L (ref 4–11)

## 2019-12-10 PROCEDURE — 80164 ASSAY DIPROPYLACETIC ACD TOT: CPT | Performed by: PSYCHIATRY & NEUROLOGY

## 2019-12-10 PROCEDURE — 36415 COLL VENOUS BLD VENIPUNCTURE: CPT | Performed by: PSYCHIATRY & NEUROLOGY

## 2019-12-10 PROCEDURE — 12400001 ZZH R&B MH UMMC

## 2019-12-10 PROCEDURE — 99233 SBSQ HOSP IP/OBS HIGH 50: CPT | Performed by: PSYCHIATRY & NEUROLOGY

## 2019-12-10 PROCEDURE — 80053 COMPREHEN METABOLIC PANEL: CPT | Performed by: PSYCHIATRY & NEUROLOGY

## 2019-12-10 PROCEDURE — 25000132 ZZH RX MED GY IP 250 OP 250 PS 637: Performed by: PSYCHIATRY & NEUROLOGY

## 2019-12-10 PROCEDURE — 85025 COMPLETE CBC W/AUTO DIFF WBC: CPT | Performed by: PSYCHIATRY & NEUROLOGY

## 2019-12-10 RX ADMIN — BENZTROPINE MESYLATE 1 MG: 0.5 TABLET ORAL at 19:49

## 2019-12-10 RX ADMIN — ASPIRIN 81 MG CHEWABLE TABLET 81 MG: 81 TABLET CHEWABLE at 08:04

## 2019-12-10 RX ADMIN — TRAZODONE HYDROCHLORIDE 100 MG: 100 TABLET ORAL at 19:49

## 2019-12-10 RX ADMIN — NICOTINE 1 PATCH: 14 PATCH, EXTENDED RELEASE TRANSDERMAL at 08:04

## 2019-12-10 RX ADMIN — BENZTROPINE MESYLATE 1 MG: 0.5 TABLET ORAL at 08:04

## 2019-12-10 RX ADMIN — LEVOTHYROXINE SODIUM 50 MCG: 25 TABLET ORAL at 08:04

## 2019-12-10 RX ADMIN — HALOPERIDOL 5 MG: 5 TABLET ORAL at 19:49

## 2019-12-10 RX ADMIN — DIVALPROEX SODIUM 1000 MG: 500 TABLET, EXTENDED RELEASE ORAL at 19:49

## 2019-12-10 RX ADMIN — MULTIPLE VITAMINS W/ MINERALS TAB 1 TABLET: TAB at 08:04

## 2019-12-10 ASSESSMENT — ACTIVITIES OF DAILY LIVING (ADL)
ORAL_HYGIENE: INDEPENDENT
LAUNDRY: UNABLE TO COMPLETE
DRESS: SCRUBS (BEHAVIORAL HEALTH)
ORAL_HYGIENE: INDEPENDENT
HYGIENE/GROOMING: INDEPENDENT
HYGIENE/GROOMING: INDEPENDENT
DRESS: INDEPENDENT

## 2019-12-10 NOTE — PROGRESS NOTES
"Pt had an uneventful shift and remained isolative to room for majority of evening. Pt did not take a shower today but stated he would take one tomorrow morning. Pt denies SI/VH/AH. During check in, pt stated he was feeling \"rotten\" but was pleasant in interactions with other patients and staff.      12/09/19 2100   Behavioral Health   Hallucinations denies / not responding to hallucinations   Thinking paranoid;distractable   Orientation person: oriented;place: oriented;date: oriented;time: oriented   Insight denial of illness   Judgement impaired   Eye Contact at examiner   Affect blunted, flat   Mood mood is calm   Hygiene neglected grooming - unclean body, hair, teeth   1. Wish to be Dead (Recent) No   2. Non-Specific Active Suicidal Thoughts (Recent) No   Activity isolative   Speech pressured   Activities of Daily Living   Hygiene/Grooming independent   Oral Hygiene independent   Dress scrubs (behavioral health)   Room Organization independent     "

## 2019-12-10 NOTE — PROGRESS NOTES
Writer spoke with Michelle through patient's [facility] ACT Team and passed along information that he is interested in going to [facility] facility. Michelle reports they will discuss it during team today.

## 2019-12-10 NOTE — PROGRESS NOTES
12/10/19 1400   Behavioral Health   Hallucinations denies / not responding to hallucinations   Thinking distractable   Orientation person: oriented;place: oriented;date: oriented   Insight denial of illness   Judgement impaired   Eye Contact at examiner   Affect blunted, flat   Mood mood is calm   Hygiene neglected grooming - unclean body, hair, teeth   1. Wish to be Dead (Recent) No   2. Non-Specific Active Suicidal Thoughts (Recent) No   Activity isolative   Speech pressured   Activities of Daily Living   Hygiene/Grooming independent   Oral Hygiene independent   Dress scrubs (behavioral health)   Room Organization independent           Pt was isolative and reserved this morning. He spent the entire shift in his room and ate both his breakfast and lunch in his room. Pt  did not go to group either. Pt does not report any all side effects and  rates his anxiety and depression at 0. The only concern the pt has is his tremors in his hands and arms. Pt reports to be having a good day so far.

## 2019-12-10 NOTE — PROGRESS NOTES
"[facility], [place]   Psychiatric Progress Note  Hospital Day: 18        Interim History:   The patient's care was discussed with the treatment team during the daily team meeting and/or staff's chart notes were reviewed.  Staff report patient was transferred from station 10 to station 12 on 11/23 following aggressive behavior necessitating seclusion. Patient remains disorganized with ongoing evidence of delusional thought content, though he has been more cooperative with noted improvement in agitation/irritability. No evidence of aggressive or violent behavior. He has been pleasant and cooperative in all interactions.     Upon interview, the patient was more guarded and irritable, stating \"There is nothing I want to discuss with you.\" He did acknowledge feeling quite upset about my recommendation to pursue an IRTS. He said \"If I am doing better in the hospital, why can't I just go back home?\" He then said \"I will go to [facility], but nowhere else.\" He mentioned at one point that his back, neck, and knees hurt because of the YAHIR. When asked to elaborate, he replied \"Nothing I care to discuss with you.\" Offered soft care mattress though he declined. Denied acute medical concerns or side effects. Reports good sleep and appetite. Denies SI/HI. Patient had no further requests or concerns.          Medications:       aspirin  81 mg Oral Daily     benztropine  1 mg Oral BID     divalproex sodium extended-release  1,000 mg Oral At Bedtime     haloperidol lactate  5 mg Intramuscular At Bedtime    Or     haloperidol  5 mg Oral At Bedtime     levothyroxine  50 mcg Oral Daily     multivitamin w/minerals  1 tablet Oral Daily     nicotine  1 patch Transdermal Daily     nicotine   Transdermal Q8H     nicotine   Transdermal Daily     traZODone  100 mg Oral At Bedtime          Allergies:     Allergies   Allergen Reactions     Peas GI Disturbance     Black eyed peas - vomiting          Labs:   No " "results found for this or any previous visit (from the past 24 hour(s)).       Psychiatric Examination:     /74 (BP Location: Right arm)   Pulse 84   Temp 98.2  F (36.8  C) (Oral)   Resp 16   Wt 89.8 kg (198 lb)   SpO2 99%   BMI 28.41 kg/m    Weight is 198 lbs 0 oz  Body mass index is 28.41 kg/m .    Weight over time:  Vitals:    11/22/19 2100 11/30/19 0900   Weight: 88.9 kg (196 lb) 89.8 kg (198 lb)       Orthostatic Vitals       Most Recent      Sitting Orthostatic BP 91/57 11/24 1900    Sitting Orthostatic Pulse (bpm) 47 11/24 1900            Cardiometabolic risk assessment. 11/25/19      Reviewed patient profile for cardiometabolic risk factors    Date taken /Value  REFERENCE RANGE   Abdominal Obesity  (Waist Circumference)   See nursing flowsheet Women ?35 in (88 cm)   Men ?40 in (102 cm)      Triglycerides  Triglycerides   Date Value Ref Range Status   04/19/2019 136 <150 mg/dL Final       ?150 mg/dL (1.7 mmol/L) or current treatment for elevated triglycerides   HDL cholesterol  HDL Cholesterol   Date Value Ref Range Status   04/19/2019 32 (L) >39 mg/dL Final   ]   Women <50 mg/dL (1.3 mmol/L) in women or current treatment for low HDL cholesterol  Men <40 mg/dL (1 mmol/L) in men or current treatment for low HDL cholesterol     Fasting plasma glucose (FPG) Lab Results   Component Value Date     10/14/2019      FPG ?100 mg/dL (5.6 mmol/L) or treatment for elevated blood glucose   Blood pressure  BP Readings from Last 3 Encounters:   12/08/19 107/74   11/22/19 129/86   10/30/19 103/69    Blood pressure ?130/85 mmHg or treatment for elevated blood pressure   Family History  See family history     A 69-year-old  male, appears older than stated age, disheveled and exhibited limited hygiene, more organized speech, soft volume.  Visible tremors in hands bilaterally, but no tics and improved since last evaluation.  Gait and muscle tone within normal limits.  Mood appears to be \"great\" and " heightened affect.  His thought process is linear at times though also disorganized and somewhat impoverished.  Thought content:  No suicidal or homicidal ideation reported.  Thought content is delusional in nature.  Sensorium was clear.  and memory:  Intact.  Language, fund of knowledge, orientation and memory:  Intact, oriented x 3.  Concentration and focus, poor.  Insight and judgment limited but adequate for safety at the current level of care.     Clinical Global Impressions  First:  Considering your total clinical experience with this particular patient population, how severe are the patient's symptoms at this time?: 7 (11/25/19 1311)  Compared to the patient's condition at the START of treatment, this patient's condition is:: 4 (11/25/19 1311)  Most recent:  Considering your total clinical experience with this particular patient population, how severe are the patient's symptoms at this time?: 7 (11/25/19 1311)  Compared to the patient's condition at the START of treatment, this patient's condition is:: 4 (11/25/19 1311)           Precautions:     Behavioral Orders   Procedures     Assault precautions     Code 1 - Restrict to Unit     DISCUS     Elopement precautions     Routine Programming     As clinically indicated     Sexual precautions     Single Room     Status 15     Every 15 minutes.          Diagnoses:     1.  Schizoaffective disorder, bipolar type with exacerbation of sherie and psychosis.   2.  Historical diagnosis of cognitive decline         Assessment & Plan:     Assessment and hospital summary:  Ger Bashir is a 69-year-old  male with a history of schizoaffective disorder, bipolar type, history of noncompliance and recurrent hospitalization who was referred to the Emergency Department by his ACT team due to increase in behavioral dysregulation and psychosis in the context of medication noncompliance. Report indicated that the patient had been throwing garbage at his neighbor,  pounding on their doors and had been having difficulty caring for himself.      Target psychiatric symptoms and interventions:  1.  Cogentin continued at 1 mg twice a day.   2.  Haldol was initially restarted at higher than PTA dose (5 mg to 15 mg daily) on admission, though subsequently reduced to 5 mg BID due to evidence of EPSE on examination. However, Dr. Fox from ACT team noted that patient continued to exhibit tremors despite being off of Haldol for extended period of time.  Trazodone continued at 100 mg at bedtime. Due to ongoing tremor, again reduced Haldol to 5 mg at bedtime. Obtained baseline DISCUS, which was scored at 3 on 12/5.  3.  PRN Haldol, Ativan and Benadryl for severe behavioral outbursts.  Zyprexa for psychosis and mild agitation, as well as hydroxyzine for anxiety will be offered.   4.  Psychosocial assessment was obtained by our clinical  who will assist with setting up post-discharge care.  The patient has had recurrent recent hospitalizations.   5. Administered Invega Sustenna 234 mg on 11/29. Again discussed with nursing staff from ACT team who confirms that last Invega Sustenna dose was last administered on 10/18/19. Patient refused dose scheduled for 11/15/19, which resulted in his decompensation.  6. Will discuss possibility of Invega Trinza with ACT team prior to discharge.  7. Initiated Depakote ER 1,000 mg at bedtime on 12/5 to target symptoms of hypomania and due to noted improvement on Depakote in the past. Level was checked today and is therapeutic at 67. If ongoing hypomanic sx, may consider further optimization.     Medical Problems and Treatments:  No acute medical concerns    Behavioral/Psychological/Social:  Encourage participating in unit programming    Legal: Committed with [facility]    Safety:  - Continue precautions as noted above  - Status 15 minute checks    Disposition: Pending clinical stabilization. Likely will pursue higher level of care given demonstrated  inability to maintain stability in the community despite ACT team supports.    Callie Prather MD  Health system Psychiatry

## 2019-12-11 PROCEDURE — 25000132 ZZH RX MED GY IP 250 OP 250 PS 637: Performed by: PSYCHIATRY & NEUROLOGY

## 2019-12-11 PROCEDURE — H2032 ACTIVITY THERAPY, PER 15 MIN: HCPCS

## 2019-12-11 PROCEDURE — 12400001 ZZH R&B MH UMMC

## 2019-12-11 RX ADMIN — NICOTINE 1 PATCH: 14 PATCH, EXTENDED RELEASE TRANSDERMAL at 09:18

## 2019-12-11 RX ADMIN — LEVOTHYROXINE SODIUM 50 MCG: 25 TABLET ORAL at 09:18

## 2019-12-11 RX ADMIN — ASPIRIN 81 MG CHEWABLE TABLET 81 MG: 81 TABLET CHEWABLE at 09:18

## 2019-12-11 RX ADMIN — BENZTROPINE MESYLATE 1 MG: 0.5 TABLET ORAL at 20:40

## 2019-12-11 RX ADMIN — HALOPERIDOL 5 MG: 5 TABLET ORAL at 20:40

## 2019-12-11 RX ADMIN — TRAZODONE HYDROCHLORIDE 100 MG: 100 TABLET ORAL at 20:40

## 2019-12-11 RX ADMIN — DIVALPROEX SODIUM 1000 MG: 500 TABLET, EXTENDED RELEASE ORAL at 20:40

## 2019-12-11 RX ADMIN — BENZTROPINE MESYLATE 1 MG: 0.5 TABLET ORAL at 09:18

## 2019-12-11 RX ADMIN — MULTIPLE VITAMINS W/ MINERALS TAB 1 TABLET: TAB at 09:18

## 2019-12-11 ASSESSMENT — ACTIVITIES OF DAILY LIVING (ADL)
HYGIENE/GROOMING: INDEPENDENT
LAUNDRY: UNABLE TO COMPLETE
ORAL_HYGIENE: INDEPENDENT
LAUNDRY: UNABLE TO COMPLETE
DRESS: SCRUBS (BEHAVIORAL HEALTH)
HYGIENE/GROOMING: INDEPENDENT
ORAL_HYGIENE: INDEPENDENT
DRESS: SCRUBS (BEHAVIORAL HEALTH)

## 2019-12-11 NOTE — PLAN OF CARE
Patient remains isolated to his room, withdrawn. Patient appears with blunted affect, calm mood, appears untidy, poor hygiene and grooming. Patient requires prompts to get out of bed and come out of room for medications and to get snack and meals.  Patient mumbles when speaking, at times can be difficult to understand. Patient is pleasant and cooperative, and compliant with medications. Patient is frequently seen snacking throughout shift. Orthostatic B/P WNL, see progress note. Denies SI/SIB/HI. Will continue to monitor for safety.

## 2019-12-11 NOTE — PROGRESS NOTES
This writer had leisure/process group.  Ger was the only person in attendance so we elected to play card game [employer].  He was pleasant, polite and engaged.

## 2019-12-11 NOTE — PROGRESS NOTES
"Patient continues to present with paranoid delusions regarding persecution at the hands of the YAHIR. This writer asked patient during vital sign assessment if he had any pain. Patient responded: \"the YAHIR causes my pain.\" Patient then spoke of a female who was \"behind the whole thing.\" In spite of this patient is fully oriented and denies AH/SI/HI . He has had no negative interactions nor has he had any serious behavioral issues to report.      12/11/19 1347   Sleep/Rest/Relaxation   Day/Evening Time Hours napping;resting in bed   Number of hours napping 2 hours   Behavioral Health   Hallucinations denies / not responding to hallucinations   Thinking delusional;paranoid;other (see comment)  (\"The YAHIR causes me pain.\")   Orientation person: oriented;place: oriented;date: oriented   Memory baseline memory   Insight poor   Judgement impaired   Eye Contact at examiner   Affect blunted, flat   Mood mood is calm   Physical Appearance/Attire untidy;disheveled   Hygiene other (see comment)  (adequate)   Suicidality other (see comments)  (Denies)   1. Wish to be Dead (Recent) No   2. Non-Specific Active Suicidal Thoughts (Recent) No   Self Injury other (see comment)  (No SIB observed)   Elopement   (No behaviors)   Activity isolative;withdrawn;restless   Speech other (see comments)  (clearer, less rambling)   Medication Sensitivity no stated side effects;no observed side effects   Psychomotor / Gait balanced;steady   Substance Withdrawal Interventions   Social and Therapeutic Interventions (Substance Withdrawal) encourage effective boundaries with peers;encourage socialization with peers;encourage participation in therapeutic groups and milieu activities   Overt Aggression Scale   Verbal Aggression 0   Aggression against Property 0   Auto-Aggression 0   Physical Aggression 0   Overt Aggression Total Score 0   Psycho Education   Type of Intervention 1:1 intervention   Response participates with cues/redirection   Daily Care "   Activity up ad shaila   Activities of Daily Living   Hygiene/Grooming independent   Oral Hygiene independent   Dress scrubs (behavioral health)   Laundry unable to complete   Room Organization prompts   Activity   Activity Assistance Provided independent

## 2019-12-11 NOTE — PROGRESS NOTES
Writer left a VM for [facility] Ruddy, asking if he had received the last VM they left and giving them an update on discharge to an IRTS once a bed becomes available.

## 2019-12-12 PROCEDURE — G0177 OPPS/PHP; TRAIN & EDUC SERV: HCPCS

## 2019-12-12 PROCEDURE — H2032 ACTIVITY THERAPY, PER 15 MIN: HCPCS

## 2019-12-12 PROCEDURE — 99232 SBSQ HOSP IP/OBS MODERATE 35: CPT | Performed by: PSYCHIATRY & NEUROLOGY

## 2019-12-12 PROCEDURE — 25000132 ZZH RX MED GY IP 250 OP 250 PS 637: Performed by: PSYCHIATRY & NEUROLOGY

## 2019-12-12 PROCEDURE — 12400001 ZZH R&B MH UMMC

## 2019-12-12 RX ADMIN — HALOPERIDOL 5 MG: 5 TABLET ORAL at 21:01

## 2019-12-12 RX ADMIN — ASPIRIN 81 MG CHEWABLE TABLET 81 MG: 81 TABLET CHEWABLE at 08:17

## 2019-12-12 RX ADMIN — DIVALPROEX SODIUM 1000 MG: 500 TABLET, EXTENDED RELEASE ORAL at 21:01

## 2019-12-12 RX ADMIN — TRAZODONE HYDROCHLORIDE 100 MG: 100 TABLET ORAL at 21:01

## 2019-12-12 RX ADMIN — LEVOTHYROXINE SODIUM 50 MCG: 25 TABLET ORAL at 08:17

## 2019-12-12 RX ADMIN — BENZTROPINE MESYLATE 1 MG: 0.5 TABLET ORAL at 21:01

## 2019-12-12 RX ADMIN — BENZTROPINE MESYLATE 1 MG: 0.5 TABLET ORAL at 08:17

## 2019-12-12 RX ADMIN — MULTIPLE VITAMINS W/ MINERALS TAB 1 TABLET: TAB at 08:17

## 2019-12-12 RX ADMIN — NICOTINE 1 PATCH: 14 PATCH, EXTENDED RELEASE TRANSDERMAL at 08:17

## 2019-12-12 ASSESSMENT — ACTIVITIES OF DAILY LIVING (ADL)
ORAL_HYGIENE: INDEPENDENT
LAUNDRY: UNABLE TO COMPLETE
DRESS: SCRUBS (BEHAVIORAL HEALTH)
HYGIENE/GROOMING: INDEPENDENT
DRESS: SCRUBS (BEHAVIORAL HEALTH)
HYGIENE/GROOMING: INDEPENDENT
ORAL_HYGIENE: INDEPENDENT
LAUNDRY: UNABLE TO COMPLETE

## 2019-12-12 NOTE — PROGRESS NOTES
12/11/19 2100   Art Therapy   Type of Intervention structured groups   Response participates with encouragement   Hours 0.5   Treatment Detail   (Art Therapy- holiday decor)   Goal- cope, express, regulate and reflect through Art Therapy directives    Outcome- pt worked on cutting out a [place] for an ornament and listening to music. His motor skills were slow. Delayed with the cutting and tracing although he was able to do it given time. He left early for a visitor.

## 2019-12-12 NOTE — PROGRESS NOTES
"[facility], [place]   Psychiatric Progress Note  Hospital Day: 20        Interim History:   The patient's care was discussed with the treatment team during the daily team meeting and/or staff's chart notes were reviewed.  Staff report patient was transferred from station 10 to station 12 on 11/23 following aggressive behavior necessitating seclusion. Patient remains disorganized with ongoing evidence of delusional thought content, though he has been more cooperative with noted resolution of agitation/irritability. Also reduced frequency of overt delusional statements. He has been pleasant and cooperative in all interactions.     Upon interview, Ger said that he is feeling better. When asked if he felt like his normal self, he said he only would when he has access to a pen again, voicing frustration about unit policies. He said that he does feel ready for an IRTS. He is hoping to [facility] to [facility]. He said that his tremor is \"much better.\" He denies other side effects. He said that he would like the ACT team to pick him up and bring him to buy cigarettes before he goes to an IRTS, and he wants me to call the ACT team to tell them that I am advocating for that. Denied acute medical concerns or side effects. Reports good sleep and appetite. Denies SI/HI. Patient had no further requests or concerns. No overtly delusional statements made today.         Medications:       aspirin  81 mg Oral Daily     benztropine  1 mg Oral BID     divalproex sodium extended-release  1,000 mg Oral At Bedtime     haloperidol lactate  5 mg Intramuscular At Bedtime    Or     haloperidol  5 mg Oral At Bedtime     levothyroxine  50 mcg Oral Daily     multivitamin w/minerals  1 tablet Oral Daily     nicotine  1 patch Transdermal Daily     nicotine   Transdermal Q8H     nicotine   Transdermal Daily     traZODone  100 mg Oral At Bedtime          Allergies:     Allergies   Allergen Reactions     Peas GI Disturbance     " Black eyed peas - vomiting          Labs:   No results found for this or any previous visit (from the past 24 hour(s)).       Psychiatric Examination:     /72 (BP Location: Left arm)   Pulse 72   Temp 97.6  F (36.4  C) (Oral)   Resp 18   Wt 92.1 kg (203 lb)   SpO2 96%   BMI 29.13 kg/m    Weight is 203 lbs 0 oz  Body mass index is 29.13 kg/m .    Weight over time:  Vitals:    11/22/19 2100 11/30/19 0900 12/12/19 0800   Weight: 88.9 kg (196 lb) 89.8 kg (198 lb) 92.1 kg (203 lb)       Orthostatic Vitals       Most Recent      Sitting Orthostatic BP 91/57 11/24 1900    Sitting Orthostatic Pulse (bpm) 47 11/24 1900            Cardiometabolic risk assessment. 11/25/19      Reviewed patient profile for cardiometabolic risk factors    Date taken /Value  REFERENCE RANGE   Abdominal Obesity  (Waist Circumference)   See nursing flowsheet Women ?35 in (88 cm)   Men ?40 in (102 cm)      Triglycerides  Triglycerides   Date Value Ref Range Status   04/19/2019 136 <150 mg/dL Final       ?150 mg/dL (1.7 mmol/L) or current treatment for elevated triglycerides   HDL cholesterol  HDL Cholesterol   Date Value Ref Range Status   04/19/2019 32 (L) >39 mg/dL Final   ]   Women <50 mg/dL (1.3 mmol/L) in women or current treatment for low HDL cholesterol  Men <40 mg/dL (1 mmol/L) in men or current treatment for low HDL cholesterol     Fasting plasma glucose (FPG) Lab Results   Component Value Date     10/14/2019      FPG ?100 mg/dL (5.6 mmol/L) or treatment for elevated blood glucose   Blood pressure  BP Readings from Last 3 Encounters:   12/12/19 100/72   11/22/19 129/86   10/30/19 103/69    Blood pressure ?130/85 mmHg or treatment for elevated blood pressure   Family History  See family history     A 69-year-old  male, appears older than stated age, less disheveled and exhibited improved hygiene, more organized speech, soft volume. No tics or tremors noted on examination today.  Gait and muscle tone within normal  "limits.  Mood appears to be \"better\" and heightened affect.  His thought process is linear at times though also disorganized and somewhat impoverished.  Thought content:  No suicidal or homicidal ideation reported.  Thought content is without SI/HI. Not overtly delusional today.  Sensorium was clear.  and memory:  Intact.  Language, fund of knowledge, orientation and memory:  Intact, oriented x 3.  Concentration and focus, improving.  Insight and judgment fair and adequate for safety at the current level of care.     Clinical Global Impressions  First:  Considering your total clinical experience with this particular patient population, how severe are the patient's symptoms at this time?: 7 (11/25/19 1311)  Compared to the patient's condition at the START of treatment, this patient's condition is:: 4 (11/25/19 1311)  Most recent:  Considering your total clinical experience with this particular patient population, how severe are the patient's symptoms at this time?: 7 (11/25/19 1311)  Compared to the patient's condition at the START of treatment, this patient's condition is:: 4 (11/25/19 1311)           Precautions:     Behavioral Orders   Procedures     Assault precautions     Code 1 - Restrict to Unit     DISCUS     Elopement precautions     Routine Programming     As clinically indicated     Sexual precautions     Single Room     Status 15     Every 15 minutes.          Diagnoses:     1.  Schizoaffective disorder, bipolar type with exacerbation of sherie and psychosis.   2.  Historical diagnosis of cognitive decline         Assessment & Plan:     Assessment and hospital summary:  Ger Bashir is a 69-year-old  male with a history of schizoaffective disorder, bipolar type, history of noncompliance and recurrent hospitalization who was referred to the Emergency Department by his ACT team due to increase in behavioral dysregulation and psychosis in the context of medication noncompliance. Report " indicated that the patient had been throwing garbage at his neighbor, pounding on their doors and had been having difficulty caring for himself.      Target psychiatric symptoms and interventions:  1.  Cogentin continued at 1 mg twice a day.   2.  Haldol was initially restarted at higher than PTA dose (5 mg to 15 mg daily) on admission, though subsequently reduced to 5 mg BID due to evidence of EPSE on examination. However, Dr. Fox from ACT team noted that patient continued to exhibit tremors despite being off of Haldol for extended period of time.  Trazodone continued at 100 mg at bedtime. Due to ongoing tremor, again reduced Haldol to 5 mg at bedtime. Obtained baseline DISCUS, which was scored at 3 on 12/5.  3.  PRN Haldol, Ativan and Benadryl for severe behavioral outbursts.  Zyprexa for psychosis and mild agitation, as well as hydroxyzine for anxiety will be offered.   4.  Psychosocial assessment was obtained by our clinical  who will assist with setting up post-discharge care.  The patient has had recurrent recent hospitalizations.   5. Administered Invega Sustenna 234 mg on 11/29. Again discussed with nursing staff from ACT team who confirms that last Invega Sustenna dose was last administered on 10/18/19. Patient refused dose scheduled for 11/15/19, which resulted in his decompensation.  6. Will discuss possibility of Invega Trinza with ACT team prior to discharge.  7. Initiated Depakote ER 1,000 mg at bedtime on 12/5 to target symptoms of hypomania and due to noted improvement on Depakote in the past. Level was checked on 12/10 and is therapeutic at 67. If ongoing hypomanic sx, may consider further optimization.     Medical Problems and Treatments:  No acute medical concerns    Behavioral/Psychological/Social:  Encourage participating in unit programming    Legal: Committed with Wu    Safety:  - Continue precautions as noted above  - Status 15 minute checks    Disposition: Pending clinical  stabilization. Likely will pursue higher level of care given demonstrated inability to maintain stability in the community despite ACT team supports.    Callie Prather MD  [facility]

## 2019-12-12 NOTE — PLAN OF CARE
"  Problem: OT General Care Plan  Goal: OT Goal 1  Description  Pt will engage in group activities to increase socially appropriate behaviors and improve concentration. Pt will participate in goal directed tasks to enhance planning and problem solving skills.     Pt was in a friendly mood, readily got out of bed when writer asked if he would like to do group - and offered him time to talk, do an art activity, or play a game.  Pt responded, \"let's play CARLOS!\", and proceeded to play 5 hands of carlos with writer.  Excellent focus and concentration, good sense of humor.  Outcome: Improving     "

## 2019-12-12 NOTE — PROGRESS NOTES
Writer spoke with patient's ACT Team nurse Michelle who reported they have been actively pursuing IRTS programs and that both [facility] and [facility] the [facility] have available beds. Michelle is going to check in with them about what paperwork they need and when patient would be able to admit.     Writer spoke with Kira ( , [phone]) and provided them with an update on patient. Kira requested we let her know when patient will discharge to IRTS facility.

## 2019-12-13 PROCEDURE — 99232 SBSQ HOSP IP/OBS MODERATE 35: CPT | Performed by: PSYCHIATRY & NEUROLOGY

## 2019-12-13 PROCEDURE — 12400001 ZZH R&B MH UMMC

## 2019-12-13 PROCEDURE — 25000132 ZZH RX MED GY IP 250 OP 250 PS 637: Performed by: PSYCHIATRY & NEUROLOGY

## 2019-12-13 RX ORDER — LEVOTHYROXINE SODIUM 50 UG/1
50 TABLET ORAL DAILY
Qty: 30 TABLET | Refills: 0 | Status: SHIPPED | OUTPATIENT
Start: 2019-12-13 | End: 2019-12-23

## 2019-12-13 RX ORDER — BENZTROPINE MESYLATE 1 MG/1
1 TABLET ORAL 2 TIMES DAILY
Qty: 60 TABLET | Refills: 0 | Status: SHIPPED | OUTPATIENT
Start: 2019-12-13 | End: 2019-12-23

## 2019-12-13 RX ORDER — DIVALPROEX SODIUM 500 MG/1
1000 TABLET, EXTENDED RELEASE ORAL AT BEDTIME
Qty: 60 TABLET | Refills: 0 | Status: SHIPPED | OUTPATIENT
Start: 2019-12-13 | End: 2019-12-23

## 2019-12-13 RX ORDER — TRAZODONE HYDROCHLORIDE 100 MG/1
100 TABLET ORAL AT BEDTIME
Qty: 30 TABLET | Refills: 0 | Status: SHIPPED | OUTPATIENT
Start: 2019-12-13 | End: 2019-12-23

## 2019-12-13 RX ORDER — CARBOXYMETHYLCELLULOSE SODIUM 5 MG/ML
1 SOLUTION/ DROPS OPHTHALMIC 3 TIMES DAILY PRN
Qty: 1 BOTTLE | Refills: 0 | Status: SHIPPED | OUTPATIENT
Start: 2019-12-13 | End: 2023-06-13

## 2019-12-13 RX ORDER — ASPIRIN 81 MG/1
81 TABLET, CHEWABLE ORAL DAILY
Qty: 30 TABLET | Refills: 0 | Status: SHIPPED | OUTPATIENT
Start: 2019-12-13 | End: 2019-12-23

## 2019-12-13 RX ORDER — MULTIPLE VITAMINS W/ MINERALS TAB 9MG-400MCG
1 TAB ORAL DAILY
Qty: 30 TABLET | Refills: 0 | Status: SHIPPED | OUTPATIENT
Start: 2019-12-13 | End: 2019-12-23

## 2019-12-13 RX ORDER — HALOPERIDOL 5 MG/1
5 TABLET ORAL AT BEDTIME
Qty: 30 TABLET | Refills: 0 | Status: SHIPPED | OUTPATIENT
Start: 2019-12-13 | End: 2019-12-23

## 2019-12-13 RX ORDER — ACETAMINOPHEN 325 MG/1
650 TABLET ORAL EVERY 4 HOURS PRN
Start: 2019-12-13

## 2019-12-13 RX ADMIN — BENZTROPINE MESYLATE 1 MG: 0.5 TABLET ORAL at 08:32

## 2019-12-13 RX ADMIN — HALOPERIDOL 5 MG: 5 TABLET ORAL at 20:03

## 2019-12-13 RX ADMIN — BENZTROPINE MESYLATE 1 MG: 0.5 TABLET ORAL at 20:03

## 2019-12-13 RX ADMIN — TRAZODONE HYDROCHLORIDE 100 MG: 100 TABLET ORAL at 20:03

## 2019-12-13 RX ADMIN — NICOTINE 1 PATCH: 14 PATCH, EXTENDED RELEASE TRANSDERMAL at 08:33

## 2019-12-13 RX ADMIN — MULTIPLE VITAMINS W/ MINERALS TAB 1 TABLET: TAB at 08:33

## 2019-12-13 RX ADMIN — ASPIRIN 81 MG CHEWABLE TABLET 81 MG: 81 TABLET CHEWABLE at 08:33

## 2019-12-13 RX ADMIN — DIVALPROEX SODIUM 1000 MG: 500 TABLET, EXTENDED RELEASE ORAL at 20:03

## 2019-12-13 RX ADMIN — LEVOTHYROXINE SODIUM 50 MCG: 25 TABLET ORAL at 08:33

## 2019-12-13 ASSESSMENT — ACTIVITIES OF DAILY LIVING (ADL)
LAUNDRY: UNABLE TO COMPLETE
ORAL_HYGIENE: PROMPTS
DRESS: SCRUBS (BEHAVIORAL HEALTH);INDEPENDENT
DRESS: SCRUBS (BEHAVIORAL HEALTH)
HYGIENE/GROOMING: INDEPENDENT;PROMPTS
ORAL_HYGIENE: INDEPENDENT;PROMPTS
HYGIENE/GROOMING: PROMPTS

## 2019-12-13 NOTE — PROGRESS NOTES
Pt wanted to listen to music and negotiated well with a peer over music selections.  His movements were subtle but quick- double time to most of the tempos during the session.       12/12/19 8400   Dance Movement Therapy   Type of Intervention structured groups   Response participates with encouragement   Hours 0.5

## 2019-12-13 NOTE — PLAN OF CARE
Problem: Cognitive Impairment (Psychotic Signs/Symptoms)  Goal: Improved Thought Clarity and Organization (Psychotic Signs/Symptoms)  Outcome: Improving     Patient was isolative and withdrawn watching TV in his room.  He was calm and polite when interacting with staff.  He had no concerns for staff.  Denied acute medical concerns and medication side effects.  No overtly paranoid statements this evening.  Contracted for safety.    Patient was compliant with medications as prescribed.

## 2019-12-13 NOTE — PROGRESS NOTES
12/13/19 1435   Behavioral Health   Hallucinations denies / not responding to hallucinations   Thinking poor concentration;distractable   Orientation person: oriented;place: oriented   Memory baseline memory   Insight poor   Judgement impaired   Eye Contact at examiner   Affect full range affect   Mood mood is calm   Physical Appearance/Attire untidy;disheveled   Hygiene neglected grooming - unclean body, hair, teeth   Suicidality other (see comments)  (none noted)   1. Wish to be Dead (Recent)   (none noted)   2. Non-Specific Active Suicidal Thoughts (Recent)   (none noted)   Self Injury other (see comment)  (none noted)   Elopement   (none noted)   Activity withdrawn;isolative   Speech clear;coherent   Medication Sensitivity no stated side effects;no observed side effects   Psychomotor / Gait balanced;steady   Overt Aggression Scale   Verbal Aggression 0   Aggression against Property 0   Auto-Aggression 0   Physical Aggression 0   Overt Aggression Total Score 0   Activities of Daily Living   Hygiene/Grooming prompts   Oral Hygiene prompts   Dress scrubs (behavioral health);independent   Laundry unable to complete   Room Organization prompts     Pt remained isolative, sleeping in his room for most of the day. Pt came out for meals. No behavioral issues.

## 2019-12-13 NOTE — PROGRESS NOTES
"[facility], [place]   Psychiatric Progress Note  Hospital Day: 21        Interim History:   The patient's care was discussed with the treatment team during the daily team meeting and/or staff's chart notes were reviewed.  Staff report patient was transferred from station 10 to station 12 on 11/23 following aggressive behavior necessitating seclusion. Patient has been more cooperative with noted resolution of agitation/irritability. He has been pleasant and cooperative in all interactions. Has not made any overtly paranoid or delusional statements. Denies acute medical concerns and side effects.    Upon interview, Ger said that his mood is \"good.\" Pt was excited when hearing that he had a phone interview arranged at an [facility]. He said that he would be happy to go there as long as he can smoke cigarettes. He understands that he may not consume alcohol and he must take medications consistently. He denies side effects. Made some delusional statements today about having the cure for muscular dystrophy. Remains calm and cooperative. Denied acute medical concerns or side effects. Reports good sleep and appetite. Denies SI/HI. Patient had no further requests or concerns. No overtly delusional statements made today.         Medications:       aspirin  81 mg Oral Daily     benztropine  1 mg Oral BID     divalproex sodium extended-release  1,000 mg Oral At Bedtime     haloperidol lactate  5 mg Intramuscular At Bedtime    Or     haloperidol  5 mg Oral At Bedtime     levothyroxine  50 mcg Oral Daily     multivitamin w/minerals  1 tablet Oral Daily     nicotine  1 patch Transdermal Daily     nicotine   Transdermal Q8H     nicotine   Transdermal Daily     traZODone  100 mg Oral At Bedtime          Allergies:     Allergies   Allergen Reactions     Peas GI Disturbance     Black eyed peas - vomiting          Labs:   No results found for this or any previous visit (from the past 24 hour(s)).       " "Psychiatric Examination:     /65   Pulse 73   Temp 95.8  F (35.4  C) (Tympanic)   Resp 16   Wt 92.1 kg (203 lb)   SpO2 96%   BMI 29.13 kg/m    Weight is 203 lbs 0 oz  Body mass index is 29.13 kg/m .    Weight over time:  Vitals:    11/22/19 2100 11/30/19 0900 12/12/19 0800   Weight: 88.9 kg (196 lb) 89.8 kg (198 lb) 92.1 kg (203 lb)       Orthostatic Vitals       Most Recent      Sitting Orthostatic BP 91/57 11/24 1900    Sitting Orthostatic Pulse (bpm) 47 11/24 1900            Cardiometabolic risk assessment. 11/25/19      Reviewed patient profile for cardiometabolic risk factors    Date taken /Value  REFERENCE RANGE   Abdominal Obesity  (Waist Circumference)   See nursing flowsheet Women ?35 in (88 cm)   Men ?40 in (102 cm)      Triglycerides  Triglycerides   Date Value Ref Range Status   04/19/2019 136 <150 mg/dL Final       ?150 mg/dL (1.7 mmol/L) or current treatment for elevated triglycerides   HDL cholesterol  HDL Cholesterol   Date Value Ref Range Status   04/19/2019 32 (L) >39 mg/dL Final   ]   Women <50 mg/dL (1.3 mmol/L) in women or current treatment for low HDL cholesterol  Men <40 mg/dL (1 mmol/L) in men or current treatment for low HDL cholesterol     Fasting plasma glucose (FPG) Lab Results   Component Value Date     10/14/2019      FPG ?100 mg/dL (5.6 mmol/L) or treatment for elevated blood glucose   Blood pressure  BP Readings from Last 3 Encounters:   12/12/19 100/65   11/22/19 129/86   10/30/19 103/69    Blood pressure ?130/85 mmHg or treatment for elevated blood pressure   Family History  See family history     A 69-year-old  male, appears older than stated age, less disheveled and exhibited improved hygiene, more organized speech, soft volume. No tics or tremors noted on examination today.  Gait and muscle tone within normal limits.  Mood appears to be \"better\" and heightened affect.  His thought process is linear at times though also disorganized and somewhat " impoverished.  Thought content:  No suicidal or homicidal ideation reported.  Thought content is without SI/HI. Not overtly delusional today.  Sensorium was clear.  and memory:  Intact.  Language, fund of knowledge, orientation and memory:  Intact, oriented x 3.  Concentration and focus, improving.  Insight and judgment fair and adequate for safety at the current level of care.     Clinical Global Impressions  First:  Considering your total clinical experience with this particular patient population, how severe are the patient's symptoms at this time?: 7 (11/25/19 1311)  Compared to the patient's condition at the START of treatment, this patient's condition is:: 4 (11/25/19 1311)  Most recent:  Considering your total clinical experience with this particular patient population, how severe are the patient's symptoms at this time?: 7 (11/25/19 1311)  Compared to the patient's condition at the START of treatment, this patient's condition is:: 4 (11/25/19 1311)           Precautions:     Behavioral Orders   Procedures     Assault precautions     Code 1 - Restrict to Unit     DISCUS     Elopement precautions     Routine Programming     As clinically indicated     Sexual precautions     Single Room     Status 15     Every 15 minutes.          Diagnoses:     1.  Schizoaffective disorder, bipolar type with exacerbation of sherie and psychosis.   2.  Historical diagnosis of cognitive decline  3.  History of tremors and EPSE 2/2 Haldol (at doses higher than 5 mg daily)         Assessment & Plan:     Assessment and hospital summary:  Ger Bashir is a 69-year-old  male, currently under MI commitment, with a history of schizoaffective disorder, bipolar type, history of noncompliance with Invega Sustenna and recurrent hospitalization who was referred to the Emergency Department by his ACT team due to increase in behavioral dysregulation and psychosis in the context of medication noncompliance and independent  living. Report indicated that the patient had been throwing garbage at his neighbor, pounding on their doors and had been having difficulty caring for himself.      Target psychiatric symptoms and interventions:  No medication changes will be made today  1.  Cogentin continued at 1 mg twice a day.   2.  Haldol was initially restarted at higher than PTA dose (5 mg to 15 mg daily) on admission, though subsequently reduced to 5 mg BID due to evidence of EPSE on examination. However, Dr. Fox from ACT team noted that patient continued to exhibit tremors despite being off of Haldol for extended period of time.  Trazodone continued at 100 mg at bedtime. Due to ongoing tremor, again reduced Haldol to 5 mg at bedtime. Obtained baseline DISCUS, which was scored at 3 on 12/5. Tremors have significantly improved since Haldol dose reduction.   3.  PRN Haldol, Ativan and Benadryl for severe behavioral outbursts.  Zyprexa for psychosis and mild agitation, as well as hydroxyzine for anxiety will be offered.   4.  Psychosocial assessment was obtained by our clinical  who will assist with setting up post-discharge care.  The patient has had recurrent recent hospitalizations.   5. Administered Invega Sustenna 234 mg on 11/29. Again discussed with nursing staff from ACT team who confirms that last Invega Sustenna dose was last administered on 10/18/19. Patient refused dose scheduled for 11/15/19, which resulted in his decompensation. Next dose due on 12/27.  6. Will discuss possibility of Invega Trinza with ACT team prior to discharge.  7. Initiated Depakote ER 1,000 mg at bedtime on 12/5 to target symptoms of hypomania and due to noted improvement on Depakote in the past. Level was checked on 12/10 and is therapeutic at 67. If re-emergence of hypomanic sx, may consider further optimization.     Medical Problems and Treatments:  No acute medical concerns    Behavioral/Psychological/Social:  Encourage participating in unit  programming    Legal: Committed with [facility]    Safety:  - Continue precautions as noted above  - Status 15 minute checks    Disposition: Pending clinical stabilization. Pursuing higher level of care given repeated inability to maintain stability in the community despite ACT team supports. Referrals have been placed, and [facility] and [facility] the [facility] have available beds.     Callie Prather MD  [facility]

## 2019-12-14 PROCEDURE — 25000132 ZZH RX MED GY IP 250 OP 250 PS 637: Performed by: PSYCHIATRY & NEUROLOGY

## 2019-12-14 PROCEDURE — 12400001 ZZH R&B MH UMMC

## 2019-12-14 RX ADMIN — BENZTROPINE MESYLATE 1 MG: 0.5 TABLET ORAL at 19:47

## 2019-12-14 RX ADMIN — BENZTROPINE MESYLATE 1 MG: 0.5 TABLET ORAL at 08:53

## 2019-12-14 RX ADMIN — LEVOTHYROXINE SODIUM 50 MCG: 25 TABLET ORAL at 08:53

## 2019-12-14 RX ADMIN — ASPIRIN 81 MG CHEWABLE TABLET 81 MG: 81 TABLET CHEWABLE at 08:53

## 2019-12-14 RX ADMIN — TRAZODONE HYDROCHLORIDE 100 MG: 100 TABLET ORAL at 19:47

## 2019-12-14 RX ADMIN — NICOTINE 1 PATCH: 14 PATCH, EXTENDED RELEASE TRANSDERMAL at 08:52

## 2019-12-14 RX ADMIN — MULTIPLE VITAMINS W/ MINERALS TAB 1 TABLET: TAB at 08:53

## 2019-12-14 RX ADMIN — HALOPERIDOL 5 MG: 5 TABLET ORAL at 19:47

## 2019-12-14 RX ADMIN — DIVALPROEX SODIUM 1000 MG: 500 TABLET, EXTENDED RELEASE ORAL at 19:47

## 2019-12-14 ASSESSMENT — ACTIVITIES OF DAILY LIVING (ADL)
HYGIENE/GROOMING: INDEPENDENT
DRESS: SCRUBS (BEHAVIORAL HEALTH)
ORAL_HYGIENE: INDEPENDENT
HYGIENE/GROOMING: INDEPENDENT
ORAL_HYGIENE: INDEPENDENT
LAUNDRY: UNABLE TO COMPLETE
DRESS: SCRUBS (BEHAVIORAL HEALTH)

## 2019-12-14 NOTE — PROGRESS NOTES
Patient isolated to room virtually entire shift, sleeping, watching TV.  He was calm and cooperative with staff.  His room is very messy and disorganized.  He handed me his completed menu to turn in.

## 2019-12-14 NOTE — PLAN OF CARE
Pt remains mostly withdrawn and isolated to his room.  He continues to have poor ADLs and his room is quite disorganized.  He is pleasant and cooperative with staff. He ate most of his meals, and denies any discomfort. He was medication compliant and denies SI/SIB/HI or any psychotic symptoms.

## 2019-12-15 PROCEDURE — 25000132 ZZH RX MED GY IP 250 OP 250 PS 637: Performed by: PSYCHIATRY & NEUROLOGY

## 2019-12-15 PROCEDURE — 12400001 ZZH R&B MH UMMC

## 2019-12-15 RX ADMIN — LEVOTHYROXINE SODIUM 50 MCG: 25 TABLET ORAL at 10:16

## 2019-12-15 RX ADMIN — NICOTINE 1 PATCH: 14 PATCH, EXTENDED RELEASE TRANSDERMAL at 10:16

## 2019-12-15 RX ADMIN — MULTIPLE VITAMINS W/ MINERALS TAB 1 TABLET: TAB at 10:15

## 2019-12-15 RX ADMIN — TRAZODONE HYDROCHLORIDE 100 MG: 100 TABLET ORAL at 20:13

## 2019-12-15 RX ADMIN — BENZTROPINE MESYLATE 1 MG: 0.5 TABLET ORAL at 10:16

## 2019-12-15 RX ADMIN — DIVALPROEX SODIUM 1000 MG: 500 TABLET, EXTENDED RELEASE ORAL at 20:13

## 2019-12-15 RX ADMIN — HALOPERIDOL 5 MG: 5 TABLET ORAL at 20:13

## 2019-12-15 RX ADMIN — ASPIRIN 81 MG CHEWABLE TABLET 81 MG: 81 TABLET CHEWABLE at 10:16

## 2019-12-15 RX ADMIN — BENZTROPINE MESYLATE 1 MG: 0.5 TABLET ORAL at 20:13

## 2019-12-15 ASSESSMENT — ACTIVITIES OF DAILY LIVING (ADL)
DRESS: SCRUBS (BEHAVIORAL HEALTH)
ORAL_HYGIENE: INDEPENDENT
HYGIENE/GROOMING: INDEPENDENT
LAUNDRY: UNABLE TO COMPLETE

## 2019-12-15 NOTE — PROGRESS NOTES
Pt was mostly isolative to his room. Minimal interaction with staff and peers. Mood was calm with full range affect. Fully cooperative and polite. No SI/SIB. No psychotic symptoms. Hygiene is fair. Sleep and appetite are intact. No behavioral issues.

## 2019-12-15 NOTE — PLAN OF CARE
Pt continues to remain mostly isolated to his room.  He is pleasant and cooperative when interacting with staff.  He was particularly polite during interaction with RN writer and allowed a second set of v/s to be complete.  He is excited about potentially discharging this week and reports feeling ready.  He denied SI/SIB/HI or any psychotic symptoms.

## 2019-12-16 PROCEDURE — H2032 ACTIVITY THERAPY, PER 15 MIN: HCPCS

## 2019-12-16 PROCEDURE — 12400001 ZZH R&B MH UMMC

## 2019-12-16 PROCEDURE — 25000132 ZZH RX MED GY IP 250 OP 250 PS 637: Performed by: PSYCHIATRY & NEUROLOGY

## 2019-12-16 PROCEDURE — 99232 SBSQ HOSP IP/OBS MODERATE 35: CPT | Performed by: PSYCHIATRY & NEUROLOGY

## 2019-12-16 RX ADMIN — BENZTROPINE MESYLATE 1 MG: 0.5 TABLET ORAL at 20:15

## 2019-12-16 RX ADMIN — LEVOTHYROXINE SODIUM 50 MCG: 25 TABLET ORAL at 09:26

## 2019-12-16 RX ADMIN — HALOPERIDOL 5 MG: 5 TABLET ORAL at 20:15

## 2019-12-16 RX ADMIN — ASPIRIN 81 MG CHEWABLE TABLET 81 MG: 81 TABLET CHEWABLE at 09:26

## 2019-12-16 RX ADMIN — NICOTINE 1 PATCH: 14 PATCH, EXTENDED RELEASE TRANSDERMAL at 09:26

## 2019-12-16 RX ADMIN — MULTIPLE VITAMINS W/ MINERALS TAB 1 TABLET: TAB at 09:26

## 2019-12-16 RX ADMIN — TRAZODONE HYDROCHLORIDE 100 MG: 100 TABLET ORAL at 20:16

## 2019-12-16 RX ADMIN — BENZTROPINE MESYLATE 1 MG: 0.5 TABLET ORAL at 09:26

## 2019-12-16 RX ADMIN — DIVALPROEX SODIUM 1000 MG: 500 TABLET, EXTENDED RELEASE ORAL at 20:15

## 2019-12-16 ASSESSMENT — ACTIVITIES OF DAILY LIVING (ADL)
HYGIENE/GROOMING: INDEPENDENT
ORAL_HYGIENE: INDEPENDENT
DRESS: SCRUBS (BEHAVIORAL HEALTH)
DRESS: SCRUBS (BEHAVIORAL HEALTH);INDEPENDENT
HYGIENE/GROOMING: INDEPENDENT
ORAL_HYGIENE: INDEPENDENT

## 2019-12-16 NOTE — PROGRESS NOTES
Pt isolative to room entire evening, compliant with vitals and cares. Pt came out of room for meals and bathroom only, calm and polite with staff. No behavioral concerns to be noted this shift.       12/15/19 2209   Behavioral Health   Hallucinations denies / not responding to hallucinations   Thinking intact   Orientation person: oriented;place: oriented;date: oriented;time: oriented   Memory baseline memory   Insight other (see comment)  (ian)   Judgement intact   Eye Contact at examiner   Affect blunted, flat   Mood mood is calm   Physical Appearance/Attire untidy   Hygiene neglected grooming - unclean body, hair, teeth   Suicidality other (see comments)  (none stated)   1. Wish to be Dead (Recent) No   2. Non-Specific Active Suicidal Thoughts (Recent) No   Self Injury other (see comment)  (none)   Elopement   (none)   Activity withdrawn;isolative   Speech clear;coherent   Psychomotor / Gait balanced;steady   Activities of Daily Living   Hygiene/Grooming independent   Oral Hygiene independent   Dress scrubs (behavioral health)   Laundry unable to complete   Room Organization independent

## 2019-12-16 NOTE — PROGRESS NOTES
Writer spoke with Arielle, the admissions counselor for [facility]. She reports that patient could admit to their facility on 12/26, but that she will keep him on the wait list in case something opened up sooner. Arielle stated that she will fax over the paperwork that his provider will need to complete prior to admission, and that the provider will need to fax over medication refills to [facility], fax number [phone]. Arielle indicated that the patient can call her at 1 PM today to do phone interview, but that they are familiar with the patient and that he is welcome back.

## 2019-12-16 NOTE — PLAN OF CARE
Patient presents as calm, pleasant, and cooperative.  He remains guarded when asked about symptoms of his illness.  Currently, he denies that he is experiencing any psychotic symptoms or dangerous ideations.  He continues to appear socially withdrawn with a blunted affect.  He continues to isolate in his room.  He accepted all of his scheduled medications without incident.  He denies any adverse side effects with his current regimen.  He denies any acute physical concerns, but he was noted to be hypotensive this AM.  We have been pushing fluids, and his systolic BP has improved.

## 2019-12-16 NOTE — PROVIDER NOTIFICATION
12/16/19 0815 12/16/19 1252   Vital Signs   Temp 97.6  F (36.4  C)  --    Temp src Oral  --    Resp 16  --    Pulse 69 73   Pulse/Heart Rate Source Monitor Monitor   BP (!) 88/61 94/63   BP Location Left arm Right arm   Sitting Orthostatic BP   Sitting Orthostatic BP 88/61  --    Sitting Orthostatic Pulse 69 bpm  --    Standing Orthostatic BP   Standing Orthostatic BP 82/57  --    Standing Orthostatic Pulse 79 bpm  --    Oxygen Therapy   SpO2 95 %  --    O2 Device None (Room air)  --    Pain/Comfort   Patient Currently in Pain denies  --      RN writer noted that patient was assessed to be hypotensive this morning.  Ger has been receptive to increasing his fluid intake.  He is mostly lying supine in bed, as he prefers that and reports that he is more comfortable in his room.  He is currently asymptomatic and denies feeling dizzy or light-headed.  No observed or reported falls.  Ger agreed to vital signs reassessment later in the shift, and his systolic BP had significantly improved.  RN writer alerted Dr. White via electronic paging system.  Will continue to monitor closely.

## 2019-12-16 NOTE — PLAN OF CARE
BEHAVIORAL TEAM DISCUSSION    Participants: Dominguez White MD, Stefanie Noble LPC, [facility], Tommy John RN.   Progress: Improved, at baseline.   Anticipated length of stay: 1-2 weeks  Continued Stay Criteria/Rationale: Patient appears to have stabilized and is being referred to [facility]. CTC spoke with [facility] staff who indicates that it is likely he will able to admit on 12/26/19, but that it could happen earlier if a bed opens up. Patient will need to stay in the hospital until they can be admitted directly to [facility] after discharge.   Medical/Physical: No acute concerns noted.   Precautions:   Behavioral Orders   Procedures     Assault precautions     Code 1 - Restrict to Unit     DISCUS     Elopement precautions     Routine Programming     As clinically indicated     Sexual precautions     Single Room     Status 15     Every 15 minutes.     Plan: Patient will remain in the hospital until discharge to [facility] can be arranged. This will likely occur on 12/26/19 but it is possible that it might happen sooner if a bed becomes available.  Rationale for change in precautions or plan: Patient has stabilized and team has determined he is appropriate for stepping down to a IRTS level of care.

## 2019-12-17 PROCEDURE — 12400001 ZZH R&B MH UMMC

## 2019-12-17 PROCEDURE — H2032 ACTIVITY THERAPY, PER 15 MIN: HCPCS

## 2019-12-17 PROCEDURE — 25000132 ZZH RX MED GY IP 250 OP 250 PS 637: Performed by: PSYCHIATRY & NEUROLOGY

## 2019-12-17 PROCEDURE — 99232 SBSQ HOSP IP/OBS MODERATE 35: CPT | Performed by: PSYCHIATRY & NEUROLOGY

## 2019-12-17 RX ADMIN — ASPIRIN 81 MG CHEWABLE TABLET 81 MG: 81 TABLET CHEWABLE at 09:06

## 2019-12-17 RX ADMIN — NICOTINE 1 PATCH: 14 PATCH, EXTENDED RELEASE TRANSDERMAL at 09:06

## 2019-12-17 RX ADMIN — LEVOTHYROXINE SODIUM 50 MCG: 25 TABLET ORAL at 09:06

## 2019-12-17 RX ADMIN — BENZTROPINE MESYLATE 1 MG: 0.5 TABLET ORAL at 20:43

## 2019-12-17 RX ADMIN — MULTIPLE VITAMINS W/ MINERALS TAB 1 TABLET: TAB at 09:06

## 2019-12-17 RX ADMIN — BENZTROPINE MESYLATE 1 MG: 0.5 TABLET ORAL at 09:06

## 2019-12-17 RX ADMIN — HALOPERIDOL 5 MG: 5 TABLET ORAL at 20:43

## 2019-12-17 RX ADMIN — TRAZODONE HYDROCHLORIDE 100 MG: 100 TABLET ORAL at 20:43

## 2019-12-17 RX ADMIN — DIVALPROEX SODIUM 1000 MG: 500 TABLET, EXTENDED RELEASE ORAL at 20:43

## 2019-12-17 ASSESSMENT — ACTIVITIES OF DAILY LIVING (ADL)
DRESS: INDEPENDENT
HYGIENE/GROOMING: INDEPENDENT
ORAL_HYGIENE: INDEPENDENT
HYGIENE/GROOMING: INDEPENDENT
DRESS: INDEPENDENT
ORAL_HYGIENE: INDEPENDENT
LAUNDRY: UNABLE TO COMPLETE

## 2019-12-17 NOTE — PROGRESS NOTES
Isolative to room. Out only to get dinner, ate in his room. Blunted affect. Not social.       12/16/19 2000   Behavioral Health   Insight poor   Eye Contact at examiner   Affect blunted, flat   Mood mood is calm   Physical Appearance/Attire untidy   Hygiene neglected grooming - unclean body, hair, teeth   Activities of Daily Living   Hygiene/Grooming independent   Oral Hygiene independent   Dress scrubs (behavioral health)   Room Organization independent

## 2019-12-17 NOTE — PROGRESS NOTES
12/17/19 1500   Behavioral Health   Hallucinations denies / not responding to hallucinations   Thinking poor concentration   Orientation person: oriented;place: oriented   Memory baseline memory   Insight poor   Judgement impaired   Eye Contact at examiner   Affect blunted, flat   Mood mood is calm   Physical Appearance/Attire disheveled   Hygiene neglected grooming - unclean body, hair, teeth   Suicidality other (see comments)  (denies)   1. Wish to be Dead (Recent) No   2. Non-Specific Active Suicidal Thoughts (Recent) No   Self Injury other (see comment)  (denies)   Elopement   (no value)   Activity isolative   Speech coherent;clear   Medication Sensitivity no observed side effects;no stated side effects   Psychomotor / Gait balanced;steady   Activities of Daily Living   Hygiene/Grooming independent   Oral Hygiene independent   Dress independent   Room Organization independent       Patient is calm and cooperative and was isolative in room for most of the shift. Pt stated listening to music calms him. Pt ate breakfast and lunch and was given headphones by writer. No further concerns this shift. No SI or SIB.

## 2019-12-17 NOTE — PROGRESS NOTES
"[facility], [place]   Psychiatric Progress Note  Hospital Day: 25        Interim History:   The patient's care was discussed with the treatment team during the daily team meeting and/or staff's chart notes were reviewed.  Staff report patient was transferred from station 10 to station 12 on 11/23 following aggressive behavior necessitating seclusion. Patient has been more cooperative with noted resolution of agitation/irritability. He has been pleasant and cooperative in all interactions. Has not made any overtly paranoid or delusional statements. Denies acute medical concerns and side effects.    Upon interview, Ger said that his mood is \"good.\" Pt said that he is hoping to go to [facility], however, was disappointed to hear that he may need to be in the hospital on Christmas. He denies side effects. No overt signs of delusional or paranoid thought content. Remains calm and cooperative. Denied acute medical concerns or side effects. Reports good sleep and appetite. Denies SI/HI. Patient had no further requests or concerns.          Medications:       aspirin  81 mg Oral Daily     benztropine  1 mg Oral BID     divalproex sodium extended-release  1,000 mg Oral At Bedtime     haloperidol lactate  5 mg Intramuscular At Bedtime    Or     haloperidol  5 mg Oral At Bedtime     levothyroxine  50 mcg Oral Daily     multivitamin w/minerals  1 tablet Oral Daily     nicotine  1 patch Transdermal Daily     nicotine   Transdermal Q8H     nicotine   Transdermal Daily     [START ON 12/27/2019] paliperidone  234 mg Intramuscular Q28 Days     traZODone  100 mg Oral At Bedtime          Allergies:     Allergies   Allergen Reactions     Peas GI Disturbance     Black eyed peas - vomiting          Labs:   No results found for this or any previous visit (from the past 24 hour(s)).       Psychiatric Examination:     BP 92/63 (BP Location: Left arm)   Pulse 81   Temp 97.2  F (36.2  C) (Tympanic)   Resp " "16   Wt 92.1 kg (203 lb)   SpO2 96%   BMI 29.13 kg/m    Weight is 203 lbs 0 oz  Body mass index is 29.13 kg/m .    Weight over time:  Vitals:    11/22/19 2100 11/30/19 0900 12/12/19 0800   Weight: 88.9 kg (196 lb) 89.8 kg (198 lb) 92.1 kg (203 lb)       Orthostatic Vitals       Most Recent      Sitting Orthostatic BP 91/57 11/24 1900    Sitting Orthostatic Pulse (bpm) 47 11/24 1900            Cardiometabolic risk assessment. 11/25/19      Reviewed patient profile for cardiometabolic risk factors    Date taken /Value  REFERENCE RANGE   Abdominal Obesity  (Waist Circumference)   See nursing flowsheet Women ?35 in (88 cm)   Men ?40 in (102 cm)      Triglycerides  Triglycerides   Date Value Ref Range Status   04/19/2019 136 <150 mg/dL Final       ?150 mg/dL (1.7 mmol/L) or current treatment for elevated triglycerides   HDL cholesterol  HDL Cholesterol   Date Value Ref Range Status   04/19/2019 32 (L) >39 mg/dL Final   ]   Women <50 mg/dL (1.3 mmol/L) in women or current treatment for low HDL cholesterol  Men <40 mg/dL (1 mmol/L) in men or current treatment for low HDL cholesterol     Fasting plasma glucose (FPG) Lab Results   Component Value Date     10/14/2019      FPG ?100 mg/dL (5.6 mmol/L) or treatment for elevated blood glucose   Blood pressure  BP Readings from Last 3 Encounters:   12/16/19 92/63   11/22/19 129/86   10/30/19 103/69    Blood pressure ?130/85 mmHg or treatment for elevated blood pressure   Family History  See family history     A 69-year-old  male, appears older than stated age, less disheveled and exhibited improved hygiene, more organized speech, soft volume. No tics or tremors noted on examination today.  Gait and muscle tone within normal limits.  Mood appears to be \"good\" and heightened affect.  His thought process is linear at times though also disorganized and somewhat impoverished.  Thought content:  No suicidal or homicidal ideation reported.  Thought content is without " SI/HI. Not overtly delusional today.  Sensorium was clear.  and memory:  Intact.  Language, fund of knowledge, orientation and memory:  Intact, oriented x 3.  Concentration and focus, improving.  Insight and judgment fair and adequate for safety at the current level of care.     Clinical Global Impressions  First:  Considering your total clinical experience with this particular patient population, how severe are the patient's symptoms at this time?: 7 (11/25/19 1311)  Compared to the patient's condition at the START of treatment, this patient's condition is:: 4 (11/25/19 1311)  Most recent:  Considering your total clinical experience with this particular patient population, how severe are the patient's symptoms at this time?: 7 (11/25/19 1311)  Compared to the patient's condition at the START of treatment, this patient's condition is:: 4 (11/25/19 1311)           Precautions:     Behavioral Orders   Procedures     Assault precautions     Code 1 - Restrict to Unit     DISCUS     Elopement precautions     Routine Programming     As clinically indicated     Sexual precautions     Single Room     Status 15     Every 15 minutes.          Diagnoses:     1.  Schizoaffective disorder, bipolar type with exacerbation of sherie and psychosis.   2.  Historical diagnosis of cognitive decline  3.  History of tremors and EPSE 2/2 Haldol (at doses higher than 5 mg daily)         Assessment & Plan:     Assessment and hospital summary:  Ger Bashir is a 69-year-old  male, currently under MI commitment, with a history of schizoaffective disorder, bipolar type, history of noncompliance with Invega Sustenna and recurrent hospitalization who was referred to the Emergency Department by his ACT team due to increase in behavioral dysregulation and psychosis in the context of medication noncompliance and independent living. Report indicated that the patient had been throwing garbage at his neighbor, pounding on their doors  and had been having difficulty caring for himself.      Target psychiatric symptoms and interventions:  No medication changes will be made today  1.  Cogentin continued at 1 mg twice a day.   2.  Haldol was initially restarted at higher than PTA dose (5 mg to 15 mg daily) on admission, though subsequently reduced to 5 mg BID due to evidence of EPSE on examination. However, Dr. Fox from ACT team noted that patient continued to exhibit tremors despite being off of Haldol for extended period of time.  Trazodone continued at 100 mg at bedtime. Due to ongoing tremor, again reduced Haldol to 5 mg at bedtime. Obtained baseline DISCUS, which was scored at 3 on 12/5. Tremors have significantly improved since Haldol dose reduction.   3.  PRN Haldol, Ativan and Benadryl for severe behavioral outbursts.  Zyprexa for psychosis and mild agitation, as well as hydroxyzine for anxiety will be offered.   4.  Psychosocial assessment was obtained by our clinical  who will assist with setting up post-discharge care.  The patient has had recurrent recent hospitalizations.   5. Administered Invega Sustenna 234 mg on 11/29. Again discussed with nursing staff from ACT team who confirms that last Invega Sustenna dose was last administered on 10/18/19. Patient refused dose scheduled for 11/15/19, which resulted in his decompensation. Next dose due on 12/27. May want to consider early administration if possible in the event that he is discharged to [facility] on 12/26.  6. Would discuss possibility of Invega Trinza with ACT team prior to discharge.  7. Initiated Depakote ER 1,000 mg at bedtime on 12/5 to target symptoms of hypomania and due to noted improvement on Depakote in the past. Level was checked on 12/10 and is therapeutic at 67. If re-emergence of hypomanic sx, may consider further optimization.     Medical Problems and Treatments:  No acute medical concerns    Behavioral/Psychological/Social:  Encourage participating in unit  programming    Legal: Committed with [facility]    Safety:  - Continue precautions as noted above  - Status 15 minute checks    Disposition: Pending clinical stabilization. Pursuing higher level of care given repeated inability to maintain stability in the community despite ACT team supports. Recommending IRTS placement. [facility] has accepted patient with tentative plan to discharge patient on 12/26.    Callie Prather MD  [facility]

## 2019-12-18 PROCEDURE — 25000132 ZZH RX MED GY IP 250 OP 250 PS 637: Performed by: PSYCHIATRY & NEUROLOGY

## 2019-12-18 PROCEDURE — 99233 SBSQ HOSP IP/OBS HIGH 50: CPT | Performed by: PSYCHIATRY & NEUROLOGY

## 2019-12-18 PROCEDURE — 12400001 ZZH R&B MH UMMC

## 2019-12-18 PROCEDURE — G0177 OPPS/PHP; TRAIN & EDUC SERV: HCPCS

## 2019-12-18 RX ORDER — NICOTINE 21 MG/24HR
1 PATCH, TRANSDERMAL 24 HOURS TRANSDERMAL EVERY 24 HOURS
Status: DISCONTINUED | OUTPATIENT
Start: 2019-12-19 | End: 2019-12-26 | Stop reason: HOSPADM

## 2019-12-18 RX ORDER — NICOTINE 21 MG/24HR
1 PATCH, TRANSDERMAL 24 HOURS TRANSDERMAL DAILY
Status: DISCONTINUED | OUTPATIENT
Start: 2019-12-19 | End: 2019-12-18

## 2019-12-18 RX ADMIN — TRAZODONE HYDROCHLORIDE 100 MG: 100 TABLET ORAL at 20:09

## 2019-12-18 RX ADMIN — BENZTROPINE MESYLATE 1 MG: 0.5 TABLET ORAL at 09:02

## 2019-12-18 RX ADMIN — HALOPERIDOL 5 MG: 5 TABLET ORAL at 20:09

## 2019-12-18 RX ADMIN — DIVALPROEX SODIUM 1000 MG: 500 TABLET, EXTENDED RELEASE ORAL at 20:09

## 2019-12-18 RX ADMIN — ASPIRIN 81 MG CHEWABLE TABLET 81 MG: 81 TABLET CHEWABLE at 09:02

## 2019-12-18 RX ADMIN — NICOTINE 1 PATCH: 14 PATCH, EXTENDED RELEASE TRANSDERMAL at 09:02

## 2019-12-18 RX ADMIN — MULTIPLE VITAMINS W/ MINERALS TAB 1 TABLET: TAB at 09:02

## 2019-12-18 RX ADMIN — BENZTROPINE MESYLATE 1 MG: 0.5 TABLET ORAL at 20:09

## 2019-12-18 RX ADMIN — LEVOTHYROXINE SODIUM 50 MCG: 25 TABLET ORAL at 09:02

## 2019-12-18 ASSESSMENT — ACTIVITIES OF DAILY LIVING (ADL)
HYGIENE/GROOMING: INDEPENDENT
DRESS: INDEPENDENT
HYGIENE/GROOMING: INDEPENDENT
LAUNDRY: WITH SUPERVISION
ORAL_HYGIENE: INDEPENDENT
LAUNDRY: UNABLE TO COMPLETE
DRESS: SCRUBS (BEHAVIORAL HEALTH)
ORAL_HYGIENE: INDEPENDENT

## 2019-12-18 NOTE — PROGRESS NOTES
Writer spoke with Kira, patients  through his insurance. Writer updated Kira on the plan for the patient to discharge to [facility] on 12/26.

## 2019-12-18 NOTE — PROGRESS NOTES
Patient attended one O.T. group this morning. His speech has been clear and coherent with no mention of the C.I[place]A. He has been pleasant and cooperative with no negative interactions noted. Patient is oriented in all spheres.     12/18/19 1428   Sleep/Rest/Relaxation   Day/Evening Time Hours resting in bed;napping   Number of hours napping 2 hours   Number of hours resting in bed 4 hours   Behavioral Health   Hallucinations denies / not responding to hallucinations   Thinking poor concentration   Orientation person: oriented;place: oriented;date: oriented   Memory baseline memory   Insight poor   Judgement impaired   Eye Contact at examiner   Affect blunted, flat   Mood mood is calm   Physical Appearance/Attire disheveled   Hygiene other (see comment)  (adequate)   Suicidality other (see comments)  (denies)   1. Wish to be Dead (Recent) No   2. Non-Specific Active Suicidal Thoughts (Recent) No   Self Injury other (see comment)  (Nothing observed)   Elopement   (No behavior)   Activity isolative;withdrawn;other (see comment)  (Did attend one group)   Speech clear;coherent   Medication Sensitivity no stated side effects;no observed side effects   Psychomotor / Gait balanced;steady   Substance Withdrawal Interventions   Social and Therapeutic Interventions (Substance Withdrawal) encourage effective boundaries with peers;encourage socialization with peers;encourage participation in therapeutic groups and milieu activities   Overt Aggression Scale   Verbal Aggression 0   Aggression against Property 0   Auto-Aggression 0   Physical Aggression 0   Overt Aggression Total Score 0   Coping/Psychosocial Interventions   Environmental Support distractions minimized;calm environment promoted   Psycho Education   Type of Intervention 1:1 intervention   Response participates, initiates socially appropriate   Hours 0.5   Treatment Detail Warning Signs   Daily Care   Activity up ad shaila   Activities of Daily Living   Hygiene/Grooming  independent   Oral Hygiene independent   Dress scrubs (behavioral health)   Laundry unable to complete   Room Organization prompts   Activity   Activity Assistance Provided independent

## 2019-12-18 NOTE — PROGRESS NOTES
[facility], [place]   Psychiatric Progress Note  Hospital Day: 25        Interim History:   The patient's care was discussed with the treatment team during the daily team meeting and/or staff's chart notes were reviewed.  Staff report patient has been calm and cooperative. adherent to medications.    Upon interview, the patient states that he doesn't like his medications and that he stops them when he leaves because he doesn't need them. He wants to go to an IRTS from the hospital.    Psychiatric Symptoms: delusional thoughts, paranoia    Medication side effects reported: none    Other issues reported by patient: none         Medications:       aspirin  81 mg Oral Daily     benztropine  1 mg Oral BID     divalproex sodium extended-release  1,000 mg Oral At Bedtime     haloperidol lactate  5 mg Intramuscular At Bedtime    Or     haloperidol  5 mg Oral At Bedtime     levothyroxine  50 mcg Oral Daily     multivitamin w/minerals  1 tablet Oral Daily     nicotine  1 patch Transdermal Daily     nicotine   Transdermal Q8H     nicotine   Transdermal Daily     [START ON 12/27/2019] paliperidone  234 mg Intramuscular Q28 Days     traZODone  100 mg Oral At Bedtime          Allergies:     Allergies   Allergen Reactions     Peas GI Disturbance     Black eyed peas - vomiting          Labs:   No results found for this or any previous visit (from the past 48 hour(s)).       Psychiatric Examination:     BP 92/63 (BP Location: Left arm)   Pulse 81   Temp 97.2  F (36.2  C) (Tympanic)   Resp 16   Wt 92.1 kg (203 lb)   SpO2 96%   BMI 29.13 kg/m    Weight is 203 lbs 0 oz  Body mass index is 29.13 kg/m .    Orthostatic Vitals       Most Recent      Sitting Orthostatic BP 88/61 12/16 0815    Sitting Orthostatic Pulse (bpm) 69 12/16 0815    Standing Orthostatic BP 82/57 12/16 0815    Standing Orthostatic Pulse (bpm) 79 12/16 0815            Appearance: awake, alert, dressed in hospital scrubs and  unkempt  Attitude:  somewhat cooperative  Eye Contact:  good  Mood:  mildly irritable  Affect:  intensity is normal  Speech:  clear, coherent and normal prosody  Language: fluent and intact in English  Psychomotor, Gait, Musculoskeletal:  no evidence of tardive dyskinesia, dystonia, or tics and intact station, gait and muscle tone  Throught Process:  goal oriented  Associations:  no loose associations  Thought Content:  paranoid  Insight:  limited  Judgement:  limited  Oriented to:  time, person, and place  Attention Span and Concentration:  intact  Recent and Remote Memory:  fair  Fund of Knowledge:  adequate    Clinical Global Impressions  First:  Considering your total clinical experience with this particular patient population, how severe are the patient's symptoms at this time?: 7 (11/25/19 1311)  Compared to the patient's condition at the START of treatment, this patient's condition is:: 4 (11/25/19 1311)  Most recent:  Considering your total clinical experience with this particular patient population, how severe are the patient's symptoms at this time?: 5 (12/09/19 1235)  Compared to the patient's condition at the START of treatment, this patient's condition is:: 3 (12/09/19 1235)           Precautions:     Behavioral Orders   Procedures     Assault precautions     Code 1 - Restrict to Unit     DISCUS     Elopement precautions     Routine Programming     As clinically indicated     Sexual precautions     Single Room     Status 15     Every 15 minutes.          Diagnoses:      1.  Schizoaffective disorder, bipolar type with exacerbation of sherie and psychosis.   2.  Historical diagnosis of cognitive decline  3.  History of tremors and EPSE 2/2 Haldol (at doses higher than 5 mg daily)           Assessment & Plan:   Assessment and hospital summary:  Ger Bashir is a 69-year-old  male, currently under MI commitment, with a history of schizoaffective disorder, bipolar type, history of  noncompliance with Invega Sustenna and recurrent hospitalization who was referred to the Emergency Department by his ACT team due to increase in behavioral dysregulation and psychosis in the context of medication noncompliance and independent living. Report indicated that the patient had been throwing garbage at his neighbor, pounding on their doors and had been having difficulty caring for himself.      Target psychiatric symptoms and interventions:  No medication changes will be made today  1.  Cogentin continued at 1 mg twice a day.   2.  Haldol was initially restarted at higher than PTA dose (5 mg to 15 mg daily) on admission, though subsequently reduced to 5 mg BID due to evidence of EPSE on examination. However, Dr. Fox from ACT team noted that patient continued to exhibit tremors despite being off of Haldol for extended period of time.  Trazodone continued at 100 mg at bedtime. Due to ongoing tremor, again reduced Haldol to 5 mg at bedtime. Obtained baseline DISCUS, which was scored at 3 on 12/5. Tremors have significantly improved since Haldol dose reduction.   3.  PRN Haldol, Ativan and Benadryl for severe behavioral outbursts.  Zyprexa for psychosis and mild agitation, as well as hydroxyzine for anxiety will be offered.   4.  Psychosocial assessment was obtained by our clinical  who will assist with setting up post-discharge care.  The patient has had recurrent recent hospitalizations.   5. Administered Invega Sustenna 234 mg on 11/29. Again discussed with nursing staff from ACT team who confirms that last Invega Sustenna dose was last administered on 10/18/19. Patient refused dose scheduled for 11/15/19, which resulted in his decompensation. Next dose due on 12/27. May want to consider early administration if possible in the event that he is discharged to [facility] on 12/26.  6. Would discuss possibility of Invega Trinza with ACT team prior to discharge.  7. Initiated Depakote ER 1,000 mg at  bedtime on 12/5 to target symptoms of hypomania and due to noted improvement on Depakote in the past. Level was checked on 12/10 and is therapeutic at 67. If re-emergence of hypomanic sx, may consider further optimization.     Medical Problems and Treatments:  None acute    Behavioral/Psychological/Social:  Encourage unit programming        Disposition Plan   Reason for ongoing admission: is unable to care for self due to severe psychosis or sherie  Discharge location: IRTS facility  Discharge Medications: not ordered  Follow-up Appointments: not scheduled  Legal Status: full commitment with [facility] for haloperidol, olanzapine, paliperidone, clozapine  Entered by: Dominguez White on 12/16/2019 at 7:10 PM

## 2019-12-18 NOTE — PLAN OF CARE
Problem: OT General Care Plan  Goal: OT Goal 1  Description  Pt will engage in group activities to increase socially appropriate behaviors and improve concentration. Pt will participate in goal directed tasks to enhance planning and problem solving skills.     Pt participated in the OT leisure group.  Able to learn new game, demonstrated understanding and was able to use strategy and problem solving skills.  Appropriate social interactions with others.    Outcome: Improving

## 2019-12-18 NOTE — PROGRESS NOTES
Pt denied thoughts of SI/SIB. Pt attended and participated in music group, but stayed in room afterwards for the remainder of the evening. Pt refused to take a shower.     12/17/19 3092   Behavioral Health   Hallucinations denies / not responding to hallucinations   Thinking poor concentration   Orientation person: oriented;place: oriented   Memory baseline memory   Insight poor   Judgement impaired   Eye Contact at examiner   Affect blunted, flat   Mood mood is calm   Physical Appearance/Attire disheveled   Hygiene neglected grooming - unclean body, hair, teeth   Suicidality other (see comments)  (denied)   1. Wish to be Dead (Recent) No   2. Non-Specific Active Suicidal Thoughts (Recent) No   Self Injury other (see comment)  (denied)   Elopement   (no concerns)   Activity withdrawn;other (see comment)  (attended music group but stayed in room for rest of shift)   Speech coherent   Psychomotor / Gait balanced;steady   Activities of Daily Living   Hygiene/Grooming independent   Oral Hygiene independent   Dress independent   Laundry unable to complete   Room Organization independent

## 2019-12-19 PROCEDURE — 99232 SBSQ HOSP IP/OBS MODERATE 35: CPT | Performed by: PSYCHIATRY & NEUROLOGY

## 2019-12-19 PROCEDURE — 12400001 ZZH R&B MH UMMC

## 2019-12-19 PROCEDURE — 25000132 ZZH RX MED GY IP 250 OP 250 PS 637: Performed by: PSYCHIATRY & NEUROLOGY

## 2019-12-19 RX ADMIN — HALOPERIDOL 5 MG: 5 TABLET ORAL at 20:34

## 2019-12-19 RX ADMIN — TRAZODONE HYDROCHLORIDE 100 MG: 100 TABLET ORAL at 20:34

## 2019-12-19 RX ADMIN — LEVOTHYROXINE SODIUM 50 MCG: 25 TABLET ORAL at 08:45

## 2019-12-19 RX ADMIN — BENZTROPINE MESYLATE 1 MG: 0.5 TABLET ORAL at 20:33

## 2019-12-19 RX ADMIN — BENZTROPINE MESYLATE 1 MG: 0.5 TABLET ORAL at 08:45

## 2019-12-19 RX ADMIN — DIVALPROEX SODIUM 1000 MG: 500 TABLET, EXTENDED RELEASE ORAL at 20:33

## 2019-12-19 RX ADMIN — ASPIRIN 81 MG CHEWABLE TABLET 81 MG: 81 TABLET CHEWABLE at 08:45

## 2019-12-19 RX ADMIN — MULTIPLE VITAMINS W/ MINERALS TAB 1 TABLET: TAB at 08:45

## 2019-12-19 RX ADMIN — NICOTINE 1 PATCH: 14 PATCH, EXTENDED RELEASE TRANSDERMAL at 08:45

## 2019-12-19 ASSESSMENT — ACTIVITIES OF DAILY LIVING (ADL)
HYGIENE/GROOMING: INDEPENDENT
DRESS: SCRUBS (BEHAVIORAL HEALTH)
HYGIENE/GROOMING: INDEPENDENT
ORAL_HYGIENE: INDEPENDENT
LAUNDRY: UNABLE TO COMPLETE
DRESS: SCRUBS (BEHAVIORAL HEALTH)
ORAL_HYGIENE: INDEPENDENT

## 2019-12-19 NOTE — PLAN OF CARE
Pt isolative to his room throughout the shift watching TV. Pt calm, cooperative, polite. Pt denies SI/SIB/HI. Pt denies A/V hallucinations. Pt oriented x4. Pt denies pain/physical complaints. Pt states he is agreeable to placement plans. No aggression or sexual inappropriateness. Will continue to monitor.

## 2019-12-19 NOTE — PROGRESS NOTES
[facility], [place]   Psychiatric Progress Note  Hospital Day: 26        Interim History:   The patient's care was discussed with the treatment team during the daily team meeting and/or staff's chart notes were reviewed.  Staff report patient remains calm and cooperative.    Upon interview, the patient reports that he is feeling good. He denies hallucinations. He wants to go to IRTS as he doesn't feel comfortable going home yet.    Psychiatric Symptoms: delusional thoughts, paranoia    Medication side effects reported: none    Other issues reported by patient: none         Medications:       aspirin  81 mg Oral Daily     benztropine  1 mg Oral BID     divalproex sodium extended-release  1,000 mg Oral At Bedtime     haloperidol lactate  5 mg Intramuscular At Bedtime    Or     haloperidol  5 mg Oral At Bedtime     levothyroxine  50 mcg Oral Daily     multivitamin w/minerals  1 tablet Oral Daily     [START ON 12/19/2019] nicotine  1 patch Transdermal Q24H     nicotine   Transdermal Q8H     [START ON 12/27/2019] paliperidone  234 mg Intramuscular Q28 Days     traZODone  100 mg Oral At Bedtime          Allergies:     Allergies   Allergen Reactions     Peas GI Disturbance     Black eyed peas - vomiting          Labs:   No results found for this or any previous visit (from the past 48 hour(s)).       Psychiatric Examination:     /75   Pulse 71   Temp 98  F (36.7  C) (Oral)   Resp 16   Wt 92.1 kg (203 lb)   SpO2 98%   BMI 29.13 kg/m    Weight is 203 lbs 0 oz  Body mass index is 29.13 kg/m .    Orthostatic Vitals     None            Appearance: awake, alert, dressed in hospital scrubs and unkempt  Attitude:  somewhat cooperative  Eye Contact:  good  Mood:  good  Affect:  intensity is normal  Speech:  clear, coherent and normal prosody  Language: fluent and intact in English  Psychomotor, Gait, Musculoskeletal:  no evidence of tardive dyskinesia, dystonia, or tics and intact station,  gait and muscle tone  Throught Process:  goal oriented  Associations:  no loose associations  Thought Content:  paranoid  Insight:  limited  Judgement:  limited  Oriented to:  time, person, and place  Attention Span and Concentration:  intact  Recent and Remote Memory:  fair  Fund of Knowledge:  adequate    Clinical Global Impressions  First:  Considering your total clinical experience with this particular patient population, how severe are the patient's symptoms at this time?: 7 (11/25/19 1311)  Compared to the patient's condition at the START of treatment, this patient's condition is:: 4 (11/25/19 1311)  Most recent:  Considering your total clinical experience with this particular patient population, how severe are the patient's symptoms at this time?: 7 (12/18/19 1355)  Compared to the patient's condition at the START of treatment, this patient's condition is:: 3 (12/18/19 1355)           Precautions:     Behavioral Orders   Procedures     Assault precautions     Code 1 - Restrict to Unit     DISCUS     Elopement precautions     Routine Programming     As clinically indicated     Sexual precautions     Single Room     Status 15     Every 15 minutes.          Diagnoses:      1.  Schizoaffective disorder, bipolar type with exacerbation of sherie and psychosis.   2.  Historical diagnosis of cognitive decline  3.  History of tremors and EPSE 2/2 Haldol (at doses higher than 5 mg daily)           Assessment & Plan:   Assessment and hospital summary:  Ger Bashir is a 69-year-old  male, currently under MI commitment, with a history of schizoaffective disorder, bipolar type, history of noncompliance with Invega Sustenna and recurrent hospitalization who was referred to the Emergency Department by his ACT team due to increase in behavioral dysregulation and psychosis in the context of medication noncompliance and independent living. Report indicated that the patient had been throwing garbage at his  neighbor, pounding on their doors and had been having difficulty caring for himself.  Patient remains paranoid of going home and given behavior towards neighbor home at this time likely would be unsafe. Working concurrently on [facility]S while he stabilizes.    Target psychiatric symptoms and interventions:  No medication changes will be made today  1.  Cogentin continued at 1 mg twice a day.   2.  Haldol was initially restarted at higher than PTA dose (5 mg to 15 mg daily) on admission, though subsequently reduced to 5 mg BID due to evidence of EPSE on examination. However, Dr. Fox from ACT team noted that patient continued to exhibit tremors despite being off of Haldol for extended period of time.  Due to ongoing tremor, again reduced Haldol to 5 mg at bedtime. Obtained baseline DISCUS, which was scored at 3 on 12/5. Tremors have significantly improved since Haldol dose reduction.   3.  PRN Haldol, Ativan and Benadryl for severe behavioral outbursts.  Zyprexa for psychosis and mild agitation, as well as hydroxyzine for anxiety will be offered.   4.  Psychosocial assessment was obtained by our clinical  who will assist with setting up post-discharge care.  The patient has had recurrent recent hospitalizations.   5. Administered Invega Sustenna 234 mg on 11/29. Again discussed with nursing staff from ACT team who confirms that last Invega Sustenna dose was last administered on 10/18/19. Patient refused dose scheduled for 11/15/19, which resulted in his decompensation. Next dose due on 12/27. Outpatient team will given 3 month injection (Trinza) after discharge. To be ordered by outpatient provider.  6. Initiated Depakote ER 1,000 mg at bedtime on 12/5 to target symptoms of hypomania and due to noted improvement on Depakote in the past. Level was checked on 12/10 and is therapeutic at 67. If re-emergence of hypomanic sx, may consider further optimization.   7. Insomnia: Trazodone continued at 100 mg at  bedtime.     Medical Problems and Treatments:  Hypothyroid  -stable at current dose of levothryroxine    Behavioral/Psychological/Social:  Encourage unit programming    Disposition:  I completed paperwork for IRTS 12/18/2019    Disposition Plan   Reason for ongoing admission: is unable to care for self due to severe psychosis or sherie  Discharge location: IRTS facility  Discharge Medications: not ordered  Follow-up Appointments: not scheduled  Legal Status: full commitment with [facility] for haloperidol, olanzapine, paliperidone, clozapine  Entered by: Dominguez White on 12/18/2019 at 8:32 PM

## 2019-12-19 NOTE — PROGRESS NOTES
Writer faxed over the paperwork completed by Dr. White to [facility]. Plan is for patient to discharge to their [facility] on 12/26/19.

## 2019-12-19 NOTE — PROGRESS NOTES
Pt has been isolative/socially withdrawn. Pt ate both meals. He appears to be blunted/flat in affect.       12/19/19 1400   Behavioral Health   Hallucinations denies / not responding to hallucinations   Thinking distractable   Orientation person: oriented;place: oriented   Insight poor   Judgement impaired   Eye Contact at examiner   Affect blunted, flat   Mood mood is calm   Physical Appearance/Attire untidy   Hygiene neglected grooming - unclean body, hair, teeth   Activities of Daily Living   Hygiene/Grooming independent   Oral Hygiene independent   Dress scrubs (behavioral health)   Room Organization independent

## 2019-12-20 PROCEDURE — 12400001 ZZH R&B MH UMMC

## 2019-12-20 PROCEDURE — 25000132 ZZH RX MED GY IP 250 OP 250 PS 637: Performed by: PSYCHIATRY & NEUROLOGY

## 2019-12-20 PROCEDURE — 99231 SBSQ HOSP IP/OBS SF/LOW 25: CPT | Performed by: PSYCHIATRY & NEUROLOGY

## 2019-12-20 RX ADMIN — BENZTROPINE MESYLATE 1 MG: 0.5 TABLET ORAL at 19:42

## 2019-12-20 RX ADMIN — HALOPERIDOL 5 MG: 5 TABLET ORAL at 19:43

## 2019-12-20 RX ADMIN — MULTIPLE VITAMINS W/ MINERALS TAB 1 TABLET: TAB at 08:10

## 2019-12-20 RX ADMIN — NICOTINE 1 PATCH: 14 PATCH, EXTENDED RELEASE TRANSDERMAL at 08:09

## 2019-12-20 RX ADMIN — BENZTROPINE MESYLATE 1 MG: 0.5 TABLET ORAL at 08:10

## 2019-12-20 RX ADMIN — LEVOTHYROXINE SODIUM 50 MCG: 25 TABLET ORAL at 08:10

## 2019-12-20 RX ADMIN — DIVALPROEX SODIUM 1000 MG: 500 TABLET, EXTENDED RELEASE ORAL at 19:42

## 2019-12-20 RX ADMIN — ASPIRIN 81 MG CHEWABLE TABLET 81 MG: 81 TABLET CHEWABLE at 08:10

## 2019-12-20 RX ADMIN — TRAZODONE HYDROCHLORIDE 100 MG: 100 TABLET ORAL at 19:43

## 2019-12-20 ASSESSMENT — ACTIVITIES OF DAILY LIVING (ADL)
ORAL_HYGIENE: INDEPENDENT
HYGIENE/GROOMING: INDEPENDENT
LAUNDRY: UNABLE TO COMPLETE
DRESS: SCRUBS (BEHAVIORAL HEALTH)
DRESS: SCRUBS (BEHAVIORAL HEALTH)
LAUNDRY: UNABLE TO COMPLETE
HYGIENE/GROOMING: INDEPENDENT
ORAL_HYGIENE: INDEPENDENT

## 2019-12-20 NOTE — PROGRESS NOTES
Pt was isolative and withdrawn to his room all evening shift besides coming out to the lounge to grab his dinner tray and to use the restroom. Pt mood was calm and cooperative. Pt actively watched television In his room throughout the day, showing no signs of aggressive behaviors. Pt is asleep in bed at the time of writing. No new concern.

## 2019-12-20 NOTE — PROGRESS NOTES
[facility], [place]   Psychiatric Progress Note  Hospital Day: 27        Interim History:   The patient's care was discussed with the treatment team during the daily team meeting and/or staff's chart notes were reviewed.  Staff report patient remains calm and cooperative.    Upon interview, the patient reports that he is feeling good. He denies hallucinations. He is hopeful that a bed will open up sooner than the 26th for the IRTS.    Psychiatric Symptoms: delusional thoughts, paranoia    Medication side effects reported: none    Other issues reported by patient: none         Medications:       aspirin  81 mg Oral Daily     benztropine  1 mg Oral BID     divalproex sodium extended-release  1,000 mg Oral At Bedtime     haloperidol lactate  5 mg Intramuscular At Bedtime    Or     haloperidol  5 mg Oral At Bedtime     levothyroxine  50 mcg Oral Daily     multivitamin w/minerals  1 tablet Oral Daily     nicotine  1 patch Transdermal Q24H     nicotine   Transdermal Q8H     [START ON 12/27/2019] paliperidone  234 mg Intramuscular Q28 Days     traZODone  100 mg Oral At Bedtime          Allergies:     Allergies   Allergen Reactions     Peas GI Disturbance     Black eyed peas - vomiting          Labs:   No results found for this or any previous visit (from the past 48 hour(s)).       Psychiatric Examination:     /74 (BP Location: Right arm)   Pulse 84   Temp 96.6  F (35.9  C) (Oral)   Resp 16   Wt 92.1 kg (203 lb)   SpO2 98%   BMI 29.13 kg/m    Weight is 203 lbs 0 oz  Body mass index is 29.13 kg/m .    Orthostatic Vitals     None            Appearance: awake, alert, dressed in hospital scrubs and unkempt  Attitude:  cooperative  Eye Contact:  good  Mood:  good  Affect:  intensity is normal  Speech:  clear, coherent and normal prosody  Language: fluent and intact in English  Psychomotor, Gait, Musculoskeletal:  no evidence of tardive dyskinesia, dystonia, or tics and intact station,  gait and muscle tone  Throught Process:  goal oriented  Associations:  no loose associations  Thought Content:  no auditory hallucinations present, no visual hallucinations present and paranoid  Insight:  limited  Judgement:  limited  Oriented to:  time, person, and place  Attention Span and Concentration:  intact  Recent and Remote Memory:  fair  Fund of Knowledge:  adequate    Clinical Global Impressions  First:  Considering your total clinical experience with this particular patient population, how severe are the patient's symptoms at this time?: 7 (11/25/19 1311)  Compared to the patient's condition at the START of treatment, this patient's condition is:: 4 (11/25/19 1311)  Most recent:  Considering your total clinical experience with this particular patient population, how severe are the patient's symptoms at this time?: 7 (12/18/19 1355)  Compared to the patient's condition at the START of treatment, this patient's condition is:: 3 (12/18/19 1355)           Precautions:     Behavioral Orders   Procedures     Assault precautions     Code 1 - Restrict to Unit     DISCUS     Elopement precautions     Routine Programming     As clinically indicated     Sexual precautions     Single Room     Status 15     Every 15 minutes.          Diagnoses:      1.  Schizoaffective disorder, bipolar type with exacerbation of sherie and psychosis.   2.  Historical diagnosis of cognitive decline  3.  History of tremors and EPSE 2/2 Haldol (at doses higher than 5 mg daily)           Assessment & Plan:   Assessment and hospital summary:  Ger Bashir is a 69-year-old  male, currently under MI commitment, with a history of schizoaffective disorder, bipolar type, history of noncompliance with Invega Sustenna and recurrent hospitalization who was referred to the Emergency Department by his ACT team due to increase in behavioral dysregulation and psychosis in the context of medication noncompliance and independent living.  Report indicated that the patient had been throwing garbage at his neighbor, pounding on their doors and had been having difficulty caring for himself.    Target psychiatric symptoms and interventions:  No medication changes will be made today  1.  Cogentin continued at 1 mg twice a day.   2.  Haldol was initially restarted at higher than PTA dose (5 mg to 15 mg daily) on admission, though subsequently reduced to 5 mg BID due to evidence of EPSE on examination. However, Dr. Fox from ACT team noted that patient continued to exhibit tremors despite being off of Haldol for extended period of time.  Due to ongoing tremor, again reduced Haldol to 5 mg at bedtime. Obtained baseline DISCUS, which was scored at 3 on 12/5. Tremors have significantly improved since Haldol dose reduction.   3.  PRN Haldol, Ativan and Benadryl for severe behavioral outbursts.  Zyprexa for psychosis and mild agitation, as well as hydroxyzine for anxiety will be offered.   4.  Psychosocial assessment was obtained by our clinical  who will assist with setting up post-discharge care.  The patient has had recurrent recent hospitalizations.   5. Administered Invega Sustenna 234 mg on 11/29. Again discussed with nursing staff from ACT team who confirms that last Invega Sustenna dose was last administered on 10/18/19. Patient refused dose scheduled for 11/15/19, which resulted in his decompensation. Next dose due on 12/27. Outpatient team will given 3 month injection (Trinza) after discharge. To be ordered by outpatient provider.  6. Initiated Depakote ER 1,000 mg at bedtime on 12/5 to target symptoms of hypomania and due to noted improvement on Depakote in the past. Level was checked on 12/10 and is therapeutic at 67. If re-emergence of hypomanic sx, may consider further optimization.   7. Insomnia: Trazodone continued at 100 mg at bedtime.     Medical Problems and Treatments:  Hypothyroid  -stable at current dose of  levothryroxine    Behavioral/Psychological/Social:  Encourage unit programming    Disposition:  I completed paperwork for IRTS 12/18/2019. Currently expect to be available on 12/26.   Patient remains paranoid of going home and given behavior towards neighbor home at this time likely would be unsafe. Working concurrently on IRTS while he stabilizes.    Disposition Plan   Reason for ongoing admission: is unable to care for self due to severe psychosis or sherie  Discharge location: IRTS facility  Discharge Medications: not ordered  Follow-up Appointments: not scheduled  Legal Status: full commitment with [facility] for haloperidol, olanzapine, paliperidone, clozapine  Entered by: Dominguez White on 12/19/2019 at 8:11 PM

## 2019-12-20 NOTE — PROGRESS NOTES
[facility], [place]   Psychiatric Progress Note  Hospital Day: 28        Interim History:   The patient's care was discussed with the treatment team during the daily team meeting and/or staff's chart notes were reviewed.  Staff report patient remains calm and cooperative.    Upon interview, the patient reports that he is feeling good. He denies hallucinations. He is hopeful that a bed will open up sooner than the 26th for the IRTS. He doesn't feel ready to be at home, but believes he will be within 1 month at the IRTS.    Psychiatric Symptoms: delusional thoughts, paranoia    Medication side effects reported: none    Other issues reported by patient: none         Medications:       aspirin  81 mg Oral Daily     benztropine  1 mg Oral BID     divalproex sodium extended-release  1,000 mg Oral At Bedtime     haloperidol lactate  5 mg Intramuscular At Bedtime    Or     haloperidol  5 mg Oral At Bedtime     levothyroxine  50 mcg Oral Daily     multivitamin w/minerals  1 tablet Oral Daily     nicotine  1 patch Transdermal Q24H     nicotine   Transdermal Q8H     [START ON 12/27/2019] paliperidone  234 mg Intramuscular Q28 Days     traZODone  100 mg Oral At Bedtime          Allergies:     Allergies   Allergen Reactions     Peas GI Disturbance     Black eyed peas - vomiting          Labs:   No results found for this or any previous visit (from the past 48 hour(s)).       Psychiatric Examination:     /74 (BP Location: Right arm)   Pulse 84   Temp 97.9  F (36.6  C) (Oral)   Resp 16   Wt 92.1 kg (203 lb)   SpO2 97%   BMI 29.13 kg/m    Weight is 203 lbs 0 oz  Body mass index is 29.13 kg/m .    Orthostatic Vitals       Most Recent      Sitting Orthostatic BP 99/65 12/20 0852    Sitting Orthostatic Pulse (bpm) 65 12/20 0852    Standing Orthostatic BP 93/62 12/20 0852    Standing Orthostatic Pulse (bpm) 67 12/20 0852            Appearance: awake, alert, dressed in hospital scrubs and  unkempt  Attitude:  cooperative  Eye Contact:  good  Mood:  good  Affect:  intensity is normal  Speech:  clear, coherent and normal prosody  Language: fluent and intact in English  Psychomotor, Gait, Musculoskeletal:  no evidence of tardive dyskinesia, dystonia, or tics and intact station, gait and muscle tone  Throught Process:  goal oriented  Associations:  no loose associations  Thought Content:  no auditory hallucinations present, no visual hallucinations present and paranoid  Insight:  limited  Judgement:  limited  Oriented to:  time, person, and place  Attention Span and Concentration:  intact  Recent and Remote Memory:  fair  Fund of Knowledge:  adequate    Clinical Global Impressions  First:  Considering your total clinical experience with this particular patient population, how severe are the patient's symptoms at this time?: 7 (11/25/19 1311)  Compared to the patient's condition at the START of treatment, this patient's condition is:: 4 (11/25/19 1311)  Most recent:  Considering your total clinical experience with this particular patient population, how severe are the patient's symptoms at this time?: 7 (12/18/19 1355)  Compared to the patient's condition at the START of treatment, this patient's condition is:: 3 (12/18/19 1355)           Precautions:     Behavioral Orders   Procedures     Assault precautions     Code 1 - Restrict to Unit     DISCUS     Elopement precautions     Routine Programming     As clinically indicated     Sexual precautions     Single Room     Status 15     Every 15 minutes.          Diagnoses:      1.  Schizoaffective disorder, bipolar type with exacerbation of sherie and psychosis.   2.  Historical diagnosis of cognitive decline  3.  History of tremors and EPSE 2/2 Haldol (at doses higher than 5 mg daily)           Assessment & Plan:   Assessment and hospital summary:  Ger Bashir is a 69-year-old  male, currently under MI commitment, with a history of  schizoaffective disorder, bipolar type, history of noncompliance with Invega Sustenna and recurrent hospitalization who was referred to the Emergency Department by his ACT team due to increase in behavioral dysregulation and psychosis in the context of medication noncompliance and independent living. Report indicated that the patient had been throwing garbage at his neighbor, pounding on their doors and had been having difficulty caring for himself.    Target psychiatric symptoms and interventions:  No medication changes will be made today  1.  Cogentin continued at 1 mg twice a day.   2.  Haldol was initially restarted at higher than PTA dose (5 mg to 15 mg daily) on admission, though subsequently reduced to 5 mg BID due to evidence of EPSE on examination. However, Dr. Fox from ACT team noted that patient continued to exhibit tremors despite being off of Haldol for extended period of time.  Due to ongoing tremor, again reduced Haldol to 5 mg at bedtime. Obtained baseline DISCUS, which was scored at 3 on 12/5. Tremors have significantly improved since Haldol dose reduction.   3.  PRN Haldol, Ativan and Benadryl for severe behavioral outbursts.  Zyprexa for psychosis and mild agitation, as well as hydroxyzine for anxiety will be offered.   4.  Psychosocial assessment was obtained by our clinical  who will assist with setting up post-discharge care.  The patient has had recurrent recent hospitalizations.   5. Administered Invega Sustenna 234 mg on 11/29. Again discussed with nursing staff from ACT team who confirms that last Invega Sustenna dose was last administered on 10/18/19. Patient refused dose scheduled for 11/15/19, which resulted in his decompensation. Next dose due on 12/27. Outpatient team will given 3 month injection (Trinza) after discharge. To be ordered by outpatient provider.  6. Initiated Depakote ER 1,000 mg at bedtime on 12/5 to target symptoms of hypomania and due to noted improvement  on Depakote in the past. Level was checked on 12/10 and is therapeutic at 67. If re-emergence of hypomanic sx, may consider further optimization.   7. Insomnia: Trazodone continued at 100 mg at bedtime.     Medical Problems and Treatments:  Hypothyroid  -stable at current dose of levothryroxine    Behavioral/Psychological/Social:  Encourage unit programming    Disposition:  I completed paperwork for IRTS 12/18/2019. Currently expect to be available on 12/26.   Patient remains paranoid of going home and given behavior towards neighbor home at this time likely would be unsafe. Working concurrently on IRTS while he stabilizes.    Disposition Plan   Reason for ongoing admission: is unable to care for self due to severe psychosis or sherie  Discharge location: IRTS facility  Discharge Medications: not ordered  Follow-up Appointments: not scheduled  Legal Status: full commitment with [facility] for haloperidol, olanzapine, paliperidone, clozapine  Entered by: Dominguez White on 12/20/2019 at 1:59 PM

## 2019-12-20 NOTE — PROGRESS NOTES
Writer received a VM from Arielle with [facility] reporting they received all the necessary paperwork and that patient will be able to be admitted on 12/26. The only additional thing that will need to be done is for provider to send refills to to [facility] pharmacy ([address] [place] [address], Phone: [phone] Fax: [phone]) prior to patient's discharge.

## 2019-12-20 NOTE — PLAN OF CARE
Pt presents with blunted, flat affect and calm mood. Denies all mental health sx. Spent entire shift isolated in room sleeping and watching television. Does not have any concerns or complaints at this time. Med compliant and reports no issues with medications. Will continue to monitor.

## 2019-12-21 PROCEDURE — 25000132 ZZH RX MED GY IP 250 OP 250 PS 637: Performed by: PSYCHIATRY & NEUROLOGY

## 2019-12-21 PROCEDURE — 12400001 ZZH R&B MH UMMC

## 2019-12-21 RX ADMIN — NICOTINE 1 PATCH: 14 PATCH, EXTENDED RELEASE TRANSDERMAL at 08:37

## 2019-12-21 RX ADMIN — ASPIRIN 81 MG CHEWABLE TABLET 81 MG: 81 TABLET CHEWABLE at 08:36

## 2019-12-21 RX ADMIN — BENZTROPINE MESYLATE 1 MG: 0.5 TABLET ORAL at 22:12

## 2019-12-21 RX ADMIN — BENZTROPINE MESYLATE 1 MG: 0.5 TABLET ORAL at 08:36

## 2019-12-21 RX ADMIN — DIVALPROEX SODIUM 1000 MG: 500 TABLET, EXTENDED RELEASE ORAL at 22:12

## 2019-12-21 RX ADMIN — LEVOTHYROXINE SODIUM 50 MCG: 25 TABLET ORAL at 08:37

## 2019-12-21 RX ADMIN — TRAZODONE HYDROCHLORIDE 100 MG: 100 TABLET ORAL at 22:12

## 2019-12-21 RX ADMIN — MULTIPLE VITAMINS W/ MINERALS TAB 1 TABLET: TAB at 08:37

## 2019-12-21 RX ADMIN — HALOPERIDOL 5 MG: 5 TABLET ORAL at 22:12

## 2019-12-21 ASSESSMENT — ACTIVITIES OF DAILY LIVING (ADL)
ORAL_HYGIENE: INDEPENDENT
LAUNDRY: UNABLE TO COMPLETE
HYGIENE/GROOMING: INDEPENDENT
DRESS: SCRUBS (BEHAVIORAL HEALTH);INDEPENDENT

## 2019-12-21 NOTE — PROGRESS NOTES
Pt isolative to room, withdrawn from staff and peers, pt left his bedroom for dinner and was otherwise isolative the remainder of the shift. No behavioral concerns of note this evening, pt was pleasant and cooperative during check in, denies SI, SIB, HI, AH, VH.      12/20/19 2200   Behavioral Health   Hallucinations denies / not responding to hallucinations   Thinking poor concentration;distractable   Orientation person: oriented;place: oriented   Memory baseline memory   Insight poor   Judgement impaired   Eye Contact at examiner   Affect blunted, flat   Mood mood is calm   Physical Appearance/Attire disheveled;untidy   Hygiene neglected grooming - unclean body, hair, teeth   Suicidality other (see comments)  (none stated none observed)   1. Wish to be Dead (Recent) No   2. Non-Specific Active Suicidal Thoughts (Recent) No   Self Injury other (see comment)  (none stated none observed)   Activity withdrawn;isolative   Speech clear   Medication Sensitivity no observed side effects;no stated side effects   Psychomotor / Gait balanced;steady   Activities of Daily Living   Hygiene/Grooming independent   Oral Hygiene independent   Dress scrubs (behavioral health)   Laundry unable to complete   Room Organization independent

## 2019-12-22 PROCEDURE — 25000132 ZZH RX MED GY IP 250 OP 250 PS 637: Performed by: PSYCHIATRY & NEUROLOGY

## 2019-12-22 PROCEDURE — 12400001 ZZH R&B MH UMMC

## 2019-12-22 RX ADMIN — DIVALPROEX SODIUM 1000 MG: 500 TABLET, EXTENDED RELEASE ORAL at 20:00

## 2019-12-22 RX ADMIN — NICOTINE 1 PATCH: 14 PATCH, EXTENDED RELEASE TRANSDERMAL at 08:48

## 2019-12-22 RX ADMIN — TRAZODONE HYDROCHLORIDE 100 MG: 100 TABLET ORAL at 20:00

## 2019-12-22 RX ADMIN — BENZTROPINE MESYLATE 1 MG: 0.5 TABLET ORAL at 08:46

## 2019-12-22 RX ADMIN — BENZTROPINE MESYLATE 1 MG: 0.5 TABLET ORAL at 20:00

## 2019-12-22 RX ADMIN — MULTIPLE VITAMINS W/ MINERALS TAB 1 TABLET: TAB at 08:48

## 2019-12-22 RX ADMIN — ASPIRIN 81 MG CHEWABLE TABLET 81 MG: 81 TABLET CHEWABLE at 08:46

## 2019-12-22 RX ADMIN — LEVOTHYROXINE SODIUM 50 MCG: 25 TABLET ORAL at 08:46

## 2019-12-22 RX ADMIN — HALOPERIDOL 5 MG: 5 TABLET ORAL at 20:00

## 2019-12-22 ASSESSMENT — ACTIVITIES OF DAILY LIVING (ADL)
ORAL_HYGIENE: INDEPENDENT
ORAL_HYGIENE: INDEPENDENT
DRESS: SCRUBS (BEHAVIORAL HEALTH)
LAUNDRY: UNABLE TO COMPLETE
DRESS: SCRUBS (BEHAVIORAL HEALTH)
HYGIENE/GROOMING: INDEPENDENT
HYGIENE/GROOMING: PROMPTS

## 2019-12-22 NOTE — PROGRESS NOTES
"Writer approached pt this morning and asked him what time he would like to shower today. Pt asked, \"Tomorrow?\" Writer suggested that pt pick a time today. Pt stated, \"You won't be able to find me. I will hide under a rock.\" Ultimately, pt chose 1400. Pt isolative to his room throughout the day. At 1400, writer approached pt with shower supplies, and he was agreeable to showering. Writer offered to change pt's linens in his room and throw out garbage. Pt stated, \"No need to bother.\" Writer stated that she would like to anyway, as it would likely make pt feel better. Pt stated, \"Well okay.\" Writer changed linens in pt's room (old linens were covered in food) and opened up blinds while pt was in the shower. When pt came back to clean room, he indicated that it felt good to take a shower and thanked writer for cleaning up his room. \"I appreciate you.\"   "

## 2019-12-22 NOTE — PLAN OF CARE
No substantial changes in the last 48 hours. Pt seems to be doing well overall. Pt did not state any psychotic symptoms this shift. Pt was calm and pleasant with staff and peers. Pt spent most of the day in his room, generally withdrawn but communicative when approached. Pt completed all of his ADLs this shift with prompting from staff. Denied physical health concerns. Appears to have mild orthostatic hypotension, all other VS were WDL.

## 2019-12-23 PROCEDURE — 12400001 ZZH R&B MH UMMC

## 2019-12-23 PROCEDURE — 25000132 ZZH RX MED GY IP 250 OP 250 PS 637: Performed by: PSYCHIATRY & NEUROLOGY

## 2019-12-23 PROCEDURE — 99232 SBSQ HOSP IP/OBS MODERATE 35: CPT | Performed by: PSYCHIATRY & NEUROLOGY

## 2019-12-23 PROCEDURE — G0177 OPPS/PHP; TRAIN & EDUC SERV: HCPCS

## 2019-12-23 RX ORDER — TRAZODONE HYDROCHLORIDE 100 MG/1
100 TABLET ORAL AT BEDTIME
Qty: 30 TABLET | Refills: 0 | Status: SHIPPED | OUTPATIENT
Start: 2019-12-23

## 2019-12-23 RX ORDER — DIVALPROEX SODIUM 500 MG/1
1000 TABLET, EXTENDED RELEASE ORAL AT BEDTIME
Qty: 60 TABLET | Refills: 0 | Status: SHIPPED | OUTPATIENT
Start: 2019-12-23

## 2019-12-23 RX ORDER — LEVOTHYROXINE SODIUM 50 UG/1
50 TABLET ORAL DAILY
Qty: 30 TABLET | Refills: 0 | Status: SHIPPED | OUTPATIENT
Start: 2019-12-23

## 2019-12-23 RX ORDER — MULTIPLE VITAMINS W/ MINERALS TAB 9MG-400MCG
1 TAB ORAL DAILY
Qty: 30 TABLET | Refills: 0 | Status: SHIPPED | OUTPATIENT
Start: 2019-12-23

## 2019-12-23 RX ORDER — ASPIRIN 81 MG/1
81 TABLET, CHEWABLE ORAL DAILY
Qty: 30 TABLET | Refills: 0 | Status: SHIPPED | OUTPATIENT
Start: 2019-12-23

## 2019-12-23 RX ORDER — HALOPERIDOL 5 MG/1
5 TABLET ORAL AT BEDTIME
Qty: 30 TABLET | Refills: 0 | Status: SHIPPED | OUTPATIENT
Start: 2019-12-23

## 2019-12-23 RX ORDER — BENZTROPINE MESYLATE 1 MG/1
1 TABLET ORAL 2 TIMES DAILY
Qty: 60 TABLET | Refills: 0 | Status: SHIPPED | OUTPATIENT
Start: 2019-12-23

## 2019-12-23 RX ADMIN — TRAZODONE HYDROCHLORIDE 100 MG: 100 TABLET ORAL at 20:42

## 2019-12-23 RX ADMIN — BENZTROPINE MESYLATE 1 MG: 0.5 TABLET ORAL at 09:16

## 2019-12-23 RX ADMIN — NICOTINE 1 PATCH: 14 PATCH, EXTENDED RELEASE TRANSDERMAL at 09:16

## 2019-12-23 RX ADMIN — ASPIRIN 81 MG CHEWABLE TABLET 81 MG: 81 TABLET CHEWABLE at 09:16

## 2019-12-23 RX ADMIN — MULTIPLE VITAMINS W/ MINERALS TAB 1 TABLET: TAB at 09:16

## 2019-12-23 RX ADMIN — DIVALPROEX SODIUM 1000 MG: 500 TABLET, EXTENDED RELEASE ORAL at 20:42

## 2019-12-23 RX ADMIN — BENZTROPINE MESYLATE 1 MG: 0.5 TABLET ORAL at 20:42

## 2019-12-23 RX ADMIN — HALOPERIDOL 5 MG: 5 TABLET ORAL at 20:42

## 2019-12-23 RX ADMIN — LEVOTHYROXINE SODIUM 50 MCG: 25 TABLET ORAL at 09:16

## 2019-12-23 ASSESSMENT — ACTIVITIES OF DAILY LIVING (ADL)
LAUNDRY: UNABLE TO COMPLETE
HYGIENE/GROOMING: INDEPENDENT;PROMPTS
DRESS: INDEPENDENT;SCRUBS (BEHAVIORAL HEALTH)
ORAL_HYGIENE: INDEPENDENT

## 2019-12-23 NOTE — PROGRESS NOTES
Patient instructed to push fluids due to mild orthostatic hypotension during day shift, writer brought patient two large cups of water and patient drank one full cup in front of writer. B/P sitting 112/72, HR 75, B/P Standing 104/66, HR 83. Will continue to monitor for safety.

## 2019-12-23 NOTE — PROGRESS NOTES
Writer left a VM for patient's [facility] Ruddy requesting his fax number. Provisional Discharge is complete and signed by patient.

## 2019-12-23 NOTE — PROGRESS NOTES
Pt denied thoughts of SI/SIB. Pt denied having any hallucinations. Pt was isolated and withdrawn in his room for majority of the shift. Pt ate all of his dinner and snack. Pt has a body odor although he claimed taking a shower during the day.     12/22/19 2113   Behavioral Health   Hallucinations denies / not responding to hallucinations   Thinking poor concentration   Orientation person: oriented;place: oriented;time: oriented   Memory baseline memory   Insight poor   Judgement impaired   Eye Contact at examiner   Affect blunted, flat   Mood mood is calm   Physical Appearance/Attire attire appropriate to age and situation   Hygiene body odor  (claimed he took a shower in the morning)   Suicidality other (see comments)  (denied)   1. Wish to be Dead (Recent) No   2. Non-Specific Active Suicidal Thoughts (Recent) No   Self Injury other (see comment)  (denied)   Activity isolative;withdrawn   Speech clear;coherent   Psychomotor / Gait balanced;steady   Activities of Daily Living   Hygiene/Grooming independent   Oral Hygiene independent   Dress scrubs (behavioral health)   Laundry unable to complete   Room Organization independent

## 2019-12-23 NOTE — PLAN OF CARE
Problem: OT General Care Plan  Goal: OT Goal 1  Description  Pt will engage in group activities to increase socially appropriate behaviors and improve concentration. Pt will participate in goal directed tasks to enhance planning and problem solving skills.     Pt attended morning OT group, given various options, asked to play carlos with writer, and seemed pleased with a peer joined in as well (two other peers present working on crafts).  Seemed content being in the larger group setting.  Two games lasted nearly 1.5 hours, no problems concentrating.  Pt got into choosing music on the ipad, even learning how to scroll back to different screens and enter his selections.  Pt seemed delighted when an 17 y/o peer was also picking music from the 1960's.  Outcome: Improving

## 2019-12-23 NOTE — PLAN OF CARE
BEHAVIORAL TEAM DISCUSSION    Participants: Stefanie Noble LPC, [facility], Dominguez White MD, Anthony Jama,RN.   Progress: Patient has reached baseline, ready for discharge.   Anticipated length of stay: Patient will discharge on 12/26/19.   Continued Stay Criteria/Rationale: Patient is waiting in the hospital until he can discharge.   Medical/Physical: No acute concerns noted  Precautions:   Behavioral Orders   Procedures     Assault precautions     Code 1 - Restrict to Unit     DISCUS     Elopement precautions     Routine Programming     As clinically indicated     Sexual precautions     Single Room     Status 15     Every 15 minutes.     Plan: Patient will discharge to [facility] on 12/26. We are still working on a plan for who will drive him.   Rationale for change in precautions or plan: Plans have changed in order to discharge patient to an IRTS facility. No precautions have been changed.

## 2019-12-24 PROCEDURE — 25000132 ZZH RX MED GY IP 250 OP 250 PS 637: Performed by: PSYCHIATRY & NEUROLOGY

## 2019-12-24 PROCEDURE — 99232 SBSQ HOSP IP/OBS MODERATE 35: CPT | Performed by: PSYCHIATRY & NEUROLOGY

## 2019-12-24 PROCEDURE — 12400001 ZZH R&B MH UMMC

## 2019-12-24 RX ADMIN — DIVALPROEX SODIUM 1000 MG: 500 TABLET, EXTENDED RELEASE ORAL at 20:33

## 2019-12-24 RX ADMIN — NICOTINE 1 PATCH: 14 PATCH, EXTENDED RELEASE TRANSDERMAL at 08:36

## 2019-12-24 RX ADMIN — LEVOTHYROXINE SODIUM 50 MCG: 25 TABLET ORAL at 08:35

## 2019-12-24 RX ADMIN — BENZTROPINE MESYLATE 1 MG: 0.5 TABLET ORAL at 20:33

## 2019-12-24 RX ADMIN — HALOPERIDOL 5 MG: 5 TABLET ORAL at 20:33

## 2019-12-24 RX ADMIN — BENZTROPINE MESYLATE 1 MG: 0.5 TABLET ORAL at 08:35

## 2019-12-24 RX ADMIN — ASPIRIN 81 MG CHEWABLE TABLET 81 MG: 81 TABLET CHEWABLE at 08:34

## 2019-12-24 RX ADMIN — MULTIPLE VITAMINS W/ MINERALS TAB 1 TABLET: TAB at 08:35

## 2019-12-24 RX ADMIN — TRAZODONE HYDROCHLORIDE 100 MG: 100 TABLET ORAL at 20:33

## 2019-12-24 ASSESSMENT — ACTIVITIES OF DAILY LIVING (ADL)
HYGIENE/GROOMING: INDEPENDENT
ORAL_HYGIENE: INDEPENDENT
ORAL_HYGIENE: INDEPENDENT
LAUNDRY: UNABLE TO COMPLETE
DRESS: SCRUBS (BEHAVIORAL HEALTH)
DRESS: SCRUBS (BEHAVIORAL HEALTH);INDEPENDENT
HYGIENE/GROOMING: INDEPENDENT

## 2019-12-24 NOTE — PROGRESS NOTES
Patient provided writer with another number for his  Ruddy Henry. Writer called [phone] and left a VM asking for a call back.

## 2019-12-24 NOTE — PROGRESS NOTES
[facility], [place]   Psychiatric Progress Note  Hospital Day: 32        Interim History:   The patient's care was discussed with the treatment team during the daily team meeting and/or staff's chart notes were reviewed.  Staff report patient is cooperative. He isolates to his room. Doesn't participate in unit activities.    Upon interview,the patient indicates that he feels ready to lave on Thursday for IRTS.    Psychiatric Symptoms: delusional thoughts, paranoia improved    Medication side effects reported: none    Other issues reported by patient: none         Medications:       aspirin  81 mg Oral Daily     benztropine  1 mg Oral BID     divalproex sodium extended-release  1,000 mg Oral At Bedtime     haloperidol lactate  5 mg Intramuscular At Bedtime    Or     haloperidol  5 mg Oral At Bedtime     levothyroxine  50 mcg Oral Daily     multivitamin w/minerals  1 tablet Oral Daily     nicotine  1 patch Transdermal Q24H     nicotine   Transdermal Q8H     [START ON 12/27/2019] paliperidone  234 mg Intramuscular Q28 Days     traZODone  100 mg Oral At Bedtime          Allergies:     Allergies   Allergen Reactions     Peas GI Disturbance     Black eyed peas - vomiting          Labs:   No results found for this or any previous visit (from the past 48 hour(s)).       Psychiatric Examination:     BP 98/68 (BP Location: Left arm)   Pulse 94   Temp 97.1  F (36.2  C) (Tympanic)   Resp 16   Wt 92.1 kg (203 lb)   SpO2 96%   BMI 29.13 kg/m    Weight is 203 lbs 0 oz  Body mass index is 29.13 kg/m .    Orthostatic Vitals       Most Recent      Sitting Orthostatic BP 97/61 12/23 0800    Sitting Orthostatic Pulse (bpm) 62 12/23 0800    Standing Orthostatic BP 97/66 12/23 0800    Standing Orthostatic Pulse (bpm) 73 12/23 0800            Appearance: awake, alert, dressed in hospital scrubs and unkempt  Attitude:  cooperative  Eye Contact:  good  Mood:  good  Affect:  intensity is normal  Speech:   clear, coherent and normal prosody  Language: fluent and intact in English  Psychomotor, Gait, Musculoskeletal:  no evidence of tardive dyskinesia, dystonia, or tics and intact station, gait and muscle tone  Throught Process:  goal oriented  Associations:  no loose associations  Thought Content:  no auditory hallucinations present, no visual hallucinations present and paranoid  Insight:  limited  Judgement:  limited  Oriented to:  time, person, and place  Attention Span and Concentration:  intact  Recent and Remote Memory:  fair  Fund of Knowledge:  adequate    Clinical Global Impressions  First:  Considering your total clinical experience with this particular patient population, how severe are the patient's symptoms at this time?: 7 (11/25/19 1311)  Compared to the patient's condition at the START of treatment, this patient's condition is:: 4 (11/25/19 1311)  Most recent:  Considering your total clinical experience with this particular patient population, how severe are the patient's symptoms at this time?: 7 (12/18/19 1355)  Compared to the patient's condition at the START of treatment, this patient's condition is:: 3 (12/18/19 1355)           Precautions:     Behavioral Orders   Procedures     Assault precautions     Code 1 - Restrict to Unit     DISCUS     Elopement precautions     Routine Programming     As clinically indicated     Sexual precautions     Single Room     Status 15     Every 15 minutes.          Diagnoses:      1.  Schizoaffective disorder, bipolar type with exacerbation of sherie and psychosis.   2.  Historical diagnosis of cognitive decline  3.  History of tremors and EPSE 2/2 Haldol (at doses higher than 5 mg daily)           Assessment & Plan:   Assessment and hospital summary:  Ger Bashir is a 69-year-old  male, currently under MI commitment, with a history of schizoaffective disorder, bipolar type, history of noncompliance with Invega Sustenna and recurrent hospitalization  who was referred to the Emergency Department by his ACT team due to increase in behavioral dysregulation and psychosis in the context of medication noncompliance and independent living. Report indicated that the patient had been throwing garbage at his neighbor, pounding on their doors and had been having difficulty caring for himself.    Target psychiatric symptoms and interventions:  No medication changes will be made today  1.  Cogentin continued at 1 mg twice a day.   2.  Haldol was initially restarted at higher than PTA dose (5 mg to 15 mg daily) on admission, though subsequently reduced to 5 mg BID due to evidence of EPSE on examination. However, Dr. Fox from ACT team noted that patient continued to exhibit tremors despite being off of Haldol for extended period of time.  Due to ongoing tremor, again reduced Haldol to 5 mg at bedtime. Obtained baseline DISCUS, which was scored at 3 on 12/5. Tremors have significantly improved since Haldol dose reduction.   3.  PRN Haldol, Ativan and Benadryl for severe behavioral outbursts.  Zyprexa for psychosis and mild agitation, as well as hydroxyzine for anxiety will be offered.   4.  Psychosocial assessment was obtained by our clinical  who will assist with setting up post-discharge care.  The patient has had recurrent recent hospitalizations.   5. Administered Invega Sustenna 234 mg on 11/29. Again discussed with nursing staff from ACT team who confirms that last Invega Sustenna dose was last administered on 10/18/19. Patient refused dose scheduled for 11/15/19, which resulted in his decompensation. Next dose due on 12/27. Outpatient team is considering Trinza ([id number] injection). This to be done by them post discharge.  6. Initiated Depakote ER 1,000 mg at bedtime on 12/5 to target symptoms of hypomania and due to noted improvement on Depakote in the past. Level was checked on 12/10 and is therapeutic at 67. If re-emergence of hypomanic sx, may consider  further optimization.   7. Insomnia: Trazodone continued at 100 mg at bedtime.     Medical Problems and Treatments:  Hypothyroid  -stable at current dose of levothryroxine    Behavioral/Psychological/Social:  Encourage unit programming    Disposition:  I completed paperwork for IRTS 12/18/2019. Currently expect to be available on 12/26.   Patient remains paranoid of going home and given behavior towards neighbor home at this time likely would be unsafe. Working concurrently on IRTS while he stabilizes.    Disposition Plan   Reason for ongoing admission: is unable to care for self due to severe psychosis or sherie  Discharge location: IRTS facility  Discharge Medications: not ordered  Follow-up Appointments: not scheduled  Legal Status: full commitment with [facility] for haloperidol, olanzapine, paliperidone, clozapine  Entered by: Dominguez White on 12/23/2019 at 12:04 AM

## 2019-12-24 NOTE — PROGRESS NOTES
Patient remains isolative to room. He comes out to the milieu to receive his meal tray and will occasionally attend groups. He denies SI/HI/AH and is oriented in all spheres. It is this writers opinion that patient would benefit from a less restrictive unit.     12/24/19 1253   Sleep/Rest/Relaxation   Day/Evening Time Hours napping;resting in bed   Number of hours napping 44 hours   Behavioral Health   Hallucinations denies / not responding to hallucinations   Thinking poor concentration   Orientation place: oriented;date: oriented;person: oriented   Memory baseline memory   Insight poor   Judgement impaired   Eye Contact at examiner   Affect blunted, flat   Mood mood is calm   Physical Appearance/Attire disheveled   Hygiene other (see comment)  (aDEQUATE)   Suicidality other (see comments)  (Denies)   1. Wish to be Dead (Recent) No   2. Non-Specific Active Suicidal Thoughts (Recent) No   Self Injury other (see comment)  (No SIB observed)   Elopement   (No Behaviors)   Activity isolative;withdrawn   Speech clear;coherent   Medication Sensitivity no stated side effects;no observed side effects   Psychomotor / Gait balanced;steady   Overt Aggression Scale   Verbal Aggression 0   Aggression against Property 0   Auto-Aggression 0   Physical Aggression 0   Overt Aggression Total Score 0   Psycho Education   Type of Intervention 1:1 intervention   Response participates, initiates socially appropriate   Hours 0.5   Treatment Detail   (Check in)   Daily Care   Activity up ad shaila   Activities of Daily Living   Hygiene/Grooming independent   Oral Hygiene independent   Dress scrubs (behavioral health)   Laundry unable to complete   Room Organization prompts   Activity   Activity Assistance Provided independent

## 2019-12-24 NOTE — PROGRESS NOTES
12/23/19 2228   Behavioral Health   Hallucinations denies / not responding to hallucinations   Thinking paranoid   Orientation person: oriented;place: oriented;time: oriented   Memory baseline memory   Insight poor   Judgement impaired   Eye Contact at examiner   Affect blunted, flat   Mood mood is calm   Physical Appearance/Attire attire appropriate to age and situation   Hygiene body odor   Suicidality other (see comments)  (Unable to assess)   1. Wish to be Dead (Recent)   (HANNY)   2. Non-Specific Active Suicidal Thoughts (Recent)   (HANNY)   Self Injury other (see comment)  (Unable to assess)   Elopement   (None stated or observed)   Activity isolative;withdrawn   Speech clear;coherent   Medication Sensitivity no stated side effects   Psychomotor / Gait balanced;steady   Activities of Daily Living   Hygiene/Grooming independent;prompts   Oral Hygiene independent   Dress independent;scrubs (behavioral health)   Laundry unable to complete   Room Organization independent     Pt stayed in his room for the majority of the shift. Pt watched TV for the duration of the shift. Pt complied with staff request to do a room check. This was the only time writer noted that the Pt exited his room during the shift. No aggressive behavior noted from Pt during this shift.

## 2019-12-24 NOTE — PROGRESS NOTES
12/24/19 0858 12/24/19 1737   Vital Signs   Temp 98.1  F (36.7  C)  --    Temp src Oral  --    Resp 16  --    Pulse 67 68   Pulse/Heart Rate Source Monitor Monitor   BP (!) 80/55 90/61   BP Location Right arm Right arm     Upon review of the chart, RN writer noted that patient was assessed to be hypotensive this morning (BP=80/55 at 08:58).  RN writer met with Jack and encouraged increased fluid intake.  Jack has been receptive to increasing his fluid intake, accepting both ice water and Ginger Ale.  He is mostly lying supine in bed, as he prefers that and reports that he is more comfortable in his room.  He is currently asymptomatic and denies feeling dizzy or light-headed.  No observed or reported falls.  Jack agreed to vital signs reassessment later in the shift, and his systolic BP had significantly improved (90/61 at 17:37).  RN writer alerted patient s attending psychiatrist, Dr. White, via electronic message routing system.  Will continue to monitor closely.

## 2019-12-24 NOTE — PROGRESS NOTES
The previous day (12/23) writer called [facility] attempting to get the fax number for Ruddy Henry, patient's CCM. No one was able to assist. Writer again left Ruddy Henry a VM asking for his fax number, as patient is discharging on 12/26 and CCM needs to sign his PD. Phone number listed is [phone].

## 2019-12-25 PROCEDURE — 25000132 ZZH RX MED GY IP 250 OP 250 PS 637: Performed by: PSYCHIATRY & NEUROLOGY

## 2019-12-25 PROCEDURE — 12400001 ZZH R&B MH UMMC

## 2019-12-25 RX ADMIN — DIVALPROEX SODIUM 1000 MG: 500 TABLET, EXTENDED RELEASE ORAL at 20:05

## 2019-12-25 RX ADMIN — NICOTINE 1 PATCH: 14 PATCH, EXTENDED RELEASE TRANSDERMAL at 09:07

## 2019-12-25 RX ADMIN — BENZTROPINE MESYLATE 1 MG: 0.5 TABLET ORAL at 09:07

## 2019-12-25 RX ADMIN — MULTIPLE VITAMINS W/ MINERALS TAB 1 TABLET: TAB at 09:07

## 2019-12-25 RX ADMIN — HALOPERIDOL 5 MG: 5 TABLET ORAL at 20:05

## 2019-12-25 RX ADMIN — BENZTROPINE MESYLATE 1 MG: 0.5 TABLET ORAL at 20:05

## 2019-12-25 RX ADMIN — ASPIRIN 81 MG CHEWABLE TABLET 81 MG: 81 TABLET CHEWABLE at 09:07

## 2019-12-25 RX ADMIN — LEVOTHYROXINE SODIUM 50 MCG: 25 TABLET ORAL at 09:07

## 2019-12-25 RX ADMIN — TRAZODONE HYDROCHLORIDE 100 MG: 100 TABLET ORAL at 20:05

## 2019-12-25 NOTE — PROGRESS NOTES
"[facility], [place]   Psychiatric Progress Note  Hospital Day: 32        Interim History:   The patient's care was discussed with the treatment team during the daily team meeting and/or staff's chart notes were reviewed.  Staff reports that the patient has been keeping to himself. He requires days worth of prodding to attend to ADLs (showers) and even after doesn't appear overly clean.    Upon interview,the patient asks about his transportation to [facility]. He states that he won't ride with his outpatient . I asked if he would walk should his outpatient  be there to pick him up, he replied, \"I'd stay here.\" I indicated that he will be discharging when a bed is available at an [facility] and that if his ACT team is providing transportation, I cannot guarantee who will be doing it. I suggested that if he is faced with that situation, he strongly consider the ride because it would not be an option for him to remain hospitalized.    Psychiatric Symptoms: delusional thoughts, paranoia improved    Medication side effects reported: none    Other issues reported by patient: none         Medications:       aspirin  81 mg Oral Daily     benztropine  1 mg Oral BID     divalproex sodium extended-release  1,000 mg Oral At Bedtime     haloperidol lactate  5 mg Intramuscular At Bedtime    Or     haloperidol  5 mg Oral At Bedtime     levothyroxine  50 mcg Oral Daily     multivitamin w/minerals  1 tablet Oral Daily     nicotine  1 patch Transdermal Q24H     nicotine   Transdermal Q8H     [START ON 12/27/2019] paliperidone  234 mg Intramuscular Q28 Days     traZODone  100 mg Oral At Bedtime          Allergies:     Allergies   Allergen Reactions     Peas GI Disturbance     Black eyed peas - vomiting          Labs:   No results found for this or any previous visit (from the past 48 hour(s)).       Psychiatric Examination:     BP 90/61 (BP Location: Right arm)   Pulse 68   Temp 97.6  F (36.4 "  C) (Tympanic)   Resp 16   Wt 92.1 kg (203 lb)   SpO2 94%   BMI 29.13 kg/m    Weight is 203 lbs 0 oz  Body mass index is 29.13 kg/m .    Orthostatic Vitals       Most Recent      Sitting Orthostatic BP 97/61 12/23 0800    Sitting Orthostatic Pulse (bpm) 62 12/23 0800    Standing Orthostatic BP 97/66 12/23 0800    Standing Orthostatic Pulse (bpm) 73 12/23 0800            Appearance: awake, alert, dressed in hospital scrubs and unkempt  Attitude:  cooperative  Eye Contact:  good  Mood:  good  Affect:  intensity is normal  Speech:  clear, coherent and normal prosody  Language: fluent and intact in English  Psychomotor, Gait, Musculoskeletal:  no evidence of tardive dyskinesia, dystonia, or tics and intact station, gait and muscle tone  Throught Process:  goal oriented  Associations:  no loose associations  Thought Content:  no auditory hallucinations present, no visual hallucinations present and paranoid  Insight:  limited  Judgement:  limited  Oriented to:  time, person, and place  Attention Span and Concentration:  intact  Recent and Remote Memory:  fair  Fund of Knowledge:  adequate    Clinical Global Impressions  First:  Considering your total clinical experience with this particular patient population, how severe are the patient's symptoms at this time?: 7 (11/25/19 1311)  Compared to the patient's condition at the START of treatment, this patient's condition is:: 4 (11/25/19 1311)  Most recent:  Considering your total clinical experience with this particular patient population, how severe are the patient's symptoms at this time?: 7 (12/18/19 1355)  Compared to the patient's condition at the START of treatment, this patient's condition is:: 3 (12/18/19 1355)           Precautions:     Behavioral Orders   Procedures     Assault precautions     Code 1 - Restrict to Unit     DISCUS     Elopement precautions     Routine Programming     As clinically indicated     Sexual precautions     Single Room     Status 15      Every 15 minutes.          Diagnoses:      1.  Schizoaffective disorder, bipolar type with exacerbation of sherie and psychosis.   2.  Historical diagnosis of cognitive decline  3.  History of tremors and EPSE 2/2 Haldol (at doses higher than 5 mg daily)           Assessment & Plan:   Assessment and hospital summary:  Ger Bashir is a 69-year-old  male, currently under MI commitment, with a history of schizoaffective disorder, bipolar type, history of noncompliance with Invega Sustenna and recurrent hospitalization who was referred to the Emergency Department by his ACT team due to increase in behavioral dysregulation and psychosis in the context of medication noncompliance and independent living. Report indicated that the patient had been throwing garbage at his neighbor, pounding on their doors and had been having difficulty caring for himself.    Target psychiatric symptoms and interventions:  No medication changes will be made today  1.  Cogentin continued at 1 mg twice a day.   2.  Haldol was initially restarted at higher than PTA dose (5 mg to 15 mg daily) on admission, though subsequently reduced to 5 mg BID due to evidence of EPSE on examination. However, Dr. Fox from ACT team noted that patient continued to exhibit tremors despite being off of Haldol for extended period of time.  Due to ongoing tremor, again reduced Haldol to 5 mg at bedtime. Obtained baseline DISCUS, which was scored at 3 on 12/5. Tremors have significantly improved since Haldol dose reduction.   3.  PRN Haldol, Ativan and Benadryl for severe behavioral outbursts.  Zyprexa for psychosis and mild agitation, as well as hydroxyzine for anxiety will be offered.   4.  Psychosocial assessment was obtained by our clinical  who will assist with setting up post-discharge care.  The patient has had recurrent recent hospitalizations.   5. Administered Invega Sustenna 234 mg on 11/29. Again discussed with nursing staff  from ACT team who confirms that last Invega Sustenna dose was last administered on 10/18/19. Patient refused dose scheduled for 11/15/19, which resulted in his decompensation. Next dose due on 12/27. Outpatient team is considering Trinza (i8odusd injection). This to be done by them post discharge.  6. Initiated Depakote ER 1,000 mg at bedtime on 12/5 to target symptoms of hypomania and due to noted improvement on Depakote in the past. Level was checked on 12/10 and is therapeutic at 67. If re-emergence of hypomanic sx, may consider further optimization.   7. Insomnia: Trazodone continued at 100 mg at bedtime.     Medical Problems and Treatments:  Hypothyroid  -stable at current dose of levothryroxine    Behavioral/Psychological/Social:  Encourage unit programming    Disposition:  Patient to discharge to [facility] on 12/26.    Disposition Plan   Reason for ongoing admission: is unable to care for self due to severe psychosis or sherie  Discharge location: [facility] facility  Discharge Medications: not ordered  Follow-up Appointments: not scheduled  Legal Status: full commitment with [facility] for haloperidol, olanzapine, paliperidone, clozapine  Entered by: Dominguez White on 12/24/2019 at 10:03 PM

## 2019-12-25 NOTE — PLAN OF CARE
Patient presents as calm, pleasant, and cooperative.  He appears socially withdrawn and quiet, prefers to isolate in his room.  He denies that he is experiencing any psychotic, manic, or depressive symptoms at this time.  He denies any dangerous ideations.  He has been compliant with all of his scheduled medications, and no adverse effects have been reported or observed.      RN writer received a call from patient's ACT team Randi MENDOZA, who requested an update on patient's current medication regimen (Noted addition of Depakote on 12/5/19), next date Invega Sustenna is due (noted last given on 11/29/19; highlighted that the Next dose is due on 12/27), and plan for medications to be filled at hospital discharge summary vs. electronic scripts being sent to the IRTS program's pharmacy of choice (noted that the IRTS asked we send orders to [facility]).  Update given.   Randi provided the fax number for the ACT team: [phone].  Patient will discharge on 12/26/19 to the [facility] program.

## 2019-12-25 NOTE — PROGRESS NOTES
12/25/19 1400   Behavioral Health   Hallucinations denies / not responding to hallucinations   Thinking poor concentration   Orientation person: oriented;place: oriented   Memory baseline memory   Insight poor   Judgement impaired   Eye Contact at examiner   Affect blunted, flat   Mood mood is calm   Physical Appearance/Attire disheveled   Hygiene neglected grooming - unclean body, hair, teeth

## 2019-12-26 VITALS
SYSTOLIC BLOOD PRESSURE: 109 MMHG | DIASTOLIC BLOOD PRESSURE: 73 MMHG | BODY MASS INDEX: 29.13 KG/M2 | OXYGEN SATURATION: 95 % | WEIGHT: 203 LBS | RESPIRATION RATE: 16 BRPM | TEMPERATURE: 97.4 F | HEART RATE: 85 BPM

## 2019-12-26 PROCEDURE — 25000132 ZZH RX MED GY IP 250 OP 250 PS 637: Performed by: PSYCHIATRY & NEUROLOGY

## 2019-12-26 PROCEDURE — 99239 HOSP IP/OBS DSCHRG MGMT >30: CPT | Performed by: PSYCHIATRY & NEUROLOGY

## 2019-12-26 RX ADMIN — NICOTINE 1 PATCH: 14 PATCH, EXTENDED RELEASE TRANSDERMAL at 08:37

## 2019-12-26 RX ADMIN — ASPIRIN 81 MG CHEWABLE TABLET 81 MG: 81 TABLET CHEWABLE at 08:37

## 2019-12-26 RX ADMIN — BENZTROPINE MESYLATE 1 MG: 0.5 TABLET ORAL at 08:37

## 2019-12-26 RX ADMIN — LEVOTHYROXINE SODIUM 50 MCG: 25 TABLET ORAL at 08:37

## 2019-12-26 RX ADMIN — MULTIPLE VITAMINS W/ MINERALS TAB 1 TABLET: TAB at 08:37

## 2019-12-26 ASSESSMENT — ACTIVITIES OF DAILY LIVING (ADL)
DRESS: SCRUBS (BEHAVIORAL HEALTH)
HYGIENE/GROOMING: INDEPENDENT
ORAL_HYGIENE: INDEPENDENT

## 2019-12-26 NOTE — PLAN OF CARE
Pt is discharging to [facility] today. Pt is agreeable to this plan and believes that it is safe and workable for him. Pt is pleasant and calm throughout interaction. Pt denies any SI/SIB/HI. Pt denies psychotic symptoms. Pt has no outward psychiatric symptoms. Pt is somewhat disheveled and unkept. Pt took all medications without issue. Pt denies physical health concerns or side effects from medications. Pt's standing BP remains low, he was encouraged to ambulate and have fluids. Will recheck prior to discharge, MD aware. Other VS were WDL

## 2019-12-26 NOTE — PROGRESS NOTES
Patient discharged to [facility] in [place], he was picked up by Eileen from his ACT team. PD signed by patient who received a copy, and it was faxed to [facility]. At this point in time writer has not been able to successfully contact his CCM Ruddy Henry to get his fax number after repeated attempts to do so, and therefore is unable to fax him the PD at this point in time. Patient will continue to receive medication management and other services through his [facility] ACT Team.

## 2019-12-26 NOTE — DISCHARGE INSTRUCTIONS
Behavioral Discharge Planning and Instructions      Summary:  You were admitted on 11/22/2019  due to Disorganized Thinking/Behaviors, Delusional Thoughts, Psychotic Symptomology, Manic Symptomology, Homicidal Ideations/Threatening Behaviors and Agressive Behaviors.  You were treated by Dr. Georgiana Prather MD and Dominguez White MD, and discharged on 12/26/19 from [facility] to [facility].     You are on a MI Commitment through [facility]. You are required to comply with all expectations of the Provisional Discharge that you signed. If you were violate your Provisional Discharge it may result in you being hospitalized.     Principal Diagnosis:   1.  Schizoaffective disorder, bipolar type with exacerbation of sherie and psychosis.   2.  Historical diagnosis of cognitive decline  3.  History of tremors and EPSE 2/2 Haldol (at doses higher than 5 mg daily)    Health Care Follow-up Appointments:   Patient has an ACT Team through [facility] that will assist in setting up any necessary appointments/services and will provide psychotropic medications. [facility] team reports they will meet with patient 1-2 times a week [facility] and everyday after he returns to the community.   [facility] ACT Team  Psychiatrist- Dr. Fox, [phone]  Nurse-Michelle, [phone]  Major Treatments, Procedures and Findings:  You were provided with: a psychiatric assessment, assessed for medical stability, medication evaluation and/or management, group therapy, milieu management and medical interventions    Symptoms to Report: feeling more aggressive, increased confusion, losing more sleep, mood getting worse or thoughts of suicide    Early warning signs can include: increased depression or anxiety sleep disturbances increased thoughts or behaviors of suicide or self-harm  increased unusual thinking, such as paranoia or hearing voices    Safety and Wellness:  Take all medicines as directed.  Make no changes unless your doctor  suggests them.      Follow treatment recommendations.  Refrain from alcohol and non-prescribed drugs.  If there is a concern for safety, call 911.    Resources:   Crisis Intervention: 484.258.2723 or [phone] (TTY: 205.778.8884).  Call anytime for help.  National [place] on Mental Illness (www.mn.felicia.org): 884.985.5612 or 491-868-6406.  Alcoholics Anonymous (www.alcoholics-anonymous.org): Check your phone book for your local chapter.  [facility] Crisis (COPE) Response - Adult 549 592-2306  Crisis Intervention: 127.726.2915 or 689-214-8812. Call anytime for help.       The treatment team has appreciated the opportunity to work with you.     If you have any questions or concerns our unit number is [phone].

## 2019-12-26 NOTE — DISCHARGE SUMMARY
Psychiatric Discharge Summary    Ger Bashir MRN# [id number]   Age: 69 year old YOB: 1950     Date of Admission:  11/22/2019  Date of Discharge:  12/26/2019  Admitting Physician:  Georgiana Prather MD  Discharge Physician:  Dominguez White MD          Event Leading to Hospitalization:   Ger Bashir is a 69-year-old  male with a history of schizoaffective disorder, bipolar type, history of noncompliance and recurrent hospitalization who was referred to the Emergency Department by his ACT team due to increase in behavioral dysregulation and psychosis in the context of medication noncompliance.  Reportedly, the patient was being evaluated at his place of residence by the [facility] psychiatrist.  He had been noncompliant with his medications.  Report indicated that the patient had been throwing garbage at his neighbor, pounding on their doors and had been having difficulty caring for himself.  He is reportedly under civil commitment with Wu orders.  While at the Emergency Department, the patient was quite dysregulated, hostile, agitated and possibly paranoid.  PRN medications were utilized and subdued his agitation.  Upon arrival to the unit, the patient was quite verbally combative.  He has been needing a great deal of prompting to comply with p.o. medications.  Behavioral outbursts continued requiring multiple PRNs and records due to safety concerns.  He reportedly had been delusional, suspicious, and tangential and at times, disorganized.        The patient was pacing in the hallways when approached by this provider.  He was quite loud, uttering nonsensical statements, yelling, using profanities.  He was directed to his room, but his agitation continued.  He refused p.o. medications.  Subsequently, a code 21 was called and IM neuroleptics were given.  The patient was placed in seclusion due to safety concerns.  He seemed to have very poor insight into his behavior.  He was  dismissive of all symptoms, denying depression, anxiety, suicidal thoughts and homicidal ideations, despite exhibiting a threatening demeanor toward staff.  Internal stimuli and paranoia were suspected.  Also appears to be expressing some delusional thoughts.  He was unable to answer questions regarding the event that led to this hospitalization, demanding discharge and expressed desire not to comply with care and medications if offered.             See Admission note by Raheel Doyle MD on 11/23/2019 for additional details.          Diagnoses:     1.  Schizoaffective disorder, bipolar type with exacerbation of sherie and psychosis.   2.  Historical diagnosis of cognitive decline  3.  History of tremors and EPSE 2/2 Haldol (at doses higher than 5 mg daily)  4.  Hypothyroidism, stable         Labs:     Results for orders placed or performed during the hospital encounter of 11/22/19   Valproic acid     Status: None   Result Value Ref Range    Valproic Acid Level 67 50 - 100 mg/L   CBC with platelets differential     Status: None   Result Value Ref Range    WBC 5.9 4.0 - 11.0 10e9/L    RBC Count 4.88 4.4 - 5.9 10e12/L    Hemoglobin 15.6 13.3 - 17.7 g/dL    Hematocrit 45.3 40.0 - 53.0 %    MCV 93 78 - 100 fl    MCH 32.0 26.5 - 33.0 pg    MCHC 34.4 31.5 - 36.5 g/dL    RDW 12.2 10.0 - 15.0 %    Platelet Count 194 150 - 450 10e9/L    Diff Method Automated Method     % Neutrophils 56.2 %    % Lymphocytes 31.3 %    % Monocytes 9.8 %    % Eosinophils 2.2 %    % Basophils 0.3 %    % Immature Granulocytes 0.2 %    Nucleated RBCs 0 0 /100    Absolute Neutrophil 3.3 1.6 - 8.3 10e9/L    Absolute Lymphocytes 1.9 0.8 - 5.3 10e9/L    Absolute Monocytes 0.6 0.0 - 1.3 10e9/L    Absolute Eosinophils 0.1 0.0 - 0.7 10e9/L    Absolute Basophils 0.0 0.0 - 0.2 10e9/L    Abs Immature Granulocytes 0.0 0 - 0.4 10e9/L    Absolute Nucleated RBC 0.0    Comprehensive metabolic panel     Status: Abnormal   Result Value Ref Range    Sodium 138 133 -  144 mmol/L    Potassium 3.9 3.4 - 5.3 mmol/L    Chloride 102 94 - 109 mmol/L    Carbon Dioxide 34 (H) 20 - 32 mmol/L    Anion Gap 2 (L) 3 - 14 mmol/L    Glucose 90 70 - 99 mg/dL    Urea Nitrogen 15 7 - 30 mg/dL    Creatinine 0.71 0.66 - 1.25 mg/dL    GFR Estimate >90 >60 mL/min/[1.73_m2]    GFR Estimate If Black >90 >60 mL/min/[1.73_m2]    Calcium 8.7 8.5 - 10.1 mg/dL    Bilirubin Total 0.6 0.2 - 1.3 mg/dL    Albumin 3.3 (L) 3.4 - 5.0 g/dL    Protein Total 6.4 (L) 6.8 - 8.8 g/dL    Alkaline Phosphatase 85 40 - 150 U/L    ALT 26 0 - 70 U/L    AST 11 0 - 45 U/L              Consults:   No consultations were requested during this admission         Hospital Course:   Ger Bashir was admitted to [facility] with attending Dominguez White MD under an ongoing civil commitment. The patient was placed under status 15 (15 minute checks) to ensure patient safety.     Patient was restarted on his PTA meds with initially a higher dose of haloperidol. On 15 mg daily, patient had notable tremor. He was decreased to 5 mg BID and then 5 mg nightly. DISCUS performed was scored at 3 on 12/5.  Case was discussed with outpatient team and they planned to transition him to [facility] ([id number] injection) due to his occasional refusal of the monthly. His next monthly will be due on 12/27. The outpatient team will manage the transition. Depakote was started at 1000 mg at bedtime due to hypomanic symptoms. He has been on this in the past. The patient still lacked insight and was ambivalent about medications at discharge.    The patient improved during his stay, and over the last week he was more pleasant. He did have some anger toward his county worker and indicated that he would refuse to get in the car for transport to [facility] if that is who picked him up. The patient was kept in the hospital until a bed was found at [facility] in order to allow longer time for stabilization. I had concerns that return home would lead to  behaviors that caused his admission in the first place. Repeated issues could jeopardize his housing, which would lead to an extremely prolonged hospitalization.    Ger Bashir was released to [facility]. At the time of discharge Ger Bashir was determined to not be a danger to himself or others.          Discharge Medications:     Current Discharge Medication List      START taking these medications    Details   acetaminophen (TYLENOL) 325 MG tablet Take 2 tablets (650 mg) by mouth every 4 hours as needed for mild pain    Associated Diagnoses: Encounter for routine adult health examination without abnormal findings      carboxymethylcellulose PF (REFRESH PLUS) 0.5 % ophthalmic solution Place 1 drop into both eyes 3 times daily as needed for dry eyes  Qty: 1 Bottle, Refills: 0    Associated Diagnoses: Encounter for routine adult health examination without abnormal findings      divalproex sodium extended-release (DEPAKOTE ER) 500 MG 24 hr tablet Take 2 tablets (1,000 mg) by mouth At Bedtime  Qty: 60 tablet, Refills: 0    Associated Diagnoses: Schizoaffective disorder, bipolar type (H)         CONTINUE these medications which have CHANGED    Details   aspirin (ASA) 81 MG chewable tablet Take 1 tablet (81 mg) by mouth daily  Qty: 30 tablet, Refills: 0    Associated Diagnoses: Paranoid schizophrenia (H)      benztropine (COGENTIN) 1 MG tablet Take 1 tablet (1 mg) by mouth 2 times daily  Qty: 60 tablet, Refills: 0    Associated Diagnoses: Paranoid schizophrenia (H)      haloperidol (HALDOL) 5 MG tablet Take 1 tablet (5 mg) by mouth At Bedtime  Qty: 30 tablet, Refills: 0    Associated Diagnoses: Paranoid schizophrenia (H)      levothyroxine (SYNTHROID/LEVOTHROID) 50 MCG tablet Take 1 tablet (50 mcg) by mouth daily  Qty: 30 tablet, Refills: 0    Associated Diagnoses: Paranoid schizophrenia (H)      multivitamin w/minerals (THERA-VIT-M) tablet Take 1 tablet by mouth daily  Qty: 30 tablet, Refills: 0     Associated Diagnoses: Paranoid schizophrenia (H)      paliperidone (INVEGA SUSTENNA) 234 MG/1.5ML CHRISTOS Inject 1.5 mLs (234 mg) into the muscle every 28 days Next dose due 12/27/19    Associated Diagnoses: Schizoaffective disorder, bipolar type (H)      Skin Protectants, Misc. (EUCERIN) cream Apply topically 2 times daily  Qty: 2 g, Refills: 0    Associated Diagnoses: Paranoid schizophrenia (H)      traZODone (DESYREL) 100 MG tablet Take 1 tablet (100 mg) by mouth At Bedtime  Qty: 30 tablet, Refills: 0    Associated Diagnoses: Paranoid schizophrenia (H)                  Psychiatric Examination:   Appearance:  awake, alert, dressed in hospital scrubs and disheveled   Attitude:  cooperative  Eye Contact:  good  Mood:  mildly irritable  Affect:  mood congruent  Speech:  clear, coherent  Psychomotor Behavior:  no evidence of tardive dyskinesia, dystonia, or tics  Thought Process:  linear and goal oriented  Associations:  no loose associations  Thought Content:  some ongoing paranoia  Insight:  limited  Judgment:  limited  Oriented to:  time, person, and place  Attention Span and Concentration:  intact  Recent and Remote Memory:  intact  Language: Able to name objects and Able to repeat phrases  Fund of Knowledge: appropriate  Muscle Strength and Tone: normal  Gait and Station: Normal         Discharge Plan:   Patient was discharged to [facility]. His ACT team and Dr. Fox will manage his transition to [facility] from [facility]. They will schedule his outpatient appointments for ongoing management and work with him on transition back home when stable.    Attestation:  The patient has been seen and evaluated by me,  Dominguez White MD  On the day of discharge, I saw the patient and performed the above examination, reviewed discharge medications, reviewed follow-up plan, and assessed safety for discharge. I spent greater than 30 minutes on these tasks.

## 2020-04-02 ENCOUNTER — MEDICAL CORRESPONDENCE (OUTPATIENT)
Dept: HEALTH INFORMATION MANAGEMENT | Facility: CLINIC | Age: 70
End: 2020-04-02

## 2020-04-05 ENCOUNTER — RECORDS - HEALTHEAST (OUTPATIENT)
Dept: ADMINISTRATIVE | Facility: OTHER | Age: 70
End: 2020-04-05

## 2020-04-05 ENCOUNTER — AMBULATORY - HEALTHEAST (OUTPATIENT)
Dept: BEHAVIORAL HEALTH | Facility: CLINIC | Age: 70
End: 2020-04-05

## 2020-04-05 ENCOUNTER — HOSPITAL ENCOUNTER (EMERGENCY)
Facility: CLINIC | Age: 70
Discharge: HOSPICE | End: 2020-04-06
Attending: EMERGENCY MEDICINE | Admitting: EMERGENCY MEDICINE
Payer: COMMERCIAL

## 2020-04-05 ENCOUNTER — TELEPHONE (OUTPATIENT)
Dept: BEHAVIORAL HEALTH | Facility: CLINIC | Age: 70
End: 2020-04-05

## 2020-04-05 DIAGNOSIS — F20.9 SCHIZOPHRENIA, UNSPECIFIED TYPE (H): ICD-10-CM

## 2020-04-05 DIAGNOSIS — F22 PARANOIA (H): ICD-10-CM

## 2020-04-05 LAB
ANION GAP SERPL CALCULATED.3IONS-SCNC: 5 MMOL/L (ref 3–14)
BASOPHILS # BLD AUTO: 0 10E9/L (ref 0–0.2)
BASOPHILS NFR BLD AUTO: 0.4 %
BUN SERPL-MCNC: 16 MG/DL (ref 7–30)
CALCIUM SERPL-MCNC: 8.7 MG/DL (ref 8.5–10.1)
CHLORIDE SERPL-SCNC: 104 MMOL/L (ref 94–109)
CO2 SERPL-SCNC: 27 MMOL/L (ref 20–32)
CREAT SERPL-MCNC: 0.8 MG/DL (ref 0.66–1.25)
DIFFERENTIAL METHOD BLD: NORMAL
EOSINOPHIL # BLD AUTO: 0.1 10E9/L (ref 0–0.7)
EOSINOPHIL NFR BLD AUTO: 0.8 %
ERYTHROCYTE [DISTWIDTH] IN BLOOD BY AUTOMATED COUNT: 12.6 % (ref 10–15)
GFR SERPL CREATININE-BSD FRML MDRD: >90 ML/MIN/{1.73_M2}
GLUCOSE SERPL-MCNC: 97 MG/DL (ref 70–99)
HCT VFR BLD AUTO: 44.6 % (ref 40–53)
HGB BLD-MCNC: 15.7 G/DL (ref 13.3–17.7)
IMM GRANULOCYTES # BLD: 0 10E9/L (ref 0–0.4)
IMM GRANULOCYTES NFR BLD: 0.1 %
LYMPHOCYTES # BLD AUTO: 1.5 10E9/L (ref 0.8–5.3)
LYMPHOCYTES NFR BLD AUTO: 21.1 %
MCH RBC QN AUTO: 31.9 PG (ref 26.5–33)
MCHC RBC AUTO-ENTMCNC: 35.2 G/DL (ref 31.5–36.5)
MCV RBC AUTO: 91 FL (ref 78–100)
MONOCYTES # BLD AUTO: 0.6 10E9/L (ref 0–1.3)
MONOCYTES NFR BLD AUTO: 8.3 %
NEUTROPHILS # BLD AUTO: 5 10E9/L (ref 1.6–8.3)
NEUTROPHILS NFR BLD AUTO: 69.3 %
NRBC # BLD AUTO: 0 10*3/UL
NRBC BLD AUTO-RTO: 0 /100
PLATELET # BLD AUTO: 252 10E9/L (ref 150–450)
POTASSIUM SERPL-SCNC: 4.1 MMOL/L (ref 3.4–5.3)
RBC # BLD AUTO: 4.92 10E12/L (ref 4.4–5.9)
SODIUM SERPL-SCNC: 136 MMOL/L (ref 133–144)
TSH SERPL DL<=0.005 MIU/L-ACNC: 3.12 MU/L (ref 0.4–4)
VALPROATE SERPL-MCNC: 3 MG/L (ref 50–100)
WBC # BLD AUTO: 7.2 10E9/L (ref 4–11)

## 2020-04-05 PROCEDURE — 99285 EMERGENCY DEPT VISIT HI MDM: CPT | Mod: 25 | Performed by: EMERGENCY MEDICINE

## 2020-04-05 PROCEDURE — 85025 COMPLETE CBC W/AUTO DIFF WBC: CPT | Performed by: EMERGENCY MEDICINE

## 2020-04-05 PROCEDURE — 80048 BASIC METABOLIC PNL TOTAL CA: CPT | Performed by: EMERGENCY MEDICINE

## 2020-04-05 PROCEDURE — 25000132 ZZH RX MED GY IP 250 OP 250 PS 637: Performed by: EMERGENCY MEDICINE

## 2020-04-05 PROCEDURE — 80164 ASSAY DIPROPYLACETIC ACD TOT: CPT | Performed by: EMERGENCY MEDICINE

## 2020-04-05 PROCEDURE — 84443 ASSAY THYROID STIM HORMONE: CPT | Performed by: EMERGENCY MEDICINE

## 2020-04-05 PROCEDURE — 99284 EMERGENCY DEPT VISIT MOD MDM: CPT | Mod: Z6 | Performed by: EMERGENCY MEDICINE

## 2020-04-05 PROCEDURE — 90791 PSYCH DIAGNOSTIC EVALUATION: CPT

## 2020-04-05 RX ORDER — HALOPERIDOL 5 MG/1
5 TABLET ORAL AT BEDTIME
Status: DISCONTINUED | OUTPATIENT
Start: 2020-04-05 | End: 2020-04-06 | Stop reason: HOSPADM

## 2020-04-05 RX ORDER — BENZTROPINE MESYLATE 1 MG/1
1 TABLET ORAL 2 TIMES DAILY
Status: DISCONTINUED | OUTPATIENT
Start: 2020-04-05 | End: 2020-04-06 | Stop reason: HOSPADM

## 2020-04-05 RX ADMIN — HALOPERIDOL 5 MG: 5 TABLET ORAL at 21:07

## 2020-04-05 RX ADMIN — BENZTROPINE MESYLATE 1 MG: 1 TABLET ORAL at 21:07

## 2020-04-05 ASSESSMENT — ENCOUNTER SYMPTOMS
COUGH: 0
FEVER: 0

## 2020-04-06 VITALS
SYSTOLIC BLOOD PRESSURE: 97 MMHG | TEMPERATURE: 97.7 F | RESPIRATION RATE: 16 BRPM | HEART RATE: 81 BPM | OXYGEN SATURATION: 94 % | DIASTOLIC BLOOD PRESSURE: 66 MMHG

## 2020-04-06 PROCEDURE — 25000132 ZZH RX MED GY IP 250 OP 250 PS 637: Performed by: EMERGENCY MEDICINE

## 2020-04-06 RX ORDER — HALOPERIDOL 5 MG/1
5 TABLET ORAL ONCE
Status: COMPLETED | OUTPATIENT
Start: 2020-04-06 | End: 2020-04-06

## 2020-04-06 RX ADMIN — HALOPERIDOL 5 MG: 5 TABLET ORAL at 01:14

## 2020-04-06 NOTE — ED NOTES
ED to Behavioral Floor Handoff  ambulance  SITUATION  Ger Bashir is a 70 year old male who speaks English and lives in a home alone The patient arrived in the ED by  from home with a complaint of Paranoid (pt called PD, he feels that [facility] will take him and doesn't feel safe alone in his house, has history of schizophrenia, has been off his medication for unknown time)  .The patient's current symptoms started/worsened 1 day(s) ago and during this time the symptoms have increased.   In the ED, pt was diagnosed with   Final diagnoses:   Schizophrenia, unspecified type (H)   Paranoia (H)        Initial vitals were: BP: (!) 132/94  Pulse: 100  Temp: 98.1  F (36.7  C)  Resp: 18  Weight: (HANNY)  SpO2: 99 %   --------  Is the patient diabetic? No   If yes, last blood glucose? --     If yes, was this treated in the ED? --  --------  Is the patient inebriated (ETOH) No or Impaired on other substances? No  MSSA done? N/A  Last MSSA score: --    Were withdrawal symptoms treated? N/A  Does the patient have a seizure history? No. If yes, date of most recent seizure--  --------  Is the patient patient experiencing suicidal ideation? denies current or recent suicidal ideation     Homicidal ideation? denies current or recent homicidal ideation or behaviors.    Self-injurious behavior/urges? denies current or recent self injurious behavior or ideation.  ------  Was pt aggressive in the ED No  Was a code called No  Is the pt now cooperative? Yes  -------  Meds given in ED:   Medications   benztropine (COGENTIN) tablet 1 mg (1 mg Oral Given 4/5/20 2107)   haloperidol (HALDOL) tablet 5 mg (5 mg Oral Given 4/5/20 2107)      Family present during ED course? No  Family currently present? No    BACKGROUND  Does the patient have a cognitive impairment or developmental disability? No  Allergies:   Allergies   Allergen Reactions     Peas GI Disturbance     Black eyed peas - vomiting   .   Social demographics are   Social History      Socioeconomic History     Marital status: Single     Spouse name: None     Number of children: None     Years of education: None     Highest education level: None   Occupational History     None   Social Needs     Financial resource strain: None     Food insecurity     Worry: None     Inability: None     Transportation needs     Medical: None     Non-medical: None   Tobacco Use     Smoking status: Current Every Day Smoker     Packs/day: 2.00     Years: 15.00     Pack years: 30.00     Types: Cigarettes     Smokeless tobacco: Never Used   Substance and Sexual Activity     Alcohol use: Yes     Comment: social     Drug use: No     Sexual activity: Never   Lifestyle     Physical activity     Days per week: None     Minutes per session: None     Stress: None   Relationships     Social connections     Talks on phone: None     Gets together: None     Attends Buddhism service: None     Active member of club or organization: None     Attends meetings of clubs or organizations: None     Relationship status: None     Intimate partner violence     Fear of current or ex partner: None     Emotionally abused: None     Physically abused: None     Forced sexual activity: None   Other Topics Concern     None   Social History Narrative     None        ASSESSMENT  Labs results   Labs Ordered and Resulted from Time of ED Arrival Up to the Time of Departure from the ED   VALPROIC ACID - Abnormal; Notable for the following components:       Result Value    Valproic Acid Level 3 (*)     All other components within normal limits   CBC WITH PLATELETS DIFFERENTIAL   BASIC METABOLIC PANEL   TSH WITH FREE T4 REFLEX      Imaging Studies: No results found for this or any previous visit (from the past 24 hour(s)).   Most recent vital signs BP (!) 132/94   Pulse 100   Temp 98.1  F (36.7  C) (Oral)   Resp 18   SpO2 99%    Abnormal labs/tests/findings requiring intervention:---   Pain control: pt had none  Nausea control: pt had  none    RECOMMENDATION  Are any infection precautions needed (MRSA, VRE, etc.)? No If yes, what infection? --  ---  Does the patient have mobility issues? independently. If yes, what device does the pt use? ---  ---  Is patient on 72 hour hold or commitment? No If on 72 hour hold, have hold and rights been given to patient? N/A  Are admitting orders written if after 10 p.m. ?N/A  Tasks needing to be completed:---     Omero Wilson RN   [phone] [facility]   [phone] [facility]

## 2020-04-06 NOTE — ED NOTES
Pt placement called. Client going to [facility], station 4500 with Dr. Mendez. Will call after [phone]. Their phone number is [phone].

## 2020-04-06 NOTE — ED PROVIDER NOTES
"    [facility] [place])     April 5, 2020    History     Chief Complaint   Patient presents with     Paranoid     pt called PD, he feels that [facility] will take him and doesn't feel safe alone in his house, has history of schizophrenia, has been off his medication for unknown time     The history is provided by the patient and medical records.     Ger Bashir is a 70 year old male with a history of schizophrenia who presents to the Emergency Department with paranoia. Patient reports he called the police, because \"the [facility] was coming up in my basement.\" Patient states he believes the [facility] is coming after him. Patient reports he has not been taking his medication for a long time. Patient is a heavy smoker, and states he has an occasional cough because of this. Patient denies fever.    PAST MEDICAL HISTORY:   Past Medical History:   Diagnosis Date     Schizophrenia (H)        PAST SURGICAL HISTORY: History reviewed. No pertinent surgical history.    Past medical history, past surgical history, medications, and allergies were reviewed with the patient. Additional pertinent items: None    FAMILY HISTORY: History reviewed. No pertinent family history.    SOCIAL HISTORY:   Social History     Tobacco Use     Smoking status: Current Every Day Smoker     Packs/day: 2.00     Years: 15.00     Pack years: 30.00     Types: Cigarettes     Smokeless tobacco: Never Used   Substance Use Topics     Alcohol use: Yes     Comment: social     Social history was reviewed with the patient. Additional pertinent items: None      Patient's Medications   New Prescriptions    No medications on file   Previous Medications    ACETAMINOPHEN (TYLENOL) 325 MG TABLET    Take 2 tablets (650 mg) by mouth every 4 hours as needed for mild pain    ASPIRIN (ASA) 81 MG CHEWABLE TABLET    Take 1 tablet (81 mg) by mouth daily    BENZTROPINE (COGENTIN) 1 MG TABLET    Take 1 tablet (1 mg) by mouth 2 times daily    " CARBOXYMETHYLCELLULOSE PF (REFRESH PLUS) 0.5 % OPHTHALMIC SOLUTION    Place 1 drop into both eyes 3 times daily as needed for dry eyes    DIVALPROEX SODIUM EXTENDED-RELEASE (DEPAKOTE ER) 500 MG 24 HR TABLET    Take 2 tablets (1,000 mg) by mouth At Bedtime    HALOPERIDOL (HALDOL) 5 MG TABLET    Take 1 tablet (5 mg) by mouth At Bedtime    LEVOTHYROXINE (SYNTHROID/LEVOTHROID) 50 MCG TABLET    Take 1 tablet (50 mcg) by mouth daily    MULTIVITAMIN W/MINERALS (THERA-VIT-M) TABLET    Take 1 tablet by mouth daily    PALIPERIDONE (INVEGA SUSTENNA) 234 MG/1.5ML CHRISTOS    Inject 1.5 mLs (234 mg) into the muscle every 28 days Next dose due 12/27/19    SKIN PROTECTANTS, MISC. (EUCERIN) CREAM    Apply topically 2 times daily    TRAZODONE (DESYREL) 100 MG TABLET    Take 1 tablet (100 mg) by mouth At Bedtime   Modified Medications    No medications on file   Discontinued Medications    No medications on file          Allergies   Allergen Reactions     Peas GI Disturbance     Black eyed peas - vomiting        Review of Systems   Constitutional: Negative for fever.   Respiratory: Negative for cough.    All other systems reviewed and are negative.    A complete review of systems was performed with pertinent positives and negatives noted in the HPI, and all other systems negative.       Physical Exam   BP: (!) 132/94  Pulse: 100  Temp: 98.1  F (36.7  C)  Resp: 18  Weight: (HANNY)  SpO2: 99 %      Physical Exam  Vitals signs and nursing note reviewed.   Constitutional:       General: He is not in acute distress.     Appearance: He is well-developed.   HENT:      Head: Normocephalic.   Eyes:      Extraocular Movements: Extraocular movements intact.   Neck:      Musculoskeletal: Neck supple.   Pulmonary:      Effort: No respiratory distress.   Musculoskeletal:         General: No deformity.   Skin:     General: Skin is dry.   Neurological:      Mental Status: He is alert.      Comments: Oriented to person, confused, answering questions,    Psychiatric:      Comments: Delusions with impaired judgment         ED Course   8:22 PM  The patient was seen and examined by Salvador Souza DO in Room ED05.      Procedures            \  Results for orders placed or performed during the hospital encounter of 04/05/20   Valproic acid (Depakote level)     Status: Abnormal   Result Value Ref Range    Valproic Acid Level 3 (L) 50 - 100 mg/L   CBC with platelets differential     Status: None   Result Value Ref Range    WBC 7.2 4.0 - 11.0 10e9/L    RBC Count 4.92 4.4 - 5.9 10e12/L    Hemoglobin 15.7 13.3 - 17.7 g/dL    Hematocrit 44.6 40.0 - 53.0 %    MCV 91 78 - 100 fl    MCH 31.9 26.5 - 33.0 pg    MCHC 35.2 31.5 - 36.5 g/dL    RDW 12.6 10.0 - 15.0 %    Platelet Count 252 150 - 450 10e9/L    Diff Method Automated Method     % Neutrophils 69.3 %    % Lymphocytes 21.1 %    % Monocytes 8.3 %    % Eosinophils 0.8 %    % Basophils 0.4 %    % Immature Granulocytes 0.1 %    Nucleated RBCs 0 0 /100    Absolute Neutrophil 5.0 1.6 - 8.3 10e9/L    Absolute Lymphocytes 1.5 0.8 - 5.3 10e9/L    Absolute Monocytes 0.6 0.0 - 1.3 10e9/L    Absolute Eosinophils 0.1 0.0 - 0.7 10e9/L    Absolute Basophils 0.0 0.0 - 0.2 10e9/L    Abs Immature Granulocytes 0.0 0 - 0.4 10e9/L    Absolute Nucleated RBC 0.0    Basic metabolic panel     Status: None   Result Value Ref Range    Sodium 136 133 - 144 mmol/L    Potassium 4.1 3.4 - 5.3 mmol/L    Chloride 104 94 - 109 mmol/L    Carbon Dioxide 27 20 - 32 mmol/L    Anion Gap 5 3 - 14 mmol/L    Glucose 97 70 - 99 mg/dL    Urea Nitrogen 16 7 - 30 mg/dL    Creatinine 0.80 0.66 - 1.25 mg/dL    GFR Estimate >90 >60 mL/min/[1.73_m2]    GFR Estimate If Black >90 >60 mL/min/[1.73_m2]    Calcium 8.7 8.5 - 10.1 mg/dL   TSH with free T4 reflex     Status: None   Result Value Ref Range    TSH 3.12 0.40 - 4.00 mU/L       Results for orders placed or performed during the hospital encounter of 04/05/20 (from the past 24 hour(s))   Valproic acid (Depakote level)    Result Value Ref Range    Valproic Acid Level 3 (L) 50 - 100 mg/L   CBC with platelets differential   Result Value Ref Range    WBC 7.2 4.0 - 11.0 10e9/L    RBC Count 4.92 4.4 - 5.9 10e12/L    Hemoglobin 15.7 13.3 - 17.7 g/dL    Hematocrit 44.6 40.0 - 53.0 %    MCV 91 78 - 100 fl    MCH 31.9 26.5 - 33.0 pg    MCHC 35.2 31.5 - 36.5 g/dL    RDW 12.6 10.0 - 15.0 %    Platelet Count 252 150 - 450 10e9/L    Diff Method Automated Method     % Neutrophils 69.3 %    % Lymphocytes 21.1 %    % Monocytes 8.3 %    % Eosinophils 0.8 %    % Basophils 0.4 %    % Immature Granulocytes 0.1 %    Nucleated RBCs 0 0 /100    Absolute Neutrophil 5.0 1.6 - 8.3 10e9/L    Absolute Lymphocytes 1.5 0.8 - 5.3 10e9/L    Absolute Monocytes 0.6 0.0 - 1.3 10e9/L    Absolute Eosinophils 0.1 0.0 - 0.7 10e9/L    Absolute Basophils 0.0 0.0 - 0.2 10e9/L    Abs Immature Granulocytes 0.0 0 - 0.4 10e9/L    Absolute Nucleated RBC 0.0    Basic metabolic panel   Result Value Ref Range    Sodium 136 133 - 144 mmol/L    Potassium 4.1 3.4 - 5.3 mmol/L    Chloride 104 94 - 109 mmol/L    Carbon Dioxide 27 20 - 32 mmol/L    Anion Gap 5 3 - 14 mmol/L    Glucose 97 70 - 99 mg/dL    Urea Nitrogen 16 7 - 30 mg/dL    Creatinine 0.80 0.66 - 1.25 mg/dL    GFR Estimate >90 >60 mL/min/[1.73_m2]    GFR Estimate If Black >90 >60 mL/min/[1.73_m2]    Calcium 8.7 8.5 - 10.1 mg/dL   TSH with free T4 reflex   Result Value Ref Range    TSH 3.12 0.40 - 4.00 mU/L     Medications   benztropine (COGENTIN) tablet 1 mg (1 mg Oral Given 4/5/20 2107)   haloperidol (HALDOL) tablet 5 mg (5 mg Oral Given 4/5/20 2107)             Assessments & Plan (with Medical Decision Making)   70-year-old male with a history of schizophrenia comes to us with concerns that the [facility] is monitoring him and he is concerned for his life.  The patient reports he has not been taking his medications.  He is normally on Haldol and Cogentin.  We gave him his evening doses.  Labs are unremarkable.  Patient is on  thyroid replacement but fortunately TSH was normal.  Patient was evaluated by both myself and our mental health .  Patient is to be admitted to the behavioral health unit for stabilization.  He has impaired judgment, lacks insight and is experiencing paranoia and delusions.    I have reviewed the nursing notes.    I have reviewed the findings, diagnosis, plan and need for follow up with the patient.    New Prescriptions    No medications on file       Final diagnoses:   Schizophrenia, unspecified type (H)   Paranoia (H)     I, Adriana Moscoso, am serving as a trained medical scribe to document services personally performed by  Salvador Souza DO, based on the provider's statements to me.      I, Salvador Souza DO, was physically present and have reviewed and verified the accuracy of this note documented by Adriana Moscoso.     4/5/2020   [facility], [place], EMERGENCY DEPARTMENT     Salvador Souza DO  04/05/20 1019

## 2020-04-06 NOTE — TELEPHONE ENCOUNTER
S:  Pt is a 70 yrs old male in the [place] ED for paranoia, reports by Imani.    B: Pt has a hx of Schizoeffective d/o with Bipolar type.  Pt reports having been off his medications for some time.  Pt called police for help with the [facility]- that the [facility] was coming after him and he does not feel safe at home.  Pt was paranoid, delusional, and unable to care for himself.  Pt was not able to provide much info.  Pt repeated random rants.    Pt is a heavy smoker and states he has occasional cough because of this.  Denies fever.  Pt is medically cleared and ambulates independently.      Pt was here on 11/22/19-12/26/19.   Per previous discharge summary, pt has a hx of noncompliant with his medications.  Had behavioral outbursts(yelling and using profanity), disorganized, delusional, suspicious, and tangential at times.      Valproic Acid level 3  CBC normal  Basic Metabolic Panel normal  TSH normal    A: For now he is voluntary and may be put on a hold if he tries to leave.     R: 11:16PM-Dr. Rosario accepted for 4500/Mendez.  Unit and ED notified.   Bed control was notified.  Pt's room# is 4530-bed2. ED can call to report @ 12:05AM.        Travel screen was completed.     Patient cleared and ready for behavioral bed placement: Yes

## 2020-04-06 NOTE — ED NOTES
Bed: HW01  Expected date:   Expected time:   Means of arrival:   Comments:  [id number]  70 M  Jane Heredia

## 2020-11-10 ENCOUNTER — HOSPITAL ENCOUNTER (EMERGENCY)
Facility: CLINIC | Age: 70
Discharge: LEFT BEFORE DISCHARGE | End: 2020-11-10
Payer: COMMERCIAL

## 2020-11-10 VITALS
HEIGHT: 70 IN | SYSTOLIC BLOOD PRESSURE: 118 MMHG | BODY MASS INDEX: 29.13 KG/M2 | RESPIRATION RATE: 20 BRPM | TEMPERATURE: 99.7 F | DIASTOLIC BLOOD PRESSURE: 73 MMHG | OXYGEN SATURATION: 99 % | HEART RATE: 99 BPM

## 2020-12-17 ENCOUNTER — MEDICAL CORRESPONDENCE (OUTPATIENT)
Dept: HEALTH INFORMATION MANAGEMENT | Facility: CLINIC | Age: 70
End: 2020-12-17

## 2021-06-07 NOTE — PROGRESS NOTES
S:  Pt is a 70 yrs old male in the [place] ED for paranoia, reports by Imani.    B: Pt has a hx of Schizoeffective d/o with Bipolar type.  Pt reports having been off his medications for some time.  Pt called police for help with the [facility]- that the [facility] was coming after him and he does not feel safe at home.  Pt was paranoid, delusional, and unable to care for himself.  Pt was not able to provide much info.  Pt repeated random rants.    Pt is a heavy smoker and states he has occasional cough because of this.  Denies fever.  Pt is medically cleared and ambulates independently.      Pt was here on 11/22/19-12/26/19.   Per previous discharge summary, pt has a hx of noncompliant with his medications.  Had behavioral outbursts(yelling and using profanity), disorganized, delusional, suspicious, and tangential at times.      Valproic Acid level 3  CBC normal  Basic Metabolic Panel normal  TSH normal    A: For now he is voluntary and may be put on a hold if he tries to leave.     R: 11:16PM-Dr. Rosario accepted for 4500/Mendez.  Unit and ED notified.   Bed control was notified.        Travel screen was completed.     Patient cleared and ready for behavioral bed placement: Yes

## 2021-12-25 NOTE — PROGRESS NOTES
Pt isolated to his room for the entirety of this shift. Pt ate his meal in his room. Pt mood seems depressed, however pt denies SI/SIB. Pt is pleasant and polite upon approach, but conversation with staff and peers is minimal. Pt denies AH/VH. There were no other behavioral concerns of note to this writer during this shift.     12/21/19 2100   Behavioral Health   Hallucinations denies / not responding to hallucinations   Thinking poor concentration   Orientation person: oriented;place: oriented;date: oriented;time: oriented   Memory baseline memory   Insight poor   Judgement impaired   Eye Contact at examiner   Affect blunted, flat   Mood depressed;mood is calm   Physical Appearance/Attire disheveled;untidy   Hygiene neglected grooming - unclean body, hair, teeth   Suicidality other (see comments)  (Denies)   1. Wish to be Dead (Recent) No   2. Non-Specific Active Suicidal Thoughts (Recent) No   Self Injury other (see comment)  (none stated; none observed)   Elopement   (none observed)   Activity isolative;withdrawn   Speech clear   Medication Sensitivity no stated side effects;no observed side effects   Psychomotor / Gait balanced;steady   Psycho Education   Type of Intervention 1:1 intervention   Response participates, initiates socially appropriate   Hours 0.5   Treatment Detail   (check-in)   Activities of Daily Living   Hygiene/Grooming independent   Oral Hygiene independent   Dress scrubs (behavioral health);independent   Laundry unable to complete   Room Organization independent      unable to obtain due to cognitive abilities

## 2022-10-31 NOTE — PROVIDER NOTIFICATION
04/18/19 0215   Patient Belongings   Did you bring any home meds/supplements to the hospital?  No   Patient Belongings remains with patient;locker   Patient Belongings Remaining with Patient clothing;glasses   Patient Belongings Put in Hospital Secure Location (Security or Locker, etc.) clothing;keys;MP3 Player;plastic bag;shoes   Belongings Search Yes   Clothing Search Yes   Second Staff [employer] 3A   A               Admission:  Pt. Admitted on the night shift to room 358,  Pt. Will have in his room his glasses and t-shirt.  Locked in storage on the floor of 3B are the following items:  Three plastic bags, pants, shoes, papers, [place] I.D[place] Keys, cigarettes, lighter, MP3 player, headset, brut cologne, and a black jacket.  [facility] [employer] 4/18/19.    Pt tx to [facility] 4/19/19  All pt belongings placed in pt locker  I am responsible for any personal items that are not sent to the safe or pharmacy.  [facility] is not responsible for loss, theft or damage of any property in my possession.    Signature:  _________________________________ Date: _______  Time: _____                                              Staff Signature:  ____________________________ Date: ________  Time: _____      2nd Staff person, if patient is unable/unwilling to sign:    Signature: ________________________________ Date: ________  Time: _____     Discharge:  [facility] has returned all of my personal belongings:    Signature: _________________________________ Date: ________  Time: _____                                          Staff Signature:  ____________________________ Date: ________  Time: _____        Show Aperture Variable?: Yes Detail Level: Detailed Render Post-Care Instructions In Note?: no Duration Of Freeze Thaw-Cycle (Seconds): 0 Consent: The patient's consent was obtained including but not limited to risks of crusting, scabbing, blistering, scarring, darker or lighter pigmentary change, recurrence, incomplete removal and infection. Post-Care Instructions: I reviewed with the patient in detail post-care instructions. Patient is to wear sunprotection, and avoid picking at any of the treated lesions. Pt may apply Vaseline to crusted or scabbing areas. Medical Necessity Information: It is in your best interest to select a reason for this procedure from the list below. All of these items fulfill various CMS LCD requirements except the new and changing color options. Medical Necessity Clause: This procedure was medically necessary because the lesions that were treated were: Spray Paint Text: The liquid nitrogen was applied to the skin utilizing a spray paint frosting technique.

## 2023-06-13 ENCOUNTER — HOSPITAL ENCOUNTER (EMERGENCY)
Facility: HOSPITAL | Age: 73
Discharge: HOME | End: 2023-06-13
Attending: EMERGENCY MEDICINE | Admitting: EMERGENCY MEDICINE
Payer: COMMERCIAL

## 2023-06-13 VITALS
WEIGHT: 200 LBS | SYSTOLIC BLOOD PRESSURE: 157 MMHG | DIASTOLIC BLOOD PRESSURE: 79 MMHG | OXYGEN SATURATION: 99 % | HEART RATE: 55 BPM | BODY MASS INDEX: 28.7 KG/M2 | TEMPERATURE: 98 F | RESPIRATION RATE: 18 BRPM

## 2023-06-13 DIAGNOSIS — H04.123 DRY EYES: ICD-10-CM

## 2023-06-13 PROCEDURE — 99283 EMERGENCY DEPT VISIT LOW MDM: CPT

## 2023-06-13 PROCEDURE — 250N000013 HC RX MED GY IP 250 OP 250 PS 637: Performed by: EMERGENCY MEDICINE

## 2023-06-13 RX ORDER — CARBOXYMETHYLCELLULOSE SODIUM 5 MG/ML
1 SOLUTION/ DROPS OPHTHALMIC 3 TIMES DAILY PRN
Qty: 70 EACH | Refills: 0 | Status: SHIPPED | OUTPATIENT
Start: 2023-06-13

## 2023-06-13 RX ORDER — CARBOXYMETHYLCELLULOSE SODIUM 5 MG/ML
1 SOLUTION/ DROPS OPHTHALMIC 3 TIMES DAILY PRN
Qty: 70 EACH | Refills: 0 | Status: SHIPPED | OUTPATIENT
Start: 2023-06-13 | End: 2023-06-13

## 2023-06-13 RX ORDER — CARBOXYMETHYLCELLULOSE SODIUM 5 MG/ML
1 SOLUTION/ DROPS OPHTHALMIC ONCE
Status: COMPLETED | OUTPATIENT
Start: 2023-06-13 | End: 2023-06-13

## 2023-06-13 RX ADMIN — CARBOXYMETHYLCELLULOSE SODIUM 1 DROP: 5 SOLUTION/ DROPS OPHTHALMIC at 01:21

## 2023-06-13 ASSESSMENT — ACTIVITIES OF DAILY LIVING (ADL): ADLS_ACUITY_SCORE: 35

## 2023-06-13 NOTE — ED NOTES
Called by [facility] for receiving rx for pt that is not part of their program.  Called patient's care facility (his mobile phone number) and they note it should be [facility] [address]  In [facility] so I changed the pharmacy e script.     Zainab Gonzalez MD  06/13/23 0816

## 2023-06-13 NOTE — ED NOTES
Bed: [id number]  Expected date: 6/13/23  Expected time: 12:52 AM  Means of arrival: Ambulance  Comments:  Cyn  73 M--run out of eye gtts

## 2023-06-13 NOTE — ED TRIAGE NOTES
Pt BIBA. Pt reports coming into this facility and states that he needs eye drops.       Triage Assessment     Row Name 06/13/23 0106       Triage Assessment (Adult)    Airway WDL WDL       Respiratory WDL    Respiratory WDL WDL       Skin Circulation/Temperature WDL    Skin Circulation/Temperature WDL WDL       Cardiac WDL    Cardiac WDL WDL       Peripheral/Neurovascular WDL    Peripheral Neurovascular WDL WDL       Cognitive/Neuro/Behavioral WDL    Cognitive/Neuro/Behavioral WDL WDL

## 2023-06-13 NOTE — ED PROVIDER NOTES
EMERGENCY DEPARTMENT ENCOUNTER      NAME: Ger Bashir  AGE: 73 year old male  YOB: 1950  MRN: [id number]  EVALUATION DATE & TIME: 6/13/2023 12:58 AM    PCP: No Ref-Primary, Physician    ED PROVIDER: Ana Kong MD      Chief Complaint   Patient presents with     Medication Refill         FINAL IMPRESSION:  1. Dry eyes          ED COURSE & MEDICAL DECISION MAKING:    Pertinent Labs & Imaging studies reviewed. (See chart for details)  73 year old male presents to the Emergency Department for evaluation of dry eyes.  He reports that he used to use lubricating eyedrops to help with the dryness in his eyes and the friction of his eyes, he ran out of these drops a few years ago.  He states that he has insomnia and states of doing work, only sleeping 1 to 2 hours and evening.  Aside from the dryness, he has no itching in his eyes, no discharge.  On evaluation, there is no signs of infection, injury to his eyes.  Lubricating eyedrops were given to him in the emergency department, prescription was written for him.  He otherwise has no other complaints.    At the conclusion of the encounter I discussed the results of all of the tests and the disposition. The questions were answered. The patient or family acknowledged understanding and was agreeable with the care plan.     1:02 AM Introduced myself to the patient, obtained history of present illness, and performed initial physical exam at this time.   1:05 AM Discussed discharge with the patient at this time.       Medical Decision Making    History:    Supplemental history from: Documented in chart, if applicable    External Record(s) reviewed: Documented in chart, if applicable.    Work Up:    Chart documentation includes differential considered and any EKGs or imaging independently interpreted by provider, where specified.    In additional to work up documented, I considered the following work up: Documented in chart, if applicable.    External  consultation:    Discussion of management with another provider: Documented in chart, if applicable    Complicating factors:    Care impacted by chronic illness: Mental Health    Care affected by social determinants of health: Access to Medical Care    Disposition considerations: Discharge. I prescribed additional prescription strength medication(s) as charted. See documentation for any additional details.      MEDICATIONS GIVEN IN THE EMERGENCY:  Medications   carboxymethylcellulose PF (REFRESH PLUS) 0.5 % ophthalmic solution 1 drop (has no administration in time range)       NEW PRESCRIPTIONS STARTED AT TODAY'S ER VISIT  New Prescriptions    CARBOXYMETHYLCELLULOSE PF (CARBOXYMETHYLCELLULOSE SODIUM) 0.5 % OPHTHALMIC SOLUTION    Place 1 drop into both eyes 3 times daily as needed for dry eyes          =================================================================    HPI    Patient information was obtained from: the patient    Use of : N/A       Ger Bashir is a 73 year old male with a pertinent history of heart failure with preserved left ventricular function, mild ascending aorta dilatation, schizoaffective disorder, and hypothyroidism, who presents to this ED for evaluation of a medication refill.     The patient has been experiencing dry eyes recently, and tonight they were bothering him. He states that he needs eye drops, but he has not had them in years. He does not have an eye doctor that he follows with regularly. The patient comes in from a group home by EMS this evening. Patient has been prescribed Refresh Plus eye drops in the past. No other complaints at this time.      PAST MEDICAL HISTORY:  Past Medical History:   Diagnosis Date     Schizophrenia (H)        PAST SURGICAL HISTORY:  No past surgical history on file.        CURRENT MEDICATIONS:    carboxymethylcellulose PF (CARBOXYMETHYLCELLULOSE SODIUM) 0.5 % ophthalmic solution  acetaminophen (TYLENOL) 325 MG tablet  aspirin (ASA) 81 MG  chewable tablet  benztropine (COGENTIN) 1 MG tablet  divalproex sodium extended-release (DEPAKOTE ER) 500 MG 24 hr tablet  haloperidol (HALDOL) 5 MG tablet  levothyroxine (SYNTHROID/LEVOTHROID) 50 MCG tablet  multivitamin w/minerals (THERA-VIT-M) tablet  paliperidone (INVEGA SUSTENNA) 234 MG/1.5ML CHRISTOS  Skin Protectants, Misc. (EUCERIN) cream  traZODone (DESYREL) 100 MG tablet        ALLERGIES:  Allergies   Allergen Reactions     Peas GI Disturbance     Black eyed peas - vomiting       FAMILY HISTORY:  No family history on file.    SOCIAL HISTORY:   Social History     Socioeconomic History     Marital status: Single   Tobacco Use     Smoking status: Heavy Smoker     Packs/day: 1.00     Years: 10.00     Pack years: 10.00     Types: Cigarettes     Smokeless tobacco: Never   Substance and Sexual Activity     Alcohol use: Yes     Alcohol/week: 1.0 standard drink of alcohol     Comment: social     Drug use: Yes     Types: Other     Comment: Drug use: No definate answer given     Sexual activity: Not Currently     Partners: Female       VITALS:  BP (!) 157/79   Pulse 55   Temp 98  F (36.7  C) (Oral)   Resp 18   Wt 90.7 kg (200 lb)   SpO2 99%   BMI 28.70 kg/m      PHYSICAL EXAM    Constitutional: Well developed, Well nourished, NAD  HENT: Normocephalic, Atraumatic, Bilateral external ears normal, Oropharynx normal, mucous membranes moist, Nose normal.   Neck- Normal range of motion, No tenderness, Supple, No stridor.  Eyes: PERRL, EOMI, Conjunctiva normal, No discharge.   Respiratory: Normal breath sounds, No respiratory distress  Cardiovascular: Normal heart rate, Regular rhythm  Musculoskeletal: No edema. Good range of motion in all major joints. No tenderness to palpation or major deformities noted.   Integument: Warm, Dry, No erythema, No rash  Neurologic: Alert & oriented x 3, Normal motor function, Normal sensory function, No focal deficits noted. Normal gait.     LAB:  All pertinent labs reviewed and  interpreted.       RADIOLOGY:  Reviewed all pertinent imaging. Please see official radiology report.  No orders to display         I, Afsaneh Awad, am serving as a scribe to document services personally performed by Ana Kong, based on my observation and the provider's statements to me. I, Ana Kong MD, attest that Afsaneh Awad is acting in a scribe capacity, has observed my performance of the services and has documented them in accordance with my direction.    Ana Kong MD  Emergency Medicine  [facility] EMERGENCY DEPARTMENT  [address] [place] [id number]  [phone]       Ana Kong MD  06/13/23 0122

## 2023-08-14 ENCOUNTER — APPOINTMENT (OUTPATIENT)
Dept: GENERAL RADIOLOGY | Facility: CLINIC | Age: 73
End: 2023-08-14
Attending: EMERGENCY MEDICINE
Payer: COMMERCIAL

## 2023-08-14 ENCOUNTER — APPOINTMENT (OUTPATIENT)
Dept: CT IMAGING | Facility: CLINIC | Age: 73
End: 2023-08-14
Attending: EMERGENCY MEDICINE
Payer: COMMERCIAL

## 2023-08-14 ENCOUNTER — HOSPITAL ENCOUNTER (EMERGENCY)
Facility: CLINIC | Age: 73
Discharge: HOME | End: 2023-08-14
Attending: EMERGENCY MEDICINE | Admitting: EMERGENCY MEDICINE
Payer: COMMERCIAL

## 2023-08-14 VITALS
TEMPERATURE: 98.2 F | OXYGEN SATURATION: 95 % | SYSTOLIC BLOOD PRESSURE: 119 MMHG | RESPIRATION RATE: 18 BRPM | DIASTOLIC BLOOD PRESSURE: 101 MMHG | HEART RATE: 102 BPM

## 2023-08-14 DIAGNOSIS — R07.9 CHEST PAIN, UNSPECIFIED TYPE: ICD-10-CM

## 2023-08-14 DIAGNOSIS — M54.50 CHRONIC BILATERAL LOW BACK PAIN WITHOUT SCIATICA: Primary | ICD-10-CM

## 2023-08-14 DIAGNOSIS — R44.0 AUDITORY HALLUCINATIONS: ICD-10-CM

## 2023-08-14 DIAGNOSIS — G89.29 CHRONIC BILATERAL LOW BACK PAIN WITHOUT SCIATICA: Primary | ICD-10-CM

## 2023-08-14 PROBLEM — F20.9 SCHIZOPHRENIA (H): Status: ACTIVE | Noted: 2019-04-18

## 2023-08-14 LAB
ANION GAP SERPL CALCULATED.3IONS-SCNC: 9 MMOL/L (ref 7–15)
ATRIAL RATE - MUSE: 81 BPM
BASOPHILS # BLD AUTO: 0 10E3/UL (ref 0–0.2)
BASOPHILS NFR BLD AUTO: 1 %
BUN SERPL-MCNC: 20.3 MG/DL (ref 8–23)
CALCIUM SERPL-MCNC: 8.8 MG/DL (ref 8.8–10.2)
CHLORIDE SERPL-SCNC: 98 MMOL/L (ref 98–107)
CREAT SERPL-MCNC: 0.8 MG/DL (ref 0.67–1.17)
D DIMER PPP FEU-MCNC: 0.43 UG/ML FEU (ref 0–0.5)
DEPRECATED HCO3 PLAS-SCNC: 29 MMOL/L (ref 22–29)
DIASTOLIC BLOOD PRESSURE - MUSE: NORMAL MMHG
EOSINOPHIL # BLD AUTO: 0.2 10E3/UL (ref 0–0.7)
EOSINOPHIL NFR BLD AUTO: 2 %
ERYTHROCYTE [DISTWIDTH] IN BLOOD BY AUTOMATED COUNT: 11.9 % (ref 10–15)
ETHANOL SERPL-MCNC: <0.01 G/DL
GFR SERPL CREATININE-BSD FRML MDRD: >90 ML/MIN/1.73M2
GLUCOSE SERPL-MCNC: 95 MG/DL (ref 70–99)
HCT VFR BLD AUTO: 42.6 % (ref 40–53)
HGB BLD-MCNC: 14.6 G/DL (ref 13.3–17.7)
IMM GRANULOCYTES # BLD: 0 10E3/UL
IMM GRANULOCYTES NFR BLD: 0 %
INTERPRETATION ECG - MUSE: NORMAL
LYMPHOCYTES # BLD AUTO: 1.6 10E3/UL (ref 0.8–5.3)
LYMPHOCYTES NFR BLD AUTO: 19 %
MAGNESIUM SERPL-MCNC: 2 MG/DL (ref 1.7–2.3)
MCH RBC QN AUTO: 31.7 PG (ref 26.5–33)
MCHC RBC AUTO-ENTMCNC: 34.3 G/DL (ref 31.5–36.5)
MCV RBC AUTO: 93 FL (ref 78–100)
MONOCYTES # BLD AUTO: 0.8 10E3/UL (ref 0–1.3)
MONOCYTES NFR BLD AUTO: 10 %
NEUTROPHILS # BLD AUTO: 5.8 10E3/UL (ref 1.6–8.3)
NEUTROPHILS NFR BLD AUTO: 68 %
NRBC # BLD AUTO: 0 10E3/UL
NRBC BLD AUTO-RTO: 0 /100
P AXIS - MUSE: 47 DEGREES
PLATELET # BLD AUTO: 208 10E3/UL (ref 150–450)
POTASSIUM SERPL-SCNC: 4 MMOL/L (ref 3.4–5.3)
PR INTERVAL - MUSE: 162 MS
QRS DURATION - MUSE: 148 MS
QT - MUSE: 416 MS
QTC - MUSE: 483 MS
R AXIS - MUSE: -71 DEGREES
RBC # BLD AUTO: 4.6 10E6/UL (ref 4.4–5.9)
SODIUM SERPL-SCNC: 136 MMOL/L (ref 136–145)
SYSTOLIC BLOOD PRESSURE - MUSE: NORMAL MMHG
T AXIS - MUSE: 16 DEGREES
TROPONIN T SERPL HS-MCNC: 7 NG/L
TSH SERPL DL<=0.005 MIU/L-ACNC: 3.61 UIU/ML (ref 0.3–4.2)
VENTRICULAR RATE- MUSE: 81 BPM
WBC # BLD AUTO: 8.4 10E3/UL (ref 4–11)

## 2023-08-14 PROCEDURE — 85025 COMPLETE CBC W/AUTO DIFF WBC: CPT | Performed by: EMERGENCY MEDICINE

## 2023-08-14 PROCEDURE — 84484 ASSAY OF TROPONIN QUANT: CPT | Performed by: EMERGENCY MEDICINE

## 2023-08-14 PROCEDURE — 99285 EMERGENCY DEPT VISIT HI MDM: CPT | Mod: 25

## 2023-08-14 PROCEDURE — 72100 X-RAY EXAM L-S SPINE 2/3 VWS: CPT

## 2023-08-14 PROCEDURE — 93005 ELECTROCARDIOGRAM TRACING: CPT

## 2023-08-14 PROCEDURE — 36415 COLL VENOUS BLD VENIPUNCTURE: CPT | Performed by: EMERGENCY MEDICINE

## 2023-08-14 PROCEDURE — 83735 ASSAY OF MAGNESIUM: CPT | Performed by: EMERGENCY MEDICINE

## 2023-08-14 PROCEDURE — 82077 ASSAY SPEC XCP UR&BREATH IA: CPT | Performed by: EMERGENCY MEDICINE

## 2023-08-14 PROCEDURE — 84443 ASSAY THYROID STIM HORMONE: CPT | Performed by: EMERGENCY MEDICINE

## 2023-08-14 PROCEDURE — 71046 X-RAY EXAM CHEST 2 VIEWS: CPT

## 2023-08-14 PROCEDURE — 82310 ASSAY OF CALCIUM: CPT | Performed by: EMERGENCY MEDICINE

## 2023-08-14 PROCEDURE — 72131 CT LUMBAR SPINE W/O DYE: CPT

## 2023-08-14 PROCEDURE — 85379 FIBRIN DEGRADATION QUANT: CPT | Performed by: EMERGENCY MEDICINE

## 2023-08-14 PROCEDURE — 90791 PSYCH DIAGNOSTIC EVALUATION: CPT

## 2023-08-14 ASSESSMENT — ACTIVITIES OF DAILY LIVING (ADL)
ADLS_ACUITY_SCORE: 35

## 2023-08-14 NOTE — ED NOTES
Assumed care from previous physician.    CT lumbar spine reassuring against acute fracture.     I did discuss the results with the patient.  When it came to discussion of discharge, the patient became very agitated, stating he needs to stay in the hospital for his back pain.     DEC assessment performed at bedside: Patient is at baseline. History of schizophrenia. AH are at the patient's baseline. Delusional thoughts. Grandiosity. No SI or HI. Not at imminent risk of harm to self. Knows he has a . Knows when his Invega shot is due.     Agree with this evaluation.  I feel the patient can be safely discharged from a medical and mental health point of view at this time.         Angus Gurrola MD  08/14/23 [id number]

## 2023-08-14 NOTE — DISCHARGE INSTRUCTIONS
Aftercare Plan    Follow up with established providers and supports as scheduled. Continue taking medications as prescribed. Abstain from drugs and alcohol. Utilize your [facility] mental health crisis team as needed. They are available 24/7. Contact information is listed below.     If I am feeling unsafe or I am in a crisis, I will:   Contact my established care providers   Call the [place] Suicide Prevention Lifeline: [phone]   Go to the nearest emergency room   Call 911     Warning signs that I or other people might notice when a crisis is developing for me: changes to sleep, appetite or mood, increased anger, agitation or irritability, feeling depressed or hopeless, spending more time alone or talking less, increased crying, decreased productivity, seeing or hearing things that aren't there, thoughts of not wanting to live anymore or of actually killing myself, thoughts of hurting others    Things I am able to do on my own to cope or help me feel better: watching a favorite tv show or movie, listening to music I enjoy, going outside and breathing fresh air, going for a walk or exercising, taking a shower or bath, a cold or hot beverage, a healthy snack, drawing/coloring/painting, journaling, singing or dancing, deep breathing     I can try practicing square breathing when I begin to feel anxious - inhale through the nose for the count of 4 and the first line on the square. Exhale through the mouth for the count of 4 for the second line of the square. Repeat to complete the square. Repeat the square as many times as needed.    I can also use my five senses to practice mindfulness and grounding. What are five things I can see, four things I can hear, three things I can feel, two things I can smell, and one thing I can taste.     Things that I am able to do with others to cope or help me feel better: sometimes just talking or spending time with someone else, sharing a meal or having coffee, watching a movie or  "playing a game, going for a walk or exercising    I can also use community resources including mental health hotlines, [facility] crisis teams, or apps.     Things I can use or do for distraction: movies/tv, music, reading, games, drawing/coloring/painting or other art, essential oils, exercise, cleaning/organizing, puzzles, crossword puzzles, word search, Sudoku       I can also download a meditation or relaxation no, like Calm, Headspace, or Insight Timer (all three offer a free version)    Changes I can make to support my mental health and wellness: Attend scheduled mental health therapy and psychiatric appointments. Take my medications as prescribed. Maintain a daily schedule/routine. Abstain from all mood altering substances, including drugs, alcohol, or medications not currently prescribed to me. Implement a self-care routine.      People in my life that I can ask for help: friends or family, trusted teachers/staff/colleagues, trusted members of my community or place of [place]astaf[place], mental health crisis lines, or 911    Your [facility] has a mental health crisis team you can call 24/7: [facility], [phone]    Other things that are important when I m in crisis: to remember that the feelings I am having right now are temporary, and it won't feel like this forever, and that it is okay and important to ask for help    Crisis Lines  Crisis Text Line  Text 916218  You will be connected with a trained live crisis counselor to provide support.    Por espanol, texto  JOSE MIGUEL a 536187 o texto a 442-AYUDAME en WhatsApp    [facility] Hope Line  1.800.SUICIDE [1444474]      Community Resources  Fast Tracker  Linking people to mental health and substance use disorder resources  fasttrackermn.org     [place] Mental Health Warm Line  Peer to peer support  Monday thru Saturday, 12 pm to 10 pm  381.602.4018 or 3.388.161.9752  Text \"Support\" to 46043    National [place] on Mental Illness (RADHA)  995.764.1429 or " 1.888.RADHA.HELPS      Mental Health Apps  My3  https://my3app.org/    VirtualHopeBox  https://[employer]/apps/virtual-hope-box/      Additional Information  Today you were seen by a licensed mental health professional through Triage and Transition services, Behavioral Healthcare Providers ([facility])  for a crisis assessment in the Emergency Department at [facility].  It is recommended that you follow up with your established providers (psychiatrist, mental health therapist, and/or primary care doctor - as relevant) as soon as possible. Coordinators from [facility] will be calling you in the next 24-48 hours to ensure that you have the resources you need.  You can also contact [facility] coordinators directly at [phone]. You may have been scheduled for or offered an appointment with a mental health provider. [facility] maintains an extensive network of licensed behavioral health providers to connect patients with the services they need.  We do not charge providers a fee to participate in our referral network.  We match patients with providers based on a patient's specific needs, insurance coverage, and location.  Our first effort will be to refer you to a provider within your care system, and will utilize providers outside your care system as needed.      Discharge Instructions  Back Pain  You were seen today for back pain. Back pain can have many causes, but most will get better without surgery or other specific treatment. Sometimes there is a herniated ( slipped ) disc. We do not usually do MRI scans to look for these right away, since most herniated discs will get better on their own with time.  Today, we did not find any evidence that your back pain was caused by a serious condition. However, sometimes symptoms develop over time and cannot be found during an emergency visit, so it is very important that you follow up with your primary provider.  Generally, every Emergency Department visit should have a follow-up clinic  visit with either a primary or a specialty clinic/provider. Please follow-up as instructed by your emergency provider today.    Return to the Emergency Department if:  You develop a fever with your back pain.   You have weakness or change in sensation in one or both legs.  You lose control of your bowels or bladder, or cannot empty your bladder (cannot pee).  Your pain gets much worse.     Follow-up with your provider:  Unless your pain has completely gone away, please make an appointment with your provider within one week. Most of the routine care for back pain is available in a clinic and not the Emergency Department. You may need further management of your back pain, such as more pain medication, imaging such as an X-ray or MRI, or physical therapy.    What can I do to help myself?  Remain Active -- People are often afraid that they will hurt their back further or delay recovery by remaining active, but this is one of the best things you can do for your back. In fact, staying in bed for a long time to rest is not recommended. Studies have shown that people with low back pain recover faster when they remain active. Movement helps to bring blood flow to the muscles and relieve muscle spasms as well as preventing loss of muscle strength.  Heat -- Using a heating pad can help with low back pain during the first few weeks. Do not sleep with a heating pad, as you can be burned.   Pain medications - You may take a pain medication such as Tylenol  (acetaminophen), Advil , Motrin  (ibuprofen) or Aleve  (naproxen).  If you were given a prescription for medicine here today, be sure to read all of the information (including the package insert) that comes with your prescription.  This will include important information about the medicine, its side effects, and any warnings that you need to know about.  The pharmacist who fills the prescription can provide more information and answer questions you may have about the medicine.   If you have questions or concerns that the pharmacist cannot address, please call or return to the Emergency Department.   Remember that you can always come back to the Emergency Department if you are not able to see your regular provider in the amount of time listed above, if you get any new symptoms, or if there is anything that worries you.

## 2023-08-14 NOTE — ED PROVIDER NOTES
History   Chief Complaint:  Chest Pain     The history is provided by the patient.      Ger Bashir is a 73 year old male with a complex past medical history including bipolar schizoaffective disorder, heart failure, morbid obesity, and psychosis who presents with constant chest pain since last night. He eventually called EMS earlier today, and was brought to the ED. At bedside, he is no longer sure if he is having chest pain, but rather thinks it might be arm pain. It is not clear if he has a history of myocardial infarction. Additionally, he endorses having auditory hallucinations lately, but they have not been telling him to harm himself. He denies dyspnea, abdominal pain, vomiting, diarrhea, leg swelling, fever, suicidal ideations, or homicidal ideations.      Independent Historian:   None - Patient Only    Review of External Notes:   None     Medications:    ASA   Cogentin   Depakote   Haldol   Synthroid   Invega Sustenna   Desyrel     Past Medical History:    Paranoid Schizophrenia   Schizoaffective disorder, bipolar type   Psychosis  Alcohol abuse   Dyslipidemia   GERD   Hypothyroidism   Tobacco use disorder   Mild ascending aorta dilatation   Heart failure   Depressive disorder   Morbid obesity   MORIAH     Past Surgical History:    L knee reconstruction     Physical Exam   Patient Vitals for the past 24 hrs:   BP Temp Pulse Resp SpO2   08/14/23 1300 (!) 119/101 98.2  F (36.8  C) 102 18 96 %      Physical Exam  General: Alert, appears well-developed and well-nourished. Homeless.  Cooperative.     In mild distress  HEENT:  Head:  Atraumatic  Ears:  External ears are normal  Mouth/Throat:  Oropharynx is without erythema or exudate and mucous membranes are moist.   Eyes:   Conjunctivae normal and EOM are normal. No scleral icterus.  CV:  Normal rate, regular rhythm, normal heart sounds and radial pulses are 2+ and symmetric.  No murmur.  Resp:  Breath sounds are clear bilaterally    Non-labored, no retractions  or accessory muscle use  GI:  Abdomen is soft, no distension, no tenderness. No rebound or guarding.  No CVA tenderness bilaterally  MS:  Normal range of motion. No edema.    Normal strength in all 4 extremities.     Back atraumatic.    No midline cervical, thoracic, or lumbar tenderness  Skin:  Warm and dry.  Green discoloration to chest from fake silver necklace.    Neuro:   Alert. Normal strength.  Sensation intact in all 4 extremities. GCS: 15  Psych: Depressed mood and flat affect.  Endorses auditory hallucinations, but denies SI/HI.      Emergency Department Course   ECG  ECG taken at 1423, ECG read at 1434  Sinus rhythm with premature atrial complexes in a pattern of bigeminy   Right bundle branch block  Left anterior fascicular block   Bifascicular block  Abnormal ECG   No significant changes as compared to prior, dated 4/1/2012.  Rate 81 bpm. OR interval 162 ms. QRS duration 148 ms. QT/QTc 416/483 ms. P-R-T axes 47 -71 16.     Imaging:  XR Chest 2 Views   Final Result   IMPRESSION: No infiltrate, pleural effusion or pneumothorax. The   cardiac and mediastinal silhouettes are normal. Old healed right sixth   and seventh posterior rib fracture deformities.      AYAKA VARGAS MD            SYSTEM ID:  [id number]      Lumbar spine XR, 2-3 views   Final Result   IMPRESSION: Five lumbar type vertebral body numbering convention.   Advanced grade 3-4 L5-S1 likely chronic anterolisthesis. Recommend CT   of the lumbar spine to further evaluate. Otherwise preserved vertebral   body alignment and vertebral body heights. Multilevel vertebral body   endplate osteophytosis.      Findings discussed with Dr. Austin by Dr. Wilson at 1606 hours.      ABIOLA WILSON DO            SYSTEM ID:  [id number]      Lumbar spine CT w/o contrast    (Results Pending)   Report per radiology    Laboratory:  Labs Ordered and Resulted from Time of ED Arrival to Time of ED Departure   BASIC METABOLIC PANEL - Normal       Result Value    Sodium 136       Potassium 4.0      Chloride 98      Carbon Dioxide (CO2) 29      Anion Gap 9      Urea Nitrogen 20.3      Creatinine 0.80      Calcium 8.8      Glucose 95      GFR Estimate >90     TROPONIN T, HIGH SENSITIVITY - Normal    Troponin T, High Sensitivity 7     TSH WITH FREE T4 REFLEX - Normal    TSH 3.61     MAGNESIUM - Normal    Magnesium 2.0     D DIMER QUANTITATIVE - Normal    D-Dimer Quantitative 0.43     CBC WITH PLATELETS AND DIFFERENTIAL    WBC Count 8.4      RBC Count 4.60      Hemoglobin 14.6      Hematocrit 42.6      MCV 93      MCH 31.7      MCHC 34.3      RDW 11.9      Platelet Count 208      % Neutrophils 68      % Lymphocytes 19      % Monocytes 10      % Eosinophils 2      % Basophils 1      % Immature Granulocytes 0      NRBCs per 100 WBC 0      Absolute Neutrophils 5.8      Absolute Lymphocytes 1.6      Absolute Monocytes 0.8      Absolute Eosinophils 0.2      Absolute Basophils 0.0      Absolute Immature Granulocytes 0.0      Absolute NRBCs 0.0     ETHYL ALCOHOL LEVEL      Emergency Department Course & Assessments:    Interventions:  Medications - No data to display     Assessments:  1358 I obtained history and examined the patient as noted above.  1710 I rechecked the patient and explained findings. I discussed plan for transfer to  the [facility].    Independent Interpretation (X-rays, CTs, rhythm strip):  I independently reviewed chest x-ray imaging which shows no evidence of pneumothorax or pneumonia.    Consultations/Discussion of Management or Tests:  1605 I spoke with Radiology regarding findings.      Social Determinants of Health affecting care:   Homelessness/Housing Insecurity and Hx of alcohol abuse    Disposition:  Care signed out to my partner Dr. Gurrola pending CT results and final disposition, likely to empath.    Impression & Plan    Medical Decision Making:  Patient is a 73-year-old male with history of schizoaffective disorder, alcohol abuse, and GERD who presents with  intermittent chest discomfort as well as low back pain.  Patient states he may have had a fall approximate 1 week ago.  He is a relatively poor historian so challenging to interpret what the main issue is bringing him to the emergency department today.  He also states he has been having ongoing auditory hallucinations although I suspect these may be chronic given his history of schizoaffective disorder and psychosis.  Patient had a EKG which showed no concerning ischemic changes and troponin within normal range and lower concern for ACS.  Laboratory work grossly unremarkable with normal electrolytes and renal function.  CBC normal.  D-dimer within normal range and lower concern for pulmonary embolism.  No evidence of thyroid dysfunction.  Patient did have chest x-ray imaging showing no acute infiltrates nor pneumothorax.  Patient also had x-ray imaging of the lumbar spine which showed L5-S1 chronic anterior listhesis.  CT imaging was ultimately recommended by radiology.CT imaging is pending at time of signout.  Assuming there are no acute abnormalities detected on CT imaging of the lumbar spine, patient would be safe for transfer to [facility] for further evaluation of chronic auditory hallucinations.  Thankfully he is not having suicidal or homicidal ideation and does not require to be placed on hold at this time.  He is voluntarily at this time.  Care signed out to my partner Dr. Gurrola pending final CT report and final disposition, likely to [facility].    Diagnosis:    ICD-10-CM    1. Chest pain, unspecified type  R07.9       2. Chronic bilateral low back pain without sciatica  M54.50     G89.29       3. Auditory hallucinations  R44.0            Discharge Medications:  New Prescriptions    No medications on file          Scribe Disclosure:  I, Tommy Mondragon, am serving as a scribe at 2:07 PM on 8/14/2023 to document services personally performed by López Austin MD based on my observations and the provider's  statements to me.   8/14/2023   López Austin MD White, Scott, MD  08/14/23 0573

## 2023-08-14 NOTE — ED NOTES
He was sitting by a Bahai today a bystander called because patient was complaining of chest pain, patient is not a good historian he said chest pain started last night, at this time he denies chest pain, he is complaining of pain on his arm.  He is confused, insight is poor, judgement is impaired, he says I have auditory hallucination, he is homeless, takes a shot of Invega monthly .

## 2023-08-14 NOTE — PROGRESS NOTES
"Patient reported that he is comfortable with the bed he is in right now and will have difficulty in ambulating because he is in extreme pain. He wants some \"support\" for his back to alleviate the discomfort. The mobility and pain concerns of the patients maybe a barrier to move to the [facility] unit. Suggest to put in a DEC consult so that the LMHP can do an assessment in the ED.   "

## 2023-08-14 NOTE — ED NOTES
Bed: ED29  Expected date:   Expected time:   Means of arrival:   Comments[facility] 73 [facility] SOB eta 1348

## 2023-08-15 NOTE — ED NOTES
Patient given discharge paperwork. Patient stating that he cannot carry all of his things with his back. Requesting that we call for an ambulance to take him to [facility]. Writer explained to patient that we do not call for EMS to take patient to another hospital when they are discharged. Patient refusing to leave, security called to help escote patient.

## 2023-08-15 NOTE — CONSULTS
"Diagnostic Evaluation Consultation  Crisis Assessment    Patient Name: Ger Bashir  Age:  73 year old  Legal Sex: male  Gender Identity: male  Race: White  Ethnicity: Not  or   Language: English      Patient was assessed: In person      Patient location: [facility] EMERGENCY DEPT                                 Referral Data and Chief Complaint  Ger Bashir presents to the ED via EMS. Patient is presenting to the ED for the following concerns: Other (see comment) (auditory hallucinations, grandiose delusions).   Factors that make the mental health crisis life threatening or complex are:  Pt presents to the ED via EMS after a bystander called 911 for him. Pt reports that he has been camping out by a [employer] and a restaurant and neither entity wanted him there. Pt denies any acute mental health concerns, including suicidal or homicidal ideation. He denies auditory or visual hallucinations but reports that he does experience auditory hallucinations while living on the street. Pt exhibits grandiose delusions but is otherwise oriented to reality. He reports that he has cured autism, muscular dystrophy, and AIDS. He also talks about a \"Dr. Andres\", who is an FBI agent dressed as a doctor that made him sign various gokit papers. Pt tells this writer that his next Invega injection is not due for over two weeks and that he has a  named Guerda. He is unable to provide additional details to this writer about his established mental healthcare providers. When asked if pt needs any other supports for his mental health, he tells this writer that he needs to be left alone so that he can continue watching the TV and that he needs a coffee with cream and sugar..      Informed Consent and Assessment Methods  Explained the crisis assessment process, including applicable information disclosures and limits to confidentiality, assessed understanding of the process, and obtained consent " to proceed with the assessment.  Assessment methods included conducting a formal interview with patient, review of medical records, collaboration with medical staff, and obtaining relevant collateral information from family and community providers when available.  : done     Patient response to interventions: verbalizes understanding  Coping skills were attempted to reduce the crisis:  Pt has been consistently meeting with his [facility]  per his report.     History of the Crisis   Pt has a diagnosis of chronic schizophrenia. His last inpatient hospitalization was 04/06-04/15/2020 at [facility]. He reports continuing to work with a . He is under an MI commitment in [facility].    Brief Psychosocial History  Family:  Single, Children yes (Per chart review, pt has one adult child.)  Support System:  Other (specify) ([facility] )  Employment Status:  disabled  Source of Income:  disability  Financial Environmental Concerns:  unable to afford rent/mortgage  Current Hobbies:  other (see comments) (none identified)  Barriers in Personal Life:  mental health concerns (diagnosis of schizophrenia)    Significant Clinical History  Current Anxiety Symptoms:     Current Depression/Trauma:     Current Somatic Symptoms:     Current Psychosis/Thought Disturbance:  auditory hallucinations, grandiosity  Current Eating Symptoms:     Chemical Use History:  Alcohol: None  Benzodiazepines: None  Opiates: None  Cocaine: None  Marijuana: None  Other Use: None   Past diagnosis:  Schizophrenia  Family history:  Other (Per chart review, pt's mother has a history of mental illness.)  Past treatment:  Civil Commitment, Psychiatric Medication Management, Inpatient Hospitalization, ACT  Details of most recent treatment:  Pt is currently under a MI commitment in [facility]. Per chart review, his most recent inpatient hospitalization was at [facility] in 2020.  Other relevant history:           Collateral Information  Is there collateral information: No     Additional collateral information:  This writer attempted to call [facility]  Guerda Alfaro at [phone]. Wrong number.     Risk Assessment  [place] Suicide Severity Rating Scale Full Clinical Version: 08/14/23  Suicidal Ideation  Q1 Wish to be Dead (Lifetime): No  Q2 Non-Specific Active Suicidal Thoughts (Lifetime): No     Suicidal Behavior (Lifetime)  Actual Attempt (Lifetime): No  Has subject engaged in non-suicidal self-injurious behavior? (Lifetime): No  Interrupted Attempts (Lifetime): No  Aborted or Self-Interrupted Attempt (Lifetime): No  Preparatory Acts or Behavior (Lifetime): No    [place] Suicide Severity Rating Scale Recent: 08/14/23  Suicidal Ideation (Recent)  Q1 Wished to be Dead (Past Month): no  Q2 Suicidal Thoughts (Past Month): no  Q3 Suicidal Thought Method: no  Q4 Suicidal Intent without Specific Plan: no  Q5 Suicide Intent with Specific Plan: no  Level of Risk per Screen: low risk  Intensity of Ideation (Recent)  Most Severe Ideation Rating (Past 1 Month):  (0)  Frequency (Past 1 Month):  (0)  Duration (Past 1 Month):  (0)  Controllability (Past 1 Month):  (0)  Deterrents (Past 1 Month): Does not apply  Reasons for Ideation (Past 1 Month): Does not apply  Suicidal Behavior (Recent)  Actual Attempt (Past 3 Months): No  Total Number of Actual Attempts (Past 3 Months): 0  Has subject engaged in non-suicidal self-injurious behavior? (Past 3 Months): No  Interrupted Attempts (Past 3 Months): No  Total Number of Interrupted Attempts (Past 3 Months): 0  Aborted or Self-Interrupted Attempt (Past 3 Months): No  Total Number of Aborted or Self-Interrupted Attempts (Past 3 Months): 0  Preparatory Acts or Behavior (Past 3 Months): No  Total Number of Preparatory Acts (Past 3 Months): 0    Environmental or Psychosocial Events: challenging interpersonal relationships, unstable housing, homelessness, social isolation,  neither working nor attending school  Protective Factors: Protective Factors: supportive ongoing medical and mental health care relationships    Does the patient have thoughts of harming others? Feels Like Hurting Others: no  Previous Attempt to Hurt Others: no  Is the patient engaging in sexually inappropriate behavior?: no    Is the patient engaging in sexually inappropriate behavior?  no        Mental Status Exam   Affect: Appropriate  Appearance: Disheveled  Attention Span/Concentration: Inattentive  Eye Contact: Variable    Fund of Knowledge: Delayed   Language /Speech Content: Other (please comment) (mumbles)  Language /Speech Volume: Normal  Language /Speech Rate/Productions: Normal  Recent Memory: Intact  Remote Memory: Intact  Mood: Normal  Orientation to Person: Yes   Orientation to Place: Yes  Orientation to Time of Day: Yes  Orientation to Date: Yes     Situation (Do they understand why they are here?): Yes  Psychomotor Behavior: Normal  Thought Content: Hallucinations, Delusions  Thought Form: Intact     Medication  Psychotropic medications: Pt receives a monthly Invega injection.  Medication Orders - Psychiatric (From admission, onward)      None             Current Care Team  Patient Care Team:  No Ref-Primary, Physician as PCP - General    Diagnosis  Patient Active Problem List   Diagnosis Code    Schizoaffective disorder, bipolar type (H) F25.0    Schizophrenia (H) F20.9    Alcohol abuse F10.10    Dyslipidemia E78.5    Gastroesophageal reflux disease without esophagitis K21.9    Hypothyroidism due to acquired atrophy of thyroid E03.4    Tobacco use disorder F17.200    Schizoaffective disorder (H) F25.9       Primary Problem This Admission  Active Hospital Problems    Schizophrenia (H)        Clinical Summary and Substantiation of Recommendations   It is the recommendation of this writer that pt discharge to follow-up with their county . Although pt exhibits grandiose delusions, he is  otherwise oriented to reality. He denies auditory or visual hallucinations at present, suicidal ideation, or homicidal ideation.    Patient coping skills attempted to reduce the crisis:  Pt has been consistently meeting with his county  per his report.    Disposition  Recommended disposition: Other. please comment ([facility] )        Reviewed case and recommendations with attending provider. Attending Name: Dr. Gurrola       Attending concurs with disposition: yes       Patient and/or validated legal guardian concurs with disposition:   yes       Final disposition:  discharge    Legal status on admission: Voluntary/Patient has signed consent for treatment    Assessment Details   Total duration spent on the patient case in minutes: 15 min     CPT code(s) utilized: Non-Billable    CHAMP Strong, Psychotherapist  DEC - Triage & Transition Services  Callback: [phone]

## 2023-09-05 ENCOUNTER — HOSPITAL ENCOUNTER (EMERGENCY)
Facility: CLINIC | Age: 73
Discharge: HOME | End: 2023-09-05
Attending: EMERGENCY MEDICINE | Admitting: EMERGENCY MEDICINE
Payer: COMMERCIAL

## 2023-09-05 VITALS
HEART RATE: 73 BPM | SYSTOLIC BLOOD PRESSURE: 106 MMHG | DIASTOLIC BLOOD PRESSURE: 75 MMHG | TEMPERATURE: 98.1 F | OXYGEN SATURATION: 94 % | RESPIRATION RATE: 18 BRPM

## 2023-09-05 DIAGNOSIS — F99 CHRONIC MENTAL ILLNESS: ICD-10-CM

## 2023-09-05 DIAGNOSIS — Z59.00 HOMELESSNESS: ICD-10-CM

## 2023-09-05 PROCEDURE — 99283 EMERGENCY DEPT VISIT LOW MDM: CPT

## 2023-09-05 SDOH — ECONOMIC STABILITY - HOUSING INSECURITY: HOMELESSNESS UNSPECIFIED: Z59.00

## 2023-09-05 ASSESSMENT — ACTIVITIES OF DAILY LIVING (ADL)
ADLS_ACUITY_SCORE: 35
ADLS_ACUITY_SCORE: 35

## 2023-09-05 NOTE — DISCHARGE INSTRUCTIONS
Emergency departments are for emergent medical conditions.  If you have one of these you should return.      [facility]risis Residence Services  Cris[facility] Residence provides residential mental health crisis stabilization services to adults with mental illness, who may also have other behavioral problems and/or chemical abuse/dependency issues. Access services anytime, day or night, if a room is available. You may call 24 hours a day and provide self-referral.    Jims Bradford  [employer]   [address] [place], [place], [address]  Phone: [phone].     [facility]  [employer]  [address] [place], [address]  Phone: [phone]    [facility]  [employer]  [address] [place] [address]  Phone: [phone]   Fax: [phone]    [address]  Phone:[phone]   Call or walk-in. They provide insurance if someone does not have any. Bring medications or they will help you get them. Stay between 3-10 days. They can help you get connected to mental and chemical health treatment; financial, food, and medical assistance; housing, transportation, and employment resources.    Emergency Shelter Services    [facility] Hotline: [phone]  Remarks: Provides information about emergency shelter and transitional housing for single adults, families and youth in the [place] area.    [facility]  [address] [place] [address]  Phone: [phone]  Provides: A safe waiting space (a simple mat on the floor) is provided overnight from 9 pm-6 am. Must arrive between 8 and 10 pm. Call for information. Lockers are available.    [facility]  [address]  Phone: [phone]  Provides: Overnight shelter for adult men (18 and up). Doors open at 7:15 pm and close at 8:00 am.  Remarks: Men need to have resided in [facility] for the past 30 days to be eligible. They need to have no financial resources to house  themselves. There is a total of 20 beds in the shelter.  Resources: Independent Living Skills classes, Computer Lab access, meal, laundry.12     [facility]  [address] [facility]  Phone: [phone]  Emergency Housing: Provides shelter, personal hygiene items and meals for homeless single adults who are receiving GA, social security or other benefits. Access through [facility] Shelter Team at [address] [facility] (5-11 pm daily) or [address] [place] [address]. Open to men and women.  [facility]: Provides shelter, personal hygiene items and meals for able-bodied adults who are employed or actively seeking employment. There is a $10/day  co-pay  to stay there. Open to men and women.    [facility]: A secure shelter for single homeless women. Beds are available first come, first serve each night, starting at 4 pm. Open to women only.  [facility]: A secure shelter for single homeless men. Beds are available first come, first serve each night, starting at 8 pm. Open to men only.    [address]  Phone: [phone]  Remarks: Sign up for emergency shelter between 3-6 pm and attend evening service at 7:30 pm. Showers are available. Evening meal and breakfast provided. Must leave by 7:30 am, and sign up every day. Free clothing is available 5:30-7 pm.  Individuals can pay for their own beds at [facility] in order to keep them as far ahead of time as they want to stay. When you pay for beds, you can enter at 3:30 pm; daily chapel attendance is not required as part of this program. The beds' fee is $6 a night, and includes breakfast, lunch, and evening meal.    Families   [facility] and Shelter  [address] [facility])  Phone: [phone]  Hours: 7 am-4:30 pm.  Intake and Info: Call 2-1-10 ([phone]) to complete an initial eligibility review. [facility] specialists will conduct a further  intake assessment to help determine the most appropriate referral for housing /shelter for each family based on the needs of each individual family, and also based on openings as they are available.    Youth  [facility]  [address] [facility]  Phone: [phone] (www.[employer])  Shelter for runaway and homeless youths, ages 5-17. Space for 5 boys and 5 girls. There are two separate programs. One, for youth who refer themselves, or are referred by family members, may stay up to 21 days. Children referred by a [facility] child welfare agency can stay longer.  Services: In-house counseling is offered for both individuals and families of residents. Aftercare services are also provided.    [facility]   [address] [place] [address]  Phone: [phone]  Remarks: Space for 14 youth ages 10-17. Call to see if there is space available.  Services: Street outreach and youth support groups, emergency shelter, individual and family counseling.    Children s Crisis Response  24-Hour Crisis Phone: [phone]  Provides: Crisis intervention for [facility] youth (ages 0-18) and their families. 24-hour hotline, diagnostic assessments, and in-home intervention available.    [facility]  Phone: [phone]  Provides: Safe shelter for youth ages 16-20 (male and female) who do not have a safe place; referrals can be from community youth workers or by self-referral; open from 7 pm-9 am.  Intake procedures: Doors open at 8 pm; phones open at 7 pm to call for availability. Emergencies, call [phone].    [facility]  [address]  Phone: [phone]   [facility] Joint Shelter Line: [phone]  Provides: Shelter, personal hygiene items for homeless single youth, ages 18-21. Call Joint [facility] Line for bed availability.    Community Outreach & Drop-in Centers  [facility]  [address] [place] [address] [address]   Phone: [phone] ext. [phone]  Hours: 9:00 am-5:00 pm  Provides:  Outreach  provide direct street outreach and case management to people who are unsheltered (camping outside, in their car, etc.) in [facility].  Services:  reach out into the community by going to camps, drop-in centers, and other locations where unsheltered people stay. After first contact, individuals who have been unsheltered for a year or longer, or four times in the past three years, may be enrolled in the outreach case management program which provides connection to benefits, health care, and housing resources.     [address] ([facility])  Phone: [phone]  Walk-in Hours: Monday through Friday 9:30-11:30 am.  Services: Outreach for homeless mentally ill people in [facility]; short-term case management; information and referral; and crisis management assistance. Call for appointment or just walk in.

## 2023-09-05 NOTE — ED PROVIDER NOTES
"    History     Chief Complaint:  Homeless       The history is provided by the patient and medical records.      Ger Bashir is a 73 year old male with a history of schizophrenia who presents via EMS for medical evaluation.  Reportedly police were called on Ger because he was loitering.  He told them he was ill but upon arrival to the emergency department he tells staff he is simply in need of a warm meal.  When I evaluated the patient he requested he speak with a psychiatrist as they are the \"highest level of doctor\" because they \"go to the most school.\"  When asked why he needed to see a psychiatrist he tells me this is because he needs a number to call to get his money.  I explained that was not a psychiatric condition and he tells me he then has no other concerns.  He begins yelling at me and demanding paper and a pen.  He is unable to express any physical concerns nor mental health concerns.  He denies suicidal ideation, homicidal ideation, and hallucinations.  He denies drug and alcohol use.    Review of medical records indicates that Ger had a very similar ER visit at [facility] 8/24/2023 when he reported chest pain prior to arrival then told staff there he just wanted a place to stay for 3 days.    Independent Historian:    As above    Review of External Notes:  [facility] visit 8/24/2023.  [facility] visit 8/16/2023 documenting he had received \"injections for schizophrenia\" that week.    Medications:    Patient has reported he does not take any of his medications    Past Medical History:    Past Medical History:   Diagnosis Date    Schizophrenia (H)      Physical Exam   Patient Vitals for the past 24 hrs:   BP Temp Temp src Pulse Resp SpO2   09/05/23 1039 106/75 98.1  F (36.7  C) Temporal 73 18 94 %      .anep  Physical Exam  General: WD/WN; well appearing elderly  man eating provided snack box yogurt; somewhat cooperative  Head:  Atraumatic  Eyes:  Conjunctivae, lids, and sclerae are normal  ENT:  "   Normal nose; MMM  Neck:  Supple; normal ROM  Resp:  No respiratory distress  GI:  Nondistended    MS:  Normal ROM  Skin:  Warm; non-diaphoretic; no pallor  Neuro:  Awake; A&Ox3  Psych: Normal mood and angry affect; normal speech; no suicidal or homicidal thought content; not responding to internal stimuli; mildly agitated  Vitals reviewed.    Emergency Department Course   Emergency Department Course & Assessments:  Independent Interpretation (X-rays, CTs, rhythm strip):  Not applicable    Consultations/Discussion of Management or Tests:  Not applicable    Social Determinants of Health affecting care:  Homeless  Mental illness     Disposition:  Discharged    Impression & Plan    Medical Decision Making:  Ger is a 73 year old man with schizophrenia who reportedly told police that he was ill when they were called because he was a loitering.  However, upon arrival he said he really just wanted a warm meal because he is homeless.  He expresses no medical concerns and is rude and yelling at home.  He denies homicidal or suicidal ideation.  He is not responding to internal stimuli nor does he express illogical thought process or evidence of psychosis.  He does not meet criteria for hold.  He has no evidence of trauma or toxidrome. Further, he does not have any emergent conditions which require work-up or treatment today.  He had a very similar visit 8/24/2023 at [facility].  He was provided with a snack box as well as bus tokens and discharged.  He was encouraged to return if he has an emergent condition and was provided with crisis services information.    Diagnosis:    ICD-10-CM    1. Homelessness  Z59.00       2. Chronic mental illness  F99            Discharge Medications:  Discharge Medication List as of 9/5/2023 11:32 AM         9/5/2023   Megan Layton MD Dixson, Kylie S, MD  09/09/23 5795

## 2023-09-05 NOTE — ED NOTES
Bed: BH3  Expected date:   Expected time:   Means of arrival:   Comments:  [id number]

## 2023-09-05 NOTE — ED NOTES
Pt was given his clothing along with an additional set. Pt will be given a bus token for discharge

## 2023-09-05 NOTE — ED TRIAGE NOTES
"Pt is homeless and walked up to [facility] and was loitering there. PD was called and patient states that he is \"sick and not taking meds\". Patient has hx of bipolar and schizophrenia per him. \"Really I'm just homeless and want a warm meal\". Patient c/o \"every body part I have hurts and I'm hungry\". Pt demanded a tv remote and phone right away.      Triage Assessment       Row Name 09/05/23 1050       Respiratory WDL    Respiratory WDL WDL       Cardiac WDL    Cardiac WDL WDL       Cognitive/Neuro/Behavioral WDL    Cognitive/Neuro/Behavioral WDL X    Level of Consciousness confused    Arousal Level opens eyes spontaneously    Orientation disoriented to;situation;time    Speech garbled                    "

## 2023-09-28 NOTE — PLAN OF CARE
"  Problem: Cognitive Impairment (Psychotic Signs/Symptoms)  Goal: Improved Thought Clarity and Organization (Psychotic Signs/Symptoms)  Outcome: Improving     Patient was AOx3.  He was isolative and withdrawn to his room.  Patient reports feeling anxious and restless on the unit, as he wants to discharge.  Patient stated he prefers to discharge to  [facility]  [facility].  Patient denied feeling depressed/SI/SIB/HI.  He denied AH/VH/Paranoia.  When asked directly about the YAHIR, patient replied  I have not met a , so I cannot form an opinion\".  This appears to be an improvement from earlier in the admission.    Patient was compliant with his medications as prescribed.    Patient denied acute medical concerns and medication side effects.  Patient ate supper.  He did not attend to ADLs.    " Addended by: GARRETT MELLO on: 9/28/2023 04:37 PM     Modules accepted: Level of Service

## 2023-12-14 NOTE — PROGRESS NOTES
Pt has not attended scheduled occupational therapy sessions. Encourage attendance and participation when appropriate for groups. Pt will be given self-assessment form, and OT staff will explain the purpose of including them in their treatment plan and offer options for meeting their needs.     Suspect PCOS. Offered OCPs via PP

## 2024-01-01 NOTE — PROGRESS NOTES
"Patient has been awake off and on all night - given Hydroxyzine 25 mg PO x1.  Patient again awake and threatening staff both verbally and physically clenching his fist & drawing his arm back as if to hit another staff.  Able to verbally de-escalate to a degree, but patient agreed to take PO Zyprexa 10 mg (as long as it was not a Green pill - per patient), and did take same.  Redirected back to his room after taking the medication.  Remains extremely labile at times & irritable.  Yelling and screaming at staff at times, and many times not making much sense in what he's saying \"that's not it! I need 6 sugars for everything! He's a fag! I'm not here, I'm there!\".  At times speech is garbled besides nonsensical, and he appears to react better to female staff than to male.   " This document is complete and the patient is ready for discharge. Billing Type: Third-Party Bill

## 2024-02-22 ENCOUNTER — HOSPITAL ENCOUNTER (EMERGENCY)
Facility: CLINIC | Age: 74
Discharge: HOME | End: 2024-02-22
Attending: EMERGENCY MEDICINE | Admitting: EMERGENCY MEDICINE
Payer: COMMERCIAL

## 2024-02-22 VITALS
HEART RATE: 69 BPM | OXYGEN SATURATION: 95 % | TEMPERATURE: 97.4 F | RESPIRATION RATE: 16 BRPM | DIASTOLIC BLOOD PRESSURE: 72 MMHG | SYSTOLIC BLOOD PRESSURE: 115 MMHG

## 2024-02-22 DIAGNOSIS — R41.0 CONFUSION: ICD-10-CM

## 2024-02-22 LAB
ALBUMIN SERPL BCG-MCNC: 4.2 G/DL (ref 3.5–5.2)
ALP SERPL-CCNC: 83 U/L (ref 40–150)
ALT SERPL W P-5'-P-CCNC: 20 U/L (ref 0–70)
ANION GAP SERPL CALCULATED.3IONS-SCNC: 6 MMOL/L (ref 7–15)
AST SERPL W P-5'-P-CCNC: 20 U/L (ref 0–45)
BASOPHILS # BLD AUTO: 0.1 10E3/UL (ref 0–0.2)
BASOPHILS NFR BLD AUTO: 1 %
BILIRUB SERPL-MCNC: 0.4 MG/DL
BUN SERPL-MCNC: 14.6 MG/DL (ref 8–23)
CALCIUM SERPL-MCNC: 9 MG/DL (ref 8.8–10.2)
CHLORIDE SERPL-SCNC: 102 MMOL/L (ref 98–107)
CREAT SERPL-MCNC: 0.86 MG/DL (ref 0.67–1.17)
DEPRECATED HCO3 PLAS-SCNC: 30 MMOL/L (ref 22–29)
EGFRCR SERPLBLD CKD-EPI 2021: >90 ML/MIN/1.73M2
EOSINOPHIL # BLD AUTO: 0.1 10E3/UL (ref 0–0.7)
EOSINOPHIL NFR BLD AUTO: 2 %
ERYTHROCYTE [DISTWIDTH] IN BLOOD BY AUTOMATED COUNT: 13.3 % (ref 10–15)
GLUCOSE SERPL-MCNC: 95 MG/DL (ref 70–99)
HCT VFR BLD AUTO: 41.1 % (ref 40–53)
HGB BLD-MCNC: 13.7 G/DL (ref 13.3–17.7)
HOLD SPECIMEN: NORMAL
IMM GRANULOCYTES # BLD: 0 10E3/UL
IMM GRANULOCYTES NFR BLD: 0 %
LYMPHOCYTES # BLD AUTO: 1.4 10E3/UL (ref 0.8–5.3)
LYMPHOCYTES NFR BLD AUTO: 24 %
MCH RBC QN AUTO: 30.6 PG (ref 26.5–33)
MCHC RBC AUTO-ENTMCNC: 33.3 G/DL (ref 31.5–36.5)
MCV RBC AUTO: 92 FL (ref 78–100)
MONOCYTES # BLD AUTO: 0.4 10E3/UL (ref 0–1.3)
MONOCYTES NFR BLD AUTO: 7 %
NEUTROPHILS # BLD AUTO: 3.8 10E3/UL (ref 1.6–8.3)
NEUTROPHILS NFR BLD AUTO: 66 %
NRBC # BLD AUTO: 0 10E3/UL
NRBC BLD AUTO-RTO: 0 /100
PLATELET # BLD AUTO: 211 10E3/UL (ref 150–450)
POTASSIUM SERPL-SCNC: 4.1 MMOL/L (ref 3.4–5.3)
PROT SERPL-MCNC: 6.6 G/DL (ref 6.4–8.3)
RBC # BLD AUTO: 4.48 10E6/UL (ref 4.4–5.9)
SODIUM SERPL-SCNC: 138 MMOL/L (ref 135–145)
WBC # BLD AUTO: 5.8 10E3/UL (ref 4–11)

## 2024-02-22 PROCEDURE — 36415 COLL VENOUS BLD VENIPUNCTURE: CPT | Performed by: EMERGENCY MEDICINE

## 2024-02-22 PROCEDURE — 93005 ELECTROCARDIOGRAM TRACING: CPT

## 2024-02-22 PROCEDURE — 99284 EMERGENCY DEPT VISIT MOD MDM: CPT

## 2024-02-22 PROCEDURE — 80053 COMPREHEN METABOLIC PANEL: CPT | Performed by: EMERGENCY MEDICINE

## 2024-02-22 PROCEDURE — 85025 COMPLETE CBC W/AUTO DIFF WBC: CPT | Performed by: EMERGENCY MEDICINE

## 2024-02-22 ASSESSMENT — COLUMBIA-SUICIDE SEVERITY RATING SCALE - C-SSRS
1. IN THE PAST MONTH, HAVE YOU WISHED YOU WERE DEAD OR WISHED YOU COULD GO TO SLEEP AND NOT WAKE UP?: NO
2. HAVE YOU ACTUALLY HAD ANY THOUGHTS OF KILLING YOURSELF IN THE PAST MONTH?: NO
6. HAVE YOU EVER DONE ANYTHING, STARTED TO DO ANYTHING, OR PREPARED TO DO ANYTHING TO END YOUR LIFE?: NO

## 2024-02-22 ASSESSMENT — ACTIVITIES OF DAILY LIVING (ADL)
ADLS_ACUITY_SCORE: 35
ADLS_ACUITY_SCORE: 35

## 2024-02-23 LAB
ATRIAL RATE - MUSE: 64 BPM
DIASTOLIC BLOOD PRESSURE - MUSE: NORMAL MMHG
INTERPRETATION ECG - MUSE: NORMAL
P AXIS - MUSE: 68 DEGREES
PR INTERVAL - MUSE: 194 MS
QRS DURATION - MUSE: 140 MS
QT - MUSE: 424 MS
QTC - MUSE: 437 MS
R AXIS - MUSE: -61 DEGREES
SYSTOLIC BLOOD PRESSURE - MUSE: NORMAL MMHG
T AXIS - MUSE: 17 DEGREES
VENTRICULAR RATE- MUSE: 64 BPM

## 2024-02-23 NOTE — ED PROVIDER NOTES
"History     Chief Complaint:  Altered Mental Status       The history is provided by the EMS personnel and the patient. The history is limited by the condition of the patient.      Ger Bashir is a 74 year old male with a history of schizophrenia who presents via EMS with altered mental status. Per the EMS report, Ger lives at the [facility] home in [place] where he moved in after a period of houselessness. The home staff say that he has tremors and drooling at baseline, but since this evening was more mentally confused than normal. Ger reportedly drank a lot of caffeine and went on walks with staff, and complained earlier of a \"prickling sensation\" on his body. At presentation, Ger denies any suicidal ideation, and is unsure why staff sent him to the ED. He also denies any sickness, vomiting, diarrhea, or dysuria, and states he has been walking okay. Per EMS, patient also does not take any of his medications besides vitamin supplements.     Independent Historian:    EMS report - provided history    Review of External Notes:  Reviewed emergency department note from October 2023 where he was seen for alcohol intoxication    Medications:    Aspirin 81 mg  Cogentin  Depakote  Haldol  Levothyroxine  Trazodone    Past Medical History:    Depression   GERD  Schizophrenia   Suicidal ideation   Seborrhea   Tobacco use disorder     Past Surgical History:    ACL reconstructioj      Physical Exam   Patient Vitals for the past 24 hrs:   BP Temp Temp src Pulse Resp SpO2   02/22/24 2312 -- -- -- -- -- 95 %   02/22/24 2310 -- -- -- -- -- 94 %   02/22/24 2309 -- -- -- -- -- 94 %   02/22/24 2308 115/72 -- -- 69 -- 93 %   02/22/24 2116 96/54 -- -- 64 -- 97 %   02/22/24 2111 -- 97.4  F (36.3  C) Oral 69 16 94 %        Physical Exam  General: Sitting up in bed  Eyes:  The pupils are equal and round    Conjunctivae and sclerae are normal  ENT:    Atraumatic face  Neck:  Normal range of motion  CV:  Regular rate, regular rhythm "     Skin warm and well perfused   Resp:  Non labored breathing on room air    No tachypnea    No cough heard    Lungs clear bilaterally  GI:  Abdomen is soft, there is no rigidity    No distension    No rebound tenderness     No abdominal tenderness  MS:  Normal muscular tone  Skin:  No rash or acute skin lesions noted  Neuro:   Awake, alert.  Oriented to name, date and location    SILT on bilateral UE/LE    Speech is normal and fluent.    Face is symmetric.     Moves all extremities equally  Psych: Normal affect.  Appropriate interactions.    Emergency Department Course   ECG  ECG taken at 2124, ECG read at 2131  Normal sinus rhythm  Right bundle branch block  Left anterior fascicular block  Abnormal ECG  When compared with prior ECG, dated 8/14/23, no significant change  Rate 64 bpm. DC interval 194 ms. QRS duration 140 ms. QT/QTc 424/437 ms. P-R-T axes 68 -61 17.    Laboratory:  Labs Ordered and Resulted from Time of ED Arrival to Time of ED Departure   COMPREHENSIVE METABOLIC PANEL - Abnormal       Result Value    Sodium 138      Potassium 4.1      Carbon Dioxide (CO2) 30 (*)     Anion Gap 6 (*)     Urea Nitrogen 14.6      Creatinine 0.86      GFR Estimate >90      Calcium 9.0      Chloride 102      Glucose 95      Alkaline Phosphatase 83      AST 20      ALT 20      Protein Total 6.6      Albumin 4.2      Bilirubin Total 0.4     CBC WITH PLATELETS AND DIFFERENTIAL    WBC Count 5.8      RBC Count 4.48      Hemoglobin 13.7      Hematocrit 41.1      MCV 92      MCH 30.6      MCHC 33.3      RDW 13.3      Platelet Count 211      % Neutrophils 66      % Lymphocytes 24      % Monocytes 7      % Eosinophils 2      % Basophils 1      % Immature Granulocytes 0      NRBCs per 100 WBC 0      Absolute Neutrophils 3.8      Absolute Lymphocytes 1.4      Absolute Monocytes 0.4      Absolute Eosinophils 0.1      Absolute Basophils 0.1      Absolute Immature Granulocytes 0.0      Absolute NRBCs 0.0       Emergency Department  Course & Assessments:    Assessments:  2307 I obtained history and examined the patient as noted above. We discussed a plan for discharge and patient is comfortable with plan.    Independent Interpretation (X-rays, CTs, rhythm strip):  None    Consultations/Discussion of Management or Tests:  None    Disposition:  The patient was discharged to home.     Impression & Plan    CMS Diagnoses: None    Medical Decision Making:  Ger Bashir is a 74-year-old male who presented to the emergency department with concerns for confusion.  Patient does not appear confused in the emergency department.  He is oriented x 3.  He does not know why he is here in the emergency department and does not want to be here anymore.  He has no medical symptoms such as chest pain, fever, shortness of breath, headache, etc.  Basic laboratory studies are unremarkable.  He denies any urinary symptoms and does not want to wait for a urine test to be obtained.  There is no report of fall and I see no head trauma.  He has no headache and doubt stroke or acute intracranial process.  At this point patient wants to be discharged which seems reasonable at this time and staff from his facility came to pick him up.    Diagnosis:    ICD-10-CM    1. Confusion  R41.0         Scribe Disclosure:  I, Frankie Smallwood, am serving as a scribe at 12:04 AM on 2/23/2024 to document services personally performed by Marium Villa MD, based on my observations and the provider's statements to me.  2/22/2024   Marium Villa MD Goertz, Maria Kristine, MD  02/23/24 0729

## 2024-02-23 NOTE — ED TRIAGE NOTES
"Pt BIBA from [facility] in [place] where he has been for a week, was homeless prior. Staff found patient to be \"more confused than normal\" they state that the tremors and drooling are baseline. States that confusion began early this evening. They states they know nothing about his medical history prior to January. Pt reportedly went on several outings with staff today and drank \"copious amounts of caffeine.\" Pt complains of \"prickling sensation on body.\" Hx paranoid schizophrenia and tremors. Oriented to self and situation only.      Triage Assessment (Adult)       Row Name 02/22/24 2110          Triage Assessment    Airway WDL WDL        Respiratory WDL    Respiratory WDL WDL        Skin Circulation/Temperature WDL    Skin Circulation/Temperature WDL WDL        Cardiac WDL    Cardiac WDL WDL        Peripheral/Neurovascular WDL    Peripheral Neurovascular WDL WDL        Cognitive/Neuro/Behavioral WDL    Cognitive/Neuro/Behavioral WDL --  altered for staff                     "

## 2024-02-23 NOTE — ED NOTES
Bed: ED26  Expected date:   Expected time:   Means of arrival:   Comments:  L[place] 2 74M confusion eta 2300

## 2024-02-23 NOTE — ED NOTES
RN must call facility prior to discharge. Pt has a history of eloping from facility. Callback [phone]

## 2024-04-12 ENCOUNTER — MEDICAL CORRESPONDENCE (OUTPATIENT)
Dept: HEALTH INFORMATION MANAGEMENT | Facility: CLINIC | Age: 74
End: 2024-04-12

## 2025-01-27 NOTE — PROGRESS NOTES
"[facility]   Psychiatric Progress Note  Hospital Day: 11        Interim History:   The patient's care was discussed with the treatment team during the daily team meeting and/or staff's chart notes were reviewed.  Staff report patient was transferred from station 10 to station 12 on 11/23 following aggressive behavior necessitating seclusion. Patient remains disorganized with ongoing evidence of delusional thought content, though he has been more cooperative with noted improvement in agitation/irritability.  Noted significant improvement in the past week overall.  He has been actively participating in groups.  No behavioral issues.  Patient denies auditory hallucinations, suicidal ideation, and self-harm urges.  Continues to exhibit rambling speech at times.    Upon interview, the patient was pleasant and cooperative, though irritable at times when I informed him that he could not have access to a pen in his room. He said \"You really think I am going to poke my eyes out or what?\" He was quite perseverative on \"speaking to your boss about that.\" He was directed to patient relations if ongoing questions about unit restrictions. He again mentions that the [facility] are monitoring him closely related to a murder that took place in the 1970's. Continues to exhibit poor insight and delusional thinking, though overall improvement in his mood. Patient had no further requests or concerns.          Medications:       aspirin  81 mg Oral Daily     benztropine  1 mg Oral BID     haloperidol  5 mg Oral At Bedtime    Or     haloperidol lactate  10 mg Intramuscular At Bedtime     haloperidol  5 mg Oral Daily    Or     haloperidol lactate  5 mg Intramuscular Daily     levothyroxine  50 mcg Oral Daily     multivitamin w/minerals  1 tablet Oral Daily     nicotine  1 patch Transdermal Daily     nicotine   Transdermal Q8H     nicotine   Transdermal Daily     traZODone  100 mg Oral At Bedtime          " SUBJECTIVE:  Na returns for follow up today for asthma and nasal polyposis with chronic sinusitis.    Today's visit:    The patient is a 75-year-old woman who presents for a follow-up of asthma.    She reports that her asthma is currently well-managed with no exacerbations. She has been receiving Dupixent injections, which have been effective. However, she has not yet received her acceptance letter for the 2025 program year. She notes that even if she is not accepted into the program, she can still afford the treatment due to Medicare's out-of-pocket cap of $ 2000.    She has not received the RSV vaccine. She has had COVID-19 and influenza in the past.    Dupilumab has been immensely effective improving polyposis.  Marked reduction in symptoms on dupilumab.  Secondary benefit of improved asthma control as well.  Has not had any significant asthma flares or exacerbations.  No nocturnal wheezing coughing or chest congestion.  ACT 25.      Supplemental Information  She reports elevated blood sugar levels, which she is actively managing. She has recently developed neuropathy in her feet, a complication likely related to her diabetes. She experienced plantar fasciitis in her left foot, but this condition has since resolved. She is not currently engaging in physical exercise or walking but plans to resume these activities.    She has not received the RSV vaccine. She has had COVID-19 and influenza in the past.    Symptoms have improved.  Missed work/school no.    Problem List as of 1/27/2025 Reviewed: 11/18/2024  9:44 AM by Rohith Slaughter PA-C         High    Cardiomyopathy (CMD)    PAC (premature atrial contraction)       Low    Allergic contact dermatitis due to metals    Allergic rhinitis, cause unspecified    Last Assessment & Plan 7/22/2024 Office Visit Written 7/22/2024 12:07 PM by Lalit Burrell MD     Perennial she is allergic rhinitis is stable, often has increased symptoms during ragweed  Allergies:     Allergies   Allergen Reactions     Peas GI Disturbance     Black eyed peas - vomiting     Haloperidol Anxiety          Labs:   No results found for this or any previous visit (from the past 24 hour(s)).       Psychiatric Examination:     /70 (BP Location: Right arm)   Pulse 80   Temp 97.4  F (36.3  C) (Oral)   Resp 16   Wt 89.8 kg (198 lb)   SpO2 95%   BMI 28.41 kg/m    Weight is 198 lbs 0 oz  Body mass index is 28.41 kg/m .    Weight over time:  Vitals:    11/22/19 2100 11/30/19 0900   Weight: 88.9 kg (196 lb) 89.8 kg (198 lb)       Orthostatic Vitals       Most Recent      Sitting Orthostatic BP 91/57 11/24 1900    Sitting Orthostatic Pulse (bpm) 47 11/24 1900            Cardiometabolic risk assessment. 11/25/19      Reviewed patient profile for cardiometabolic risk factors    Date taken /Value  REFERENCE RANGE   Abdominal Obesity  (Waist Circumference)   See nursing flowsheet Women ?35 in (88 cm)   Men ?40 in (102 cm)      Triglycerides  Triglycerides   Date Value Ref Range Status   04/19/2019 136 <150 mg/dL Final       ?150 mg/dL (1.7 mmol/L) or current treatment for elevated triglycerides   HDL cholesterol  HDL Cholesterol   Date Value Ref Range Status   04/19/2019 32 (L) >39 mg/dL Final   ]   Women <50 mg/dL (1.3 mmol/L) in women or current treatment for low HDL cholesterol  Men <40 mg/dL (1 mmol/L) in men or current treatment for low HDL cholesterol     Fasting plasma glucose (FPG) Lab Results   Component Value Date     10/14/2019      FPG ?100 mg/dL (5.6 mmol/L) or treatment for elevated blood glucose   Blood pressure  BP Readings from Last 3 Encounters:   12/02/19 113/70   11/22/19 129/86   10/30/19 103/69    Blood pressure ?130/85 mmHg or treatment for elevated blood pressure   Family History  See family history     A 69-year-old  male, appears older than stated age, disheveled and exhibited limited hygiene, nonsensical, rapid speech, soft volume.  Visible tremors  season, watch for this and watch for increased asthma symptoms.         Anticoagulant long-term use    Last Assessment & Plan 10/28/2024 Office Visit Written 10/28/2024 11:46 AM by Shanae Lu     On warfarin    INR (no units)   Date Value   10/28/2024 2.5       HGB (g/dL)   Date Value   04/12/2024 13.7     HCT (%)   Date Value   04/12/2024 43.4         Tolerating well without bleeding issues- notes some bruising. Continue with INR goal.          Chronic right hip pain    Chronic sinusitis    Daytime hypersomnolence    Digoxin therapy    Last Assessment & Plan 10/28/2024 Office Visit Written 10/28/2024 11:46 AM by Shanae Lu     Started 12/19/2022  No sodium or potassium channel toxicity noted. EGMs reviewed.  Tolerating well, continue current dose.    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  The in-person device check shows NORMAL FUNCTION.     Presenting Rhythm:  AFL  Underlying Rhythm:  AFL  Mode:  AAIR-DDDR 70-130BPM  Battery Voltage/Longevity:  5.2 YEARS  Percent Pacing:  :31.8%, AP:12.0%  Tachycardia Detections/Episodes:  AF BURDEN 89.5%     See device parameters/results in the media.           Mixed dyslipidemia    NAFLD (nonalcoholic fatty liver disease)    Nasal polyp    Last Assessment & Plan 7/22/2024 Office Visit Written 7/22/2024 12:07 PM by Ashanti  "in hands bilaterally, but no tics.  Gait and muscle tone within normal limits.  Mood appears to be \"\" and heightened affect.  His thought process is disorganized and somewhat impoverished.  Thought content:  No suicidal or homicidal ideation reported.  He appears to be internally preoccupied, suspicious and expressed delusional thoughts earlier.  Sensorium was clear.  and memory:  Unable to assess.  Language, fund of knowledge, orientation and memory:  Unable to assess at the current.  Concentration and focus, poor.  Insight and judgment limited but adequate for safety at the current level of care.     Clinical Global Impressions  First:  Considering your total clinical experience with this particular patient population, how severe are the patient's symptoms at this time?: 7 (11/25/19 1311)  Compared to the patient's condition at the START of treatment, this patient's condition is:: 4 (11/25/19 1311)  Most recent:  Considering your total clinical experience with this particular patient population, how severe are the patient's symptoms at this time?: 7 (11/25/19 1311)  Compared to the patient's condition at the START of treatment, this patient's condition is:: 4 (11/25/19 1311)           Precautions:     Behavioral Orders   Procedures     Assault precautions     Code 1 - Restrict to Unit     Elopement precautions     Routine Programming     As clinically indicated     Single Room     Status 15     Every 15 minutes.          Diagnoses:     1.  Schizoaffective disorder, bipolar type with exacerbation of sherie and psychosis.   2.  Historical diagnosis of cognitive decline         Assessment & Plan:     Assessment and hospital summary:  Ger Bashir is a 69-year-old  male with a history of schizoaffective disorder, bipolar type, history of noncompliance and recurrent hospitalization who was referred to the Emergency Department by his ACT team due to increase in behavioral dysregulation and " Lalit MCCORD MD     Chronic nasal polyposis outstanding response dupilumab, continue nasal steroid spray.  Follow-up 6 months unless there are problems in the interim.         Pelvic floor weakness    Persistent atrial fibrillation / atrial flutter (CMD)    Last Assessment & Plan 10/28/2024 Office Visit Edited 10/28/2024 11:48 AM by Shanae Lu     Date of initial AF (atrial fibrillation) diagnosis: 10/11/17  Paroxysmal, persistent OR chronic:persistent   Symptoms, frequency and duration: fatigue  COURTNEY:  48.7 ml/m2 per echo 2021  LVEF (left ventricular ejection fraction) (date): 53% per echo 12/2021  CHADSVASCs score: 4 (age, gender, DM, HTN)  Dates of ablations:   5/17/2022 - Pulmonary vein isolation using cryoablation. and OS of PVs and roofline with cryo, and then with RF: CFAE, septum to mitral annulus, posterior mitral line, and anterior area of RSPV (GP)  And Cardioversion  Past chest and abdominal surgeries /dates:  none  Prior AA (anti-arrythymic) and reason for discontinuing: Toprol  Currently on anticoagulation?: Xarelto; Eliquis too expensive  D - NM myocardial perfusion testing 5/17/2021 - no ischemia (done at [facility])    DCCV 11/2/2022    Assessment:  Persistent AF/AFL - rates controlled and pt is asymptomatic  She is still not interested in AVJ RFA  Now managed with rate control strategy and is anticoagulated on Warfarin.  Given family history of aortic aneurysm, will order CT of abdomen   Will order echo next year given patients hx    Recommendations:  Continue with warfarin and metoprolol and digoxin  Given family history of aortic aneurysm, will order CT of abdomen   3. Remote in 3 mo  4. Follow up in 6 mos         Pre-ulcerative calluses    S/P hysterectomy with oophorectomy    Severe persistent asthma (CMD)    Last Assessment & Plan 7/22/2024 Office Visit Written 7/22/2024 12:07 PM by Lalit Burrell MD     Severe persistent asthma extremely well-controlled, blood pressure is normal  now.  Remains on combination medication but often misses doses without any sequela.  Dupilumab is been highly effective in controlling asthma and polyposis.         Sick sinus syndrome  (CMD)    Sinus node dysfunction  (CMD)    Last Assessment & Plan 10/28/2024 Office Visit Written 10/28/2024 11:46 AM by Shanae Lu     S/p pacemaker         MAYI (stress urinary incontinence, female)    Tear film insufficiency, unspecified    Tubular adenoma    Type 2 diabetes mellitus, with long-term current use of insulin (CMS/[facility])    Uncomplicated severe persistent asthma  (CMD)    Urethral caruncle         Current Outpatient Medications   Medication Sig Dispense Refill    atorvastatin (LIPITOR) 40 MG tablet TAKE 1 TABLET NIGHTLY 90 tablet 1    montelukast (SINGULAIR) 10 MG tablet TAKE 1 TABLET EVERY EVENING 90 tablet 3    Alpha-Lipoic Acid 600 MG Cap Take 600 mg by mouth daily.      metoPROLOL tartrate (LOPRESSOR) 50 MG tablet TAKE 1 TABLET EVERY 12 HOURS 180 tablet 3    digoxin (LANOXIN) 0.125 MG tablet Take 1 tablet by mouth daily. 90 tablet 3    insulin glargine (Lantus) 100 UNIT/ML vial solution Inject 100 Units into the skin nightly. 100 mL 3    glimepiride (AMARYL) 4 MG tablet Take 1 tablet by mouth daily (before breakfast). 90 tablet 3    insulin lispro (HumaLOG) 100 UNIT/ML injectable solution Inject up to 20 units before breakfast, 22 units before lunch, and 24 units before dinner PER sliding scale. Prime 2 units before each dose. Take insulin to carb ratio of 1 unit for every 10 grams of carbs. MDD 80 units. 90 mL 1    benazepril (LOTENSIN) 40 MG tablet Take 1 tablet by mouth daily. 90 tablet 1    duPILUmab (DUPIXENT) 300 MG/2ML injection Inject 2 mLs into the skin every 14 days. 6 mL 3    EPINEPHrine 0.3 MG/0.3ML auto-injector INJECT CONTENTS OF 1 SYRINGE INTRAMUSCULARLY AS NEEDED FOR ANAPHYLAXIS 2 each 1    albuterol 108 (90 Base) MCG/ACT inhaler INHALE 2 PUFFS IBNTO THE LUNGS EVERY 4 HOURS AS NEEDED FOR  psychosis in the context of medication noncompliance. Report indicated that the patient had been throwing garbage at his neighbor, pounding on their doors and had been having difficulty caring for himself.      Target psychiatric symptoms and interventions:  No medication changes will be made today  1.  Cogentin continued at 1 mg twice a day.   2.  Haldol was initially restarted at higher than PTA dose (5 mg to 15 mg daily) on admission, though subsequently reduced to 5 mg BID due to evidence of EPSE on examination. However, Dr. Fox from ACT team noted that patient continued to exhibit tremors despite being off of Haldol for extended period of time.  Trazodone continued at 100 mg at bedtime  3.  PRN Haldol, Ativan and Benadryl for severe behavioral outbursts.  Zyprexa for psychosis and mild agitation, as well as hydroxyzine for anxiety will be offered.   4.  Psychosocial assessment was obtained by our clinical  who will assist with setting up post-discharge care.  The patient has had recurrent recent hospitalizations.   5. Administered Invega Sustenna 234 mg on 11/29. Again discussed with nursing staff from ACT team who confirms that last Invega Sustenna dose was last administered on 10/18/19. Patient refused dose scheduled for 11/15/19, which resulted in his decompensation.  6. Will discuss possibility of Invega Trinza with ACT team prior to discharge.    Medical Problems and Treatments:  No acute medical concerns    Behavioral/Psychological/Social:  Encourage participating in unit programming    Legal: Committed with [facility]    Safety:  - Continue precautions as noted above  - Status 15 minute checks    Disposition: Pending clinical stabilization. Likely will pursue higher level of care given demonstrated inability to maintain stability in the community despite ACT team supports.    Callie Prather MD  [facility] Psychiatry               SHORTNESS OF BREATH OR WHEEZING 18 g 1    spironolactone (ALDACTONE) 50 MG tablet TAKE 1 TABLET EVERY OTHER DAY 45 tablet 3    warfarin (COUMADIN) 1 MG tablet (warfarin) Take 0.5-1 tablets by mouth every evening. Or as directed by anticoagulation clinic 90 tablet 1    allopurinol (ZYLOPRIM) 300 MG tablet TAKE 1 TABLET DAILY 90 tablet 3    diclofenac (VOLTAREN) 1 % gel Apply 4 g topically 4 times daily as needed (pain). (Patient not taking: Reported on 1/9/2025) 350 g 3    Continuous Blood Gluc Sensor (FreeStyle Virginie 2 Sensor) Misc 1 each every 14 days. 6 each 3    OneTouch Verio test strip Test Blood Sugars 4 Times Daily As Directed. Dx: E11.29 400 strip 3    Blood Glucose Monitoring Suppl w/Device Kit Test blood sugar 4x daily 1 kit 0    DISPENSE Test blood sugar 4x daily 300 each 1    DISPENSE Test blood sugar 4x daily 300 each 1    MAGNESIUM OXIDE PO Take 1 tablet by mouth daily.      estradiol (ESTRACE VAGINAL) 0.1 MG/GM vaginal cream Apply 0.5g into the vagina nightly for 2 weeks followed by 0.5g nightly 2 times per week (Patient not taking: Reported on 1/6/2025) 42.5 g 12    Cholecalciferol (Vitamin D-3) 25 mcg (1,000 units) capsule Take 2 capsules by mouth daily. 1 capsule 0    Insulin Pen Needle 32G X 5 MM Misc Use to inject insulin 4 times daily. Remove needle cover(s) to expose needle before injecting. 400 each 1    Insulin Syringe 31G X 5/16\" 1 ML Misc Use to inject insulin 4X daily 200 each 10    calcium carbonate-cholecalciferol 600-800 MG-UNIT tablet Take 1 tablet by mouth daily.      Multiple Vitamins-Minerals (MULTIVITAMIN ADULT) Chew Tab With iron 18mg and Brandie D 600IU and calclcium 100mg (flintstone)      ONETOUCH DELICA LANCETS 33G Misc To test blood sugar twice daily (Patient taking differently: To test blood sugar four times daily) 100 each 4    fexofenadine (ALLEGRA) 180 MG tablet Take 1 tablet by mouth daily. 0      propylene glycol 0.6 % Solution Apply 1 drop to eye 2 times daily.  10 mL       No current facility-administered medications for this visit.       Response to treatment excellent.    Allergies as of 01/27/2025 - Reviewed 01/27/2025   Allergen Reaction Noted    Amoxicillin-pot clavulanate HIVES 09/16/2016    Ibuprofen HIVES 01/25/2001    Pamprin [apap-parabrom-pyrilamine] HIVES and SHORTNESS OF BREATH 07/22/2008    Tetanus toxoids HIVES and SHORTNESS OF BREATH 01/25/2001    Augmentin [amoclan] HIVES 01/14/2016    Bee sting  10/14/2013    Diltiazem PRURITUS 10/23/2018    Latex   (environmental) PRURITUS 01/25/2001    Metal topical   (environmental) PRURITUS 12/04/2017    Monosodium glutamate   (food or med) DIARRHEA 04/01/2021    Rosuvastatin NAUSEA and VOMITING 08/22/2021    Verapamil Other (See Comments) 10/16/2018       Interval medical history is noted for decrease in diabetes control.  Some peripheral neuropathy as well as ongoing lower extremity edema.  Discussed exercise..  Na has no other significant problems or complaints.     Environmental changes: Mite covers are  in place.  Pets  present.  Tobacco exposure not present.    PHYSICAL EXAMINATION:   GENERAL: Na is in no acute distress.   VITAL SIGNS: Visit Vitals  /62 (BP Location: LUE - Left upper extremity, Patient Position: Sitting, Cuff Size: Large Adult)   Pulse 81   Temp 96.9 °F (36.1 °C) (Temporal)   SpO2 96%      HEENT exam is clear. There are no allergic shiners or sinus tenderness. Conjunctivae, pupils, and lids are clear. External and internal ear exam is clear. Nasal mucosa is pale. There is no turbinate hypertrophy, no swelling, no discharge, no bleeding, no ulcer, no polyps.  Oropharynx is clear. There is no thrush. Lips, gums and teeth are normal.   NECK: Supple and flexible with no cervical lymphadenopathy. Trachea is midline. There is no stridor.  The thyroid exam is normal.  CHEST: There are no supra or sub-clavicular lymph nodes palpable.  There are no retractions or visible respiratory distress.  no  wheezes, rales or rhonchi. There is good air movement throughout.    CARDIAC: Regular rate and rhythm. Normal S1, S2. No murmur, gallop or rub. There is  peripheral edema.   ABDOMEN: Normal bowel sounds in all four quadrants.  Soft, with no epigastric tenderness or rebound. No hepatomegaly or splenomegaly.     SKIN: Warm and dry.  There is no eczema, urticaria or dermatographism.   NEURO: The patient is alert and oriented x 3 with a normal affect.  The patient gives a clear medical history.    Data:      Lab Results   Component Value Date    SODIUM 138 01/09/2025    SODIUM 140 04/18/2024    POTASSIUM 3.8 01/09/2025    POTASSIUM 4.6 04/18/2024    CHLORIDE 103 01/09/2025    CHLORIDE 105 04/18/2024    CO2 27 01/09/2025    CO2 32 04/18/2024    ANIONGAP 12 01/09/2025    ANIONGAP 8 04/18/2024    GLUCOSE 195 (H) 01/09/2025    GLUCOSE 141 (H) 04/18/2024    BUN 21 (H) 01/09/2025    BUN 19 04/18/2024    CREATININE 0.83 01/09/2025    CREATININE 1.02 (H) 04/18/2024    GFRESTIMATE 74 01/09/2025    GFRESTIMATE 58 (L) 04/18/2024    BUNCR 25 01/09/2025    BUNCR 19 04/18/2024    CALCIUM 9.3 01/09/2025    CALCIUM 10.0 04/18/2024    BILIRUBIN 0.8 01/09/2025    BILIRUBIN 1.2 (H) 04/18/2024    AST 29 01/09/2025    AST 24 04/18/2024    GPT 63 01/09/2025    GPT 34 04/18/2024    ALKPT 119 (H) 01/09/2025    ALKPT 106 04/18/2024    ALBUMIN 3.8 01/09/2025    ALBUMIN 3.7 04/18/2024    TOTPROTEIN 6.7 01/09/2025    TOTPROTEIN 7.1 04/18/2024    GLOB 2.9 01/09/2025    GLOB 3.4 04/18/2024    AGR 1.3 01/09/2025    AGR 1.1 04/18/2024    TSH 0.716 07/27/2023    TSH 0.445 03/03/2022    FT3 2.4 03/03/2022    T4FREE 1.1 03/03/2022    RESR 20 10/31/2017       Lab Results   Component Value Date    WBC 7.0 04/12/2024    RBC 4.73 04/12/2024    HGB 13.7 04/12/2024    HCT 43.4 04/12/2024    MCV 91.8 04/12/2024    MCH 29.0 04/12/2024    MCHC 31.6 (L) 04/12/2024    RDWCV 14.2 04/12/2024    RDWSD 47.4 04/12/2024     04/12/2024    NRBCRE 0 04/12/2024    SEG  58 04/12/2024    TLYMPH 29 04/12/2024    PMON 8 04/12/2024    PEOS 4 04/12/2024    PBASO 1 04/12/2024    IGRE 0 04/12/2024    ANEUT 4.1 04/12/2024    ALYMS 2.0 04/12/2024    ALMA 0.6 04/12/2024    AEOS 0.3 04/12/2024    ABASO 0.1 04/12/2024    IGAB 0.0 04/12/2024       Lab Results   Component Value Date    .1 (H) 10/18/2015    IGG 1,050 10/18/2015     10/18/2015    IGM 74 10/18/2015         ASSESSMENT AND PLAN:  Based on today's evaluation, I made the following assessment and recommendations:    Nasal polyp  Nasal polyposis with outstanding response to dupilumab.  Continue this along with nasal steroid spray.  No side effects of dupilumab.    Severe persistent asthma  Severe persistent asthma control is improved remarkably.  Dupilumab is been very effective in treating the asthma and along with the polyposis.  No eosinophilia noted.  No current symptoms.  Not even using rescue inhaler and has often forgot to use dual therapy without any breakthrough symptoms.  Spirometry last summer was stable.       Thank you for allowing me to participate in Na's care.     Lalit Burrell MD

## 2025-04-20 ENCOUNTER — HOSPITAL ENCOUNTER (INPATIENT)
Facility: CLINIC | Age: 75
LOS: 2 days | Discharge: OTHER INSTITUTION | DRG: 885 | End: 2025-04-23
Attending: EMERGENCY MEDICINE | Admitting: STUDENT IN AN ORGANIZED HEALTH CARE EDUCATION/TRAINING PROGRAM
Payer: COMMERCIAL

## 2025-04-20 DIAGNOSIS — I48.92 ATRIAL FLUTTER, UNSPECIFIED TYPE (H): ICD-10-CM

## 2025-04-20 DIAGNOSIS — F23 ACUTE PSYCHOSIS (H): ICD-10-CM

## 2025-04-20 DIAGNOSIS — F10.10 ALCOHOL ABUSE: Primary | ICD-10-CM

## 2025-04-20 LAB
ALBUMIN SERPL BCG-MCNC: 3.9 G/DL (ref 3.5–5.2)
ALP SERPL-CCNC: 88 U/L (ref 40–150)
ALT SERPL W P-5'-P-CCNC: 11 U/L (ref 0–70)
ANION GAP SERPL CALCULATED.3IONS-SCNC: 9 MMOL/L (ref 7–15)
AST SERPL W P-5'-P-CCNC: 23 U/L (ref 0–45)
ATRIAL RATE - MUSE: 249 BPM
ATRIAL RATE - MUSE: 288 BPM
BASE EXCESS BLDV CALC-SCNC: 5 MMOL/L (ref -3–3)
BASOPHILS # BLD AUTO: 0 10E3/UL (ref 0–0.2)
BASOPHILS NFR BLD AUTO: 1 %
BILIRUB DIRECT SERPL-MCNC: 0.21 MG/DL (ref 0–0.3)
BILIRUB SERPL-MCNC: 0.7 MG/DL
BUN SERPL-MCNC: 12.4 MG/DL (ref 8–23)
CALCIUM SERPL-MCNC: 8.6 MG/DL (ref 8.8–10.4)
CHLORIDE SERPL-SCNC: 93 MMOL/L (ref 98–107)
CREAT SERPL-MCNC: 0.8 MG/DL (ref 0.67–1.17)
DIASTOLIC BLOOD PRESSURE - MUSE: NORMAL MMHG
DIASTOLIC BLOOD PRESSURE - MUSE: NORMAL MMHG
EGFRCR SERPLBLD CKD-EPI 2021: >90 ML/MIN/1.73M2
EOSINOPHIL # BLD AUTO: 0.1 10E3/UL (ref 0–0.7)
EOSINOPHIL NFR BLD AUTO: 1 %
ERYTHROCYTE [DISTWIDTH] IN BLOOD BY AUTOMATED COUNT: 11.9 % (ref 10–15)
ETHANOL SERPL-MCNC: <0.01 G/DL
ETHANOL SERPL-MCNC: <0.01 G/DL
GLUCOSE SERPL-MCNC: 103 MG/DL (ref 70–99)
HCO3 BLDV-SCNC: 31 MMOL/L (ref 21–28)
HCO3 SERPL-SCNC: 29 MMOL/L (ref 22–29)
HCT VFR BLD AUTO: 40.7 % (ref 40–53)
HGB BLD-MCNC: 14 G/DL (ref 13.3–17.7)
HOLD SPECIMEN: NORMAL
HOLD SPECIMEN: NORMAL
IMM GRANULOCYTES # BLD: 0 10E3/UL
IMM GRANULOCYTES NFR BLD: 0 %
INTERPRETATION ECG - MUSE: NORMAL
INTERPRETATION ECG - MUSE: NORMAL
LACTATE BLD-SCNC: 0.9 MMOL/L (ref 0.7–2)
LYMPHOCYTES # BLD AUTO: 1 10E3/UL (ref 0.8–5.3)
LYMPHOCYTES NFR BLD AUTO: 16 %
MAGNESIUM SERPL-MCNC: 2 MG/DL (ref 1.7–2.3)
MCH RBC QN AUTO: 31.5 PG (ref 26.5–33)
MCHC RBC AUTO-ENTMCNC: 34.4 G/DL (ref 31.5–36.5)
MCV RBC AUTO: 92 FL (ref 78–100)
MONOCYTES # BLD AUTO: 0.5 10E3/UL (ref 0–1.3)
MONOCYTES NFR BLD AUTO: 8 %
NEUTROPHILS # BLD AUTO: 4.8 10E3/UL (ref 1.6–8.3)
NEUTROPHILS NFR BLD AUTO: 75 %
NRBC # BLD AUTO: 0 10E3/UL
NRBC BLD AUTO-RTO: 0 /100
P AXIS - MUSE: 122 DEGREES
P AXIS - MUSE: 63 DEGREES
PCO2 BLDV: 51 MM HG (ref 40–50)
PH BLDV: 7.39 [PH] (ref 7.32–7.43)
PHOSPHATE SERPL-MCNC: 3 MG/DL (ref 2.5–4.5)
PLATELET # BLD AUTO: 183 10E3/UL (ref 150–450)
PO2 BLDV: 24 MM HG (ref 25–47)
POTASSIUM SERPL-SCNC: 3.5 MMOL/L (ref 3.4–5.3)
PR INTERVAL - MUSE: NORMAL MS
PR INTERVAL - MUSE: NORMAL MS
PROT SERPL-MCNC: 5.8 G/DL (ref 6.4–8.3)
QRS DURATION - MUSE: 158 MS
QRS DURATION - MUSE: 160 MS
QT - MUSE: 428 MS
QT - MUSE: 438 MS
QTC - MUSE: 465 MS
QTC - MUSE: 469 MS
R AXIS - MUSE: -75 DEGREES
R AXIS - MUSE: -76 DEGREES
RBC # BLD AUTO: 4.44 10E6/UL (ref 4.4–5.9)
SAO2 % BLDV: 42 % (ref 70–75)
SODIUM SERPL-SCNC: 131 MMOL/L (ref 135–145)
SYSTOLIC BLOOD PRESSURE - MUSE: NORMAL MMHG
SYSTOLIC BLOOD PRESSURE - MUSE: NORMAL MMHG
T AXIS - MUSE: 56 DEGREES
T AXIS - MUSE: 63 DEGREES
TROPONIN T SERPL HS-MCNC: 6 NG/L
TROPONIN T SERPL HS-MCNC: 8 NG/L
TSH SERPL DL<=0.005 MIU/L-ACNC: 4.37 UIU/ML (ref 0.3–4.2)
VENTRICULAR RATE- MUSE: 69 BPM
VENTRICULAR RATE- MUSE: 71 BPM
WBC # BLD AUTO: 6.4 10E3/UL (ref 4–11)

## 2025-04-20 PROCEDURE — 258N000003 HC RX IP 258 OP 636: Performed by: EMERGENCY MEDICINE

## 2025-04-20 PROCEDURE — 84443 ASSAY THYROID STIM HORMONE: CPT | Performed by: STUDENT IN AN ORGANIZED HEALTH CARE EDUCATION/TRAINING PROGRAM

## 2025-04-20 PROCEDURE — 84100 ASSAY OF PHOSPHORUS: CPT | Performed by: STUDENT IN AN ORGANIZED HEALTH CARE EDUCATION/TRAINING PROGRAM

## 2025-04-20 PROCEDURE — 82803 BLOOD GASES ANY COMBINATION: CPT

## 2025-04-20 PROCEDURE — 82248 BILIRUBIN DIRECT: CPT | Performed by: STUDENT IN AN ORGANIZED HEALTH CARE EDUCATION/TRAINING PROGRAM

## 2025-04-20 PROCEDURE — 85025 COMPLETE CBC W/AUTO DIFF WBC: CPT | Performed by: EMERGENCY MEDICINE

## 2025-04-20 PROCEDURE — 82077 ASSAY SPEC XCP UR&BREATH IA: CPT | Performed by: EMERGENCY MEDICINE

## 2025-04-20 PROCEDURE — 83735 ASSAY OF MAGNESIUM: CPT | Performed by: STUDENT IN AN ORGANIZED HEALTH CARE EDUCATION/TRAINING PROGRAM

## 2025-04-20 PROCEDURE — 84484 ASSAY OF TROPONIN QUANT: CPT | Performed by: EMERGENCY MEDICINE

## 2025-04-20 PROCEDURE — 93005 ELECTROCARDIOGRAM TRACING: CPT

## 2025-04-20 PROCEDURE — 82310 ASSAY OF CALCIUM: CPT | Performed by: EMERGENCY MEDICINE

## 2025-04-20 PROCEDURE — 96361 HYDRATE IV INFUSION ADD-ON: CPT

## 2025-04-20 PROCEDURE — G0378 HOSPITAL OBSERVATION PER HR: HCPCS

## 2025-04-20 PROCEDURE — 96360 HYDRATION IV INFUSION INIT: CPT

## 2025-04-20 PROCEDURE — 82077 ASSAY SPEC XCP UR&BREATH IA: CPT | Performed by: STUDENT IN AN ORGANIZED HEALTH CARE EDUCATION/TRAINING PROGRAM

## 2025-04-20 PROCEDURE — 250N000013 HC RX MED GY IP 250 OP 250 PS 637: Performed by: STUDENT IN AN ORGANIZED HEALTH CARE EDUCATION/TRAINING PROGRAM

## 2025-04-20 PROCEDURE — 99285 EMERGENCY DEPT VISIT HI MDM: CPT

## 2025-04-20 PROCEDURE — 99223 1ST HOSP IP/OBS HIGH 75: CPT | Performed by: STUDENT IN AN ORGANIZED HEALTH CARE EDUCATION/TRAINING PROGRAM

## 2025-04-20 PROCEDURE — 93005 ELECTROCARDIOGRAM TRACING: CPT | Mod: 76

## 2025-04-20 PROCEDURE — 84439 ASSAY OF FREE THYROXINE: CPT | Performed by: STUDENT IN AN ORGANIZED HEALTH CARE EDUCATION/TRAINING PROGRAM

## 2025-04-20 PROCEDURE — 250N000013 HC RX MED GY IP 250 OP 250 PS 637: Performed by: EMERGENCY MEDICINE

## 2025-04-20 PROCEDURE — 36415 COLL VENOUS BLD VENIPUNCTURE: CPT | Performed by: EMERGENCY MEDICINE

## 2025-04-20 RX ORDER — HALOPERIDOL 5 MG/ML
2.5-5 INJECTION INTRAMUSCULAR EVERY 6 HOURS PRN
Status: DISCONTINUED | OUTPATIENT
Start: 2025-04-20 | End: 2025-04-23 | Stop reason: HOSPADM

## 2025-04-20 RX ORDER — TRAZODONE HYDROCHLORIDE 50 MG/1
25 TABLET ORAL
Status: ON HOLD | COMMUNITY

## 2025-04-20 RX ORDER — FOLIC ACID 1 MG/1
1 TABLET ORAL DAILY
Status: DISCONTINUED | OUTPATIENT
Start: 2025-04-21 | End: 2025-04-23 | Stop reason: HOSPADM

## 2025-04-20 RX ORDER — BENZTROPINE MESYLATE 1 MG/1
1 TABLET ORAL 2 TIMES DAILY
Status: DISCONTINUED | OUTPATIENT
Start: 2025-04-20 | End: 2025-04-23 | Stop reason: HOSPADM

## 2025-04-20 RX ORDER — FLUMAZENIL 0.1 MG/ML
0.2 INJECTION, SOLUTION INTRAVENOUS
Status: DISCONTINUED | OUTPATIENT
Start: 2025-04-20 | End: 2025-04-23 | Stop reason: HOSPADM

## 2025-04-20 RX ORDER — DIAZEPAM 2 MG/1
2 TABLET ORAL ONCE
Status: COMPLETED | OUTPATIENT
Start: 2025-04-20 | End: 2025-04-20

## 2025-04-20 RX ORDER — MULTIPLE VITAMINS W/ MINERALS TAB 9MG-400MCG
1 TAB ORAL DAILY
Status: DISCONTINUED | OUTPATIENT
Start: 2025-04-21 | End: 2025-04-23 | Stop reason: HOSPADM

## 2025-04-20 RX ORDER — DIAZEPAM 10 MG/2ML
5-10 INJECTION, SOLUTION INTRAMUSCULAR; INTRAVENOUS EVERY 30 MIN PRN
Status: DISCONTINUED | OUTPATIENT
Start: 2025-04-20 | End: 2025-04-23 | Stop reason: HOSPADM

## 2025-04-20 RX ORDER — HYDRALAZINE HYDROCHLORIDE 20 MG/ML
10 INJECTION INTRAMUSCULAR; INTRAVENOUS EVERY 4 HOURS PRN
Status: DISCONTINUED | OUTPATIENT
Start: 2025-04-20 | End: 2025-04-23 | Stop reason: HOSPADM

## 2025-04-20 RX ORDER — OLANZAPINE 5 MG/1
5-10 TABLET, ORALLY DISINTEGRATING ORAL EVERY 6 HOURS PRN
Status: DISCONTINUED | OUTPATIENT
Start: 2025-04-20 | End: 2025-04-23 | Stop reason: HOSPADM

## 2025-04-20 RX ORDER — CLONIDINE HYDROCHLORIDE 0.1 MG/1
0.1 TABLET ORAL EVERY 8 HOURS PRN
Status: DISCONTINUED | OUTPATIENT
Start: 2025-04-20 | End: 2025-04-23 | Stop reason: HOSPADM

## 2025-04-20 RX ORDER — ASPIRIN 81 MG/1
324 TABLET, CHEWABLE ORAL ONCE
Status: COMPLETED | OUTPATIENT
Start: 2025-04-20 | End: 2025-04-20

## 2025-04-20 RX ORDER — DIAZEPAM 5 MG/1
10 TABLET ORAL EVERY 30 MIN PRN
Status: DISCONTINUED | OUTPATIENT
Start: 2025-04-20 | End: 2025-04-23 | Stop reason: HOSPADM

## 2025-04-20 RX ADMIN — ASPIRIN 324 MG: 81 TABLET, CHEWABLE ORAL at 19:47

## 2025-04-20 RX ADMIN — SODIUM CHLORIDE 1000 ML: 9 INJECTION, SOLUTION INTRAVENOUS at 19:48

## 2025-04-20 RX ADMIN — BENZTROPINE MESYLATE 1 MG: 1 TABLET ORAL at 21:28

## 2025-04-20 RX ADMIN — DIAZEPAM 2 MG: 2 TABLET ORAL at 23:36

## 2025-04-20 ASSESSMENT — LIFESTYLE VARIABLES
ANXIETY: NO ANXIETY, AT EASE
PAROXYSMAL SWEATS: BARELY PERCEPTIBLE SWEATING, PALMS MOIST
NAUSEA AND VOMITING: NO NAUSEA AND NO VOMITING
TREMOR: MODERATE, WITH PATIENT'S ARMS EXTENDED
HEADACHE, FULLNESS IN HEAD: NOT PRESENT
TOTAL SCORE: 5
AGITATION: NORMAL ACTIVITY
ORIENTATION AND CLOUDING OF SENSORIUM: ORIENTED AND CAN DO SERIAL ADDITIONS
VISUAL DISTURBANCES: NOT PRESENT
AUDITORY DISTURBANCES: NOT PRESENT

## 2025-04-20 ASSESSMENT — COLUMBIA-SUICIDE SEVERITY RATING SCALE - C-SSRS
1. IN THE PAST MONTH, HAVE YOU WISHED YOU WERE DEAD OR WISHED YOU COULD GO TO SLEEP AND NOT WAKE UP?: YES
3. HAVE YOU BEEN THINKING ABOUT HOW YOU MIGHT KILL YOURSELF?: YES
6. HAVE YOU EVER DONE ANYTHING, STARTED TO DO ANYTHING, OR PREPARED TO DO ANYTHING TO END YOUR LIFE?: YES
2. HAVE YOU ACTUALLY HAD ANY THOUGHTS OF KILLING YOURSELF IN THE PAST MONTH?: YES

## 2025-04-20 ASSESSMENT — ACTIVITIES OF DAILY LIVING (ADL)
ADLS_ACUITY_SCORE: 50

## 2025-04-21 ENCOUNTER — MEDICAL CORRESPONDENCE (OUTPATIENT)
Dept: HEALTH INFORMATION MANAGEMENT | Facility: CLINIC | Age: 75
End: 2025-04-21

## 2025-04-21 PROBLEM — F25.9 SCHIZOAFFECTIVE DISORDER (H): Status: ACTIVE | Noted: 2019-11-22

## 2025-04-21 PROBLEM — I77.810 AORTIC ROOT DILATATION: Status: ACTIVE | Noted: 2025-04-21

## 2025-04-21 PROBLEM — F20.9 SCHIZOPHRENIA (H): Status: ACTIVE | Noted: 2019-04-18

## 2025-04-21 LAB
ALBUMIN SERPL BCG-MCNC: 4 G/DL (ref 3.5–5.2)
ALBUMIN UR-MCNC: NEGATIVE MG/DL
ALP SERPL-CCNC: 76 U/L (ref 40–150)
ALT SERPL W P-5'-P-CCNC: 12 U/L (ref 0–70)
AMPHETAMINES UR QL SCN: NORMAL
ANION GAP SERPL CALCULATED.3IONS-SCNC: 6 MMOL/L (ref 7–15)
APPEARANCE UR: CLEAR
AST SERPL W P-5'-P-CCNC: 18 U/L (ref 0–45)
ATRIAL RATE - MUSE: 72 BPM
BARBITURATES UR QL SCN: NORMAL
BASE EXCESS BLDV CALC-SCNC: 5.5 MMOL/L (ref -3–3)
BENZODIAZ UR QL SCN: NORMAL
BILIRUB SERPL-MCNC: 0.6 MG/DL
BILIRUB UR QL STRIP: NEGATIVE
BUN SERPL-MCNC: 11.6 MG/DL (ref 8–23)
BZE UR QL SCN: NORMAL
CALCIUM SERPL-MCNC: 8.7 MG/DL (ref 8.8–10.4)
CANNABINOIDS UR QL SCN: NORMAL
CHLORIDE SERPL-SCNC: 100 MMOL/L (ref 98–107)
COLOR UR AUTO: NORMAL
CREAT SERPL-MCNC: 0.79 MG/DL (ref 0.67–1.17)
DIASTOLIC BLOOD PRESSURE - MUSE: NORMAL MMHG
EGFRCR SERPLBLD CKD-EPI 2021: >90 ML/MIN/1.73M2
ERYTHROCYTE [DISTWIDTH] IN BLOOD BY AUTOMATED COUNT: 11.9 % (ref 10–15)
FENTANYL UR QL: NORMAL
GLUCOSE SERPL-MCNC: 102 MG/DL (ref 70–99)
GLUCOSE UR STRIP-MCNC: NEGATIVE MG/DL
HCO3 BLDV-SCNC: 31 MMOL/L (ref 21–28)
HCO3 SERPL-SCNC: 30 MMOL/L (ref 22–29)
HCT VFR BLD AUTO: 41.1 % (ref 40–53)
HGB BLD-MCNC: 14 G/DL (ref 13.3–17.7)
HGB UR QL STRIP: NEGATIVE
INTERPRETATION ECG - MUSE: NORMAL
KETONES UR STRIP-MCNC: NEGATIVE MG/DL
LEUKOCYTE ESTERASE UR QL STRIP: NEGATIVE
LVEF ECHO: NORMAL
MCH RBC QN AUTO: 31.4 PG (ref 26.5–33)
MCHC RBC AUTO-ENTMCNC: 34.1 G/DL (ref 31.5–36.5)
MCV RBC AUTO: 92 FL (ref 78–100)
NITRATE UR QL: NEGATIVE
O2/TOTAL GAS SETTING VFR VENT: 0 %
OPIATES UR QL SCN: NORMAL
OXYHGB MFR BLDV: 80 % (ref 70–75)
P AXIS - MUSE: 47 DEGREES
PCO2 BLDV: 48 MM HG (ref 40–50)
PCP QUAL URINE (ROCHE): NORMAL
PH BLDV: 7.42 [PH] (ref 7.32–7.43)
PH UR STRIP: 6.5 [PH] (ref 5–7)
PLATELET # BLD AUTO: 170 10E3/UL (ref 150–450)
PO2 BLDV: 43 MM HG (ref 25–47)
POTASSIUM SERPL-SCNC: 4.4 MMOL/L (ref 3.4–5.3)
PR INTERVAL - MUSE: 180 MS
PROT SERPL-MCNC: 5.9 G/DL (ref 6.4–8.3)
QRS DURATION - MUSE: 152 MS
QT - MUSE: 420 MS
QTC - MUSE: 459 MS
R AXIS - MUSE: -73 DEGREES
RBC # BLD AUTO: 4.46 10E6/UL (ref 4.4–5.9)
SAO2 % BLDV: 81.8 % (ref 70–75)
SODIUM SERPL-SCNC: 136 MMOL/L (ref 135–145)
SP GR UR STRIP: 1.01 (ref 1–1.03)
SYSTOLIC BLOOD PRESSURE - MUSE: NORMAL MMHG
T AXIS - MUSE: 49 DEGREES
T4 FREE SERPL-MCNC: 1.4 NG/DL (ref 0.9–1.7)
UROBILINOGEN UR STRIP-MCNC: NORMAL MG/DL
VENTRICULAR RATE- MUSE: 72 BPM
WBC # BLD AUTO: 5.4 10E3/UL (ref 4–11)

## 2025-04-21 PROCEDURE — 85027 COMPLETE CBC AUTOMATED: CPT | Performed by: STUDENT IN AN ORGANIZED HEALTH CARE EDUCATION/TRAINING PROGRAM

## 2025-04-21 PROCEDURE — 250N000013 HC RX MED GY IP 250 OP 250 PS 637: Performed by: STUDENT IN AN ORGANIZED HEALTH CARE EDUCATION/TRAINING PROGRAM

## 2025-04-21 PROCEDURE — G0378 HOSPITAL OBSERVATION PER HR: HCPCS

## 2025-04-21 PROCEDURE — 250N000013 HC RX MED GY IP 250 OP 250 PS 637

## 2025-04-21 PROCEDURE — 81003 URINALYSIS AUTO W/O SCOPE: CPT | Performed by: PHYSICIAN ASSISTANT

## 2025-04-21 PROCEDURE — 93005 ELECTROCARDIOGRAM TRACING: CPT

## 2025-04-21 PROCEDURE — 80053 COMPREHEN METABOLIC PANEL: CPT | Performed by: STUDENT IN AN ORGANIZED HEALTH CARE EDUCATION/TRAINING PROGRAM

## 2025-04-21 PROCEDURE — 36415 COLL VENOUS BLD VENIPUNCTURE: CPT | Performed by: PHYSICIAN ASSISTANT

## 2025-04-21 PROCEDURE — 93010 ELECTROCARDIOGRAM REPORT: CPT | Performed by: INTERNAL MEDICINE

## 2025-04-21 PROCEDURE — 250N000013 HC RX MED GY IP 250 OP 250 PS 637: Performed by: EMERGENCY MEDICINE

## 2025-04-21 PROCEDURE — 120N000001 HC R&B MED SURG/OB

## 2025-04-21 PROCEDURE — 99233 SBSQ HOSP IP/OBS HIGH 50: CPT | Performed by: PHYSICIAN ASSISTANT

## 2025-04-21 PROCEDURE — 80307 DRUG TEST PRSMV CHEM ANLYZR: CPT | Performed by: PHYSICIAN ASSISTANT

## 2025-04-21 PROCEDURE — 82805 BLOOD GASES W/O2 SATURATION: CPT | Performed by: PHYSICIAN ASSISTANT

## 2025-04-21 PROCEDURE — 250N000013 HC RX MED GY IP 250 OP 250 PS 637: Performed by: PSYCHIATRY & NEUROLOGY

## 2025-04-21 PROCEDURE — 99207 PR NO BILLABLE SERVICE THIS VISIT: CPT | Mod: FS

## 2025-04-21 PROCEDURE — 99252 IP/OBS CONSLTJ NEW/EST SF 35: CPT | Mod: FS

## 2025-04-21 RX ORDER — HYDRALAZINE HYDROCHLORIDE 20 MG/ML
10 INJECTION INTRAMUSCULAR; INTRAVENOUS EVERY 4 HOURS PRN
Status: DISCONTINUED | OUTPATIENT
Start: 2025-04-21 | End: 2025-04-23 | Stop reason: HOSPADM

## 2025-04-21 RX ORDER — ONDANSETRON 4 MG/1
4 TABLET, ORALLY DISINTEGRATING ORAL EVERY 6 HOURS PRN
Status: DISCONTINUED | OUTPATIENT
Start: 2025-04-21 | End: 2025-04-23 | Stop reason: HOSPADM

## 2025-04-21 RX ORDER — HYDRALAZINE HYDROCHLORIDE 10 MG/1
10 TABLET, FILM COATED ORAL EVERY 4 HOURS PRN
Status: DISCONTINUED | OUTPATIENT
Start: 2025-04-21 | End: 2025-04-23 | Stop reason: HOSPADM

## 2025-04-21 RX ORDER — NITROGLYCERIN 0.4 MG/1
0.4 TABLET SUBLINGUAL EVERY 5 MIN PRN
Status: DISCONTINUED | OUTPATIENT
Start: 2025-04-21 | End: 2025-04-23 | Stop reason: HOSPADM

## 2025-04-21 RX ORDER — PROCHLORPERAZINE MALEATE 5 MG/1
5 TABLET ORAL EVERY 6 HOURS PRN
Status: DISCONTINUED | OUTPATIENT
Start: 2025-04-21 | End: 2025-04-23 | Stop reason: HOSPADM

## 2025-04-21 RX ORDER — ACETAMINOPHEN 500 MG
500-1000 TABLET ORAL EVERY 6 HOURS PRN
Status: ON HOLD | COMMUNITY

## 2025-04-21 RX ORDER — BENZTROPINE MESYLATE 1 MG/1
1 TABLET ORAL 2 TIMES DAILY
Status: DISCONTINUED | OUTPATIENT
Start: 2025-04-21 | End: 2025-04-21

## 2025-04-21 RX ORDER — ACETAMINOPHEN 650 MG/1
650 SUPPOSITORY RECTAL EVERY 4 HOURS PRN
Status: DISCONTINUED | OUTPATIENT
Start: 2025-04-21 | End: 2025-04-23 | Stop reason: HOSPADM

## 2025-04-21 RX ORDER — ONDANSETRON 2 MG/ML
4 INJECTION INTRAMUSCULAR; INTRAVENOUS EVERY 6 HOURS PRN
Status: DISCONTINUED | OUTPATIENT
Start: 2025-04-21 | End: 2025-04-23 | Stop reason: HOSPADM

## 2025-04-21 RX ORDER — CARBOXYMETHYLCELLULOSE SODIUM 5 MG/ML
1 SOLUTION/ DROPS OPHTHALMIC 2 TIMES DAILY PRN
Status: DISCONTINUED | OUTPATIENT
Start: 2025-04-21 | End: 2025-04-23 | Stop reason: HOSPADM

## 2025-04-21 RX ORDER — PALIPERIDONE 6 MG/1
6 TABLET, EXTENDED RELEASE ORAL DAILY
Status: DISCONTINUED | OUTPATIENT
Start: 2025-04-21 | End: 2025-04-23 | Stop reason: HOSPADM

## 2025-04-21 RX ORDER — ACETAMINOPHEN 325 MG/1
650 TABLET ORAL EVERY 4 HOURS PRN
Status: DISCONTINUED | OUTPATIENT
Start: 2025-04-21 | End: 2025-04-23 | Stop reason: HOSPADM

## 2025-04-21 RX ORDER — AMOXICILLIN 250 MG
2 CAPSULE ORAL 2 TIMES DAILY PRN
Status: DISCONTINUED | OUTPATIENT
Start: 2025-04-21 | End: 2025-04-23 | Stop reason: HOSPADM

## 2025-04-21 RX ORDER — AMOXICILLIN 250 MG
1 CAPSULE ORAL 2 TIMES DAILY PRN
Status: DISCONTINUED | OUTPATIENT
Start: 2025-04-21 | End: 2025-04-23 | Stop reason: HOSPADM

## 2025-04-21 RX ADMIN — PALIPERIDONE 6 MG: 6 TABLET, EXTENDED RELEASE ORAL at 13:50

## 2025-04-21 RX ADMIN — BENZTROPINE MESYLATE 1 MG: 1 TABLET ORAL at 09:45

## 2025-04-21 RX ADMIN — CARBOXYMETHYLCELLULOSE SODIUM 1 DROP: 5 SOLUTION/ DROPS OPHTHALMIC at 19:34

## 2025-04-21 RX ADMIN — FOLIC ACID 1 MG: 1 TABLET ORAL at 09:45

## 2025-04-21 RX ADMIN — Medication 1 TABLET: at 09:45

## 2025-04-21 RX ADMIN — BENZTROPINE MESYLATE 1 MG: 1 TABLET ORAL at 19:34

## 2025-04-21 RX ADMIN — THIAMINE HCL TAB 100 MG 100 MG: 100 TAB at 09:45

## 2025-04-21 ASSESSMENT — ACTIVITIES OF DAILY LIVING (ADL)
ADLS_ACUITY_SCORE: 50
ADLS_ACUITY_SCORE: 45
ADLS_ACUITY_SCORE: 50
ADLS_ACUITY_SCORE: 45
ADLS_ACUITY_SCORE: 50
ADLS_ACUITY_SCORE: 45
ADLS_ACUITY_SCORE: 51
ADLS_ACUITY_SCORE: 50
ADLS_ACUITY_SCORE: 50
ADLS_ACUITY_SCORE: 45
ADLS_ACUITY_SCORE: 50
ADLS_ACUITY_SCORE: 45
ADLS_ACUITY_SCORE: 50
ADLS_ACUITY_SCORE: 45

## 2025-04-21 NOTE — PROGRESS NOTES
Room safety check completed. No sharp objects within vicinity. Belonging checks completed. Pt brought clothings, glasses, shoes, few coins & a card with him. There was also a vaping device in his jacket's pocket. Writer sent it down to the security team for storage.

## 2025-04-21 NOTE — CONSULTS
[facility] Electrophysiology Consultation     Date of Admission:  4/20/2025  Date of Service: 4/21/2025     Reason for consult:  Atrial flutter    Hospital Summary:  Mr. Ger Bashir is a very pleasant 75 year old male with a past medical history of  schizophrenia, bipolar disorder, borderline personality, anxiety/depression, history of alcohol use, history of medication noncompliance  who presented for psychiatric evaluation on 4/20/2025. He was found [place] a gas station after being missing from his group home for 4 days and had not been taking his prescribed medications.    Interval History:  HR 60-80s. SBP 90-120s. K 3.5. Cr 0.80, Stable. Mg 2.0. Etoh negative.     He reports doing well from a cardiac standpoint. He had some chest pain for ~10 minutes yesterday, no recurrence since then. He is unable to report pain characteristics, although reports it did not radiate. Sometimes has intermittent palpitations, brief and not very often. He reports frequent dizziness and balance issues. Denies shortness of breath, lower extremity edema. No acute cardiac concerns.    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  Psychiatry consulted  Anxiety/depression  History of alcohol use      Detailed Pertinent Problem List:  Sinus rhythm with artifactual appearance of atrial flutter  Significant EKG artifact, likely due to significant tremor,   Repeat EKG this morning with efforts taken to avoid artifact and appears to be NSR with prominent P waves, frequent  "PACs  Telemetry also consistent with NSR with PACs  Recommend continued efforts to reduce artifact on EKGs considering patient's tremulousness  EP will sign off    --------------------------------------------------------------------------------------------  Rizwana Belle PA-C  EP service:  M-F ([id number])     A total of 35 minutes was spent face-to-face or coordinating care of Ger Bashir. Over 50% of our time was spent counseling the patient and/or coordinating care.    Imaging:  No results found for this or any previous visit (from the past 48 hours).    Echo:  No results found for this or any previous visit (from the past 4320 hours).    Temp:  [97  F (36.1  C)] 97  F (36.1  C)  Pulse:  [53-80] 80  Resp:  [9-27] 12  BP: ()/() 118/63  SpO2:  [93 %-95 %] 95 %  0 lbs 0 oz    Data   Recent Labs   Lab Test 04/20/25 1938 02/22/24 2122   WBC 6.4 5.8   HGB 14.0 13.7   MCV 92 92    211      Recent Labs   Lab Test 04/20/25 1938 02/22/24 2122   * 138   POTASSIUM 3.5 4.1   CHLORIDE 93* 102   BUN 12.4 14.6   CR 0.80 0.86     Recent Labs   Lab 04/20/25 1938   *     Recent Labs   Lab Test 04/20/25 2130 02/22/24 2122   ALT 11 20   AST 23 20     No results found for: \"TROPI\"    Recent Labs   Lab Test 04/20/25 2130 08/14/23  1423   MAG 2.0 2.0       Lab Results   Component Value Date    CHOL 148 04/19/2019     Lab Results   Component Value Date    HDL 32 04/19/2019     Lab Results   Component Value Date    LDL 89 04/19/2019     Lab Results   Component Value Date    TRIG 136 04/19/2019     Lab Results   Component Value Date    CHOLHDLRATIO 6.0 04/01/2012          TSH   Date Value Ref Range Status   04/20/2025 4.37 (H) 0.30 - 4.20 uIU/mL Final   04/05/2020 3.12 0.40 - 4.00 mU/L Final       Rizwana Belle PA-C     Primary Care Physician   Physician No Ref-Primary    Past Medical History   Past Medical History:   Diagnosis Date    Schizophrenia (H)        Past Surgical History   No past " surgical history on file.    Prior to Admission Medications   Prior to Admission Medications   Prescriptions Last Dose Informant Patient Reported? Taking?   Skin Protectants, Misc. (EUCERIN) cream   No No   Sig: Apply topically 2 times daily   benztropine (COGENTIN) 1 MG tablet   No Yes   Sig: Take 1 tablet (1 mg) by mouth 2 times daily   carboxymethylcellulose PF (CARBOXYMETHYLCELLULOSE SODIUM) 0.5 % ophthalmic solution   No Yes   Sig: Place 1 drop into both eyes 3 times daily as needed for dry eyes   melatonin 3 MG tablet   Yes Yes   Sig: Take 3 mg by mouth at bedtime.   multivitamin w/minerals (THERA-VIT-M) tablet   No No   Sig: Take 1 tablet by mouth daily   paliperidone (INVEGA SUSTENNA) 234 MG/1.5ML CHRISTOS   No Yes   Sig: Inject 1.5 mLs (234 mg) into the muscle every 28 days Next dose due 12/27/19   traZODone (DESYREL) 100 MG tablet   No No   Sig: Take 1 tablet (100 mg) by mouth At Bedtime   traZODone (DESYREL) 50 MG tablet   Yes Yes   Sig: Take by mouth at bedtime.      Facility-Administered Medications: None     Current Facility-Administered Medications   Medication Dose Route Frequency Provider Last Rate Last Admin    acetaminophen (TYLENOL) tablet 650 mg  650 mg Oral Q4H PRN Refugio Erickson MD        Or    acetaminophen (TYLENOL) Suppository 650 mg  650 mg Rectal Q4H PRN Refugio Erickson MD        benztropine (COGENTIN) tablet 1 mg  1 mg Oral BID Rogelio Chang MD   1 mg at 04/20/25 2128    cloNIDine (CATAPRES) tablet 0.1 mg  0.1 mg Oral Q8H PRN Refugio Erickson MD        diazepam (VALIUM) tablet 10 mg  10 mg Oral Q30 Min PRN Refugio Erickson MD        Or    diazepam (VALIUM) injection 5-10 mg  5-10 mg Intravenous Q30 Min PRN Refugio Erickson MD        flumazenil (ROMAZICON) injection 0.2 mg  0.2 mg Intravenous q1 min prn Refugio Erickson MD        folic acid (FOLVITE) tablet 1 mg  1 mg Oral Daily Refugio Erickson MD        OLANZapine zydis (zyPREXA) ODT tab 5-10 mg  5-10 mg Oral Q6H  PRN Refugio Erickson MD        Or    haloperidol lactate (HALDOL) injection 2.5-5 mg  2.5-5 mg Intravenous Q6H PRN Refugio Erickson MD        hydrALAZINE (APRESOLINE) tablet 10 mg  10 mg Oral Q4H PRN Refugio Erickson MD        Or    hydrALAZINE (APRESOLINE) injection 10 mg  10 mg Intravenous Q4H PRN Refugio Erickson MD        hydrALAZINE (APRESOLINE) injection 10 mg  10 mg Intravenous Q4H PRN Refugio Erickson MD        melatonin tablet 5 mg  5 mg Oral QPM PRN Refugio Erickson MD        multivitamin w/minerals (THERA-VIT-M) tablet 1 tablet  1 tablet Oral Daily Refugio Erickson MD        nitroGLYcerin (NITROSTAT) sublingual tablet 0.4 mg  0.4 mg Sublingual Q5 Min PRN Refugio Erickson MD        ondansetron (ZOFRAN ODT) ODT tab 4 mg  4 mg Oral Q6H PRN Refugio Erickson MD        Or    ondansetron (ZOFRAN) injection 4 mg  4 mg Intravenous Q6H PRN Refugio Erickson MD        prochlorperazine (COMPAZINE) injection 5 mg  5 mg Intravenous Q6H PRRefugio Lombardo MD        Or    prochlorperazine (COMPAZINE) tablet 5 mg  5 mg Oral Q6H PRRefugio Lombardo MD        senna-docusate (SENOKOT-S/PERICOLACE) 8.6-50 MG per tablet 1 tablet  1 tablet Oral BID PRRefugio Lombardo MD        Or    senna-docusate (SENOKOT-S/PERICOLACE) 8.6-50 MG per tablet 2 tablet  2 tablet Oral BID PRRefugio Lombardo MD        thiamine (B-1) tablet 100 mg  100 mg Oral Daily Refugio Erickson MD         Current Facility-Administered Medications   Medication Dose Route Frequency Provider Last Rate Last Admin    acetaminophen (TYLENOL) tablet 650 mg  650 mg Oral Q4H PRRefugio Lombardo MD        Or    acetaminophen (TYLENOL) Suppository 650 mg  650 mg Rectal Q4H PRN Refugio Erickson MD        benztropine (COGENTIN) tablet 1 mg  1 mg Oral BID Rogelio Chang MD   1 mg at 04/20/25 2128    cloNIDine (CATAPRES) tablet 0.1 mg  0.1 mg Oral Q8H PRN Refugio Erickson MD        diazepam (VALIUM) tablet 10 mg  10 mg Oral Q30  Min PRN Refugio Erickson MD        Or    diazepam (VALIUM) injection 5-10 mg  5-10 mg Intravenous Q30 Min PRN Refugio Erickson MD        flumazenil (ROMAZICON) injection 0.2 mg  0.2 mg Intravenous q1 min prn Refugio Erickson MD        folic acid (FOLVITE) tablet 1 mg  1 mg Oral Daily Refugio Erickson MD        OLANZapine zydis (zyPREXA) ODT tab 5-10 mg  5-10 mg Oral Q6H PRN Rfeugio Erickson MD        Or    haloperidol lactate (HALDOL) injection 2.5-5 mg  2.5-5 mg Intravenous Q6H PRN Refugio Erickson MD        hydrALAZINE (APRESOLINE) tablet 10 mg  10 mg Oral Q4H PRN Refugio Erickson MD        Or    hydrALAZINE (APRESOLINE) injection 10 mg  10 mg Intravenous Q4H PRN Refugio Erickson MD        hydrALAZINE (APRESOLINE) injection 10 mg  10 mg Intravenous Q4H PRN Refugio Erickson MD        melatonin tablet 5 mg  5 mg Oral QPM PRN Refugio Erickson MD        multivitamin w/minerals (THERA-VIT-M) tablet 1 tablet  1 tablet Oral Daily Refugio Erickson MD        nitroGLYcerin (NITROSTAT) sublingual tablet 0.4 mg  0.4 mg Sublingual Q5 Min PRN Refugio Erickson MD        ondansetron (ZOFRAN ODT) ODT tab 4 mg  4 mg Oral Q6H PRN Refugio Erickson MD        Or    ondansetron (ZOFRAN) injection 4 mg  4 mg Intravenous Q6H PRN Refugio Erickson MD        prochlorperazine (COMPAZINE) injection 5 mg  5 mg Intravenous Q6H PRN Refugio Erickson MD        Or    prochlorperazine (COMPAZINE) tablet 5 mg  5 mg Oral Q6H PRN Refugio Erickson MD        senna-docusate (SENOKOT-S/PERICOLACE) 8.6-50 MG per tablet 1 tablet  1 tablet Oral BID PRN Refugio Erickson MD        Or    senna-docusate (SENOKOT-S/PERICOLACE) 8.6-50 MG per tablet 2 tablet  2 tablet Oral BID PRN Refugio Erickson MD        thiamine (B-1) tablet 100 mg  100 mg Oral Daily Refugio Erickson MD         Allergies   Allergies   Allergen Reactions    Peas GI Disturbance     Black eyed peas - vomiting       Social History    reports that he has been  smoking cigarettes. He has a 10 pack-year smoking history. He has never used smokeless tobacco. He reports current alcohol use of about 1.0 standard drink of alcohol per week. He reports current drug use. Drug: Other.    Family History            Review of Systems   A comprehensive review of system was performed and is negative other than that noted in the HPI or here.     Physical Exam:  General: Awake and alert. No acute distress.  Skin: Warm and dry. Appropriate color. No lesions or rashes noted.  HEENT: Normocephalic and atraumatic. EOM intact. PERRL. Trachea midline. Right-sided JVD: none.  Cardiac: Normal S1 and S2, no murmurs, rubs, or gallops noted. Regular rate and rhythm.  Lung: Regular work of breathing without accessory muscle use. Clear to auscultation bilaterally.  Abdomen: No distension.  Extremities: Bilateral lower extremity edema: none.  Neuro: A & O. No focal deficits noted. Moves all extremities appropriately.        CBC RESULTS:   Recent Labs   Lab Test 04/20/25 1938   WBC 6.4   RBC 4.44   HGB 14.0   HCT 40.7   MCV 92   MCH 31.5   MCHC 34.4   RDW 11.9         Last Comprehensive Metabolic Panel:  Lab Results   Component Value Date     (L) 04/20/2025    POTASSIUM 3.5 04/20/2025    CHLORIDE 93 (L) 04/20/2025    CO2 29 04/20/2025    ANIONGAP 9 04/20/2025     (H) 04/20/2025    BUN 12.4 04/20/2025    CR 0.80 04/20/2025    GFRESTIMATED >90 04/20/2025    ZAHRA 8.6 (L) 04/20/2025        Recent Labs   Lab 04/20/25 2130 04/20/25 1938   WBC  --  6.4   HGB  --  14.0   MCV  --  92   PLT  --  183   NA  --  131*   POTASSIUM  --  3.5   CHLORIDE  --  93*   CO2  --  29   BUN  --  12.4   CR  --  0.80   GFRESTIMATED  --  >90   ANIONGAP  --  9   ZAHRA  --  8.6*   GLC  --  103*   ALBUMIN 3.9  --    PROTTOTAL 5.8*  --    BILITOTAL 0.7  --    ALKPHOS 88  --    ALT 11  --    AST 23  --      Recent Labs   Lab Test 04/19/19  0642   CHOL 148   HDL 32*   LDL 89   TRIG 136     Recent Labs   Lab  04/20/25 1938   WBC 6.4   HGB 14.0   HCT 40.7   MCV 92        Recent Labs   Lab 04/20/25 1938   PHV 7.39   PO2V 24*   PCO2V 51*   HCO3V 31*       Recent Labs   Lab 04/20/25 1938        Recent Labs   Lab 04/20/25 2130   TSH 4.37*       Code Status    Full Code

## 2025-04-21 NOTE — CONSULTS
Petition for Commitment Status    County Involved: [facility]    Petition was filed today: Yes     Type of Petition Filed: Mental Illness (MI)    Care team faxed the petition with the 72 hour hold duration, Examiners Statement, Exhibit A, and Facesheet to the county of responsibility: Yes    Petition included instructions for the PPS team on to how to make a time sensitive request for medical records from HIM, which includes any Progress Notes, ED Notes, Consults, H&P, results, and MAR to be in compliance with the HIPAA Privacy Rule for Reproductive Rights.   YES    Next steps include: Awaiting [facility] decision

## 2025-04-21 NOTE — PROGRESS NOTES
"Pt stated, \"The police is trying to get to me & I am trying to run away from them\". Reassurance provided that pt is in the hospital and is safe here. Pt denies suicidal ideas. Good appetite.   "

## 2025-04-21 NOTE — ED PROVIDER NOTES
Emergency Department Note      History of Present Illness     Chief Complaint   Psychiatric Evaluation      HPI   Ger Bashir is a 75 year old male with a history of schizophrenia who presents for a psychiatric evaluation. Today the patient was found at a gas station after being missing from his group home for 4 days. He has not been taking his prescribed medications, endorses some episodes of chest pains today that last for 10 minutes, not present now.  He is unsure of the last time the chest pain occurred today but states that he gets the pains regularly He states he usually gets tremulous when he doesn't take his medications. He does endorse some suicidal ideation to EMS. He denies any homicidal ideation, suicidal ideation, fever, or chills here.   EMS reported that he was trying to turn himself in as a fugitive at the gas station.      Independent Historian   EMS as detailed above.    Review of External Notes   ED visit 2/22/2024    Past Medical History     Medical History and Problem List   Schizophrenia   Alcohol abuse   GERD  Dyslipidemia   Hypothyroidism   Anxiety   Substance use disorder   Diffuse brain atrophy   Suicidal ideation   Mild ascending aorta dilation   Depression   Pneumonia   Heart failure with preserved left ventricular function     Medications   Aspirin   Cogentin   Depakote   Haldol   Synthroid   Desyrel     Surgical History   Anterior cruciate ligament repair     Physical Exam     Patient Vitals for the past 24 hrs:   BP Temp Temp src Pulse Resp SpO2   04/21/25 0052 99/74 -- -- 64 17 95 %   04/20/25 2100 129/79 -- -- 68 20 94 %   04/20/25 1952 -- 97  F (36.1  C) Axillary -- -- --   04/1951 -- -- -- -- 16 --   04/20/25 1950 (!) 177/97 -- -- 65 (!) 9 94 %   04/20/25 1935 (!) 150/90 -- -- 63 16 95 %   04/20/25 1920 (!) 160/142 -- -- -- 27 94 %     Physical Exam  Eyes:  The pupils are equal and round    Conjunctivae and sclerae are normal  ENT:    The nose is normal    Pinnae are  normal  CV:  Irregular    No edema  Resp:  Lungs are clear    Non-labored    No rales    No wheezing   GI:  Abdomen is soft and non-tender, there is no rigidity    No distension    No rebound tenderness   MS:  Normal muscular tone    No asymmetric leg swelling  Skin:  No rash or acute skin lesions noted  Neuro:   Awake, alert.      Speech is normal and fluent.    Face is symmetric.     Moves all extremities    Resting tremor  Psych: Flat affect. Does not appear to be responding to internal stimuli.      Diagnostics     Lab Results   Labs Ordered and Resulted from Time of ED Arrival to Time of ED Departure   BASIC METABOLIC PANEL - Abnormal       Result Value    Sodium 131 (*)     Potassium 3.5      Chloride 93 (*)     Carbon Dioxide (CO2) 29      Anion Gap 9      Urea Nitrogen 12.4      Creatinine 0.80      GFR Estimate >90      Calcium 8.6 (*)     Glucose 103 (*)    ISTAT GASES LACTATE VENOUS POCT - Abnormal    Lactic Acid POCT 0.9      Bicarbonate Venous POCT 31 (*)     O2 Sat, Venous POCT 42 (*)     pCO2 Venous POCT 51 (*)     pH Venous POCT 7.39      pO2 Venous POCT 24 (*)     Base Excess/Deficit (+/-) POCT 5.0 (*)    HEPATIC FUNCTION PANEL - Abnormal    Protein Total 5.8 (*)     Albumin 3.9      Bilirubin Total 0.7      Alkaline Phosphatase 88      AST 23      ALT 11      Bilirubin Direct 0.21     TSH WITH FREE T4 REFLEX - Abnormal    TSH 4.37 (*)    TROPONIN T, HIGH SENSITIVITY - Normal    Troponin T, High Sensitivity 6     ETHYL ALCOHOL LEVEL - Normal    Alcohol ethyl <0.01     TROPONIN T, HIGH SENSITIVITY - Normal    Troponin T, High Sensitivity 8     MAGNESIUM - Normal    Magnesium 2.0     PHOSPHORUS - Normal    Phosphorus 3.0     ETHYL ALCOHOL LEVEL - Normal    Alcohol ethyl <0.01     T4 FREE - Normal    Free T4 1.40     CBC WITH PLATELETS AND DIFFERENTIAL    WBC Count 6.4      RBC Count 4.44      Hemoglobin 14.0      Hematocrit 40.7      MCV 92      MCH 31.5      MCHC 34.4      RDW 11.9      Platelet  Count 183      % Neutrophils 75      % Lymphocytes 16      % Monocytes 8      % Eosinophils 1      % Basophils 1      % Immature Granulocytes 0      NRBCs per 100 WBC 0      Absolute Neutrophils 4.8      Absolute Lymphocytes 1.0      Absolute Monocytes 0.5      Absolute Eosinophils 0.1      Absolute Basophils 0.0      Absolute Immature Granulocytes 0.0      Absolute NRBCs 0.0         Imaging   Echocardiogram Complete    (Results Pending)       EKG   ECG results from 04/20/25   EKG 12 lead     Value    Systolic Blood Pressure     Diastolic Blood Pressure     Ventricular Rate 71    Atrial Rate 288    IN Interval     QRS Duration 160        QTc 465    P Axis 63    R AXIS -75    T Axis 63    Interpretation ECG      Sinus rhythm, premature atrial contractions  Right bundle branch block  Left anterior fascicular block  ** Bifascicular block **  Minimal voltage criteria for LVH, may be normal variant ( R in aVL )  Septal infarct , age undetermined  Abnormal ECG  When compared with ECG of 22-Feb-2024 21:24,  Atrial flutter has replaced Sinus rhythm  Septal infarct is now Present  Confirmed by GENERATED REPORT, COMPUTER ([id number]),  Ari Nelson ([id number]) on 4/20/2025 7:30:17 PM     EKG 12 lead     Value    Systolic Blood Pressure     Diastolic Blood Pressure     Ventricular Rate 69    Atrial Rate 249    IN Interval     QRS Duration 158        QTc 469    P Axis 122    R AXIS -76    T Axis 56    Interpretation ECG      Atrial flutter with variable A-V block  Right bundle branch block  Left anterior fascicular block  ** Bifascicular block **  Abnormal ECG  When compared with ECG of 20-Apr-2025 19:18,  Criteria for Septal infarct are no longer Present  Confirmed by GENERATED REPORT, COMPUTER ([id number]),  Ari Nelson ([id number]) on 4/20/2025 9:41:58 PM            ED Course      Medications Administered   Medications   benztropine (COGENTIN) tablet 1 mg (1 mg Oral $Given 4/20/25 2128)   sodium chloride 0.9% BOLUS  1,000 mL (0 mLs Intravenous Stopped 4/20/25 2126)   aspirin (ASA) chewable tablet 324 mg (324 mg Oral $Given 4/20/25 1947)   diazepam (VALIUM) tablet 2 mg (2 mg Oral $Given 4/20/25 2336)       Procedures   Procedures     Discussion of Management   Admitting Hospitalist, Dr. Laws  Cardiology,      ED Course   ED Course as of 04/20/25 1926   Sun Apr 20, 2025 1920 I initially assessed the patient and obtained the above history and physical exam.        Additional Documentation  None    Medical Decision Making / Diagnosis     CMS Diagnoses: None    MIPS       None    MDM   Ger Bashir is a 75 year old male who presents to the emergency department with concerns about schizophrenia and chest pain.  Patient has had intermittent episodes of chest pain.  He has been ongoing for the past couple of weeks.  No current ongoing pain.  EKG is difficult to interpret and I suspect is sinus rhythm with premature atrial contractions; however, difficult to interpret clearly.  Patient was given fluids and reassessed and later had another EKG performed which seemed more atrial flutter like rather than sinus rhythm with premature atrial contractions.  I reviewed the EKG with cardiology who recommended patient be monitored whether inpatient or outpatient.  Given the patient's schizophrenia and recent elopement from his group home I think he would benefit from having his cardiology workup performed more expedited [place] as an inpatient.  Additionally at that time he can have psychiatric consultations regarding his current state of mind.  I spoke with the hospitalist service who agreed except the patient as an admission.    Disposition   The patient was admitted to the hospital.     Diagnosis     ICD-10-CM    1. Acute psychosis (H)  F23       2. Atrial flutter, unspecified type (H)  I48.92            Scribe Disclosure:  I, Salvador Zuniga, am serving as a scribe at 7:31 PM on 4/20/2025 to document services personally performed by  Rogelio Chang MD based on my observations and the provider's statements to me.        Rogelio Chang MD  04/21/25 0102

## 2025-04-21 NOTE — PLAN OF CARE
Goal Outcome Evaluation:    PRIMARY Concern: hx of schizophrenia, elopement from group home 4 days ago  SAFETY RISK Concerns (fall risk, behaviors, etc.): elopement risk, low suicide risk   Aggression Tool Color: green   Isolation/Type: n/a   Tests/Procedures for NEXT shift:   Consults? (Pending/following, signed-off?) psych following, eletrophysiology signed off. CM/SW consult pending.   Where is patient from? (Home, TCU, etc.): group home   Other Important info for NEXT shift: high elopement risk. Chcked pt's belongings and found a vape device. Handed over to pharm. Urine negative for drug use, UA clean. Echo result pending   Anticipated DC date & active delays: TBD  _____________________________________________________________________________  SUMMARY NOTE:   Orientation/Cognitive: disoriented to situation, calm & pleasant   Observation Goals (Met/ Not Met): NOT MET  Mobility Level/Assist Equipment: ind   Antibiotics & Plan (IV/po, length of tx left): n/a  Pain Management: denies pain   Complete Pain Reassessment: Yes Due next: next shift   Tele/VS/O2: VSS on RA, Tele: SR w/ occasional PVC  ABNL Lab/BG: see labs   Diet: regular, good appetite   Bowel/Bladder: continent of B/B, up to the bathroom   Skin Concerns: scattered bruisings   Drains/Devices: IV SL   Patient Stated Goal for Today: TBD

## 2025-04-21 NOTE — PROVIDER NOTIFICATION
MD Notification    Notified Person: MD    Notified Person Name:  [place]     Notification Date/Time: 1858 4/21/25    Notification Interaction: [employer] Messaging    Purpose of Notification: Pt complaint of dry eyes. Requesting for eye drops.     Orders Received: Eye drops ordered.     Comments:

## 2025-04-21 NOTE — CONSULTS
"Triage and Transition - Consult and Liaison     Patient Name: Ger Bashir  Preferred Name:  Ger  Age:  75 year old  Legal Sex: male  Gender Identity: male  Pronouns:   Race: White  Ethnicity: Not  or   Language: English    Date of service:  April 21, 2025  Patient location: [facility]                              CARDIAC SERVICES    Patient was assessed:   via ceferino/polycom    Plan/Recommendations:   Continue care coordination with care team:     Maintain current transition plan:     Next steps include:   inpatient mental health     Please enter another psychiatry if further visits are needed. Patients are not followed by Psychiatry C&L Service unless otherwise indicated.     Reason for consult: Psychiatry consult was requested due to  paranoia, delusions, hold     Patient was seen by Triage and Transition Consult & Liaison team.      Brief Psychosocial History:  Family:  Single, Children no  Support System:   (none)  Employment Status:  disabled, retired  Source of Income:  disability  Financial Environmental Concerns:  other (see comments) (\"yeah I need some money. Don't we all\")  Spiritual, Cultural Beliefs, Anabaptism Practices, Values that Affect Care:  no  Current Hobbies:  music  Barriers in Personal Life:       Duration of the Current Crisis:  Patient has a diagnosis of Schizophrenia. Per chart, \"Ger Bashir is a 75 year old male who was admitted on 4/20/2025. I was asked to see the patient for \" poor compliance with psych medication\" Patient has schizophrenia, patient had eloped from his group home he stopped taking his medications..  He is having delusions of persecutions delusions of reference.  He is delusional.  He believes that his twin is committing crimes and the FBI is looking he makes bizarre statements.\"  Nurse has taken my seeing voice so I could not  sing.  Patient is now on a 72-hour hold\"  Clinical description on symptoms:  " "Patient reports he is experiencing some paranoia.  Resolution attempts:  Patient was under committment which  on 25. Patient has long standing history of schizophrenia.    Current Care Team  Patient Care Team:  No Ref-Primary, Physician as PCP - General    Collateral Information  Is there collateral information:       Collateral information name, relationship, phone number:       What happened today:       What is different about patient's functioning:       Concern about alcohol/drug use:      What do you think the patient needs:      Has patient made comments about wanting to kill themselves/others:      If d/c is recommended, can they take part in safety/aftercare planning:       Additional collateral information:       Mental Status Exam  Affect: Appropriate  Appearance: Appropriate  Attention Span/Concentration: Attentive  Eye Contact: Engaged    Fund of Knowledge: Appropriate   Language /Speech Content: Fluent  Language /Speech Volume: Normal  Language /Speech Rate/Productions: Normal  Recent Memory: Intact  Remote Memory: Intact  Mood: Normal  Orientation to Person: Yes   Orientation to Place: Yes  Orientation to Time of Day: Yes  Orientation to Date: Yes     Situation (Do they understand why they are here?): Yes  Psychomotor Behavior: Normal  Thought Content: Delusions, Paranoia  Thought Form: Intact    Current medications  Psychotropic medications:   Medication Orders - Psychiatric (From admission, onward)      Start     Dose/Rate Route Frequency Ordered Stop    25 1230  paliperidone ER (INVEGA) 24 hr tablet 6 mg         6 mg Oral DAILY 25 1202      25 0234  prochlorperazine (COMPAZINE) injection 5 mg        Placed in \"Or\" Linked Group    5 mg  over 1-2 Minutes Intravenous EVERY 6 HOURS PRN 25 0234      25 0234  prochlorperazine (COMPAZINE) tablet 5 mg        Placed in \"Or\" Linked Group    5 mg Oral EVERY 6 HOURS PRN 25 0234      25 0554  diazepam " "(VALIUM) tablet 10 mg        Placed in \"Or\" Linked Group    10 mg Oral EVERY 30 MIN PRN 04/20/25 2300      04/20/25 2259  diazepam (VALIUM) injection 5-10 mg        Placed in \"Or\" Linked Group    5-10 mg  over 1-4 Minutes Intravenous EVERY 30 MIN PRN 04/20/25 2300      04/20/25 2259  OLANZapine zydis (zyPREXA) ODT tab 5-10 mg        Placed in \"Or\" Linked Group    5-10 mg Oral EVERY 6 HOURS PRN 04/20/25 2300      04/20/25 2259  haloperidol lactate (HALDOL) injection 2.5-5 mg        Placed in \"Or\" Linked Group    2.5-5 mg  over 1 Minutes Intravenous EVERY 6 HOURS PRN 04/20/25 2300               Therapeutic intervention and progress:   Writer met with patient for brief assessment. Patient was oriented X3. Patient endorsed absence of suicidal ideation. Patient endorsed that he is experiencing some paranoia. Patient reported he is [facility] to [facility] at this [facility]. Patient has history of non compliance.     Session start:  3:25PM  Session end:   3:35PM  Session duration in minutes:   10 minutes        Diagnosis  Patient Active Problem List   Diagnosis    Schizoaffective disorder, bipolar type (H)    Schizophrenia (H)    Alcohol abuse    Dyslipidemia    Gastroesophageal reflux disease without esophagitis    Hypothyroidism due to acquired atrophy of thyroid    Tobacco use disorder    Schizoaffective disorder (H)    Acute psychosis (H)    Atrial flutter, unspecified type (H)       Alka Acevedo, [facility], LADC  Triage and Transition - Consult and Liaison   [phone]     "

## 2025-04-21 NOTE — ED TRIAGE NOTES
He has been seen in [facility] observation unit for new DVT and PE. He can't get novel OAC due to insurance lack, and will need follow up on coumadin. Let's check his INR next week, and make sure he has follow up in our coumadin clinic. Pt BIBA after being found at a gas station where he was stating that he was a fugitive and trying to turn himself in.  Pt had been reported missing from his GH 4 days ago.  Hx of schizophrenia and off of his medication.  Pt has vague complaints, endorses chest pain and increased shaking movement.     Triage Assessment (Adult)       Row Name 04/20/25 1912          Triage Assessment    Airway WDL WDL        Respiratory WDL    Respiratory WDL WDL        Skin Circulation/Temperature WDL    Skin Circulation/Temperature WDL WDL        Cardiac WDL    Cardiac WDL chest pain        Peripheral/Neurovascular WDL    Peripheral Neurovascular WDL WDL        Cognitive/Neuro/Behavioral WDL    Cognitive/Neuro/Behavioral WDL mood/behavior;orientation     Orientation disoriented to;situation
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
## 2025-04-21 NOTE — CONSULTS
Care Management Initial Consult    General Information  Assessment completed with: Ger Matos  Type of CM/SW Visit: Initial Assessment    Primary Care Provider verified and updated as needed: Yes   Readmission within the last 30 days: no previous admission in last 30 days      Reason for Consult: discharge planning  Advance Care Planning: Advance Care Planning Reviewed: no concerns identified          Communication Assessment  Patient's communication style: spoken language (English or Bilingual)    Hearing Difficulty or Deaf: no   Wear Glasses or Blind: yes    Cognitive  Cognitive/Neuro/Behavioral: WDL     Arousal Level: opens eyes spontaneously  Orientation: disoriented to, situation             Living Environment:   People in home: facility resident     Current living Arrangements: group home      Able to return to prior arrangements: yes       Family/Social Support:  Care provided by: self, other (see comments)  Provides care for:    Marital Status: Single  Support system:            Description of Support System:           Current Resources:   Patient receiving home care services: No        Community Resources:  Mental health counselor  Equipment currently used at home:  no  Supplies currently used at home:  none    Employment/Financial:  Employment Status: disabled, retired        Financial Concerns:             Does the patient's insurance plan have a 3 day qualifying hospital stay waiver?  No    Lifestyle & Psychosocial Needs:  Social Drivers of Health     Food Insecurity: Food Insecurity Present (12/19/2023)    Received from [facility]    Hunger Vital Sign     Worried About Running Out of Food in the Last Year: Sometimes true     Ran Out of Food in the Last Year: Sometimes true   Depression: At risk (1/18/2024)    [facility]    PHQ-2     PHQ-2 TOTAL SCORE: 4   Housing Stability: Low Risk  (4/15/2022)     Received from [facility] [employer] [facility], [facility] [employer] [facility]    Housing Stability     Unable to Pay for Housing in the Last Year: 1   Tobacco Use: Medium Risk (1/18/2024)    Received from [facility] [employer] [facility], [facility]    Patient History     Smoking Tobacco Use: Former     Smokeless Tobacco Use: Never     Passive Exposure: Not on file   Financial Resource Strain: Medium Risk (4/15/2022)    Received from [facility], [facility]    Financial Resource Strain     Difficulty of Paying Living Expenses: 2     Difficulty of Paying Living Expenses: 1   Alcohol Use: Not on file   Transportation Needs: Unmet Transportation Needs (4/15/2022)    Received from [facility], [facility]    Transportation Needs     Lack of Transportation (Medical): 2   Physical Activity: Not on file   Interpersonal Safety: Not on file   Stress: Not on file   Social Connections: Unknown (4/19/2023)    Received from [facility], [facility]    Social Connections     Frequency of Communication with Friends and Family: Not on file   Health Literacy: Not on file       Functional Status:  Prior to admission patient needed assistance:   Dependent ADLs:: Independent  Dependent IADLs:: Medication Management, Money Management, Transportation, Meal Preparation, Laundry, Shopping, Cooking, Cleaning       Mental Health Status:  Mental Health Status: Current Concern       Chemical Dependency Status:      unknown          Values/Beliefs:  Spiritual, Cultural Beliefs, Sikh Practices, Values that affect care: no               Discussed  Partnership in Safe Discharge Planning  document with patient/family: No    Additional Information:  Writer spoke to patient. He  tells writer he does not an injection. Writer called patient's GH:  CM team called phone [phone], nurse line  is [phone] and spoke with/left message for kei to request background information on pt's place of living.      In what kind of facility does pt reside (SNF, [facility], group home)? Group home  Name of building/unit: home   What do you know about pt's baseline cognition and mobility? Per GH, minor assistance, does elope        What level of cognition/mobility is required for safe return? More compliance with medications/injections   Is resident enrolled in any services?  Meals, med management, med administration      (ask about: meals, physical assist with ADLs, med administration, housekeeping, and if they have built in or wearable call buttons)   Are there grab bars in the bathroom, shower, and/or shower chair in unit? Describe: all  Does pt have any personal DME? no   Best number to reach facility nursing? Phone [phone]  Evening/weekend number? same  Fax: nurse to provide   What does the facility need faxed to them from us at discharge? Discharge orders   Does facility manage medications?  yes   If yes, any contracted pharmacy? Name: [facility]         If new Rx meds at discharge, fill at hospital pharmacy or contracted pharmacy?  Contracted because of how it is supplied  Does [facility] have a copy of any Health Care Directive of [facility]? no           Explained we can provide therapy notes and information when it is available and provided callback contact information.       Per  provider, patient will need an IP MH unit and they will file for civil commitment. Probably tomorrow.  Writer spoke to Donya, nurse on duty(#[phone]). She tells writer he can come back to the group home anytime/anyday. This behavior is his baseline. He will only take his injection from her usually. Writer explained possible commitment. Donya feels he could still come back to them but will see what is finally decided. She  "will check in with us tomorrow.   \"He's lived here for 3 years, we know him. [facility] knows him too.\"    Next Steps: follow for discharge planning.    Violetta Acevedo RN      "

## 2025-04-21 NOTE — CONSULTS
"[facility]    Psychiatry Consultation     Date of Admission:  4/20/2025  Date of Consult (When I saw the patient): 04/21/25    Assessment & Plan   Ger Bashir is a 75 year old male who was admitted on 4/20/2025. I was asked to see the patient for \" poor compliance with psych medication\" Patient has schizophrenia, patient had eloped from his group home he stopped taking his medications..  He is having delusions of persecutions delusions of reference.  He is delusional.  He believes that his twin is committing crimes and the FBI is looking he makes bizarre statements.\"  Nurse has taken my seeing voice so I could not  sing.  Patient is now on a 72-hour hold    .It is the recommendation of this clinician that pt admit to IP MH for safety and stabilization. Pt displays the following risk factors that support IP admission: Psychiatrically decompensated with delusions and disorganized thinking pt is unable to engage in safety planning to mitigate risk level . Lower levels of care are not sufficient. Due to this IP is the least restrictive option of care for pt. Pt should remain in IP until deemed safe to return to the community and engage in OP MH supports.  Patient is on a 72-hour hold.  We will file for civil commitment.      Please continue one-to-one sitter  Principal Diagnosis:   Schizophrenia  Medications: Invega Sustenna 234 injection once-patient declined this  Will order Invega 6 mg p.o. daily  Laboratory/Imaging: Reviewed        Medical diagnoses to be addressed this admission:      Patient Active Problem List   Diagnosis    Schizoaffective disorder, bipolar type (H)    Schizophrenia (H)    Alcohol abuse    Dyslipidemia    Gastroesophageal reflux disease without esophagitis    Hypothyroidism due to acquired atrophy of thyroid    Tobacco use disorder    Schizoaffective disorder (H)    Acute psychosis (H)    Atrial flutter, unspecified type (H)           The risks, benefits, alternatives " "and side effects have been discussed and are understood by the patient and other caregivers.    Kristan Rdz MD    Reason for Consult   Reason for consult: \" Poor compliance with medication    Primary Care Physician   Physician No Ref-Primary    Chief Complaint   Taking my seeing voice\"\"    History is obtained from the patient    Review of external notes and/or information:       History of Present Illness   Pt BIBA after being found at a gas station where he was stating that he was a fugitive and trying to turn himself in. Pt had been reported missing from his GH 4 days ago. Hx of schizophrenia and off of his medication.  Patient has chronic mental illness patient has history of schizophrenia.     He lives in a group home patient has been eloping from his group home..  According to them this is his third elopement.  Patient was on a commitment that ended in February.  During my interview today with the patient patient is very delusional.  Patient has delusions of medications he believes that the police are looking for him.  He talks about his twin brother committing crimes.  He talks about FBI being involved .  He denies any auditory or visual hallucinations.  He makes bizarre statements saying that  the reason patient left the group home was because he is nurse was giving him medications and he wanted to leave because if he took the medication it would make taking your seeing voice\"\" I could not think.  He has delusions of reference delusions of persecutions.  He jumps into a non sequitur during conversations saying that the provider does not have any problems because of him.\" do not have that problem due to me\"    Patient also reports that he leaves the group home because\" radio tells me to do it\"\" sometimes and I listens\"  He also has a belief that he has a twin who has replaced him and their ideas have been switched and he has been framed.  He also made statements to the nurse about how police are out to " "get him.      He denies using any alcohol or drugs   denies any suicide or homicidal ideation plan or intent.    Information from patient psychiatrist Dr. Merino at [phone] indicates that this is not patient's baseline.  He has been paranoid but not to this level of delusional thinking about \" twin brother\".  Since patient left the group home he has been walking throughout the night he slept only 1 night.      Past Medical History   I have reviewed this patient's medical history and updated it with pertinent information if needed.   Past Medical History:   Diagnosis Date    Schizophrenia (H)        Past Psychiatric History   Records indicate that patient has bizarre beliefs believing that mood is especially grandiose delusions about being God in the past..  Paranoia  History of prior psychiatric treatment, including Past Diagnosis:  Schizoaffective disorder, bipolar type.  Age of onset:  Unknown  Previous  admissions:   Yes.  Multiple.  Most recent psychiatric hospitalization per records as available is from 05/20/2021 through 06/07/2021 at [facility].  Discharge summary by LISSET Beltran may be referred to.  Current Psychiatrist:  Dr. Fox  Current Therapist:  Unknown  Prior ECT:  Unknown  Prior medication trials:  Antianxiety:  Vistaril p.r.n.  Antidepressants:  Zoloft, trazodone.  Anti parkinsonian:  Cogentin  Antipsychotic:  Invega  Abilify  Zyprexa  Suicidal Gestures/Attempts:  Per records, a suicide attempt in 1986 with details unknown.  Reportedly walked into traffic in 2012, and jumped off the balcony in 2020.     Past Surgical History   I have reviewed this patient's surgical history and updated it with pertinent information if needed.  No past surgical history on file.    Prior to Admission Medications   Prior to Admission Medications   Prescriptions Last Dose Informant Patient Reported? Taking?   Skin Protectants, Misc. (EUCERIN) cream   No No   Sig: Apply topically 2 times daily "   benztropine (COGENTIN) 1 MG tablet   No Yes   Sig: Take 1 tablet (1 mg) by mouth 2 times daily   carboxymethylcellulose PF (CARBOXYMETHYLCELLULOSE SODIUM) 0.5 % ophthalmic solution   No Yes   Sig: Place 1 drop into both eyes 3 times daily as needed for dry eyes   melatonin 3 MG tablet   Yes Yes   Sig: Take 3 mg by mouth at bedtime.   multivitamin w/minerals (THERA-VIT-M) tablet   No No   Sig: Take 1 tablet by mouth daily   paliperidone (INVEGA SUSTENNA) 234 MG/1.5ML CHRISTOS   No Yes   Sig: Inject 1.5 mLs (234 mg) into the muscle every 28 days Next dose due 12/27/19   traZODone (DESYREL) 100 MG tablet   No No   Sig: Take 1 tablet (100 mg) by mouth At Bedtime   traZODone (DESYREL) 50 MG tablet   Yes Yes   Sig: Take by mouth at bedtime.      Facility-Administered Medications: None     Allergies   Allergies   Allergen Reactions    Peas GI Disturbance     Black eyed peas - vomiting       Social History Per EHR records as available, the patient grew up in [place], [place]. He reportedly graduated high school, and then attended [employer]. Graduated from college and worked as a  for 10 years. First psychotic break at the age of 36 years, and was unable to continue his career after that. He receives SSDI.   I have personally reviewed the social history with the patient showing     Family History   I have reviewed this patient's family history and updated it with pertinent information if needed.   No family history on file.    Review of Systems   The 10 point Review of Systems is negative other than noted in the HPI or here.     Physical Exam     Vital Signs with Ranges  Temp:  [97  F (36.1  C)] 97  F (36.1  C)  Pulse:  [53-80] 80  Resp:  [9-27] 12  BP: ()/() 118/63  SpO2:  [93 %-95 %] 95 %  0 lbs 0 oz    Appearance:  No apparent distress  Behavior/relationship to examiner/demeanor:  Cooperative  Orientation: Oriented to person, place, time, and situation  Speech Rate:  Rapid  Speech Spontaneity:   "Normal  Mood:  \" not that good\"  Affect:  Appropriate/mood-congruent  Thought Process:  Tangential  Associations: No loosening of associations private word usage\" taking your seeing voice\"  Thought Content:  no evidence of suicidal or homicidal ideation, Ideas of references, and delusional  Abnormal Perception:  None  Attention/Concentration:  Fair  Memory: Adequate  Language: Adequate  Fund of Knowledge:   Abstraction: [place]  Insight:  Limited  Judgment:  Limited      Data   Results for orders placed or performed during the hospital encounter of 04/20/25 (from the past 24 hours)   EKG 12 lead   Result Value Ref Range    Systolic Blood Pressure  mmHg    Diastolic Blood Pressure  mmHg    Ventricular Rate 71 BPM    Atrial Rate 288 BPM    SD Interval  ms    QRS Duration 160 ms     ms    QTc 465 ms    P Axis 63 degrees    R AXIS -75 degrees    T Axis 63 degrees    Interpretation ECG       Atrial flutter with variable A-V block  Right bundle branch block  Left anterior fascicular block  ** Bifascicular block **  Minimal voltage criteria for LVH, may be normal variant ( R in aVL )  Septal infarct , age undetermined  Abnormal ECG  When compared with ECG of 22-Feb-2024 21:24,  Atrial flutter has replaced Sinus rhythm  Septal infarct is now Present  Confirmed by GENERATED REPORT, COMPUTER ([id number]),  Ari Nelson ([id number]) on 4/20/2025 7:30:17 PM     CBC with platelets + differential    Narrative    The following orders were created for panel order CBC with platelets + differential.  Procedure                               Abnormality         Status                     ---------                               -----------         ------                     CBC with platelets and ...[[id number]]                      Final result                 Please view results for these tests on the individual orders.   Basic metabolic panel   Result Value Ref Range    Sodium 131 (L) 135 - 145 mmol/L    Potassium 3.5 3.4 - 5.3 " mmol/L    Chloride 93 (L) 98 - 107 mmol/L    Carbon Dioxide (CO2) 29 22 - 29 mmol/L    Anion Gap 9 7 - 15 mmol/L    Urea Nitrogen 12.4 8.0 - 23.0 mg/dL    Creatinine 0.80 0.67 - 1.17 mg/dL    GFR Estimate >90 >60 mL/min/1.73m2    Calcium 8.6 (L) 8.8 - 10.4 mg/dL    Glucose 103 (H) 70 - 99 mg/dL   Troponin T, High Sensitivity   Result Value Ref Range    Troponin T, High Sensitivity 6 <=22 ng/L   [place] Draw    Narrative    The following orders were created for panel order [place] Draw.  Procedure                               Abnormality         Status                     ---------                               -----------         ------                     Extra Blue Top Tube[[id number]]                             Final result               Extra Red Top Tube[[id number]]                              Final result                 Please view results for these tests on the individual orders.   CBC with platelets and differential   Result Value Ref Range    WBC Count 6.4 4.0 - 11.0 10e3/uL    RBC Count 4.44 4.40 - 5.90 10e6/uL    Hemoglobin 14.0 13.3 - 17.7 g/dL    Hematocrit 40.7 40.0 - 53.0 %    MCV 92 78 - 100 fL    MCH 31.5 26.5 - 33.0 pg    MCHC 34.4 31.5 - 36.5 g/dL    RDW 11.9 10.0 - 15.0 %    Platelet Count 183 150 - 450 10e3/uL    % Neutrophils 75 %    % Lymphocytes 16 %    % Monocytes 8 %    % Eosinophils 1 %    % Basophils 1 %    % Immature Granulocytes 0 %    NRBCs per 100 WBC 0 <1 /100    Absolute Neutrophils 4.8 1.6 - 8.3 10e3/uL    Absolute Lymphocytes 1.0 0.8 - 5.3 10e3/uL    Absolute Monocytes 0.5 0.0 - 1.3 10e3/uL    Absolute Eosinophils 0.1 0.0 - 0.7 10e3/uL    Absolute Basophils 0.0 0.0 - 0.2 10e3/uL    Absolute Immature Granulocytes 0.0 <=0.4 10e3/uL    Absolute NRBCs 0.0 10e3/uL   Extra Blue Top Tube   Result Value Ref Range    Hold Specimen JIC    Extra Red Top Tube   Result Value Ref Range    Hold Specimen JIC    Alcohol level blood   Result Value Ref Range    Alcohol ethyl <0.01 <=0.01 g/dL    iStat Gases (lactate) venous, POCT   Result Value Ref Range    Lactic Acid POCT 0.9 0.7 - 2.0 mmol/L    Bicarbonate Venous POCT 31 (H) 21 - 28 mmol/L    O2 Sat, Venous POCT 42 (L) 70 - 75 %    pCO2 Venous POCT 51 (H) 40 - 50 mm Hg    pH Venous POCT 7.39 7.32 - 7.43    pO2 Venous POCT 24 (L) 25 - 47 mm Hg    Base Excess/Deficit (+/-) POCT 5.0 (H) -3.0 - 3.0 mmol/L   Troponin T, High Sensitivity   Result Value Ref Range    Troponin T, High Sensitivity 8 <=22 ng/L   Hepatic panel   Result Value Ref Range    Protein Total 5.8 (L) 6.4 - 8.3 g/dL    Albumin 3.9 3.5 - 5.2 g/dL    Bilirubin Total 0.7 <=1.2 mg/dL    Alkaline Phosphatase 88 40 - 150 U/L    AST 23 0 - 45 U/L    ALT 11 0 - 70 U/L    Bilirubin Direct 0.21 0.00 - 0.30 mg/dL   Magnesium   Result Value Ref Range    Magnesium 2.0 1.7 - 2.3 mg/dL   Phosphorus   Result Value Ref Range    Phosphorus 3.0 2.5 - 4.5 mg/dL   TSH with free T4 reflex   Result Value Ref Range    TSH 4.37 (H) 0.30 - 4.20 uIU/mL   Alcohol ethyl   Result Value Ref Range    Alcohol ethyl <0.01 <=0.01 g/dL   T4 free   Result Value Ref Range    Free T4 1.40 0.90 - 1.70 ng/dL   EKG 12 lead   Result Value Ref Range    Systolic Blood Pressure  mmHg    Diastolic Blood Pressure  mmHg    Ventricular Rate 69 BPM    Atrial Rate 249 BPM    NM Interval  ms    QRS Duration 158 ms     ms    QTc 469 ms    P Axis 122 degrees    R AXIS -76 degrees    T Axis 56 degrees    Interpretation ECG       Atrial flutter with variable A-V block  Right bundle branch block  Left anterior fascicular block  ** Bifascicular block **  Abnormal ECG  When compared with ECG of 20-Apr-2025 19:18,  Criteria for Septal infarct are no longer Present  Confirmed by GENERATED REPORT, COMPUTER ([id number]),  Ari Nelson ([id number]) on 4/20/2025 9:41:58 PM     EKG 12-lead, tracing only   Result Value Ref Range    Systolic Blood Pressure  mmHg    Diastolic Blood Pressure  mmHg    Ventricular Rate 72 BPM    Atrial Rate 72 BPM    NM  Interval 180 ms    QRS Duration 152 ms     ms    QTc 459 ms    P Axis 47 degrees    R AXIS -73 degrees    T Axis 49 degrees    Interpretation ECG       Sinus rhythm with Premature atrial complexes in a pattern of bigeminy  Right bundle branch block  Left anterior fascicular block  ** Bifascicular block **  Abnormal ECG  When compared with ECG of 20-Apr-2025 21:37,  Sinus rhythm has replaced Atrial fibrillation  Confirmed by MD REMY, ARI ([id number]) on 4/21/2025 11:07:20 AM     Urine Drug Screen    Narrative    The following orders were created for panel order Urine Drug Screen.  Procedure                               Abnormality         Status                     ---------                               -----------         ------                     Urine Drug Screen Panel[[id number]]     Normal              Final result                 Please view results for these tests on the individual orders.   UA Macroscopic with reflex to Microscopic and Culture    Specimen: Urine, Clean Catch   Result Value Ref Range    Color Urine Light Yellow Colorless, Straw, Light Yellow, Yellow    Appearance Urine Clear Clear    Glucose Urine Negative Negative mg/dL    Bilirubin Urine Negative Negative    Ketones Urine Negative Negative mg/dL    Specific Gravity Urine 1.009 1.003 - 1.035    Blood Urine Negative Negative    pH Urine 6.5 5.0 - 7.0    Protein Albumin Urine Negative Negative mg/dL    Urobilinogen Urine Normal Normal mg/dL    Nitrite Urine Negative Negative    Leukocyte Esterase Urine Negative Negative    Narrative    Microscopic not indicated   Urine Drug Screen Panel   Result Value Ref Range    Amphetamines Urine Screen Negative Screen Negative    Barbituates Urine Screen Negative Screen Negative    Benzodiazepine Urine Screen Negative Screen Negative    Cannabinoids Urine Screen Negative Screen Negative    Cocaine Urine Screen Negative Screen Negative    Fentanyl Qual Urine Screen Negative Screen Negative    Opiates  Urine Screen Negative Screen Negative    PCP Urine Screen Negative Screen Negative   Comprehensive metabolic panel   Result Value Ref Range    Sodium 136 135 - 145 mmol/L    Potassium 4.4 3.4 - 5.3 mmol/L    Carbon Dioxide (CO2) 30 (H) 22 - 29 mmol/L    Anion Gap 6 (L) 7 - 15 mmol/L    Urea Nitrogen 11.6 8.0 - 23.0 mg/dL    Creatinine 0.79 0.67 - 1.17 mg/dL    GFR Estimate >90 >60 mL/min/1.73m2    Calcium 8.7 (L) 8.8 - 10.4 mg/dL    Chloride 100 98 - 107 mmol/L    Glucose 102 (H) 70 - 99 mg/dL    Alkaline Phosphatase 76 40 - 150 U/L    AST 18 0 - 45 U/L    ALT 12 0 - 70 U/L    Protein Total 5.9 (L) 6.4 - 8.3 g/dL    Albumin 4.0 3.5 - 5.2 g/dL    Bilirubin Total 0.6 <=1.2 mg/dL   CBC with platelets   Result Value Ref Range    WBC Count 5.4 4.0 - 11.0 10e3/uL    RBC Count 4.46 4.40 - 5.90 10e6/uL    Hemoglobin 14.0 13.3 - 17.7 g/dL    Hematocrit 41.1 40.0 - 53.0 %    MCV 92 78 - 100 fL    MCH 31.4 26.5 - 33.0 pg    MCHC 34.1 31.5 - 36.5 g/dL    RDW 11.9 10.0 - 15.0 %    Platelet Count 170 150 - 450 10e3/uL   Blood gas venous   Result Value Ref Range    pH Venous 7.42 7.32 - 7.43    pCO2 Venous 48 40 - 50 mm Hg    pO2 Venous 43 25 - 47 mm Hg    Bicarbonate Venous 31 (H) 21 - 28 mmol/L    Base Excess/Deficit Venous 5.5 (H) -3.0 - 3.0 mmol/L    FIO2 0     Oxyhemoglobin Venous 80 (H) 70 - 75 %    O2 Sat, Venous 81.8 (H) 70.0 - 75.0 %    Narrative    In healthy individuals, oxyhemoglobin (O2Hb) and oxygen saturation (SO2) are approximately equal. In the presence of dyshemoglobins, oxyhemoglobin can be considerably lower than oxygen saturation.     Most Recent 3 CBC's:  Recent Labs   Lab Test 04/21/25  1134 04/20/25 1938 02/22/24 2122   WBC 5.4 6.4 5.8   HGB 14.0 14.0 13.7   MCV 92 92 92    183 211      Most Recent 3 BMP's:  Recent Labs   Lab Test 04/21/25  1134 04/20/25 1938 02/22/24 2122    131* 138   POTASSIUM 4.4 3.5 4.1   CHLORIDE 100 93* 102   CO2 30* 29 30*   BUN 11.6 12.4 14.6   CR 0.79 0.80 0.86  "  ANIONGAP 6* 9 6*   ZAHRA 8.7* 8.6* 9.0   * 103* 95     Most Recent 2 LFT's:  Recent Labs   Lab Test 04/21/25  1134 04/20/25 2130   AST 18 23   ALT 12 11   ALKPHOS 76 88   BILITOTAL 0.6 0.7     Most Recent INR's and Anticoagulation Dosing History:  Anticoagulation Dose History           No data to display              Most Recent 3 Troponin's:No lab results found.  Most Recent Cholesterol Panel:  Recent Labs   Lab Test 04/19/19  0642   CHOL 148   LDL 89   HDL 32*   TRIG 136     Most Recent 6 Bacteria Isolates From Any Culture (See EPIC Reports for Culture Details):No lab results found.  Most Recent TSH, T4 and A1c Labs:  Recent Labs   Lab Test 04/20/25 2130   TSH 4.37*   T4 1.40       Total time spent in chart review, patient interview and coordination of care; 80 MIN  \"Much or all of the text in this note was generated through the use of Dragon Dictate voice to text software. Errors in spelling or words which appear to be out of contact are unintentional, may be present due having escaped editing\"      "

## 2025-04-21 NOTE — PROGRESS NOTES
Observation goals  PRIOR TO DISCHARGE        Comments: -diagnostic tests and consults completed and resulted: NOT MET. Consults and imagings pending   -vital signs normal or at patient baseline:MET  -returns to baseline functional status: NOT MET  -safe disposition plan has been identified: NOT MET  Nurse to notify provider when observation goals have been met and patient is ready for discharge.

## 2025-04-21 NOTE — PHARMACY-ADMISSION MEDICATION HISTORY
Pharmacist Admission Medication History    Admission medication history is complete. The information provided in this note is only as accurate as the sources available at the time of the update.    Information Source(s): Facility (TCU/NH/) medication list/MAR via  Fax from [facility] - [facility], Group Home, [address] [place] [address] [address], [place] [address], Phone [phone]    Pertinent Information: Patient was due for his monthly Invega Sustenna injection 234 mg on Friday April 18, 2025, but eloped from his group home prior to receiving the injection.     Changes made to PTA medication list:  Added: Tylenol  Deleted: Multivitamin, Eurcerin cream  Changed: Trazodone reduced to 25 mg hs prn.    Allergies reviewed with patient and updates made in EHR: yes    Medication History Completed By: Madiha Washington RPH 4/21/2025 9:52 AM    PTA Med List   Medication Sig Note Last Dose/Taking    acetaminophen (TYLENOL) 500 MG tablet Take 500-1,000 mg by mouth every 6 hours as needed for mild pain. 500 mg for pain level 1-5.  1000 mg for pain level 6-10.  Unknown    benztropine (COGENTIN) 1 MG tablet Take 1 tablet (1 mg) by mouth 2 times daily  Unknown    carboxymethylcellulose PF (CARBOXYMETHYLCELLULOSE SODIUM) 0.5 % ophthalmic solution Place 1 drop into both eyes 3 times daily as needed for dry eyes  Unknown    melatonin 3 MG tablet Take 3 mg by mouth at bedtime.  Unknown    paliperidone (INVEGA SUSTENNA) 234 MG/1.5ML CHRISTOS Inject 1.5 mLs (234 mg) into the muscle every 28 days Next dose due 12/27/19 4/20/2025: Last fill [facility] 3/3/25 for 28 day supply Unknown    traZODone (DESYREL) 50 MG tablet Take 25 mg by mouth nightly as needed for sleep. 4/21/2025: Last fill [facility] 3/16/25 50mg #15 for 30 day supply - dose is 25 mg hs prn.  Unknown

## 2025-04-21 NOTE — ED NOTES
[facility]  ED Nurse Handoff Report    ED Chief complaint: Psychiatric Evaluation and Chest Pain      ED Diagnosis:   Final diagnoses:   None       Code Status: Full Code    Allergies:   Allergies   Allergen Reactions    Peas GI Disturbance     Black eyed peas - vomiting       Patient Story: psych / chest pain  Focused Assessment:  75 year old male with a history of schizophrenia who presents for a psychiatric evaluation. Today the patient was found at a gas station after being missing from his group home for 4 days. He has not been taking his prescribed medications, endorses some episodes of chest pains today that last for 10 minutes, not present now.  He is unsure of the last time the chest pain occurred today but states that he gets the pains regularly He states he usually gets tremulous when he doesn't take his medications. He does endorse some suicidal ideation to EMS. He denies any homicidal ideation, suicidal ideation, fever, or chills here.   EMS reported that he was trying to turn himself in as a fugitive at the gas station.      Treatments and/or interventions provided: asa, bolus, cogentin  Patient's response to treatments and/or interventions:       To be done/followed up on inpatient unit:  monitor    Does this patient have any cognitive concerns?:  schizo-effective    Activity level - Baseline/Home:  Independent  Activity Level - Current:   Independent    Patient's Preferred language: English   Needed?: No    Isolation: None  Infection: Not Applicable  Patient tested for COVID 19 prior to admission: NO  Bariatric?: No    Vital Signs:   Vitals:    04/20/25 1950 04/1951 04/20/25 1952 04/20/25 2100   BP: (!) 177/97   129/79   Pulse: 65   68   Resp: (!) 9 16  20   Temp:   97  F (36.1  C)    TempSrc:   Axillary    SpO2: 94%   94%       Cardiac Rhythm:     Was the PSS-3 completed:   Yes  What interventions are required if any?  DONNIE          Pt's nurse at [facility]  [phone]    For the majority of the shift this patient's behavior was Green.   Behavioral interventions performed were   Pt sleeping most, calm, and cooperative; asking to go back to his group home.    ED NURSE PHONE NUMBER: [id number]

## 2025-04-21 NOTE — ED NOTES
Pt's group home nurse #[phone]    Address:     [address] [facility] [place] [address]  [facility] [address]

## 2025-04-21 NOTE — PROGRESS NOTES
[employer] [facility]  Hospitalist / LAKISHA Progress Note  Date Admitted: 4/20/2025  7:08 PM  Date of Service: 04/21/2025              Assessment and Plan:                                         This is a 75 year old, male, w a PMH of schizophrenia, group home resident, substance use disorder, alcohol abuse, anxiety, depression, HFpEF, who was admitted on 4/20/2025, after being found 4 days after eloping from group home, med noncompliance, with apparent psychosis, vague chest complaints.    Active Problems:    Acute psychosis (H)    Atrial flutter, unspecified type (H)      PLAN  Acute decompensated schizophrenia  Recurrent elopement, history of same from medical psychiatric facilities  Borderline personality bipolar, anxiety/MDD  Shaking-akathisia versus tardive dyskinesia  History of substance use disorder, alcohol abuse  Eloped from group home 4/16, found to gas station 4/20 endorsing he is a fugitive and try and turn himself in.  Noted to have lip smacking resting tremor right upper greater than right lower extremity. reports he is worried people going to kill him in the hospital.  Declined further history with nocturnist.  Psych pending Also C/o vague CP sx. JOSR low.  Negative lactate.  LFTs low.  TSH sl abnormal.  VBG slightly abnormal.  QTc .  Seen by psychiatry.  Clearly delusional consistent with decompensated schizophrenia per their interview and collateral.  On hold.  Recommends resuming Invega Depo but the patient is declining.  Thus he will need to civil commitment and transfer to inpatient psych unit.      -Admitted for observation status, send for  review.-Agrees with inpatient  - On a 72-hour hold  - Psychiatry consulted, see 4/21 note for details.  - Confirms the patient is decompensated now  --Psychiatrist to resume monthly Depo PTA Invega but patient is declining   --Commitment process started 4/21 for meds/Invega  - PTA psych meds management/resumption deferred to  "psychiatry  - Antipsychotics as needed-on hold given EPS concerns, psychiatry managing  - LFTs low, unclear if recurrent drinking  - TSH slightly depressed 4.37 but free T4 is normal 1.40  - Repeat VBG  - AM BMP    Chronic intermittent chest pain, rule out recurrent angina versus noncardiac  Hx possible a flutter, not on DOAC. Current NSR-suspected pseudo A-fib  Chronic bifascicular block, L anterior fascicular block, R bundle branch block,   HFpEF, grade 1 diastolic dysfunction  Complaining of intermittent chest pain sx> 1yr prior.  States they are regular.  Does not have any as needed nitro per chart review.- ECG reviewed, chronic A-flutter bifascicular block LVH, LAD.  However NSR on arrival.  History HF of unclear etiology.  EF normal now.  4/21-with no current CP symptoms and overall cardiology workup below, no further inpatient workup needed, stable medically, outpatient PCP could consider stress test.    -Telemetry ordered on admit, patient declined \"wants a break\" no CP symptoms for me.  Cardiology notes reviewed-they think this is a pseudo A-fib secondary to artifact.  No further workup needed or telemetry.  - ECG-NSR on admit.  QTc .  Block present prior.  -Echocardiogram-WNL, EF 55-60%, no WMA,  -Good BP monitoring below  -Patient declining telemetry, cardiology not needing.  - PCP follow-up  -Not on AC prior, does not appear indicated per EP notes.  - Outpatient PCP follow-up, if recurrent CP, consider exercise stress test.    Elevated BP without a history of hypertension  Admit BP notably elevated with systolics running 177-160.  Likely agitated. Last 99/46  -Low threshold to start beta-blocker outpatient if elevated    Incidental aortic root/aortic dilation  - Aortic root 4.3 cm and ascending aorta dilation 4 cm.  No previous  - Outpatient follow-up yearly monitoring, good BP control    Hyponatremia  Baseline 135s normal.  On arrival 131.  Given he was eloped 4 days query low intake/prerenal?  " Creatinine however is WNL at 0.80.  - BMP QD    Hypocalcemia  -- Minimal hypocalcemia at 8.6, albumin is 3.94 oh.  Not clinically relevant/causing above symptoms.  Increased p.o. intake.    Code Status:Full Code  NUTRITION Regular Diet Adult     DVT Prophylaxis: Low Risk/Ambulatory with no VTE prophylaxis indicated   Henao Catheter: Not present  Lines: None     Cardiac Monitoring: ACTIVE order. Indication: Tachyarrhythmias, acute (48 hours)      NURSING COMMUNICATION:  Bedside rounding completed with nurse.  FAMILY COMMUNICATION:  none in room       Disposition Plan       Expected Discharge Date: 04/21/2025              Entered: Temo Fu PA-C 04/21/2025, 8:34 AM      DISCHARGE PLANNING:   Criteria for discharge from the hospitalist perspective is further psychiatry workup, commitment, able to receive antipsychotics.     Medically Ready for Discharge: Anticipated in 2-4 Days      Additional comments:   Discussed the patient's care and collaborative tx plan in detail w Dr PHYLLIS Avery Hospitalist Provider. The above assessment and plan is the product of our joint decision making.    The patient's care and tx plan was also discussed with w Dr Rdz on the floor in detail.  Appreciate updates.     Time - I spent 55 minutes w greater than 50% spent in face to face counseling and education re dx, rx, plan and also including coordination of care, time documenting, and family updates if applicable.    Temo Fu PA-C  Hospitalist LAKISHA  [facility]   Securely message with the Vocera Web Console (learn more here)  Text page via [employer] Paging/Directory      Please note this documentation was partially completed using Dragon medical dictation transcription software. It was briefly proofread, but will likely have unrecognized and unintended incorrect words, numbers/values or phrases in transcription Also this is primarily intended as a direct peer to peer communication with medical  "abbreviations and verbiage that may appear to be 'direct' as to succinctly relay medical info and opinions to care teams. If there are any questions on content/transcription, or diagnosis and plan, please contact me immediately for clarification.         SUBJECTIVE:   INTERVAL EVENTS Reviewed-      Today's chief complaint-\"I do not want that shot again\"    Met patient for the first time  - Relatively calm for me and able to add history  - States he does not want the Invega shot.  States that he gets it every 3 weeks and he does not like it.  Reports it hurts.  Gets very upset irrationally so because I do not immediately acknowledge that he does not like the shot.  Seems decompensated to me from that  - Was given p.o. antipsychotics today by psychiatry  Unfortunately because of his refusal to resume Depo medications, they will have to move forward with a commitment and resumed medication, as well as a future transfer to an inpatient psychiatric unit.  - States that he gets chest pain on and off but it has been over a year.  Usually in the midsternal or epigastric area.  Last for up to 10 minutes.  Did not have any today nor yesterday.  Cannot tell me when he last had it.  Not related to food.  Not exertional.  Really vague on this particular symptom.  Currently denying any chest pain pressure no abdominal symptoms.  No fevers chills or any other concerns    -EP notes reviewed, they do not see any convincing A-fib or a flutter.  They think that what was noted yesterday was secondary to extensive artifact.  They do not need him on a telemetry and are signing off.    -Per secondary report has not been using alcohol, LFTs are low.  No drug use with UDS low.  Frequently eloped from facilities per chart review.  Group home staff was willing to take him back but he did not come    -Discussed with psychiatry in detail, due to the need for resumed antipsychotics and moved for civil commitment.  He will be transferred to " medical psych unit, at this point he is cleared from a medicine standpoint.    -Utilization management reached out to me earlier in the afternoon he is meeting inpatient criteria and was changed.         Physical Exam:     Vitals:    25 0400 25 0500 25 0727 25 0752   BP:  94/62 (!) 112/96 118/63   BP Location:    Left arm   Pulse: 55 56 63 80   Resp: 17 13 12    Temp:       TempSrc:       SpO2:   93% 95%     /63 (BP Location: Left arm)   Pulse 80   Temp 97  F (36.1  C) (Axillary)   Resp 12   SpO2 95%    Temp (24hrs), Av  F (36.1  C), Min:97  F (36.1  C), Max:97  F (36.1  C)    No intake or output data in the 24 hours ending 25 0834  No data found.    LINES: PIV,    GENERAL APPEARANCE: Alert, supine in bed, in no acute distress.  HEENT: normocephalic, normal external exam.  NECK:  No JVD  CARDIOVASCULAR: Regular rate and rhythm, s1, s2  no murmur appreciated.    RESPIRATORY: No o2, Sp02 , Normal work of breathing. Clear bilaterally without wheezes, fine rales or coarse rhonchi/crackles.   GASTROINTESTINAL: obese habitus, non-distended. Bowel sounds normoactive.  Soft, nontender over The gastrum, nor other areas nor midsternum,   No masses/organomegaly  NEUROLOGIC: Has a resting tremor, I see out of both upper and lower extremities to varying degree.  At times lip smacking although not severe.  Nonfocal, UE movements appear grossly intact.  EXTREMITIES: not walked, MAEW, No peripheral edema noted.  SKIN: No rashes or lesions.  PSYCHIATRIC: Alert and reports he is aware at the hospital..  See psychiatry notes regarding delusions and affect.  Chatty with me.  Irritable.  Quickly ramps up and anger if we do not acknowledge his symptoms.  Somewhat guarded regarding mood..    Meds- reviewed         Data:     I reviewed most recent (and historical) labs in past 24hrs, and personally reviewed any new imaging results    Recent Labs   Lab Test 25  2130 25  193  "02/22/24 2122   WBC  --  6.4 5.8   HGB  --  14.0 13.7   MCV  --  92 92   PLT  --  183 211   NA  --  131* 138   POTASSIUM  --  3.5 4.1   CHLORIDE  --  93* 102   CO2  --  29 30*   ANIONGAP  --  9 6*   GLC  --  103* 95   BUN  --  12.4 14.6   CR  --  0.80 0.86   GFRESTIMATED  --  >90 >90   ZAHRA  --  8.6* 9.0   MAG 2.0  --   --         CBC with Diff:  Recent Labs   Lab Test 04/20/25 1938 02/22/24 2122 08/14/23  1423   WBC 6.4 5.8 8.4   HGB 14.0 13.7 14.6   MCV 92 92 93    211 208        BMP:  Recent Labs   Lab Test 04/20/25 2130 04/20/25 1938 02/22/24 2122 08/14/23  1423   NA  --  131* 138 136   POTASSIUM  --  3.5 4.1 4.0   CHLORIDE  --  93* 102 98   CO2  --  29 30* 29   ANIONGAP  --  9 6* 9   GLC  --  103* 95 95   BUN  --  12.4 14.6 20.3   CR  --  0.80 0.86 0.80   GFRESTIMATED  --  >90 >90 >90   ZAHRA  --  8.6* 9.0 8.8   MAG 2.0  --   --  2.0        VBG/Venous Blood Gas  Recent Labs   Lab 04/20/25 1938   PHV 7.39   PCO2V 51*   PO2V 24*   HCO3V 31*   JONAS 5.0*     ABG:  -No lab results found in last 7 days.    Lactic Acid:    Lab Results   Component Value Date    LACT 0.9 04/20/2025         Diabetes:  Recent Labs   Lab 04/20/25 1938   *     No results found for: \"A1C\"    Troponin:    6-8      Micro results:   30-Day Micro Results       No results found for the last 720 hours.         UDS neg    US benign         IMAGING:   IMAGING, During Current Admission:  No results found.        Procedures:   none         Allergy:     Allergies   Allergen Reactions    Peas GI Disturbance     Black eyed peas - vomiting       Medical Decision Making       Please see A&P for additional details of medical decision making.  MANAGEMENT DISCUSSED with the following over the past 24 hours:     NOTE(S)/MEDICAL RECORDS REVIEWED over the past 24 hours:            Please note this documentation was partially completed using Dragon medical dictation software. It was briefly proofread, but may still have unintended incorrect " words or phrases transcribed. Also this is primarily intended as a direct peer to peer communication with medical abbreviations and verbiage that may appear to be 'direct' as to succinctly relay medical info and opinions. If there are any questions on content or diagnosis, please contact me for clarification.

## 2025-04-22 ENCOUNTER — TELEPHONE (OUTPATIENT)
Dept: BEHAVIORAL HEALTH | Facility: CLINIC | Age: 75
End: 2025-04-22
Payer: MEDICARE

## 2025-04-22 PROCEDURE — 120N000001 HC R&B MED SURG/OB

## 2025-04-22 PROCEDURE — 250N000013 HC RX MED GY IP 250 OP 250 PS 637: Performed by: STUDENT IN AN ORGANIZED HEALTH CARE EDUCATION/TRAINING PROGRAM

## 2025-04-22 PROCEDURE — 99232 SBSQ HOSP IP/OBS MODERATE 35: CPT | Performed by: PHYSICIAN ASSISTANT

## 2025-04-22 PROCEDURE — 250N000013 HC RX MED GY IP 250 OP 250 PS 637: Performed by: PSYCHIATRY & NEUROLOGY

## 2025-04-22 PROCEDURE — 99232 SBSQ HOSP IP/OBS MODERATE 35: CPT | Performed by: PSYCHIATRY & NEUROLOGY

## 2025-04-22 PROCEDURE — 250N000013 HC RX MED GY IP 250 OP 250 PS 637: Performed by: EMERGENCY MEDICINE

## 2025-04-22 RX ADMIN — Medication 1 TABLET: at 08:19

## 2025-04-22 RX ADMIN — BENZTROPINE MESYLATE 1 MG: 1 TABLET ORAL at 08:19

## 2025-04-22 RX ADMIN — PALIPERIDONE 6 MG: 6 TABLET, EXTENDED RELEASE ORAL at 08:19

## 2025-04-22 RX ADMIN — BENZTROPINE MESYLATE 1 MG: 1 TABLET ORAL at 20:17

## 2025-04-22 RX ADMIN — FOLIC ACID 1 MG: 1 TABLET ORAL at 08:19

## 2025-04-22 RX ADMIN — THIAMINE HCL TAB 100 MG 100 MG: 100 TAB at 08:19

## 2025-04-22 ASSESSMENT — ACTIVITIES OF DAILY LIVING (ADL)
ADLS_ACUITY_SCORE: 50
ADLS_ACUITY_SCORE: 50
ADLS_ACUITY_SCORE: 49
ADLS_ACUITY_SCORE: 45
ADLS_ACUITY_SCORE: 50
ADLS_ACUITY_SCORE: 45
ADLS_ACUITY_SCORE: 49
ADLS_ACUITY_SCORE: 45
ADLS_ACUITY_SCORE: 50
ADLS_ACUITY_SCORE: 50
ADLS_ACUITY_SCORE: 45
ADLS_ACUITY_SCORE: 50
ADLS_ACUITY_SCORE: 45
ADLS_ACUITY_SCORE: 50
ADLS_ACUITY_SCORE: 50
ADLS_ACUITY_SCORE: 45
ADLS_ACUITY_SCORE: 49
ADLS_ACUITY_SCORE: 49
ADLS_ACUITY_SCORE: 45
ADLS_ACUITY_SCORE: 50
ADLS_ACUITY_SCORE: 45

## 2025-04-22 NOTE — PLAN OF CARE
Goal Outcome Evaluation:    PRIMARY Concern: hx of schizophrenia, elopement from group home   SAFETY RISK Concerns (fall risk, behaviors, etc.): elopement risk, low suicide risk   Aggression Tool Color: green   Isolation/Type: n/a   Tests/Procedures for NEXT shift:   Consults? (Pending/following, signed-off?) psych & mental health professional following, eletrophysiology signed off. CM/SW following.  Where is patient from? (Home, TCU, etc.): group home   Other Important info for NEXT shift: high elopement risk based on hx. Pt talks about twin brother committing crime on his behalf. Psych provider has filled for civil commitment. Licensed professional clinical counselor (LPAC) following. CIWA score daily.   Anticipated DC date & active delays: TBD  _____________________________________________________________________________  SUMMARY NOTE:   Orientation/Cognitive: disoriented to situation, calm & pleasant   Observation Goals (Met/ Not Met): inpt   Mobility Level/Assist Equipment: ind   Antibiotics & Plan (IV/po, length of tx left): n/a  Pain Management: denies pain   Complete Pain Reassessment: Yes Due next: next shift   Tele/VS/O2: VSS on RA, Tele: SR w/ occasional PVC  ABNL Lab/BG: see labs   Diet: regular, good appetite   Bowel/Bladder: continent of B/B, up to the bathroom   Skin Concerns: scattered bruisings   Drains/Devices: IV SL   Patient Stated Goal for Today: TBD

## 2025-04-22 NOTE — TELEPHONE ENCOUNTER
S:  [facility]  [phone] ,  Alka Acevedo  Psych Consult provider  calling at 10:43 AM about 75 year old/male presenting with Psychosis     B: Pt arrived via EMS. Presenting problem, stressors: Pt on Medical needing IPMH for Psychosis.  On Medical as were monitoring     Pt affect in ED: Calm and Disorganized  Pt Dx: Schizophrenia  Previous IPMH hx? Yes:   Pt denies SI   Hx of suicide attempt? No  Pt denies SIB  Pt denies HI   Pt endorses auditory hallucinations .   Pt RARS Score: 3    Hx of aggression/violence, sexual offenses, legal concerns, Epic care plan? describe: None  Current concerns for aggression this visit? No  Does pt have a history of Civil Commitment?  Recently petitioned for commitment  -  Awaiting county   Is Pt their own guardian? Yes    Pt is prescribed medication. Is patient medication compliant? No    Pt endorses OP services: Psychiatrist and ACT Team  CD concerns: None  Acute or chronic medical concerns: None  Does Pt present with specific needs, assistive devices, or exclusionary criteria? None      Pt is ambulatory  Pt is not able to perform ADLs independently      A: Pt to be reviewed for IPMH admission. Pt is on a 72HH, initiated 4/20/25 , but also commitment papers filed.  Preferred placement: Statewide    COVID Symptoms: No  If yes, COVID test required   Utox: Negative   CMP: WNL  CBC: WNL  HCG: N/A    R: Patient cleared and ready for behavioral bed placement: Yes  Pt placed on IPMH worklist? Yes    Does Patient need a Transfer Center request created? Yes, writer completed Transfer Center request at: 11:)2am    Intake called REBECA Fu for Medical clearance Note and COVID test @ 11:02am    Intake called [facility] to review for IP .    Chu called back @ 12:07PM and all ICU Beds now full.  Declined for regular IPMH Bed.       Addition bed search initiated @ 12:10PM:      PPS Called  Chu Hall to review @ 10:55am.     Chu caLLED INTAKE BACK @ 12:09 AND  Declined and needs an MHICU bed, and none available.       Bed search continued statewide:     [facility] is posting 0 beds. [phone]    is posting 0 beds. Negative covid required.  [facility] is posting 0 beds. Neg covid. No high school/[place]-psych. [phone] 8:20AM PER PASTOR, AT [facility]. CB AFTER 10:00AM.  [facility] is posting 0 beds. [phone]  [facility] is posting 0 bed. [phone]    [facility] is posting 6 beds. (Ages 18-35) Negative covid. [phone] 8:20AM PER [place], REVIEWING FOR ALL THE YA BEDS THEY HAVE.  [facility] is posting 0 beds.    [facility] is posting 0 beds [phone]     [place]:  [facility] is posting 3 beds. LOW acuity ONLY. Mixed aged unit 12+. Negative covid- [phone]  [facility] has 0 beds posted. No aggression. Negative Covid. Low acuity.  [facility] is posting 0 beds. Low acuity only. Neg covid.  [phone] 8:22AM PER [place] [facility],   [facility] is posting 2 beds. Low acuity. No current aggression.  Pt not appropriate for [facility] is posting 0 beds. Negative covid. [phone].    [facility] ([place]) is posting 1 beds available. Negative covid.  [phone].  8:22AM PER TAMMY, only Mood Disorder bed availability.  Pt does not have dx of mood do.   [facility] is posting 0 beds. Low acuity. 72 HH hold preferred. Negative covid required. [phone]. 8:25AM PER KEYONNA, AT [facility].  [facility] is posting 1 beds. Low acuity only. Neg covid.  [phone]. 8:22AM PER TAMMY, ONLY LOW ACUITY PTS.   Pt has agitation and not appropriate for [facility] [place] is posting 6 beds.  Negative covid required.   Vol only, No history of aggression, violence, or assault. No sexual offenders. No 72  holds. [phone]  Pt is not voluntary      [place]  [facility] is posting 0 beds. Negative covid required.  (Must have the cognitive ability to do programming. No aggressive or violent behavior or recent HX in the last 2 yrs. MH must be primary.) Always low acuity. [phone]    [facility] has 0 beds posted. Negative covid required.  Low acuity only. Violence and aggression capped.  [phone]    [facility] posting 10 beds Negative covid required.  [phone].availability.  - Pt was reviewed by Chu and declined as needing a Higher acuity (ICU) bed.

## 2025-04-22 NOTE — CONSULTS
IP MH Referral Acuity Rating Score (RARS)    LMHP complete at referral to IP MH, with DEC; and, daily while awaiting IP MH placement. Call score to PPS.  CRITERIA SCORING   New 72 HH and Involuntary for IP MH (not adolescent) 3/3   Boarding over 24 hours 1/1   Vulnerable adult at least 55+ with multiple co morbidities; or, Patient age 11 or under 1/1   Suicide ideation without relief of precipitating factors 1/1   Current plan for suicide 0/1   Current plan for homicide 0/1   Imminent risk or actual attempt to seriously harm another without relief of factors precipitating the attempt 0/1   Severe dysfunction in daily living (ex: complete neglect for self care, extreme disruption in vegetative function, extreme deterioration in social interactions) 1/1   Recent (last 2 weeks) or current physical aggression in the ED 0/1   Restraints or seclusion episode in ED 0/1   Verbal aggression, agitation, yelling, etc., while in the ED 0/1   Active psychosis with psychomotor agitation or catatonia 1/1   Need for constant or near constant redirection (from leaving, from others, etc).  0/1   Intrusive or disruptive behaviors 0/1   TOTAL 8

## 2025-04-22 NOTE — PLAN OF CARE
Goal Outcome Evaluation:  PRIMARY Concern: Psychosis. Pt was found at a gas station after being missing from his group home for 4 days.   SAFETY RISK Concerns (fall risk, behaviors, etc.): On a 72-hour hold   Aggression Tool Color: Green  Isolation/Type: n/a  Tests/Procedures for NEXT shift: n/a  Consults? (Pending/following, signed-off?) Psych following, CC following  Where is patient from? (Home, TCU, etc.): Group Home  Other Important info for NEXT shift: 72 hour hold- Elopement risk  1:1 sitter  Anticipated DC date & active delays: TBD    SUMMARY NOTE:    Orientation/Cognitive: Alert- confused- not willing to answer orientation questions- No noticeable behavior/comments made this shift  Observation Goals (Met/ Not Met): Inpatient status   Mobility Level/Assist Equipment: Ind in room  Antibiotics & Plan (IV/po, length of tx left): n/a  Pain Management: Denies  Tele/VS/O2: VSS on RA. Tele: SB w/ BBB  ABNL Lab/BG: n/a  Diet: Regular  Bowel/Bladder: Cont. B&B  Skin Concerns: WNL  Drains/Devices: PIV SL  Patient Stated Goal for Today: n/a

## 2025-04-22 NOTE — PROGRESS NOTES
Writer checked on pt. Pt was alert and awake, sitting on his bed, looking at the menu. Stable. Bedside attendant came to the room.

## 2025-04-22 NOTE — CONSULTS
"[facility] Psychiatric Consult Progress Note  Reason for consult follow-up    Interval History:   Pt seen, chart reviewed, case discussed with nursing staff and treating clinicians.   Patient seen today.  He reports he is feeling better he still has paucity of thought.  Patient has delusions of reference..  He continues to refuse use to take his monthly injection Invega-saying that the nurse has taken his singing voice.  On direct questioning patient denies any auditory visual hallucinations.  Patient denies any suicide ideation plan or intent.  Patient reports he is sleeping okay appetite is okay.  When asked about his energy and motivation patient reports \"I do not know\".  Patient reports that he is not listening to the voice coming from the TV.  He does have delusions of reference still.       Review of systems:   10 point Review of Systems completed by Dr. Rdz, and is  is negative other than noted in the HPI     Medications:     Current Facility-Administered Medications   Medication Dose Route Frequency Provider Last Rate Last Admin    benztropine (COGENTIN) tablet 1 mg  1 mg Oral BID Rogelio Chang MD   1 mg at 04/22/25 0819    folic acid (FOLVITE) tablet 1 mg  1 mg Oral Daily Refugio Erickson MD   1 mg at 04/22/25 0819    multivitamin w/minerals (THERA-VIT-M) tablet 1 tablet  1 tablet Oral Daily Refugio Erickson MD   1 tablet at 04/22/25 0819    paliperidone ER (INVEGA) 24 hr tablet 6 mg  6 mg Oral Daily Kristan Rdz MD   6 mg at 04/22/25 0819    thiamine (B-1) tablet 100 mg  100 mg Oral Daily Refugio Erickson MD   100 mg at 04/22/25 0819     Current Facility-Administered Medications   Medication Dose Route Frequency Provider Last Rate Last Admin    acetaminophen (TYLENOL) tablet 650 mg  650 mg Oral Q4H PRN Refugio Erickson MD        Or    acetaminophen (TYLENOL) Suppository 650 mg  650 mg Rectal Q4H PRN Refugio Erickson MD        carboxymethylcellulose PF (REFRESH " PLUS) 0.5 % ophthalmic solution 1 drop  1 drop Both Eyes BID PRN Chi Neal MD   1 drop at 04/21/25 1934    cloNIDine (CATAPRES) tablet 0.1 mg  0.1 mg Oral Q8H PRN Refugio Erickson MD        diazepam (VALIUM) tablet 10 mg  10 mg Oral Q30 Min PRN Refugio Erickson MD        Or    diazepam (VALIUM) injection 5-10 mg  5-10 mg Intravenous Q30 Min PRN Refugio Erickson MD        flumazenil (ROMAZICON) injection 0.2 mg  0.2 mg Intravenous q1 min prn Refugio Erickson MD        OLANZapine zydis (zyPREXA) ODT tab 5-10 mg  5-10 mg Oral Q6H PRN Refugio Erickson MD        Or    haloperidol lactate (HALDOL) injection 2.5-5 mg  2.5-5 mg Intravenous Q6H PRN Refugio Erickson MD        hydrALAZINE (APRESOLINE) tablet 10 mg  10 mg Oral Q4H PRN Refugio Erickson MD        Or    hydrALAZINE (APRESOLINE) injection 10 mg  10 mg Intravenous Q4H PRN Refugio Erickson MD        hydrALAZINE (APRESOLINE) injection 10 mg  10 mg Intravenous Q4H PRN Refugio Erickson MD        melatonin tablet 5 mg  5 mg Oral QPM PRN Refugio Erickson MD        nitroGLYcerin (NITROSTAT) sublingual tablet 0.4 mg  0.4 mg Sublingual Q5 Min PRN Refugio Erickson MD        ondansetron (ZOFRAN ODT) ODT tab 4 mg  4 mg Oral Q6H PRN Refugio Erickson MD        Or    ondansetron (ZOFRAN) injection 4 mg  4 mg Intravenous Q6H PRN Refugio Erickson MD        prochlorperazine (COMPAZINE) injection 5 mg  5 mg Intravenous Q6H PRRefugio Lombardo MD        Or    prochlorperazine (COMPAZINE) tablet 5 mg  5 mg Oral Q6H PRN Refugio Erickson MD        senna-docusate (SENOKOT-S/PERICOLACE) 8.6-50 MG per tablet 1 tablet  1 tablet Oral BID PRN Refugio Erickson MD        Or    senna-docusate (SENOKOT-S/PERICOLACE) 8.6-50 MG per tablet 2 tablet  2 tablet Oral BID PRN Refugio Erickson MD           Mental Status Examination:     Appearance:  awake, alert and adequately groomed  Eye Contact:  good  Speech:  clear, coherent  Language:Normal  Psychomotor  Behavior: td+  Mood:  better  Affect:  appropriate and in normal range and mood congruent  Thought Process:  paucity of thought, bradyprhenia  Thought Content:  no evidence of suicidal ideation or homicidal ideation, no auditory hallucinations present, and no visual hallucinations present  delusional  Oriented to:  time, person, and place  Attention Span and Concentration:  limited  Recent and Remote Memory:  less than adequate  Fund of Knowledge: less than adequate  Muscle Strength and Tone: normal  Gait and Station: Normal  Insight:  limited  Judgment:  limited        Labs/Vitals:     Recent Results (from the past 24 hours)   EKG 12-lead, tracing only    Collection Time: 04/21/25  9:06 AM   Result Value Ref Range    Systolic Blood Pressure  mmHg    Diastolic Blood Pressure  mmHg    Ventricular Rate 72 BPM    Atrial Rate 72 BPM    VT Interval 180 ms    QRS Duration 152 ms     ms    QTc 459 ms    P Axis 47 degrees    R AXIS -73 degrees    T Axis 49 degrees    Interpretation ECG       Sinus rhythm with Premature atrial complexes in a pattern of bigeminy  Right bundle branch block  Left anterior fascicular block  ** Bifascicular block **  Abnormal ECG  When compared with ECG of 20-Apr-2025 21:37,  Sinus rhythm has replaced Atrial fibrillation  Confirmed by MD REMY, ARI ([id number]) on 4/21/2025 11:07:20 AM     UA Macroscopic with reflex to Microscopic and Culture    Collection Time: 04/21/25  9:57 AM    Specimen: Urine, Clean Catch   Result Value Ref Range    Color Urine Light Yellow Colorless, Straw, Light Yellow, Yellow    Appearance Urine Clear Clear    Glucose Urine Negative Negative mg/dL    Bilirubin Urine Negative Negative    Ketones Urine Negative Negative mg/dL    Specific Gravity Urine 1.009 1.003 - 1.035    Blood Urine Negative Negative    pH Urine 6.5 5.0 - 7.0    Protein Albumin Urine Negative Negative mg/dL    Urobilinogen Urine Normal Normal mg/dL    Nitrite Urine Negative Negative    Leukocyte  Esterase Urine Negative Negative   Urine Drug Screen Panel    Collection Time: 04/21/25  9:57 AM   Result Value Ref Range    Amphetamines Urine Screen Negative Screen Negative    Barbituates Urine Screen Negative Screen Negative    Benzodiazepine Urine Screen Negative Screen Negative    Cannabinoids Urine Screen Negative Screen Negative    Cocaine Urine Screen Negative Screen Negative    Fentanyl Qual Urine Screen Negative Screen Negative    Opiates Urine Screen Negative Screen Negative    PCP Urine Screen Negative Screen Negative   Comprehensive metabolic panel    Collection Time: 04/21/25 11:34 AM   Result Value Ref Range    Sodium 136 135 - 145 mmol/L    Potassium 4.4 3.4 - 5.3 mmol/L    Carbon Dioxide (CO2) 30 (H) 22 - 29 mmol/L    Anion Gap 6 (L) 7 - 15 mmol/L    Urea Nitrogen 11.6 8.0 - 23.0 mg/dL    Creatinine 0.79 0.67 - 1.17 mg/dL    GFR Estimate >90 >60 mL/min/1.73m2    Calcium 8.7 (L) 8.8 - 10.4 mg/dL    Chloride 100 98 - 107 mmol/L    Glucose 102 (H) 70 - 99 mg/dL    Alkaline Phosphatase 76 40 - 150 U/L    AST 18 0 - 45 U/L    ALT 12 0 - 70 U/L    Protein Total 5.9 (L) 6.4 - 8.3 g/dL    Albumin 4.0 3.5 - 5.2 g/dL    Bilirubin Total 0.6 <=1.2 mg/dL   CBC with platelets    Collection Time: 04/21/25 11:34 AM   Result Value Ref Range    WBC Count 5.4 4.0 - 11.0 10e3/uL    RBC Count 4.46 4.40 - 5.90 10e6/uL    Hemoglobin 14.0 13.3 - 17.7 g/dL    Hematocrit 41.1 40.0 - 53.0 %    MCV 92 78 - 100 fL    MCH 31.4 26.5 - 33.0 pg    MCHC 34.1 31.5 - 36.5 g/dL    RDW 11.9 10.0 - 15.0 %    Platelet Count 170 150 - 450 10e3/uL   Blood gas venous    Collection Time: 04/21/25 11:34 AM   Result Value Ref Range    pH Venous 7.42 7.32 - 7.43    pCO2 Venous 48 40 - 50 mm Hg    pO2 Venous 43 25 - 47 mm Hg    Bicarbonate Venous 31 (H) 21 - 28 mmol/L    Base Excess/Deficit Venous 5.5 (H) -3.0 - 3.0 mmol/L    FIO2 0     Oxyhemoglobin Venous 80 (H) 70 - 75 %    O2 Sat, Venous 81.8 (H) 70.0 - 75.0 %   Echocardiogram Complete     "Collection Time: 04/21/25 12:33 PM   Result Value Ref Range    LVEF  55-60%      B/P: 100/61, T: 97.4, P: 88, R: 18  Most Recent 3 CBC's:  Recent Labs   Lab Test 04/21/25 1134 04/20/25 1938 02/22/24 2122   WBC 5.4 6.4 5.8   HGB 14.0 14.0 13.7   MCV 92 92 92    183 211      Most Recent 3 BMP's:  Recent Labs   Lab Test 04/21/25 1134 04/20/25 1938 02/22/24 2122    131* 138   POTASSIUM 4.4 3.5 4.1   CHLORIDE 100 93* 102   CO2 30* 29 30*   BUN 11.6 12.4 14.6   CR 0.79 0.80 0.86   ANIONGAP 6* 9 6*   ZAHRA 8.7* 8.6* 9.0   * 103* 95     Most Recent 2 LFT's:  Recent Labs   Lab Test 04/21/25 1134 04/20/25 2130   AST 18 23   ALT 12 11   ALKPHOS 76 88   BILITOTAL 0.6 0.7     Most Recent INR's and Anticoagulation Dosing History:  Anticoagulation Dose History           No data to display              Most Recent 3 Troponin's:No lab results found.  Most Recent Cholesterol Panel:  Recent Labs   Lab Test 04/19/19  0642   CHOL 148   LDL 89   HDL 32*   TRIG 136     Most Recent 6 Bacteria Isolates From Any Culture (See EPIC Reports for Culture Details):No lab results found.  Most Recent TSH, T4 and A1c Labs:  Recent Labs   Lab Test 04/20/25 2130   TSH 4.37*   T4 1.40       Impression:    Patient has schizophrenia, patient had eloped from his group home he stopped taking his medications..  He is having delusions of persecutions delusions of reference.  He is delusional.  He believes that his twin is committing crimes and the FBI is looking he makes bizarre statements.\"  Nurse has taken my singing voice so I could not  sing.  Patient is now on a 72-hour hold     .      DIagnoses:   1Schizophrenia        Plan:   1. Please continue one-to-one sitter   2. Invega Sustenna 234 injection once-patient declined this again   Will continue  Invega 6 mg p.o. daily  3.  Continue the same plan to admit patient to inpatient psychiatry  .It is the recommendation of this clinician that pt admit to [facility] for safety and " stabilization. Pt displays the following risk factors that support IP admission: Psychiatrically decompensated with delusions and disorganized thinking pt is unable to engage in safety planning to mitigate risk level . Lower levels of care are not sufficient. Due to this IP is the least restrictive option of care for pt. Pt should remain in IP until deemed safe to return to the community and engage in OP MH supports.  Patient is on a 72-hour hold.   filed for civil commitment.  4.  Patient is now medically stable called intake to update them.      Attestation:  Patient has been seen and evaluated by Kristan torres MD

## 2025-04-22 NOTE — TELEPHONE ENCOUNTER
R: [place]  Access Inpatient Bed Call Log 4/22/2025 4:30pm  Intake has called facilities that have not updated the bed status within the last 12 hours.          Pt is on medical unit and is on a 72HH.  Statewide only.     (Adults);        METRO:                [facility] is posting 0 beds.            [facility] is posting 2 beds. [phone] 8:19AM CB AFTER 9:00AM.  [facility] is posting 0 beds. Negative covid required.  [facility] is posting 0 beds. Neg covid. No high school/[place]-psych. [phone] 8:20AM PER PASTOR, AT [facility]. CB AFTER 10:00AM.  [facility] is posting 0 beds. [phone]  [facility] is posting 0 bed. [phone]    [facility] is posting 6 beds. (Ages 18-35) Negative covid. [phone] 8:20AM PER [place], REVIEWING FOR ALL THE YA BEDS THEY HAVE.  [facility] is posting 0 beds.    [facility] is posting 0 beds [phone]     STATEWIDE:  [facility] is posting 3 beds. LOW acuity ONLY. Mixed unit 12+. Negative covid- [phone]  [facility] has 0 beds posted. No aggression. Negative Covid. Low acuity.  [facility] ([place]) is posting 0 beds. Low acuity only. Neg covid.  [phone] 8:22AM PER [facility] ON DIVERSION, [facility] HAS A FEW BEDS AVAILABLE AND 1 POTENTIAL IN [place] FOR MOOD D/O (VOLUNTARY ONLY).  [facility] is posting 3 beds. Low acuity. No current aggression.  [phone]  [facility] is posting 0 beds. Negative covid. [phone].    [facility] ([place]) is posting 3 beds available. Negative covid.  [phone].  8:22AM PER [facility] ON DIVERSION, [facility] HAS A FEW BEDS AVAILABLE AND 1 POTENTIAL IN [place] FOR MOOD D/O (VOLUNTARY ONLY). Mood disorder beds only.   [facility] is posting 0 beds. Low acuity. 72 HH hold preferred. Negative covid required. [phone]. 8:25AM PER [place], AT [facility].  [facility] ([place]) is posting 4 beds. Low acuity  only. Neg covid.  [phone]. 8:22AM PER [facility] ON DIVERSION, [facility] HAS A FEW BEDS AVAILABLE AND 1 POTENTIAL IN [place] FOR MOOD D/O (VOLUNTARY ONLY).  [facility] [place] is posting 6 beds.  Negative covid required.   Vol only, No history of aggression, violence, or assault. No sexual offenders. No 72 HH holds. [phone]       [facility] is posting 2 beds. Negative covid required.  (Must have the cognitive ability to do programming. No aggressive or violent behavior or recent HX in the last 2 yrs. MH must be primary.) Always low acuity. [phone]    [facility] has 0 beds posted. Negative covid required.  Low acuity only. Violence and aggression capped.  [phone]  [facility] is posting 1 bed. Low acuity, Negative covid required. [phone] 8:25AM PER KIRILL BELTRAN OF D/C'S.  [facility] posting 10 beds Negative covid required.  [phone].  [facility] [place] is posting 5 beds. Negative covid. LOW acuity. (No lines, drains, or tubes, oxygen, CPAP, IV, etc.) Must Have a Ride Home. [phone] 8:27AM 5 OPEN BEDS AVAILABLE.  [facility] is posting 6 beds. Negative covid. (No. lines, drains, or tubes, oxygen, CPAP, IV, etc.) [phone]      Pt remains on the work list pending appropriate bed availability.

## 2025-04-22 NOTE — PROGRESS NOTES
[id number]    PRIMARY Concern: Psychosis  SAFETY RISK Concerns (fall risk, behaviors, etc.):  72 Hour Hold      Aggression Tool Color: Green  Isolation/Type: n/a  Tests/Procedures for NEXT shift: n/a  Consults? (Pending/following, signed-off?) Psych following, CC follwoing  Where is patient from? (Home, TCU, etc.): Group Home  Other Important info for NEXT shift: 72 hour hold- Elopement risk  1:1 sitter  Anticipated DC date & active delays: TBD  _____________________________________________________________________________  SUMMARY NOTE:   Orientation/Cognitive: Alert- confused- not willing to answer orientation questions- No noticeable behavior/comments made this shift  Observation Goals (Met/ Not Met): Inpatient  Mobility Level/Assist Equipment: Ind in room  Antibiotics & Plan (IV/po, length of tx left): n/a  Pain Management: Denies  Complete Pain Reassessment: Y/N N Due next: Next Shift  Tele/VS/O2: VSS on RA Tele: SB w/ BBB  ABNL Lab/BG: n/a  Diet: Reg  Bowel/Bladder: Cont. B&B  Skin Concerns: WNL  Drains/Devices: IV SL  Patient Stated Goal for Today: n/a

## 2025-04-22 NOTE — PROGRESS NOTES
[facility], [place]  Hospitalist / LAKISHA Progress Note  Date Admitted: 4/20/2025  7:08 PM  Date of Service: 04/22/2025              Assessment and Plan:                                         This is a 75 year old, male, w a PMH of schizophrenia, group home resident, substance use disorder, alcohol abuse, anxiety, depression, HFpEF, who was admitted on 4/20/2025, after being found 4 days after eloping from group home, med noncompliance, with apparent psychosis, vague chest complaints.    Principal Problem:    Acute psychosis (H)  Active Problems:    Schizoaffective disorder, bipolar type (H)    Schizophrenia (H)    Schizoaffective disorder (H)    Atrial flutter, unspecified type (H)    Aortic root dilatation      PLAN  Acute decompensated schizophrenia  Recurrent elopement, history of same from medical psychiatric facilities  Borderline personality bipolar, anxiety/MDD  Shaking-akathisia versus tardive dyskinesia  History of substance use disorder, alcohol abuse  Eloped from group home 4/16, found to gas station 4/20 endorsing he is a fugitive and try and turn himself in.  Noted to have lip smacking resting tremor right upper greater than right lower extremity. reports he is worried people going to kill him in the hospital.  Declined further history with nocturnist.  Psych pending Also C/o vague CP sx. JOSR low.  Negative lactate.  LFTs low.  TSH sl abnormal.  VBG slightly abnormal.  QTc .  Seen by psychiatry.  Clearly delusional consistent with decompensated schizophrenia per their interview and collateral.  On hold.  Recommends resuming Invega Depo but the patient is declining.  Thus will need to civil commitment and transfer to inpatient psych unit.    4/22-  Still with hallucinations.  Declining IM medications, excepting pills.  Calm.  No PRNs.  One-to-one at bedside.,  As yesterday is cleared from hospitalist perspective.  Awaiting inpatient psychiatry transfer.  -Admitted for observation  "status, send for [facility] review.-Agrees with inpatient  - On a 72-hour hold  - Psychiatry consulted, see 4/21 note - Confirms the patient is decompensated now  --Psychiatrist recommended resume monthly Depo PTA Invega but patient is declining   --Commitment process started 4/21 for meds/Invega  - PTA psych meds resumed as per psychiatry  - Antipsychotics as needed-psychiatry managing.  - LFTs low, unclear if recurrent drinking  - TSH slightly depressed 4.37 but free T4 is normal 1.40  -Cleared from a hospitalist perspective to transfer anytime up at his rate of    Chronic intermittent chest pain,  Current NSR-suspected pseudo A-fib.  Possible history A-fib  Chronic bifascicular block, L anterior fascicular block, R bundle branch block,   History of chronic HFpEF, grade 1 diastolic dysfunction  Complaining of intermittent chest pain sx> 1yr prior.  States they are regular.  Does not have any as needed nitro per chart review.- ECG reviewed, NSR on arrival (with artifact) bifascicular block LVH, LAD.  However chart review indicates history of A-fib, no PTA DOAC.  History HF-EF normal now.  Telemetry ordered on admit, patient declined \"wants a break\" Cardiology notes reviewed-they think this is a pseudo A-fib secondary to artifact.  No further workup needed or telemetry.4/21-with no current CP symptoms, very vague history, no A-fib found see below, reassuring overall cardiology workup below, no clear demonstrated A-fib or a flutter.  No further inpatient workup needed, stable medically and can transfer to inpatient psych at any time, outpatient PCP could consider stress test.    - No current chest pain symptoms here or recently.  - Cardiology consulted, doubts prior A-fib.  See note.  - ECG-NSR on admit.  With atrial ectopics, not A-fib.  QTc .  Block present prior.  - Echocardiogram-WNL, EF 55-60%, no WMA,  - Good BP monitoring below  - Patient declining telemetry, cardiology not needing.  - Outpatient PCP follow-up, " if recurrent CP, consider exercise stress test.    Elevated BP without a history of hypertension, resolved  Admit BP notably elevated with systolics running 177-160.  Likely agitated. Last 99/46  4/22 BP well-controlled , 100/61 without meds.  -PCP follow-up outpatient if elevated    Incidental aortic root/aortic dilation  - Aortic root 4.3 cm and ascending aorta dilation 4 cm.  No previous  - Outpatient follow-up TTE yearly monitoring, good BP control    Hyponatremia, resolved  Baseline 135s normal.  On arrival 131.  Given he was eloped 4 days query low intake/prerenal?  Creatinine on admission is WNL at 0.80.  Last recheck 136.  Creatinine 0.79    Mild hypocalcemia, resolved  -- Minimal hypocalcemia at 8.6, albumin is 3.94 oh.  Not clinically relevant/causing above symptoms.  Increased p.o. intake.    Mildly elevated TSH,  - Admit TSH 4.37 although free T4 is 1.40.  No PTA Synthroid/meds.  No convincing symptoms of hyperthyroidism, despite psych decompensation  - PCP recheck thyroid function in 4 to 6 weeks.    Mild hypercarbia/abnormal VBG  - Admit pCO2 51, recheck 48.  Unclear if untreated DORA history?  Asymptomatic.  - FYI to PCP    History of nicotine use   -patient reports he uses Ecig sometimes, vague about when.  Cessation encouraged.    History of alcohol abuse  - Patient declines any recent use.  States he will drink when he gets money but cannot afford it.  LFTs are WNL.  JOSR negative    History of drug use  - Patient declines recent use, admit UDS is negative    Code Status:Full Code  NUTRITION Regular Diet Adult     DVT Prophylaxis: Low Risk/Ambulatory with no VTE prophylaxis indicated   Henao Catheter: Not present  Lines: None     Cardiac Monitoring: ACTIVE order. Indication: Tachyarrhythmias, acute (48 hours)      NURSING COMMUNICATION:  Bedside rounding completed with nurse.        Disposition Plan       Expected Discharge Date: 04/23/2025      Destination: other (comment)  Discharge Comments: needs  "a safe dispo plan,  on 72 hr hold,  labs      Entered: Temo Fu PA-C 04/22/2025, 9:18 AM      DISCHARGE PLANNING:   Criteria for discharge from the hospitalist perspective is further psychiatry workup, commitment, able to receive antipsychotics.  As of 4/21, medically stable/cleared to transfer to inpatient psychiatry when a bed is available     Medically Ready for Discharge: Anticipated Today      Additional comments:   Discussed the patient's care and collaborative tx plan in detail w Dr PHYLLIS Nicole cosigning Hospitalist Provider. The above assessment and plan is the product of our joint decision making.       Time - I spent 45 minutes w greater than 50% spent in face to face counseling and education re dx, rx, plan and also including coordination of care, time documenting, and family updates if applicable.    Temo Fu PA-C  Hospitalist LAKISHA  [facility]   Securely message with the Vocera Web Console (learn more here)  Text page via [employer] Paging/Directory      Please note this documentation was partially completed using Dragon medical dictation transcription software. It was briefly proofread, but will likely have unrecognized and unintended incorrect words, numbers/values or phrases in transcription Also this is primarily intended as a direct peer to peer communication with medical abbreviations and verbiage that may appear to be 'direct' as to succinctly relay medical info and opinions to care teams. If there are any questions on content/transcription, or diagnosis and plan, please contact me immediately for clarification.         SUBJECTIVE:   INTERVAL EVENTS Reviewed-      Today's chief complaint-denies new symptoms.      -Chronic auditory hallucinations patient calm this morning.  States that he has chronic auditory hallucinations.  They tell him that he is not \"enough\".  At times they tell him to harm himself but not recently.  States he has not anything to harm " himself.States he is fine taking pills but does not want an injection  - Regarding orientation-states he is in [place] and at a  hospital.  But he knows he is in a group home and he eloped because he did not want his injection  -   - ROS was otherwise negative-regarding chest pain again does not really endorse anything recently.  States it is an intermittent mild central chest pain symptoms lasting a few minutes, he states it has been well over a year, no clear trigger, and can be at rest or walking.  He cannot tell me when the last time it happened.  It is not happened here.  Has no shortness of breath.  No other medical complaints whatsoever.    Social  - In a group home  - Tobacco-states he uses e-cigarettes sometime but cannot remember when  - Alcohol states he will drink when he can afford it but not lately  - Drug use denies any recently    -Discussed with , again he remains completely stable, and has been cleared from a medicine standpoint to transfer to an inpatient psychiatry unit when available.         Physical Exam:     Vitals:    25 2203 25 0017 25 0500 25 0722   BP: 100/45   100/61   BP Location: Left arm   Left arm   Pulse: 68   88   Resp: 18 18 18 18   Temp: 98.5  F (36.9  C)   97.4  F (36.3  C)   TempSrc: Oral   Oral   SpO2: 94%   92%     /61 (BP Location: Left arm)   Pulse 88   Temp 97.4  F (36.3  C) (Oral)   Resp 18   SpO2 92%    Temp (24hrs), Av  F (36.1  C), Min:97  F (36.1  C), Max:97  F (36.1  C)    No intake or output data in the 24 hours ending 25 0834  No data found.    LINES: PIV,    GENERAL APPEARANCE: Alert, supine in bed, in no acute distress.  HEENT: normocephalic, normal external exam.  NECK:  No JVD  CARDIOVASCULAR: Regular rate and rhythm, s1, s2  no murmur appreciated..  Nontender gentle swallow.  RESPIRATORY: No o2, Sp02 , Normal work of breathing. Clear bilaterally without wheezes, fine rales or coarse  rhonchi/crackles.   GASTROINTESTINAL nontender over epigastrium, elsewhere no masses/organomegaly  NEUROLOGIC: Has a resting tremor, intermittent both upper and lower extremities to varying degree.  At times lip smacking occasionally.  Nonfocal, UE movements appear grossly intact.  EXTREMITIES: not walked, MAEW, No peripheral edema noted.  SKIN: No rashes or lesions.  PSYCHIATRIC: One-to-one at bedside.  No issues reported today.  Alert and reports he is aware at the hospital.Endorses chronic auditory hallucinations at times telling him he is not worth it..  See psychiatry notes regarding delusions and affect.      Meds- reviewed         Data:     I reviewed most recent (and historical) labs in past 24hrs, and personally reviewed any new imaging results    Recent Labs   Lab Test 04/21/25 1134 04/20/25 2130 04/20/25 1938   WBC 5.4  --  6.4   HGB 14.0  --  14.0   MCV 92  --  92     --  183     --  131*   POTASSIUM 4.4  --  3.5   CHLORIDE 100  --  93*   CO2 30*  --  29   ANIONGAP 6*  --  9   *  --  103*   BUN 11.6  --  12.4   CR 0.79  --  0.80   GFRESTIMATED >90  --  >90   ZAHRA 8.7*  --  8.6*   MAG  --  2.0  --         CBC with Diff:  Recent Labs   Lab Test 04/21/25 1134 04/20/25 1938 02/22/24 2122   WBC 5.4 6.4 5.8   HGB 14.0 14.0 13.7   MCV 92 92 92    183 211        BMP:  Recent Labs   Lab Test 04/21/25 1134 04/20/25 2130 04/20/25 1938 02/22/24 2122     --  131* 138   POTASSIUM 4.4  --  3.5 4.1   CHLORIDE 100  --  93* 102   CO2 30*  --  29 30*   ANIONGAP 6*  --  9 6*   *  --  103* 95   BUN 11.6  --  12.4 14.6   CR 0.79  --  0.80 0.86   GFRESTIMATED >90  --  >90 >90   ZAHRA 8.7*  --  8.6* 9.0   MAG  --  2.0  --   --         VBG/Venous Blood Gas  Recent Labs   Lab 04/21/25  1134 04/20/25  1938   PHV 7.42 7.39   PCO2V 48 51*   PO2V 43 24*   HCO3V 31* 31*   JONAS 5.5* 5.0*   O2PER 0  --      ABG:  -  Recent Labs   Lab 04/21/25  1134   O2PER 0       Lactic Acid:    Lab Results    Component Value Date    LACT 0.9 04/20/2025         Diabetes:  Recent Labs   Lab 04/21/25  1134 04/20/25  1938   * 103*     TSH   Date Value Ref Range Status   04/20/2025 4.37 (H) 0.30 - 4.20 uIU/mL Final   08/14/2023 3.61 0.30 - 4.20 uIU/mL Final   04/05/2020 3.12 0.40 - 4.00 mU/L Final   10/14/2019 1.97 0.40 - 4.00 mU/L Final   09/12/2019 1.41 0.40 - 4.00 mU/L Final   04/19/2019 1.92 0.40 - 4.00 mU/L Final   03/31/2012 4.22 0.4 - 5.0 mU/L Final     Troponin:    6-8      Micro results:   30-Day Micro Results       No results found for the last 720 hours.          UDS neg    US benign         IMAGING:   IMAGING, During Current Admission:  Echocardiogram Complete 4/21/2025  Interpretation Summary  1. Normal left ventricular size and function. Left ventricular ejection fraction of 55-60%. 2. No segmental wall motion abnormalities noted. 3. No hemodynamically significant valvular disease. 4. Aortic root dilatation is present, 4.3 cm. Ascending aorta dilatation is present, 4.0 cm. No prior study. _______________________________________________________________________ Left Ventricle The left ventricle is normal in size. There is normal left ventricular wall thickness. Left ventricular systolic function is normal. The visual ejection fraction is 55-60%. Grade I or early diastolic dysfunction. No regional wall motion abnormalities noted.          Procedures:   none         Allergy:     Allergies   Allergen Reactions    Peas GI Disturbance     Black eyed peas - vomiting       Medical Decision Making       Please see A&P for additional details of medical decision making.  MANAGEMENT DISCUSSED with the following over the past 24 hours:     NOTE(S)/MEDICAL RECORDS REVIEWED over the past 24 hours:            Please note this documentation was partially completed using Dragon medical dictation software. It was briefly proofread, but may still have unintended incorrect words or phrases transcribed. Also this is primarily  intended as a direct peer to peer communication with medical abbreviations and verbiage that may appear to be 'direct' as to succinctly relay medical info and opinions. If there are any questions on content or diagnosis, please contact me for clarification.

## 2025-04-22 NOTE — TELEPHONE ENCOUNTER
10:24 AM Behavioral Intake received call from Dr. Rdz advising patient is medically stable. Pt had left  and was found wandering. Pt eloped from  4 times believing the TV and radio are telling him to do so. Pt is non med compliant and delusional. Also,  has filed petition for commitment.  Coordinator who will be following notified.

## 2025-04-23 ENCOUNTER — TELEPHONE (OUTPATIENT)
Dept: BEHAVIORAL HEALTH | Facility: CLINIC | Age: 75
End: 2025-04-23
Payer: MEDICARE

## 2025-04-23 ENCOUNTER — HOSPITAL ENCOUNTER (INPATIENT)
Facility: CLINIC | Age: 75
End: 2025-04-23
Attending: PSYCHIATRY & NEUROLOGY | Admitting: PSYCHIATRY & NEUROLOGY
Payer: COMMERCIAL

## 2025-04-23 VITALS
SYSTOLIC BLOOD PRESSURE: 91 MMHG | OXYGEN SATURATION: 96 % | HEART RATE: 58 BPM | HEIGHT: 69 IN | BODY MASS INDEX: 28.73 KG/M2 | TEMPERATURE: 99.4 F | WEIGHT: 194 LBS | DIASTOLIC BLOOD PRESSURE: 57 MMHG | RESPIRATION RATE: 18 BRPM

## 2025-04-23 DIAGNOSIS — R23.8 SKIN IRRITATION: ICD-10-CM

## 2025-04-23 DIAGNOSIS — H04.123 DRY EYES: ICD-10-CM

## 2025-04-23 DIAGNOSIS — R52 PAIN: ICD-10-CM

## 2025-04-23 DIAGNOSIS — F20.0 PARANOID SCHIZOPHRENIA (H): ICD-10-CM

## 2025-04-23 DIAGNOSIS — F99 INSOMNIA DUE TO OTHER MENTAL DISORDER: ICD-10-CM

## 2025-04-23 DIAGNOSIS — F25.9 SCHIZOPHRENIA, SCHIZOAFFECTIVE, CHRONIC WITH ACUTE EXACERBATION (H): Primary | ICD-10-CM

## 2025-04-23 DIAGNOSIS — F10.10 ALCOHOL ABUSE: ICD-10-CM

## 2025-04-23 DIAGNOSIS — M62.838 MUSCLE SPASM: ICD-10-CM

## 2025-04-23 DIAGNOSIS — F51.05 INSOMNIA DUE TO OTHER MENTAL DISORDER: ICD-10-CM

## 2025-04-23 PROBLEM — R45.86 MOOD CHANGES: Status: ACTIVE | Noted: 2025-04-23

## 2025-04-23 PROCEDURE — 250N000013 HC RX MED GY IP 250 OP 250 PS 637: Performed by: PSYCHIATRY & NEUROLOGY

## 2025-04-23 PROCEDURE — 99239 HOSP IP/OBS DSCHRG MGMT >30: CPT | Performed by: HOSPITALIST

## 2025-04-23 PROCEDURE — 250N000013 HC RX MED GY IP 250 OP 250 PS 637: Performed by: STUDENT IN AN ORGANIZED HEALTH CARE EDUCATION/TRAINING PROGRAM

## 2025-04-23 PROCEDURE — 124N000002 HC R&B MH UMMC

## 2025-04-23 PROCEDURE — 99232 SBSQ HOSP IP/OBS MODERATE 35: CPT | Performed by: PSYCHIATRY & NEUROLOGY

## 2025-04-23 PROCEDURE — 250N000013 HC RX MED GY IP 250 OP 250 PS 637: Performed by: EMERGENCY MEDICINE

## 2025-04-23 RX ORDER — FOLIC ACID 1 MG/1
1 TABLET ORAL DAILY
Status: ON HOLD | DISCHARGE
Start: 2025-04-24

## 2025-04-23 RX ORDER — OLANZAPINE 2.5 MG/1
2.5 TABLET, FILM COATED ORAL 3 TIMES DAILY PRN
Status: ACTIVE | OUTPATIENT
Start: 2025-04-23

## 2025-04-23 RX ORDER — BENZTROPINE MESYLATE 1 MG/1
1 TABLET ORAL 2 TIMES DAILY
Status: DISCONTINUED | OUTPATIENT
Start: 2025-04-23 | End: 2025-04-30

## 2025-04-23 RX ORDER — MULTIPLE VITAMINS W/ MINERALS TAB 9MG-400MCG
1 TAB ORAL DAILY
Status: ON HOLD | DISCHARGE
Start: 2025-04-24

## 2025-04-23 RX ORDER — TRAZODONE HYDROCHLORIDE 50 MG/1
50 TABLET ORAL
Status: DISPENSED | OUTPATIENT
Start: 2025-04-23

## 2025-04-23 RX ORDER — GABAPENTIN 100 MG/1
100 CAPSULE ORAL EVERY 6 HOURS PRN
Status: ACTIVE | OUTPATIENT
Start: 2025-04-23

## 2025-04-23 RX ORDER — FOLIC ACID 1 MG/1
1 TABLET ORAL DAILY
Status: DISPENSED | OUTPATIENT
Start: 2025-04-24

## 2025-04-23 RX ORDER — MAGNESIUM HYDROXIDE/ALUMINUM HYDROXICE/SIMETHICONE 120; 1200; 1200 MG/30ML; MG/30ML; MG/30ML
30 SUSPENSION ORAL EVERY 4 HOURS PRN
Status: ACTIVE | OUTPATIENT
Start: 2025-04-23

## 2025-04-23 RX ORDER — LANOLIN ALCOHOL/MO/W.PET/CERES
100 CREAM (GRAM) TOPICAL DAILY
Status: ON HOLD | DISCHARGE
Start: 2025-04-24

## 2025-04-23 RX ORDER — OLANZAPINE 10 MG/2ML
2.5 INJECTION, POWDER, FOR SOLUTION INTRAMUSCULAR 3 TIMES DAILY PRN
Status: ACTIVE | OUTPATIENT
Start: 2025-04-23

## 2025-04-23 RX ORDER — CARBOXYMETHYLCELLULOSE SODIUM 5 MG/ML
1 SOLUTION/ DROPS OPHTHALMIC 3 TIMES DAILY PRN
Status: ACTIVE | OUTPATIENT
Start: 2025-04-23

## 2025-04-23 RX ORDER — ACETAMINOPHEN 325 MG/1
650 TABLET ORAL EVERY 4 HOURS PRN
Status: ACTIVE | OUTPATIENT
Start: 2025-04-23

## 2025-04-23 RX ORDER — AMOXICILLIN 250 MG
1 CAPSULE ORAL 2 TIMES DAILY PRN
Status: ACTIVE | OUTPATIENT
Start: 2025-04-23

## 2025-04-23 RX ORDER — MULTIPLE VITAMINS W/ MINERALS TAB 9MG-400MCG
1 TAB ORAL DAILY
Status: DISPENSED | OUTPATIENT
Start: 2025-04-24

## 2025-04-23 RX ADMIN — THIAMINE HCL TAB 100 MG 100 MG: 100 TAB at 08:03

## 2025-04-23 RX ADMIN — Medication 1 TABLET: at 08:03

## 2025-04-23 RX ADMIN — PALIPERIDONE 6 MG: 6 TABLET, EXTENDED RELEASE ORAL at 08:03

## 2025-04-23 RX ADMIN — FOLIC ACID 1 MG: 1 TABLET ORAL at 08:03

## 2025-04-23 RX ADMIN — SENNOSIDES AND DOCUSATE SODIUM 2 TABLET: 50; 8.6 TABLET ORAL at 11:35

## 2025-04-23 RX ADMIN — BENZTROPINE MESYLATE 1 MG: 1 TABLET ORAL at 08:03

## 2025-04-23 ASSESSMENT — ACTIVITIES OF DAILY LIVING (ADL)
ADLS_ACUITY_SCORE: 55
ADLS_ACUITY_SCORE: 54
ADLS_ACUITY_SCORE: 55
ADLS_ACUITY_SCORE: 53
ADLS_ACUITY_SCORE: 55
ADLS_ACUITY_SCORE: 67
ADLS_ACUITY_SCORE: 55
ADLS_ACUITY_SCORE: 55
HYGIENE/GROOMING: INDEPENDENT
DRESS: INDEPENDENT
ADLS_ACUITY_SCORE: 55
ADLS_ACUITY_SCORE: 54
ADLS_ACUITY_SCORE: 55
ADLS_ACUITY_SCORE: 55
ADLS_ACUITY_SCORE: 54
ADLS_ACUITY_SCORE: 55
ADLS_ACUITY_SCORE: 54
ADLS_ACUITY_SCORE: 55
ADLS_ACUITY_SCORE: 53
ORAL_HYGIENE: INDEPENDENT
ADLS_ACUITY_SCORE: 49
ADLS_ACUITY_SCORE: 53

## 2025-04-23 NOTE — PROGRESS NOTES
"[facility]  Hospitalist Progress Note   04/23/2025          Assessment and Plan:       Ger Bashir is a 75 year old male with history of schizophrenia, heart failure with preserved EF, alcohol abuse, anxiety, depression, substance use admitted on 4/20/2025 after eloped from group home with psychosis, vague chest complaints, medication noncompliance.    Acute decompensated schizophrenia  Eloped from care facility, noncompliance with medication.  Anxiety, depression.  Eloped from group home 4/16, found to gas station 4/20 endorsing he is a fugitive and try and turn himself in.  Per report third elopement from group home.   -- At the time of admission patient with delusions.  Denied alcohol or drug use.  Denied any suicidal or homicidal ideation.  -JOSR low.  Negative lactate.  LFTs low.  TSH sl abnormal.    VBG slightly abnormal.  QTc .    --Patient admitted to inpatient unit, placed on 72-hour hold.  --Psychiatry following, ordered Invega -sustenna but the patient is declining.   -- Psychiatry followed 4/23, filed for civil commitment.  Appreciate input.  Continue one-to-one sitter.  Continue Invega 6 mg oral daily.  Admit inpatient psych unit once bed available.    Intermittent chest pain improved.  Sinus rhythm with atrial ectopics.  Bifascicular block.  History of chronic HFpEF, grade 1 diastolic dysfunction  Complaining of intermittent chest pain sx> 1yr prior.    ECG NSR on arrival (with artifact) bifascicular block LVH, LAD.    Echocardiogram-WNL, EF 55-60%, no WMA,  Telemetry ordered, patient declined \"wants a break\"   - No current chest pain symptoms here or palpitations or shortness of breath.  --EP followed, impression sinus rhythm with atrial ectopics.        Elevated BP without a history of hypertension, resolved  Admit BP notably elevated with systolics running 177-160.    -- Blood pressure now improved without meds.  Monitor blood pressures periodically, consider " "antihypertensive therapy if indicated    Incidental aortic root/aortic dilation  - Aortic root 4.3 cm and ascending aorta dilation 4 cm.  No previous comparison.  - Outpatient follow-up TTE yearly monitoring, good BP control     Hyponatremia, resolved  Presented with creatinine of 131, given patient eloped poor oral intake.  Creatinine within normal limits.  Now sodium improved.  Monitor periodically    Mild hypocalcemia, resolved  -- Minimal hypocalcemia at 8.6, albumin is 3.94 oh.  Not clinically relevant/causing above symptoms.  Increased p.o. intake.     Mildly elevated TSH,  - Admit TSH 4.37 although free T4 is 1.40.  No PTA Synthroid/meds.  No convincing symptoms of hyperthyroidism, despite psych decompensation  - PCP recheck thyroid function in 10 -12 weeks.      Mild hypercarbia/abnormal VBG  - Admit pCO2 51, recheck 48.      History of nicotine use   -patient reports he uses E-cig sometimes, vague about when.  Cessation encouraged.     History of alcohol abuse  - Patient declines any recent use.  States he will drink when he gets money but cannot afford it.  LFTs are WNL.  JOSR negative     History of drug use  - Patient declines recent use, admit UDS is negative    Clinically Significant Risk Factors            # Financial/Environmental Concerns: other (see comments) (\"yeah I need some money. Don't we all\")         Orders Placed This Encounter      Regular Diet Adult      DVT Prophylaxis: SCDs, ambulate.  Code Status: Full Code  Disposition: Expected discharge pending inpatient psych bed availability    Medically Ready for Discharge: Anticipated Tomorrow     Discussed with patient, bedside RN, psychiatrist, [facility] intake team.  Total time greater than 51 minutes. More than 70% of time spent in direct patient care, care coordination, patient counseling, and formalizing plan of care.        Connor Sims MD        Interval History:        Patient lying in bed.  Flat affect.  Denies any thoughts of " hurting himself or others.  Reports mood low.  Per report appetite okay, tolerating oral diet.  Declined to take injectable Invega.  Denies any abdominal pain.  Denies any new tingling or numbness.  Denies any chest pain at this time.  No palpitations.  No shortness of breath.  Sitter by bedside.         Physical Exam:        Physical Exam   Temp:  [98.2  F (36.8  C)-99.3  F (37.4  C)] 98.2  F (36.8  C)  Pulse:  [55-71] 58  Resp:  [18] 18  BP: ()/(54-70) 102/54  SpO2:  [92 %-95 %] 94 %    Intake/Output Summary (Last 24 hours) at 4/23/2025 0951  Last data filed at 4/23/2025 0812  Gross per 24 hour   Intake 900 ml   Output --   Net 900 ml     PHYSICAL EXAM  GENERAL: Patient is in no distress.  Flat affect.  HEENT: Oropharynx pink  HEART: Regular rate and rhythm. S1S2. No murmurs  LUNGS: Clear to auscultation bilaterally. No expiratory wheeze.  Respirations unlabored  NEURO: Moving all extremities   EXTREMITIES: No pedal edema.  SKIN: Warm, dry. No rash   PSYCHIATRY Cooperative       Medications:        Current Facility-Administered Medications   Medication Dose Route Frequency Provider Last Rate Last Admin    benztropine (COGENTIN) tablet 1 mg  1 mg Oral BID Rogelio Chang MD   1 mg at 04/23/25 0803    folic acid (FOLVITE) tablet 1 mg  1 mg Oral Daily Refugio Erickson MD   1 mg at 04/23/25 0803    multivitamin w/minerals (THERA-VIT-M) tablet 1 tablet  1 tablet Oral Daily Refugio Erickson MD   1 tablet at 04/23/25 0803    paliperidone ER (INVEGA) 24 hr tablet 6 mg  6 mg Oral Daily Kristan Rdz MD   6 mg at 04/23/25 0803    thiamine (B-1) tablet 100 mg  100 mg Oral Daily Refugio Erickson MD   100 mg at 04/23/25 0803     Current Facility-Administered Medications   Medication Dose Route Frequency Provider Last Rate Last Admin    acetaminophen (TYLENOL) tablet 650 mg  650 mg Oral Q4H PRN Refugio Erickson MD        Or    acetaminophen (TYLENOL) Suppository 650 mg  650 mg Rectal Q4H PRN Georgina,  MD Refugio        carboxymethylcellulose PF (REFRESH PLUS) 0.5 % ophthalmic solution 1 drop  1 drop Both Eyes BID PRN Chi Neal MD   1 drop at 04/21/25 1934    cloNIDine (CATAPRES) tablet 0.1 mg  0.1 mg Oral Q8H PRN Refugio Erickson MD        diazepam (VALIUM) tablet 10 mg  10 mg Oral Q30 Min PRN Refugio Erickson MD        Or    diazepam (VALIUM) injection 5-10 mg  5-10 mg Intravenous Q30 Min PRN Refugio Erickson MD        flumazenil (ROMAZICON) injection 0.2 mg  0.2 mg Intravenous q1 min prn Refugio Erickson MD        OLANZapine zydis (zyPREXA) ODT tab 5-10 mg  5-10 mg Oral Q6H PRN Refugio Erickson MD        Or    haloperidol lactate (HALDOL) injection 2.5-5 mg  2.5-5 mg Intravenous Q6H PRN Refugio Erickson MD        hydrALAZINE (APRESOLINE) tablet 10 mg  10 mg Oral Q4H PRN Refugio Erickson MD        Or    hydrALAZINE (APRESOLINE) injection 10 mg  10 mg Intravenous Q4H PRN Refugio Erickson MD        hydrALAZINE (APRESOLINE) injection 10 mg  10 mg Intravenous Q4H PRN Refugio Erickson MD        melatonin tablet 5 mg  5 mg Oral QPM PRN Refugio Erickson MD        nitroGLYcerin (NITROSTAT) sublingual tablet 0.4 mg  0.4 mg Sublingual Q5 Min PRN Refugio Erickson MD        ondansetron (ZOFRAN ODT) ODT tab 4 mg  4 mg Oral Q6H PRN Refugio Erickson MD        Or    ondansetron (ZOFRAN) injection 4 mg  4 mg Intravenous Q6H PRRefugio Lombardo MD        prochlorperazine (COMPAZINE) injection 5 mg  5 mg Intravenous Q6H PRRefugio Lombardo MD        Or    prochlorperazine (COMPAZINE) tablet 5 mg  5 mg Oral Q6H PRN Refugio Erickson MD        senna-docusate (SENOKOT-S/PERICOLACE) 8.6-50 MG per tablet 1 tablet  1 tablet Oral BID Refugio Bay MD        Or    senna-docusate (SENOKOT-S/PERICOLACE) 8.6-50 MG per tablet 2 tablet  2 tablet Oral BID Refugio Bay MD                Data:      All new lab and imaging data was reviewed.

## 2025-04-23 NOTE — TELEPHONE ENCOUNTER
R: [place]  Access Inpatient Bed Call Log 4/23/25 at 9:17 AM: Intake has called facilities that have not updated the bed status within the last 12 hours.    METRO:  [facility] - is at capacity (0 beds).  [facility] - Phone: [phone] is posting 6 beds. Per call @ 8:49 AM, per VARSHA at capacity, try back after 1 PM.  [facility] - Phone: [phone] has posted 0 beds. Criteria: Negative covid required.  [facility] - Phone: [phone] is posting 0 beds. Criteria: Neg covid. No high school/Caroline-psych. Per call @ 8:56 AM, per Michelle at capacity, call back later, staff currently in meeting about discharges.  [facility] - Phone: [phone] is posting 0 beds.  [facility] - Phone: [phone] is posting 0 beds. Per call @ 9:00 AM, per Kp, no exact number but has openings at [place] and [facility].  [facility] - Phone: [phone] has posted 6 beds. Criteria: Negative covid. Per call @ 8:58 AM, per Tommy At capacity for adults, no child, 1 single occ and 1 M and 1 F for adol.  [facility] - Phone: [phone] is posting 0 beds.    METRO + 1hr:  [facility] - Phone: [phone] is posting 3 beds. Criteria: LOW acuity. Ages 12+; Neg covid. Per call @ 9:00 AM, per Kp, no exact number but has openings at [place] and [facility].  [facility] - Phone: [phone] is posting 1 beds. Criteria: No aggression. Negative Covid. Low acuity.  [facility] - Phone: [phone] is posting 0 beds. Criteria: Low acuity only. Neg covid. Per call @ 9:05 AM, per oDrene, still assessing bed availability for [place] and [facility], call back after 11 AM.  [facility] - Phone: [phone] is posting 2 beds. Criteria: Low acuity. No current aggression.  [facility] - Phone: [phone] is posting 0 beds. Criteria: Negative covid.   [facility]  [facility] - Phone: [phone] has posted 2 beds. Criteria: Negative covid.   [facility] - Phone: [phone] is posting 1 bed. Criteria: Low acuity; 72 HH hold preferred; Caroline unit; Negative covid required.   [facility] - Phone: [phone] has posted 4 beds. Criteria: Low acuity only. Neg covid. Per call @ 9:05 AM, per Dorene, still assessing bed availability for [place] and [facility], call back after 11 AM.  [facility] [place] - Phone: [phone] is posting 6 beds. Criteria: Negative covid required; Vol only; No history of aggression, violence, or assault; No sexual offenders; No 72 HH holds. Per call @ 9:07 AM, per Saranya open for referrals.    NORTHERN LOCATIONS:  [facility] - Phone: [phone] has posted 2 beds. Criteria: Negative covid required; Must have the cognitive ability to do programming; No aggressive or violent behavior or recent HX (last 2 yrs); MH must be primary; Always low acuity.   [facility] - Phone: [phone] is posting 0 beds. Criteria: Negative covid required; Low acuity only; Violence and aggression capped.   [facility] - Phone: [phone] is posting 1 bed. Criteria: Low acuity; Negative covid required. Per call @ 9:09 AM, per Kaela, has 2 available and 5 on their waitlist.  [facility] - Phone: [phone] is posting 6 beds. Criteria: Low acuity; Negative covid required.  [facility] - Phone: [phone] is posting 5 beds. Criteria: Negative covid; LOW acuity; (No lines, drains, or tubes, oxygen, CPAP, IV, etc.); Must Have a Ride Home. Per call @ 9:11 AM, website is up to date?  [facility] - Phone: [phone] is posting 4 beds. Criteria: Negative covid; (No lines, drains, or tubes, oxygen,  CPAP, IV, etc.).     Pt remains on the work list pending appropriate bed availability.         Provider following today is Dr. Connor Sims.     11:36 AM Provided 3B VARSHA Vines with MRN for review as unit is case by case.   12:12 PM Per call back from Jasmyn, they need to know why he is requiring a 1:1 while on unit and if 1:1 will need to be continued once transferred- unit cannot take anymore 1:1s.   12:14 PM Per Karen STUART RN-Arielle, pt is on 1:1 for elopement risk since the unit is not locked.  12:19 PM Updated Jasmyn on 3B, provided her with # to unit as she has some questions for RN and will follow up.   12:22 PM Per Jasmyn, pt appropriate for admission to 3B.   1:05 PM Called Alannah, no answer- LVM.   1:27 PM Per call with Hospitalist Dr Sims, requesting provider to page her on vocera or call [phone] to complete doc to doc if needed.   2:02 PM Per call with Alannah, she accepts pt for admission to 3B. (2:04 PM- Alannah declined doc to doc).  2:08 PM Called ANUSHKA and informed VARSHA Vines of pt in queue. Unit can call for report once court hold comes through.   2:12 PM Provided Karen Short Stay with placement. SW is reaching out to [employer] Re: obtaining court hold. Let RN know that once hold is received, they can call for report.   3:36 PM Per call with Alannah, she would prefer to wait until PPW has been received before pt transfers.   4:00 PM Informed 3B that Alannah wants PPW before transfer.     Final Disposition: 3B/Alannah

## 2025-04-23 NOTE — PROGRESS NOTES
PRIMARY Concern: hx of schizophrenia, acute decompensation now, elopement from group home   SAFETY RISK Concerns (fall risk, behaviors, etc.): elopement risk, low suicide risk   Aggression Tool Color: green   Isolation/Type: n/a   Tests/Procedures for NEXT shift:   Consults? (Pending/following, signed-off?) psych & mental health professional following, eletrophysiology signed off. CM/SW following.  Where is patient from? (Home, TCU, etc.): group home   Other Important info for NEXT shift: high elopement risk based on hx. Pt talks about twin brother committing crime on his behalf. Psych provider has filled for civil commitment. Licensed professional clinical counselor (LPAC) following. CIWA score daily. On 72 hrs hold 1:1 sit in place.  Anticipated DC date & active delays: plan to discharge to inpt psych TBD  _____________________________________________________________________________  SUMMARY NOTE:   Orientation/Cognitive: disoriented to situation, calm & pleasant this shift  Observation Goals (Met/ Not Met): inpt   Mobility Level/Assist Equipment: ind   Antibiotics & Plan (IV/po, length of tx left): n/a  Pain Management: denies pain   Complete Pain Reassessment: Yes Due next: next shift   Tele/VS/O2: VSS on RA, Tele: SR w/ PACs and BBB  ABNL Lab/BG: see labs   Diet: regular, good appetite   Bowel/Bladder: continent of B/B, up to the bathroom   Skin Concerns:WDL  Drains/Devices: IV SLed   Patient Stated Goal for Today: To rest

## 2025-04-23 NOTE — PROGRESS NOTES
"[facility]  Hospitalist Progress Note   04/23/2025          Assessment and Plan:       Ger Bashir is a 75 year old male with history of schizophrenia, heart failure with preserved EF, alcohol abuse, anxiety, depression, substance use admitted on 4/20/2025 after eloped from group home with psychosis, vague chest complaints, medication noncompliance.    Acute decompensated schizophrenia  Eloped from care facility, noncompliance with medication.  Anxiety, depression.  Eloped from group home 4/16, found to gas station 4/20 endorsing he is a fugitive and try and turn himself in.  Per report third elopement from group home.   -- At the time of admission patient with delusions.  Denied alcohol or drug use.  Denied any suicidal or homicidal ideation.  -JOSR low.  Negative lactate.  LFTs low.  TSH sl abnormal.    VBG slightly abnormal.  QTc .    --Patient admitted to inpatient unit, placed on 72-hour hold.  --Psychiatry following, ordered Invega -sustenna but the patient is declining.   -- Psychiatry followed 4/23, filed for civil commitment.  Appreciate input.  Continue one-to-one sitter.  Continue Invega 6 mg oral daily.  Admit inpatient psych unit once bed available.    Intermittent chest pain improved.  Sinus rhythm with atrial ectopics.  Bifascicular block.  History of chronic HFpEF, grade 1 diastolic dysfunction  Complaining of intermittent chest pain sx> 1yr prior.    ECG NSR on arrival (with artifact) bifascicular block LVH, LAD.    Echocardiogram-WNL, EF 55-60%, no WMA,  Telemetry ordered, patient declined \"wants a break\"   - No current chest pain symptoms here or palpitations or shortness of breath.  --EP followed, impression sinus rhythm with atrial ectopics.        Elevated BP without a history of hypertension, resolved  Admit BP notably elevated with systolics running 177-160.    -- Blood pressure now improved without meds.  Monitor blood pressures periodically, consider " "antihypertensive therapy if indicated    Incidental aortic root/aortic dilation  - Aortic root 4.3 cm and ascending aorta dilation 4 cm.  No previous comparison.  - Outpatient follow-up TTE yearly monitoring, good BP control     Hyponatremia, resolved  Presented with creatinine of 131, given patient eloped poor oral intake.  Creatinine within normal limits.  Now sodium improved.  Monitor periodically    Mild hypocalcemia, resolved  -- Minimal hypocalcemia at 8.6, albumin is 3.94 oh.  Not clinically relevant/causing above symptoms.  Increased p.o. intake.     Mildly elevated TSH,  - Admit TSH 4.37 although free T4 is 1.40.  No PTA Synthroid/meds.  No convincing symptoms of hyperthyroidism, despite psych decompensation  - PCP recheck thyroid function in 10 -12 weeks.      Mild hypercarbia/abnormal VBG  - Admit pCO2 51, recheck 48.      History of nicotine use   -patient reports he uses E-cig sometimes, vague about when.  Cessation encouraged.     History of alcohol abuse  - Patient declines any recent use.  States he will drink when he gets money but cannot afford it.  LFTs are WNL.  JOSR negative     History of drug use  - Patient declines recent use, admit UDS is negative    Clinically Significant Risk Factors            # Financial/Environmental Concerns: other (see comments) (\"yeah I need some money. Don't we all\")         Orders Placed This Encounter      Regular Diet Adult      DVT Prophylaxis: SCDs, ambulate.  Code Status: Full Code  Disposition: Expected discharge pending inpatient psych bed availability    Medically Ready for Discharge: Anticipated Tomorrow     Discussed with patient, bedside RN, psychiatrist, [place] intake team.  Total time greater than 51 minutes. More than 70% of time spent in direct patient care, care coordination, patient counseling, and formalizing plan of care.        Connor Sims MD        Interval History:        Patient lying in bed.  Flat affect.  Denies any thoughts of " hurting himself or others.  Reports mood low.  Per report appetite okay, tolerating oral diet.  Declined to take injectable Invega.  Denies any abdominal pain.  Denies any new tingling or numbness.  Denies any chest pain at this time.  No palpitations.  No shortness of breath.  Sitter by bedside.         Physical Exam:        Physical Exam   Temp:  [98.2  F (36.8  C)-99.3  F (37.4  C)] 98.2  F (36.8  C)  Pulse:  [55-71] 58  Resp:  [18] 18  BP: ()/(54-70) 102/54  SpO2:  [92 %-95 %] 94 %    Intake/Output Summary (Last 24 hours) at 4/23/2025 0951  Last data filed at 4/23/2025 0812  Gross per 24 hour   Intake 900 ml   Output --   Net 900 ml     PHYSICAL EXAM  GENERAL: Patient is in no distress.  Flat affect.  HEENT: Oropharynx pink  HEART: Regular rate and rhythm. S1S2. No murmurs  LUNGS: Clear to auscultation bilaterally. No expiratory wheeze.  Respirations unlabored  NEURO: Moving all extremities   EXTREMITIES: No pedal edema.  SKIN: Warm, dry. No rash   PSYCHIATRY Cooperative       Medications:        Current Facility-Administered Medications   Medication Dose Route Frequency Provider Last Rate Last Admin    benztropine (COGENTIN) tablet 1 mg  1 mg Oral BID Rogelio Chang MD   1 mg at 04/23/25 0803    folic acid (FOLVITE) tablet 1 mg  1 mg Oral Daily Refugio Erickson MD   1 mg at 04/23/25 0803    multivitamin w/minerals (THERA-VIT-M) tablet 1 tablet  1 tablet Oral Daily Refugio Erickson MD   1 tablet at 04/23/25 0803    paliperidone ER (INVEGA) 24 hr tablet 6 mg  6 mg Oral Daily Kristan Rdz MD   6 mg at 04/23/25 0803    thiamine (B-1) tablet 100 mg  100 mg Oral Daily Refugio Erickson MD   100 mg at 04/23/25 0803     Current Facility-Administered Medications   Medication Dose Route Frequency Provider Last Rate Last Admin    acetaminophen (TYLENOL) tablet 650 mg  650 mg Oral Q4H PRN Refugio Erickson MD        Or    acetaminophen (TYLENOL) Suppository 650 mg  650 mg Rectal Q4H PRN Georgina,  MD Refugio        carboxymethylcellulose PF (REFRESH PLUS) 0.5 % ophthalmic solution 1 drop  1 drop Both Eyes BID PRN Chi Neal MD   1 drop at 04/21/25 1934    cloNIDine (CATAPRES) tablet 0.1 mg  0.1 mg Oral Q8H PRN Refugio Erickson MD        diazepam (VALIUM) tablet 10 mg  10 mg Oral Q30 Min PRN Refugio Erickson MD        Or    diazepam (VALIUM) injection 5-10 mg  5-10 mg Intravenous Q30 Min PRN Refugio Erickson MD        flumazenil (ROMAZICON) injection 0.2 mg  0.2 mg Intravenous q1 min prn Refugio Erickson MD        OLANZapine zydis (zyPREXA) ODT tab 5-10 mg  5-10 mg Oral Q6H PRN Refugio Erickson MD        Or    haloperidol lactate (HALDOL) injection 2.5-5 mg  2.5-5 mg Intravenous Q6H PRN Refugio Erickson MD        hydrALAZINE (APRESOLINE) tablet 10 mg  10 mg Oral Q4H PRN Refugio Erickson MD        Or    hydrALAZINE (APRESOLINE) injection 10 mg  10 mg Intravenous Q4H PRN Refugio Erickson MD        hydrALAZINE (APRESOLINE) injection 10 mg  10 mg Intravenous Q4H PRN Refugio Erickson MD        melatonin tablet 5 mg  5 mg Oral QPM PRN Refugio Erickson MD        nitroGLYcerin (NITROSTAT) sublingual tablet 0.4 mg  0.4 mg Sublingual Q5 Min PRN Refugio Erickson MD        ondansetron (ZOFRAN ODT) ODT tab 4 mg  4 mg Oral Q6H PRN Refugio Erickson MD        Or    ondansetron (ZOFRAN) injection 4 mg  4 mg Intravenous Q6H PRRefugio Lombardo MD        prochlorperazine (COMPAZINE) injection 5 mg  5 mg Intravenous Q6H PRRefugio Lombardo MD        Or    prochlorperazine (COMPAZINE) tablet 5 mg  5 mg Oral Q6H PRN Refugio Erickson MD        senna-docusate (SENOKOT-S/PERICOLACE) 8.6-50 MG per tablet 1 tablet  1 tablet Oral BID Refugio Bay MD        Or    senna-docusate (SENOKOT-S/PERICOLACE) 8.6-50 MG per tablet 2 tablet  2 tablet Oral BID Refugio Bay MD                Data:      All new lab and imaging data was reviewed.

## 2025-04-23 NOTE — PLAN OF CARE
Goal Outcome Evaluation:       Plan of Care Reviewed With: patient     Overall Patient Progress: improvingOverall Patient Progress: improving      PRIMARY Concern: Psychosis, Pt found at gas station 4 days after leaving group home  SAFETY RISK Concerns (fall risk, behaviors, etc.): fall, suicide risk, elopement risk     Aggression Tool Color: green  Isolation/Type: n/a  Tests/Procedures for NEXT shift: none  Consults? (Pending/following, signed-off?) psyc and SW following  Where is patient from? (Home, TCU, etc.): group home  Other Important info for NEXT shift: waiting for commitment hearing  Anticipated DC date & active delays: pending commitment  _____________________________________________________________________________  SUMMARY NOTE:   Orientation/Cognitive: A&Ox4  Mobility Level/Assist Equipment: SBA  Antibiotics & Plan (IV/po, length of tx left): na  Pain Management: denies  Complete Pain Reassessment: yes Due next: shift  Tele/VS/O2: VSS   ABNL Lab/BG: labs wnl  Diet: regular  Bowel/Bladder: WNL, stool softener available  Skin Concerns: none  Drains/Devices: PIV SL  Patient Stated Goal for Today: rest

## 2025-04-23 NOTE — CONSULTS
"[facility] Psychiatric Consult Progress Note  Reason for consult follow-up    Interval History:   Pt seen, chart reviewed, case discussed with nursing staff and treating clinicians.   Patient seen today.      Patient is laying in his bed he reports that his mood is depressed. I have no luck at all\"\" he then goes into  and says.\"  That should not bother you\"  He has paucity of thought.  He is more interactive with me he recognizes me and addresses me as .  He reports he feels hopeless helpless worthless.  He has private word usage when I ask him how his energy and motivation he reports\" stationary\".  Patient reports his sleep is all right his appetite is good.  He denies any suicide ideation plan or intent.  He denies any auditory or visual hallucinations.  He has some delusions about the FBI\" want me to run away from home\"  He declines to take the injectable form but is open to taking the oral medication       Review of systems:   10 point Review of Systems completed by Dr. Rdz, and is  is negative other than noted in the HPI     Medications:     Current Facility-Administered Medications   Medication Dose Route Frequency Provider Last Rate Last Admin    benztropine (COGENTIN) tablet 1 mg  1 mg Oral BID Rogelio Chang MD   1 mg at 04/23/25 0803    folic acid (FOLVITE) tablet 1 mg  1 mg Oral Daily Refugio Erickson MD   1 mg at 04/23/25 0803    multivitamin w/minerals (THERA-VIT-M) tablet 1 tablet  1 tablet Oral Daily Refugio Erickson MD   1 tablet at 04/23/25 0803    paliperidone ER (INVEGA) 24 hr tablet 6 mg  6 mg Oral Daily Kristan Rdz MD   6 mg at 04/23/25 0803    thiamine (B-1) tablet 100 mg  100 mg Oral Daily Refugio Erickson MD   100 mg at 04/23/25 0803     Current Facility-Administered Medications   Medication Dose Route Frequency Provider Last Rate Last Admin    acetaminophen (TYLENOL) tablet 650 mg  650 mg Oral Q4H PRN Refugio Erickson MD        Or    " acetaminophen (TYLENOL) Suppository 650 mg  650 mg Rectal Q4H PRN Refugio Erickson MD        carboxymethylcellulose PF (REFRESH PLUS) 0.5 % ophthalmic solution 1 drop  1 drop Both Eyes BID PRN Chi Neal MD   1 drop at 04/21/25 1934    cloNIDine (CATAPRES) tablet 0.1 mg  0.1 mg Oral Q8H PRN Refugio Erickson MD        diazepam (VALIUM) tablet 10 mg  10 mg Oral Q30 Min PRN Refugio Erickson MD        Or    diazepam (VALIUM) injection 5-10 mg  5-10 mg Intravenous Q30 Min PRN Refugoi Erickson MD        flumazenil (ROMAZICON) injection 0.2 mg  0.2 mg Intravenous q1 min prn Refugio Erickson MD        OLANZapine zydis (zyPREXA) ODT tab 5-10 mg  5-10 mg Oral Q6H PRN Refugio Erickson MD        Or    haloperidol lactate (HALDOL) injection 2.5-5 mg  2.5-5 mg Intravenous Q6H PRN Refugio Erickson MD        hydrALAZINE (APRESOLINE) tablet 10 mg  10 mg Oral Q4H PRN Refugio Erickson MD        Or    hydrALAZINE (APRESOLINE) injection 10 mg  10 mg Intravenous Q4H PRN Refugio Erickson MD        hydrALAZINE (APRESOLINE) injection 10 mg  10 mg Intravenous Q4H PRN Refugio Erickson MD        melatonin tablet 5 mg  5 mg Oral QPM PRN Refugio Erickson MD        nitroGLYcerin (NITROSTAT) sublingual tablet 0.4 mg  0.4 mg Sublingual Q5 Min PRN Refugio Erickson MD        ondansetron (ZOFRAN ODT) ODT tab 4 mg  4 mg Oral Q6H PRN Refugio Erickson MD        Or    ondansetron (ZOFRAN) injection 4 mg  4 mg Intravenous Q6H PRRefugio Lombardo MD        prochlorperazine (COMPAZINE) injection 5 mg  5 mg Intravenous Q6H PRN Refugio Erickson MD        Or    prochlorperazine (COMPAZINE) tablet 5 mg  5 mg Oral Q6H PRN Refugio Erickson, MD        senna-docusate (SENOKOT-S/PERICOLACE) 8.6-50 MG per tablet 1 tablet  1 tablet Oral BID Refugio Bay MD        Or    senna-docusate (SENOKOT-S/PERICOLACE) 8.6-50 MG per tablet 2 tablet  2 tablet Oral BID Refugio Bay MD           Mental Status Examination:      Appearance:  awake, alert and adequately groomed  Eye Contact:  good  Speech:  clear, coherent  Language:Normal  Psychomotor Behavior: td+  Mood: Depressed  Affect:  appropriate and in normal range and mood congruent  Thought Process:  paucity of thought, bradyprhenia  Thought Content:  no evidence of suicidal ideation or homicidal ideation, no auditory hallucinations present, and no visual hallucinations present  delusional  Oriented to:  time, person, and place  Attention Span and Concentration:  limited  Recent and Remote Memory:  less than adequate  Fund of Knowledge: less than adequate  Muscle Strength and Tone: normal  Gait and Station: Normal  Insight:  limited  Judgment:  limited        Labs/Vitals:     No results found for this or any previous visit (from the past 24 hours).    B/P: 100/61, T: 97.4, P: 88, R: 18  Most Recent 3 CBC's:  Recent Labs   Lab Test 04/21/25  1134 04/20/25 1938 02/22/24 2122   WBC 5.4 6.4 5.8   HGB 14.0 14.0 13.7   MCV 92 92 92    183 211      Most Recent 3 BMP's:  Recent Labs   Lab Test 04/21/25 1134 04/20/25 1938 02/22/24 2122    131* 138   POTASSIUM 4.4 3.5 4.1   CHLORIDE 100 93* 102   CO2 30* 29 30*   BUN 11.6 12.4 14.6   CR 0.79 0.80 0.86   ANIONGAP 6* 9 6*   ZAHRA 8.7* 8.6* 9.0   * 103* 95     Most Recent 2 LFT's:  Recent Labs   Lab Test 04/21/25 1134 04/20/25 2130   AST 18 23   ALT 12 11   ALKPHOS 76 88   BILITOTAL 0.6 0.7     Most Recent INR's and Anticoagulation Dosing History:  Anticoagulation Dose History           No data to display              Most Recent 3 Troponin's:No lab results found.  Most Recent Cholesterol Panel:  Recent Labs   Lab Test 04/19/19  0642   CHOL 148   LDL 89   HDL 32*   TRIG 136     Most Recent 6 Bacteria Isolates From Any Culture (See EPIC Reports for Culture Details):No lab results found.  Most Recent TSH, T4 and A1c Labs:  Recent Labs   Lab Test 04/20/25 2130   TSH 4.37*   T4 1.40       Impression:    Patient has  "schizophrenia, patient had eloped from his group home he stopped taking his medications..  He is having delusions of persecutions delusions of reference.  He is delusional.  He believes that his twin is committing crimes and the FBI is looking he makes bizarre statements.\"  Nurse has taken my singing voice so I could not  sing.  Patient is now on a 72-hour hold     .      DIagnoses:   1Schizophrenia        Plan:   1. Please continue one-to-one sitter   2. Invega Sustenna 234 injection once-patient declined this again   Will continue  Invega 6 mg p.o. daily  3.  Continue the same plan to admit patient to inpatient psychiatry  .It is the recommendation of this clinician that pt admit to IP MH for safety and stabilization. Pt displays the following risk factors that support IP admission: Psychiatrically decompensated with delusions and disorganized thinking pt is unable to engage in safety planning to mitigate risk level . Lower levels of care are not sufficient. Due to this IP is the least restrictive option of care for pt. Pt should remain in IP until deemed safe to return to the community and engage in OP MH supports.  Patient is on a 72-hour hold.   filed for civil commitment.  Prepetition for civil commitment is supported awaiting documents to that effect.        Attestation:  Patient has been seen and evaluated by me,  Kristan Rdz MD   "

## 2025-04-23 NOTE — DISCHARGE SUMMARY
Discharge Summary  Hospitalist    Date of Admission:  4/20/2025  Date of Discharge:  4/23/2025  Discharging Provider: Connor Sims MD    Primary Care Physician   Physician No Ref-Primary  Primary Care Provider Phone Number: None  Primary Care Provider Fax Number: [phone]    PRINCIPAL DIAGNOSIS  Acute decompensated schizophrenia  Eloped from care facility, noncompliance with medication.  Intermittent chest pain improved.  Sinus rhythm with atrial ectopics.  Elevated BP without a history of hypertension, resolved  Incidental aortic root/aortic dilation  Hyponatremia, resolved  Mild hypocalcemia, resolved  Mildly elevated TSH,  Mild hypercarbia/abnormal VBG      Past Medical History:   Diagnosis Date    Aortic root dilatation 04/21/2025 4/21/2025- Incidental aortic root/aortic dilation on echo  - Aortic root dilation 4.3 cm and ascending aorta dilation 4 cm.  No previous- Outpatient follow-up yearly monitoring, good BP control      Schizophrenia (H)        History of Present Illness   Ger Bashir is an 75 year old male who presented with psychosis.     Hospital Course   Ger Bashir is a 75 year old male with history of schizophrenia, heart failure with preserved EF, alcohol abuse, anxiety, depression, substance use admitted on 4/20/2025 after eloped from group home with psychosis, vague chest complaints, medication noncompliance.     Acute decompensated schizophrenia  Eloped from care facility, noncompliance with medication.  Anxiety, depression.  Eloped from group home 4/16, found to gas station 4/20 endorsing he is a fugitive and try and turn himself in.  Per report third elopement from group home.   PTA on trazodone, Cogentin, IM Invega sustenna.   -- At the time of admission patient with delusions.  Denied alcohol or drug use.  Denied any suicidal or homicidal ideation.  -JOSR low.  Negative lactate.  LFTs low.  TSH sl abnormal.    VBG slightly abnormal.  QTc .    --Patient admitted to  "inpatient unit, placed on 72-hour hold.  Psychiatrist followed, ordered IM Invega -sustenna but the patient is declining.   -- Psychiatry followed 4/23, filed for civil commitment.  Plan to transfer to inpatient psych unit at [facility].  --Will defer resuming PTA meds/psych meds to inpatient psychiatry team.  Has been on one-to-one, continue sitter given psychosis while transported.  EMTALA form completed.      Intermittent chest pain improved.  Sinus rhythm with atrial ectopics.  Bifascicular block.  History of chronic HFpEF, grade 1 diastolic dysfunction  Complaining of intermittent chest pain sx> 1yr prior.    ECG NSR on arrival (with artifact) bifascicular block LVH, LAD.    Echocardiogram-WNL, EF 55-60%, no WMA,  Telemetry ordered, patient declined \"wants a break\"   -No current chest pain symptoms here or palpitations or shortness of breath.  -EP followed, impression sinus rhythm with atrial ectopics.         Elevated BP without a history of hypertension, resolved  Admit BP notably elevated with systolics running 177-160.    -- Blood pressure now improved without meds.  Monitor blood pressures periodically, consider antihypertensive therapy if indicated.     Incidental aortic root/aortic dilation  Aortic root 4.3 cm and ascending aorta dilation 4 cm.  No previous comparison.  Outpatient follow-up TTE yearly monitoring, good BP control.  Age-appropriate health maintenance on PCP visit.     Hyponatremia, resolved  Presented with creatinine of 131, given patient eloped poor oral intake.  Creatinine within normal limits.  Now sodium improved. Monitor in 1 week    Mild hypocalcemia, resolved  -- Minimal hypocalcemia at 8.6, albumin is 3.94 oh.  Not clinically relevant/causing above symptoms.  Increased p.o. intake.  Monitor in 1 week     Mildly elevated TSH,  - Admit TSH 4.37 although free T4 is 1.40.  No PTA Synthroid/meds.  No convincing symptoms of hyperthyroidism, despite psych decompensation  - PCP recheck " thyroid function in 10 -12 weeks.      Mild hypercarbia/abnormal VBG  - Admit pCO2 51, recheck 48.      History of nicotine use   -patient reports he uses E-cig sometimes, vague about when.    Cessation encouraged.     History of alcohol abuse  - Patient declines any recent use.  States he will drink when he gets money but cannot afford it.  LFTs are WNL.  JOSR negative.  Continue thiamine multivitamin, folic acid supplements.  Monitor electrolytes in 1 week.  Emphasized need to consider abstinence     History of drug use  - Patient declines recent use, admit UDS is negative      Connor Sims MD.    Pending Results   Unresulted Labs Ordered in the Past 30 Days of this Admission       No orders found from 3/21/2025 to 4/21/2025.               Physical Exam   Vitals:    04/23/25 1431   Weight: 88 kg (194 lb)     Vital Signs with Ranges  Temp:  [98.2  F (36.8  C)-99.4  F (37.4  C)] 99.4  F (37.4  C)  Pulse:  [55-68] 58  Resp:  [18] 18  BP: ()/(54-70) 91/57  SpO2:  [92 %-96 %] 96 %  I/O last 3 completed shifts:  In: 760 [P.O.:760]  Out: -   PHYSICAL EXAM  GENERAL: Patient is in no distress.  Flat affect.  HEENT: Oropharynx pink  HEART: Regular rate and rhythm. S1S2. No murmurs  LUNGS: Clear to auscultation bilaterally. No expiratory wheeze.  Respirations unlabored  NEURO: Moving all extremities   EXTREMITIES: No pedal edema.  SKIN: Warm, dry. No rash   PSYCHIATRY Cooperative  )Consultations This Hospital Stay   PSYCHIATRY IP CONSULT  ELECTROPHYSIOLOGY IP CONSULT  CARE MANAGEMENT / SOCIAL WORK IP CONSULT  PSYCHIATRY IP CONSULT  PSYCHIATRY IP CONSULT    Time Spent on this Encounter   IConnor MD, personally saw the patient today and spent greater than 30 minutes discharging this patient. Discussed with patient, bedside RN, psychiatrist, [place] team,Care team.    Discharge Disposition   Discharged to Inpatient psych unit   Condition at discharge: continued psychosis.    Discharge Orders      Reason  for your hospital stay    You were admitted to the hospital with psychosis, awake chest complaints, medication noncompliance.  Followed by psychiatry, filed for civil commitment.  Plan for transfer to [facility] inpatient psychiatry Unit     Additional Discharge Instructions    Continue one-to-one sitter for transportation.    Strongly consider abstinence from nicotine, alcohol use.     Monitor and record    Monitor blood pressures, heart rate daily review on provider visit and optimize therapy.     Activity - Up with nursing assistance    ELOPEMENT precautions     Follow Up and recommended labs and tests    Follow-up with inpatient psychiatry team  Medications for schizophrenia including continuing home medications per psychiatry.  BMP in 7-10 days   Follow-up with PCP after released from inpatient psych unit.  Follow thyroid function tests in 10 to 12 weeks.  Outpatient follow-up yearly echocardiograms for incidental aortic root/aortic dilatation.  Age-appropriate health maintenance on PCP visit     Fall precautions     Diet    Follow this diet upon discharge: Current Diet:Orders Placed This Encounter      Regular Diet Adult       Discharge Medications   Current Discharge Medication List        START taking these medications    Details   folic acid (FOLVITE) 1 MG tablet Take 1 tablet (1 mg) by mouth daily.    Associated Diagnoses: Alcohol abuse      multivitamin w/minerals (THERA-VIT-M) tablet Take 1 tablet by mouth daily.    Associated Diagnoses: Alcohol abuse      thiamine (B-1) 100 MG tablet Take 1 tablet (100 mg) by mouth daily.    Associated Diagnoses: Alcohol abuse           CONTINUE these medications which have NOT CHANGED    Details   acetaminophen (TYLENOL) 500 MG tablet Take 500-1,000 mg by mouth every 6 hours as needed for mild pain. 500 mg for pain level 1-5.  1000 mg for pain level 6-10.      benztropine (COGENTIN) 1 MG tablet Take 1 tablet (1 mg) by mouth 2 times daily  Qty: 60 tablet, Refills: 0     Associated Diagnoses: Paranoid schizophrenia (H)      carboxymethylcellulose PF (CARBOXYMETHYLCELLULOSE SODIUM) 0.5 % ophthalmic solution Place 1 drop into both eyes 3 times daily as needed for dry eyes  Qty: 70 each, Refills: 0      melatonin 3 MG tablet Take 3 mg by mouth at bedtime.      paliperidone (INVEGA SUSTENNA) 234 MG/1.5ML CHRISTOS Inject 1.5 mLs (234 mg) into the muscle every 28 days Next dose due 19    Associated Diagnoses: Schizoaffective disorder, bipolar type (H)      traZODone (DESYREL) 50 MG tablet Take 25 mg by mouth nightly as needed for sleep.           Allergies   Allergies   Allergen Reactions    Peas GI Disturbance     Black eyed peas - vomiting       DATA  Most Recent 3 CBC's:  Recent Labs   Lab Test 25  11325   WBC 5.4 6.4 5.8   HGB 14.0 14.0 13.7   MCV 92 92 92    183 211      Most Recent 3 BMP's:  Recent Labs   Lab Test 25  11325    131* 138   POTASSIUM 4.4 3.5 4.1   CHLORIDE 100 93* 102   CO2 30* 29 30*   BUN 11.6 12.4 14.6   CR 0.79 0.80 0.86   ANIONGAP 6* 9 6*   ZAHRA 8.7* 8.6* 9.0   * 103* 95     Most Recent 2 LFT's:  Recent Labs   Lab Test 25  11325  2130   AST 18 23   ALT 12 11   ALKPHOS 76 88   BILITOTAL 0.6 0.7     Most Recent TSH, T4 and A1c Labs:  Recent Labs   Lab Test 25   TSH 4.37*   T4 1.40     Results for orders placed or performed during the hospital encounter of 25   Echocardiogram Complete     Value    LVEF  55-60%    Narrative    [id number]  [id number]  [id number]  [id number]^CHRISALEJANDRINA^[facility]  Echocardiography Laboratory  [address] [place] [id number]     Name: JENNIFFER NEGRO  MRN: [id number]  : 1950  Study Date: 2025 12:07 PM  Age: 75 yrs  Gender: Male  Patient Location: [facility]  Reason For Study: Flutter  Ordering Physician: JESSICA BOWLES  Performed By: TANGELA Dyson     BSA: 2.1  m2  Height: 70 in  Weight: 200 lb  HR: 61  BP: 129/79 mmHg  ______________________________________________________________________________  Procedure  Echocardiogram with two-dimensional, color and spectral Doppler. Technically  difficult study.  ______________________________________________________________________________  Interpretation Summary     1. Normal left ventricular size and function. Left ventricular ejection  fraction of 55-60%.  2. No segmental wall motion abnormalities noted.  3. No hemodynamically significant valvular disease.  4. Aortic root dilatation is present, 4.3 cm. Ascending aorta dilatation is  present, 4.0 cm.  No prior study.  ______________________________________________________________________________  Left Ventricle  The left ventricle is normal in size. There is normal left ventricular wall  thickness. Left ventricular systolic function is normal. The visual ejection  fraction is 55-60%. Grade I or early diastolic dysfunction. No regional wall  motion abnormalities noted.     Right Ventricle  The right ventricle is mildly dilated. The right ventricular systolic function  is normal.     Atria  Normal left atrial size. Right atrial size is normal.     Mitral Valve  The mitral valve leaflets appear normal. There is no evidence of stenosis,  fluttering, or prolapse. There is trace mitral regurgitation.     Tricuspid Valve  Normal tricuspid valve. There is trace tricuspid regurgitation. The right  ventricular systolic pressure is approximated at 20.8 mmHg plus the right  atrial pressure.     Aortic Valve  There is mild trileaflet aortic sclerosis. There is trace aortic  regurgitation. No aortic stenosis is present.     Pulmonic Valve  The pulmonic valve is not well visualized.     Vessels  Aortic root dilatation is present. Ascending aorta dilatation is present. IVC  diameter <2.1 cm collapsing >50% with sniff suggests a normal RA pressure of 3  mmHg.     Pericardium  There is no  pericardial effusion.     Rhythm  Sinus rhythm was noted.  ______________________________________________________________________________  MMode/2D Measurements & Calculations     IVSd: 0.75 cm  LVIDd: 4.8 cm  LVIDs: 3.0 cm  LVPWd: 0.97 cm  FS: 37.2 %  LV mass(C)d: 140.1 grams  LV mass(C)dI: 67.1 grams/m2  Ao root diam: 4.3 cm  asc Aorta Diam: 4.0 cm  LVOT diam: 2.2 cm  LVOT area: 3.9 cm2  Ao root diam index Ht(cm/m): 2.4  Ao root diam index BSA (cm/m2): 2.1  Asc Ao diam index BSA (cm/m2): 1.9  Asc Ao diam index Ht(cm/m): 2.3  EF Biplane: 69.3 %  LA Volume (BP): 39.8 ml     LA Volume Index (BP): 19.0 ml/m2  RWT: 0.40  TAPSE: 2.4 cm     Doppler Measurements & Calculations  MV E max migel: 68.9 cm/sec  MV A max migel: 100.4 cm/sec  MV E/A: 0.69  MV dec slope: 222.3 cm/sec2  MV dec time: 0.31 sec  PA acc time: 0.13 sec  TR max migel: 228.0 cm/sec  TR max P.8 mmHg  E/E' av.1  Lateral E/e': 8.0  Medial E/e': 10.1  RV S Migel: 10.1 cm/sec     ______________________________________________________________________________  Report approved by: Zane Rivera MD on 2025 02:05 PM

## 2025-04-23 NOTE — PLAN OF CARE
Goal Outcome Evaluation:      Plan of Care Reviewed With: patient    Overall Patient Progress: improvingOverall Patient Progress: improving     PRIMARY Concern: Psychosis, Pt found at gas station 4 days after leaving group home  SAFETY RISK Concerns (fall risk, behaviors, etc.): fall, elopement      Aggression Tool Color: green  Isolation/Type: na  Tests/Procedures for NEXT shift:   Consults? (Pending/following, signed-off?) na  Where is patient from? (Home, TCU, etc.): group home  Other Important info for NEXT shift: waiting for commitment hearing  Anticipated DC date & active delays: pending commitment  _____________________________________________________________________________  SUMMARY NOTE:   Orientation/Cognitive: A&Ox4  Observation Goals (Met/ Not Met): in pt  Mobility Level/Assist Equipment: SBA  Antibiotics & Plan (IV/po, length of tx left): na  Pain Management: denies  Complete Pain Reassessment: Y/N na Due next: na  Tele/VS/O2: VSS on RA  ABNL Lab/BG: labs wnl  Diet: reg  Bowel/Bladder: WNL  Skin Concerns: WNL  Drains/Devices: PIV SL  Patient Stated Goal for Today:

## 2025-04-23 NOTE — PLAN OF CARE
Spoke to Dr. Rdz and confirmed pt does not need a sitter at [facility]. Pt only requires a sitter while on a medical floor, but not on at inpatient mental health unit. Dr. Rdz spoke to Dr. Jaffe (accepting MD at [facility] Dr. Sims was updated, SW updated and oncoming report given to next shift.

## 2025-04-23 NOTE — TELEPHONE ENCOUNTER
2:43 AM Intake called Jarrod Sandoval and left  for call in the morning.             R: [place]  Access Inpatient Bed Call Log  4/23/2025 12:31 AM  Intake has called facilities that have not updated their bed status within the last 12 hours.    Adults:    *METRO:  [place] -- [facility]: @ CAPACITY.  [facility]-[id number]: @ CAPACITY. Reporting no reviews overnight.   [facility]- [id number]: @ CAPACITY. Low acuity   [place] -- [facility]- [id number]: @ CAPACITY. Low acuity only   [place] -- [facility]- [id number]: @ CAPACITY.   [facility]- [id number]: @ CAPACITY.   [facility]- [id number]: @ CAPACITY. Ages 18-35, Voluntary only, NO aggression/physical/sexual assault, violence hx or drug abuse, or psychosis. Negative Covid. -12:32 AM Per Abi, YA: 1, Adol: can review, Child: 0.  [facility]- [id number]: @ CAPACITY.  Replaced by [facility]- [id number]: @ CAPACITY.  [place] -- [facility]- [id number]: @ CAPACITY. Do not review overnight.     *STATEWIDE (by distance):  [facility]- [id number]: @ POSTING 3 BEDS. Mixed unit. Ages 12 and up/Low acuity only. -12:08 AM Per Courtney, they can review for very low acuity.   [facility] - [id number]: @ POSTING 1 BEDS. Low acuity, No aggression.   [facility] - [id number]: @ CAPACITY.   [facility] - [id number]: @ POSTING 3 BEDS. Low acuity only. No current aggression.   [facility] - [id number]: @ CAPACITY. Negative Covid. Lower acuity only.   [facility] - [id number]: @ POSTING 3 BEDS. Low acuity only. Prefer med-adjustment placements.   [facility] - [id number]: @ POSTING 4 BEDS. No aggression. - Only Low Acuity reviews.    [place] - [facility]- [id number] @ POSTING 1 BEDS. No aggressive behaviors. Do not review overnight.   [place] -- [facility]- [id number]: @ POSTING 6 BEDS. No hx of aggression. No sexual offenders.  Voluntary patients only.   [place] -- [facility]- [id number]: @ POSTING 2 BEDS. low acuity only. Must be able to do programming. No aggression/violent behavior in 2 years. No CD treatment.   [place] -- [facility]- [id number]: @ POSTING 2 BEDS. Negative Covid test. Must be low acuity ONLY.   [place] -- [facility]- [id number]: @ POSTING 1 BEDS. Low acuity. Negative Covid. -12:14 AM Per Orlin, they can review.   [place] -- [facility]: @ POSTING 10 BEDS. -Declined due to acuity  [place] - [facility]- [id number]: @ POSTING 5 BEDS. No hx of aggression/assault. No lines, drains or tubes. Does not provide detox or CD treatment. Require a confirmed ride upon discharge.   [place] -- [facility]- [id number]: @ POSTING 4 BED. Negative COVID. No medical devices.        Pt remains on waitlist pending appropriate placement availability.

## 2025-04-23 NOTE — PROGRESS NOTES
Called [facility] prepetition screening [phone] to get phone number of screener, Samantha (ph: [phone]). Spoke with Samantha, who confirmed pt will be placed on a court hold, this will come with the paperwork being faxed to the unit.     Updated Charge RN and JIMMY Sprague. Alka will need an update once the fax is received. Marisa informed to call her in an hour or so if the fax still isn't received.    Addendum 1417: Left VM for Samantha to update that paperwork has not been received yet.   Samantha called back and will reach out to the  to let them know it hasn't been received yet.  Received another call from Samantha, the county is backed up and it hasn't been filed yet. Let Samantha know this writer leaves at 1630, but the unit can always be called after that at [phone].     Lin Rendon, GEOVANNI  Social Work  [employer]

## 2025-04-23 NOTE — CONSULTS
IP MH Referral Acuity Rating Score (RARS)    LMHP complete at referral to IP MH, with DEC; and, daily while awaiting IP MH placement. Call score to PPS.  CRITERIA SCORING   New 73 HH and Involuntary for IP MH (not adolescent) 3/3   Boarding over 24 hours 1/1   Vulnerable adult at least 55+ with multiple co morbidities; or, Patient age 11 or under 1/1   Suicide ideation without relief of precipitating factors 1/1   Current plan for suicide 0/1   Current plan for homicide 0/1   Imminent risk or actual attempt to seriously harm another without relief of factors precipitating the attempt 0/1   Severe dysfunction in daily living (ex: complete neglect for self care, extreme disruption in vegetative function, extreme deterioration in social interactions) 1/1   Recent (last 2 weeks) or current physical aggression in the ED 0/1   Restraints or seclusion episode in ED 0/1   Verbal aggression, agitation, yelling, etc., while in the ED 0/1   Active psychosis with psychomotor agitation or catatonia 1/1   Need for constant or near constant redirection (from leaving, from others, etc).  0/1   Intrusive or disruptive behaviors 0/1   TOTAL 8   ;

## 2025-04-23 NOTE — PROGRESS NOTES
Called gave report to Carroll HOWE RN at [facility] and faxed hold paper work. EMS ETA 30 mins. Security bringing back up Pt vape pen.

## 2025-04-23 NOTE — PLAN OF CARE
4/22/25  [id number]    PRIMARY Concern: Psychosis. Pt was found at a gas station after being missing from his group home for 4 days.     Orientation: A/Ox4    Vitals/Tele: VSS, RA    IV Access/drains: LPIV SL    Diet: Reg    Mobility: IND/SBA    GI/: Continent     Wound/Skin: WNL    Discharge Plan: Plan to discharge to inpt psych TBD    Other: Has a sitter      See Flow sheets for assessment

## 2025-04-24 VITALS
TEMPERATURE: 96.7 F | WEIGHT: 192.24 LBS | RESPIRATION RATE: 16 BRPM | HEIGHT: 69 IN | SYSTOLIC BLOOD PRESSURE: 147 MMHG | BODY MASS INDEX: 28.47 KG/M2 | DIASTOLIC BLOOD PRESSURE: 69 MMHG | OXYGEN SATURATION: 97 % | HEART RATE: 59 BPM

## 2025-04-24 PROCEDURE — 250N000013 HC RX MED GY IP 250 OP 250 PS 637: Performed by: PSYCHIATRY & NEUROLOGY

## 2025-04-24 PROCEDURE — 99254 IP/OBS CNSLTJ NEW/EST MOD 60: CPT

## 2025-04-24 PROCEDURE — 124N000002 HC R&B MH UMMC

## 2025-04-24 PROCEDURE — 99223 1ST HOSP IP/OBS HIGH 75: CPT | Mod: AI | Performed by: PSYCHIATRY & NEUROLOGY

## 2025-04-24 RX ORDER — PALIPERIDONE 3 MG/1
3 TABLET, EXTENDED RELEASE ORAL DAILY
Status: DISCONTINUED | OUTPATIENT
Start: 2025-04-24 | End: 2025-04-25

## 2025-04-24 RX ORDER — CYCLOBENZAPRINE HCL 10 MG
10 TABLET ORAL EVERY 8 HOURS PRN
Status: DISPENSED | OUTPATIENT
Start: 2025-04-24

## 2025-04-24 RX ADMIN — THIAMINE HCL TAB 100 MG 100 MG: 100 TAB at 08:00

## 2025-04-24 RX ADMIN — PALIPERIDONE 3 MG: 3 TABLET, EXTENDED RELEASE ORAL at 14:09

## 2025-04-24 RX ADMIN — FOLIC ACID 1 MG: 1 TABLET ORAL at 08:00

## 2025-04-24 RX ADMIN — Medication 1 TABLET: at 08:00

## 2025-04-24 ASSESSMENT — ACTIVITIES OF DAILY LIVING (ADL)
ADLS_ACUITY_SCORE: 44
ADLS_ACUITY_SCORE: 55
ORAL_HYGIENE: INDEPENDENT
ADLS_ACUITY_SCORE: 55
DRESS: INDEPENDENT;SCRUBS (BEHAVIORAL HEALTH)
ADLS_ACUITY_SCORE: 55
ADLS_ACUITY_SCORE: 55
ADLS_ACUITY_SCORE: 44
ADLS_ACUITY_SCORE: 55
ADLS_ACUITY_SCORE: 44
ADLS_ACUITY_SCORE: 55
ADLS_ACUITY_SCORE: 55
ADLS_ACUITY_SCORE: 44
HYGIENE/GROOMING: INDEPENDENT
ADLS_ACUITY_SCORE: 55

## 2025-04-24 NOTE — PLAN OF CARE
Initial meeting note:    Therapist introduced self to patient and discussed psychotherapy service available to patient.     Pt response: Pt expressed interest in meeting with therapist    Plan: Made plan to meet with pt again; began identifying goals     Greeted pt and advsied of role and will be checking in. Pt shared he was upset about the commitment process. Pt shared he liked the headphones and hopes to get  pair to go home with.

## 2025-04-24 NOTE — H&P
PSYCHIATRY HISTORY AND PHYSICAL         DATE OF SERVICE:   4/24/2025         CHIEF COMPLAINT:   Patient says FBI is after him, he has suicidal thoughts, refuses IM medications, but agrees with a pill.          HISTORY OF PRESENT ILLNESS:     This is a 75 year old   male with diagnosis of paranoid schizophrenia per chart.  He was evaluated in the emergency room on April 20, 2025.    According to ER provider Dr Chang's note from 4/20/2025 patient was found at the gas station after missing from the group home for 4 days.  He was not taking his prescribed medications.  He reported chest pain, he reported getting tremulous when not taking his medications.  He reported suicidal thoughts, no thoughts of hurting others.  EMS reported that he was trying to turn himself in as a fugitive at the gas station.    During the interview with me patient says that he came to the hospital because he needs help.  He says that he was going to be a lot because FBI was after him.  He said that they were walking and they were threatening to kill him.  He has suicidal thoughts, but he says he does not have courage to do that.  No thoughts of hurting others.  He denies hallucinations.  He had decreased sleep, normal appetite, decreased energy and decreased concentration.  He perseverates on FBI being after him.  He says that he has been on many medications in the past.  He thinks that his mental illness started about 35 years ago when he started thinking every I was after him.  He remembers that he was on Haldol for many years.  He says he was on many other medications but he cannot remember the name.  He says that the medication that his provider gives him now helps him, but he says he will take only pill.  He says that he does not want to take injections because it hurts for several days after he gets the injection.  He was due for injection on April 18.  He says that he will group home and will found that yes station, because he did not  want to get injection.  He says again that he will take oral pill and he agrees and today.  He thinks with commitment with request for Wu.  I will give him oral Invega until Wu has been granted.  I asked him to sign release of information for his outpatient psychiatrist Cailin Britton, that I can coordinate care with her.  He gets agitated when the pharmacist who works in the team with me starts sneezing and coughing.  He does not want to talk while she is in the room, so she had to leave.  She then says that it upsets him.  I remind him that we have options that she could leave as she did not.  He likes to stay in his room and listen to the music.  He says he will go out for meals, but he refuses to go to groups.  He is anxious about where he is going to go after discharge.  He denies other medical problems.          CHEMICAL DEPENDENCY HISTORY: Oral pill     History   Drug Use    Types: Other     Comment: Drug use: No definate answer given     Pt denies     Social History    Substance and Sexual Activity      Alcohol use: Yes        Alcohol/week: 1.0 standard drink of alcohol        Comment: social    Patient says very rarely, can not remember when it was last time      History   Smoking Status    Heavy Smoker    Packs/day: 1.00    Years: 10.00    Types: Cigarettes   Smokeless Tobacco    Never     Chem dep treatments:pt denies  DUI:pt denies   Withdrawal seizures:pt denies          PAST PSYCHIATRIC HISTORY:     Hospitalizations: pt says many  ECT: pt denies   Suicide Attempts:pt denies   /Gun Access: pt denies   Community Supports: limited          PAST MEDICAL HISTORY:     Past Medical History:   Diagnosis Date    Aortic root dilatation 04/21/2025 4/21/2025- Incidental aortic root/aortic dilation on echo  - Aortic root dilation 4.3 cm and ascending aorta dilation 4 cm.  No previous- Outpatient follow-up yearly monitoring, good BP control      Schizophrenia (H)      Medications:   Current  Facility-Administered Medications   Medication Dose Route Frequency Provider Last Rate Last Admin    [Held by provider] benztropine (COGENTIN) tablet 1 mg  1 mg Oral BID Marilyn Jaffe MD        folic acid (FOLVITE) tablet 1 mg  1 mg Oral Daily Marilyn Jaffe MD   1 mg at 04/24/25 0800    multivitamin w/minerals (THERA-VIT-M) tablet 1 tablet  1 tablet Oral Daily Marilyn Jaffe MD   1 tablet at 04/24/25 0800    [Held by provider] paliperidone (INVEGA SUSTENNA) injection CHRISTOS 234 mg  234 mg Intramuscular Q28 Days Marilyn Jaffe MD        thiamine (B-1) tablet 100 mg  100 mg Oral Daily Marilyn Jaffe MD   100 mg at 04/24/25 0800     Medications as needed:   Current Facility-Administered Medications   Medication Dose Route Frequency Provider Last Rate Last Admin    acetaminophen (TYLENOL) tablet 650 mg  650 mg Oral Q4H PRN Marilyn Jaffe MD        alum & mag hydroxide-simethicone (MAALOX) suspension 30 mL  30 mL Oral Q4H PRN Marilyn Jaffe MD        carboxymethylcellulose PF (REFRESH PLUS) 0.5 % ophthalmic solution 1 drop  1 drop Both Eyes TID PRN Marilyn Jaffe MD        gabapentin (NEURONTIN) capsule 100 mg  100 mg Oral Q6H PRN Marilyn Jaffe MD        OLANZapine (zyPREXA) tablet 2.5 mg  2.5 mg Oral TID PRN Marilyn Jaffe MD        Or    OLANZapine (zyPREXA) injection 2.5 mg  2.5 mg Intramuscular TID PRN Marilyn Jaffe MD        senna-docusate (SENOKOT-S/PERICOLACE) 8.6-50 MG per tablet 1 tablet  1 tablet Oral BID PRN Marilyn Jaffe MD        traZODone (DESYREL) tablet 50 mg  50 mg Oral At Bedtime PRN Marilyn Jaffe MD           ALLERGY: Peas    History of traumatic brain injury:pt denies   History of seizures:pt denies          MEDICATIONS:     No current outpatient medications on file.       Medication adherence: agrees with oral,    Medication side effects: refused IM Invega on 4/18/25, says it hurts for days    Benefit: symptom reduction          ROS:   As per HPI, otherwise reminder of review of  systems is negative for:general,eyes, ears, nose, throat, neck, respiratory, cardiovascular, gastrointestinal, genitourinary, musculo-sceletal,neurological, hematological,skin and endocrine system.         FAMILY HISTORY:   No family history on file.   Psychiatric:pt says his mother had mental illness, but he does not know what it was  Suicide attempt:pt first denies , then says he is not sure  Chemical Dependency:pt is not sure  Diabetes:pt does not know  Dyslipidemia:pt does not know         SOCIAL HISTORY:     Social History     Tobacco Use    Smoking status: Heavy Smoker     Current packs/day: 1.00     Average packs/day: 1 pack/day for 10.0 years (10.0 ttl pk-yrs)     Types: Cigarettes    Smokeless tobacco: Never   Substance Use Topics    Alcohol use: Yes     Alcohol/week: 1.0 standard drink of alcohol     Comment: social    Drug use: Yes     Types: Other     Comment: Drug use: No definate answer given       Patient was born and raised in [place].  Parents .  Siblings:2 sister    Relationship with family:limited  Abuse:pt denies  Education:[employer]  Employment: retired, SSDI  Merital status:never   Children:pt denies   Living situation:  Legal problems:pt denies  Financial problems:pt is not sure  :pt denies   Strength:pt does not identify  Weakness: pt does not identify  Hobby:pt does not identify         MENTAL STATUS EXAM:   General: fair hygiene, cooperative  Orientation:to self, place, time  Speech: normal in rate and tone  Language: intact  Thought processes: concrete  Thought content: positive for paranoid delusions, denies hallucinations  Suicidal thoughts: present  Homicidal thoughts: denies    Associations: connected  Affect: Anxious, irritable   Mood: depressed   Intellectual functioning: average  Fund of knowledge: consistent with education and experience  Attention and concentration: decreased  Memory: affected by psychosis   Gait: steady  Psycho-motor  "activity: mild  agitation  Muscle tone:no atrophy, no involuntary movement  Insight and judgment: inadequate         PHYSICAL EXAM:   Vitals: BP 97/67 (BP Location: Left arm, Patient Position: Sitting, Cuff Size: Adult Regular)   Pulse 55   Temp 97.4  F (36.3  C) (Temporal)   Resp 16   Ht 1.753 m (5' 9\")   Wt 87.2 kg (192 lb 3.9 oz)   SpO2 97%   BMI 28.39 kg/m      General:patient is not in acute distress  HEENT: head is normocephalic/atraumatic,  Pupils equal ,round, extraocular movements intact  Nasal passages transient  Oropharynx clear  Neck: Supple  Lungs: breathing is unlabored  Extremities: No cyanosis, edema, clubbing  Skin: Normal color, no rash  Neurological exam: Nonfocal, CN II to XII grossly intact, Strength 5/5 x 4, sensory grossly intact to light touch, coordination grossly intact  For full exam refer to medicine hospitalist exam nothing to ad.         LABS:     personally reviewed   Lab Results   Component Value Date    WBC 5.4 04/21/2025    HGB 14.0 04/21/2025    HCT 41.1 04/21/2025     04/21/2025    CHOL 148 04/19/2019    TRIG 136 04/19/2019    HDL 32 (L) 04/19/2019    ALT 12 04/21/2025    AST 18 04/21/2025     04/21/2025    BUN 11.6 04/21/2025    CO2 30 (H) 04/21/2025    TSH 4.37 (H) 04/20/2025      Latest Reference Range & Units 04/21/25 11:34   Sodium 135 - 145 mmol/L 136   Potassium 3.4 - 5.3 mmol/L 4.4   Chloride 98 - 107 mmol/L 100   Carbon Dioxide (CO2) 22 - 29 mmol/L 30 (H)   Urea Nitrogen 8.0 - 23.0 mg/dL 11.6   Creatinine 0.67 - 1.17 mg/dL 0.79   GFR Estimate >60 mL/min/1.73m2 >90   Calcium 8.8 - 10.4 mg/dL 8.7 (L)   Anion Gap 7 - 15 mmol/L 6 (L)   Albumin 3.5 - 5.2 g/dL 4.0   Protein Total 6.4 - 8.3 g/dL 5.9 (L)   Alkaline Phosphatase 40 - 150 U/L 76   ALT 0 - 70 U/L 12   AST 0 - 45 U/L 18   Bilirubin Total <=1.2 mg/dL 0.6   Glucose 70 - 99 mg/dL 102 (H)      Latest Reference Range & Units 04/21/25 11:34   FIO2  0   Ph Venous 7.32 - 7.43  7.42   PCO2 Venous 40 - 50 mm " Hg 48   PO2 Venous 25 - 47 mm Hg 43   O2 Sat, Venous 70.0 - 75.0 % 81.8 (H)   Bicarbonate Venous 21 - 28 mmol/L 31 (H)   Base Excess Venous -3.0 - 3.0 mmol/L 5.5 (H)   Oxyhemoglobin Venous 70 - 75 % 80 (H)   WBC 4.0 - 11.0 10e3/uL 5.4   Hemoglobin 13.3 - 17.7 g/dL 14.0   Hematocrit 40.0 - 53.0 % 41.1   Platelet Count 150 - 450 10e3/uL 170   RBC Count 4.40 - 5.90 10e6/uL 4.46   MCV 78 - 100 fL 92   MCH 26.5 - 33.0 pg 31.4   MCHC 31.5 - 36.5 g/dL 34.1   RDW 10.0 - 15.0 % 11.9      [facility] Reference Range & Units 04/21/25 09:57 04/21/25 12:33   Color Urine Colorless, Straw, Light Yellow, Yellow  Light Yellow    Appearance Urine Clear  Clear    Glucose Urine Negative mg/dL Negative    Bilirubin Urine Negative  Negative    Ketones Urine Negative mg/dL Negative    Specific Gravity Urine 1.003 - 1.035  1.009    pH Urine 5.0 - 7.0  6.5    Protein Albumin Urine Negative mg/dL Negative    Urobilinogen mg/dL Normal mg/dL Normal    Nitrite Urine Negative  Negative    Blood Urine Negative  Negative    Leukocyte Esterase Urine Negative  Negative    Amphetamine Qual Urine Screen Negative  Screen Negative    Fentanyl Qual Urine Screen Negative  Screen Negative    Cocaine Urine Screen Negative  Screen Negative    Benzodiazepine Urine Screen Negative  Screen Negative    Opiates Qualitative Urine Screen Negative  Screen Negative    PCP Urine Screen Negative  Screen Negative    Cannabinoids Qual Urine Screen Negative  Screen Negative    Barbiturates Qual Urine Screen Negative  Screen Negative    ECHOCARDIOGRAM COMPLETE   Rpt (C)         DIAGNOSIS:          Principal Problem:      <principal problem not specified>    Active Problem List:    Patient Active Problem List   Diagnosis    Schizoaffective disorder, bipolar type (H)    Schizophrenia (H)    Alcohol abuse    Dyslipidemia    Gastroesophageal reflux disease without esophagitis    Hypothyroidism due to acquired atrophy of thyroid    Tobacco use disorder    Schizoaffective disorder  (H)    Acute psychosis (H)    Atrial flutter, unspecified type (H)    Aortic root dilatation    Mood changes           ASSESSMENT  AND TREATMENT  RECOMMENDATIONS:     Patient was admitted to psychiatric unit for safety, stabilization and medication management.  We discussed diagnosis and treatment and these are the recommendations for treatment:    Medications:      We discussed side effects, benefits and alternative treatment and patient agrees with capacity too do so.   Suicide precautions   will obtain collateral information and  make outpatient referrals   Rule 25 for chemical dependency treatment  Patient will attend groups.  Staff will  provide emotional support.  Labs reviewed personally:  Consults: As needed according to patient's symptoms report    The patient was seen, medical record reviewed, care coordinated with the team.    This note was created with the help of Dragon  dictation system.  All typing and grammatical errors or context distortion are unintentional and inherent to software.    Marilyn Jaffe MD    Initial Certification I certify that the inpatient psychiatric facility admission was medically necessary for treatment which could reasonably be expected to improve the patient s condition.        I estimate TBD days of hospitalization is necessary for proper treatment of the patient. My plans for post-hospital care this patient are;medications, appointment      Marilyn Pitts MD   Screen Negative    ECHOCARDIOGRAM COMPLETE   Rpt (C)         DIAGNOSIS:     Paranoid schizophrenia  chronic with acute exacerbation    Principal Problem:      Paranoid schizophrenia panic with acute exacerbation    Active Problem List:    Patient Active Problem List   Diagnosis    Schizoaffective disorder, bipolar type (H)    Schizophrenia (H)    Alcohol abuse    Dyslipidemia    Gastroesophageal reflux disease without esophagitis    Hypothyroidism due to acquired atrophy of thyroid    Tobacco use disorder    Schizoaffective disorder (H)    Acute psychosis (H)    Atrial flutter, unspecified type (H)    Aortic root dilatation    Mood changes           ASSESSMENT  AND TREATMENT  RECOMMENDATIONS:     Patient was admitted to psychiatric unit for safety, stabilization and medication management.  He is on a court hold, waiting for commitment hearing. He was under commitment in 2023  He refuses injectable Invega Sustenna which he was taking in the outpatient basis.  He has been on it for several years.  He was due on April 18, 2025.  He says that injection hurts for days and he does not want to take it, but he says that he wants to take pill.  His last psychiatric admission in [facility] was listed in 2023.  Injectable Invega provides continuity of care, he will has stable housing in a group home which he did not have in the past, so it is beneficial for his mental health.  Once there is Wu, provider, injectable Invega will be used again.  He is now refusing to go back to the group home.  Will discuss it when his symptoms improve.  We discussed diagnosis and treatment and these are the recommendations for treatment:    Medications:  Invega 6 mg daily for schizophrenia  Trazodone 50 mg at at bedtime as needed for sleep  Multivitamins daily  Thiamine 100 mg daily  Folic acid 1 mg daily  Tylenol 650 mg every 4 hours as needed pain  Maalox 30 mL every 4 hours as needed  Refresh Plus ophthalmic solution 0.5% 1 drop to both eyes 3  times daily as needed for dry eyes  Flexeril 10 mg 3 times daily as needed for muscle spasms  Neurontin 100 mg every 6 hours as needed for anxiety  Zyprexa 2.5 mg 3 times daily as needed for agitation  Senna docusate 1 pill twice daily as needed for constipation  We discussed side effects, benefits and alternative treatment and patient agrees with oral medication, refuses IM.   Legal: Court hold  Suicide precautions  Elopement precautions  Fall precautions  Full code   will obtain collateral information and  make outpatient referrals   Patient will attend groups.  Staff will  provide emotional support.  Labs reviewed personally:  Consults: medicine consult for co management of nonpsychiatric disorders .    The patient was seen, medical record reviewed, care coordinated with the team.    This note was created with the help of Dragon  dictation system.  All typing and grammatical errors or context distortion are unintentional and inherent to software.    Marilyn Jaffe MD    Initial Certification I certify that the inpatient psychiatric facility admission was medically necessary for treatment which could reasonably be expected to improve the patient s condition.        I estimate TBD days of hospitalization is necessary for proper treatment of the patient. My plans for post-hospital care this patient are;medications, appointment      Marilyn Pitts MD

## 2025-04-24 NOTE — PLAN OF CARE
Patient was mostly isolative/withdrawn into his room. He came out for meals. Took his medications without any issue. He did not attend any groups. Not social with peers. He presented with a flat affect. Mood is calm. Thought process is disorganized. He was not able to answer  assessment questions. He did not make any delusional/paranoid statements this shift, no behavioral outburst. Unable to complete admission profile due to patient being way disorganized. He refused lab draw this morning and they were rescheduled for tomorrow.

## 2025-04-24 NOTE — PROVIDER NOTIFICATION
"   04/24/25 1103   Individualization/Patient Specific Goals   Patient Personal Strengths expressive of needs;resourceful;resilient   Patient Vulnerabilities limited support system;lacks insight into illness;substance abuse/addiction   Interprofessional Rounds   Summary Pt admitted to unit from [facility]. Pt was brought to [facility] ED following pt reporting to a gas station to \"turn himself in.\" Pt had eloped from his group home four days before that. Pt had not been medication compliant.   Participants nursing;OT;CTC;psychiatrist   Behavioral Team Discussion   Participants Dr. Marilyn Jaffe MD; Crystal Armijo, RN; Dora Johnson, OT; Jayna Funk, SW, CTC   Progress Pt just admitted to the unit. Pt refused to engage verbally in an assessment upon arrival on the unit. Pt likes to repond to questions in written form, on his own time. This appears to be volitional. [facility] ED staff petitioned for commitment with [facility]. Commitment petition was supported and pt is on a court hold as of 4/23/2025.   Anticipated length of stay 7-10 days   Continued Stay Criteria/Rationale medication management and evaluation of symptoms, court process, possible placement issue   Medical/Physical see H&P and provider notes   Precautions see below   Plan to be determined   Rationale for change in precautions or plan NA   Safety Plan per unit protocol   Anticipated Discharge Disposition group home     PRECAUTIONS AND SAFETY    Behavioral Orders   Procedures    Code 1 - Restrict to Unit    Elopement precautions    Fall precautions    Routine Programming     As clinically indicated    Status 15     Every 15 minutes.    Suicide precautions: Suicide Risk: HIGH; Clinical rationale to override score: Exhibiting Suicidal/self-harm behaviors or thoughts     Patients on Suicide Precautions should have a Combination Diet ordered that includes a Diet selection(s) AND a Behavioral Tray selection for Safe Tray - with utensils, or Safe Tray " - NO utensils       Order Specific Question:   Suicide Risk     Answer:   HIGH     Order Specific Question:   Clinical rationale to override score:     Answer:   Exhibiting Suicidal/self-harm behaviors or thoughts       Safety  Safety WDL: .WDL except  Safety Measures: belongings check completed, clinical history reviewed, environmental rounds completed, safety rounds completed, self-directed behavior promoted  Elopement Assessment:  (on 1:1 at [facility] for concerns of elopement)  Elopement Interventions: status 15

## 2025-04-24 NOTE — PLAN OF CARE
Problem: Psychotic Signs/Symptoms  Goal: Improved Sleep (Psychotic Signs/Symptoms)  Outcome: Progressing   Goal Outcome Evaluation:       Received in bed asleep, appeared comfortable, came out and requested for a headphone to listen to music.  Kept on NPO for Lipid profile and Hgb A1C lab in Am  Pt has a no roommate order due to disorganized behavior, Psychosis and delusional thoughts. No behavioral or safety concerns tonight.Slept for 5.5 hours    All precautions in place

## 2025-04-24 NOTE — PLAN OF CARE
"Team Note Due:  Thursday    Assessment/Intervention/Current Symtoms and Care Coordination:  Chart review and met with team, discussed pt progress, symptomology, and response to treatment.  Discussed the discharge plan and any potential impediments to discharge.    Court hold documentation was dropped off by [facility]. Copy was placed in pt's chart and one will be provided to pt.    Writer introduced self to pt and explained role in his care. When asked how pt was doing, he said \"the [place] are losing.\" Writer asked pt a few other questions about if he knew why he was in the hospital and he wasn't able to provide an answer. Writer asked if he thought he was in legal trouble as he reported in the ED notes several times, and he shrugged and said he might be. He confirmed that his 's name is Guerda. Writer stated that I would be contacting her and he agreed. Writer asked pt if he wanted to go back to his group home and he said \"sure.\" Writer asked pt if he had any questions about being here or about his court hold and he said no and writer provided him with the paperwork. Writer told pt how he can let staff know if he wants to meet with me.    Writer called and left message for pt's ACT team  Guerda. Informed her that pt transferred to our unit from [facility], provided unit number and contact information for writer.     Discharge Plan or Goal:  To be determined     Barriers to Discharge:  Patient requires further psychiatric stabilization due to current symptomology and medication management with possible adjustments    Referral Status:  To be determined      Legal Status:  [facility]  File Number: [id number]  Upcoming Hearings:  Exam Hearing on 04/28/2025 April 28, 2025 at 1:00 PM  Preliminary/Probable Cause Hearing on 04/28/2025 April 28, 2025 at 2:00 PM  Wu Hearing on 04/28/2025 April 28, 2025 at 2:01 PM    Contacts (include PEG status):  Guerda: ACT team case " manager [phone]      Upcoming Meetings and Dates/Important Information and next steps:  Touch base with ACT team   Try to clarify background and collateral information

## 2025-04-24 NOTE — PLAN OF CARE
Admission:         75 year old male admitted on court hold via transport from [facility]. Hx schizophrenia. Found at gas station on 4/20 after being missing from his group home for 4 days and reportedly not taking prescribed meds. Reportedly stated he was turning himself in to [employer] as a fugitive. On court hold. Per report from ED, pt is independent with ADL's, is continent of bowel & bladder, and denied SI. He had been on 1:1 there as he was in an unlocked area and there were apparently concerns for potential elopement. On arrival to unit, pt calm and directable. However, when writer began to ask admission profile questions, pt took a piece of paper and a pen from writer and began to write questions down, stating he was not going to answer questions verbally but preferred to write down the questions and then write his responses to them at a later time. Unable to complete profile as a result. Gait balanced & steady. Some drooling observed. Safety search completed. Belongings documented. Oriented to unit, room. Vitals assessed. Care plan initiated. Status 15 initiated. Fall, suicide & elopement precautions initiated. Admission orders acknowledged.

## 2025-04-24 NOTE — CONSULTS
[facility]  Consult Note - Hospitalist Service  Date of Admission:  4/23/2025  Consult Requested by: Dr. Jaffe   Reason for Consult: medical comanagement     Assessment & Plan   Ger Bashir is a 75 year old male admitted on 4/23/2025. He has a past medical history of schizophrenia, heart failure with preserved EF, alcohol use disorder, anxiety, depression, substance use admitted on 4/24/2025 to  for decompensated schizophrenia. He was recently admitted to [facility] after he eloped from group home with psychosis and was found at gas [facility] stating that he was a fugitive. Internal medicine was routinely consulted for medical comanagement.   ____________________________    # Acute decompensated schizophrenia  # Eloped from care facility, noncompliance with medication.  # Anxiety, depression.  Eloped from group home 4/16, found to gas station 4/20 endorsing he is a fugitive and try and turn himself in.  Per report third elopement from group home. Was admitted to [facility] internal medicine 4/20. Now admitted to  for treatment of decompensated schizophrenia. PTA on trazodone, Cogentin, IM Invega sustenna.   - As managed per psychiatry     # Chronic intermittent chest pain  # Possible history A-fib  # Chronic bifascicular block, L anterior fascicular block, R bundle branch block,   # History of chronic HFpEF, grade 1 diastolic dysfunction  Of note, has complained of chronic intermittent chest pain sx> 1yr prior.  States symptoms are regular.  Does not have any as needed nitro per chart review. Recent ECG reviewed, NSR on arrival (with artifact) bifascicular block LVH, LAD.  However chart review indicates history of A-fib, no PTA DOAC.  History HFpEF most recent EF 55-60%.   - Follow up with PCP and establish care with cardiology team outpatient      # Incidental aortic root/aortic dilation  Aortic root 4.3 cm and ascending aorta dilation 4 cm.  No previous  "comparison.  - Outpatient follow-up TTE yearly monitoring, good BP control.  - Age-appropriate health maintenance on PCP visit.     # Hyponatremia, resolved  Presented with sodium of 131, given patient eloped likely related to poor oral intake.  Creatinine within normal limits.  Now sodium improved.  - Follow up with PCP for ongoing monitoring     # Mild hypocalcemia, resolved  Minimal hypocalcemia at 8.6, albumin is 3.94 oh.  Not clinically relevant/causing above symptoms.  Increased p.o. intake.  - Monitor with PCP outpatient      # Mildly elevated TSH  Admit TSH 4.37 although free T4 is 1.40.  No PTA Synthroid/meds.  No convincing symptoms of hyperthyroidism, despite psych decompensation  - PCP recheck thyroid function in 10 -12 weeks.      # History of nicotine use   Patient reports he uses E-cig sometimes, vague about when.    - Cessation encouraged, NRT per primary team      # History of alcohol use disorder   Patient declines any recent use.  States he will drink when he gets money but cannot afford it.  LFTs are WNL.  JOSR negative.  - Continue thiamine multivitamin, folic acid supplements.  - Encourage cessation      # History of drug use  - Patient declines recent use - recent UDS is negative     The patient's care was discussed with the Patient.    Clinically Significant Risk Factors Present on Admission                             # Overweight: Estimated body mass index is 29.04 kg/m  as calculated from the following:    Height as of this encounter: 1.753 m (5' 9\").    Weight as of this encounter: 89.2 kg (196 lb 10.4 oz).       # Financial/Environmental Concerns:           Michael Melendrez PA-C  Hospitalist Service  Securely message with [employer] (more info)  Text page via [facility] Paging/Directory   ______________________________________________________________________    Chief Complaint   Schizophrenia     History is obtained from the patient    History of Present Illness   Ger Bashir is a 75 year old male " who presented as a direct admit from [facility]. No acute medical concerns mentioned. Feeling well. States he has some chronic back pain but no other complaints.       Past Medical History    Past Medical History:   Diagnosis Date    Aortic root dilatation 04/21/2025 4/21/2025- Incidental aortic root/aortic dilation on echo  - Aortic root dilation 4.3 cm and ascending aorta dilation 4 cm.  No previous- Outpatient follow-up yearly monitoring, good BP control      Schizophrenia (H)        Past Surgical History   No past surgical history on file.    Medications   Medications Prior to Admission   Medication Sig Dispense Refill Last Dose/Taking    acetaminophen (TYLENOL) 500 MG tablet Take 500-1,000 mg by mouth every 6 hours as needed for mild pain. 500 mg for pain level 1-5.  1000 mg for pain level 6-10.       benztropine (COGENTIN) 1 MG tablet Take 1 tablet (1 mg) by mouth 2 times daily 60 tablet 0     carboxymethylcellulose PF (CARBOXYMETHYLCELLULOSE SODIUM) 0.5 % ophthalmic solution Place 1 drop into both eyes 3 times daily as needed for dry eyes 70 each 0     folic acid (FOLVITE) 1 MG tablet Take 1 tablet (1 mg) by mouth daily.       melatonin 3 MG tablet Take 3 mg by mouth at bedtime.       multivitamin w/minerals (THERA-VIT-M) tablet Take 1 tablet by mouth daily.       paliperidone (INVEGA SUSTENNA) 234 MG/1.5ML CHRISTOS Inject 1.5 mLs (234 mg) into the muscle every 28 days Next dose due 12/27/19       thiamine (B-1) 100 MG tablet Take 1 tablet (100 mg) by mouth daily.       traZODone (DESYREL) 50 MG tablet Take 25 mg by mouth nightly as needed for sleep.                Physical Exam   Vital Signs: Temp: 98.4  F (36.9  C)   BP: 107/66     Resp: 18 SpO2: 94 %      Weight: 196 lbs 10.41 oz    Constitutional: awake, alert, cooperative  Respiratory: No increased work of breathing, good air exchange, clear to auscultation bilaterally, no crackles or wheezing  Cardiovascular: Normal apical impulse, regular rate and  rhythm, and no murmur noted  GI: normal bowel sounds, soft, non-distended, non-tender  Skin: no bruising or bleeding of visible skin, no jaundice   Musculoskeletal: There is no redness, warmth, or swelling of the visible joints, no STEPHANIE, no calve swelling   Neurologic: Awake, alert, oriented to name, place and time.        Medical Decision Making       70 MINUTES SPENT BY ME on the date of service doing chart review, history, exam, documentation & further activities per the note.      Data   Recent Labs   Lab 04/21/25  1134 04/20/25  2130 04/20/25  1938   WBC 5.4  --  6.4   HGB 14.0  --  14.0   MCV 92  --  92     --  183     --  131*   POTASSIUM 4.4  --  3.5   CHLORIDE 100  --  93*   CO2 30*  --  29   BUN 11.6  --  12.4   CR 0.79  --  0.80   ANIONGAP 6*  --  9   ZAHRA 8.7*  --  8.6*   *  --  103*   ALBUMIN 4.0 3.9  --    PROTTOTAL 5.9* 5.8*  --    BILITOTAL 0.6 0.7  --    ALKPHOS 76 88  --    ALT 12 11  --    AST 18 23  --

## 2025-04-24 NOTE — PLAN OF CARE
04/23/25 2142   Patient Belongings   Did you bring any home meds/supplements to the hospital?  No   Patient Belongings locker;sent to security per site process   Patient Belongings Put in Hospital Secure Location (Security or Locker, etc.) cash/credit card   Belongings Search Yes   Clothing Search Yes   Second Staff Hector S RN     Goal Outcome Evaluation:  Security Envelope #[id number]: mastercard debit [id number], mastercard debit [id number], check #[id number] for $125, $545 in cash    Control bag #[id number]: vape pen    In Locker: sweatpants, underwear, long sleeve, tshirt, pair of tennis shoes, 1 pair socks, comb, eyeglasses case, medical and commitment paperwork, nail clippers, pen, wallet, [employer] card, assorted lottery tickets, various notes, coins    A               Admission:  I am responsible for any personal items that are not sent to the safe or pharmacy.  [facility] is not responsible for loss, theft or damage of any property in my possession.    Signature:  _________________________________ Date: _______  Time: _____                                              Staff Signature:  ____________________________ Date: ________  Time: _____      2nd Staff person, if patient is unable/unwilling to sign:    Signature: ________________________________ Date: ________  Time: _____     Discharge:  [facility] has returned all of my personal belongings:    Signature: _________________________________ Date: ________  Time: _____                                          Staff Signature:  ____________________________ Date: ________  Time: _____

## 2025-04-24 NOTE — PLAN OF CARE
"In room, listening to music upon initial check in. Mostly isolating to room. Presents as guarded, withdrawn. Denied SI/HI, A/VH. Endorses a postive mood. Good eye contact. No scheduled medication this shift. No acute behavioral or safety concerns identified this shift.     Denied pain, having any acute physical concerns. Somewhat hypotensive, 97/67. States that he ate all of his dinner.     BP 97/67 (BP Location: Left arm, Patient Position: Sitting, Cuff Size: Adult Regular)   Pulse 55   Temp 97.4  F (36.3  C) (Temporal)   Resp 16   Ht 1.753 m (5' 9\")   Wt 87.2 kg (192 lb 3.9 oz)   SpO2 97%   BMI 28.39 kg/m      "

## 2025-04-24 NOTE — PHARMACY-ADMISSION MEDICATION HISTORY
Please see admission medication reconciliation note placed 4/21/25 for information regarding prior to admission medications.    Alanna Rosas, PharmD  PGY-2 Behavioral Health Pharmacy Resident  [facility] ([facility])  Available on [employer]

## 2025-04-24 NOTE — PLAN OF CARE
04/24/25 1400   General Information   Has Not Attended OT as of: 04/24/25     Pt has not attended scheduled occupational therapy sessions. Encourage attendance and participation.

## 2025-04-25 PROBLEM — F20.0 PARANOID SCHIZOPHRENIA, CHRONIC CONDITION WITH ACUTE EXACERBATION (H): Status: ACTIVE | Noted: 2025-04-25

## 2025-04-25 PROCEDURE — 250N000013 HC RX MED GY IP 250 OP 250 PS 637: Performed by: PSYCHIATRY & NEUROLOGY

## 2025-04-25 PROCEDURE — 99232 SBSQ HOSP IP/OBS MODERATE 35: CPT | Performed by: PSYCHIATRY & NEUROLOGY

## 2025-04-25 PROCEDURE — 124N000002 HC R&B MH UMMC

## 2025-04-25 RX ORDER — PALIPERIDONE 3 MG/1
6 TABLET, EXTENDED RELEASE ORAL DAILY
Status: DISPENSED | OUTPATIENT
Start: 2025-04-25

## 2025-04-25 RX ADMIN — PALIPERIDONE 6 MG: 3 TABLET, EXTENDED RELEASE ORAL at 08:28

## 2025-04-25 RX ADMIN — THIAMINE HCL TAB 100 MG 100 MG: 100 TAB at 08:28

## 2025-04-25 RX ADMIN — Medication 1 TABLET: at 08:28

## 2025-04-25 RX ADMIN — FOLIC ACID 1 MG: 1 TABLET ORAL at 08:28

## 2025-04-25 ASSESSMENT — ACTIVITIES OF DAILY LIVING (ADL)
ADLS_ACUITY_SCORE: 44
HYGIENE/GROOMING: INDEPENDENT
ADLS_ACUITY_SCORE: 44
ADLS_ACUITY_SCORE: 44
ORAL_HYGIENE: INDEPENDENT
ADLS_ACUITY_SCORE: 44
DRESS: INDEPENDENT
LAUNDRY: UNABLE TO COMPLETE
DRESS: INDEPENDENT;SCRUBS (BEHAVIORAL HEALTH)
ADLS_ACUITY_SCORE: 44
HYGIENE/GROOMING: INDEPENDENT
ADLS_ACUITY_SCORE: 44
ORAL_HYGIENE: INDEPENDENT

## 2025-04-25 NOTE — PLAN OF CARE
Problem: Adult Behavioral Health Plan of Care  Goal: Plan of Care Review  Outcome: Progressing  Goal Outcome Evaluation:    Plan of Care Reviewed With: patient      Patient continue to be isolative and withdrawn in the room listening to headphone music most of the time but visible at the milieu for meals. Is alert and oriented,  pleasant and cooperative with staff,  Affect is flat blunted, reports okay appetite with good fluid intake, says mood  is okay,  rates depression 6/10, no delusional/paranoid statements this shift and denies anxiety and all other MH symptoms. No complains of pain and took all of the scheduled medications.    Patient refused lab yesterday, was NPO today but he ate breakfast and got rescheduled for tomorrow again.

## 2025-04-25 NOTE — PLAN OF CARE
"  Problem: Psychotic Signs/Symptoms  Goal: Increased Participation and Engagement (Psychotic Signs/Symptoms)  Outcome: Not Progressing   Goal Outcome Evaluation:    Plan of Care Reviewed With: patient          Mood: HANNY, pt refuses to answer questions      Affect: flat and guarded    Thought Process:  HANNY, pt refuses to answer questions      Psychotic Symptoms: HANNY, pt refuses to answer questions     Suicidality: No, denies     Self-Injurious Behavior: No    Sexually Inappropriate Behavior? No        Gait/Mobility: ambulates independently; steady    Assistive Devices: no assistive devices    Bladder: continent    Bowel: continent          Sleep Disturbance: No    Appetite: Adequate    Medication Compliance: no scheduled meds this shift    Medication Concerns:  none    Medical bed: No     Precautions: SI, Sexual, Elopement, and Fall    No roommate order: Yes paranoid, disorganized, psychotic    SIO Status: standard every 15 min safety checks    Brief Shift Overview:   Pt continues to isolate to room  Wears headphones almost constantly  Makes eye contact when approached, affect flat & guarded  When asked if he had written the answers to the admission questions writer had for him which he had written down when admitted, he stated \"No, I haven't had time yet. Maybe tomorrow.\"  Refused to answer any questions verbally  When asked if he had any suicidal ideation, he did respond \"no\", but he immediately put headphones back on    Recommendations:  Continue with current treatment plan and recommendations.  Continue to monitor and reassess symptoms.   Monitor response to medications.   Monitor progress towards treatment goals.   Encourage groups and participation.                  "

## 2025-04-25 NOTE — PLAN OF CARE
Problem: Psychotic Signs/Symptoms  Goal: Improved Sleep (Psychotic Signs/Symptoms)  Outcome: Progressing   Goal Outcome Evaluation:                  Received asleep with headphone on. Kept on NPO for Lipid profile and Hgb A1c. Pt refused labs yesterday.  Pt has a no roommate order due to disorganized behavior, Psychosis and delusional thoughts. No behavioral or safety concerns tonight.  Slept for 10.5 hours

## 2025-04-25 NOTE — PLAN OF CARE
Team Note Due:  Thursday    Assessment/Intervention/Current Symtoms and Care Coordination:  Chart review and met with team, discussed pt progress, symptomology, and response to treatment.  Discussed the discharge plan and any potential impediments to discharge.    Pt admitted for eloping group home, medication non-compliance, and paranoid/delusional thinking. Petition for commitment was started in the ED. Pt arrived to the unit on a court hold. Pt is calm, but paranoid. Mostly isolative to his room. If he does come out, he usually is wearing headphones and doesn't engage with others.    Checked in with pt.    Discharge Plan or Goal:  To be determined     Barriers to Discharge:  Patient requires further psychiatric stabilization due to current symptomology and medication management with possible adjustments    Referral Status:  To be determined      Legal Status:  Court Hold  [facility]  File Number: [id number]  Upcoming Hearings:  Exam Hearing on 04/28/2025 April 28, 2025 at 1:00 PM  Preliminary/Probable Cause Hearing on 04/28/2025 April 28, 2025 at 2:00 PM  Wu Hearing on 04/28/2025 April 28, 2025 at 2:01 PM    Contacts (include PEG status):  [place]: ACT team  [phone]      Upcoming Meetings and Dates/Important Information and next steps:  Touch base with ACT team   Try to clarify background and collateral information

## 2025-04-26 LAB
CHOLEST SERPL-MCNC: 152 MG/DL
EST. AVERAGE GLUCOSE BLD GHB EST-MCNC: 100 MG/DL
HBA1C MFR BLD: 5.1 %
HDLC SERPL-MCNC: 48 MG/DL
LDLC SERPL CALC-MCNC: 88 MG/DL
NONHDLC SERPL-MCNC: 104 MG/DL
TRIGL SERPL-MCNC: 81 MG/DL

## 2025-04-26 PROCEDURE — 250N000013 HC RX MED GY IP 250 OP 250 PS 637: Performed by: PSYCHIATRY & NEUROLOGY

## 2025-04-26 PROCEDURE — 80061 LIPID PANEL: CPT | Performed by: PSYCHIATRY & NEUROLOGY

## 2025-04-26 PROCEDURE — 124N000002 HC R&B MH UMMC

## 2025-04-26 PROCEDURE — 36415 COLL VENOUS BLD VENIPUNCTURE: CPT | Performed by: PSYCHIATRY & NEUROLOGY

## 2025-04-26 PROCEDURE — 83036 HEMOGLOBIN GLYCOSYLATED A1C: CPT | Performed by: PSYCHIATRY & NEUROLOGY

## 2025-04-26 RX ADMIN — THIAMINE HCL TAB 100 MG 100 MG: 100 TAB at 08:18

## 2025-04-26 RX ADMIN — Medication 1 TABLET: at 08:18

## 2025-04-26 RX ADMIN — PALIPERIDONE 6 MG: 3 TABLET, EXTENDED RELEASE ORAL at 08:18

## 2025-04-26 RX ADMIN — FOLIC ACID 1 MG: 1 TABLET ORAL at 08:18

## 2025-04-26 ASSESSMENT — ACTIVITIES OF DAILY LIVING (ADL)
HYGIENE/GROOMING: INDEPENDENT
LAUNDRY: UNABLE TO COMPLETE
ADLS_ACUITY_SCORE: 44
DRESS: INDEPENDENT;SCRUBS (BEHAVIORAL HEALTH)
ADLS_ACUITY_SCORE: 44
ADLS_ACUITY_SCORE: 44
ORAL_HYGIENE: INDEPENDENT
ADLS_ACUITY_SCORE: 44
ORAL_HYGIENE: INDEPENDENT
HYGIENE/GROOMING: INDEPENDENT
ADLS_ACUITY_SCORE: 44
DRESS: INDEPENDENT
ADLS_ACUITY_SCORE: 44

## 2025-04-26 NOTE — PLAN OF CARE
Problem: Psychotic Signs/Symptoms  Goal: Improved Behavioral Control (Psychotic Signs/Symptoms)  Outcome: Progressing   Goal Outcome Evaluation:    Plan of Care Reviewed With: patient      Patient has been isolative and withdrawn in the room most of the shift but visible at the milieu for meals. Guarded and hesitate to answer  questions, affect is flat blunted, says mood is ok, depression is 5/10 and responded no to all other  questions. Patient is seen with headphones listening to music most of the time, noted to have hand tremors mostly when he is in the room and when asked, patient said it's been going on for 7 months and it happens when he is nervous, was offered PRN gabapentin but patient declined saying he takes enough medications in the morning, is eating and drinking adequately and was medication compliant.

## 2025-04-26 NOTE — PLAN OF CARE
Problem: Sleep Disturbance  Goal: Adequate Sleep/Rest  Outcome: Progressing     Pt asleep at start of the night shift. Pt appeared to has slept 7.5 hours based on Q 15 min rounds. No medication was requested or administered in this shift.

## 2025-04-26 NOTE — PROGRESS NOTES
PSYCHIATRY PROGRESS NOTE         DATE OF SERVICE:   4/25/2025         CHIEF COMPLAINT:     Paranoid delusions, continues to refuse IM Invega even though he has benefit of it for years, going through committment          OBJECTIVE:     Nursing reports : Patient stays in his room, took medications      reports working on outpatient referrals    Medicine consult by REBECA Person on 4/24/2025  # Acute decompensated schizophrenia  # Eloped from care facility, noncompliance with medication.  # Anxiety, depression.  Eloped from group home 4/16, found to gas station 4/20 endorsing he is a fugitive and try and turn himself in.  Per report third elopement from group home. Was admitted to [facility] internal medicine 4/20. Now admitted to  for treatment of decompensated schizophrenia. PTA on trazodone, Cogentin, IM Invega sustenna.   - As managed per psychiatry   # Chronic intermittent chest pain  # Possible history A-fib  # Chronic bifascicular block, L anterior fascicular block, R bundle branch block,   # History of chronic HFpEF, grade 1 diastolic dysfunction  Of note, has complained of chronic intermittent chest pain sx> 1yr prior.  States symptoms are regular.  Does not have any as needed nitro per chart review. Recent ECG reviewed, NSR on arrival (with artifact) bifascicular block LVH, LAD.  However chart review indicates history of A-fib, no PTA DOAC.  History HFpEF most recent EF 55-60%.   - Follow up with PCP and establish care with cardiology team outpatient   # Incidental aortic root/aortic dilation  Aortic root 4.3 cm and ascending aorta dilation 4 cm.  No previous comparison.  - Outpatient follow-up TTE yearly monitoring, good BP control.  - Age-appropriate health maintenance on PCP visit.  # Hyponatremia, resolved  Presented with sodium of 131, given patient eloped likely related to poor oral intake.  Creatinine within normal limits.  Now sodium improved.  - Follow up with PCP for ongoing  monitoring   # Mild hypocalcemia, resolved  Minimal hypocalcemia at 8.6, albumin is 3.94 oh.  Not clinically relevant/causing above symptoms.  Increased p.o. intake.  - Monitor with PCP outpatient   # Mildly elevated TSH  Admit TSH 4.37 although free T4 is 1.40.  No PTA Synthroid/meds.  No convincing symptoms of hyperthyroidism, despite psych decompensation  - PCP recheck thyroid function in 10 -12 weeks.   # History of nicotine use   Patient reports he uses E-cig sometimes, vague about when.    - Cessation encouraged, NRT per primary team   # History of alcohol use disorder   Patient declines any recent use.  States he will drink when he gets money but cannot afford it.  LFTs are WNL.  JOSR negative.  - Continue thiamine multivitamin, folic acid supplements.  - Encourage cessation   # History of drug use  - Patient declines recent use - recent UDS is negative         SUBJECTIVE:      Ger stays mostly in his room. He has less thoughts about the FBI being after him.  He talks about his twin  brother.  He says they were in the same hospital, then when he went to sleep they switched IDs.  He says he never met him.  He denies thoughts of suicide today, denies thoughts of hurting others.  He says that he wants to take oral Invega, but he refuses injectable Invega.  He says that he wants to return to his residence which he calls talked to home care.  He says he lives there with 4 people and he gets along well with them and he likes to staff.  He says again that he believes that they would take him back.  He states in the room most of the time.  He goes out to eat and then he isolates.  He says he slept through the night.  I asked him if he understands that he is going through commitment process and he confirms.  I remind him if he remembers that he went through commitment in the past that he confirms.  I reminded him that he would have commitment hearing next week and then will wait for charges orders.  He asked me if I  am going to give him injection.  I told him that we will discuss that next week after we get judges order.  I reminded him that he did well when he was on injection and according to the chart he has been on it for at least 3 years.  I reminded him that he has housing and his life is more stable when he takes injectable medication.I encourage him to come out of the room and to attend groups, but he refuses it.          MEDICATIONS:     Current Facility-Administered Medications   Medication Dose Route Frequency Provider Last Rate Last Admin    - Psychiatric Emergency -   Other See Admin Instructions Marilyn Jaffe MD        [Held by provider] benztropine (COGENTIN) tablet 1 mg  1 mg Oral BID Marilyn Jaffe MD        folic acid (FOLVITE) tablet 1 mg  1 mg Oral Daily Marilyn Jaffe MD   1 mg at 04/25/25 0828    multivitamin w/minerals (THERA-VIT-M) tablet 1 tablet  1 tablet Oral Daily Marilyn Jaffe MD   1 tablet at 04/25/25 0828    [Held by provider] paliperidone (INVEGA SUSTENNA) injection CHRISTOS 234 mg  234 mg Intramuscular Q28 Days Marilyn Jaffe MD        paliperidone ER (INVEGA) 24 hr tablet 6 mg  6 mg Oral Daily Marilyn Jaffe MD   6 mg at 04/25/25 0828    thiamine (B-1) tablet 100 mg  100 mg Oral Daily Marilyn Jaffe MD   100 mg at 04/25/25 0828     Current Facility-Administered Medications   Medication Dose Route Frequency Provider Last Rate Last Admin    acetaminophen (TYLENOL) tablet 650 mg  650 mg Oral Q4H PRN Marilyn Jaffe MD        alum & mag hydroxide-simethicone (MAALOX) suspension 30 mL  30 mL Oral Q4H PRN Marilyn Jaffe MD        carboxymethylcellulose PF (REFRESH PLUS) 0.5 % ophthalmic solution 1 drop  1 drop Both Eyes TID PRN Marilyn Jaffe MD        cyclobenzaprine (FLEXERIL) tablet 10 mg  10 mg Oral Q8H PRN Michael Melendrez PA-C        gabapentin (NEURONTIN) capsule 100 mg  100 mg Oral Q6H PRN Marilyn Jaffe MD        OLANZapine (zyPREXA) tablet 2.5 mg  2.5 mg Oral TID PRN Marilyn Jaffe MD   "      Or    OLANZapine (zyPREXA) injection 2.5 mg  2.5 mg Intramuscular TID PRN Marilyn Jaffe MD        senna-docusate (SENOKOT-S/PERICOLACE) 8.6-50 MG per tablet 1 tablet  1 tablet Oral BID PRN aMrilyn Jaffe MD        traZODone (DESYREL) tablet 50 mg  50 mg Oral At Bedtime PRN Marilyn Jaffe MD           Medication adherence: Yes with oral, refusing IM  Medication side effects: c/o of pain in injection site when he gets IM neuroleptic   Benefit: Symptom reduction         ROS:   As per history of present illness, otherwise reminder of review of systems is negative for: General, eyes, ears, nose, throat, neck, respiratory, cardiovascular, gastrointestinal, genitourinary, meniscal skeletal, neurological, hematological, dermatological and endocrine system.         MENTAL STATUS EXAM:   /83   Pulse 73   Temp 97  F (36.1  C) (Temporal)   Resp 16   Ht 1.753 m (5' 9\")   Wt 87.2 kg (192 lb 3.9 oz)   SpO2 97%   BMI 28.39 kg/m      Appearance:fair hygiene  Orientation:to self, place, partially in time   Speech:fluent  Language ability: intact  Thought process: concrete  Thought content: positive for paranoid delusions, denies hallucinations  Associations: Connected  Suicidal Ideation: denies   Homicidal Ideation:denies   Mood:  depressed  Affect: irritable   Intellectual functioning:average  Fund of Knowledge: consistent with education and experience   Attention/Concentration: decreased  Memory: affected by psychosis   Psychomotor Behavior: calm  Muscle Strength and Tone: no atrophy or involuntary movement  Gait and Station: steady  Insight and judgement:inadequate          LABS:   personally reviewed.   Lab Results   Component Value Date     04/21/2025     04/20/2025     02/22/2024     04/05/2020     12/10/2019     10/14/2019    CO2 30 04/21/2025    CO2 29 04/20/2025    CO2 30 02/22/2024    CO2 27 04/05/2020    CO2 34 12/10/2019    CO2 28 10/14/2019    BUN 11.6 04/21/2025 " "   BUN 12.4 04/20/2025    BUN 14.6 02/22/2024    BUN 16 04/05/2020    BUN 15 12/10/2019    BUN 12 10/14/2019     No results found for: \"CKTOTAL\", \"CKMB\", \"TROPONINI\"  Lab Results   Component Value Date    WBC 5.4 04/21/2025    WBC 6.4 04/20/2025    WBC 5.8 02/22/2024    WBC 7.2 04/05/2020    WBC 5.9 12/10/2019    WBC 6.7 10/14/2019    HGB 14.0 04/21/2025    HGB 14.0 04/20/2025    HGB 13.7 02/22/2024    HGB 15.7 04/05/2020    HGB 15.6 12/10/2019    HGB 15.3 10/14/2019    HCT 41.1 04/21/2025    HCT 40.7 04/20/2025    HCT 41.1 02/22/2024    HCT 44.6 04/05/2020    HCT 45.3 12/10/2019    HCT 42.9 10/14/2019    MCV 92 04/21/2025    MCV 92 04/20/2025    MCV 92 02/22/2024    MCV 91 04/05/2020    MCV 93 12/10/2019    MCV 89 10/14/2019     04/21/2025     04/20/2025     02/22/2024     04/05/2020     12/10/2019     10/14/2019     Lab Results   Component Value Date    CHOL 148 04/19/2019    CHOL 158 04/01/2012    TRIG 136 04/19/2019    TRIG 107 04/01/2012    HDL 32 04/19/2019    HDL 29 04/01/2012      Latest Reference Range & Units 04/20/25 21:30 04/21/25 11:34   Sodium 135 - 145 mmol/L  136   Potassium 3.4 - 5.3 mmol/L  4.4   Chloride 98 - 107 mmol/L  100   Carbon Dioxide (CO2) 22 - 29 mmol/L  30 (H)   Urea Nitrogen 8.0 - 23.0 mg/dL  11.6   Creatinine 0.67 - 1.17 mg/dL  0.79   GFR Estimate >60 mL/min/1.73m2  >90   Calcium 8.8 - 10.4 mg/dL  8.7 (L)   Anion Gap 7 - 15 mmol/L  6 (L)   Magnesium 1.7 - 2.3 mg/dL 2.0    Phosphorus 2.5 - 4.5 mg/dL 3.0    Albumin 3.5 - 5.2 g/dL 3.9 4.0   Protein Total 6.4 - 8.3 g/dL 5.8 (L) 5.9 (L)   Alkaline Phosphatase 40 - 150 U/L 88 76   ALT 0 - 70 U/L 11 12   AST 0 - 45 U/L 23 18   Bilirubin Direct 0.00 - 0.30 mg/dL 0.21    Bilirubin Total <=1.2 mg/dL 0.7 0.6   Glucose 70 - 99 mg/dL  102 (H)   T4 Free 0.90 - 1.70 ng/dL 1.40    Troponin T, High Sensitivity <=22 ng/L 8    TSH 0.30 - 4.20 uIU/mL 4.37 (H)       Latest Reference Range & Units 04/21/25 11:34 "   FIO2  0   Ph Venous 7.32 - 7.43  7.42   PCO2 Venous 40 - 50 mm Hg 48   PO2 Venous 25 - 47 mm Hg 43   O2 Sat, Venous 70.0 - 75.0 % 81.8 (H)   Bicarbonate Venous 21 - 28 mmol/L 31 (H)   Base Excess Venous -3.0 - 3.0 mmol/L 5.5 (H)   Oxyhemoglobin Venous 70 - 75 % 80 (H)   WBC 4.0 - 11.0 10e3/uL 5.4   Hemoglobin 13.3 - 17.7 g/dL 14.0   Hematocrit 40.0 - 53.0 % 41.1   Platelet Count 150 - 450 10e3/uL 170   RBC Count 4.40 - 5.90 10e6/uL 4.46   MCV 78 - 100 fL 92   MCH 26.5 - 33.0 pg 31.4   MCHC 31.5 - 36.5 g/dL 34.1   RDW 10.0 - 15.0 % 11.9      [facility] Reference Range & Units 04/21/25 09:57   Color Urine Colorless, Straw, Light Yellow, Yellow  Light Yellow   Appearance Urine Clear  Clear   Glucose Urine Negative mg/dL Negative   Bilirubin Urine Negative  Negative   Ketones Urine Negative mg/dL Negative   Specific Gravity Urine 1.003 - 1.035  1.009   pH Urine 5.0 - 7.0  6.5   Protein Albumin Urine Negative mg/dL Negative   Urobilinogen mg/dL Normal mg/dL Normal   Nitrite Urine Negative  Negative   Blood Urine Negative  Negative   Leukocyte Esterase Urine Negative  Negative   Amphetamine Qual Urine Screen Negative  Screen Negative   Fentanyl Qual Urine Screen Negative  Screen Negative   Cocaine Urine Screen Negative  Screen Negative   Benzodiazepine Urine Screen Negative  Screen Negative   Opiates Qualitative Urine Screen Negative  Screen Negative   PCP Urine Screen Negative  Screen Negative   Cannabinoids Qual Urine Screen Negative  Screen Negative   Barbiturates Qual Urine Screen Negative  Screen Negative       CT HEAD WO IV CONT, CT TRAUMA CERVICAL SCREEN T4-C1   LOCATION: [facility]   DATE/TIME: 2/24/2023 1:49 AM   INDICATION: GCS 14 or 15 - head injury and age over 64 years.   COMPARISON: 12/15/2020. 12/29/2022.   TECHNIQUE:   1) Routine CT Head without IV contrast. Multiplanar reformats. Dose reduction techniques were used.   2) Routine CT Cervical Spine without IV contrast. Multiplanar reformats. Dose  reduction techniques were used.   FINDINGS:   HEAD CT:   INTRACRANIAL CONTENTS: No intracranial hemorrhage, extraaxial collection, or mass effect.  No CT evidence of acute infarct. Normal parenchymal attenuation. Mild generalized volume loss. No hydrocephalus.        EKG 12-lead, tracing only 4/21/2025          Component  Ref Range & Units 4/21/25  9:06 AM 4/20/25  9:37 PM    Systolic Blood Pressure  mmHg      Diastolic Blood Pressure  mmHg      Ventricular Rate  BPM 72 69    Atrial Rate  BPM 72 249    AR Interval  ms 180     QRS Duration  ms 152 158    QT  ms 420 438    QTc  ms 459 469    P Axis  degrees 47 122    R AXIS  degrees -73 -76    T Axis  degrees 49 56    Interpretation ECG Sinus rhythm with Premature atrial complexes in a pattern of bigeminy  Right bundle branch block  Left anterior fascicular block  ** Bifascicular block **  Abnormal ECG  When compared with ECG of 20-Apr-2025 21:37,  Sinus rhythm has replaced Atrial fibrillation  Confirmed by MD REMY, ARI ([id number]) on 4/21/2025 11:07:20 AM               DIAGNOSIS:     Paranoid schizophrenia chronic with acute exacerbation     Patient Active Problem List   Diagnosis    Schizoaffective disorder, bipolar type (H)    Schizophrenia (H)    Alcohol abuse    Dyslipidemia    Gastroesophageal reflux disease without esophagitis    Hypothyroidism due to acquired atrophy of thyroid    Tobacco use disorder    Schizoaffective disorder (H)    Acute psychosis (H)    Atrial flutter, unspecified type (H)    Aortic root dilatation    Mood changes    Paranoid schizophrenia, chronic condition with acute exacerbation (H)          PLAN:   Patient and I discussed  diagnosis and treatment plan.  He was admitted to psychiatric unit for safety, stabilization and medication management.  He is on a court hold and he is waiting for commitment hearing.  He had prior commitment in 2023.   also reported that he was committed in 2019.  He refuses injectable Invega Sustenna  which he was taking in the outpatient basis for several years and which kept him out of the hospital and kept him in a stable housing for at least the last year.  He was due for injection on April 18, 2025, but he refused to take it because he said injection hurt 4 days and he only wanted to take a pill.  His last psychiatric admission in [facility] was listed in 2023.  Once there is Wu order under commitment, injectable Invega will be used again.  He agrees to go back to group home.  He likes it there and he hopes that they will take him back.He agrees  with the following recommendations:    Medications:  Invega 6 mg daily for paranoid schizophrenia   Trazodone 50 mg nightly as needed for sleep  Multivitamins 1 pill daily  Thiamine 100 mg daily  Folic acid 1 mg daily  Tylenol 650 mg every 4 hours as needed for pain  Maalox 30 mg every 4 as needed for indigestion  Refresh Plus ophthalmic solution 0.5% 1 drop to both eyes 3 times daily as needed for dry eyes  Flexeril 10 mg 3 times daily as needed for muscle spasms  Neurontin 100 mg every 6 hours as needed for anxiety  Zyprexa 2.5 mg 3 times daily as needed for agitation  Senna docusate 1 pill twice daily as needed for constipation  We discussed side effects, benefits and alternative treatments and patient agrees with with oral , but refuses IM Invega.   will collect collateral information and make outpatient referrals  Staff to provide emotional support and redirect as needed  Patient encouraged to attend groups  Lab results: Reviewed personally  Consultation: medicine consult completed     Risk Assessment: going through commitment due to non compliance with tx which led to psychotic break    Coordination of Care:   Patient seen, medical record reviewed, care coordinated with the team.    Total time:  More than 35 minutes spent on this visit with more than 50% time  spent on coordination of care with staff, team meeting, reviewing medical record,  educating patient about treatment options, side effects and benefits and alternative treatments for medications, providing supportive therapy regarding above issues,entering orders and preparing documentation for the visit.    This document is created with the help of Dragon dictation system.  All grammatical/typing errors or context distortion are unintentional and inherent to software.    Marilyn Jaffe MD        Re-Certification I certify that the inpatient psychiatric facility services furnished since the previous certification were, and continue to be, medically necessary for, either, treatment which could reasonably be expected to improve the patient s condition or diagnostic study and that the hospital records indicate that the services furnished were, either, intensive treatment services, admission and related services necessary for diagnostic study, or equivalent services.     I certify that the patient continues to need, on a daily basis, active treatment furnished directly by or requiring the supervision of inpatient psychiatric facility personnel.   I estimate TBD days of hospitalization is necessary for proper treatment of the patient. My plans for post-hospital care for this patient are : Medications, appointments     Marilny Jaffe MD

## 2025-04-27 PROCEDURE — 250N000013 HC RX MED GY IP 250 OP 250 PS 637: Performed by: PSYCHIATRY & NEUROLOGY

## 2025-04-27 PROCEDURE — 124N000002 HC R&B MH UMMC

## 2025-04-27 RX ADMIN — Medication 1 TABLET: at 08:11

## 2025-04-27 RX ADMIN — FOLIC ACID 1 MG: 1 TABLET ORAL at 08:11

## 2025-04-27 RX ADMIN — PALIPERIDONE 6 MG: 3 TABLET, EXTENDED RELEASE ORAL at 08:11

## 2025-04-27 RX ADMIN — THIAMINE HCL TAB 100 MG 100 MG: 100 TAB at 08:11

## 2025-04-27 ASSESSMENT — ACTIVITIES OF DAILY LIVING (ADL)
DRESS: INDEPENDENT
ADLS_ACUITY_SCORE: 44
ADLS_ACUITY_SCORE: 41
ADLS_ACUITY_SCORE: 44
LAUNDRY: UNABLE TO COMPLETE
ADLS_ACUITY_SCORE: 44
ADLS_ACUITY_SCORE: 44
ADLS_ACUITY_SCORE: 41
ADLS_ACUITY_SCORE: 44
DRESS: INDEPENDENT;SCRUBS (BEHAVIORAL HEALTH)
ADLS_ACUITY_SCORE: 44
ORAL_HYGIENE: INDEPENDENT
ADLS_ACUITY_SCORE: 41
ADLS_ACUITY_SCORE: 44
DIFFICULTY_EATING/SWALLOWING: NO
HYGIENE/GROOMING: INDEPENDENT
ORAL_HYGIENE: INDEPENDENT
HYGIENE/GROOMING: INDEPENDENT
ADLS_ACUITY_SCORE: 44

## 2025-04-27 NOTE — PLAN OF CARE
"  Problem: Psychotic Signs/Symptoms  Goal: Increased Participation and Engagement (Psychotic Signs/Symptoms)  Outcome: Not Progressing   Goal Outcome Evaluation:         Mood: HANNY, pt refuses to answer questions       Affect: flat, less guarded     Thought Process:  HANNY, pt refuses to answer questions       Psychotic Symptoms: HANNY, pt refuses to answer questions      Suicidality: No, denies      Self-Injurious Behavior: No     Sexually Inappropriate Behavior? No         Gait/Mobility: ambulates independently; steady     Assistive Devices: no assistive devices     Bladder: continent     Bowel: continent           Sleep Disturbance: No     Appetite: Adequate     Medication Compliance: no scheduled meds this shift     Medication Concerns:  none     Medical bed: No      Precautions: SI, Sexual, Elopement, and Fall     No roommate order: Yes paranoid, disorganized, psychotic     SIO Status: standard every 15 min safety checks     Brief Shift Overview:   Pt continues to isolate to room  Wears headphones almost constantly  Makes eye contact when approached, affect flat   Less guarded, speech slightly more spontaneous  Pt did write brief responses to some of the admission profile questions writer gave him on admission but did not elaborate enough to make the responses useful (e.g. in response to \"do you have any medical issues?\" pt simply wrote \"yes\")  Wrote that his reason for admission was \"a comedy of errors\"  Continues to decline to answer any questions verbally  Pt stated he  does feel hopeless at times, but denied SI      Recommendations:  Continue with current treatment plan and recommendations.  Continue to monitor and reassess symptoms.   Monitor response to medications.   Monitor progress towards treatment goals.   Encourage groups and participation.               "

## 2025-04-27 NOTE — PLAN OF CARE
Problem: Sleep Disturbance  Goal: Adequate Sleep/Rest  Outcome: Progressing     Goal Outcome Evaluation:    Pt was received sleeping at the start of this shift, breathing unlabored. Slept for 8.25 hours. Safety checks maintained. Pt came out to the lounge to have snack and went back to sleep. No roommate r/t psychotic, disorganized,delusional. Will continue to monitor per plan of care.

## 2025-04-27 NOTE — PLAN OF CARE
Problem: Psychotic Signs/Symptoms  Goal: Improved Behavioral Control (Psychotic Signs/Symptoms)  Outcome: Progressing  Goal Outcome Evaluation:    Plan of Care Reviewed With: patient      Patient continue to be isolative and withdrawn in the room most of the shift but came to the milieu for meals. Attended the morning service on you tube at the OT room with other patients and ate 100% of his meal with good fluid intake. Was pleasant, cooperative, and medication compliant. Patient is guarded and hesitate to answer some of the MH questions, Endorsed anxiety 5/10, responded yes to depression and no to the rest of the other questions. No other concerns, will continue with the care plan.

## 2025-04-28 PROCEDURE — 124N000002 HC R&B MH UMMC

## 2025-04-28 PROCEDURE — 99232 SBSQ HOSP IP/OBS MODERATE 35: CPT | Performed by: PSYCHIATRY & NEUROLOGY

## 2025-04-28 PROCEDURE — 250N000013 HC RX MED GY IP 250 OP 250 PS 637: Performed by: PSYCHIATRY & NEUROLOGY

## 2025-04-28 RX ADMIN — FOLIC ACID 1 MG: 1 TABLET ORAL at 08:39

## 2025-04-28 RX ADMIN — THIAMINE HCL TAB 100 MG 100 MG: 100 TAB at 08:39

## 2025-04-28 RX ADMIN — Medication 1 TABLET: at 08:39

## 2025-04-28 RX ADMIN — PALIPERIDONE 6 MG: 3 TABLET, EXTENDED RELEASE ORAL at 08:39

## 2025-04-28 RX ADMIN — TRAZODONE HYDROCHLORIDE 50 MG: 50 TABLET ORAL at 01:22

## 2025-04-28 ASSESSMENT — ACTIVITIES OF DAILY LIVING (ADL)
ADLS_ACUITY_SCORE: 41
ORAL_HYGIENE: INDEPENDENT
ADLS_ACUITY_SCORE: 41
HYGIENE/GROOMING: INDEPENDENT
ADLS_ACUITY_SCORE: 41
ADLS_ACUITY_SCORE: 41
LAUNDRY: WITH SUPERVISION
ADLS_ACUITY_SCORE: 41
DRESS: INDEPENDENT
ADLS_ACUITY_SCORE: 41
HYGIENE/GROOMING: INDEPENDENT

## 2025-04-28 NOTE — PLAN OF CARE
Problem: Psychotic Signs/Symptoms  Goal: Improved Sleep (Psychotic Signs/Symptoms)  Outcome: Not Progressing   Goal Outcome Evaluation:                  Received in bed resting, listening to music. Pt able to make needs known and independent with his ADL's.Came out for snacks at 0035, affect tensed and noted left hand tremors while eating. Offered prn Trazodone for sleep but pt declined, appreciative to staff.  Re approached staff stating he has court today at 1PM and wanted to take the prn trazodone 50mg, given at 0122.  Slept on and off for 5.25 hours

## 2025-04-28 NOTE — PLAN OF CARE
"  Problem: Adult Behavioral Health Plan of Care  Goal: Develops/Participates in Therapeutic [place] to Support Successful Transition  Outcome: Progressing   Goal Outcome Evaluation:    Plan of Care Reviewed With: patient          Mood: depressed      Affect: flat, less guarded     Thought Process:  appears paranoid      Psychotic Symptoms: HANNY, pt refuses to answer questions      Suicidality: No, denies      Self-Injurious Behavior: No     Sexually Inappropriate Behavior? No         Gait/Mobility: ambulates independently; steady     Assistive Devices: no assistive devices     Bladder: continent     Bowel: continent           Sleep Disturbance: No     Appetite: Adequate     Medication Compliance: no scheduled meds this shift     Medication Concerns:  none     Medical bed: No      Precautions: SI, Sexual, Elopement, and Fall     No roommate order: Yes paranoid, disorganized, psychotic     SIO Status: standard every 15 min safety checks     Brief Shift Overview:   Pt continues to isolate to room  Wears headphones almost constantly  Makes eye contact when approached, affect flat   Less guarded, speech slightly more spontaneous  Appears paranoid but seems to be developing some trust  When asked if he'd written answers to profile questions given yesterday, at first he stated \"No, they're too personal,\" but later in shift he said \"I'll try.\" Later he submitted written answers to some of the safety questions.  Continues to decline to answer many questions verbally  Pt stated he does feel hopeless at times, but denied SI   Significant hand tremors observed at rest     Recommendations:  Continue with current treatment plan and recommendations.  Continue to monitor and reassess symptoms.   Monitor response to medications.   Monitor progress towards treatment goals.   Encourage groups and participation.         "

## 2025-04-28 NOTE — PLAN OF CARE
Team Note Due:  Thursday    Assessment/Intervention/Current Symtoms and Care Coordination:  Chart review and met with team, discussed pt progress, symptomology, and response to treatment.  Discussed the discharge plan and any potential impediments to discharge.    Pt admitted for eloping group home, medication non-compliance, and paranoid/delusional thinking. Petition for commitment was started in the ED. Pt arrived to the unit on a court hold. Pt is calm, but paranoid. Mostly isolative to his room. If he does come out, he usually is wearing headphones and doesn't engage with others. Pt only slept 3.75 hours last night. Per RN, pt reports he is paranoid and struggling to trust others.    Writer checked in with pt. Reminded him that he has court this afternoon. Pt stated he was aware and ready. Asked him if he had any questions and he stated no.    Pt attended his court hearings via zoom with no issue.    Discharge Plan or Goal:  To be determined     Barriers to Discharge:  Patient requires further psychiatric stabilization due to current symptomology and medication management with possible adjustments    Referral Status:  To be determined      Legal Status:  Court Hold  [facility]  File Number: [id number]  Upcoming Hearings:  Exam Hearing on 04/28/2025 April 28, 2025 at 1:00 PM  Preliminary/Probable Cause Hearing on 04/28/2025 April 28, 2025 at 2:00 PM  Wu Hearing on 04/28/2025 April 28, 2025 at 2:01 PM    Contacts (include PEG status):  [place]: ACT team  [phone]      Upcoming Meetings and Dates/Important Information and next steps:  Touch base with ACT team   Try to clarify background and collateral information

## 2025-04-28 NOTE — PLAN OF CARE
Pt was visible in the milieu during meals but then returned to his room, where he was isolative and withdrawn to himself. He intermittently paced the halls for brief moments, listening to headphones. Pt was observed as anxious and restless. Bilateral shakes of hands were observed. Pt denies all mental health symptoms. No anxiety, depression or SI thoughts. Pt may be masking symptoms. Pt A&Ox4 but tense during assessment and intermittent hesitant eye contact. Appetite was outstanding, he ate 100% of his meals.     Problem: Psychotic Signs/Symptoms  Goal: Decreased Sensory Symptoms (Psychotic Signs/Symptoms)  Outcome: Progressing  Intervention: Minimize and Manage Sensory Impairment  Recent Flowsheet Documentation  Taken 4/28/2025 1100 by Tosin Dominguez RN  Sensory Stimulation Regulation: auditory stimulation provided   Goal Outcome Evaluation:    Plan of Care Reviewed With: patient

## 2025-04-28 NOTE — PLAN OF CARE
BEH IP Unit Acuity Rating Score (UARS)  Patient is given one point for every criteria they meet.    CRITERIA SCORING   On a 72 hour hold, court hold, committed, stay of commitment, or revocation. 1    Patient LOS on BEH unit exceeds 20 days. 0  LOS: 5   Patient under guardianship, 55+, otherwise medically complex, or under age 11. 1   Suicide ideation without relief of precipitating factors. 0   Current plan for suicide. 0   Current plan for homicide. 0   Imminent risk or actual attempt to seriously harm another without relief of factors precipitating the attempt. 0   Severe dysfunction in daily living (ex: complete neglect for self care, extreme disruption in vegetative function, extreme deterioration in social interactions). 1   Recent (last 7 days) or current physical aggression in the ED or on unit. 0   Restraints or seclusion episode in past 72 hours. 0   Recent (last 7 days) or current verbal aggression, agitation, yelling, etc., while in the ED or unit. 0   Active psychosis. 1   Need for constant or near constant redirection (from leaving, from others, etc).  0   Intrusive or disruptive behaviors. 0   Patient requires 3 or more hours of individualized nursing care per 8-hour shift (i.e. for ADLs, meds, therapeutic interventions). 0   TOTAL 4

## 2025-04-28 NOTE — PROGRESS NOTES
PSYCHIATRY PROGRESS NOTE         DATE OF SERVICE:   4/28/2025         CHIEF COMPLAINT:     Paranoid delusions of FBI being after him, refuses IM Invega, waiting for commitment hearing           OBJECTIVE:     Nursing reports : Patient stays in his room, took medications , slept 5.25 hours      reports working on outpatient referrals    Medicine consult by REBECA Person on 4/24/2025  # Acute decompensated schizophrenia  # Eloped from care facility, noncompliance with medication.  # Anxiety, depression.  Eloped from group home 4/16, found to gas station 4/20 endorsing he is a fugitive and try and turn himself in.  Per report third elopement from group home. Was admitted to [facility] 4/[facility]. Now admitted to  for treatment of decompensated schizophrenia. PTA on trazodone, Cogentin, IM Invega sustenna.   - As managed per psychiatry   # Chronic intermittent chest pain  # Possible history A-fib  # Chronic bifascicular block, L anterior fascicular block, R bundle branch block,   # History of chronic HFpEF, grade 1 diastolic dysfunction  Of note, has complained of chronic intermittent chest pain sx> 1yr prior.  States symptoms are regular.  Does not have any as needed nitro per chart review. Recent ECG reviewed, NSR on arrival (with artifact) bifascicular block LVH, LAD.  However chart review indicates history of A-fib, no PTA DOAC.  History HFpEF most recent EF 55-60%.   - Follow up with PCP and establish care with cardiology team outpatient   # Incidental aortic root/aortic dilation  Aortic root 4.3 cm and ascending aorta dilation 4 cm.  No previous comparison.  - Outpatient follow-up TTE yearly monitoring, good BP control.  - Age-appropriate health maintenance on PCP visit.  # Hyponatremia, resolved  Presented with sodium of 131, given patient eloped likely related to poor oral intake.  Creatinine within normal limits.  Now sodium improved.  - Follow up with PCP for ongoing monitoring    # Mild hypocalcemia, resolved  Minimal hypocalcemia at 8.6, albumin is 3.94 oh.  Not clinically relevant/causing above symptoms.  Increased p.o. intake.  - Monitor with PCP outpatient   # Mildly elevated TSH  Admit TSH 4.37 although free T4 is 1.40.  No PTA Synthroid/meds.  No convincing symptoms of hyperthyroidism, despite psych decompensation  - PCP recheck thyroid function in 10 -12 weeks.   # History of nicotine use   Patient reports he uses E-cig sometimes, vague about when.    - Cessation encouraged, NRT per primary team   # History of alcohol use disorder   Patient declines any recent use.  States he will drink when he gets money but cannot afford it.  LFTs are WNL.  JOSR negative.  - Continue thiamine multivitamin, folic acid supplements.  - Encourage cessation   # History of drug use  - Patient declines recent use - recent UDS is negative         SUBJECTIVE:      Ger spends more time out of the room. He says he slept ok, appetite is good. He has decreased energy and concentration . He reports that the FBI is after him.  He says that talking bad things about him. He says he does not hear them talking, but he knows that they are doing that.  He denies thoughts of hurting self or others.  He says that he was waking up during the night, but he was able to fall asleep.  He says that he does not go to groups because he does not care about being in groups.  He says that he prefers to be alone not just here, but he says in life he would say that he is a loner.  He has commitment hearing in the afternoon.  He continues to be resistant to injectable Invega, but he takes oral Invega.  I remind him that injectable Invega provides continuity of care, he does not have to think if he gets the medication or if he does not want to take it.  The medication is working for him.  He says that the injection site hurts for few days.  I remind him that the benefit of the whole month is more important than just having unpleasant  feeling for few days.  According to the chart he wanted to go off Invega Sustenna in the past and stay only on oral medications and he decompensated.  He has been on Invega Sustenna for 5 years.  He has involuntary movements with his tongue and also with his arms and legs.  He was diagnosed with extraparametal symptoms due to Haldol  in 2019.  He has been on neuroleptics since that he says that he has tremor and is getting worse.     I reviewed his admission from May 2023.  At that time he was admitted at [facility] as a voluntary patient, but he was under my commitment with Bryce.  He was diagnosed with schizoaffective disorder and he had agitation and verbal aggression, paranoid delusions, disorganized thoughts, and refusing medications.  The staff from the detention reported that he refused medications and was getting more irritable, argumentative and agitated.  She described him as mean and making threats and throwing things.  He believed that medical providers were conspiring the government against him.  He was receiving special messages through the headphones.  After getting Invega Sustenna he is thought process was more organized and less tangential although he he eventually agreed to take long acting Invega Sustenna.  It says that Cogentin was decreased from 1 to 0.5 mg twice daily to minimize anticholinergic  burden.  It controlled tremor.  He was admitted to December 2022 when he had altered mental status, wandering the streets without gloves or socks.  Cogentin was increased for tremor, and Invega Sustenna IM given.  He was under commitment at that time    He was admitted to IP psychiatry at [facility] in June 2022.  911 was called because he was walking on the Friday.  He reported that his name is Ger Solares, that he was homeless and his life is in the eighth send that he was suicidal and that he had paranoid schizophrenia.  He reported hearing male and female voices.  He talked about  hearing Augustin Ordonez and walking the line like him.  At that time he was suicidal and reported [place] get hit by the car and die.He was living in  and he was followed by ACT team. At that time he started refusing Invega Sustena and insisted only on oral medications and decompensated on it.     Prior to that he was admitted inJuary 2022, May 2021,December 2020, April 2020    It says that In April 2020 he was admitted to [facility] he was on Haldol and Invega, including Invega Sustenna.  He was noncompliant with medications.  He believes that [employer] was after him.He was working with ACT team.  It was reported that he had baseline delusions.He was diagnosed with schizophrenia schizoaffective disorder chronic with acute exacerbation.             MEDICATIONS:     Current Facility-Administered Medications   Medication Dose Route Frequency Provider Last Rate Last Admin    - Psychiatric Emergency -   Other See Admin Instructions Marilyn Jaffe MD        [Held by provider] benztropine (COGENTIN) tablet 1 mg  1 mg Oral BID Marilyn Jaffe MD        folic acid (FOLVITE) tablet 1 mg  1 mg Oral Daily Marilyn Jaffe MD   1 mg at 04/27/25 0811    multivitamin w/minerals (THERA-VIT-M) tablet 1 tablet  1 tablet Oral Daily Marilyn Jaffe MD   1 tablet at 04/27/25 0811    [Held by provider] paliperidone (INVEGA SUSTENNA) injection CHRISTOS 234 mg  234 mg Intramuscular Q28 Days Marilyn Jaffe MD        paliperidone ER (INVEGA) 24 hr tablet 6 mg  6 mg Oral Daily Marilyn Jaffe MD   6 mg at 04/27/25 0811    thiamine (B-1) tablet 100 mg  100 mg Oral Daily Marilyn Jaffe MD   100 mg at 04/27/25 0811     Current Facility-Administered Medications   Medication Dose Route Frequency Provider Last Rate Last Admin    acetaminophen (TYLENOL) tablet 650 mg  650 mg Oral Q4H PRN Marilyn Jaffe MD        alum & mag hydroxide-simethicone (MAALOX) suspension 30 mL  30 mL Oral Q4H PRN  "Marilyn Jaffe MD        carboxymethylcellulose PF (REFRESH PLUS) 0.5 % ophthalmic solution 1 drop  1 drop Both Eyes TID PRN Marilyn Jaffe MD        cyclobenzaprine (FLEXERIL) tablet 10 mg  10 mg Oral Q8H PRN Michael Melendrez PA-C        gabapentin (NEURONTIN) capsule 100 mg  100 mg Oral Q6H PRN Marilyn Jaffe MD        OLANZapine (zyPREXA) tablet 2.5 mg  2.5 mg Oral TID PRN Marilyn Jaffe MD        Or    OLANZapine (zyPREXA) injection 2.5 mg  2.5 mg Intramuscular TID PRN Marilyn Jaffe MD        senna-docusate (SENOKOT-S/PERICOLACE) 8.6-50 MG per tablet 1 tablet  1 tablet Oral BID PRN Marilyn Jaffe MD        traZODone (DESYREL) tablet 50 mg  50 mg Oral At Bedtime PRN Marilyn Jaffe MD   50 mg at 04/28/25 0122       Medication adherence: Yes with oral, refusing IM  Medication side effects: c/o of pain in injection site when he gets IM neuroleptic   Benefit: Symptom reduction         ROS:   As per history of present illness, otherwise reminder of review of systems is negative for: General, eyes, ears, nose, throat, neck, respiratory, cardiovascular, gastrointestinal, genitourinary, meniscal skeletal, neurological, hematological, dermatological and endocrine system.         MENTAL STATUS EXAM:   /71   Pulse 76   Temp 97  F (36.1  C) (Temporal)   Resp 16   Ht 1.753 m (5' 9\")   Wt 86.7 kg (191 lb 2.2 oz)   SpO2 98%   BMI 28.23 kg/m      Appearance:fair hygiene  Orientation:to self, place, partially in time   Speech:fluent  Language ability: intact  Thought process: concrete  Thought content: positive for paranoid delusions, denies hallucinations  Associations: Connected  Suicidal Ideation: denies   Homicidal Ideation:denies   Mood:  depressed  Affect: irritable   Intellectual functioning:average  Fund of Knowledge: consistent with education and experience   Attention/Concentration: decreased  Memory: affected by psychosis   Psychomotor Behavior: calm  Muscle Strength and Tone: no atrophy or involuntary " "movement  Gait and Station: steady  Insight and judgement:inadequate          LABS:   personally reviewed.   Lab Results   Component Value Date     04/21/2025     04/20/2025     02/22/2024     04/05/2020     12/10/2019     10/14/2019    CO2 30 04/21/2025    CO2 29 04/20/2025    CO2 30 02/22/2024    CO2 27 04/05/2020    CO2 34 12/10/2019    CO2 28 10/14/2019    BUN 11.6 04/21/2025    BUN 12.4 04/20/2025    BUN 14.6 02/22/2024    BUN 16 04/05/2020    BUN 15 12/10/2019    BUN 12 10/14/2019     No results found for: \"CKTOTAL\", \"CKMB\", \"TROPONINI\"  Lab Results   Component Value Date    WBC 5.4 04/21/2025    WBC 6.4 04/20/2025    WBC 5.8 02/22/2024    WBC 7.2 04/05/2020    WBC 5.9 12/10/2019    WBC 6.7 10/14/2019    HGB 14.0 04/21/2025    HGB 14.0 04/20/2025    HGB 13.7 02/22/2024    HGB 15.7 04/05/2020    HGB 15.6 12/10/2019    HGB 15.3 10/14/2019    HCT 41.1 04/21/2025    HCT 40.7 04/20/2025    HCT 41.1 02/22/2024    HCT 44.6 04/05/2020    HCT 45.3 12/10/2019    HCT 42.9 10/14/2019    MCV 92 04/21/2025    MCV 92 04/20/2025    MCV 92 02/22/2024    MCV 91 04/05/2020    MCV 93 12/10/2019    MCV 89 10/14/2019     04/21/2025     04/20/2025     02/22/2024     04/05/2020     12/10/2019     10/14/2019     Lab Results   Component Value Date    CHOL 148 04/19/2019    CHOL 158 04/01/2012    TRIG 136 04/19/2019    TRIG 107 04/01/2012    HDL 32 04/19/2019    HDL 29 04/01/2012      Latest Reference Range & Units 04/20/25 21:30 04/21/25 11:34   Sodium 135 - 145 mmol/L  136   Potassium 3.4 - 5.3 mmol/L  4.4   Chloride 98 - 107 mmol/L  100   Carbon Dioxide (CO2) 22 - 29 mmol/L  30 (H)   Urea Nitrogen 8.0 - 23.0 mg/dL  11.6   Creatinine 0.67 - 1.17 mg/dL  0.79   GFR Estimate >60 mL/min/1.73m2  >90   Calcium 8.8 - 10.4 mg/dL  8.7 (L)   Anion Gap 7 - 15 mmol/L  6 (L)   Magnesium 1.7 - 2.3 mg/dL 2.0    Phosphorus 2.5 - 4.5 mg/dL 3.0    Albumin 3.5 - 5.2 g/dL 3.9 " 4.0   Protein Total 6.4 - 8.3 g/dL 5.8 (L) 5.9 (L)   Alkaline Phosphatase 40 - 150 U/L 88 76   ALT 0 - 70 U/L 11 12   AST 0 - 45 U/L 23 18   Bilirubin Direct 0.00 - 0.30 mg/dL 0.21    Bilirubin Total <=1.2 mg/dL 0.7 0.6   Glucose 70 - 99 mg/dL  102 (H)   T4 Free 0.90 - 1.70 ng/dL 1.40    Troponin T, High Sensitivity <=22 ng/L 8    TSH 0.30 - 4.20 uIU/mL 4.37 (H)       Latest Reference Range & Units 04/21/25 11:34   FIO2  0   Ph Venous 7.32 - 7.43  7.42   PCO2 Venous 40 - 50 mm Hg 48   PO2 Venous 25 - 47 mm Hg 43   O2 Sat, Venous 70.0 - 75.0 % 81.8 (H)   Bicarbonate Venous 21 - 28 mmol/L 31 (H)   Base Excess Venous -3.0 - 3.0 mmol/L 5.5 (H)   Oxyhemoglobin Venous 70 - 75 % 80 (H)   WBC 4.0 - 11.0 10e3/uL 5.4   Hemoglobin 13.3 - 17.7 g/dL 14.0   Hematocrit 40.0 - 53.0 % 41.1   Platelet Count 150 - 450 10e3/uL 170   RBC Count 4.40 - 5.90 10e6/uL 4.46   MCV 78 - 100 fL 92   MCH 26.5 - 33.0 pg 31.4   MCHC 31.5 - 36.5 g/dL 34.1   RDW 10.0 - 15.0 % 11.9      Latest Reference Range & Units 04/21/25 09:57   Color Urine Colorless, Straw, Light Yellow, Yellow  Light Yellow   Appearance Urine Clear  Clear   Glucose Urine Negative mg/dL Negative   Bilirubin Urine Negative  Negative   Ketones Urine Negative mg/dL Negative   Specific Gravity Urine 1.003 - 1.035  1.009   pH Urine 5.0 - 7.0  6.5   Protein Albumin Urine Negative mg/dL Negative   Urobilinogen mg/dL Normal mg/dL Normal   Nitrite Urine Negative  Negative   Blood Urine Negative  Negative   Leukocyte Esterase Urine Negative  Negative   Amphetamine Qual Urine Screen Negative  Screen Negative   Fentanyl Qual Urine Screen Negative  Screen Negative   Cocaine Urine Screen Negative  Screen Negative   Benzodiazepine Urine Screen Negative  Screen Negative   Opiates Qualitative Urine Screen Negative  Screen Negative   PCP Urine Screen Negative  Screen Negative   Cannabinoids Qual Urine Screen Negative  Screen Negative   Barbiturates Qual Urine Screen Negative  Screen Negative        CT HEAD WO IV CONT, CT TRAUMA CERVICAL SCREEN T4-C1   LOCATION: [facility]   DATE/TIME: 2/24/2023 1:49 AM   INDICATION: GCS 14 or 15 - head injury and age over 64 years.   COMPARISON: 12/15/2020. 12/29/2022.   TECHNIQUE:   1) Routine CT Head without IV contrast. Multiplanar reformats. Dose reduction techniques were used.   2) Routine CT Cervical Spine without IV contrast. Multiplanar reformats. Dose reduction techniques were used.   FINDINGS:   HEAD CT:   INTRACRANIAL CONTENTS: No intracranial hemorrhage, extraaxial collection, or mass effect.  No CT evidence of acute infarct. Normal parenchymal attenuation. Mild generalized volume loss. No hydrocephalus.        EKG 12-lead, tracing only 4/21/2025          Component  Ref Range & Units 4/21/25  9:06 AM 4/20/25  9:37 PM    Systolic Blood Pressure  mmHg      Diastolic Blood Pressure  mmHg      Ventricular Rate  BPM 72 69    Atrial Rate  BPM 72 249    HI Interval  ms 180     QRS Duration  ms 152 158    QT  ms 420 438    QTc  ms 459 469    P Axis  degrees 47 122    R AXIS  degrees -73 -76    T Axis  degrees 49 56    Interpretation ECG Sinus rhythm with Premature atrial complexes in a pattern of bigeminy  Right bundle branch block  Left anterior fascicular block  ** Bifascicular block **  Abnormal ECG  When compared with ECG of 20-Apr-2025 21:37,  Sinus rhythm has replaced Atrial fibrillation  Confirmed by MD REMY, ARI ([id number]) on 4/21/2025 11:07:20 AM               DIAGNOSIS:     Paranoid schizophrenia chronic with acute exacerbation     Patient Active Problem List   Diagnosis    Schizoaffective disorder, bipolar type (H)    Schizophrenia (H)    Alcohol abuse    Dyslipidemia    Gastroesophageal reflux disease without esophagitis    Hypothyroidism due to acquired atrophy of thyroid    Tobacco use disorder    Schizoaffective disorder (H)    Acute psychosis (H)    Atrial flutter, unspecified type (H)    Aortic root dilatation    Mood changes    Paranoid  schizophrenia, chronic condition with acute exacerbation (H)          PLAN:   Jack is  76 y/o male with chronic persistent mental illness. He has been on neuroleptics for many years. He was diagnosed with schizophrenia, schizoaffective disorder.  He is also diagnosed with DPS and cognitive decline.  He has had multiple psychiatric hospitalizations, commitments with [facility] order due to noncompliance with treatment.  He had previous admissions due to not wanting to take Invega Sustenna and wanting to take only oral medications and during that time he decompensated and Invega Sustenna was restarted.  He has been on it for at least 5 to 6 years.  We discussed  diagnosis and treatment plan.  He was admitted to psychiatric unit for safety, stabilization and medication management.  He is on a court hold and he is waiting for commitment hearing.   He refuses injectable Invega Sustenna which he was taking in the outpatient basis for several years and which kept him out of the hospital and kept him in a stable housing He was due for injection on April 18, 2025, but he refused to take it because he said injection hurt for  days and he only wanted to take a pill.  His last psychiatric admission in [facility] was listed in 2023.  Once there is Wu order under commitment, injectable Invega will be used again.  He agrees to go back to group home.  He likes it there and he hopes that they will take him back.He agrees  with the following recommendations:    Medications:  Invega 6 mg daily for paranoid schizophrenia   Trazodone 50 mg nightly as needed for sleep  Multivitamins 1 pill daily  Thiamine 100 mg daily  Folic acid 1 mg daily  Tylenol 650 mg every 4 hours as needed for pain  Maalox 30 mg every 4 as needed for indigestion  Refresh Plus ophthalmic solution 0.5% 1 drop to both eyes 3 times daily as needed for dry eyes  Flexeril 10 mg 3 times daily as needed for muscle spasms  Neurontin 100 mg every 6 hours as needed for  anxiety  Zyprexa 2.5 mg 3 times daily as needed for agitation  Senna docusate 1 pill twice daily as needed for constipation  We discussed side effects, benefits and alternative treatments and patient agrees with with oral , but refuses IM Invega.   will collect collateral information and make outpatient referrals  Staff to provide emotional support and redirect as needed  Patient encouraged to attend groups  Lab results: Reviewed personally  Consultation: medicine consult completed     Risk Assessment: going through commitment due to non compliance with tx which led to psychotic break    Coordination of Care:   Patient seen, medical record reviewed, care coordinated with the team.    Total time:  More than 35 minutes  spent on this visit with more than 50% time  spent on coordination of care with staff, team meeting, reviewing medical record, educating patient about treatment options, side effects and benefits and alternative treatments for medications, providing supportive therapy regarding above issues,entering orders and preparing documentation for the visit.    This document is created with the help of Dragon dictation system.  All grammatical/typing errors or context distortion are unintentional and inherent to software.    Marilyn Jaffe MD        Re-Certification I certify that the inpatient psychiatric facility services furnished since the previous certification were, and continue to be, medically necessary for, either, treatment which could reasonably be expected to improve the patient s condition or diagnostic study and that the hospital records indicate that the services furnished were, either, intensive treatment services, admission and related services necessary for diagnostic study, or equivalent services.     I certify that the patient continues to need, on a daily basis, active treatment furnished directly by or requiring the supervision of inpatient psychiatric facility personnel.   I  estimate TBD days of hospitalization is necessary for proper treatment of the patient. My plans for post-hospital care for this patient are : Medications, appointments     Marilyn Jaffe MD

## 2025-04-29 PROCEDURE — 124N000002 HC R&B MH UMMC

## 2025-04-29 PROCEDURE — 99232 SBSQ HOSP IP/OBS MODERATE 35: CPT | Performed by: PSYCHIATRY & NEUROLOGY

## 2025-04-29 PROCEDURE — 97150 GROUP THERAPEUTIC PROCEDURES: CPT | Mod: GO

## 2025-04-29 PROCEDURE — 250N000013 HC RX MED GY IP 250 OP 250 PS 637: Performed by: PSYCHIATRY & NEUROLOGY

## 2025-04-29 RX ADMIN — FOLIC ACID 1 MG: 1 TABLET ORAL at 08:30

## 2025-04-29 RX ADMIN — Medication 1 TABLET: at 08:30

## 2025-04-29 RX ADMIN — PALIPERIDONE 6 MG: 3 TABLET, EXTENDED RELEASE ORAL at 08:30

## 2025-04-29 RX ADMIN — THIAMINE HCL TAB 100 MG 100 MG: 100 TAB at 08:30

## 2025-04-29 ASSESSMENT — ACTIVITIES OF DAILY LIVING (ADL)
ADLS_ACUITY_SCORE: 41
DRESS: INDEPENDENT
HYGIENE/GROOMING: INDEPENDENT
ADLS_ACUITY_SCORE: 41
ORAL_HYGIENE: INDEPENDENT

## 2025-04-29 NOTE — PLAN OF CARE
Problem: Sleep Disturbance  Goal: Adequate Sleep/Rest  Outcome: Progressing  Note: Pt denies SI/SIB/HI or AVH. Denies depression or anxiety. Pt endorses sleeping well last night. A&OX3-pt does not know the name of the hospital or understand why he is here. Last BM-yesterday, and described as normal. Pt appears to be in guarded, flat and restrictive mood. Bilateral tremor/trembling of hands and legs present. Pt observed in room, dining room and walking the hallway. Pt wore headphones during the shift. Attended group and showered during shift. Med compliant.    Goal Outcome Evaluation:    Plan of Care Reviewed With: patient

## 2025-04-29 NOTE — PLAN OF CARE
Problem: Psychotic Signs/Symptoms  Goal: Improved Sleep (Psychotic Signs/Symptoms)  Outcome: Progressing   Goal Outcome Evaluation:               Received asleep, pt uses music to aid with sleep.Pt has a no roommate order due to disorganized behavior, Psychosis and delusional thoughts. No behavioral or safety concerns tonight. All precautions in place. Slept for 8.5 hours

## 2025-04-29 NOTE — PROGRESS NOTES
PSYCHIATRY PROGRESS NOTE         DATE OF SERVICE:   4/29/2025         CHIEF COMPLAINT:     Paranoid delusions, refusal of IM Invega, does not think he needs medications at all          OBJECTIVE:     Nursing reports : Patient stays in his room, took medications , slept 8.5 hours      reports working on outpatient referrals    Medicine consult by REBECA Person on 4/24/2025  # Acute decompensated schizophrenia  # Eloped from care facility, noncompliance with medication.  # Anxiety, depression.  Eloped from group home 4/16, found to gas station 4/20 endorsing he is a fugitive and try and turn himself in.  Per report third elopement from group home. Was admitted to [facility] internal medicine 4/20. Now admitted to  for treatment of decompensated schizophrenia. PTA on trazodone, Cogentin, IM Invega sustenna.   - As managed per psychiatry   # Chronic intermittent chest pain  # Possible history A-fib  # Chronic bifascicular block, L anterior fascicular block, R bundle branch block,   # History of chronic HFpEF, grade 1 diastolic dysfunction  Of note, has complained of chronic intermittent chest pain sx> 1yr prior.  States symptoms are regular.  Does not have any as needed nitro per chart review. Recent ECG reviewed, NSR on arrival (with artifact) bifascicular block LVH, LAD.  However chart review indicates history of A-fib, no PTA DOAC.  History HFpEF most recent EF 55-60%.   - Follow up with PCP and establish care with cardiology team outpatient   # Incidental aortic root/aortic dilation  Aortic root 4.3 cm and ascending aorta dilation 4 cm.  No previous comparison.  - Outpatient follow-up TTE yearly monitoring, good BP control.  - Age-appropriate health maintenance on PCP visit.  # Hyponatremia, resolved  Presented with sodium of 131, given patient eloped likely related to poor oral intake.  Creatinine within normal limits.  Now sodium improved.  - Follow up with PCP for ongoing monitoring   # Mild  hypocalcemia, resolved  Minimal hypocalcemia at 8.6, albumin is 3.94 oh.  Not clinically relevant/causing above symptoms.  Increased p.o. intake.  - Monitor with PCP outpatient   # Mildly elevated TSH  Admit TSH 4.37 although free T4 is 1.40.  No PTA Synthroid/meds.  No convincing symptoms of hyperthyroidism, despite psych decompensation  - PCP recheck thyroid function in 10 -12 weeks.   # History of nicotine use   Patient reports he uses E-cig sometimes, vague about when.    - Cessation encouraged, NRT per primary team   # History of alcohol use disorder   Patient declines any recent use.  States he will drink when he gets money but cannot afford it.  LFTs are WNL.  JOSR negative.  - Continue thiamine multivitamin, folic acid supplements.  - Encourage cessation   # History of drug use  - Patient declines recent use - recent UDS is negative         SUBJECTIVE:      Ger takes oral Invega, continues to refuse IM Invega. I remind him that he tried it in 2022, but his symptoms got worse and he was hospitalized and he continued to get injection under commitment. He says that he did not need medications to begin with. I remind him of multiple hospitalizations and civil commitment, but he has no insight into it. I remind him that his complaint about IM Invega is that the injection site  hurts for a few days and I remind him again that it is minor side effects, comparing to benefit of injection, because he does not have to think if he forgot medication, and not challenge  self with not taking medication when he starts thinking that he does not need it. as he often did in the past. I remind him that IM Invega works for him by releasing small amount from muscle to blood stream and keeps steady level to control his symptoms, and oral medication did not work for him. I ask him if there is any other reason that he does not want IM medications and he says FBI will have something on him. He denies thoughts of hurting self or  others. Appetite is good. Energy fluctuates. He has involuntary movement. His tongue is moving inside his mouth and he has hand tremor. He has TD from Haldol in the past. He asks for Austedo or Ingrezza. I will ask pharmacist if hospital pharmacy carries those medications.    I reviewed his admission from May 2023.  At that time he was admitted at [facility] as a voluntary patient, but he was under my commitment with Bryce.  He was diagnosed with schizoaffective disorder and he had agitation and verbal aggression, paranoid delusions, disorganized thoughts, and refusing medications.  The staff from the senior care reported that he refused medications and was getting more irritable, argumentative and agitated.  She described him as mean and making threats and throwing things.  He believed that medical providers were conspiring the government against him.  He was receiving special messages through the headphones.  After getting Invega Sustenna he is thought process was more organized and less tangential although he he eventually agreed to take long acting Invega Sustenna.  It says that Cogentin was decreased from 1 to 0.5 mg twice daily to minimize anticholinergic  burden.  It controlled tremor.  He was admitted to December 2022 when he had altered mental status, wandering the streets without gloves or socks.  Cogentin was increased for tremor, and Invega Sustenna IM given.  He was under commitment at that time    He was admitted to IP psychiatry at [facility] in June 2022.  911 was called because he was walking on the Friday.  He reported that his name is Ger Solares, that he was homeless and his life is in the eighth send that he was suicidal and that he had paranoid schizophrenia.  He reported hearing male and female voices.  He talked about hearing [employer] and walking the line like him.  At that time he was suicidal and reported [place] get [place]  by the car and die.He was living in  and he was followed by ACT team. At that time he started refusing Invega Sustena and insisted only on oral medications and decompensated on it.     Prior to that he was admitted inJanuary 2022, May 2021,December 2020, April 2020    It says that In April 2020 he was admitted to [facility] he was on Haldol and Invega, including Invega Sustenna.  He was noncompliant with medications.  He believes that [employer] was after him.He was working with ACT team.  It was reported that he had baseline delusions.He was diagnosed with schizophrenia schizoaffective disorder chronic with acute exacerbation.    In November 2019 he was admitted, diagnosed with EPS due to Haldol, discharged on oral Haldol, IM invega Sustena and Cogentine.    He had 4 admissions in 2019   He had 6 admissions in 2018  He had 2 admissions in 2016  He had 1 admission in 2013  He had 3 admissions in 2012            MEDICATIONS:     Current Facility-Administered Medications   Medication Dose Route Frequency Provider Last Rate Last Admin    - Psychiatric Emergency -   Other See Admin Instructions Marilyn Jaffe MD        [Held by provider] benztropine (COGENTIN) tablet 1 mg  1 mg Oral BID Marilyn Jaffe MD        folic acid (FOLVITE) tablet 1 mg  1 mg Oral Daily Marilyn Jaffe MD   1 mg at 04/29/25 0830    multivitamin w/minerals (THERA-VIT-M) tablet 1 tablet  1 tablet Oral Daily Marilyn Jaffe MD   1 tablet at 04/29/25 0830    [Held by provider] paliperidone (INVEGA SUSTENNA) injection CHRISTOS 234 mg  234 mg Intramuscular Q28 Days Marilyn Jaffe MD        paliperidone ER (INVEGA) 24 hr tablet 6 mg  6 mg Oral Daily Marilyn Jaffe MD   6 mg at 04/29/25 0830    thiamine (B-1) tablet 100 mg  100 mg Oral Daily Marilyn Jaffe MD   100 mg at 04/29/25 0830     Current Facility-Administered Medications   Medication Dose Route Frequency Provider Last Rate Last Admin    acetaminophen (TYLENOL) tablet 650 mg  650 mg Oral  "Q4H PRN Marilyn Jaffe MD        alum & mag hydroxide-simethicone (MAALOX) suspension 30 mL  30 mL Oral Q4H PRN Marilyn Jaffe MD        carboxymethylcellulose PF (REFRESH PLUS) 0.5 % ophthalmic solution 1 drop  1 drop Both Eyes TID PRN Marilyn Jaffe MD        cyclobenzaprine (FLEXERIL) tablet 10 mg  10 mg Oral Q8H PRN Michael Melendrez PA-C        gabapentin (NEURONTIN) capsule 100 mg  100 mg Oral Q6H PRN Marilyn Jaffe MD        OLANZapine (zyPREXA) tablet 2.5 mg  2.5 mg Oral TID PRN Marilyn Jaffe MD        Or    OLANZapine (zyPREXA) injection 2.5 mg  2.5 mg Intramuscular TID PRN Marilyn Jaffe MD        senna-docusate (SENOKOT-S/PERICOLACE) 8.6-50 MG per tablet 1 tablet  1 tablet Oral BID PRN Marilyn Jaffe MD        traZODone (DESYREL) tablet 50 mg  50 mg Oral At Bedtime PRN Marilyn Jaffe MD   50 mg at 04/28/25 0122       Medication adherence: Yes with oral, refusing IM  Medication side effects: c/o of pain in injection site when he gets IM neuroleptic   Benefit: Symptom reduction         ROS:   As per history of present illness, otherwise reminder of review of systems is negative for: General, eyes, ears, nose, throat, neck, respiratory, cardiovascular, gastrointestinal, genitourinary, meniscal skeletal, neurological, hematological, dermatological and endocrine system.         MENTAL STATUS EXAM:   /68 (BP Location: Right arm, Patient Position: Sitting)   Pulse 77   Temp 97.1  F (36.2  C) (Temporal)   Resp 16   Ht 1.753 m (5' 9\")   Wt 87.9 kg (193 lb 12.6 oz)   SpO2 97%   BMI 28.62 kg/m      Appearance:fair hygiene  Orientation:to self, place, partially in time   Speech:fluent  Language ability: intact  Thought process: concrete  Thought content: positive for paranoid delusions, denies hallucinations  Associations: Connected  Suicidal Ideation: denies   Homicidal Ideation:denies   Mood:  depressed  Affect: irritable   Intellectual functioning:average  Fund of Knowledge: consistent with " "education and experience   Attention/Concentration: decreased  Memory: affected by psychosis   Psychomotor Behavior: calm  Muscle Strength and Tone: no atrophy or involuntary movement  Gait and Station: steady  Insight and judgement:inadequate          LABS:   personally reviewed.   Lab Results   Component Value Date     04/21/2025     04/20/2025     02/22/2024     04/05/2020     12/10/2019     10/14/2019    CO2 30 04/21/2025    CO2 29 04/20/2025    CO2 30 02/22/2024    CO2 27 04/05/2020    CO2 34 12/10/2019    CO2 28 10/14/2019    BUN 11.6 04/21/2025    BUN 12.4 04/20/2025    BUN 14.6 02/22/2024    BUN 16 04/05/2020    BUN 15 12/10/2019    BUN 12 10/14/2019     No results found for: \"CKTOTAL\", \"CKMB\", \"TROPONINI\"  Lab Results   Component Value Date    WBC 5.4 04/21/2025    WBC 6.4 04/20/2025    WBC 5.8 02/22/2024    WBC 7.2 04/05/2020    WBC 5.9 12/10/2019    WBC 6.7 10/14/2019    HGB 14.0 04/21/2025    HGB 14.0 04/20/2025    HGB 13.7 02/22/2024    HGB 15.7 04/05/2020    HGB 15.6 12/10/2019    HGB 15.3 10/14/2019    HCT 41.1 04/21/2025    HCT 40.7 04/20/2025    HCT 41.1 02/22/2024    HCT 44.6 04/05/2020    HCT 45.3 12/10/2019    HCT 42.9 10/14/2019    MCV 92 04/21/2025    MCV 92 04/20/2025    MCV 92 02/22/2024    MCV 91 04/05/2020    MCV 93 12/10/2019    MCV 89 10/14/2019     04/21/2025     04/20/2025     02/22/2024     04/05/2020     12/10/2019     10/14/2019     Lab Results   Component Value Date    CHOL 148 04/19/2019    CHOL 158 04/01/2012    TRIG 136 04/19/2019    TRIG 107 04/01/2012    HDL 32 04/19/2019    HDL 29 04/01/2012      Latest Reference Range & Units 04/20/25 21:30 04/21/25 11:34   Sodium 135 - 145 mmol/L  136   Potassium 3.4 - 5.3 mmol/L  4.4   Chloride 98 - 107 mmol/L  100   Carbon Dioxide (CO2) 22 - 29 mmol/L  30 (H)   Urea Nitrogen 8.0 - 23.0 mg/dL  11.6   Creatinine 0.67 - 1.17 mg/dL  0.79   GFR Estimate >60 " mL/min/1.73m2  >90   Calcium 8.8 - 10.4 mg/dL  8.7 (L)   Anion Gap 7 - 15 mmol/L  6 (L)   Magnesium 1.7 - 2.3 mg/dL 2.0    Phosphorus 2.5 - 4.5 mg/dL 3.0    Albumin 3.5 - 5.2 g/dL 3.9 4.0   Protein Total 6.4 - 8.3 g/dL 5.8 (L) 5.9 (L)   Alkaline Phosphatase 40 - 150 U/L 88 76   ALT 0 - 70 U/L 11 12   AST 0 - 45 U/L 23 18   Bilirubin Direct 0.00 - 0.30 mg/dL 0.21    Bilirubin Total <=1.2 mg/dL 0.7 0.6   Glucose 70 - 99 mg/dL  102 (H)   T4 Free 0.90 - 1.70 ng/dL 1.40    Troponin T, High Sensitivity <=22 ng/L 8    TSH 0.30 - 4.20 uIU/mL 4.37 (H)       Latest Reference Range & Units 04/21/25 11:34   FIO2  0   Ph Venous 7.32 - 7.43  7.42   PCO2 Venous 40 - 50 mm Hg 48   PO2 Venous 25 - 47 mm Hg 43   O2 Sat, Venous 70.0 - 75.0 % 81.8 (H)   Bicarbonate Venous 21 - 28 mmol/L 31 (H)   Base Excess Venous -3.0 - 3.0 mmol/L 5.5 (H)   Oxyhemoglobin Venous 70 - 75 % 80 (H)   WBC 4.0 - 11.0 10e3/uL 5.4   Hemoglobin 13.3 - 17.7 g/dL 14.0   Hematocrit 40.0 - 53.0 % 41.1   Platelet Count 150 - 450 10e3/uL 170   RBC Count 4.40 - 5.90 10e6/uL 4.46   MCV 78 - 100 fL 92   MCH 26.5 - 33.0 pg 31.4   MCHC 31.5 - 36.5 g/dL 34.1   RDW 10.0 - 15.0 % 11.9      Latest Reference Range & Units 04/21/25 09:57   Color Urine Colorless, Straw, Light Yellow, Yellow  Light Yellow   Appearance Urine Clear  Clear   Glucose Urine Negative mg/dL Negative   Bilirubin Urine Negative  Negative   Ketones Urine Negative mg/dL Negative   Specific Gravity Urine 1.003 - 1.035  1.009   pH Urine 5.0 - 7.0  6.5   Protein Albumin Urine Negative mg/dL Negative   Urobilinogen mg/dL Normal mg/dL Normal   Nitrite Urine Negative  Negative   Blood Urine Negative  Negative   Leukocyte Esterase Urine Negative  Negative   Amphetamine Qual Urine Screen Negative  Screen Negative   Fentanyl Qual Urine Screen Negative  Screen Negative   Cocaine Urine Screen Negative  Screen Negative   Benzodiazepine Urine Screen Negative  Screen Negative   Opiates Qualitative Urine Screen Negative   Screen Negative   PCP Urine Screen Negative  Screen Negative   Cannabinoids Qual Urine Screen Negative  Screen Negative   Barbiturates Qual Urine Screen Negative  Screen Negative       CT HEAD WO IV CONT, CT TRAUMA CERVICAL SCREEN T4-C1   LOCATION: [facility]   DATE/TIME: 2/24/2023 1:49 AM   INDICATION: GCS 14 or 15 - head injury and age over 64 years.   COMPARISON: 12/15/2020. 12/29/2022.   TECHNIQUE:   1) Routine CT Head without IV contrast. Multiplanar reformats. Dose reduction techniques were used.   2) Routine CT Cervical Spine without IV contrast. Multiplanar reformats. Dose reduction techniques were used.   FINDINGS:   HEAD CT:   INTRACRANIAL CONTENTS: No intracranial hemorrhage, extraaxial collection, or mass effect.  No CT evidence of acute infarct. Normal parenchymal attenuation. Mild generalized volume loss. No hydrocephalus.        EKG 12-lead, tracing only 4/21/2025          Component  Ref Range & Units 4/21/25  9:06 AM 4/20/25  9:37 PM    Systolic Blood Pressure  mmHg      Diastolic Blood Pressure  mmHg      Ventricular Rate  BPM 72 69    Atrial Rate  BPM 72 249    HI Interval  ms 180     QRS Duration  ms 152 158    QT  ms 420 438    QTc  ms 459 469    P Axis  degrees 47 122    R AXIS  degrees -73 -76    T Axis  degrees 49 56    Interpretation ECG Sinus rhythm with Premature atrial complexes in a pattern of bigeminy  Right bundle branch block  Left anterior fascicular block  ** Bifascicular block **  Abnormal ECG  When compared with ECG of 20-Apr-2025 21:37,  Sinus rhythm has replaced Atrial fibrillation  Confirmed by MD REMY, ARI ([id number]) on 4/21/2025 11:07:20 AM               DIAGNOSIS:     Paranoid schizophrenia chronic with acute exacerbation   Tardive dyskinesia    Patient Active Problem List   Diagnosis    Schizoaffective disorder, bipolar type (H)    Schizophrenia (H)    Alcohol abuse    Dyslipidemia    Gastroesophageal reflux disease without esophagitis    Hypothyroidism due to acquired  atrophy of thyroid    Tobacco use disorder    Schizoaffective disorder (H)    Acute psychosis (H)    Atrial flutter, unspecified type (H)    Aortic root dilatation    Mood changes    Paranoid schizophrenia, chronic condition with acute exacerbation (H)          PLAN:   Jack is  74 y/o male with chronic persistent mental illness. He has been on neuroleptics for many years. He was diagnosed with schizophrenia, schizoaffective disorder.  He is also diagnosed with EPS and cognitive decline.  He has had multiple psychiatric hospitalizations, commitments with [facility] order due to noncompliance with treatment.  Per Epic report there are more than 20 psychiatric admissions since 2012.   He had previous admissions due to not wanting to take Invega Sustenna and wanting to take only oral medications and during that time he decompensated and Invega Sustenna was restarted.  He has been on it for at least  6 years. He has impaired insight and judgement. He says he does not need medication to begin with and he does not want IM Sustena because FBI will have something on him.   He is on a court hold and he has  final commitment hearing on May 1 st. He has TD from past use of Haldol. Will ask pharmacy if they carry Austedo or Ingrezza.   He agrees  with the following recommendations:    Medications:  Invega 6 mg daily for paranoid schizophrenia   Trazodone 50 mg nightly as needed for sleep  Multivitamins 1 pill daily  Thiamine 100 mg daily  Folic acid 1 mg daily  Tylenol 650 mg every 4 hours as needed for pain  Maalox 30 mg every 4 as needed for indigestion  Refresh Plus ophthalmic solution 0.5% 1 drop to both eyes 3 times daily as needed for dry eyes  Flexeril 10 mg 3 times daily as needed for muscle spasms  Neurontin 100 mg every 6 hours as needed for anxiety  Zyprexa 2.5 mg 3 times daily as needed for agitation  Senna docusate 1 pill twice daily as needed for constipation  We discussed side effects, benefits and alternative  treatments and patient agrees with with oral , but refuses IM Invega.   will collect collateral information and make outpatient referrals  Staff to provide emotional support and redirect as needed  Patient encouraged to attend groups  Lab results: Reviewed personally  Consultation: medicine consult completed     Risk Assessment: going through commitment due to non compliance with tx which led to psychotic break    Coordination of Care:   Patient seen, medical record reviewed, care coordinated with the team.    Total time:  More than 35 minutes  spent on this visit with more than 50% time  spent on coordination of care with staff, team meeting, reviewing medical record, educating patient about treatment options, side effects and benefits and alternative treatments for medications, providing supportive therapy regarding above issues,entering orders and preparing documentation for the visit.    This document is created with the help of Dragon dictation system.  All grammatical/typing errors or context distortion are unintentional and inherent to software.    Marilyn Jaffe MD        Re-Certification I certify that the inpatient psychiatric facility services furnished since the previous certification were, and continue to be, medically necessary for, either, treatment which could reasonably be expected to improve the patient s condition or diagnostic study and that the hospital records indicate that the services furnished were, either, intensive treatment services, admission and related services necessary for diagnostic study, or equivalent services.     I certify that the patient continues to need, on a daily basis, active treatment furnished directly by or requiring the supervision of inpatient psychiatric facility personnel.   I estimate TBD days of hospitalization is necessary for proper treatment of the patient. My plans for post-hospital care for this patient are : Medications,  appointments     Marilyn Jaffe MD

## 2025-04-29 NOTE — PLAN OF CARE
"  Problem: Psychotic Signs/Symptoms  Goal: Improved Mood Symptoms (Psychotic Signs/Symptoms)  Outcome: Progressing   Goal Outcome Evaluation:         Mood: depressed      Affect: flat, less guarded     Thought Process:  appears paranoid      Psychotic Symptoms: HANNY, pt refuses to answer questions      Suicidality: No, denies      Self-Injurious Behavior: No     Sexually Inappropriate Behavior? No         Gait/Mobility: ambulates independently; steady     Assistive Devices: no assistive devices     Bladder: continent     Bowel: continent           Sleep Disturbance: No     Appetite: Adequate     Medication Compliance: no scheduled meds this shift     Medication Concerns:  none     Medical bed: No      Precautions: SI, Sexual, Elopement, and Fall     No roommate order: Yes paranoid, disorganized, psychotic     SIO Status: standard every 15 min safety checks     Brief Shift Overview:   Pt continues to isolate to room  Wears headphones almost constantly  Makes eye contact when approached, affect flat   Less guarded, speech slightly more spontaneous  Appears paranoid but seems to be developing some trust; greeted writer by name today  When asked if he would answer more profile questions today, he stated \"Oh, no, I don't think so. I'm done with that.\"  Did allow writer to leave a list of some written questions for him to work on later, however  Continues to decline to answer many questions verbally but denied SI   Significant tremors observed at rest     Recommendations:  Continue with current treatment plan and recommendations.  Continue to monitor and reassess symptoms.   Monitor response to medications.   Monitor progress towards treatment goals.   Encourage groups and participation.               "

## 2025-04-29 NOTE — PLAN OF CARE
Problem: Psychotic Signs/Symptoms  Goal: Improved Behavioral Control (Psychotic Signs/Symptoms)  4/28/2025 2129 by Frederic Vega RN    Plan of Care Reviewed With: patient      Pt had a quiet evening. Took a long nap, came out to eat, watched some TV and back to bed.   Denied feeling depressed, anxious or suicidal. No pain or discomfort.      Received court papers this evening. Pt will have court hearing via zoom on May 1,2025 at 9:45am. Trial for Bryce.

## 2025-04-29 NOTE — PLAN OF CARE
Rehab Group    Start time: 11:10  End time: 12:00  Patient time total: 40 minutes    attended partial group    #5 attended   Group Type: occupational therapy   Group Topic Covered: coping skills, mindfulness, relaxation , and social skills     Group Session Detail:  Patient engaged in a window cling creative task activity in order to promote self expression, maximize autonomy within meaningful tasks, encourage productive use of time, and to maximize attention and focus on a therapeutic task.       Patient Response/Contribution:  cooperative with task and actively engaged     Patient Detail:  Patient was agreeable to engage in a creative hands-on task following a verbal and visual demonstration of task. Patient benefited from assist with problem-solving and sequencing a simple, 1-step task. Patient worked slowly; yet, consistently on task. Patient was noted to have bilateral hand tremors; in addition, intermittent episodes of drooling observed. Patient was quiet and withdrawn to self; no social interaction with peers observed. Patient utilized headphones as he worked on his selected task. Pleasant and polite in brief interactions.        [id number] OT Group (2 or more in attendance)      Patient Active Problem List   Diagnosis    Schizoaffective disorder, bipolar type (H)    Schizophrenia (H)    Alcohol abuse    Dyslipidemia    Gastroesophageal reflux disease without esophagitis    Hypothyroidism due to acquired atrophy of thyroid    Tobacco use disorder    Schizoaffective disorder (H)    Acute psychosis (H)    Atrial flutter, unspecified type (H)    Aortic root dilatation    Mood changes    Paranoid schizophrenia, chronic condition with acute exacerbation (H)

## 2025-04-29 NOTE — PLAN OF CARE
Team Note Due:  Thursday    Assessment/Intervention/Current Symtoms and Care Coordination:  Chart review and met with team, discussed pt progress, symptomology, and response to treatment.  Discussed the discharge plan and any potential impediments to discharge.    Pt admitted for eloping group home, medication non-compliance, and paranoid/delusional thinking. Petition for commitment was started in the ED. Pt arrived to the unit on a court hold. Pt is calm, but paranoid. Mostly isolative to his room. If he does come out, he usually is wearing headphones and doesn't engage with others. Pt only slept 3.75 hours last night. Per RN, pt reports he is paranoid and struggling to trust others.    Writer received call from [facility] Attorney Chapito Love. He inquired if provider is recommending full commitment and if she would consider a change to the request to use IM. Writer called provider and she recommended full commitment with Wu and IM. Writer called Chapito and shared providers recommendations. He reported there was not a Wu note submitted with the initial petition documents. He told writer that one can be submitted tomorrow morning. Writer obtain his email and will prepare Wu order for provider to complete and send back to Chapito.     Writer called and left message for pt's ACT team .    Discharge Plan or Goal:  To be determined     Barriers to Discharge:  Patient requires further psychiatric stabilization due to current symptomology and medication management with possible adjustments    Referral Status:  To be determined      Legal Status:  Court Hold  [facility] [place]  File Number: [id number]  Upcoming Hearings:  Final hearing 5/1 9:45am    Contacts (include PEG status):  Guerda: ACT team  [phone]     Chapito Lazo- [facility] Attorney  [email or web address]     Upcoming Meetings and Dates/Important Information and next steps:  Send Jarivs Note to Chapito

## 2025-04-30 PROCEDURE — 250N000013 HC RX MED GY IP 250 OP 250 PS 637

## 2025-04-30 PROCEDURE — 99232 SBSQ HOSP IP/OBS MODERATE 35: CPT | Performed by: PSYCHIATRY & NEUROLOGY

## 2025-04-30 PROCEDURE — 97150 GROUP THERAPEUTIC PROCEDURES: CPT | Mod: GO

## 2025-04-30 PROCEDURE — 250N000013 HC RX MED GY IP 250 OP 250 PS 637: Performed by: PSYCHIATRY & NEUROLOGY

## 2025-04-30 PROCEDURE — 124N000002 HC R&B MH UMMC

## 2025-04-30 RX ORDER — BENZTROPINE MESYLATE 1 MG/1
1 TABLET ORAL 2 TIMES DAILY
Status: DISPENSED | OUTPATIENT
Start: 2025-04-30

## 2025-04-30 RX ADMIN — BENZTROPINE MESYLATE 1 MG: 1 TABLET ORAL at 20:41

## 2025-04-30 RX ADMIN — FOLIC ACID 1 MG: 1 TABLET ORAL at 08:41

## 2025-04-30 RX ADMIN — Medication 1 TABLET: at 08:41

## 2025-04-30 RX ADMIN — PALIPERIDONE 6 MG: 3 TABLET, EXTENDED RELEASE ORAL at 08:41

## 2025-04-30 RX ADMIN — THIAMINE HCL TAB 100 MG 100 MG: 100 TAB at 08:41

## 2025-04-30 RX ADMIN — CYCLOBENZAPRINE 10 MG: 10 TABLET, FILM COATED ORAL at 16:32

## 2025-04-30 ASSESSMENT — ACTIVITIES OF DAILY LIVING (ADL)
ADLS_ACUITY_SCORE: 41
HYGIENE/GROOMING: INDEPENDENT
ADLS_ACUITY_SCORE: 41
HYGIENE/GROOMING: INDEPENDENT
ADLS_ACUITY_SCORE: 41
LAUNDRY: WITH SUPERVISION
ADLS_ACUITY_SCORE: 41
DRESS: INDEPENDENT
ADLS_ACUITY_SCORE: 41
DRESS: INDEPENDENT
ADLS_ACUITY_SCORE: 41
ORAL_HYGIENE: INDEPENDENT
LAUNDRY: WITH SUPERVISION
ADLS_ACUITY_SCORE: 41
ORAL_HYGIENE: INDEPENDENT
ADLS_ACUITY_SCORE: 41
ADLS_ACUITY_SCORE: 41

## 2025-04-30 NOTE — PLAN OF CARE
Problem: Sleep Disturbance  Goal: Adequate Sleep/Rest  Outcome: Progressing   Goal Outcome Evaluation:                  Received asleep with headphone on.   Pt has a no roommate order due to disorganized behavior, Psychosis and delusional thoughts. No behavioral or safety concerns tonight.  Slept for 8.75  hours

## 2025-04-30 NOTE — PLAN OF CARE
Rehab Group    Start time: 1015  End time: 1200  Patient time total: 20 minutes    attended partial group    #8 attended   Group Type: OT Clinic   Group Topic Covered: cognitive activities, coping skills, healthy leisure time, and problem solving   Group Session Detail:OT: Education on healthy activity engagement and creative hands-on endeavor (OT clinic) to increase concentration, focus, attention to task/detail, decision making, problem solving, frustration tolerance, task follow through, coping with stress, healthy leisure engagement, creative expression, and social engagement    Patient Response/Contribution:  cooperative with task, worked intermittently, Limited eye contact, and withdrawn   Patient Detail: pt quiet, withdrawn. Pt chose to engage in previous creative task. Pt was provided with set up of supplies. Pt worked quietly for duration of engagement. Bilateral hand tremors observed. Pt appeared somewhat anxious-pt packed up his own supplies and left group space shortly thereafter      [id number] OT Group (2 or more in attendance)      Patient Active Problem List   Diagnosis    Schizoaffective disorder, bipolar type (H)    Schizophrenia (H)    Alcohol abuse    Dyslipidemia    Gastroesophageal reflux disease without esophagitis    Hypothyroidism due to acquired atrophy of thyroid    Tobacco use disorder    Schizoaffective disorder (H)    Acute psychosis (H)    Atrial flutter, unspecified type (H)    Aortic root dilatation    Mood changes    Paranoid schizophrenia, chronic condition with acute exacerbation (H)

## 2025-04-30 NOTE — PLAN OF CARE
Problem: Psychotic Signs/Symptoms  Goal: Improved Behavioral Control (Psychotic Signs/Symptoms)  Outcome: Progressing  Flowsheets (Taken 4/30/2025 1304)  Mutually Determined Action Steps (Improved Behavioral Control):   identifies symptoms triggers   verbalizes gratifying activity   verbalizes personal treatment goal   Goal Outcome Evaluation:    Plan of Care Reviewed With: patient      Patient is alert and oriented to person and place, disoriented to situation. Pt denies all psych symptoms and is medication compliant. Pt spent most of the shift out in the milieu but is withdrawn and does not socialize with peers. Pt denies pain, food and fluid intake adequate.     Pt reported involuntary lip movements, provider was notified and cogentin was ordered.

## 2025-04-30 NOTE — PLAN OF CARE
Team Note Due:  Thursday    Assessment/Intervention/Current Symtoms and Care Coordination:  Chart review and met with team, discussed pt progress, symptomology, and response to treatment.  Discussed the discharge plan and any potential impediments to discharge.    Pt admitted for eloping group home, medication non-compliance, and paranoid/delusional thinking. Petition for commitment was started in the ED. Pt arrived to the unit on a court hold. Pt is calm, but paranoid. Mostly isolative to his room. If he does come out, he usually is wearing headphones and doesn't engage with others. Pt only slept 3.75 hours last night. Per RN, pt reports he is paranoid and struggling to trust others.    Writer prepared rocha note and petition for provider to complete. After completed writer emailed to [facility] AttorneyChapito.     Chapito later emailed and asked if provider could be available to testify between 945 and 1015am tomorrow. Writer called provider and she agreed. Writer gave Chapito provider's contact information.     Discharge Plan or Goal:  To be determined     Barriers to Discharge:  Patient requires further psychiatric stabilization due to current symptomology and medication management with possible adjustments    Referral Status:  To be determined      Legal Status:  Court Hold  [facility]: [place]  File Number: [id number]  Upcoming Hearings:  Final hearing 5/1 9:45am    Contacts (include PEG status):  Vaelri WEBSTER team  [phone]     Chapito Love- [facility] Attorney  [email or web address]     Upcoming Meetings and Dates/Important Information and next steps:  Hearing @ 930am 5/1

## 2025-04-30 NOTE — PROGRESS NOTES
PSYCHIATRY PROGRESS NOTE         DATE OF SERVICE:   4/30/2025         CHIEF COMPLAINT:     Paranoid delusions of FBI being after him, refuses IM Invega, waiting for commitment hearing           OBJECTIVE:     Nursing reports : Patient stays in his room, took medications , slept 5.25 hours      reports working on outpatient referrals    Medicine consult by REBECA Person on 4/24/2025  # Acute decompensated schizophrenia  # Eloped from care facility, noncompliance with medication.  # Anxiety, depression.  Eloped from group home 4/16, found to gas station 4/20 endorsing he is a fugitive and try and turn himself in.  Per report third elopement from group home. Was admitted to [facility] internal medicine 4/20. Now admitted to  for treatment of decompensated schizophrenia. PTA on trazodone, Cogentin, IM Invega sustenna.   - As managed per psychiatry   # Chronic intermittent chest pain  # Possible history A-fib  # Chronic bifascicular block, L anterior fascicular block, R bundle branch block,   # History of chronic HFpEF, grade 1 diastolic dysfunction  Of note, has complained of chronic intermittent chest pain sx> 1yr prior.  States symptoms are regular.  Does not have any as needed nitro per chart review. Recent ECG reviewed, NSR on arrival (with artifact) bifascicular block LVH, LAD.  However chart review indicates history of A-fib, no PTA DOAC.  History HFpEF most recent EF 55-60%.   - Follow up with PCP and establish care with cardiology team outpatient   # Incidental aortic root/aortic dilation  Aortic root 4.3 cm and ascending aorta dilation 4 cm.  No previous comparison.  - Outpatient follow-up TTE yearly monitoring, good BP control.  - Age-appropriate health maintenance on PCP visit.  # Hyponatremia, resolved  Presented with sodium of 131, given patient eloped likely related to poor oral intake.  Creatinine within normal limits.  Now sodium improved.  - Follow up with PCP for ongoing monitoring    # Mild hypocalcemia, resolved  Minimal hypocalcemia at 8.6, albumin is 3.94 oh.  Not clinically relevant/causing above symptoms.  Increased p.o. intake.  - Monitor with PCP outpatient   # Mildly elevated TSH  Admit TSH 4.37 although free T4 is 1.40.  No PTA Synthroid/meds.  No convincing symptoms of hyperthyroidism, despite psych decompensation  - PCP recheck thyroid function in 10 -12 weeks.   # History of nicotine use   Patient reports he uses E-cig sometimes, vague about when.    - Cessation encouraged, NRT per primary team   # History of alcohol use disorder   Patient declines any recent use.  States he will drink when he gets money but cannot afford it.  LFTs are WNL.  JOSR negative.  - Continue thiamine multivitamin, folic acid supplements.  - Encourage cessation   # History of drug use  - Patient declines recent use - recent UDS is negative         SUBJECTIVE:      Ger spends more time out of the room. He says he slept ok, appetite is good. He has decreased energy and concentration . He reports that the FBI is after him.  He says that talking bad things about him. He says he does not hear them talking, but he knows that they are doing that.  He denies thoughts of hurting self or others.  He says that he was waking up during the night, but he was able to fall asleep.  He says that he does not go to groups because he does not care about being in groups.  He says that he prefers to be alone not just here, but he says in life he would say that he is a loner.  He has commitment hearing in the afternoon.  He continues to be resistant to injectable Invega, but he takes oral Invega.  I remind him that injectable Invega provides continuity of care, he does not have to think if he gets the medication or if he does not want to take it.  The medication is working for him.  He says that the injection site hurts for few days.  I remind him that the benefit of the whole month is more important than just having unpleasant  feeling for few days.  According to the chart he wanted to go off Invega Sustenna in the past and stay only on oral medications and he decompensated.  He has been on Invega Sustenna for 5 years.  He has involuntary movements with his tongue and also with his arms and legs.  He was diagnosed with extraparametal symptoms due to Haldol  in 2019.  He has been on neuroleptics since that he says that he has tremor and is getting worse.     I reviewed his admission from May 2023.  At that time he was admitted at [facility] as a voluntary patient, but he was under my commitment with Bryce.  He was diagnosed with schizoaffective disorder and he had agitation and verbal aggression, paranoid delusions, disorganized thoughts, and refusing medications.  The staff from the jail reported that he refused medications and was getting more irritable, argumentative and agitated.  She described him as mean and making threats and throwing things.  He believed that medical providers were conspiring the government against him.  He was receiving special messages through the headphones.  After getting Invega Sustenna he is thought process was more organized and less tangential although he he eventually agreed to take long acting Invega Sustenna.  It says that Cogentin was decreased from 1 to 0.5 mg twice daily to minimize anticholinergic  burden.  It controlled tremor.  He was admitted to December 2022 when he had altered mental status, wandering the streets without gloves or socks.  Cogentin was increased for tremor, and Invega Sustenna IM given.  He was under commitment at that time    He was admitted to IP psychiatry at [facility] in June 2022.  911 was called because he was walking on the Friday.  He reported that his name is Ger Solares, that he was homeless and his life is in the eighth send that he was suicidal and that he had paranoid schizophrenia.  He reported hearing male and female voices.  He talked about  hearing Augustin Ordonez and walking the line like him.  At that time he was suicidal and reported [place] get [place] by the car and die.He was living in  and he was followed by ACT team. At that time he started refusing Invega Sustena and insisted only on oral medications and decompensated on it.     Prior to that he was admitted inJuary 2022, May 2021,December 2020, April 2020    It says that In April 2020 he was admitted to [facility] he was on Haldol and Invega, including Invega Sustenna.  He was noncompliant with medications.  He believes that [employer] was after him.He was working with ACT team.  It was reported that he had baseline delusions.He was diagnosed with schizophrenia schizoaffective disorder chronic with acute exacerbation.             MEDICATIONS:     Current Facility-Administered Medications   Medication Dose Route Frequency Provider Last Rate Last Admin    - Psychiatric Emergency -   Other See Admin Instructions Marilyn Jaffe MD        [Held by provider] benztropine (COGENTIN) tablet 1 mg  1 mg Oral BID Marilyn Jaffe MD        folic acid (FOLVITE) tablet 1 mg  1 mg Oral Daily Marilyn Jaffe MD   1 mg at 04/30/25 0841    multivitamin w/minerals (THERA-VIT-M) tablet 1 tablet  1 tablet Oral Daily Marilyn Jaffe MD   1 tablet at 04/30/25 0841    [Held by provider] paliperidone (INVEGA SUSTENNA) injection CHRISTOS 234 mg  234 mg Intramuscular Q28 Days Marilyn Jaffe MD        paliperidone ER (INVEGA) 24 hr tablet 6 mg  6 mg Oral Daily Marilyn Jaffe MD   6 mg at 04/30/25 0841    thiamine (B-1) tablet 100 mg  100 mg Oral Daily Marilyn Jaffe MD   100 mg at 04/30/25 0841     Current Facility-Administered Medications   Medication Dose Route Frequency Provider Last Rate Last Admin    acetaminophen (TYLENOL) tablet 650 mg  650 mg Oral Q4H PRN Marilyn Jaffe MD        alum & mag hydroxide-simethicone (MAALOX) suspension 30 mL  30 mL Oral Q4H PRN  "Marilyn Jaffe MD        carboxymethylcellulose PF (REFRESH PLUS) 0.5 % ophthalmic solution 1 drop  1 drop Both Eyes TID PRN Marilyn Jaffe MD        cyclobenzaprine (FLEXERIL) tablet 10 mg  10 mg Oral Q8H PRN Michael Melendrez PA-C        gabapentin (NEURONTIN) capsule 100 mg  100 mg Oral Q6H PRN Marilyn Jaffe MD        OLANZapine (zyPREXA) tablet 2.5 mg  2.5 mg Oral TID PRN Marilyn Jaffe MD        Or    OLANZapine (zyPREXA) injection 2.5 mg  2.5 mg Intramuscular TID PRN Marilyn Jaffe MD        senna-docusate (SENOKOT-S/PERICOLACE) 8.6-50 MG per tablet 1 tablet  1 tablet Oral BID PRN Marilyn Jaffe MD        traZODone (DESYREL) tablet 50 mg  50 mg Oral At Bedtime PRN Marilyn Jaffe MD   50 mg at 04/28/25 0122       Medication adherence: Yes with oral, refusing IM  Medication side effects: c/o of pain in injection site when he gets IM neuroleptic   Benefit: Symptom reduction         ROS:   As per history of present illness, otherwise reminder of review of systems is negative for: General, eyes, ears, nose, throat, neck, respiratory, cardiovascular, gastrointestinal, genitourinary, meniscal skeletal, neurological, hematological, dermatological and endocrine system.         MENTAL STATUS EXAM:   /73 (BP Location: Left arm, Patient Position: Sitting, Cuff Size: Adult Regular)   Pulse 83   Temp 97.8  F (36.6  C) (Temporal)   Resp 16   Ht 1.753 m (5' 9\")   Wt 87.9 kg (193 lb 12.6 oz)   SpO2 98%   BMI 28.62 kg/m      Appearance:fair hygiene  Orientation:to self, place, partially in time   Speech:fluent  Language ability: intact  Thought process: concrete  Thought content: positive for paranoid delusions, denies hallucinations  Associations: Connected  Suicidal Ideation: denies   Homicidal Ideation:denies   Mood:  depressed  Affect: irritable   Intellectual functioning:average  Fund of Knowledge: consistent with education and experience   Attention/Concentration: decreased  Memory: affected by psychosis " "  Psychomotor Behavior: calm  Muscle Strength and Tone: no atrophy or involuntary movement  Gait and Station: steady  Insight and judgement:inadequate          LABS:   personally reviewed.   Lab Results   Component Value Date     04/21/2025     04/20/2025     02/22/2024     04/05/2020     12/10/2019     10/14/2019    CO2 30 04/21/2025    CO2 29 04/20/2025    CO2 30 02/22/2024    CO2 27 04/05/2020    CO2 34 12/10/2019    CO2 28 10/14/2019    BUN 11.6 04/21/2025    BUN 12.4 04/20/2025    BUN 14.6 02/22/2024    BUN 16 04/05/2020    BUN 15 12/10/2019    BUN 12 10/14/2019     No results found for: \"CKTOTAL\", \"CKMB\", \"TROPONINI\"  Lab Results   Component Value Date    WBC 5.4 04/21/2025    WBC 6.4 04/20/2025    WBC 5.8 02/22/2024    WBC 7.2 04/05/2020    WBC 5.9 12/10/2019    WBC 6.7 10/14/2019    HGB 14.0 04/21/2025    HGB 14.0 04/20/2025    HGB 13.7 02/22/2024    HGB 15.7 04/05/2020    HGB 15.6 12/10/2019    HGB 15.3 10/14/2019    HCT 41.1 04/21/2025    HCT 40.7 04/20/2025    HCT 41.1 02/22/2024    HCT 44.6 04/05/2020    HCT 45.3 12/10/2019    HCT 42.9 10/14/2019    MCV 92 04/21/2025    MCV 92 04/20/2025    MCV 92 02/22/2024    MCV 91 04/05/2020    MCV 93 12/10/2019    MCV 89 10/14/2019     04/21/2025     04/20/2025     02/22/2024     04/05/2020     12/10/2019     10/14/2019     Lab Results   Component Value Date    CHOL 148 04/19/2019    CHOL 158 04/01/2012    TRIG 136 04/19/2019    TRIG 107 04/01/2012    HDL 32 04/19/2019    HDL 29 04/01/2012      Latest Reference Range & Units 04/20/25 21:30 04/21/25 11:34   Sodium 135 - 145 mmol/L  136   Potassium 3.4 - 5.3 mmol/L  4.4   Chloride 98 - 107 mmol/L  100   Carbon Dioxide (CO2) 22 - 29 mmol/L  30 (H)   Urea Nitrogen 8.0 - 23.0 mg/dL  11.6   Creatinine 0.67 - 1.17 mg/dL  0.79   GFR Estimate >60 mL/min/1.73m2  >90   Calcium 8.8 - 10.4 mg/dL  8.7 (L)   Anion Gap 7 - 15 mmol/L  6 (L)   Magnesium 1.7 " - 2.3 mg/dL 2.0    Phosphorus 2.5 - 4.5 mg/dL 3.0    Albumin 3.5 - 5.2 g/dL 3.9 4.0   Protein Total 6.4 - 8.3 g/dL 5.8 (L) 5.9 (L)   Alkaline Phosphatase 40 - 150 U/L 88 76   ALT 0 - 70 U/L 11 12   AST 0 - 45 U/L 23 18   Bilirubin Direct 0.00 - 0.30 mg/dL 0.21    Bilirubin Total <=1.2 mg/dL 0.7 0.6   Glucose 70 - 99 mg/dL  102 (H)   T4 Free 0.90 - 1.70 ng/dL 1.40    Troponin T, High Sensitivity <=22 ng/L 8    TSH 0.30 - 4.20 uIU/mL 4.37 (H)       Latest Reference Range & Units 04/21/25 11:34   FIO2  0   Ph Venous 7.32 - 7.43  7.42   PCO2 Venous 40 - 50 mm Hg 48   PO2 Venous 25 - 47 mm Hg 43   O2 Sat, Venous 70.0 - 75.0 % 81.8 (H)   Bicarbonate Venous 21 - 28 mmol/L 31 (H)   Base Excess Venous -3.0 - 3.0 mmol/L 5.5 (H)   Oxyhemoglobin Venous 70 - 75 % 80 (H)   WBC 4.0 - 11.0 10e3/uL 5.4   Hemoglobin 13.3 - 17.7 g/dL 14.0   Hematocrit 40.0 - 53.0 % 41.1   Platelet Count 150 - 450 10e3/uL 170   RBC Count 4.40 - 5.90 10e6/uL 4.46   MCV 78 - 100 fL 92   MCH 26.5 - 33.0 pg 31.4   MCHC 31.5 - 36.5 g/dL 34.1   RDW 10.0 - 15.0 % 11.9      Latest Reference Range & Units 04/21/25 09:57   Color Urine Colorless, Straw, Light Yellow, Yellow  Light Yellow   Appearance Urine Clear  Clear   Glucose Urine Negative mg/dL Negative   Bilirubin Urine Negative  Negative   Ketones Urine Negative mg/dL Negative   Specific Gravity Urine 1.003 - 1.035  1.009   pH Urine 5.0 - 7.0  6.5   Protein Albumin Urine Negative mg/dL Negative   Urobilinogen mg/dL Normal mg/dL Normal   Nitrite Urine Negative  Negative   Blood Urine Negative  Negative   Leukocyte Esterase Urine Negative  Negative   Amphetamine Qual Urine Screen Negative  Screen Negative   Fentanyl Qual Urine Screen Negative  Screen Negative   Cocaine Urine Screen Negative  Screen Negative   Benzodiazepine Urine Screen Negative  Screen Negative   Opiates Qualitative Urine Screen Negative  Screen Negative   PCP Urine Screen Negative  Screen Negative   Cannabinoids Qual Urine Screen Negative   Screen Negative   Barbiturates Qual Urine Screen Negative  Screen Negative       CT HEAD WO IV CONT, CT TRAUMA CERVICAL SCREEN T4-C1   LOCATION: [facility]   DATE/TIME: 2/24/2023 1:49 AM   INDICATION: GCS 14 or 15 - head injury and age over 64 years.   COMPARISON: 12/15/2020. 12/29/2022.   TECHNIQUE:   1) Routine CT Head without IV contrast. Multiplanar reformats. Dose reduction techniques were used.   2) Routine CT Cervical Spine without IV contrast. Multiplanar reformats. Dose reduction techniques were used.   FINDINGS:   HEAD CT:   INTRACRANIAL CONTENTS: No intracranial hemorrhage, extraaxial collection, or mass effect.  No CT evidence of acute infarct. Normal parenchymal attenuation. Mild generalized volume loss. No hydrocephalus.        EKG 12-lead, tracing only 4/21/2025          Component  Ref Range & Units 4/21/25  9:06 AM 4/20/25  9:37 PM    Systolic Blood Pressure  mmHg      Diastolic Blood Pressure  mmHg      Ventricular Rate  BPM 72 69    Atrial Rate  BPM 72 249    OH Interval  ms 180     QRS Duration  ms 152 158    QT  ms 420 438    QTc  ms 459 469    P Axis  degrees 47 122    R AXIS  degrees -73 -76    T Axis  degrees 49 56    Interpretation ECG Sinus rhythm with Premature atrial complexes in a pattern of bigeminy  Right bundle branch block  Left anterior fascicular block  ** Bifascicular block **  Abnormal ECG  When compared with ECG of 20-Apr-2025 21:37,  Sinus rhythm has replaced Atrial fibrillation  Confirmed by MD REMY, ARI ([id number]) on 4/21/2025 11:07:20 AM               DIAGNOSIS:     Paranoid schizophrenia chronic with acute exacerbation     Patient Active Problem List   Diagnosis    Schizoaffective disorder, bipolar type (H)    Schizophrenia (H)    Alcohol abuse    Dyslipidemia    Gastroesophageal reflux disease without esophagitis    Hypothyroidism due to acquired atrophy of thyroid    Tobacco use disorder    Schizoaffective disorder (H)    Acute psychosis (H)    Atrial flutter,  unspecified type (H)    Aortic root dilatation    Mood changes    Paranoid schizophrenia, chronic condition with acute exacerbation (H)          PLAN:   Jack is  76 y/o male with chronic persistent mental illness. He has been on neuroleptics for many years. He was diagnosed with schizophrenia, schizoaffective disorder.  He is also diagnosed with DPS and cognitive decline.  He has had multiple psychiatric hospitalizations, commitments with [facility] order due to noncompliance with treatment.  He had previous admissions due to not wanting to take Invega Sustenna and wanting to take only oral medications and during that time he decompensated and Invega Sustenna was restarted.  He has been on it for at least 5 to 6 years.  We discussed  diagnosis and treatment plan.  He was admitted to psychiatric unit for safety, stabilization and medication management.  He is on a court hold and he is waiting for commitment hearing.   He refuses injectable Invega Sustenna which he was taking in the outpatient basis for several years and which kept him out of the hospital and kept him in a stable housing He was due for injection on April 18, 2025, but he refused to take it because he said injection hurt for  days and he only wanted to take a pill.  His last psychiatric admission in [facility] was listed in 2023.  Once there is [facility] order under commitment, injectable Invega will be used again.  He agrees to go back to group home.  He likes it there and he hopes that they will take him back.He agrees  with the following recommendations:    Medications:  Invega 6 mg daily for paranoid schizophrenia   Trazodone 50 mg nightly as needed for sleep  Multivitamins 1 pill daily  Thiamine 100 mg daily  Folic acid 1 mg daily  Tylenol 650 mg every 4 hours as needed for pain  Maalox 30 mg every 4 as needed for indigestion  Refresh Plus ophthalmic solution 0.5% 1 drop to both eyes 3 times daily as needed for dry eyes  Flexeril 10 mg 3 times daily as needed  for muscle spasms  Neurontin 100 mg every 6 hours as needed for anxiety  Zyprexa 2.5 mg 3 times daily as needed for agitation  Senna docusate 1 pill twice daily as needed for constipation  We discussed side effects, benefits and alternative treatments and patient agrees with with oral , but refuses IM Invega.   will collect collateral information and make outpatient referrals  Staff to provide emotional support and redirect as needed  Patient encouraged to attend groups  Lab results: Reviewed personally  Consultation: medicine consult completed     Risk Assessment: going through commitment due to non compliance with tx which led to psychotic break    Coordination of Care:   Patient seen, medical record reviewed, care coordinated with the team.    Total time:  More than 35 minutes  spent on this visit with more than 50% time  spent on coordination of care with staff, team meeting, reviewing medical record, educating patient about treatment options, side effects and benefits and alternative treatments for medications, providing supportive therapy regarding above issues,entering orders and preparing documentation for the visit.    This document is created with the help of Dragon dictation system.  All grammatical/typing errors or context distortion are unintentional and inherent to software.    Marilyn Jaffe MD        Re-Certification I certify that the inpatient psychiatric facility services furnished since the previous certification were, and continue to be, medically necessary for, either, treatment which could reasonably be expected to improve the patient s condition or diagnostic study and that the hospital records indicate that the services furnished were, either, intensive treatment services, admission and related services necessary for diagnostic study, or equivalent services.     I certify that the patient continues to need, on a daily basis, active treatment furnished directly by or requiring  the supervision of inpatient psychiatric facility personnel.   I estimate TBD days of hospitalization is necessary for proper treatment of the patient. My plans for post-hospital care for this patient are : Medications, appointments     Marilyn Jaffe MD

## 2025-05-01 VITALS
BODY MASS INDEX: 28.7 KG/M2 | HEIGHT: 69 IN | OXYGEN SATURATION: 98 % | SYSTOLIC BLOOD PRESSURE: 122 MMHG | WEIGHT: 193.78 LBS | DIASTOLIC BLOOD PRESSURE: 74 MMHG | HEART RATE: 74 BPM | TEMPERATURE: 97.8 F | RESPIRATION RATE: 18 BRPM

## 2025-05-01 PROCEDURE — 250N000013 HC RX MED GY IP 250 OP 250 PS 637: Performed by: PSYCHIATRY & NEUROLOGY

## 2025-05-01 PROCEDURE — 97150 GROUP THERAPEUTIC PROCEDURES: CPT | Mod: GO

## 2025-05-01 PROCEDURE — 99232 SBSQ HOSP IP/OBS MODERATE 35: CPT | Performed by: PSYCHIATRY & NEUROLOGY

## 2025-05-01 PROCEDURE — 124N000002 HC R&B MH UMMC

## 2025-05-01 RX ADMIN — BENZTROPINE MESYLATE 1 MG: 1 TABLET ORAL at 19:47

## 2025-05-01 RX ADMIN — PALIPERIDONE 6 MG: 3 TABLET, EXTENDED RELEASE ORAL at 08:49

## 2025-05-01 RX ADMIN — THIAMINE HCL TAB 100 MG 100 MG: 100 TAB at 08:48

## 2025-05-01 RX ADMIN — Medication 1 TABLET: at 08:48

## 2025-05-01 RX ADMIN — FOLIC ACID 1 MG: 1 TABLET ORAL at 08:48

## 2025-05-01 RX ADMIN — BENZTROPINE MESYLATE 1 MG: 1 TABLET ORAL at 08:49

## 2025-05-01 ASSESSMENT — ACTIVITIES OF DAILY LIVING (ADL)
ADLS_ACUITY_SCORE: 41
DRESS: INDEPENDENT
ORAL_HYGIENE: INDEPENDENT
ADLS_ACUITY_SCORE: 41
ORAL_HYGIENE: INDEPENDENT
ADLS_ACUITY_SCORE: 41
HYGIENE/GROOMING: INDEPENDENT
LAUNDRY: WITH SUPERVISION
ADLS_ACUITY_SCORE: 41
DRESS: INDEPENDENT
HYGIENE/GROOMING: INDEPENDENT
ADLS_ACUITY_SCORE: 41
LAUNDRY: WITH SUPERVISION

## 2025-05-01 NOTE — PROGRESS NOTES
Rehab Group    Start time: 1120  End time: 1205  Patient time total: 45 minutes    attended full group    #5 attended   Group Type: occupational therapy   Group Topic Covered: balanced lifestyle, Physical activity, and social skills     Group Session Detail:  Wellness bingo     Patient Response/Contribution:  cooperative with task, attentive, and actively engaged     Patient Detail:    Pt actively participated in a structured occupational therapy group involving an interactive social activity (Wellness Bingo) to increase concentration, attention to task, symptom management, coping with stress, sharing information about self, listening to others, physical wellness, social wellness, and overall well-being. Pt appeared comfortable responding to discussion prompts when directly prompted, and also followed along with approximately 10% of the guided movements. Appeared to be tracking the task consistently and accurately, and occasionally asked for clarification to ensure his accuracy. When prompted to identify his favorite sport, he shared that he enjoys watching the [employer] and enjoys watching the Super Bowl every year. Calm and cooperative.       [id number] OT Group (2 or more in attendance)      Patient Active Problem List   Diagnosis    Schizoaffective disorder, bipolar type (H)    Schizophrenia (H)    Alcohol abuse    Dyslipidemia    Gastroesophageal reflux disease without esophagitis    Hypothyroidism due to acquired atrophy of thyroid    Tobacco use disorder    Schizoaffective disorder (H)    Acute psychosis (H)    Atrial flutter, unspecified type (H)    Aortic root dilatation    Mood changes    Paranoid schizophrenia, chronic condition with acute exacerbation (H)

## 2025-05-01 NOTE — PROGRESS NOTES
PSYCHIATRY PROGRESS NOTE         DATE OF SERVICE:   5/1/2025         CHIEF COMPLAINT:     Had commitment hearing , does not want to talk about it, paranoid delusions          OBJECTIVE:     Nursing reports : Patient stays in his room, took medications , slept 9.5 hours ,      reports working on outpatient referrals, final commitment hearing 5/1/2025    Medicine consult by REBECA Person on 4/24/2025  # Acute decompensated schizophrenia  # Eloped from care facility, noncompliance with medication.  # Anxiety, depression.  Eloped from group home 4/16, found to gas station 4/20 endorsing he is a fugitive and try and turn himself in.  Per report third elopement from group home. Was admitted to [facility] internal medicine 4/20. Now admitted to  for treatment of decompensated schizophrenia. PTA on trazodone, Cogentin, IM Invega sustenna.   - As managed per psychiatry   # Chronic intermittent chest pain  # Possible history A-fib  # Chronic bifascicular block, L anterior fascicular block, R bundle branch block,   # History of chronic HFpEF, grade 1 diastolic dysfunction  Of note, has complained of chronic intermittent chest pain sx> 1yr prior.  States symptoms are regular.  Does not have any as needed nitro per chart review. Recent ECG reviewed, NSR on arrival (with artifact) bifascicular block LVH, LAD.  However chart review indicates history of A-fib, no PTA DOAC.  History HFpEF most recent EF 55-60%.   - Follow up with PCP and establish care with cardiology team outpatient   # Incidental aortic root/aortic dilation  Aortic root 4.3 cm and ascending aorta dilation 4 cm.  No previous comparison.  - Outpatient follow-up TTE yearly monitoring, good BP control.  - Age-appropriate health maintenance on PCP visit.  # Hyponatremia, resolved  Presented with sodium of 131, given patient eloped likely related to poor oral intake.  Creatinine within normal limits.  Now sodium improved.  - Follow up with PCP for  ongoing monitoring   # Mild hypocalcemia, resolved  Minimal hypocalcemia at 8.6, albumin is 3.94 oh.  Not clinically relevant/causing above symptoms.  Increased p.o. intake.  - Monitor with PCP outpatient   # Mildly elevated TSH  Admit TSH 4.37 although free T4 is 1.40.  No PTA Synthroid/meds.  No convincing symptoms of hyperthyroidism, despite psych decompensation  - PCP recheck thyroid function in 10 -12 weeks.   # History of nicotine use   Patient reports he uses E-cig sometimes, vague about when.    - Cessation encouraged, NRT per primary team   # History of alcohol use disorder   Patient declines any recent use.  States he will drink when he gets money but cannot afford it.  LFTs are WNL.  JOSR negative.  - Continue thiamine multivitamin, folic acid supplements.  - Encourage cessation   # History of drug use  - Patient declines recent use - recent UDS is negative         SUBJECTIVE:      Ger continues to have paranoid delusions. He refuses IM Invega. He has impaired insight and judgement and he does not think that he needs medications to begin with. He is getting more agitated and irritable. He has TD. Hospital does not carry Austedo or Ingrezza. He was on Cogentin in the past . He says it helps a little. He denies thoughts of hurting self or others. He had commitment hearing today. He does not want to talk about it.  is waiting for court response after final hearing.     I reviewed his admission from May 2023.  At that time he was admitted at [facility] as a voluntary patient, but he was under my commitment with Bryce.  He was diagnosed with schizoaffective disorder and he had agitation and verbal aggression, paranoid delusions, disorganized thoughts, and refusing medications.  The staff from the shelter reported that he refused medications and was getting more irritable, argumentative and agitated.  She described him as mean and making threats and throwing things.  He believed that  medical providers were conspiring the government against him.  He was receiving special messages through the headphones.  After getting Invega Sustenna he is thought process was more organized and less tangential although he he eventually agreed to take long acting Invega Sustenna.  It says that Cogentin was decreased from 1 to 0.5 mg twice daily to minimize anticholinergic  burden.  It controlled tremor.  He was admitted to December 2022 when he had altered mental status, wandering the streets without gloves or socks.  Cogentin was increased for tremor, and Invega Sustenna IM given.  He was under commitment at that time    He was admitted to IP psychiatry at [facility] in June 2022.  911 was called because he was walking on the Friday.  He reported that his name is Ger Solares, that he was homeless and his life is in the eighth send that he was suicidal and that he had paranoid schizophrenia.  He reported hearing male and female voices.  He talked about hearing [employer] and walking the line like him.  At that time he was suicidal and reported [place] get hit by the car and die.He was living in  and he was followed by ACT team. At that time he started refusing Invega Sustena and insisted only on oral medications and decompensated on it.     Prior to that he was admitted inJanuary 2022, May 2021,December 2020, April 2020    It says that In April 2020 he was admitted to [facility] he was on Haldol and Invega, including Invega Sustenna.  He was noncompliant with medications.  He believes that [facility] was after him.He was working with ACT team.  It was reported that he had baseline delusions.He was diagnosed with schizophrenia schizoaffective disorder chronic with acute exacerbation.    In November 2019 he was admitted, diagnosed with EPS due to Haldol, discharged on oral Haldol, IM invega Sustena and Cogentine.    He had 4 admissions in 2019   He  had 6 admissions in 2018  He had 2 admissions in 2016  He had 1 admission in 2013  He had 3 admissions in 2012            MEDICATIONS:     Current Facility-Administered Medications   Medication Dose Route Frequency Provider Last Rate Last Admin    - Psychiatric Emergency -   Other See Admin Instructions Marilyn Jaffe MD        benztropine (COGENTIN) tablet 1 mg  1 mg Oral BID Marilyn Jaffe MD   1 mg at 05/01/25 0849    folic acid (FOLVITE) tablet 1 mg  1 mg Oral Daily Marilyn Jaffe MD   1 mg at 05/01/25 0848    multivitamin w/minerals (THERA-VIT-M) tablet 1 tablet  1 tablet Oral Daily Marilyn Jaffe MD   1 tablet at 05/01/25 0848    [Held by provider] paliperidone (INVEGA SUSTENNA) injection CHRISTOS 234 mg  234 mg Intramuscular Q28 Days Marilyn Jaffe MD        paliperidone ER (INVEGA) 24 hr tablet 6 mg  6 mg Oral Daily Marilyn Jaffe MD   6 mg at 05/01/25 0849    thiamine (B-1) tablet 100 mg  100 mg Oral Daily Marilyn Jaffe MD   100 mg at 05/01/25 0848     Current Facility-Administered Medications   Medication Dose Route Frequency Provider Last Rate Last Admin    acetaminophen (TYLENOL) tablet 650 mg  650 mg Oral Q4H PRN Marilyn Jaffe MD        alum & mag hydroxide-simethicone (MAALOX) suspension 30 mL  30 mL Oral Q4H PRN Marilyn Jaffe MD        carboxymethylcellulose PF (REFRESH PLUS) 0.5 % ophthalmic solution 1 drop  1 drop Both Eyes TID PRN Marilyn Jaffe MD        cyclobenzaprine (FLEXERIL) tablet 10 mg  10 mg Oral Q8H PRN Michael Melendrez PA-C   10 mg at 04/30/25 1632    gabapentin (NEURONTIN) capsule 100 mg  100 mg Oral Q6H PRN Marilyn Jaffe MD        OLANZapine (zyPREXA) tablet 2.5 mg  2.5 mg Oral TID PRN Marilyn Jaffe MD        Or    OLANZapine (zyPREXA) injection 2.5 mg  2.5 mg Intramuscular TID PRN Marilyn Jaffe MD        senna-docusate (SENOKOT-S/PERICOLACE) 8.6-50 MG per tablet 1 tablet  1 tablet Oral BID PRN Marilyn Jaffe MD        traZODone (DESYREL) tablet 50 mg  50 mg Oral At  "Bedtime Marilyn Vines MD   50 mg at 04/28/25 0122       Medication adherence: Yes with oral, refusing IM  Medication side effects: c/o of pain in injection site when he gets IM neuroleptic   Benefit: Symptom reduction         ROS:   As per history of present illness, otherwise reminder of review of systems is negative for: General, eyes, ears, nose, throat, neck, respiratory, cardiovascular, gastrointestinal, genitourinary, meniscal skeletal, neurological, hematological, dermatological and endocrine system.         MENTAL STATUS EXAM:   /74   Pulse 74   Temp 97.8  F (36.6  C) (Temporal)   Resp 18   Ht 1.753 m (5' 9\")   Wt 87.9 kg (193 lb 12.6 oz)   SpO2 98%   BMI 28.62 kg/m      Appearance:fair hygiene  Orientation:to self, place, partially in time   Speech:fluent  Language ability: intact  Thought process: concrete  Thought content: positive for paranoid delusions, denies hallucinations  Associations: Connected  Suicidal Ideation: denies   Homicidal Ideation:denies   Mood:  depressed  Affect: irritable   Intellectual functioning:average  Fund of Knowledge: consistent with education and experience   Attention/Concentration: decreased  Memory: affected by psychosis   Psychomotor Behavior: calm  Muscle Strength and Tone: no atrophy or involuntary movement  Gait and Station: steady  Insight and judgement:inadequate          LABS:   personally reviewed.   Lab Results   Component Value Date     04/21/2025     04/20/2025     02/22/2024     04/05/2020     12/10/2019     10/14/2019    CO2 30 04/21/2025    CO2 29 04/20/2025    CO2 30 02/22/2024    CO2 27 04/05/2020    CO2 34 12/10/2019    CO2 28 10/14/2019    BUN 11.6 04/21/2025    BUN 12.4 04/20/2025    BUN 14.6 02/22/2024    BUN 16 04/05/2020    BUN 15 12/10/2019    BUN 12 10/14/2019     No results found for: \"CKTOTAL\", \"CKMB\", \"TROPONINI\"  Lab Results   Component Value Date    WBC 5.4 04/21/2025    WBC 6.4 04/20/2025 "    WBC 5.8 02/22/2024    WBC 7.2 04/05/2020    WBC 5.9 12/10/2019    WBC 6.7 10/14/2019    HGB 14.0 04/21/2025    HGB 14.0 04/20/2025    HGB 13.7 02/22/2024    HGB 15.7 04/05/2020    HGB 15.6 12/10/2019    HGB 15.3 10/14/2019    HCT 41.1 04/21/2025    HCT 40.7 04/20/2025    HCT 41.1 02/22/2024    HCT 44.6 04/05/2020    HCT 45.3 12/10/2019    HCT 42.9 10/14/2019    MCV 92 04/21/2025    MCV 92 04/20/2025    MCV 92 02/22/2024    MCV 91 04/05/2020    MCV 93 12/10/2019    MCV 89 10/14/2019     04/21/2025     04/20/2025     02/22/2024     04/05/2020     12/10/2019     10/14/2019     Lab Results   Component Value Date    CHOL 148 04/19/2019    CHOL 158 04/01/2012    TRIG 136 04/19/2019    TRIG 107 04/01/2012    HDL 32 04/19/2019    HDL 29 04/01/2012      Latest Reference Range & Units 04/20/25 21:30 04/21/25 11:34   Sodium 135 - 145 mmol/L  136   Potassium 3.4 - 5.3 mmol/L  4.4   Chloride 98 - 107 mmol/L  100   Carbon Dioxide (CO2) 22 - 29 mmol/L  30 (H)   Urea Nitrogen 8.0 - 23.0 mg/dL  11.6   Creatinine 0.67 - 1.17 mg/dL  0.79   GFR Estimate >60 mL/min/1.73m2  >90   Calcium 8.8 - 10.4 mg/dL  8.7 (L)   Anion Gap 7 - 15 mmol/L  6 (L)   Magnesium 1.7 - 2.3 mg/dL 2.0    Phosphorus 2.5 - 4.5 mg/dL 3.0    Albumin 3.5 - 5.2 g/dL 3.9 4.0   Protein Total 6.4 - 8.3 g/dL 5.8 (L) 5.9 (L)   Alkaline Phosphatase 40 - 150 U/L 88 76   ALT 0 - 70 U/L 11 12   AST 0 - 45 U/L 23 18   Bilirubin Direct 0.00 - 0.30 mg/dL 0.21    Bilirubin Total <=1.2 mg/dL 0.7 0.6   Glucose 70 - 99 mg/dL  102 (H)   T4 Free 0.90 - 1.70 ng/dL 1.40    Troponin T, High Sensitivity <=22 ng/L 8    TSH 0.30 - 4.20 uIU/mL 4.37 (H)       Latest Reference Range & Units 04/21/25 11:34   FIO2  0   Ph Venous 7.32 - 7.43  7.42   PCO2 Venous 40 - 50 mm Hg 48   PO2 Venous 25 - 47 mm Hg 43   O2 Sat, Venous 70.0 - 75.0 % 81.8 (H)   Bicarbonate Venous 21 - 28 mmol/L 31 (H)   Base Excess Venous -3.0 - 3.0 mmol/L 5.5 (H)   Oxyhemoglobin  Venous 70 - 75 % 80 (H)   WBC 4.0 - 11.0 10e3/uL 5.4   Hemoglobin 13.3 - 17.7 g/dL 14.0   Hematocrit 40.0 - 53.0 % 41.1   Platelet Count 150 - 450 10e3/uL 170   RBC Count 4.40 - 5.90 10e6/uL 4.46   MCV 78 - 100 fL 92   MCH 26.5 - 33.0 pg 31.4   MCHC 31.5 - 36.5 g/dL 34.1   RDW 10.0 - 15.0 % 11.9      [facility] Reference Range & Units 04/21/25 09:57   Color Urine Colorless, Straw, Light Yellow, Yellow  Light Yellow   Appearance Urine Clear  Clear   Glucose Urine Negative mg/dL Negative   Bilirubin Urine Negative  Negative   Ketones Urine Negative mg/dL Negative   Specific Gravity Urine 1.003 - 1.035  1.009   pH Urine 5.0 - 7.0  6.5   Protein Albumin Urine Negative mg/dL Negative   Urobilinogen mg/dL Normal mg/dL Normal   Nitrite Urine Negative  Negative   Blood Urine Negative  Negative   Leukocyte Esterase Urine Negative  Negative   Amphetamine Qual Urine Screen Negative  Screen Negative   Fentanyl Qual Urine Screen Negative  Screen Negative   Cocaine Urine Screen Negative  Screen Negative   Benzodiazepine Urine Screen Negative  Screen Negative   Opiates Qualitative Urine Screen Negative  Screen Negative   PCP Urine Screen Negative  Screen Negative   Cannabinoids Qual Urine Screen Negative  Screen Negative   Barbiturates Qual Urine Screen Negative  Screen Negative       CT HEAD WO IV CONT, CT TRAUMA CERVICAL SCREEN T4-C1   LOCATION: [facility]   DATE/TIME: 2/24/2023 1:49 AM   INDICATION: GCS 14 or 15 - head injury and age over 64 years.   COMPARISON: 12/15/2020. 12/29/2022.   TECHNIQUE:   1) Routine CT Head without IV contrast. Multiplanar reformats. Dose reduction techniques were used.   2) Routine CT Cervical Spine without IV contrast. Multiplanar reformats. Dose reduction techniques were used.   FINDINGS:   HEAD CT:   INTRACRANIAL CONTENTS: No intracranial hemorrhage, extraaxial collection, or mass effect.  No CT evidence of acute infarct. Normal parenchymal attenuation. Mild generalized volume loss. No  hydrocephalus.        EKG 12-lead, tracing only 4/21/2025          Component  Ref Range & Units 4/21/25  9:06 AM 4/20/25  9:37 PM    Systolic Blood Pressure  mmHg      Diastolic Blood Pressure  mmHg      Ventricular Rate  BPM 72 69    Atrial Rate  BPM 72 249    AL Interval  ms 180     QRS Duration  ms 152 158    QT  ms 420 438    QTc  ms 459 469    P Axis  degrees 47 122    R AXIS  degrees -73 -76    T Axis  degrees 49 56    Interpretation ECG Sinus rhythm with Premature atrial complexes in a pattern of bigeminy  Right bundle branch block  Left anterior fascicular block  ** Bifascicular block **  Abnormal ECG  When compared with ECG of 20-Apr-2025 21:37,  Sinus rhythm has replaced Atrial fibrillation  Confirmed by MD REMY, ARI ([id number]) on 4/21/2025 11:07:20 AM               DIAGNOSIS:     Paranoid schizophrenia chronic with acute exacerbation   Tardive dyskinesia    Patient Active Problem List   Diagnosis    Schizoaffective disorder, bipolar type (H)    Schizophrenia (H)    Alcohol abuse    Dyslipidemia    Gastroesophageal reflux disease without esophagitis    Hypothyroidism due to acquired atrophy of thyroid    Tobacco use disorder    Schizoaffective disorder (H)    Acute psychosis (H)    Atrial flutter, unspecified type (H)    Aortic root dilatation    Mood changes    Paranoid schizophrenia, chronic condition with acute exacerbation (H)          PLAN:   Ger is  76 y/o male with chronic persistent mental illness. He has been on neuroleptics for many years. He was diagnosed with schizophrenia, schizoaffective disorder.  He is also diagnosed with EPS and cognitive decline.  He has had multiple psychiatric hospitalizations, commitments with Wu order due to noncompliance with treatment.  Per Epic report there are more than 20 psychiatric admissions since 2012.   He had previous admissions due to not wanting to take Invega Sustenna and wanting to take only oral medications and during that time he decompensated and  Invega Sustenna was restarted.  He has been on it for at least  6 years. He has impaired insight and judgement. He says he does not need medication to begin with and he does not want IM Sustena because FBI will have something on him.   He is on a court hold and he has  final commitment hearing on May 1 st. Waiting for court response. Patient does not want to says how it went.   He agrees  with the following recommendations:    Medications:  Invega 6 mg daily for paranoid schizophrenia   Cogentin 1 mg bid for EPS  Trazodone 50 mg nightly as needed for sleep  Multivitamins 1 pill daily  Thiamine 100 mg daily  Folic acid 1 mg daily  Tylenol 650 mg every 4 hours as needed for pain  Maalox 30 mg every 4 as needed for indigestion  Refresh Plus ophthalmic solution 0.5% 1 drop to both eyes 3 times daily as needed for dry eyes  Flexeril 10 mg 3 times daily as needed for muscle spasms  Neurontin 100 mg every 6 hours as needed for anxiety  Zyprexa 2.5 mg 3 times daily as needed for agitation  Senna docusate 1 pill twice daily as needed for constipation  We discussed side effects, benefits and alternative treatments and patient agrees with with oral , but refuses IM Invega.   will collect collateral information and make outpatient referrals  Staff to provide emotional support and redirect as needed  Patient encouraged to attend groups  Lab results: Reviewed personally  Consultation: medicine consult completed     Risk Assessment: going through commitment due to non compliance with tx which led to psychotic break    Coordination of Care:   Patient seen, medical record reviewed, care coordinated with the team.    This document is created with the help of Dragon dictation system.  All grammatical/typing errors or context distortion are unintentional and inherent to software.    Marilyn Jaffe MD        Re-Certification I certify that the inpatient psychiatric facility services furnished since the previous  certification were, and continue to be, medically necessary for, either, treatment which could reasonably be expected to improve the patient s condition or diagnostic study and that the hospital records indicate that the services furnished were, either, intensive treatment services, admission and related services necessary for diagnostic study, or equivalent services.     I certify that the patient continues to need, on a daily basis, active treatment furnished directly by or requiring the supervision of inpatient psychiatric facility personnel.   I estimate TBD days of hospitalization is necessary for proper treatment of the patient. My plans for post-hospital care for this patient are : Medications, appointments     Marilyn Jaffe MD

## 2025-05-01 NOTE — PLAN OF CARE
Problem: Psychotic Signs/Symptoms  Goal: Improved Sleep (Psychotic Signs/Symptoms)  Outcome: Progressing   Goal Outcome Evaluation:    Slept well for 9.5 hours  Pt has a no roommate order due to disorganized behavior, Psychosis and delusional thoughts. No behavioral or safety concerns tonight.   Court Trial and Wu hearing scheduled for 0945 today

## 2025-05-01 NOTE — PLAN OF CARE
Problem: Psychotic Signs/Symptoms  Goal: Improved Behavioral Control (Psychotic Signs/Symptoms)  Outcome: Progressing   Goal Outcome Evaluation:    Plan of Care Reviewed With: patient      Patient has been calm, isolative and withdrawn. Pt spent most of the day in his room and does not interact with peers. Pt denies all mental illness symptoms and reluctantly took scheduled medications stating he does not need them. Pt stated cogentin has helped decrease involuntary mouth movements.

## 2025-05-01 NOTE — PLAN OF CARE
Problem: Adult Behavioral Health Plan of Care  Goal: Plan of Care Review  Flowsheets  Taken 4/30/2025 1943  Plan of Care Reviewed With: patient  Overall Patient Progress: no change  Patient Agreement with Plan of Care: agrees    Patient was calm, pleasant but isolative/withdrawn, resting in bed, listening to music most of the time. He was visible in the milieu at supper, during snack time and when taking his medication. Ate 100%. He denied mental health symptoms. He denied pain when asked. He was med compliant.

## 2025-05-01 NOTE — PLAN OF CARE
Team Note Due:  Thursday    Assessment/Intervention/Current Symtoms and Care Coordination:  Chart review and met with team, discussed pt progress, symptomology, and response to treatment.  Discussed the discharge plan and any potential impediments to discharge.    Pt admitted for eloping group home, medication non-compliance, and paranoid/delusional thinking. Petition for commitment was started in the ED. Pt arrived to the unit on a court hold. Pt is calm, but paranoid. Mostly isolative to his room. If he does come out, he usually is wearing headphones and doesn't engage with others. Per RN, pt reports he is paranoid and struggling to trust others.    Writer checked in with pt.     Pt attended final hearing at 945am.    Writer sent email update to pt's ACT team , Guerda.    Discharge Plan or Goal:  To be determined     Barriers to Discharge:  Patient requires further psychiatric stabilization due to current symptomology and medication management with possible adjustments    Referral Status:  To be determined      Legal Status:  Court Hold  [facility]: [place]  File Number: [id number]  Upcoming Hearings:  Final hearing 5/1 9:45am    Contacts (include PEG status):  Guerda: ACT team  [phone]   [email or web address]    Chapito Love- [facility] Attorney  [email or web address]     Upcoming Meetings and Dates/Important Information and next steps:  Follow up on status of hearing from 5/1

## 2025-05-01 NOTE — PROVIDER NOTIFICATION
"   05/01/25 1136   Individualization/Patient Specific Goals   Patient Personal Strengths expressive of needs;resourceful;resilient   Patient Vulnerabilities limited support system;lacks insight into illness;substance abuse/addiction   Interprofessional Rounds   Summary Pt admitted to unit from [facility]. Pt was brought to [facility] ED following pt reporting to a gas station to \"turn himself in.\" Pt had eloped from his group home four days before that. Pt had not been medication compliant.   Participants nursing;OT;CTC;psychiatrist   Behavioral Team Discussion   Participants Dr. Marilyn Jaffe MD; Velma Egna, RN; Dora Johnson, OT; Jayna Funk, CARLOS ENRIQUESW, CTC   Progress Pt just admitted to the unit. Pt refused to engage verbally in an assessment upon arrival on the unit. Pt likes to repond to questions in written form, on his own time. This appears to be volitional. [facility] ED staff petitioned for commitment with [facility]. Commitment petition was supported and pt is on a court hold as of 4/23/2025. Pt attended his preliminary hearing on 4/28/2025 and final hearing today 5/1/2025. Pt remains paranoid and delusional. Believes he is in legal trouble and the FBI is looking for him.   Anticipated length of stay 7-10 days   Continued Stay Criteria/Rationale medication management and evaluation of symptoms, court process, possible placement issue   Medical/Physical see H&P and provider notes   Precautions see below   Plan to be determined   Rationale for change in precautions or plan NA   Safety Plan per unit protocol   Anticipated Discharge Disposition group home     PRECAUTIONS AND SAFETY    Behavioral Orders   Procedures    Code 1 - Restrict to Unit    Elopement precautions    Fall precautions    Routine Programming     As clinically indicated    Sexual precautions     History of sexual inappropriate comments    Status 15     Every 15 minutes.    Suicide precautions: Suicide Risk: HIGH; Clinical rationale to " override score: Exhibiting Suicidal/self-harm behaviors or thoughts     Patients on Suicide Precautions should have a Combination Diet ordered that includes a Diet selection(s) AND a Behavioral Tray selection for Safe Tray - with utensils, or Safe Tray - NO utensils       Order Specific Question:   Suicide Risk     Answer:   HIGH     Order Specific Question:   Clinical rationale to override score:     Answer:   Exhibiting Suicidal/self-harm behaviors or thoughts       Safety  Safety WDL: WDL  Patient Location: patient room, own  Observed Behavior: calm  Observed Behavior (Comment): awake in bed  Safety Measures: safety rounds completed  Suicidality: Status 15  Elopement Assessment:  (on 1:1 at [facility] for concerns of elopement)  Elopement Interventions: status 15  Sexual: status 15  Additional Documentation:  (fall precaution inplace)

## 2025-05-02 PROCEDURE — 250N000013 HC RX MED GY IP 250 OP 250 PS 637: Performed by: PSYCHIATRY & NEUROLOGY

## 2025-05-02 PROCEDURE — 124N000002 HC R&B MH UMMC

## 2025-05-02 PROCEDURE — 99232 SBSQ HOSP IP/OBS MODERATE 35: CPT | Performed by: PSYCHIATRY & NEUROLOGY

## 2025-05-02 PROCEDURE — 97150 GROUP THERAPEUTIC PROCEDURES: CPT | Mod: GO

## 2025-05-02 RX ADMIN — PALIPERIDONE 6 MG: 3 TABLET, EXTENDED RELEASE ORAL at 08:37

## 2025-05-02 RX ADMIN — Medication 1 TABLET: at 08:37

## 2025-05-02 RX ADMIN — BENZTROPINE MESYLATE 1 MG: 1 TABLET ORAL at 20:39

## 2025-05-02 RX ADMIN — FOLIC ACID 1 MG: 1 TABLET ORAL at 08:37

## 2025-05-02 RX ADMIN — THIAMINE HCL TAB 100 MG 100 MG: 100 TAB at 08:37

## 2025-05-02 RX ADMIN — BENZTROPINE MESYLATE 1 MG: 1 TABLET ORAL at 08:36

## 2025-05-02 ASSESSMENT — ACTIVITIES OF DAILY LIVING (ADL)
ADLS_ACUITY_SCORE: 41
ADLS_ACUITY_SCORE: 41
LAUNDRY: UNABLE TO COMPLETE
ADLS_ACUITY_SCORE: 41
ADLS_ACUITY_SCORE: 41
ORAL_HYGIENE: INDEPENDENT
ADLS_ACUITY_SCORE: 41
DRESS: INDEPENDENT
ORAL_HYGIENE: INDEPENDENT
ADLS_ACUITY_SCORE: 41
DRESS: INDEPENDENT
HYGIENE/GROOMING: INDEPENDENT
HYGIENE/GROOMING: INDEPENDENT
ADLS_ACUITY_SCORE: 41

## 2025-05-02 NOTE — PLAN OF CARE
"Pt was isolative and withdrawn in his room most of this shift. Pt stated he was feeling \"really\" good, and endorsed depression but chose not to rate the level of depression. He also said he did not want to answer questions about anxiety and SI. Pt appetite was good. His mood was calm, and he presented with a flat, blunted affect. Pt was hypervigilant during assessment. No new concerns this shift. Plan of care ongoing.     Problem: Psychotic Signs/Symptoms  Goal: Improved Behavioral Control (Psychotic Signs/Symptoms)  Outcome: Progressing   Goal Outcome Evaluation:    Plan of Care Reviewed With: patient                   "

## 2025-05-02 NOTE — PLAN OF CARE
Rehab Group    Start time: 11:10  End time: 12:05  Patient time total: 55 minutes    attended full group    #7 attended   Group Type: occupational therapy   Group Topic Covered: cognitive activities, coping skills, healthy leisure time, and social skills     Group Session Detail:  Patient actively engaged in a occupational therapy group with leisure exploration and engagement being the topic and focus. Leisure exploration offered for increased intrinsic motivation to engage in social situations with peers and exercise cognitive skills.      Patient Response/Contribution:  cooperative with task, socially appropriate, attentive, and actively engaged     Patient Detail:  Patient appeared motivated to engage in a familiar leisure activity with peers. Patient was familiar with task and demonstrated good recall of task instructions and strategy. Patient was able to engage independently; while successfully completing scoring and solving basic mathematical calculations. Patient remained attentive throughout the activity and was able to track the activity successfully. Patient briefly engaged in social interactions when initiated by others. Pleasant, calm, cooperative, and polite group attendee.        [id number] OT Group (2 or more in attendance)      Patient Active Problem List   Diagnosis    Schizoaffective disorder, bipolar type (H)    Schizophrenia (H)    Alcohol abuse    Dyslipidemia    Gastroesophageal reflux disease without esophagitis    Hypothyroidism due to acquired atrophy of thyroid    Tobacco use disorder    Schizoaffective disorder (H)    Acute psychosis (H)    Atrial flutter, unspecified type (H)    Aortic root dilatation    Mood changes    Paranoid schizophrenia, chronic condition with acute exacerbation (H)

## 2025-05-02 NOTE — PLAN OF CARE
"  Problem: Psychotic Signs/Symptoms  Goal: Improved Mood Symptoms (Psychotic Signs/Symptoms)  Outcome: Not Progressing   Goal Outcome Evaluation:    Plan of Care Reviewed With: patient      Patient is isolative and withdrawn, guarded during MH assessment. Endorses depression but will not rate it. Denies other MH symptoms. Patient is disoriented to situation. Not social with peers, isolated and withdrawn most of shift, attended portion of OT group.   /71 (BP Location: Right arm, Patient Position: Sitting, Cuff Size: Adult Regular)   Pulse 74   Temp 97.2  F (36.2  C) (Temporal)   Resp 16   Ht 1.753 m (5' 9\")   Wt 87.9 kg (193 lb 12.6 oz)   SpO2 93%   BMI 28.62 kg/m    Denies pain.  "

## 2025-05-02 NOTE — PLAN OF CARE
Rehab Group    Start time: 1015  End time: 1105  Patient time total: 45 minutes in group, 30 minutes with other participants    attended full group    #2 attended   Group Type: OT Clinic   Group Topic Covered: cognitive activities, coping skills, healthy leisure time, problem solving, and social skills   Group Session Detail:OT: Education on healthy activity engagement and creative hands-on endeavor (OT clinic) to increase concentration, focus, attention to task/detail, decision making, problem solving, frustration tolerance, task follow through, coping with stress, healthy leisure engagement, creative expression, and social engagement    Patient Response/Contribution:  cooperative with task and actively engaged   Patient Detail:pt chose to engage in previous familiar creative task. Pt brightens with engagement. Pt pleasant and appropriate during interactions with staff and peers. Pt worked in a neat and intentional manner for duration of engagement      [id number] OT Group (2 or more in attendance)      Patient Active Problem List   Diagnosis    Schizoaffective disorder, bipolar type (H)    Schizophrenia (H)    Alcohol abuse    Dyslipidemia    Gastroesophageal reflux disease without esophagitis    Hypothyroidism due to acquired atrophy of thyroid    Tobacco use disorder    Schizoaffective disorder (H)    Acute psychosis (H)    Atrial flutter, unspecified type (H)    Aortic root dilatation    Mood changes    Paranoid schizophrenia, chronic condition with acute exacerbation (H)

## 2025-05-02 NOTE — PROGRESS NOTES
PSYCHIATRY PROGRESS NOTE         DATE OF SERVICE:   5/2/2025         CHIEF COMPLAINT:     Patient says EMMANUELLE wants him to apprehend criminals, she wants to know if they have results from court          OBJECTIVE:     Nursing reports : Isolates in his room, slept through the night, takes medications     reports working on outpatient referrals, final commitment hearing 5/1/2025    Medicine consult by REBECA Person on 4/24/2025  # Acute decompensated schizophrenia  # Eloped from care facility, noncompliance with medication.  # Anxiety, depression.  Eloped from group home 4/16, found to gas station 4/20 endorsing he is a fugitive and try and turn himself in.  Per report third elopement from group home. Was admitted to [facility] internal medicine 4/20. Now admitted to  for treatment of decompensated schizophrenia. PTA on trazodone, Cogentin, IM Invega sustenna.   - As managed per psychiatry   # Chronic intermittent chest pain  # Possible history A-fib  # Chronic bifascicular block, L anterior fascicular block, R bundle branch block,   # History of chronic HFpEF, grade 1 diastolic dysfunction  Of note, has complained of chronic intermittent chest pain sx> 1yr prior.  States symptoms are regular.  Does not have any as needed nitro per chart review. Recent ECG reviewed, NSR on arrival (with artifact) bifascicular block LVH, LAD.  However chart review indicates history of A-fib, no PTA DOAC.  History HFpEF most recent EF 55-60%.   - Follow up with PCP and establish care with cardiology team outpatient   # Incidental aortic root/aortic dilation  Aortic root 4.3 cm and ascending aorta dilation 4 cm.  No previous comparison.  - Outpatient follow-up TTE yearly monitoring, good BP control.  - Age-appropriate health maintenance on PCP visit.  # Hyponatremia, resolved  Presented with sodium of 131, given patient eloped likely related to poor oral intake.  Creatinine within normal limits.  Now sodium improved.  -  Follow up with PCP for ongoing monitoring   # Mild hypocalcemia, resolved  Minimal hypocalcemia at 8.6, albumin is 3.94 oh.  Not clinically relevant/causing above symptoms.  Increased p.o. intake.  - Monitor with PCP outpatient   # Mildly elevated TSH  Admit TSH 4.37 although free T4 is 1.40.  No PTA Synthroid/meds.  No convincing symptoms of hyperthyroidism, despite psych decompensation  - PCP recheck thyroid function in 10 -12 weeks.   # History of nicotine use   Patient reports he uses E-cig sometimes, vague about when.    - Cessation encouraged, NRT per primary team   # History of alcohol use disorder   Patient declines any recent use.  States he will drink when he gets money but cannot afford it.  LFTs are WNL.  JOSR negative.  - Continue thiamine multivitamin, folic acid supplements.  - Encourage cessation   # History of drug use  - Patient declines recent use - recent UDS is negative         SUBJECTIVE:      Ger says  that the FBI wants him to apprehend the criminals.  He said that FBI is talking to him.  When I asked him if he hears voices, he says that they are talking to him through his mind, but he does not hear voices.  He denies thoughts of hurting self or others.  He slept most of the night.  Appetite fluctuates, energy decreased, concentration decreased.  He asks if I have the results from court.  I remind him that we are still waiting for that and that he will have to take Invega Sustenna injection if discharge orders it.  He continues to resist that.  He says that he does not need it, it hurts when he gets injection, he believes he can take oral medication.  I remind him that in the past he tried that and his symptoms got worse and he ended up back in the hospital.  I remind him that if he really wants to go back home then she needs to cooperate with orders.He goes to groups.  He seems little more relaxed now that he knows commitment hearing is older, but he is still anxious about whether the   will order that he has to take Invega Sustenna.      I reviewed his admission from May 2023.  At that time he was admitted at [facility] as a voluntary patient, but he was under my commitment with Bryce.  He was diagnosed with schizoaffective disorder and he had agitation and verbal aggression, paranoid delusions, disorganized thoughts, and refusing medications.  The staff from the residential reported that he refused medications and was getting more irritable, argumentative and agitated.  She described him as mean and making threats and throwing things.  He believed that medical providers were conspiring the government against him.  He was receiving special messages through the headphones.  After getting Invega Sustenna he is thought process was more organized and less tangential although he he eventually agreed to take long acting Invega Sustenna.  It says that Cogentin was decreased from 1 to 0.5 mg twice daily to minimize anticholinergic  burden.  It controlled tremor.  He was admitted to December 2022 when he had altered mental status, wandering the streets without gloves or socks.  Cogentin was increased for tremor, and Invega Sustenna IM given.  He was under commitment at that time    He was admitted to IP psychiatry at [facility] in June 2022.  911 was called because he was walking on the Friday.  He reported that his name is Ger Solares, that he was homeless and his life is in the eighth send that he was suicidal and that he had paranoid schizophrenia.  He reported hearing male and female voices.  He talked about hearing BRAND-YOURSELF and walking the line like him.  At that time he was suicidal and reported [place] get hit by the car and die.He was living in  and he was followed by ACT team. At that time he started refusing Invega Sustena and insisted only on oral medications and decompensated on it.     Prior to that he was admitted  inJanuary 2022, May 2021,December 2020, April 2020    It says that In April 2020 he was admitted to [facility] he was on Haldol and Invega, including Invega Sustenna.  He was noncompliant with medications.  He believes that YAHIR was after him.He was working with ACT team.  It was reported that he had baseline delusions.He was diagnosed with schizophrenia schizoaffective disorder chronic with acute exacerbation.    In November 2019 he was admitted, diagnosed with EPS due to Haldol, discharged on oral Haldol, IM invega Sustena and Cogentine.    He had 4 admissions in 2019   He had 6 admissions in 2018  He had 2 admissions in 2016  He had 1 admission in 2013  He had 3 admissions in 2012            MEDICATIONS:     Current Facility-Administered Medications   Medication Dose Route Frequency Provider Last Rate Last Admin    - Psychiatric Emergency -   Other See Admin Instructions Marilyn Jaffe MD        benztropine (COGENTIN) tablet 1 mg  1 mg Oral BID Marilyn Jaffe MD   1 mg at 05/02/25 0836    folic acid (FOLVITE) tablet 1 mg  1 mg Oral Daily Marilyn Jaffe MD   1 mg at 05/02/25 0837    multivitamin w/minerals (THERA-VIT-M) tablet 1 tablet  1 tablet Oral Daily Marilyn Jaffe MD   1 tablet at 05/02/25 0837    [Held by provider] paliperidone (INVEGA SUSTENNA) injection CHRISTOS 234 mg  234 mg Intramuscular Q28 Days Marilyn Jaffe MD        paliperidone ER (INVEGA) 24 hr tablet 6 mg  6 mg Oral Daily Marilyn Jaffe MD   6 mg at 05/02/25 0837    thiamine (B-1) tablet 100 mg  100 mg Oral Daily Marilyn Jaffe MD   100 mg at 05/02/25 0837     Current Facility-Administered Medications   Medication Dose Route Frequency Provider Last Rate Last Admin    acetaminophen (TYLENOL) tablet 650 mg  650 mg Oral Q4H PRN Marilyn Jaffe MD        alum & mag hydroxide-simethicone (MAALOX) suspension 30 mL  30 mL Oral Q4H PRN Marilyn Jaffe MD        carboxymethylcellulose PF (REFRESH PLUS) 0.5 % ophthalmic solution 1 drop  1  "drop Both Eyes TID PRN Marilyn Jaffe MD        cyclobenzaprine (FLEXERIL) tablet 10 mg  10 mg Oral Q8H PRN Michael Melendrez PA-C   10 mg at 04/30/25 1632    gabapentin (NEURONTIN) capsule 100 mg  100 mg Oral Q6H PRN Marilyn Jaffe MD        OLANZapine (zyPREXA) tablet 2.5 mg  2.5 mg Oral TID PRN Marilyn Jaffe MD        Or    OLANZapine (zyPREXA) injection 2.5 mg  2.5 mg Intramuscular TID PRN Marilyn Jaffe MD        senna-docusate (SENOKOT-S/PERICOLACE) 8.6-50 MG per tablet 1 tablet  1 tablet Oral BID PRN Marilyn Jaffe MD        traZODone (DESYREL) tablet 50 mg  50 mg Oral At Bedtime PRN Marilyn Jaffe MD   50 mg at 04/28/25 0122       Medication adherence: Yes with oral, refusing IM  Medication side effects: c/o of pain in injection site when he gets IM neuroleptic   Benefit: Symptom reduction         ROS:   As per history of present illness, otherwise reminder of review of systems is negative for: General, eyes, ears, nose, throat, neck, respiratory, cardiovascular, gastrointestinal, genitourinary, meniscal skeletal, neurological, hematological, dermatological and endocrine system.         MENTAL STATUS EXAM:   /71 (BP Location: Right arm, Patient Position: Sitting, Cuff Size: Adult Regular)   Pulse 74   Temp 97.2  F (36.2  C) (Temporal)   Resp 16   Ht 1.753 m (5' 9\")   Wt 87.9 kg (193 lb 12.6 oz)   SpO2 93%   BMI 28.62 kg/m      Appearance:fair hygiene  Orientation:to self, place, partially in time   Speech:fluent  Language ability: intact  Thought process: concrete  Thought content: positive for paranoid delusions, denies hallucinations but it is questionable  Associations: Connected  Suicidal Ideation: denies   Homicidal Ideation:denies   Mood:  depressed  Affect: anxious   Intellectual functioning:average  Fund of Knowledge: consistent with education and experience   Attention/Concentration: decreased  Memory: affected by psychosis   Psychomotor Behavior: calm  Muscle Strength and Tone: no " "atrophy or involuntary movement  Gait and Station: steady  Insight and judgement:inadequate          LABS:   personally reviewed.   Lab Results   Component Value Date     04/21/2025     04/20/2025     02/22/2024     04/05/2020     12/10/2019     10/14/2019    CO2 30 04/21/2025    CO2 29 04/20/2025    CO2 30 02/22/2024    CO2 27 04/05/2020    CO2 34 12/10/2019    CO2 28 10/14/2019    BUN 11.6 04/21/2025    BUN 12.4 04/20/2025    BUN 14.6 02/22/2024    BUN 16 04/05/2020    BUN 15 12/10/2019    BUN 12 10/14/2019     No results found for: \"CKTOTAL\", \"CKMB\", \"TROPONINI\"  Lab Results   Component Value Date    WBC 5.4 04/21/2025    WBC 6.4 04/20/2025    WBC 5.8 02/22/2024    WBC 7.2 04/05/2020    WBC 5.9 12/10/2019    WBC 6.7 10/14/2019    HGB 14.0 04/21/2025    HGB 14.0 04/20/2025    HGB 13.7 02/22/2024    HGB 15.7 04/05/2020    HGB 15.6 12/10/2019    HGB 15.3 10/14/2019    HCT 41.1 04/21/2025    HCT 40.7 04/20/2025    HCT 41.1 02/22/2024    HCT 44.6 04/05/2020    HCT 45.3 12/10/2019    HCT 42.9 10/14/2019    MCV 92 04/21/2025    MCV 92 04/20/2025    MCV 92 02/22/2024    MCV 91 04/05/2020    MCV 93 12/10/2019    MCV 89 10/14/2019     04/21/2025     04/20/2025     02/22/2024     04/05/2020     12/10/2019     10/14/2019     Lab Results   Component Value Date    CHOL 148 04/19/2019    CHOL 158 04/01/2012    TRIG 136 04/19/2019    TRIG 107 04/01/2012    HDL 32 04/19/2019    HDL 29 04/01/2012      Latest Reference Range & Units 04/20/25 21:30 04/21/25 11:34   Sodium 135 - 145 mmol/L  136   Potassium 3.4 - 5.3 mmol/L  4.4   Chloride 98 - 107 mmol/L  100   Carbon Dioxide (CO2) 22 - 29 mmol/L  30 (H)   Urea Nitrogen 8.0 - 23.0 mg/dL  11.6   Creatinine 0.67 - 1.17 mg/dL  0.79   GFR Estimate >60 mL/min/1.73m2  >90   Calcium 8.8 - 10.4 mg/dL  8.7 (L)   Anion Gap 7 - 15 mmol/L  6 (L)   Magnesium 1.7 - 2.3 mg/dL 2.0    Phosphorus 2.5 - 4.5 mg/dL 3.0  "   Albumin 3.5 - 5.2 g/dL 3.9 4.0   Protein Total 6.4 - 8.3 g/dL 5.8 (L) 5.9 (L)   Alkaline Phosphatase 40 - 150 U/L 88 76   ALT 0 - 70 U/L 11 12   AST 0 - 45 U/L 23 18   Bilirubin Direct 0.00 - 0.30 mg/dL 0.21    Bilirubin Total <=1.2 mg/dL 0.7 0.6   Glucose 70 - 99 mg/dL  102 (H)   T4 Free 0.90 - 1.70 ng/dL 1.40    Troponin T, High Sensitivity <=22 ng/L 8    TSH 0.30 - 4.20 uIU/mL 4.37 (H)       [facility] Reference Range & Units 04/21/25 11:34   FIO2  0   Ph Venous 7.32 - 7.43  7.42   PCO2 Venous 40 - 50 mm Hg 48   PO2 Venous 25 - 47 mm Hg 43   O2 Sat, Venous 70.0 - 75.0 % 81.8 (H)   Bicarbonate Venous 21 - 28 mmol/L 31 (H)   Base Excess Venous -3.0 - 3.0 mmol/L 5.5 (H)   Oxyhemoglobin Venous 70 - 75 % 80 (H)   WBC 4.0 - 11.0 10e3/uL 5.4   Hemoglobin 13.3 - 17.7 g/dL 14.0   Hematocrit 40.0 - 53.0 % 41.1   Platelet Count 150 - 450 10e3/uL 170   RBC Count 4.40 - 5.90 10e6/uL 4.46   MCV 78 - 100 fL 92   MCH 26.5 - 33.0 pg 31.4   MCHC 31.5 - 36.5 g/dL 34.1   RDW 10.0 - 15.0 % 11.9      Latest Reference Range & Units 04/21/25 09:57   Color Urine Colorless, Straw, Light Yellow, Yellow  Light Yellow   Appearance Urine Clear  Clear   Glucose Urine Negative mg/dL Negative   Bilirubin Urine Negative  Negative   Ketones Urine Negative mg/dL Negative   Specific Gravity Urine 1.003 - 1.035  1.009   pH Urine 5.0 - 7.0  6.5   Protein Albumin Urine Negative mg/dL Negative   Urobilinogen mg/dL Normal mg/dL Normal   Nitrite Urine Negative  Negative   Blood Urine Negative  Negative   Leukocyte Esterase Urine Negative  Negative   Amphetamine Qual Urine Screen Negative  Screen Negative   Fentanyl Qual Urine Screen Negative  Screen Negative   Cocaine Urine Screen Negative  Screen Negative   Benzodiazepine Urine Screen Negative  Screen Negative   Opiates Qualitative Urine Screen Negative  Screen Negative   PCP Urine Screen Negative  Screen Negative   Cannabinoids Qual Urine Screen Negative  Screen Negative   Barbiturates Qual Urine Screen  Negative  Screen Negative       CT HEAD WO IV CONT, CT TRAUMA CERVICAL SCREEN T4-C1   LOCATION: [facility]   DATE/TIME: 2/24/2023 1:49 AM   INDICATION: GCS 14 or 15 - head injury and age over 64 years.   COMPARISON: 12/15/2020. 12/29/2022.   TECHNIQUE:   1) Routine CT Head without IV contrast. Multiplanar reformats. Dose reduction techniques were used.   2) Routine CT Cervical Spine without IV contrast. Multiplanar reformats. Dose reduction techniques were used.   FINDINGS:   HEAD CT:   INTRACRANIAL CONTENTS: No intracranial hemorrhage, extraaxial collection, or mass effect.  No CT evidence of acute infarct. Normal parenchymal attenuation. Mild generalized volume loss. No hydrocephalus.        EKG 12-lead, tracing only 4/21/2025          Component  Ref Range & Units 4/21/25  9:06 AM 4/20/25  9:37 PM    Systolic Blood Pressure  mmHg      Diastolic Blood Pressure  mmHg      Ventricular Rate  BPM 72 69    Atrial Rate  BPM 72 249    NH Interval  ms 180     QRS Duration  ms 152 158    QT  ms 420 438    QTc  ms 459 469    P Axis  degrees 47 122    R AXIS  degrees -73 -76    T Axis  degrees 49 56    Interpretation ECG Sinus rhythm with Premature atrial complexes in a pattern of bigeminy  Right bundle branch block  Left anterior fascicular block  ** Bifascicular block **  Abnormal ECG  When compared with ECG of 20-Apr-2025 21:37,  Sinus rhythm has replaced Atrial fibrillation  Confirmed by MD REMY, ARI ([id number]) on 4/21/2025 11:07:20 AM               DIAGNOSIS:     Paranoid schizophrenia chronic with acute exacerbation   Tardive dyskinesia    Patient Active Problem List   Diagnosis    Schizoaffective disorder, bipolar type (H)    Schizophrenia (H)    Alcohol abuse    Dyslipidemia    Gastroesophageal reflux disease without esophagitis    Hypothyroidism due to acquired atrophy of thyroid    Tobacco use disorder    Schizoaffective disorder (H)    Acute psychosis (H)    Atrial flutter, unspecified type (H)    Aortic root  dilatation    Mood changes    Paranoid schizophrenia, chronic condition with acute exacerbation (H)          PLAN:   Jack is  74 y/o male with chronic persistent mental illness. He has been on neuroleptics for many years. He was diagnosed with schizophrenia, schizoaffective disorder.  He is also diagnosed with EPS and cognitive decline.  He has had multiple psychiatric hospitalizations, commitments with [facility] order due to noncompliance with treatment.  Per Epic report there are more than 20 psychiatric admissions since 2012.   He had previous admissions due to not wanting to take Invega Sustenna and wanting to take only oral medications and during that time he decompensated and Invega Sustenna was restarted.  He has been on it for at least  6 years. He has impaired insight and judgement. He says he does not need medication to begin with and he does not want IM Sustena because FBI will have something on him. He is on a court hold and he had  final commitment hearing on May 1 st. Waiting for court response.  He agrees  with the following recommendations:    Medications:  Invega 6 mg daily for paranoid schizophrenia   Cogentin 1 mg bid for EPS  Trazodone 50 mg nightly as needed for sleep  Multivitamins 1 pill daily  Thiamine 100 mg daily  Folic acid 1 mg daily  Tylenol 650 mg every 4 hours as needed for pain  Maalox 30 mg every 4 as needed for indigestion  Refresh Plus ophthalmic solution 0.5% 1 drop to both eyes 3 times daily as needed for dry eyes  Flexeril 10 mg 3 times daily as needed for muscle spasms  Neurontin 100 mg every 6 hours as needed for anxiety  Zyprexa 2.5 mg 3 times daily as needed for agitation  Senna docusate 1 pill twice daily as needed for constipation  We discussed side effects, benefits and alternative treatments and patient agrees with with oral , but refuses IM Invega.   will collect collateral information and make outpatient referrals  Staff to provide emotional support and  redirect as needed  Patient encouraged to attend groups  Lab results: Reviewed personally  Consultation: medicine consult completed     Risk Assessment: going through commitment due to non compliance with tx which led to psychotic break    Coordination of Care:   Patient seen, medical record reviewed, care coordinated with the team.    This document is created with the help of Dragon dictation system.  All grammatical/typing errors or context distortion are unintentional and inherent to software.    Marilyn Jaffe MD        Re-Certification I certify that the inpatient psychiatric facility services furnished since the previous certification were, and continue to be, medically necessary for, either, treatment which could reasonably be expected to improve the patient s condition or diagnostic study and that the hospital records indicate that the services furnished were, either, intensive treatment services, admission and related services necessary for diagnostic study, or equivalent services.     I certify that the patient continues to need, on a daily basis, active treatment furnished directly by or requiring the supervision of inpatient psychiatric facility personnel.   I estimate TBD days of hospitalization is necessary for proper treatment of the patient. My plans for post-hospital care for this patient are : Medications, appointments     Marilyn Jaffe MD

## 2025-05-02 NOTE — PLAN OF CARE
Team Note Due:  Thursday    Assessment/Intervention/Current Symtoms and Care Coordination:  Chart review and met with team, discussed pt progress, symptomology, and response to treatment.  Discussed the discharge plan and any potential impediments to discharge.    Pt admitted for eloping group home, medication non-compliance, and paranoid/delusional thinking. Petition for commitment was started in the ED. Pt arrived to the unit on a court hold. Pt is calm, but paranoid. Mostly isolative to his room. If he does come out, he usually is wearing headphones and doesn't engage with others. Per RN, pt reports he is paranoid and struggling to trust others.    Writer checked in with pt.     Writer continued to check MCRO to see if pt's commitment has been decided. At the end of the day, no update had been made to his file.    Discharge Plan or Goal:  To be determined     Barriers to Discharge:  Patient requires further psychiatric stabilization due to current symptomology and medication management with possible adjustments    Referral Status:  To be determined      Legal Status:  Court Hold  [facility] [place] [id number]  Upcoming Hearings:  Final hearing 5/1 9:45am    Contacts (include PEG status):  [facility] team  [phone]   [email or web address]    Chapito Love- [facility] Attorney  [email or web address]     Upcoming Meetings and Dates/Important Information and next steps:  Follow up on status of hearing from 5/1

## 2025-05-02 NOTE — PLAN OF CARE
Problem: Sleep Disturbance  Goal: Adequate Sleep/Rest  Outcome: Progressing   Goal Outcome Evaluation:    Pt was observed to have slept all through the night without apparent signs of pain or discomfort. Slept 8.5 hours.

## 2025-05-03 PROCEDURE — 250N000013 HC RX MED GY IP 250 OP 250 PS 637: Performed by: PSYCHIATRY & NEUROLOGY

## 2025-05-03 PROCEDURE — 124N000002 HC R&B MH UMMC

## 2025-05-03 PROCEDURE — 97150 GROUP THERAPEUTIC PROCEDURES: CPT | Mod: GO

## 2025-05-03 RX ADMIN — FOLIC ACID 1 MG: 1 TABLET ORAL at 08:29

## 2025-05-03 RX ADMIN — PALIPERIDONE 6 MG: 3 TABLET, EXTENDED RELEASE ORAL at 08:29

## 2025-05-03 RX ADMIN — BENZTROPINE MESYLATE 1 MG: 1 TABLET ORAL at 08:29

## 2025-05-03 RX ADMIN — THIAMINE HCL TAB 100 MG 100 MG: 100 TAB at 08:29

## 2025-05-03 RX ADMIN — BENZTROPINE MESYLATE 1 MG: 1 TABLET ORAL at 20:26

## 2025-05-03 RX ADMIN — Medication 1 TABLET: at 08:29

## 2025-05-03 ASSESSMENT — ACTIVITIES OF DAILY LIVING (ADL)
ORAL_HYGIENE: INDEPENDENT
ADLS_ACUITY_SCORE: 41
DRESS: INDEPENDENT
ADLS_ACUITY_SCORE: 41
ADLS_ACUITY_SCORE: 41
DRESS: INDEPENDENT
ADLS_ACUITY_SCORE: 41
HYGIENE/GROOMING: INDEPENDENT
ADLS_ACUITY_SCORE: 41
ORAL_HYGIENE: INDEPENDENT
ADLS_ACUITY_SCORE: 41
HYGIENE/GROOMING: INDEPENDENT
ADLS_ACUITY_SCORE: 41

## 2025-05-03 NOTE — PLAN OF CARE
"\"It is all history.\"- after reviewing court order  \"No thank you.\"    WNL to intense eye contact. Latent speech with some dysarthria and low volume and slow rate. Politely dismissive mostly. Some vigilance. Endorses decrease in frequency of auditory stimuli. Using headphones: listening to music to cope. Mostly isolative. Minimal smiling. Polite manner. Listing having his own apartment as his major concern.  Unkempt appearance. Flaky skin.    Continuing orofacial movements: mouth and tongue.     Stated his main reason for refusing invega sustena  was pain associated with the injections. Smiled with appreciation when informed that the doctor could order lidocaine  cream to help numb the area of injection.    Served with commitment and rocha order: Invega, Zyprexa, Abilify, Prolixin. Questions encouraged.     Plan: Monitor and document mood and behaviour, thought process and content. Establish and maintain therapeutic relationship. Educate about diagnoses, medications, treatment, legal status, plan of care. Address preexisting and concurrent medical concerns.     Problem: Psychotic Signs/Symptoms  Goal: Optimal Cognitive Function   Outcome: Progressing     Plan of Care Reviewed With: patient          "

## 2025-05-03 NOTE — PLAN OF CARE
Problem: Psychotic Signs/Symptoms  Goal: Improved Behavioral Control (Psychotic Signs/Symptoms)  Outcome: Progressing     Goal Outcome Evaluation:    Pt was sleeping when received at report and slept all night. No roommate r/t psychotic, disorganized and delusional. Slept for 8.75 hours. Safety checks maintained. Will continue to monitor per plan of care.

## 2025-05-03 NOTE — PLAN OF CARE
"  Problem: Depressive Signs/Symptoms  Goal: Increased Participation and Engagement (Depressive Signs/Symptoms)  Outcome: Not Progressing  Goal: Improved Mood Symptoms (Depressive Signs/Symptoms)  Outcome: Not Progressing  Intervention: Promote Mood Improvement  Recent Flowsheet Documentation  Taken 5/3/2025 1213 by Anthony Mccain, RN  Trust Relationship/Rapport:   care explained   choices provided   emotional support provided   empathic listening provided   questions answered   questions encouraged   thoughts/feelings acknowledged   reassurance provided   Goal Outcome Evaluation:    Plan of Care Reviewed With: patient      Affect is blunted, mood is calm. Patient is isolative and withdrawn, visible for meals. Not social with peers. Denies pain. Patient appears guarded during MH assessment. Patient states they are always depressed and will not rate it on a numerical scale. Denies anxiety. When asked about suicidal ideation patient states \"If I was suicidal I wouldn't tell you\". When asked if they would let their provider know patient stated \"I don't know If they've ever asked me that\". When asked why they are in the hospital patient stated \"I'm here to satisfy the courts\".  BP softer this shift denies symptoms fluids encouraged.  BP 96/65 (BP Location: Right arm, Patient Position: Sitting, Cuff Size: Adult Regular)   Pulse 74   Temp 98.4  F (36.9  C) (Temporal)   Resp 16   Ht 1.753 m (5' 9\")   Wt 87.9 kg (193 lb 12.6 oz)   SpO2 95%   BMI 28.62 kg/m        "

## 2025-05-04 PROCEDURE — 250N000013 HC RX MED GY IP 250 OP 250 PS 637: Performed by: PSYCHIATRY & NEUROLOGY

## 2025-05-04 PROCEDURE — 124N000002 HC R&B MH UMMC

## 2025-05-04 PROCEDURE — H2032 ACTIVITY THERAPY, PER 15 MIN: HCPCS

## 2025-05-04 RX ADMIN — Medication 1 TABLET: at 08:04

## 2025-05-04 RX ADMIN — BENZTROPINE MESYLATE 1 MG: 1 TABLET ORAL at 19:52

## 2025-05-04 RX ADMIN — PALIPERIDONE 6 MG: 3 TABLET, EXTENDED RELEASE ORAL at 08:04

## 2025-05-04 RX ADMIN — THIAMINE HCL TAB 100 MG 100 MG: 100 TAB at 08:04

## 2025-05-04 RX ADMIN — BENZTROPINE MESYLATE 1 MG: 1 TABLET ORAL at 08:04

## 2025-05-04 RX ADMIN — FOLIC ACID 1 MG: 1 TABLET ORAL at 08:04

## 2025-05-04 ASSESSMENT — ACTIVITIES OF DAILY LIVING (ADL)
ADLS_ACUITY_SCORE: 41
HYGIENE/GROOMING: INDEPENDENT
ADLS_ACUITY_SCORE: 41
ORAL_HYGIENE: INDEPENDENT
ADLS_ACUITY_SCORE: 41
DRESS: INDEPENDENT
ADLS_ACUITY_SCORE: 41

## 2025-05-04 NOTE — PLAN OF CARE
"Goal Outcome Evaluation:    Plan of Care Reviewed With: patient      Problem: Adult Behavioral Health Plan of Care  Goal: Plan of Care Review  Outcome: Progressing  Flowsheets (Taken 5/3/2025 2000)  Patient Agreement with Plan of Care: agrees         Pt presented with a blunt affect, calm mood during check in. Pt endorsed anxiety & depression, could not rate, sometimes hears voices, non at the moment.Pt reports that when he hears voices, the voices \" tell me if I am doing a good job or not\".  Pt denied pain, SI, HI,visual hallucinations, and contracted for safety. Pt only  visible in the milieu for dinner and snack, encouraged to socialize more but declined, stating \" I am a loner\". Spend most of the shift in the room listening to music with head phones. Compliant with all scheduled medications. No side effects reported. Pt makes needs known appropriately. No safety concerns observed this shift.                    "

## 2025-05-04 NOTE — PLAN OF CARE
"  Problem: Depressive Signs/Symptoms  Goal: Increased Participation and Engagement (Depressive Signs/Symptoms)  Outcome: Not Progressing   Goal Outcome Evaluation:    Isolative and withdrawn, visible for meals only. Not social with peers. Medication compliant. Guarded during MH assessment. /66   Pulse 56   Temp 97.6  F (36.4  C) (Temporal)   Resp 16   Ht 1.753 m (5' 9\")   Wt 87.9 kg (193 lb 12.6 oz)   SpO2 97%   BMI 28.62 kg/m      "

## 2025-05-04 NOTE — PLAN OF CARE
"  Rehab Group    Start time: 1800  End time: 1900  Patient time total: 60 minutes    attended full group     #3 attended   Group Type: occupational therapy   Group Topic Covered: cognitive therapy, healthy leisure time, and social skills   Group Session Detail: OT: Education on cognitive wellness and healthy leisure engagement with an interactive social activity containing a cognitive component (T[place]) to increase concentration, focus, attention to task/detail, memory recall, coping with stress, healthy distraction engagement, symptom management, healthy leisure engagement, social wellness, cognitive wellness, and abstract and concrete thinking    Patient Response/Contribution:  cooperative with task and actively engaged   Patient Detail: Pt reported during check-in that \"riding a bicycle\" is an activity he enjoys to support his cognitive wellness. Pt sat among peers to complete presented activity but did not engage in social interactions with peers. Pt able to independently identify several responses and attempted to assist peers who were struggling with the task. Pt reported they enjoyed the activity and  verbalized understanding of importance of cognitive wellness in a healthy lifestyle.       [id number] OT Group (2 or more in attendance)    Patient Active Problem List   Diagnosis    Schizoaffective disorder, bipolar type (H)    Schizophrenia (H)    Alcohol abuse    Dyslipidemia    Gastroesophageal reflux disease without esophagitis    Hypothyroidism due to acquired atrophy of thyroid    Tobacco use disorder    Schizoaffective disorder (H)    Acute psychosis (H)    Atrial flutter, unspecified type (H)    Aortic root dilatation    Mood changes    Paranoid schizophrenia, chronic condition with acute exacerbation (H)         "

## 2025-05-04 NOTE — PLAN OF CARE
Problem: Psychotic Signs/Symptoms  Goal: Improved Behavioral Control (Psychotic Signs/Symptoms)  Outcome: Progressing     Goal Outcome Evaluation:      Pt was sleeping when received at report and slept all night. No roommate r/t psychotic, disorganized and delusional. Slept for 6 hours. Safety checks maintained. Will continue to monitor per plan of care

## 2025-05-05 PROBLEM — F25.9 SCHIZOPHRENIA, SCHIZOAFFECTIVE, CHRONIC WITH ACUTE EXACERBATION (H): Status: ACTIVE | Noted: 2025-04-20

## 2025-05-05 PROBLEM — G24.01 TARDIVE DYSKINESIA: Status: ACTIVE | Noted: 2025-05-05

## 2025-05-05 PROCEDURE — 99232 SBSQ HOSP IP/OBS MODERATE 35: CPT | Performed by: PSYCHIATRY & NEUROLOGY

## 2025-05-05 PROCEDURE — 250N000013 HC RX MED GY IP 250 OP 250 PS 637: Performed by: PSYCHIATRY & NEUROLOGY

## 2025-05-05 PROCEDURE — 124N000002 HC R&B MH UMMC

## 2025-05-05 RX ORDER — SERTRALINE HYDROCHLORIDE 25 MG/1
50 TABLET, FILM COATED ORAL DAILY
Status: DISCONTINUED | OUTPATIENT
Start: 2025-05-06 | End: 2025-05-14 | Stop reason: HOSPADM

## 2025-05-05 RX ADMIN — Medication 1 TABLET: at 08:52

## 2025-05-05 RX ADMIN — FOLIC ACID 1 MG: 1 TABLET ORAL at 08:53

## 2025-05-05 RX ADMIN — BENZTROPINE MESYLATE 1 MG: 1 TABLET ORAL at 20:22

## 2025-05-05 RX ADMIN — BENZTROPINE MESYLATE 1 MG: 1 TABLET ORAL at 08:52

## 2025-05-05 RX ADMIN — PALIPERIDONE 6 MG: 3 TABLET, EXTENDED RELEASE ORAL at 08:53

## 2025-05-05 RX ADMIN — THIAMINE HCL TAB 100 MG 100 MG: 100 TAB at 08:52

## 2025-05-05 RX ADMIN — ACETAMINOPHEN 650 MG: 325 TABLET ORAL at 08:51

## 2025-05-05 ASSESSMENT — ACTIVITIES OF DAILY LIVING (ADL)
ADLS_ACUITY_SCORE: 41
ORAL_HYGIENE: INDEPENDENT
ADLS_ACUITY_SCORE: 41
ADLS_ACUITY_SCORE: 41
LAUNDRY: UNABLE TO COMPLETE
ADLS_ACUITY_SCORE: 41
DRESS: INDEPENDENT
ADLS_ACUITY_SCORE: 41
HYGIENE/GROOMING: INDEPENDENT
ADLS_ACUITY_SCORE: 41
LAUNDRY: UNABLE TO COMPLETE
ADLS_ACUITY_SCORE: 41
HYGIENE/GROOMING: INDEPENDENT
ORAL_HYGIENE: INDEPENDENT
ADLS_ACUITY_SCORE: 41
ADLS_ACUITY_SCORE: 41
DRESS: STREET CLOTHES;INDEPENDENT
ADLS_ACUITY_SCORE: 41

## 2025-05-05 NOTE — PROGRESS NOTES
Rehab Group    Start time: 1900  End time: 1950  Patient time total: 50 minutes    attended full group    #7 attended   Group Type: recreation   Group Topic Covered: activity therapy, Physical activity, relaxation , and self-care       Group Session Detail:  Exercise and relaxation     Patient Response/Contribution:  cooperative with task, attentive, and actively engaged       Patient Detail:    Pt actively participated in a structured Therapeutic Recreation group with a focus on leisure participation, socializing, and exercise. Pt participated in the guided exercise for the full duration of the group. Pt followed along, engaged in the guided chair exercise routine and added to the discussion prompts throughout the routine.  Pt was encouraged to use positive imagery with the deep breathing and stretching to foster relaxation, improves focus, and reduce stress.      Activity Therapy Per 15 min ()      Patient Active Problem List   Diagnosis    Schizoaffective disorder, bipolar type (H)    Schizophrenia (H)    Alcohol abuse    Dyslipidemia    Gastroesophageal reflux disease without esophagitis    Hypothyroidism due to acquired atrophy of thyroid    Tobacco use disorder    Schizoaffective disorder (H)    Acute psychosis (H)    Atrial flutter, unspecified type (H)    Aortic root dilatation    Mood changes    Paranoid schizophrenia, chronic condition with acute exacerbation (H)

## 2025-05-05 NOTE — PLAN OF CARE
BEH IP Unit Acuity Rating Score (UARS)  Patient is given one point for every criteria they meet.    CRITERIA SCORING   On a 72 hour hold, court hold, committed, stay of commitment, or revocation. 1    Patient LOS on BEH unit exceeds 20 days. 0  LOS: 12   Patient under guardianship, 55+, otherwise medically complex, or under age 11. 1   Suicide ideation without relief of precipitating factors. 0   Current plan for suicide. 0   Current plan for homicide. 0   Imminent risk or actual attempt to seriously harm another without relief of factors precipitating the attempt. 0   Severe dysfunction in daily living (ex: complete neglect for self care, extreme disruption in vegetative function, extreme deterioration in social interactions). 1   Recent (last 7 days) or current physical aggression in the ED or on unit. 0   Restraints or seclusion episode in past 72 hours. 0   Recent (last 7 days) or current verbal aggression, agitation, yelling, etc., while in the ED or unit. 0   Active psychosis. 1   Need for constant or near constant redirection (from leaving, from others, etc).  0   Intrusive or disruptive behaviors. 0   Patient requires 3 or more hours of individualized nursing care per 8-hour shift (i.e. for ADLs, meds, therapeutic interventions). 0   TOTAL 4

## 2025-05-05 NOTE — PLAN OF CARE
Problem: Psychotic Signs/Symptoms  Goal: Improved Behavioral Control (Psychotic Signs/Symptoms)  Outcome: Progressing     Goal Outcome Evaluation:    Pt was observed sleeping at report. No behavioral concern this shift. No roommate r/t psychotic, disorganized and delusional. Slept for 8.0 hours. Safety checks maintained. Will continue to monitor per plan of care

## 2025-05-05 NOTE — PLAN OF CARE
Problem: Suicide Risk  Goal: Absence of Self-Harm  Outcome: Progressing  Intervention: Assess Risk to Self and Maintain Safety  Recent Flowsheet Documentation  Taken 5/5/2025 1731 by Hung Baltazar RN  Behavior Management: impulse control promoted  Self-Harm Prevention: environment modified for self-harm risk  Enhanced Safety Measures: pain management  Taken 5/5/2025 1622 by Hung Baltazar RN  Behavior Management: behavioral plan reviewed  Self-Harm Prevention: environment modified for self-harm risk  Intervention: Promote Psychosocial Wellbeing  Recent Flowsheet Documentation  Taken 5/5/2025 1731 by Hung Baltazar RN  Supportive Measures: active listening utilized  Sleep/Rest Enhancement: noise level reduced  Family/Support System Care: involvement promoted  Taken 5/5/2025 1622 by Hung Baltazar RN  Supportive Measures: self-care encouraged  Intervention: Establish Safety Plan and Continuity of Care  Recent Flowsheet Documentation  Taken 5/5/2025 1731 by Hung Baltazar RN  Safe Transition Promotion: protective factors promoted  Taken 5/5/2025 1622 by Hung Baltazar RN  Safe Transition Promotion: protective factors promoted      Patient spent most of the evening resting in his room, he was observed playing cards and listening to music on headphone. Patient affect flat with cooperative mood, had no aggressive or assaultive behavior. Patient ate 100% of meal with adequate fluid intake. Patient blood pressure was 88/57, staff continues to push fluid and /71 later, writer updated Dr Jaffe regarding blood pressure. Patient has new orders for Zoloft 50 mg daily and Invega injection to start tomorrow, writer updated patient about new orders. Patient denies SI/SIB/HI and hallucination, contract for safety and medication compliant.

## 2025-05-05 NOTE — PLAN OF CARE
Problem: Psychotic Signs/Symptoms  Goal: Improved Behavioral Control (Psychotic Signs/Symptoms)  Outcome: Progressing  Flowsheets (Taken 5/4/2025 2244)  Mutually Determined Action Steps (Improved Behavioral Control): verbalizes gratifying activity   Goal Outcome Evaluation:    The patient moved between his room and the common area but largely avoided interacting with his peers while in the common area. His affect was flat, and he reported experiencing both anxiety and depression but denied any thoughts of suicide or self-injury, as well as hallucinations or racing thoughts. He was medication compliant, ate dinner and snacks, participated in recreational therapy, and spent the evening watching television or listening to music through headphones.

## 2025-05-05 NOTE — PROGRESS NOTES
PSYCHIATRY PROGRESS NOTE         DATE OF SERVICE:   5/5/2025         CHIEF COMPLAINT:     Paranoid delusion, isolation, depression           OBJECTIVE:     Nursing reports : Patient stays in his room, takes oralmedications , slept 8 hours      reports working on outpatient referrals, final commitment hearing 5/1/2025    Medicine consult by REBECA Person on 4/24/2025  # Acute decompensated schizophrenia  # Eloped from care facility, noncompliance with medication.  # Anxiety, depression.  Eloped from group home 4/16, found to gas station 4/20 endorsing he is a fugitive and try and turn himself in.  Per report third elopement from group home. Was admitted to [facility] internal medicine 4/20. Now admitted to  for treatment of decompensated schizophrenia. PTA on trazodone, Cogentin, IM Invega sustenna.   - As managed per psychiatry   # Chronic intermittent chest pain  # Possible history A-fib  # Chronic bifascicular block, L anterior fascicular block, R bundle branch block,   # History of chronic HFpEF, grade 1 diastolic dysfunction  Of note, has complained of chronic intermittent chest pain sx> 1yr prior.  States symptoms are regular.  Does not have any as needed nitro per chart review. Recent ECG reviewed, NSR on arrival (with artifact) bifascicular block LVH, LAD.  However chart review indicates history of A-fib, no PTA DOAC.  History HFpEF most recent EF 55-60%.   - Follow up with PCP and establish care with cardiology team outpatient   # Incidental aortic root/aortic dilation  Aortic root 4.3 cm and ascending aorta dilation 4 cm.  No previous comparison.  - Outpatient follow-up TTE yearly monitoring, good BP control.  - Age-appropriate health maintenance on PCP visit.  # Hyponatremia, resolved  Presented with sodium of 131, given patient eloped likely related to poor oral intake.  Creatinine within normal limits.  Now sodium improved.  - Follow up with PCP for ongoing monitoring   # Mild  hypocalcemia, resolved  Minimal hypocalcemia at 8.6, albumin is 3.94 oh.  Not clinically relevant/causing above symptoms.  Increased p.o. intake.  - Monitor with PCP outpatient   # Mildly elevated TSH  Admit TSH 4.37 although free T4 is 1.40.  No PTA Synthroid/meds.  No convincing symptoms of hyperthyroidism, despite psych decompensation  - PCP recheck thyroid function in 10 -12 weeks.   # History of nicotine use   Patient reports he uses E-cig sometimes, vague about when.    - Cessation encouraged, NRT per primary team   # History of alcohol use disorder   Patient declines any recent use.  States he will drink when he gets money but cannot afford it.  LFTs are WNL.  JOSR negative.  - Continue thiamine multivitamin, folic acid supplements.  - Encourage cessation   # History of drug use  - Patient declines recent use - recent UDS is negative         SUBJECTIVE:      Ger is less agitated, isolating in his room, spending little time out of the room. He takes oral medications. He continues to have paranoid delusion about FBI being after him . He denies hallucinations. He denies suicidal and homicidal ideas. He says that he is anxious about court decision. I remind him, as I did on Friday, that if we got court order for neuroleptic, that he could  not refuse IM Invega.He does not argue about it, but he does not agree that he needs it. I remind him that we discussed it on Friday and his major concern was that the nurse who was trying to give him injection in the  tried to inject it too hard. I assured him that the nurse here would take his sensitivity to injection and could numb the skin to make it easier. He did not say anything, just asked if I have court order, and I informed him that I do not have it yet. He has less tremor and tongue movement on Cogentin.He says that he is depressed.  The  informed me later that we have court order for commitment and Wu. Will order Invega Sustena 234 mg for  tomorrow.    From the past note:  I reviewed his admission from May 2023.  At that time he was admitted at [facility] as a voluntary patient, but he was under my commitment with Bryce.  He was diagnosed with schizoaffective disorder and he had agitation and verbal aggression, paranoid delusions, disorganized thoughts, and refusing medications.  The staff from the halfway reported that he refused medications and was getting more irritable, argumentative and agitated.  She described him as mean and making threats and throwing things.  He believed that medical providers were conspiring the government against him.  He was receiving special messages through the headphones.  After getting Invega Sustenna he is thought process was more organized and less tangential although he he eventually agreed to take long acting Invega Sustenna.  It says that Cogentin was decreased from 1 to 0.5 mg twice daily to minimize anticholinergic  burden.  It controlled tremor.  He was admitted to December 2022 when he had altered mental status, wandering the streets without gloves or socks.  Cogentin was increased for tremor, and Invega Sustenna IM given.  He was under commitment at that time    He was admitted to IP psychiatry at [facility] in June 2022.  911 was called because he was walking on the Friday.  He reported that his name is Ger Solares, that he was homeless and his life is in the eighth send that he was suicidal and that he had paranoid schizophrenia.  He reported hearing male and female voices.  He talked about hearing Ivaldi and walking the line like him.  At that time he was suicidal and reported [place] get hit by the car and die.He was living in  and he was followed by ACT team. At that time he started refusing Invega Sustena and insisted only on oral medications and decompensated on it.     Prior to that he was admitted inJanuary 2022, May  2021,December 2020, April 2020    It says that In April 2020 he was admitted to [facility] he was on Haldol and Invega, including Invega Sustenna.  He was noncompliant with medications.  He believes that [facility] was after him.He was working with ACT team.  It was reported that he had baseline delusions.He was diagnosed with schizophrenia schizoaffective disorder chronic with acute exacerbation.    In November 2019 he was admitted, diagnosed with EPS due to Haldol, discharged on oral Haldol, IM invega Sustena and Cogentine.    He had 4 admissions in 2019   He had 6 admissions in 2018  He had 2 admissions in 2016  He had 1 admission in 2013  He had 3 admissions in 2012            MEDICATIONS:     Current Facility-Administered Medications   Medication Dose Route Frequency Provider Last Rate Last Admin    - Psychiatric Emergency -   Other See Admin Instructions Marilyn Jaffe MD        benztropine (COGENTIN) tablet 1 mg  1 mg Oral BID Marilyn Jaffe MD   1 mg at 05/04/25 1952    folic acid (FOLVITE) tablet 1 mg  1 mg Oral Daily Marilyn Jaffe MD   1 mg at 05/04/25 0804    multivitamin w/minerals (THERA-VIT-M) tablet 1 tablet  1 tablet Oral Daily Marilyn Jaffe MD   1 tablet at 05/04/25 0804    [Held by provider] paliperidone (INVEGA SUSTENNA) injection CHRISTOS 234 mg  234 mg Intramuscular Q28 Days Marilyn Jaffe MD        paliperidone ER (INVEGA) 24 hr tablet 6 mg  6 mg Oral Daily Marilyn Jaffe MD   6 mg at 05/04/25 0804    thiamine (B-1) tablet 100 mg  100 mg Oral Daily Marilyn Jaffe MD   100 mg at 05/04/25 0804     Current Facility-Administered Medications   Medication Dose Route Frequency Provider Last Rate Last Admin    acetaminophen (TYLENOL) tablet 650 mg  650 mg Oral Q4H PRN Marilyn Jaffe MD        alum & mag hydroxide-simethicone (MAALOX) suspension 30 mL  30 mL Oral Q4H PRN Marilyn Jaffe MD        carboxymethylcellulose PF (REFRESH PLUS) 0.5 % ophthalmic solution 1 drop  1 drop Both Eyes TID  "PRN Marilyn Jaffe MD        cyclobenzaprine (FLEXERIL) tablet 10 mg  10 mg Oral Q8H PRN Michael Melendrez PA-C   10 mg at 04/30/25 1632    gabapentin (NEURONTIN) capsule 100 mg  100 mg Oral Q6H PRN Marilyn Jaffe MD        OLANZapine (zyPREXA) tablet 2.5 mg  2.5 mg Oral TID PRN Marilyn Jaffe MD        Or    OLANZapine (zyPREXA) injection 2.5 mg  2.5 mg Intramuscular TID PRN Marilyn Jaffe MD        senna-docusate (SENOKOT-S/PERICOLACE) 8.6-50 MG per tablet 1 tablet  1 tablet Oral BID PRN Marilyn Jaffe MD        traZODone (DESYREL) tablet 50 mg  50 mg Oral At Bedtime PRN Marilyn Jaffe MD   50 mg at 04/28/25 0122       Medication adherence: Yes with oral, refusing IM  Medication side effects: c/o of pain in injection site when he gets IM neuroleptic   Benefit: Symptom reduction         ROS:   As per history of present illness, otherwise reminder of review of systems is negative for: General, eyes, ears, nose, throat, neck, respiratory, cardiovascular, gastrointestinal, genitourinary, meniscal skeletal, neurological, hematological, dermatological and endocrine system.         MENTAL STATUS EXAM:   BP 97/64   Pulse 74   Temp 98  F (36.7  C)   Resp 16   Ht 1.753 m (5' 9\")   Wt 87.9 kg (193 lb 12.6 oz)   SpO2 98%   BMI 28.62 kg/m      Appearance:fair hygiene  Orientation:to self, place, partially in time   Speech:fluent  Language ability: intact  Thought process: concrete  Thought content: positive for paranoid delusions, denies hallucinations  Associations: Connected  Suicidal Ideation: denies   Homicidal Ideation:denies   Mood:  depressed  Affect: irritable   Intellectual functioning:average  Fund of Knowledge: consistent with education and experience   Attention/Concentration: decreased  Memory: affected by psychosis   Psychomotor Behavior: calm  Muscle Strength and Tone: no atrophy or involuntary movement  Gait and Station: steady  Insight and judgement:inadequate          LABS:   personally reviewed. " "  Lab Results   Component Value Date     04/21/2025     04/20/2025     02/22/2024     04/05/2020     12/10/2019     10/14/2019    CO2 30 04/21/2025    CO2 29 04/20/2025    CO2 30 02/22/2024    CO2 27 04/05/2020    CO2 34 12/10/2019    CO2 28 10/14/2019    BUN 11.6 04/21/2025    BUN 12.4 04/20/2025    BUN 14.6 02/22/2024    BUN 16 04/05/2020    BUN 15 12/10/2019    BUN 12 10/14/2019     No results found for: \"CKTOTAL\", \"CKMB\", \"TROPONINI\"  Lab Results   Component Value Date    WBC 5.4 04/21/2025    WBC 6.4 04/20/2025    WBC 5.8 02/22/2024    WBC 7.2 04/05/2020    WBC 5.9 12/10/2019    WBC 6.7 10/14/2019    HGB 14.0 04/21/2025    HGB 14.0 04/20/2025    HGB 13.7 02/22/2024    HGB 15.7 04/05/2020    HGB 15.6 12/10/2019    HGB 15.3 10/14/2019    HCT 41.1 04/21/2025    HCT 40.7 04/20/2025    HCT 41.1 02/22/2024    HCT 44.6 04/05/2020    HCT 45.3 12/10/2019    HCT 42.9 10/14/2019    MCV 92 04/21/2025    MCV 92 04/20/2025    MCV 92 02/22/2024    MCV 91 04/05/2020    MCV 93 12/10/2019    MCV 89 10/14/2019     04/21/2025     04/20/2025     02/22/2024     04/05/2020     12/10/2019     10/14/2019     Lab Results   Component Value Date    CHOL 148 04/19/2019    CHOL 158 04/01/2012    TRIG 136 04/19/2019    TRIG 107 04/01/2012    HDL 32 04/19/2019    HDL 29 04/01/2012      Latest Reference Range & Units 04/20/25 21:30 04/21/25 11:34   Sodium 135 - 145 mmol/L  136   Potassium 3.4 - 5.3 mmol/L  4.4   Chloride 98 - 107 mmol/L  100   Carbon Dioxide (CO2) 22 - 29 mmol/L  30 (H)   Urea Nitrogen 8.0 - 23.0 mg/dL  11.6   Creatinine 0.67 - 1.17 mg/dL  0.79   GFR Estimate >60 mL/min/1.73m2  >90   Calcium 8.8 - 10.4 mg/dL  8.7 (L)   Anion Gap 7 - 15 mmol/L  6 (L)   Magnesium 1.7 - 2.3 mg/dL 2.0    Phosphorus 2.5 - 4.5 mg/dL 3.0    Albumin 3.5 - 5.2 g/dL 3.9 4.0   Protein Total 6.4 - 8.3 g/dL 5.8 (L) 5.9 (L)   Alkaline Phosphatase 40 - 150 U/L 88 76   ALT 0 - 70 " U/L 11 12   AST 0 - 45 U/L 23 18   Bilirubin Direct 0.00 - 0.30 mg/dL 0.21    Bilirubin Total <=1.2 mg/dL 0.7 0.6   Glucose 70 - 99 mg/dL  102 (H)   T4 Free 0.90 - 1.70 ng/dL 1.40    Troponin T, High Sensitivity <=22 ng/L 8    TSH 0.30 - 4.20 uIU/mL 4.37 (H)       Latest Reference Range & Units 04/21/25 11:34   FIO2  0   Ph Venous 7.32 - 7.43  7.42   PCO2 Venous 40 - 50 mm Hg 48   PO2 Venous 25 - 47 mm Hg 43   O2 Sat, Venous 70.0 - 75.0 % 81.8 (H)   Bicarbonate Venous 21 - 28 mmol/L 31 (H)   Base Excess Venous -3.0 - 3.0 mmol/L 5.5 (H)   Oxyhemoglobin Venous 70 - 75 % 80 (H)   WBC 4.0 - 11.0 10e3/uL 5.4   Hemoglobin 13.3 - 17.7 g/dL 14.0   Hematocrit 40.0 - 53.0 % 41.1   Platelet Count 150 - 450 10e3/uL 170   RBC Count 4.40 - 5.90 10e6/uL 4.46   MCV 78 - 100 fL 92   MCH 26.5 - 33.0 pg 31.4   MCHC 31.5 - 36.5 g/dL 34.1   RDW 10.0 - 15.0 % 11.9      Latest Reference Range & Units 04/21/25 09:57   Color Urine Colorless, Straw, Light Yellow, Yellow  Light Yellow   Appearance Urine Clear  Clear   Glucose Urine Negative mg/dL Negative   Bilirubin Urine Negative  Negative   Ketones Urine Negative mg/dL Negative   Specific Gravity Urine 1.003 - 1.035  1.009   pH Urine 5.0 - 7.0  6.5   Protein Albumin Urine Negative mg/dL Negative   Urobilinogen mg/dL Normal mg/dL Normal   Nitrite Urine Negative  Negative   Blood Urine Negative  Negative   Leukocyte Esterase Urine Negative  Negative   Amphetamine Qual Urine Screen Negative  Screen Negative   Fentanyl Qual Urine Screen Negative  Screen Negative   Cocaine Urine Screen Negative  Screen Negative   Benzodiazepine Urine Screen Negative  Screen Negative   Opiates Qualitative Urine Screen Negative  Screen Negative   PCP Urine Screen Negative  Screen Negative   Cannabinoids Qual Urine Screen Negative  Screen Negative   Barbiturates Qual Urine Screen Negative  Screen Negative       CT HEAD WO IV CONT, CT TRAUMA CERVICAL SCREEN T4-C1   LOCATION: [facility]   DATE/TIME: 2/24/2023  1:49 AM   INDICATION: GCS 14 or 15 - head injury and age over 64 years.   COMPARISON: 12/15/2020. 12/29/2022.   TECHNIQUE:   1) Routine CT Head without IV contrast. Multiplanar reformats. Dose reduction techniques were used.   2) Routine CT Cervical Spine without IV contrast. Multiplanar reformats. Dose reduction techniques were used.   FINDINGS:   HEAD CT:   INTRACRANIAL CONTENTS: No intracranial hemorrhage, extraaxial collection, or mass effect.  No CT evidence of acute infarct. Normal parenchymal attenuation. Mild generalized volume loss. No hydrocephalus.        EKG 12-lead, tracing only 4/21/2025          Component  Ref Range & Units 4/21/25  9:06 AM 4/20/25  9:37 PM    Systolic Blood Pressure  mmHg      Diastolic Blood Pressure  mmHg      Ventricular Rate  BPM 72 69    Atrial Rate  BPM 72 249    AK Interval  ms 180     QRS Duration  ms 152 158    QT  ms 420 438    QTc  ms 459 469    P Axis  degrees 47 122    R AXIS  degrees -73 -76    T Axis  degrees 49 56    Interpretation ECG Sinus rhythm with Premature atrial complexes in a pattern of bigeminy  Right bundle branch block  Left anterior fascicular block  ** Bifascicular block **  Abnormal ECG  When compared with ECG of 20-Apr-2025 21:37,  Sinus rhythm has replaced Atrial fibrillation  Confirmed by MD REMY, ARI ([id number]) on 4/21/2025 11:07:20 AM               DIAGNOSIS:     Schizophrenia schizoaffective chronic with acute exacerbation   Tardive dyskinesia    Patient Active Problem List   Diagnosis    Schizoaffective disorder, bipolar type (H)    Schizophrenia (H)    Alcohol abuse    Dyslipidemia    Gastroesophageal reflux disease without esophagitis    Hypothyroidism due to acquired atrophy of thyroid    Tobacco use disorder    Schizoaffective disorder (H)    Acute psychosis (H)    Atrial flutter, unspecified type (H)    Aortic root dilatation    Mood changes    Paranoid schizophrenia, chronic condition with acute exacerbation (H)          PLAN:   Ger is  75  y/o male with chronic persistent mental illness. He has been on neuroleptics for many years. He was diagnosed with schizophrenia, schizoaffective disorder.  He is also diagnosed with EPS and cognitive decline.  He has had multiple psychiatric hospitalizations, commitments with Wu order due to noncompliance with treatment.  Per Epic report there are more than 20 psychiatric admissions since 2012.   He had previous admissions due to not wanting to take Invega Sustenna and wanting to take only oral medications and during that time he decompensated and Invega Sustenna was restarted.  He has been on it for at least  6 years. He has impaired insight and judgement. He says he does not need medication to begin with and he does not want IM Sustena because FBI will have something on him.   He had   final commitment hearing on May 1 st. The court faxed commitment order and Wu neuroleptic order, so IM Invega will be ordered . He has mood component in his symptomatology which fits schizophrenia schizoaffective disorder.   He agrees  with the following recommendations:    Medications:  Start Invega Sustena 234 mg IM on 5/6/2025, nd then every 28 days  Start Zoloft 50 mg qam for depression  Invega 6 mg daily for paranoid schizophrenia   Cogentin 1 mg bid for EPS  Trazodone 50 mg nightly as needed for sleep  Multivitamins 1 pill daily  Thiamine 100 mg daily  Folic acid 1 mg daily  Tylenol 650 mg every 4 hours as needed for pain  Maalox 30 mg every 4 as needed for indigestion  Refresh Plus ophthalmic solution 0.5% 1 drop to both eyes 3 times daily as needed for dry eyes  Flexeril 10 mg 3 times daily as needed for muscle spasms  Neurontin 100 mg every 6 hours as needed for anxiety  Zyprexa 2.5 mg 3 times daily as needed for agitation  Senna docusate 1 pill twice daily as needed for constipation  We discussed side effects, benefits and alternative treatments and patient agrees with with oral , but refuses IM Invega.  Social  worker will collect collateral information and make outpatient referrals  Staff to provide emotional support and redirect as needed  Patient encouraged to attend groups  Lab results: Reviewed personally  Consultation: medicine consult completed     Risk Assessment: going through commitment due to non compliance with tx which led to psychotic break    Coordination of Care:   Patient seen, medical record reviewed, care coordinated with the team.    This document is created with the help of Dragon dictation system.  All grammatical/typing errors or context distortion are unintentional and inherent to software.    Marilyn Jaffe MD        Re-Certification I certify that the inpatient psychiatric facility services furnished since the previous certification were, and continue to be, medically necessary for, either, treatment which could reasonably be expected to improve the patient s condition or diagnostic study and that the hospital records indicate that the services furnished were, either, intensive treatment services, admission and related services necessary for diagnostic study, or equivalent services.     I certify that the patient continues to need, on a daily basis, active treatment furnished directly by or requiring the supervision of inpatient psychiatric facility personnel.   I estimate TBD days of hospitalization is necessary for proper treatment of the patient. My plans for post-hospital care for this patient are : Medications, appointments     Marilyn Jaffe MD

## 2025-05-05 NOTE — PLAN OF CARE
Team Note Due:  Thursday    Assessment/Intervention/Current Symtoms and Care Coordination:  Chart review and met with team, discussed pt progress, symptomology, and response to treatment.  Discussed the discharge plan and any potential impediments to discharge.    Pt admitted for eloping group home, medication non-compliance, and paranoid/delusional thinking. Petition for commitment was started in the ED. Pt arrived to the unit on a court hold. Pt is calm, but paranoid. Mostly isolative to his room. If he does come out, he usually is wearing headphones and doesn't engage with others. Per RN, pt reports he is paranoid and struggling to trust others.    Writer received court paperwork from [facility]. Pt is now committed MI with Bryce (IPT). Writer gave pt a copy of his paperwork and asked if he had any questions. Writer also let pt know that his ACT team  was planning to see him later today. Copy of court paperwork was placed in pt's chart and also sent to medical records.    Writer received email notification from pt's ACT team , Guerda, that she would be by to visit pt later today. Writer left pt know.    Discharge Plan or Goal:  To be determined     Barriers to Discharge:  Patient requires further psychiatric stabilization due to current symptomology and medication management with possible adjustments    Referral Status:  To be determined      Legal Status:  Committed MI with Bryce (ITP)  [facility]  File Number: [id number]  Start Date: 5/2/2025  ITP medications: Invega, Zyprexa, Abilify, and Prolixin    Contacts (include PEG status):  Guerda: ACT team  [phone]   [email or web address]     Upcoming Meetings and Dates/Important Information and next steps:

## 2025-05-05 NOTE — PLAN OF CARE
"  Problem: Depressive Signs/Symptoms  Goal: Optimized Energy Level (Depressive Signs/Symptoms)  Outcome: Not Progressing   Goal Outcome Evaluation:    Pt came out late for breakfast. Pt affect is flat. Pt appropriate eye contact.   Pt rated pain in his neck this am \"kink\". Pt declined to rate it \"I don't rate my pain\". Pt was offered/accepted prn tylenol 650 mg. Pt accepted breakfast and asked for and Ensure as well.   Order received for patient to receive an Ensure with each meal.   Pt reported that tylenol was effective in eliminating his pain.  Pt denies anxiety,depression, SI/SIB/wishes to be dead. Pt also is denying auditory hallucinations. Pt has been isolative to his room with the exception for meals.   Pt has been eating well. Fluids have been encouraged due to blood pressure being soft. Pt accepted 480 ml of milk mid morning.  Pt's blood pressure slightly improved prior to lunch ( sitting 99/65 with a pulse of 96 and standing 87/60 with a pulse of 99). Pt denies dizziness.   Pt oriented to Monday, May, 2025 and [facility]. Pt did not know the exact date but knew it was \"the beginning of the month\".                           "

## 2025-05-06 PROCEDURE — 250N000009 HC RX 250: Performed by: PSYCHIATRY & NEUROLOGY

## 2025-05-06 PROCEDURE — 99232 SBSQ HOSP IP/OBS MODERATE 35: CPT | Performed by: PSYCHIATRY & NEUROLOGY

## 2025-05-06 PROCEDURE — 124N000002 HC R&B MH UMMC

## 2025-05-06 PROCEDURE — 250N000013 HC RX MED GY IP 250 OP 250 PS 637: Performed by: PSYCHIATRY & NEUROLOGY

## 2025-05-06 RX ORDER — LIDOCAINE 40 MG/G
CREAM TOPICAL EVERY 4 HOURS PRN
Status: DISCONTINUED | OUTPATIENT
Start: 2025-05-06 | End: 2025-05-14 | Stop reason: HOSPADM

## 2025-05-06 RX ADMIN — LIDOCAINE: 40 CREAM TOPICAL at 12:24

## 2025-05-06 RX ADMIN — FOLIC ACID 1 MG: 1 TABLET ORAL at 08:36

## 2025-05-06 RX ADMIN — BENZTROPINE MESYLATE 1 MG: 1 TABLET ORAL at 19:19

## 2025-05-06 RX ADMIN — SERTRALINE HYDROCHLORIDE 50 MG: 25 TABLET ORAL at 08:36

## 2025-05-06 RX ADMIN — Medication 1 TABLET: at 08:36

## 2025-05-06 RX ADMIN — PALIPERIDONE 6 MG: 3 TABLET, EXTENDED RELEASE ORAL at 08:36

## 2025-05-06 RX ADMIN — THIAMINE HCL TAB 100 MG 100 MG: 100 TAB at 08:36

## 2025-05-06 RX ADMIN — BENZTROPINE MESYLATE 1 MG: 1 TABLET ORAL at 08:36

## 2025-05-06 ASSESSMENT — ACTIVITIES OF DAILY LIVING (ADL)
ADLS_ACUITY_SCORE: 41
DRESS: INDEPENDENT
ADLS_ACUITY_SCORE: 41
HYGIENE/GROOMING: INDEPENDENT
ADLS_ACUITY_SCORE: 41
LAUNDRY: UNABLE TO COMPLETE
ADLS_ACUITY_SCORE: 41
ORAL_HYGIENE: INDEPENDENT
ADLS_ACUITY_SCORE: 41
ADLS_ACUITY_SCORE: 41

## 2025-05-06 NOTE — PLAN OF CARE
"Goal Outcome Evaluation:    Plan of Care Reviewed With: patient      Patient continues to be isolative and withdrawn to room, minimal to no interactions with peers when out in the milieu for meals. Patient presents a flat blunted affect, wears headphones most of the time. Patient needed multiple encouragement to come out for breakfast, initially declining. Patient was reluctant to take his AM medications telling RN, \" Am not supposed to take any medications today, am getting an injection\". Patient was educated that he was supposed to take his medications orally and that he was also going to get his Invega Sustena injection later today at 11AM. Patient took all his scheduled medications and 100% of his breakfast  and lunch and went back to his room and observed laying in bed. Patient denied SI/SIB/HI/AVH, endorsed anxiety and depression but declined to rate saying \" I can't rate it\".   Lidocaine ointment was ordered to be used during injection to help with pain. Patient continues to have soft BP, more fluids encouraged.  Patient received Invega Sustena Injection to the right deltoid, tolerated well.  Blood pressure 99/64, pulse 58, temperature 97.4  F (36.3  C), temperature source Temporal, resp. rate 16, height 1.753 m (5' 9\"), weight 88.7 kg (195 lb 8.8 oz), SpO2 95%.        "

## 2025-05-06 NOTE — PLAN OF CARE
Team Note Due:  Thursday    Assessment/Intervention/Current Symtoms and Care Coordination:  Chart review and met with team, discussed pt progress, symptomology, and response to treatment.  Discussed the discharge plan and any potential impediments to discharge.    Pt admitted for eloping group home, medication non-compliance, and paranoid/delusional thinking. Petition for commitment was started in the ED. Pt arrived to the unit on a court hold. Pt is calm, but paranoid. Mostly isolative to his room. If he does come out, he usually is wearing headphones and doesn't engage with others. Per RN, pt reports he is paranoid and struggling to trust others.    Pt met with his ACT team  yesterday afternoon. Writer also checked in with her after their meeting. She reported she has been working with him for the last seven years. Reports he has a history of eloping but they always find him within a week of him eloping. She confirmed that pt's estranged sister passed away last year. Reports he has a niece but she is not really involved and lives in [place]. She reports pt is well-liked at his group home and he can return there. She reported pt mostly keeps to himself but will engage with others when he feels comfortable. She confirmed baseline delusions about the Y[employer] and FBI being after him.     Writer called and left message for pt's GH RNDonya. Provided contact information.     Writer sent email update to pt's ACT team , Guerda, letting her know that pt will start an CARRASCO today.    Discharge Plan or Goal:  To be determined     Barriers to Discharge:  Patient requires further psychiatric stabilization due to current symptomology and medication management with possible adjustments    Referral Status:  To be determined      Legal Status:  Committed MI with Bryce (ITP)  [facility]: [place]  File Number: [id number]  Start Date: 5/2/2025  ITP medications: Invega, Zyprexa, Abilify, and Prolixin    Contacts  (include PEG status):  Guerda Mann: ACT team  [phone]   [email or web address]    Pam Randle DNP, APRN  ACT team psychiatry provider    Group Home: [facility]  RN: Donya  Phone: [phone]    Upcoming Meetings and Dates/Important Information and next steps:

## 2025-05-06 NOTE — PLAN OF CARE
Problem: Sleep Disturbance  Goal: Adequate Sleep/Rest  Outcome: Progressing   Goal Outcome Evaluation:             Received asleep, using the headphone to listen to music.Pt has a no roommate order due to being Psychotic, paranoid and delusional, on observed this shift.No behavioral or safety concerns tonight.All precautions in place.  Slept for 8.75 hours

## 2025-05-06 NOTE — PROGRESS NOTES
PSYCHIATRY PROGRESS NOTE         DATE OF SERVICE:   5/6/2025         CHIEF COMPLAINT:      Waiting for Invega Sustena injection , anxious because he thinks it will hurt, paranoid delusions about FBI          OBJECTIVE:     Nursing reports : Patient stays in his room, takes oral medications , slept 8.75 hours       reports working on outpatient referrals, final commitment hearing 5/1/2025    Medicine consult by REBECA Person on 4/24/2025  # Acute decompensated schizophrenia  # Eloped from care facility, noncompliance with medication.  # Anxiety, depression.  Eloped from group home 4/16, found to gas station 4/20 endorsing he is a fugitive and try and turn himself in.  Per report third elopement from group home. Was admitted to [facility] internal medicine 4/20. Now admitted to  for treatment of decompensated schizophrenia. PTA on trazodone, Cogentin, IM Invega sustenna.   - As managed per psychiatry   # Chronic intermittent chest pain  # Possible history A-fib  # Chronic bifascicular block, L anterior fascicular block, R bundle branch block,   # History of chronic HFpEF, grade 1 diastolic dysfunction  Of note, has complained of chronic intermittent chest pain sx> 1yr prior.  States symptoms are regular.  Does not have any as needed nitro per chart review. Recent ECG reviewed, NSR on arrival (with artifact) bifascicular block LVH, LAD.  However chart review indicates history of A-fib, no PTA DOAC.  History HFpEF most recent EF 55-60%.   - Follow up with PCP and establish care with cardiology team outpatient   # Incidental aortic root/aortic dilation  Aortic root 4.3 cm and ascending aorta dilation 4 cm.  No previous comparison.  - Outpatient follow-up TTE yearly monitoring, good BP control.  - Age-appropriate health maintenance on PCP visit.  # Hyponatremia, resolved  Presented with sodium of 131, given patient eloped likely related to poor oral intake.  Creatinine within normal limits.  Now sodium  improved.  - Follow up with PCP for ongoing monitoring   # Mild hypocalcemia, resolved  Minimal hypocalcemia at 8.6, albumin is 3.94 oh.  Not clinically relevant/causing above symptoms.  Increased p.o. intake.  - Monitor with PCP outpatient   # Mildly elevated TSH  Admit TSH 4.37 although free T4 is 1.40.  No PTA Synthroid/meds.  No convincing symptoms of hyperthyroidism, despite psych decompensation  - PCP recheck thyroid function in 10 -12 weeks.   # History of nicotine use   Patient reports he uses E-cig sometimes, vague about when.    - Cessation encouraged, NRT per primary team   # History of alcohol use disorder   Patient declines any recent use.  States he will drink when he gets money but cannot afford it.  LFTs are WNL.  JOSR negative.  - Continue thiamine multivitamin, folic acid supplements.  - Encourage cessation   # History of drug use  - Patient declines recent use - recent UDS is negative         SUBJECTIVE:      Ger is anxious about waiting for Invega Sustena injection. He perseverates that it is going to hurt again. I inform him that his skin will be numbed by Xylocaine cream, so he will feel less painful poke. He seems less anxious after I assure him that we will do everything to make it less painful for him. He continues to have delusions about FBI being after him, which is chronic, but worse when he is of Sustena. He denies hallucinations, but he says that FBO reaches him through his mind. He denies thoughts of hurting self or others.  He slept most of the night.  Appetite is good.  He does not drink enough fluid.  I remind him that it can decrease his blood pressure and he can become dehydrated and fall and break his hip.  I encouraged him to drink fluid.  Nurse will also encourage him.  He prefers to stay in his room, but he says he will try to go to groups.  Tremor and tongue movement  significantly decreased after he was put on Cogentin.  He denies other problems.    From the past note:  I  reviewed his admission from May 2023.  At that time he was admitted at [facility] as a voluntary patient, but he was under my commitment with Bryce.  He was diagnosed with schizoaffective disorder and he had agitation and verbal aggression, paranoid delusions, disorganized thoughts, and refusing medications.  The staff from the residential reported that he refused medications and was getting more irritable, argumentative and agitated.  She described him as mean and making threats and throwing things.  He believed that medical providers were conspiring the government against him.  He was receiving special messages through the headphones.  After getting Invega Sustenna he is thought process was more organized and less tangential although he he eventually agreed to take long acting Invega Sustenna.  It says that Cogentin was decreased from 1 to 0.5 mg twice daily to minimize anticholinergic  burden.  It controlled tremor.  He was admitted to December 2022 when he had altered mental status, wandering the streets without gloves or socks.  Cogentin was increased for tremor, and Invega Sustenna IM given.  He was under commitment at that time    He was admitted to IP psychiatry at [facility] in June 2022.  911 was called because he was walking on the Friday.  He reported that his name is Ger Solares, that he was homeless and his life is in the eighth send that he was suicidal and that he had paranoid schizophrenia.  He reported hearing male and female voices.  He talked about hearing [employer] and walking the line like him.  At that time he was suicidal and reported [place] get hit by the car and die.He was living in  and he was followed by ACT team. At that time he started refusing Invega Sustena and insisted only on oral medications and decompensated on it.     Prior to that he was admitted inJanuary 2022, May 2021,December 2020, April 2020    It says  that In April 2020 he was admitted to [facility] he was on Haldol and Invega, including Invega Sustenna.  He was noncompliant with medications.  He believes that [employer] was after him.He was working with ACT team.  It was reported that he had baseline delusions.He was diagnosed with schizophrenia schizoaffective disorder chronic with acute exacerbation.    In November 2019 he was admitted, diagnosed with EPS due to Haldol, discharged on oral Haldol, IM invega Sustena and Cogentine.    He had 4 admissions in 2019   He had 6 admissions in 2018  He had 2 admissions in 2016  He had 1 admission in 2013  He had 3 admissions in 2012            MEDICATIONS:     Current Facility-Administered Medications   Medication Dose Route Frequency Provider Last Rate Last Admin    - Psychiatric Emergency -   Other See Admin Instructions Marilyn Jaffe MD        benztropine (COGENTIN) tablet 1 mg  1 mg Oral BID Marilyn Jaffe MD   1 mg at 05/06/25 0836    folic acid (FOLVITE) tablet 1 mg  1 mg Oral Daily Marilyn Jaffe MD   1 mg at 05/06/25 0836    multivitamin w/minerals (THERA-VIT-M) tablet 1 tablet  1 tablet Oral Daily Marilyn Jaffe MD   1 tablet at 05/06/25 0836    paliperidone (INVEGA SUSTENNA) injection CHRISTOS 234 mg  234 mg Intramuscular Q30 Days Marilyn Jaffe MD   234 mg at 05/06/25 1205    paliperidone ER (INVEGA) 24 hr tablet 6 mg  6 mg Oral Daily Marilyn Jaffe MD   6 mg at 05/06/25 0836    sertraline (ZOLOFT) tablet 50 mg  50 mg Oral Daily Marilyn Jaffe MD   50 mg at 05/06/25 0836    thiamine (B-1) tablet 100 mg  100 mg Oral Daily Marilyn Jaffe MD   100 mg at 05/06/25 0836     Current Facility-Administered Medications   Medication Dose Route Frequency Provider Last Rate Last Admin    acetaminophen (TYLENOL) tablet 650 mg  650 mg Oral Q4H PRN Marilyn Jaffe MD   650 mg at 05/05/25 0851    alum & mag hydroxide-simethicone (MAALOX) suspension 30 mL  30 mL Oral Q4H PRN Marilyn Jaffe MD         "carboxymethylcellulose PF (REFRESH PLUS) 0.5 % ophthalmic solution 1 drop  1 drop Both Eyes TID PRN Marilyn Jaffe MD        cyclobenzaprine (FLEXERIL) tablet 10 mg  10 mg Oral Q8H PRN Michael Melendrez PA-C   10 mg at 04/30/25 1632    gabapentin (NEURONTIN) capsule 100 mg  100 mg Oral Q6H PRN Marilyn Jaffe MD        lidocaine (LMX4) cream   Topical Q4H PRN Marilyn Jaffe MD   Given at 05/06/25 1224    OLANZapine (zyPREXA) tablet 2.5 mg  2.5 mg Oral TID PRN Marilyn Jaffe MD        Or    OLANZapine (zyPREXA) injection 2.5 mg  2.5 mg Intramuscular TID PRN Marilyn Jaffe MD        senna-docusate (SENOKOT-S/PERICOLACE) 8.6-50 MG per tablet 1 tablet  1 tablet Oral BID PRN Marilyn Jaffe MD        traZODone (DESYREL) tablet 50 mg  50 mg Oral At Bedtime PRN Marilyn Jaffe MD   50 mg at 04/28/25 0122       Medication adherence: Yes with oral, refusing IM  Medication side effects: c/o of pain in injection site when he gets IM neuroleptic   Benefit: Symptom reduction         ROS:   As per history of present illness, otherwise reminder of review of systems is negative for: General, eyes, ears, nose, throat, neck, respiratory, cardiovascular, gastrointestinal, genitourinary, meniscal skeletal, neurological, hematological, dermatological and endocrine system.         MENTAL STATUS EXAM:   BP 99/64 (Patient Position: Sitting)   Pulse 58   Temp 97.4  F (36.3  C) (Temporal)   Resp 16   Ht 1.753 m (5' 9\")   Wt 88.7 kg (195 lb 8.8 oz)   SpO2 95%   BMI 28.88 kg/m      Appearance:fair hygiene  Orientation:to self, place, partially in time   Speech:fluent  Language ability: intact  Thought process: concrete  Thought content: positive for paranoid delusions, denies hallucinations  Associations: Connected  Suicidal Ideation: denies   Homicidal Ideation:denies   Mood:  depressed  Affect: anxious about injection   Intellectual functioning:average  Fund of Knowledge: consistent with education and experience " "  Attention/Concentration: decreased  Memory: affected by psychosis   Psychomotor Behavior: calm  Muscle Strength and Tone: no atrophy or involuntary movement  Gait and Station: steady  Insight and judgement:inadequate          LABS:   personally reviewed.   Lab Results   Component Value Date     04/21/2025     04/20/2025     02/22/2024     04/05/2020     12/10/2019     10/14/2019    CO2 30 04/21/2025    CO2 29 04/20/2025    CO2 30 02/22/2024    CO2 27 04/05/2020    CO2 34 12/10/2019    CO2 28 10/14/2019    BUN 11.6 04/21/2025    BUN 12.4 04/20/2025    BUN 14.6 02/22/2024    BUN 16 04/05/2020    BUN 15 12/10/2019    BUN 12 10/14/2019     No results found for: \"CKTOTAL\", \"CKMB\", \"TROPONINI\"  Lab Results   Component Value Date    WBC 5.4 04/21/2025    WBC 6.4 04/20/2025    WBC 5.8 02/22/2024    WBC 7.2 04/05/2020    WBC 5.9 12/10/2019    WBC 6.7 10/14/2019    HGB 14.0 04/21/2025    HGB 14.0 04/20/2025    HGB 13.7 02/22/2024    HGB 15.7 04/05/2020    HGB 15.6 12/10/2019    HGB 15.3 10/14/2019    HCT 41.1 04/21/2025    HCT 40.7 04/20/2025    HCT 41.1 02/22/2024    HCT 44.6 04/05/2020    HCT 45.3 12/10/2019    HCT 42.9 10/14/2019    MCV 92 04/21/2025    MCV 92 04/20/2025    MCV 92 02/22/2024    MCV 91 04/05/2020    MCV 93 12/10/2019    MCV 89 10/14/2019     04/21/2025     04/20/2025     02/22/2024     04/05/2020     12/10/2019     10/14/2019     Lab Results   Component Value Date    CHOL 148 04/19/2019    CHOL 158 04/01/2012    TRIG 136 04/19/2019    TRIG 107 04/01/2012    HDL 32 04/19/2019    HDL 29 04/01/2012      Latest Reference Range & Units 04/20/25 21:30 04/21/25 11:34   Sodium 135 - 145 mmol/L  136   Potassium 3.4 - 5.3 mmol/L  4.4   Chloride 98 - 107 mmol/L  100   Carbon Dioxide (CO2) 22 - 29 mmol/L  30 (H)   Urea Nitrogen 8.0 - 23.0 mg/dL  11.6   Creatinine 0.67 - 1.17 mg/dL  0.79   GFR Estimate >60 mL/min/1.73m2  >90   Calcium 8.8 - " 10.4 mg/dL  8.7 (L)   Anion Gap 7 - 15 mmol/L  6 (L)   Magnesium 1.7 - 2.3 mg/dL 2.0    Phosphorus 2.5 - 4.5 mg/dL 3.0    Albumin 3.5 - 5.2 g/dL 3.9 4.0   Protein Total 6.4 - 8.3 g/dL 5.8 (L) 5.9 (L)   Alkaline Phosphatase 40 - 150 U/L 88 76   ALT 0 - 70 U/L 11 12   AST 0 - 45 U/L 23 18   Bilirubin Direct 0.00 - 0.30 mg/dL 0.21    Bilirubin Total <=1.2 mg/dL 0.7 0.6   Glucose 70 - 99 mg/dL  102 (H)   T4 Free 0.90 - 1.70 ng/dL 1.40    Troponin T, High Sensitivity <=22 ng/L 8    TSH 0.30 - 4.20 uIU/mL 4.37 (H)       Latest Reference Range & Units 04/21/25 11:34   FIO2  0   Ph Venous 7.32 - 7.43  7.42   PCO2 Venous 40 - 50 mm Hg 48   PO2 Venous 25 - 47 mm Hg 43   O2 Sat, Venous 70.0 - 75.0 % 81.8 (H)   Bicarbonate Venous 21 - 28 mmol/L 31 (H)   Base Excess Venous -3.0 - 3.0 mmol/L 5.5 (H)   Oxyhemoglobin Venous 70 - 75 % 80 (H)   WBC 4.0 - 11.0 10e3/uL 5.4   Hemoglobin 13.3 - 17.7 g/dL 14.0   Hematocrit 40.0 - 53.0 % 41.1   Platelet Count 150 - 450 10e3/uL 170   RBC Count 4.40 - 5.90 10e6/uL 4.46   MCV 78 - 100 fL 92   MCH 26.5 - 33.0 pg 31.4   MCHC 31.5 - 36.5 g/dL 34.1   RDW 10.0 - 15.0 % 11.9      Latest Reference Range & Units 04/21/25 09:57   Color Urine Colorless, Straw, Light Yellow, Yellow  Light Yellow   Appearance Urine Clear  Clear   Glucose Urine Negative mg/dL Negative   Bilirubin Urine Negative  Negative   Ketones Urine Negative mg/dL Negative   Specific Gravity Urine 1.003 - 1.035  1.009   pH Urine 5.0 - 7.0  6.5   Protein Albumin Urine Negative mg/dL Negative   Urobilinogen mg/dL Normal mg/dL Normal   Nitrite Urine Negative  Negative   Blood Urine Negative  Negative   Leukocyte Esterase Urine Negative  Negative   Amphetamine Qual Urine Screen Negative  Screen Negative   Fentanyl Qual Urine Screen Negative  Screen Negative   Cocaine Urine Screen Negative  Screen Negative   Benzodiazepine Urine Screen Negative  Screen Negative   Opiates Qualitative Urine Screen Negative  Screen Negative   PCP Urine Screen  Negative  Screen Negative   Cannabinoids Qual Urine Screen Negative  Screen Negative   Barbiturates Qual Urine Screen Negative  Screen Negative       CT HEAD WO IV CONT, CT TRAUMA CERVICAL SCREEN T4-C1   LOCATION: [facility]   DATE/TIME: 2/24/2023 1:49 AM   INDICATION: GCS 14 or 15 - head injury and age over 64 years.   COMPARISON: 12/15/2020. 12/29/2022.   TECHNIQUE:   1) Routine CT Head without IV contrast. Multiplanar reformats. Dose reduction techniques were used.   2) Routine CT Cervical Spine without IV contrast. Multiplanar reformats. Dose reduction techniques were used.   FINDINGS:   HEAD CT:   INTRACRANIAL CONTENTS: No intracranial hemorrhage, extraaxial collection, or mass effect.  No CT evidence of acute infarct. Normal parenchymal attenuation. Mild generalized volume loss. No hydrocephalus.        EKG 12-lead, tracing only 4/21/2025          Component  Ref Range & Units 4/21/25  9:06 AM 4/20/25  9:37 PM    Systolic Blood Pressure  mmHg      Diastolic Blood Pressure  mmHg      Ventricular Rate  BPM 72 69    Atrial Rate  BPM 72 249    ME Interval  ms 180     QRS Duration  ms 152 158    QT  ms 420 438    QTc  ms 459 469    P Axis  degrees 47 122    R AXIS  degrees -73 -76    T Axis  degrees 49 56    Interpretation ECG Sinus rhythm with Premature atrial complexes in a pattern of bigeminy  Right bundle branch block  Left anterior fascicular block  ** Bifascicular block **  Abnormal ECG  When compared with ECG of 20-Apr-2025 21:37,  Sinus rhythm has replaced Atrial fibrillation  Confirmed by MD REMY, ARI ([id number]) on 4/21/2025 11:07:20 AM               DIAGNOSIS:     Schizophrenia schizoaffective chronic with acute exacerbation   Tardive dyskinesia    Patient Active Problem List   Diagnosis    Schizoaffective disorder, bipolar type (H)    Schizophrenia (H)    Alcohol abuse    Dyslipidemia    Gastroesophageal reflux disease without esophagitis    Hypothyroidism due to acquired atrophy of thyroid    Tobacco  use disorder    Schizoaffective disorder (H)    Schizophrenia, schizoaffective, chronic with acute exacerbation (H)    Atrial flutter, unspecified type (H)    Aortic root dilatation    Mood changes    Paranoid schizophrenia, chronic condition with acute exacerbation (H)    Tardive dyskinesia          PLAN:   Jack is  74 y/o male with chronic persistent mental illness. He has been on neuroleptics for many years. He was diagnosed with schizophrenia, schizoaffective disorder.  He is also diagnosed with EPS and cognitive decline.  He has had multiple psychiatric hospitalizations, commitments with [facility] order due to noncompliance with treatment.  Per Epic report there are more than 20 psychiatric admissions since 2012.   He had previous admissions due to not wanting to take Invega Sustenna and wanting to take only oral medications and during that time he decompensated and Invega Sustenna was restarted.  He has been on it for at least  6 years. He has impaired insight and judgement. He says he does not need medication to begin with and he does not want IM Sustena because FBI will have something on him.   He had   final commitment hearing on May 1 st. The court faxed commitment order and [facility] neuroleptic order, so IM Invega will be given today. He has mood component in his symptomatology which fits schizophrenia schizoaffective disorder.   He agrees  with the following recommendations:    Medications:  Start Xylocaine cream to injection site to numb the skin and decrease the pain  Start Invega Sustena 234 mg IM on 5/6/2025, then every 28 days   Zoloft 50 mg qam for depression  Invega 6 mg daily for paranoid schizophrenia   Cogentin 1 mg bid for EPS  Trazodone 50 mg nightly as needed for sleep  Multivitamins 1 pill daily  Thiamine 100 mg daily  Folic acid 1 mg daily  Tylenol 650 mg every 4 hours as needed for pain  Maalox 30 mg every 4 as needed for indigestion  Refresh Plus ophthalmic solution 0.5% 1 drop to both eyes 3  times daily as needed for dry eyes  Flexeril 10 mg 3 times daily as needed for muscle spasms  Neurontin 100 mg every 6 hours as needed for anxiety  Zyprexa 2.5 mg 3 times daily as needed for agitation  Senna docusate 1 pill twice daily as needed for constipation  We discussed side effects, benefits and alternative treatments and patient agrees with with oral , but refuses IM Invega.   will collect collateral information and make outpatient referrals  Staff to provide emotional support and redirect as needed  Patient encouraged to attend groups  Lab results: Reviewed personally  Consultation: medicine consult completed     Risk Assessment: going through commitment due to non compliance with tx which led to psychotic break    Coordination of Care:   Patient seen, medical record reviewed, care coordinated with the team.    Total time: More than 35 minutes spent on this visit with more than 50% of time spent in coordination of care with staff,team meeting,  chart review, psychoeducation with the patient regarding diagnosis and treatment, providing supportive therapy regarding above symptoms, especially coping with injection and importance of medications, orders and documentation.      This document is created with the help of Dragon dictation system.  All grammatical/typing errors or context distortion are unintentional and inherent to software.    Marilyn Jaffe MD        Re-Certification I certify that the inpatient psychiatric facility services furnished since the previous certification were, and continue to be, medically necessary for, either, treatment which could reasonably be expected to improve the patient s condition or diagnostic study and that the hospital records indicate that the services furnished were, either, intensive treatment services, admission and related services necessary for diagnostic study, or equivalent services.     I certify that the patient continues to need, on a daily basis,  active treatment furnished directly by or requiring the supervision of inpatient psychiatric facility personnel.   I estimate TBD days of hospitalization is necessary for proper treatment of the patient. My plans for post-hospital care for this patient are : Medications, appointments     Marilyn Jaffe MD

## 2025-05-06 NOTE — PLAN OF CARE
Initial meeting note:    Therapist introduced self to patient and discussed psychotherapy service available to patient.     Pt response: Pt expressed interest in meeting with therapist    Plan: Made plan to meet with pt again; began identifying goals     Briefly checked in with pt. Pt is feeling ok for now, the voices were not present. Pt tries to go to groups occasionally. Pt did not have anything he wanted to discuss further but appreciated the company.

## 2025-05-07 PROCEDURE — 124N000002 HC R&B MH UMMC

## 2025-05-07 PROCEDURE — 250N000013 HC RX MED GY IP 250 OP 250 PS 637: Performed by: PSYCHIATRY & NEUROLOGY

## 2025-05-07 PROCEDURE — 99232 SBSQ HOSP IP/OBS MODERATE 35: CPT | Performed by: PSYCHIATRY & NEUROLOGY

## 2025-05-07 PROCEDURE — 97150 GROUP THERAPEUTIC PROCEDURES: CPT | Mod: GO

## 2025-05-07 PROCEDURE — 90853 GROUP PSYCHOTHERAPY: CPT

## 2025-05-07 RX ORDER — BENZOCAINE/MENTHOL 6 MG-10 MG
LOZENGE MUCOUS MEMBRANE 2 TIMES DAILY
Status: DISCONTINUED | OUTPATIENT
Start: 2025-05-07 | End: 2025-05-14 | Stop reason: HOSPADM

## 2025-05-07 RX ADMIN — FOLIC ACID 1 MG: 1 TABLET ORAL at 07:55

## 2025-05-07 RX ADMIN — BENZTROPINE MESYLATE 1 MG: 1 TABLET ORAL at 07:55

## 2025-05-07 RX ADMIN — BENZTROPINE MESYLATE 1 MG: 1 TABLET ORAL at 21:01

## 2025-05-07 RX ADMIN — SERTRALINE HYDROCHLORIDE 50 MG: 25 TABLET ORAL at 07:55

## 2025-05-07 RX ADMIN — Medication 1 TABLET: at 07:55

## 2025-05-07 RX ADMIN — THIAMINE HCL TAB 100 MG 100 MG: 100 TAB at 07:55

## 2025-05-07 RX ADMIN — PALIPERIDONE 6 MG: 3 TABLET, EXTENDED RELEASE ORAL at 07:55

## 2025-05-07 RX ADMIN — HYDROCORTISONE: 1 CREAM TOPICAL at 21:01

## 2025-05-07 ASSESSMENT — ACTIVITIES OF DAILY LIVING (ADL)
ADLS_ACUITY_SCORE: 41
HYGIENE/GROOMING: INDEPENDENT
ADLS_ACUITY_SCORE: 41
ORAL_HYGIENE: INDEPENDENT
ADLS_ACUITY_SCORE: 41
LAUNDRY: UNABLE TO COMPLETE
HYGIENE/GROOMING: INDEPENDENT
ADLS_ACUITY_SCORE: 41
DRESS: INDEPENDENT
ADLS_ACUITY_SCORE: 41
ORAL_HYGIENE: INDEPENDENT
LAUNDRY: UNABLE TO COMPLETE
DRESS: INDEPENDENT
ADLS_ACUITY_SCORE: 41

## 2025-05-07 NOTE — PROGRESS NOTES
Rehab Group    Start time: 1025  End time: 1200  Patient time total: 85 minutes    attended full group    #7 attended   Group Type: occupational therapy   Group Topic Covered: coping skills     Group Session Detail:  OT clinic     Patient Response/Contribution:  cooperative with task, organized, attentive, and actively engaged     Patient Detail:    Pt actively participated in occupational therapy clinic to facilitate coping skill exploration, creative expression within personally meaningful activities, and clinical observation of social, cognitive, and kinesthetic performance skills. Pt response: Independent to initiate, gather materials, sequence, and adjust to workspace demands as needed. Demonstrated good focus, planning, and attention to detail for selected structured creative expression task. He was observed working at a slow, careful pace. Organized in his task approach, and observed adhering to a symmetrical pattern for the details of his task. Able to ask for assistance as needed, and primarily socialized with writer during this group; minimal interactions with peers observed. Occasionally sought feedback from writer in planning out the details for his task, and he was pleasant and polite in interactions.       [id number] OT Group (2 or more in attendance)      Patient Active Problem List   Diagnosis    Schizoaffective disorder, bipolar type (H)    Schizophrenia (H)    Alcohol abuse    Dyslipidemia    Gastroesophageal reflux disease without esophagitis    Hypothyroidism due to acquired atrophy of thyroid    Tobacco use disorder    Schizoaffective disorder (H)    Schizophrenia, schizoaffective, chronic with acute exacerbation (H)    Atrial flutter, unspecified type (H)    Aortic root dilatation    Mood changes    Paranoid schizophrenia, chronic condition with acute exacerbation (H)    Tardive dyskinesia

## 2025-05-07 NOTE — PLAN OF CARE
Problem: Psychotic Signs/Symptoms  Goal: Improved Behavioral Control (Psychotic Signs/Symptoms)  Outcome: Progressing   Goal Outcome Evaluation:  Pt visible at that start of the shift. Pt appeared more relaxed and was sitting in the lounge watching TV. Pt accepted AM medications. Pt did question how long he would have to take the oral Invega since he had long acting injection yesterday. Dr Jaffe was contacted and she stated it would only be couple of  days.  Pt denies mental health issues including depression/anxiety/SI/SIB/auditory or visual hallucinations.

## 2025-05-07 NOTE — PLAN OF CARE
Problem: Psychotic Signs/Symptoms  Goal: Improved Sleep (Psychotic Signs/Symptoms)  Outcome: Progressing   Goal Outcome Evaluation:        Received asleep with headphone on.   Pt has a no roommate order due to disorganized behavior, Psychosis and delusional thoughts. No behavioral or safety concerns tonight.  Slept for 8 hours

## 2025-05-07 NOTE — PLAN OF CARE
Problem: Psychotic Signs/Symptoms  Goal: Improved Behavioral Control (Psychotic Signs/Symptoms)  Outcome: Progressing   Goal Outcome Evaluation:    Plan of Care Reviewed With: patient      Laying in bed beginning of evening shift. Full range affect, friendly, polite, and cooperative with interactions. Is denying hallucinations. Does not appear to be responding to internal stimuli when passively observed. Voices hope to discharge next week. Is asking about the cash he had on admission and states it's not in his locker. Explained the process and checked belongings list per request. The exact amount, $545, was confirmed to be documented and sent to security. Pt voices appreciation for checking. He denies all physical concerns. Denies all mental health concerns. Spent majority of shift in room.     Applied hydrocortisone this HS to dry, flaky skin on forehead and bilateral cheeks by nose per request. States it does not itch.

## 2025-05-07 NOTE — PROGRESS NOTES
"  Rehab Group    Start time: 1320  End time: 1405  Patient time total: 40 minutes    attended full group    #5 attended   Group Type: occupational therapy   Group Topic Covered: balanced lifestyle, healthy leisure time, and social skills     Group Session Detail:  Self-reflection fahad     Patient Response/Contribution:  cooperative with task, listened actively, attentive, and actively engaged     Patient Detail:    Pt actively participated in a structured occupational therapy group with a focus on facilitating self-awareness, self-esteem, and social engagement. Pt appeared comfortable sharing positive personal information, and was respectful in listening and responding to peers. Observed tracking the task consistently and accurately. Responses to prompts were appropriate to topic, though often concrete. For example, when prompted to identify what makes him special or unique, he identified \"my fingerprints and my teeth.\" When prompted to share an ideal vacation, he described taking a road trip to [place] and emphasized being able to drive without traffic and without getting pulled over. Calm, pleasant, and engaged.      [id number] OT Group (2 or more in attendance)      Patient Active Problem List   Diagnosis    Schizoaffective disorder, bipolar type (H)    Schizophrenia (H)    Alcohol abuse    Dyslipidemia    Gastroesophageal reflux disease without esophagitis    Hypothyroidism due to acquired atrophy of thyroid    Tobacco use disorder    Schizoaffective disorder (H)    Schizophrenia, schizoaffective, chronic with acute exacerbation (H)    Atrial flutter, unspecified type (H)    Aortic root dilatation    Mood changes    Paranoid schizophrenia, chronic condition with acute exacerbation (H)    Tardive dyskinesia       "

## 2025-05-07 NOTE — PLAN OF CARE
Team Note Due:  Thursday    Assessment/Intervention/Current Symtoms and Care Coordination:  Chart review and met with team, discussed pt progress, symptomology, and response to treatment.  Discussed the discharge plan and any potential impediments to discharge.    Pt admitted for eloping group home, medication non-compliance, and paranoid/delusional thinking. Petition for commitment was started in the ED. Pt arrived to the unit on a court hold. Pt is calm, but paranoid. Mostly isolative to his room. However, starting this morning, pt has been out of his room since the beginning of the day shift and reported to the provider that he wanted to start going to groups. Pt also told provider that he was grateful for getting the injection yesterday and feels less anxious. Provider this pt will be ready to discharge mid to end of next week.    Writer sent update to pt's ACT team  with plan to potentially discharge end of next week.      Discharge Plan or Goal:  To be determined     Barriers to Discharge:  Patient requires further psychiatric stabilization due to current symptomology and medication management with possible adjustments    Referral Status:  To be determined      Legal Status:  Committed MI with [facility] (ITP)  [facility]: [place]  File Number: [id number]  Start Date: 5/2/2025  ITP medications: Invega, Zyprexa, Abilify, and Prolixin    Contacts (include PEG status):  Guerda Mann: ACT team    [phone]   [email or web address]    Pam Randle, ABIMBOLA, APRN   [phone]  [email or web address]  ACT team psychiatry provider    Group Home: [facility]  RN: Donya Naqvi Phone: [phone]  Main line: [phone]     Upcoming Meetings and Dates/Important Information and next steps:     I reviewed patient's paperwork from [facility]. Had pneumonia on CXR done today, though our CXR is clear. Patient is resting comfortably, NAD. Spoke with patient's son. Updated him on patient's status. Patient is resting comfortably, NAD. Spoke with patient's son. Updated him on patient's status. Patient was trying to climb out of bed. I ordered her regular trazodone and ativan. Patient already given azithromycin at [facility] today. Patient was more agitated, climbing out of bed, requiring continual redirection. Given haldol 1mg IV, no effect. Given another 1mg, now calmer. Endorsed to Dr. Chavira.
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
## 2025-05-07 NOTE — PROGRESS NOTES
PSYCHIATRY PROGRESS NOTE         DATE OF SERVICE:   5/7/2025         CHIEF COMPLAINT:      Paranoid delusions,tolerates  Invega Sustena injection without pain , asks for hydrocortisone cream which helped him with skin irritation in the past           OBJECTIVE:     Nursing reports : takes medications, goes to groups, slept 8 hours       reports working on outpatient referrals, final commitment hearing 5/1/2025    Medicine consult by REBECA Person on 4/24/2025  # Acute decompensated schizophrenia  # Eloped from care facility, noncompliance with medication.  # Anxiety, depression.  Eloped from group home 4/16, found to gas station 4/20 endorsing he is a fugitive and try and turn himself in.  Per report third elopement from group home. Was admitted to [facility] internal medicine 4/20. Now admitted to  for treatment of decompensated schizophrenia. PTA on trazodone, Cogentin, IM Invega sustenna.   - As managed per psychiatry   # Chronic intermittent chest pain  # Possible history A-fib  # Chronic bifascicular block, L anterior fascicular block, R bundle branch block,   # History of chronic HFpEF, grade 1 diastolic dysfunction  Of note, has complained of chronic intermittent chest pain sx> 1yr prior.  States symptoms are regular.  Does not have any as needed nitro per chart review. Recent ECG reviewed, NSR on arrival (with artifact) bifascicular block LVH, LAD.  However chart review indicates history of A-fib, no PTA DOAC.  History HFpEF most recent EF 55-60%.   - Follow up with PCP and establish care with cardiology team outpatient   # Incidental aortic root/aortic dilation  Aortic root 4.3 cm and ascending aorta dilation 4 cm.  No previous comparison.  - Outpatient follow-up TTE yearly monitoring, good BP control.  - Age-appropriate health maintenance on PCP visit.  # Hyponatremia, resolved  Presented with sodium of 131, given patient eloped likely related to poor oral intake.  Creatinine within  normal limits.  Now sodium improved.  - Follow up with PCP for ongoing monitoring   # Mild hypocalcemia, resolved  Minimal hypocalcemia at 8.6, albumin is 3.94 oh.  Not clinically relevant/causing above symptoms.  Increased p.o. intake.  - Monitor with PCP outpatient   # Mildly elevated TSH  Admit TSH 4.37 although free T4 is 1.40.  No PTA Synthroid/meds.  No convincing symptoms of hyperthyroidism, despite psych decompensation  - PCP recheck thyroid function in 10 -12 weeks.   # History of nicotine use   Patient reports he uses E-cig sometimes, vague about when.    - Cessation encouraged, NRT per primary team   # History of alcohol use disorder   Patient declines any recent use.  States he will drink when he gets money but cannot afford it.  LFTs are WNL.  JOSR negative.  - Continue thiamine multivitamin, folic acid supplements.  - Encourage cessation   # History of drug use  - Patient declines recent use - recent UDS is negative         SUBJECTIVE:      Ger is more relaxed after he had Invega injection . He says it was not so painful. The nurse tried to give it in less forceful way, and skin was numbed with xylocaine. He says he goes to groups.He spends more time out of the room. He says he thinks about FBI being after him. He says FBI is real. He denies hallucinations, denies suicidal and homicidal ideas. He slept through the night. Appetite is good . Energy below baseline, but he makes effort to get out of the room. He has no problems in communication with staff and patients. Tongue movement and tremor improved on Cogentin.He asks for hydrocortisone cream for facial irritation. He says he has flaky skin and it helped in the past when he took it for a week.He is alert and oriented to self, place and he knows month and year , not day and date. He says there is no calendar in the room, and there is nothing for him to do , so he does not care about day and date.    From the past note:  I reviewed his admission from  May 2023.  At that time he was admitted at [facility] as a voluntary patient, but he was under my commitment with Bryce.  He was diagnosed with schizoaffective disorder and he had agitation and verbal aggression, paranoid delusions, disorganized thoughts, and refusing medications.  The staff from the senior care reported that he refused medications and was getting more irritable, argumentative and agitated.  She described him as mean and making threats and throwing things.  He believed that medical providers were conspiring the government against him.  He was receiving special messages through the headphones.  After getting Invega Sustenna he is thought process was more organized and less tangential although he he eventually agreed to take long acting Invega Sustenna.  It says that Cogentin was decreased from 1 to 0.5 mg twice daily to minimize anticholinergic  burden.  It controlled tremor.  He was admitted to December 2022 when he had altered mental status, wandering the streets without gloves or socks.  Cogentin was increased for tremor, and Invega Sustenna IM given.  He was under commitment at that time    He was admitted to IP psychiatry at [facility] in June 2022.  911 was called because he was walking on the Friday.  He reported that his name is Ger Solares, that he was homeless and his life is in the eighth send that he was suicidal and that he had paranoid schizophrenia.  He reported hearing male and female voices.  He talked about hearing [employer] and walking the line like him.  At that time he was suicidal and reported [place] get hit by the car and die.He was living in  and he was followed by ACT team. At that time he started refusing Invega Sustena and insisted only on oral medications and decompensated on it.     Prior to that he was admitted inJanuary 2022, May 2021,December 2020, April 2020    It says that In April 2020 he was  admitted to  he was on Haldol and Invega, including Invega Sustenna.  He was noncompliant with medications.  He believes that [employer] was after him.He was working with ACT team.  It was reported that he had baseline delusions.He was diagnosed with schizophrenia schizoaffective disorder chronic with acute exacerbation.    In November 2019 he was admitted, diagnosed with EPS due to Haldol, discharged on oral Haldol, IM invega Sustena and Cogentine.    He had 4 admissions in 2019   He had 6 admissions in 2018  He had 2 admissions in 2016  He had 1 admission in 2013  He had 3 admissions in 2012            MEDICATIONS:     Current Facility-Administered Medications   Medication Dose Route Frequency Provider Last Rate Last Admin    - Psychiatric Emergency -   Other See Admin Instructions Marilyn Jaffe MD        benztropine (COGENTIN) tablet 1 mg  1 mg Oral BID Marilyn Jaffe MD   1 mg at 05/07/25 0755    folic acid (FOLVITE) tablet 1 mg  1 mg Oral Daily Marilyn Jaffe MD   1 mg at 05/07/25 0755    hydrocortisone (CORTAID) 1 % cream   Topical BID Marilyn Jaffe MD        multivitamin w/minerals (THERA-VIT-M) tablet 1 tablet  1 tablet Oral Daily Marilyn Jaffe MD   1 tablet at 05/07/25 0755    paliperidone (INVEGA SUSTENNA) injection CHRISTOS 234 mg  234 mg Intramuscular Q30 Days Marilyn Jaffe MD   234 mg at 05/06/25 1205    paliperidone ER (INVEGA) 24 hr tablet 6 mg  6 mg Oral Daily Marilyn Jaffe MD   6 mg at 05/07/25 0755    sertraline (ZOLOFT) tablet 50 mg  50 mg Oral Daily Marilyn Jaffe MD   50 mg at 05/07/25 0755    thiamine (B-1) tablet 100 mg  100 mg Oral Daily Marilyn Jaffe MD   100 mg at 05/07/25 0755     Current Facility-Administered Medications   Medication Dose Route Frequency Provider Last Rate Last Admin    acetaminophen (TYLENOL) tablet 650 mg  650 mg Oral Q4H PRN Marilyn Jaffe MD   650 mg at 05/05/25 0851    alum & mag hydroxide-simethicone (MAALOX) suspension 30 mL  30 mL Oral  "Q4H PRN Marilyn Jaffe MD        carboxymethylcellulose PF (REFRESH PLUS) 0.5 % ophthalmic solution 1 drop  1 drop Both Eyes TID PRN Marilyn Jaffe MD        cyclobenzaprine (FLEXERIL) tablet 10 mg  10 mg Oral Q8H PRN Michael Melendrez PA-C   10 mg at 04/30/25 1632    gabapentin (NEURONTIN) capsule 100 mg  100 mg Oral Q6H PRN Marilyn Jaffe MD        lidocaine (LMX4) cream   Topical Q4H PRN Marilyn Jaffe MD   Given at 05/06/25 1224    OLANZapine (zyPREXA) tablet 2.5 mg  2.5 mg Oral TID PRN Marilyn Jaffe MD        Or    OLANZapine (zyPREXA) injection 2.5 mg  2.5 mg Intramuscular TID PRN Marilyn Jaffe MD        senna-docusate (SENOKOT-S/PERICOLACE) 8.6-50 MG per tablet 1 tablet  1 tablet Oral BID PRN Marilyn Jaffe MD        traZODone (DESYREL) tablet 50 mg  50 mg Oral At Bedtime PRN Marilyn Jaffe MD   50 mg at 04/28/25 0122       Medication adherence: Yes with oral, refusing IM  Medication side effects: c/o of pain in injection site when he gets IM neuroleptic   Benefit: Symptom reduction         ROS:   As per history of present illness, otherwise reminder of review of systems is negative for: General, eyes, ears, nose, throat, neck, respiratory, cardiovascular, gastrointestinal, genitourinary, meniscal skeletal, neurological, hematological, dermatological and endocrine system.         MENTAL STATUS EXAM:   /73   Pulse 56   Temp 97.6  F (36.4  C) (Oral)   Resp 16   Ht 1.753 m (5' 9\")   Wt 88.7 kg (195 lb 8.8 oz)   SpO2 98%   BMI 28.88 kg/m      Appearance:fair hygiene  Orientation:to self, place, partially in time   Speech:fluent  Language ability: intact  Thought process: concrete  Thought content: positive for paranoid delusions, denies hallucinations  Associations: Connected  Suicidal Ideation: denies   Homicidal Ideation:denies   Mood:  depressed  Affect: anxious about injection   Intellectual functioning:average  Fund of Knowledge: consistent with education and experience " "  Attention/Concentration: decreased  Memory: affected by psychosis   Psychomotor Behavior: calm  Muscle Strength and Tone: no atrophy or involuntary movement  Gait and Station: steady  Insight and judgement:inadequate          LABS:   personally reviewed.   Lab Results   Component Value Date     04/21/2025     04/20/2025     02/22/2024     04/05/2020     12/10/2019     10/14/2019    CO2 30 04/21/2025    CO2 29 04/20/2025    CO2 30 02/22/2024    CO2 27 04/05/2020    CO2 34 12/10/2019    CO2 28 10/14/2019    BUN 11.6 04/21/2025    BUN 12.4 04/20/2025    BUN 14.6 02/22/2024    BUN 16 04/05/2020    BUN 15 12/10/2019    BUN 12 10/14/2019     No results found for: \"CKTOTAL\", \"CKMB\", \"TROPONINI\"  Lab Results   Component Value Date    WBC 5.4 04/21/2025    WBC 6.4 04/20/2025    WBC 5.8 02/22/2024    WBC 7.2 04/05/2020    WBC 5.9 12/10/2019    WBC 6.7 10/14/2019    HGB 14.0 04/21/2025    HGB 14.0 04/20/2025    HGB 13.7 02/22/2024    HGB 15.7 04/05/2020    HGB 15.6 12/10/2019    HGB 15.3 10/14/2019    HCT 41.1 04/21/2025    HCT 40.7 04/20/2025    HCT 41.1 02/22/2024    HCT 44.6 04/05/2020    HCT 45.3 12/10/2019    HCT 42.9 10/14/2019    MCV 92 04/21/2025    MCV 92 04/20/2025    MCV 92 02/22/2024    MCV 91 04/05/2020    MCV 93 12/10/2019    MCV 89 10/14/2019     04/21/2025     04/20/2025     02/22/2024     04/05/2020     12/10/2019     10/14/2019     Lab Results   Component Value Date    CHOL 148 04/19/2019    CHOL 158 04/01/2012    TRIG 136 04/19/2019    TRIG 107 04/01/2012    HDL 32 04/19/2019    HDL 29 04/01/2012      Latest Reference Range & Units 04/20/25 21:30 04/21/25 11:34   Sodium 135 - 145 mmol/L  136   Potassium 3.4 - 5.3 mmol/L  4.4   Chloride 98 - 107 mmol/L  100   Carbon Dioxide (CO2) 22 - 29 mmol/L  30 (H)   Urea Nitrogen 8.0 - 23.0 mg/dL  11.6   Creatinine 0.67 - 1.17 mg/dL  0.79   GFR Estimate >60 mL/min/1.73m2  >90   Calcium 8.8 - " 10.4 mg/dL  8.7 (L)   Anion Gap 7 - 15 mmol/L  6 (L)   Magnesium 1.7 - 2.3 mg/dL 2.0    Phosphorus 2.5 - 4.5 mg/dL 3.0    Albumin 3.5 - 5.2 g/dL 3.9 4.0   Protein Total 6.4 - 8.3 g/dL 5.8 (L) 5.9 (L)   Alkaline Phosphatase 40 - 150 U/L 88 76   ALT 0 - 70 U/L 11 12   AST 0 - 45 U/L 23 18   Bilirubin Direct 0.00 - 0.30 mg/dL 0.21    Bilirubin Total <=1.2 mg/dL 0.7 0.6   Glucose 70 - 99 mg/dL  102 (H)   T4 Free 0.90 - 1.70 ng/dL 1.40    Troponin T, High Sensitivity <=22 ng/L 8    TSH 0.30 - 4.20 uIU/mL 4.37 (H)       Latest Reference Range & Units 04/21/25 11:34   FIO2  0   Ph Venous 7.32 - 7.43  7.42   PCO2 Venous 40 - 50 mm Hg 48   PO2 Venous 25 - 47 mm Hg 43   O2 Sat, Venous 70.0 - 75.0 % 81.8 (H)   Bicarbonate Venous 21 - 28 mmol/L 31 (H)   Base Excess Venous -3.0 - 3.0 mmol/L 5.5 (H)   Oxyhemoglobin Venous 70 - 75 % 80 (H)   WBC 4.0 - 11.0 10e3/uL 5.4   Hemoglobin 13.3 - 17.7 g/dL 14.0   Hematocrit 40.0 - 53.0 % 41.1   Platelet Count 150 - 450 10e3/uL 170   RBC Count 4.40 - 5.90 10e6/uL 4.46   MCV 78 - 100 fL 92   MCH 26.5 - 33.0 pg 31.4   MCHC 31.5 - 36.5 g/dL 34.1   RDW 10.0 - 15.0 % 11.9      Latest Reference Range & Units 04/21/25 09:57   Color Urine Colorless, Straw, Light Yellow, Yellow  Light Yellow   Appearance Urine Clear  Clear   Glucose Urine Negative mg/dL Negative   Bilirubin Urine Negative  Negative   Ketones Urine Negative mg/dL Negative   Specific Gravity Urine 1.003 - 1.035  1.009   pH Urine 5.0 - 7.0  6.5   Protein Albumin Urine Negative mg/dL Negative   Urobilinogen mg/dL Normal mg/dL Normal   Nitrite Urine Negative  Negative   Blood Urine Negative  Negative   Leukocyte Esterase Urine Negative  Negative   Amphetamine Qual Urine Screen Negative  Screen Negative   Fentanyl Qual Urine Screen Negative  Screen Negative   Cocaine Urine Screen Negative  Screen Negative   Benzodiazepine Urine Screen Negative  Screen Negative   Opiates Qualitative Urine Screen Negative  Screen Negative   PCP Urine Screen  Negative  Screen Negative   Cannabinoids Qual Urine Screen Negative  Screen Negative   Barbiturates Qual Urine Screen Negative  Screen Negative       CT HEAD WO IV CONT, CT TRAUMA CERVICAL SCREEN T4-C1   LOCATION: [facility]   DATE/TIME: 2/24/2023 1:49 AM   INDICATION: GCS 14 or 15 - head injury and age over 64 years.   COMPARISON: 12/15/2020. 12/29/2022.   TECHNIQUE:   1) Routine CT Head without IV contrast. Multiplanar reformats. Dose reduction techniques were used.   2) Routine CT Cervical Spine without IV contrast. Multiplanar reformats. Dose reduction techniques were used.   FINDINGS:   HEAD CT:   INTRACRANIAL CONTENTS: No intracranial hemorrhage, extraaxial collection, or mass effect.  No CT evidence of acute infarct. Normal parenchymal attenuation. Mild generalized volume loss. No hydrocephalus.        EKG 12-lead, tracing only 4/21/2025          Component  Ref Range & Units 4/21/25  9:06 AM 4/20/25  9:37 PM    Systolic Blood Pressure  mmHg      Diastolic Blood Pressure  mmHg      Ventricular Rate  BPM 72 69    Atrial Rate  BPM 72 249    [place] Interval  ms 180     QRS Duration  ms 152 158    QT  ms 420 438    QTc  ms 459 469    P Axis  degrees 47 122    R AXIS  degrees -73 -76    T Axis  degrees 49 56    Interpretation ECG Sinus rhythm with Premature atrial complexes in a pattern of bigeminy  Right bundle branch block  Left anterior fascicular block  ** Bifascicular block **  Abnormal ECG  When compared with ECG of 20-Apr-2025 21:37,  Sinus rhythm has replaced Atrial fibrillation  Confirmed by MD REMY, ARI ([id number]) on 4/21/2025 11:07:20 AM               DIAGNOSIS:     Schizophrenia schizoaffective chronic with acute exacerbation   Tardive dyskinesia  Skin  irritation      Patient Active Problem List   Diagnosis    Schizoaffective disorder, bipolar type (H)    Schizophrenia (H)    Alcohol abuse    Dyslipidemia    Gastroesophageal reflux disease without esophagitis    Hypothyroidism due to acquired atrophy  of thyroid    Tobacco use disorder    Schizoaffective disorder (H)    Schizophrenia, schizoaffective, chronic with acute exacerbation (H)    Atrial flutter, unspecified type (H)    Aortic root dilatation    Mood changes    Paranoid schizophrenia, chronic condition with acute exacerbation (H)    Tardive dyskinesia          PLAN:   Jack is  74 y/o male with chronic persistent mental illness. He has been on neuroleptics for many years. He was diagnosed with schizophrenia, schizoaffective disorder.  He is also diagnosed with EPS and cognitive decline.  He has had multiple psychiatric hospitalizations, commitments with [facility] order due to noncompliance with treatment.  Per Epic report there are more than 20 psychiatric admissions since 2012.   He had previous admissions due to not wanting to take Invega Sustenna and wanting to take only oral medications and during that time he decompensated and Invega Sustenna was restarted.  He has been on it for at least  6 years. He has impaired insight and judgement. He says he does not need medication to begin with and he does not want IM Sustena because FBI will have something on him.   He had   final commitment hearing on May 1 st. The court faxed commitment order and [facility] neuroleptic order, so IM Invega was given 5/6/25. He is more relaxed now, that he got injection.   He agrees  with the following recommendations:    Medications:  Start Hydrocortisone cream 1% bid to affected area for skin irritation  Discontinue oral Invega    Xylocaine cream to injection site to numb the skin and decrease the pain  Invega Sustena 234 mg IM on 5/6/2025, then every 28 days   Zoloft 50 mg qam for depression  Cogentin 1 mg bid for EPS  Trazodone 50 mg nightly as needed for sleep  Multivitamins 1 pill daily  Thiamine 100 mg daily  Folic acid 1 mg daily  Tylenol 650 mg every 4 hours as needed for pain  Maalox 30 mg every 4 as needed for indigestion  Refresh Plus ophthalmic solution 0.5% 1 drop to  both eyes 3 times daily as needed for dry eyes  Flexeril 10 mg 3 times daily as needed for muscle spasms  Neurontin 100 mg every 6 hours as needed for anxiety  Zyprexa 2.5 mg 3 times daily as needed for agitation  Senna docusate 1 pill twice daily as needed for constipation  We discussed side effects, benefits and alternative treatments and patient agrees with with oral , but refuses IM Invega.   will collect collateral information and make outpatient referrals  Staff to provide emotional support and redirect as needed  Patient encouraged to attend groups  Lab results: Reviewed personally  Consultation: medicine consult completed     Risk Assessment: going through commitment due to non compliance with tx which led to psychotic break    Coordination of Care:   Patient seen, medical record reviewed, care coordinated with the team.      This document is created with the help of Dragon dictation system.  All grammatical/typing errors or context distortion are unintentional and inherent to software.    Marilyn Jaffe MD        Re-Certification I certify that the inpatient psychiatric facility services furnished since the previous certification were, and continue to be, medically necessary for, either, treatment which could reasonably be expected to improve the patient s condition or diagnostic study and that the hospital records indicate that the services furnished were, either, intensive treatment services, admission and related services necessary for diagnostic study, or equivalent services.     I certify that the patient continues to need, on a daily basis, active treatment furnished directly by or requiring the supervision of inpatient psychiatric facility personnel.   I estimate TBD days of hospitalization is necessary for proper treatment of the patient. My plans for post-hospital care for this patient are : Medications, appointments     Marilyn Jaffe MD

## 2025-05-08 PROCEDURE — 250N000013 HC RX MED GY IP 250 OP 250 PS 637: Performed by: PSYCHIATRY & NEUROLOGY

## 2025-05-08 PROCEDURE — 124N000002 HC R&B MH UMMC

## 2025-05-08 PROCEDURE — 99232 SBSQ HOSP IP/OBS MODERATE 35: CPT | Performed by: PSYCHIATRY & NEUROLOGY

## 2025-05-08 RX ADMIN — THIAMINE HCL TAB 100 MG 100 MG: 100 TAB at 08:48

## 2025-05-08 RX ADMIN — FOLIC ACID 1 MG: 1 TABLET ORAL at 08:49

## 2025-05-08 RX ADMIN — BENZTROPINE MESYLATE 1 MG: 1 TABLET ORAL at 20:14

## 2025-05-08 RX ADMIN — BENZTROPINE MESYLATE 1 MG: 1 TABLET ORAL at 08:48

## 2025-05-08 RX ADMIN — HYDROCORTISONE: 1 CREAM TOPICAL at 10:30

## 2025-05-08 RX ADMIN — SERTRALINE HYDROCHLORIDE 50 MG: 25 TABLET ORAL at 08:48

## 2025-05-08 RX ADMIN — Medication 1 TABLET: at 08:47

## 2025-05-08 ASSESSMENT — ACTIVITIES OF DAILY LIVING (ADL)
ORAL_HYGIENE: INDEPENDENT
HYGIENE/GROOMING: INDEPENDENT
DRESS: INDEPENDENT;SCRUBS (BEHAVIORAL HEALTH)
ADLS_ACUITY_SCORE: 41
LAUNDRY: UNABLE TO COMPLETE
ADLS_ACUITY_SCORE: 41
HYGIENE/GROOMING: INDEPENDENT
ADLS_ACUITY_SCORE: 41
ADLS_ACUITY_SCORE: 41
ORAL_HYGIENE: INDEPENDENT
ADLS_ACUITY_SCORE: 41
LAUNDRY: UNABLE TO COMPLETE
ADLS_ACUITY_SCORE: 41
DRESS: SCRUBS (BEHAVIORAL HEALTH);INDEPENDENT
ADLS_ACUITY_SCORE: 41

## 2025-05-08 NOTE — PLAN OF CARE
Problem: Psychotic Signs/Symptoms  Goal: Improved Sleep (Psychotic Signs/Symptoms)  Outcome: Progressing   Goal Outcome Evaluation:                  Slept well for 8.5 hours  Pt utilizing the headphone to listen to music as a form of relaxation.Visible in the milieu at 0500, calm and bright on approach, denied AH/VH.Pt has a no roommate order due to disorganized behavior, Psychosis and delusional thoughts. No behavioral or safety concerns tonight.

## 2025-05-08 NOTE — PROGRESS NOTES
PSYCHIATRY PROGRESS NOTE         DATE OF SERVICE:   5/8/2025         CHIEF COMPLAINT:      Feeling tired, wants to stay in his room           OBJECTIVE:     Nursing reports : takes medications, stays in his room , slept through the night     reports working on outpatient referrals, final commitment hearing 5/1/2025    Medicine consult by REBECA Person on 4/24/2025  # Acute decompensated schizophrenia  # Eloped from care facility, noncompliance with medication.  # Anxiety, depression.  Eloped from group home 4/16, found to gas station 4/20 endorsing he is a fugitive and try and turn himself in.  Per report third elopement from group home. Was admitted to [facility] internal medicine 4/20. Now admitted to  for treatment of decompensated schizophrenia. PTA on trazodone, Cogentin, IM Invega sustenna.   - As managed per psychiatry   # Chronic intermittent chest pain  # Possible history A-fib  # Chronic bifascicular block, L anterior fascicular block, R bundle branch block,   # History of chronic HFpEF, grade 1 diastolic dysfunction  Of note, has complained of chronic intermittent chest pain sx> 1yr prior.  States symptoms are regular.  Does not have any as needed nitro per chart review. Recent ECG reviewed, NSR on arrival (with artifact) bifascicular block LVH, LAD.  However chart review indicates history of A-fib, no PTA DOAC.  History HFpEF most recent EF 55-60%.   - Follow up with PCP and establish care with cardiology team outpatient   # Incidental aortic root/aortic dilation  Aortic root 4.3 cm and ascending aorta dilation 4 cm.  No previous comparison.  - Outpatient follow-up TTE yearly monitoring, good BP control.  - Age-appropriate health maintenance on PCP visit.  # Hyponatremia, resolved  Presented with sodium of 131, given patient eloped likely related to poor oral intake.  Creatinine within normal limits.  Now sodium improved.  - Follow up with PCP for ongoing monitoring   # Mild  hypocalcemia, resolved  Minimal hypocalcemia at 8.6, albumin is 3.94 oh.  Not clinically relevant/causing above symptoms.  Increased p.o. intake.  - Monitor with PCP outpatient   # Mildly elevated TSH  Admit TSH 4.37 although free T4 is 1.40.  No PTA Synthroid/meds.  No convincing symptoms of hyperthyroidism, despite psych decompensation  - PCP recheck thyroid function in 10 -12 weeks.   # History of nicotine use   Patient reports he uses E-cig sometimes, vague about when.    - Cessation encouraged, NRT per primary team   # History of alcohol use disorder   Patient declines any recent use.  States he will drink when he gets money but cannot afford it.  LFTs are WNL.  JOSR negative.  - Continue thiamine multivitamin, folic acid supplements.  - Encourage cessation   # History of drug use  - Patient declines recent use - recent UDS is negative         SUBJECTIVE:      Ger says that he had better day yesterday. He feels tired today. He denies suicidal and homicidal ides. He has paranoid delusions about FBI, but he tries to think less about it. He denies hallucinations. Appetite normal. Energy and concentration worse than yesterday.He says that head phones help him to think about music instead of FBI. He says that sometimes he does not think about anything.He is alert to self, place and month and year, not day and date. He says he will look at the calendar on the wall in the lounge. He does not plan to go to groups today. He says he wants to rest.He says he went to groups yesterday. He says that there is depression. He started taking Zoloft. He says that Cogentin helps with tremor . I see less of it, no orofacial movements.    From the past note:  I reviewed his admission from May 2023.  At that time he was admitted at [facility] as a voluntary patient, but he was under my commitment with Bryce.  He was diagnosed with schizoaffective disorder and he had agitation and verbal aggression, paranoid delusions,  disorganized thoughts, and refusing medications.  The staff from the nursing home reported that he refused medications and was getting more irritable, argumentative and agitated.  She described him as mean and making threats and throwing things.  He believed that medical providers were conspiring the government against him.  He was receiving special messages through the headphones.  After getting Invega Sustenna he is thought process was more organized and less tangential although he he eventually agreed to take long acting Invega Sustenna.  It says that Cogentin was decreased from 1 to 0.5 mg twice daily to minimize anticholinergic  burden.  It controlled tremor.  He was admitted to December 2022 when he had altered mental status, wandering the streets without gloves or socks.  Cogentin was increased for tremor, and Invega Sustenna IM given.  He was under commitment at that time    He was admitted to IP psychiatry at [facility] in June 2022.  911 was called because he was walking on the Friday.  He reported that his name is Ger Solares, that he was homeless and his life is in the eighth send that he was suicidal and that he had paranoid schizophrenia.  He reported hearing male and female voices.  He talked about hearing [employer] and walking the line like him.  At that time he was suicidal and reported [place] get hit by the car and die.He was living in  and he was followed by ACT team. At that time he started refusing Invega Sustena and insisted only on oral medications and decompensated on it.     Prior to that he was admitted inJuary 2022, May 2021,December 2020, April 2020    It says that In April 2020 he was admitted to [facility] he was on Haldol and Invega, including Invega Sustenna.  He was noncompliant with medications.  He believes that [facility] was after him.He was working with ACT team.  It was reported that he had baseline  delusions.He was diagnosed with schizophrenia schizoaffective disorder chronic with acute exacerbation.    In November 2019 he was admitted, diagnosed with EPS due to Haldol, discharged on oral Haldol, IM invega Sustena and Cogentine.    He had 4 admissions in 2019   He had 6 admissions in 2018  He had 2 admissions in 2016  He had 1 admission in 2013  He had 3 admissions in 2012            MEDICATIONS:     Current Facility-Administered Medications   Medication Dose Route Frequency Provider Last Rate Last Admin    - Psychiatric Emergency -   Other See Admin Instructions Marilyn Jaffe MD        benztropine (COGENTIN) tablet 1 mg  1 mg Oral BID Marilyn Jaffe MD   1 mg at 05/07/25 2101    folic acid (FOLVITE) tablet 1 mg  1 mg Oral Daily Marilyn Jaffe MD   1 mg at 05/07/25 0755    hydrocortisone (CORTAID) 1 % cream   Topical BID Marilyn Jaffe MD   Given at 05/07/25 2101    multivitamin w/minerals (THERA-VIT-M) tablet 1 tablet  1 tablet Oral Daily Marilyn Jaffe MD   1 tablet at 05/07/25 0755    paliperidone (INVEGA SUSTENNA) injection CHRISTOS 234 mg  234 mg Intramuscular Q30 Days Marilyn Jaffe MD   234 mg at 05/06/25 1205    paliperidone ER (INVEGA) 24 hr tablet 6 mg  6 mg Oral Daily Marilyn Jaffe MD   6 mg at 05/07/25 0755    sertraline (ZOLOFT) tablet 50 mg  50 mg Oral Daily Marilyn Jaffe MD   50 mg at 05/07/25 0755    thiamine (B-1) tablet 100 mg  100 mg Oral Daily Marilyn Jaffe MD   100 mg at 05/07/25 0755     Current Facility-Administered Medications   Medication Dose Route Frequency Provider Last Rate Last Admin    acetaminophen (TYLENOL) tablet 650 mg  650 mg Oral Q4H PRN Marilyn Jaffe MD   650 mg at 05/05/25 0851    alum & mag hydroxide-simethicone (MAALOX) suspension 30 mL  30 mL Oral Q4H PRN Marilyn Jaffe MD        carboxymethylcellulose PF (REFRESH PLUS) 0.5 % ophthalmic solution 1 drop  1 drop Both Eyes TID PRN Marilyn Jaffe MD        cyclobenzaprine (FLEXERIL) tablet 10 mg  10 mg Oral  "Q8H PRN Michael Melendrez PA-C   10 mg at 04/30/25 1632    gabapentin (NEURONTIN) capsule 100 mg  100 mg Oral Q6H PRN Marilyn Jaffe MD        lidocaine (LMX4) cream   Topical Q4H PRN Marilyn Jaffe MD   Given at 05/06/25 1224    OLANZapine (zyPREXA) tablet 2.5 mg  2.5 mg Oral TID PRN Marilyn Jaffe MD        Or    OLANZapine (zyPREXA) injection 2.5 mg  2.5 mg Intramuscular TID PRN Marilyn Jaffe MD        senna-docusate (SENOKOT-S/PERICOLACE) 8.6-50 MG per tablet 1 tablet  1 tablet Oral BID PRN Marilyn Jaffe MD        traZODone (DESYREL) tablet 50 mg  50 mg Oral At Bedtime PRN Marilyn Jaffe MD   50 mg at 04/28/25 0122       Medication adherence: Yes with oral, refusing IM  Medication side effects: c/o of pain in injection site when he gets IM neuroleptic   Benefit: Symptom reduction         ROS:   As per history of present illness, otherwise reminder of review of systems is negative for: General, eyes, ears, nose, throat, neck, respiratory, cardiovascular, gastrointestinal, genitourinary, meniscal skeletal, neurological, hematological, dermatological and endocrine system.         MENTAL STATUS EXAM:   /73   Pulse 56   Temp 97.6  F (36.4  C) (Oral)   Resp 16   Ht 1.753 m (5' 9\")   Wt 88.7 kg (195 lb 8.8 oz)   SpO2 98%   BMI 28.88 kg/m      Appearance:fair hygiene  Orientation:to self, place, partially in time   Speech:fluent  Language ability: intact  Thought process: concrete  Thought content: positive for paranoid delusions, denies hallucinations  Associations: Connected  Suicidal Ideation: denies   Homicidal Ideation:denies   Mood:  depressed  Affect: constricted   Intellectual functioning:average  Fund of Knowledge: consistent with education and experience   Attention/Concentration: decreased  Memory: affected by psychosis   Psychomotor Behavior: calm  Muscle Strength and Tone: no atrophy or involuntary movement  Gait and Station: steady  Insight and judgement:inadequate, under commitment with " "[employer]          LABS:   personally reviewed.   Lab Results   Component Value Date     04/21/2025     04/20/2025     02/22/2024     04/05/2020     12/10/2019     10/14/2019    CO2 30 04/21/2025    CO2 29 04/20/2025    CO2 30 02/22/2024    CO2 27 04/05/2020    CO2 34 12/10/2019    CO2 28 10/14/2019    BUN 11.6 04/21/2025    BUN 12.4 04/20/2025    BUN 14.6 02/22/2024    BUN 16 04/05/2020    BUN 15 12/10/2019    BUN 12 10/14/2019     No results found for: \"CKTOTAL\", \"CKMB\", \"TROPONINI\"  Lab Results   Component Value Date    WBC 5.4 04/21/2025    WBC 6.4 04/20/2025    WBC 5.8 02/22/2024    WBC 7.2 04/05/2020    WBC 5.9 12/10/2019    WBC 6.7 10/14/2019    HGB 14.0 04/21/2025    HGB 14.0 04/20/2025    HGB 13.7 02/22/2024    HGB 15.7 04/05/2020    HGB 15.6 12/10/2019    HGB 15.3 10/14/2019    HCT 41.1 04/21/2025    HCT 40.7 04/20/2025    HCT 41.1 02/22/2024    HCT 44.6 04/05/2020    HCT 45.3 12/10/2019    HCT 42.9 10/14/2019    MCV 92 04/21/2025    MCV 92 04/20/2025    MCV 92 02/22/2024    MCV 91 04/05/2020    MCV 93 12/10/2019    MCV 89 10/14/2019     04/21/2025     04/20/2025     02/22/2024     04/05/2020     12/10/2019     10/14/2019     Lab Results   Component Value Date    CHOL 148 04/19/2019    CHOL 158 04/01/2012    TRIG 136 04/19/2019    TRIG 107 04/01/2012    HDL 32 04/19/2019    HDL 29 04/01/2012      Latest Reference Range & Units 04/20/25 21:30 04/21/25 11:34   Sodium 135 - 145 mmol/L  136   Potassium 3.4 - 5.3 mmol/L  4.4   Chloride 98 - 107 mmol/L  100   Carbon Dioxide (CO2) 22 - 29 mmol/L  30 (H)   Urea Nitrogen 8.0 - 23.0 mg/dL  11.6   Creatinine 0.67 - 1.17 mg/dL  0.79   GFR Estimate >60 mL/min/1.73m2  >90   Calcium 8.8 - 10.4 mg/dL  8.7 (L)   Anion Gap 7 - 15 mmol/L  6 (L)   Magnesium 1.7 - 2.3 mg/dL 2.0    Phosphorus 2.5 - 4.5 mg/dL 3.0    Albumin 3.5 - 5.2 g/dL 3.9 4.0   Protein Total 6.4 - 8.3 g/dL 5.8 (L) 5.9 (L)   Alkaline " Phosphatase 40 - 150 U/L 88 76   ALT 0 - 70 U/L 11 12   AST 0 - 45 U/L 23 18   Bilirubin Direct 0.00 - 0.30 mg/dL 0.21    Bilirubin Total <=1.2 mg/dL 0.7 0.6   Glucose 70 - 99 mg/dL  102 (H)   T4 Free 0.90 - 1.70 ng/dL 1.40    Troponin T, High Sensitivity <=22 ng/L 8    TSH 0.30 - 4.20 uIU/mL 4.37 (H)       Latest Reference Range & Units 04/21/25 11:34   FIO2  0   Ph Venous 7.32 - 7.43  7.42   PCO2 Venous 40 - 50 mm Hg 48   PO2 Venous 25 - 47 mm Hg 43   O2 Sat, Venous 70.0 - 75.0 % 81.8 (H)   Bicarbonate Venous 21 - 28 mmol/L 31 (H)   Base Excess Venous -3.0 - 3.0 mmol/L 5.5 (H)   Oxyhemoglobin Venous 70 - 75 % 80 (H)   WBC 4.0 - 11.0 10e3/uL 5.4   Hemoglobin 13.3 - 17.7 g/dL 14.0   Hematocrit 40.0 - 53.0 % 41.1   Platelet Count 150 - 450 10e3/uL 170   RBC Count 4.40 - 5.90 10e6/uL 4.46   MCV 78 - 100 fL 92   MCH 26.5 - 33.0 pg 31.4   MCHC 31.5 - 36.5 g/dL 34.1   RDW 10.0 - 15.0 % 11.9      Latest Reference Range & Units 04/21/25 09:57   Color Urine Colorless, Straw, Light Yellow, Yellow  Light Yellow   Appearance Urine Clear  Clear   Glucose Urine Negative mg/dL Negative   Bilirubin Urine Negative  Negative   Ketones Urine Negative mg/dL Negative   Specific Gravity Urine 1.003 - 1.035  1.009   pH Urine 5.0 - 7.0  6.5   Protein Albumin Urine Negative mg/dL Negative   Urobilinogen mg/dL Normal mg/dL Normal   Nitrite Urine Negative  Negative   Blood Urine Negative  Negative   Leukocyte Esterase Urine Negative  Negative   Amphetamine Qual Urine Screen Negative  Screen Negative   Fentanyl Qual Urine Screen Negative  Screen Negative   Cocaine Urine Screen Negative  Screen Negative   Benzodiazepine Urine Screen Negative  Screen Negative   Opiates Qualitative Urine Screen Negative  Screen Negative   PCP Urine Screen Negative  Screen Negative   Cannabinoids Qual Urine Screen Negative  Screen Negative   Barbiturates Qual Urine Screen Negative  Screen Negative       CT HEAD WO IV CONT, CT TRAUMA CERVICAL SCREEN T4-C1    LOCATION: [facility]   DATE/TIME: 2/24/2023 1:49 AM   INDICATION: GCS 14 or 15 - head injury and age over 64 years.   COMPARISON: 12/15/2020. 12/29/2022.   TECHNIQUE:   1) Routine CT Head without IV contrast. Multiplanar reformats. Dose reduction techniques were used.   2) Routine CT Cervical Spine without IV contrast. Multiplanar reformats. Dose reduction techniques were used.   FINDINGS:   HEAD CT:   INTRACRANIAL CONTENTS: No intracranial hemorrhage, extraaxial collection, or mass effect.  No CT evidence of acute infarct. Normal parenchymal attenuation. Mild generalized volume loss. No hydrocephalus.        EKG 12-lead, tracing only 4/21/2025          Component  Ref Range & Units 4/21/25  9:06 AM 4/20/25  9:37 PM    Systolic Blood Pressure  mmHg      Diastolic Blood Pressure  mmHg      Ventricular Rate  BPM 72 69    Atrial Rate  BPM 72 249    VT Interval  ms 180     QRS Duration  ms 152 158    QT  ms 420 438    QTc  ms 459 469    P Axis  degrees 47 122    R AXIS  degrees -73 -76    T Axis  degrees 49 56    Interpretation ECG Sinus rhythm with Premature atrial complexes in a pattern of bigeminy  Right bundle branch block  Left anterior fascicular block  ** Bifascicular block **  Abnormal ECG  When compared with ECG of 20-Apr-2025 21:37,  Sinus rhythm has replaced Atrial fibrillation  Confirmed by MD REMY, ARI ([id number]) on 4/21/2025 11:07:20 AM               DIAGNOSIS:     Schizophrenia schizoaffective chronic with acute exacerbation   Tardive dyskinesia    Patient Active Problem List   Diagnosis    Schizoaffective disorder, bipolar type (H)    Schizophrenia (H)    Alcohol abuse    Dyslipidemia    Gastroesophageal reflux disease without esophagitis    Hypothyroidism due to acquired atrophy of thyroid    Tobacco use disorder    Schizoaffective disorder (H)    Schizophrenia, schizoaffective, chronic with acute exacerbation (H)    Atrial flutter, unspecified type (H)    Aortic root dilatation    Mood changes     Paranoid schizophrenia, chronic condition with acute exacerbation (H)    Tardive dyskinesia          PLAN:   Jack is  74 y/o male with chronic persistent mental illness. He has been on neuroleptics for many years. He was diagnosed with schizophrenia, schizoaffective disorder.  He is also diagnosed with EPS and cognitive decline.  He has had multiple psychiatric hospitalizations, commitments with [facility] order due to noncompliance with treatment.  Per Epic report there are more than 20 psychiatric admissions since 2012.   He had previous admissions due to not wanting to take Invega Sustenna and wanting to take only oral medications and during that time he decompensated and Invega Sustenna was restarted.  He has been on it for at least  6 years. He has impaired insight and judgement. He says he does not need medication to begin with and he does not want IM Sustena because FBI will have something on him.   He had   final commitment hearing on May 1 st. The court faxed commitment order and [facility] neuroleptic order, so IM Invega will be given today. He has mood component in his symptomatology which fits schizophrenia schizoaffective disorder.   He agrees  with the following recommendations:    Medications:  Invega Sustena 234 mg IM on 5/6/2025, then every 28 days  Zoloft 50 mg qam for depression  Cogentin 1 mg bid for EPS  Trazodone 50 mg nightly as needed for sleep  Multivitamins 1 pill daily  Thiamine 100 mg daily  Folic acid 1 mg daily  Tylenol 650 mg every 4 hours as needed for pain  Maalox 30 mg every 4 as needed for indigestion  Refresh Plus ophthalmic solution 0.5% 1 drop to both eyes 3 times daily as needed for dry eyes  Flexeril 10 mg 3 times daily as needed for muscle spasms  Neurontin 100 mg every 6 hours as needed for anxiety  Zyprexa 2.5 mg 3 times daily as needed for agitation  Senna docusate 1 pill twice daily as needed for constipation  We discussed side effects, benefits and alternative treatments and  patient agrees with with oral , but refuses IM Invega.   will collect collateral information and make outpatient referrals  Staff to provide emotional support and redirect as needed  Patient encouraged to attend groups  Lab results: Reviewed personally  Consultation: medicine consult completed     Risk Assessment: going through commitment due to non compliance with tx which led to psychotic break    Coordination of Care:   Patient seen, medical record reviewed, care coordinated with the team.    This document is created with the help of Dragon dictation system.  All grammatical/typing errors or context distortion are unintentional and inherent to software.    Marilyn Jaffe MD        Re-Certification I certify that the inpatient psychiatric facility services furnished since the previous certification were, and continue to be, medically necessary for, either, treatment which could reasonably be expected to improve the patient s condition or diagnostic study and that the hospital records indicate that the services furnished were, either, intensive treatment services, admission and related services necessary for diagnostic study, or equivalent services.     I certify that the patient continues to need, on a daily basis, active treatment furnished directly by or requiring the supervision of inpatient psychiatric facility personnel.   I estimate TBD days of hospitalization is necessary for proper treatment of the patient. My plans for post-hospital care for this patient are : Medications, appointments     Marilyn Jaffe MD

## 2025-05-08 NOTE — PLAN OF CARE
Goal Outcome Evaluation:    Plan of Care Reviewed With: patient        Ger was up ad shaila, spent most of the shift in his room. Ger did come out of his room for meals, he ate >75% of both breakfast and lunch. Ger did not attend groups this shift. Pt listened  to headphones most of the day.  Ger was med adherent. He reported feeling down today and didn't feel like coming out of his room very much.   Ger denied having suicidal ideation or self harm thoughts. Pt did appear to be preoccupied in thought when approached.

## 2025-05-08 NOTE — PLAN OF CARE
Team Note Due:  Thursday    Assessment/Intervention/Current Symtoms and Care Coordination:  Chart review and met with team, discussed pt progress, symptomology, and response to treatment.  Discussed the discharge plan and any potential impediments to discharge.    Pt admitted for eloping group home, medication non-compliance, and paranoid/delusional thinking. Petition for commitment was started in the ED. Pt arrived to the unit on a court hold. Pt is calm, but paranoid. Mostly isolative to his room. However, starting this morning, pt has been out of his room since the beginning of the day shift and reported to the provider that he wanted to start going to groups. Pt also told provider that he was grateful for getting the injection yesterday and feels less anxious. Provider this pt will be ready to discharge mid to end of next week.    Writer called and spoke to RAHUL Naqvi RN manager. Reported that pt will likely be ready to discharge next week. She reported that pt can come M, W or F of next week as that is when the supervisor is on site. Writer stated likely W or F or next week would be probable. She asked for medication orders to be sent to [facility] in [place] and for discharge summary once pt is ready to go. Writer stated that information would be given to provider and that I would follow up tomorrow after the team meeting.      Discharge Plan or Goal:  To be determined     Barriers to Discharge:  Patient requires further psychiatric stabilization due to current symptomology and medication management with possible adjustments    Referral Status:  To be determined      Legal Status:  Committed MI with [facility] (ITP)  [facility]: [place]  File Number: [id number]  Start Date: 5/2/2025  ITP medications: Invega, Zyprexa, Abilify, and Prolixin    Contacts (include PEG status):  Guerda Mann: ACT team    [phone]   [email or web address]    Pam Randle  [facility], APRN   [phone]  [email or web address]  ACT team psychiatry provider    Group Home: [facility]  RN: Donya Naqvi Phone: [phone]  Main line: [phone]     Upcoming Meetings and Dates/Important Information and next steps:  Follow up with Younger about Discharge next week

## 2025-05-08 NOTE — PROVIDER NOTIFICATION
"   05/08/25 1216   Individualization/Patient Specific Goals   Patient Personal Strengths expressive of needs;resourceful;resilient   Patient Vulnerabilities limited support system;lacks insight into illness;substance abuse/addiction   Interprofessional Rounds   Summary Pt admitted to unit from [facility]. Pt was brought to [facility] ED following pt reporting to a gas station to \"turn himself in.\" Pt had eloped from his group home four days before that. Pt had not been medication compliant.   Participants nursing;OT;CTC;psychiatrist   Behavioral Team Discussion   Participants Dr. Marilyn Jaffe MD; Jose Jackson, RN; Jewels Marin, OT; Jayna Funk, SW, CTC   Progress Pt just admitted to the unit. Pt refused to engage verbally in an assessment upon arrival on the unit. Pt likes to repond to questions in written form, on his own time. This appears to be volitional. [facility] ED staff petitioned for commitment with [facility]. Commitment petition was supported and pt is on a court hold as of 4/23/2025. Pt attended his preliminary hearing on 4/28/2025 and final hearing on 5/1/2025. Pt was committed MI with [facility]. Pt started an [facility] on 5/6/2025. Pt has been coming out of his room more and not as isolative. Pt still maintains some paranoia and delusional thinking about the FBI and [employer] being after him.   Anticipated length of stay 5-7 days   Continued Stay Criteria/Rationale medication management and evaluation of symptoms   Medical/Physical see H&P and provider notes   Precautions see below   Plan to return to group home and keep working with ACT team   Rationale for change in precautions or plan NA   Safety Plan per unit protocol   Anticipated Discharge Disposition group home     PRECAUTIONS AND SAFETY    Behavioral Orders   Procedures    Code 1 - Restrict to Unit    Elopement precautions    Fall precautions    Routine Programming     As clinically indicated    Sexual precautions     History of sexual " inappropriate comments    Status 15     Every 15 minutes.    Suicide precautions: Suicide Risk: HIGH; Clinical rationale to override score: Exhibiting Suicidal/self-harm behaviors or thoughts     Patients on Suicide Precautions should have a Combination Diet ordered that includes a Diet selection(s) AND a Behavioral Tray selection for Safe Tray - with utensils, or Safe Tray - NO utensils       Suicide Risk:   HIGH     Clinical rationale to override score::   Exhibiting Suicidal/self-harm behaviors or thoughts       Safety  Safety WDL: WDL  Patient Location: patient room, own  Observed Behavior: other (see comments) (Lying in bed)  Observed Behavior (Comment): laying in bed  Safety Measures: safety rounds completed  Suicidality: Status 15, Minimal personal belongings in room, Minimal furniture in room, Optimize communication / relationship to minimize opportunity for self-harm, Promote patient engagement with treatment process  Elopement Assessment: Statements about wanting to leave  Elopement Interventions: status 15, no shoes, room away from unit doors, distraction, engagement in unit activities  Sexual: status 15, private room  Additional Documentation:  (Fall)

## 2025-05-09 PROBLEM — R23.8 SKIN IRRITATION: Status: ACTIVE | Noted: 2025-05-09

## 2025-05-09 PROCEDURE — 250N000013 HC RX MED GY IP 250 OP 250 PS 637: Performed by: PSYCHIATRY & NEUROLOGY

## 2025-05-09 PROCEDURE — 124N000002 HC R&B MH UMMC

## 2025-05-09 PROCEDURE — 99232 SBSQ HOSP IP/OBS MODERATE 35: CPT | Performed by: PSYCHIATRY & NEUROLOGY

## 2025-05-09 PROCEDURE — 97150 GROUP THERAPEUTIC PROCEDURES: CPT | Mod: GO

## 2025-05-09 RX ADMIN — Medication 1 TABLET: at 08:13

## 2025-05-09 RX ADMIN — BENZTROPINE MESYLATE 1 MG: 1 TABLET ORAL at 08:14

## 2025-05-09 RX ADMIN — SERTRALINE HYDROCHLORIDE 50 MG: 25 TABLET ORAL at 08:14

## 2025-05-09 RX ADMIN — FOLIC ACID 1 MG: 1 TABLET ORAL at 08:13

## 2025-05-09 RX ADMIN — BENZTROPINE MESYLATE 1 MG: 1 TABLET ORAL at 20:19

## 2025-05-09 RX ADMIN — THIAMINE HCL TAB 100 MG 100 MG: 100 TAB at 08:13

## 2025-05-09 RX ADMIN — HYDROCORTISONE: 1 CREAM TOPICAL at 08:14

## 2025-05-09 ASSESSMENT — ACTIVITIES OF DAILY LIVING (ADL)
ADLS_ACUITY_SCORE: 41
DRESS: INDEPENDENT
ADLS_ACUITY_SCORE: 41
DRESS: INDEPENDENT
ADLS_ACUITY_SCORE: 41
ORAL_HYGIENE: INDEPENDENT
ADLS_ACUITY_SCORE: 41
HYGIENE/GROOMING: INDEPENDENT
LAUNDRY: UNABLE TO COMPLETE
ADLS_ACUITY_SCORE: 41
ADLS_ACUITY_SCORE: 41
HYGIENE/GROOMING: INDEPENDENT
ADLS_ACUITY_SCORE: 41
ADLS_ACUITY_SCORE: 41
ORAL_HYGIENE: INDEPENDENT
LAUNDRY: UNABLE TO COMPLETE
ADLS_ACUITY_SCORE: 41

## 2025-05-09 NOTE — PLAN OF CARE
BEH IP Unit Acuity Rating Score (UARS)  Patient is given one point for every criteria they meet.    CRITERIA SCORING   On a 72 hour hold, court hold, committed, stay of commitment, or revocation. 1    Patient LOS on BEH unit exceeds 20 days. 0  LOS: 16   Patient under guardianship, 55+, otherwise medically complex, or under age 11. 1   Suicide ideation without relief of precipitating factors. 0   Current plan for suicide. 0   Current plan for homicide. 0   Imminent risk or actual attempt to seriously harm another without relief of factors precipitating the attempt. 0   Severe dysfunction in daily living (ex: complete neglect for self care, extreme disruption in vegetative function, extreme deterioration in social interactions). 1   Recent (last 7 days) or current physical aggression in the ED or on unit. 0   Restraints or seclusion episode in past 72 hours. 0   Recent (last 7 days) or current verbal aggression, agitation, yelling, etc., while in the ED or unit. 0   Active psychosis. 1   Need for constant or near constant redirection (from leaving, from others, etc).  0   Intrusive or disruptive behaviors. 0   Patient requires 3 or more hours of individualized nursing care per 8-hour shift (i.e. for ADLs, meds, therapeutic interventions). 0   TOTAL 4

## 2025-05-09 NOTE — PLAN OF CARE
Rehab Group    Start time: 1015  End time: 1200  Patient time total: 70 minutes    attended partial group    #7 attended   Group Type: OT Clinic   Group Topic Covered: cognitive activities, coping skills, healthy leisure time, problem solving, and social skills   Group Session Detail:OT: Education on healthy activity engagement and creative hands-on endeavor (OT clinic) to increase concentration, focus, attention to task/detail, decision making, problem solving, frustration tolerance, task follow through, coping with stress, healthy leisure engagement, creative expression, and social engagement    Patient Response/Contribution:  cooperative with task, actively engaged, and withdrawn   Patient Detail:pt somewhat flat. Pt polite and appropriate during interactions though mainly quiet. Pt chose to engage in familiar creative task. Pt was provided with set up of supplies. Pt worked quietly for duration. Pt worked in a neat and tidy manner throughout.       [id number] OT Group (2 or more in attendance)      Patient Active Problem List   Diagnosis    Schizoaffective disorder, bipolar type (H)    Schizophrenia (H)    Alcohol abuse    Dyslipidemia    Gastroesophageal reflux disease without esophagitis    Hypothyroidism due to acquired atrophy of thyroid    Tobacco use disorder    Schizoaffective disorder (H)    Schizophrenia, schizoaffective, chronic with acute exacerbation (H)    Atrial flutter, unspecified type (H)    Aortic root dilatation    Mood changes    Paranoid schizophrenia, chronic condition with acute exacerbation (H)    Tardive dyskinesia

## 2025-05-09 NOTE — PROGRESS NOTES
PSYCHIATRY PROGRESS NOTE         DATE OF SERVICE:   5/9/2025         CHIEF COMPLAINT:      Paranoid delusions present, but less preoccupied with them, tolerates medications well          OBJECTIVE:     Nursing reports : slept  9 hours, takes medications, attends group     reports working on outpatient referrals, under commitment with [facility]    Medicine consult by REBECA Person on 4/24/2025  # Acute decompensated schizophrenia  # Eloped from care facility, noncompliance with medication.  # Anxiety, depression.  Eloped from group home 4/16, found to gas station 4/20 endorsing he is a fugitive and try and turn himself in.  Per report third elopement from group home. Was admitted to [facility] internal medicine 4/20. Now admitted to  for treatment of decompensated schizophrenia. PTA on trazodone, Cogentin, IM Invega sustenna.   - As managed per psychiatry   # Chronic intermittent chest pain  # Possible history A-fib  # Chronic bifascicular block, L anterior fascicular block, R bundle branch block,   # History of chronic HFpEF, grade 1 diastolic dysfunction  Of note, has complained of chronic intermittent chest pain sx> 1yr prior.  States symptoms are regular.  Does not have any as needed nitro per chart review. Recent ECG reviewed, NSR on arrival (with artifact) bifascicular block LVH, LAD.  However chart review indicates history of A-fib, no PTA DOAC.  History HFpEF most recent EF 55-60%.   - Follow up with PCP and establish care with cardiology team outpatient   # Incidental aortic root/aortic dilation  Aortic root 4.3 cm and ascending aorta dilation 4 cm.  No previous comparison.  - Outpatient follow-up TTE yearly monitoring, good BP control.  - Age-appropriate health maintenance on PCP visit.  # Hyponatremia, resolved  Presented with sodium of 131, given patient eloped likely related to poor oral intake.  Creatinine within normal limits.  Now sodium improved.  - Follow up with PCP for ongoing  monitoring   # Mild hypocalcemia, resolved  Minimal hypocalcemia at 8.6, albumin is 3.94 oh.  Not clinically relevant/causing above symptoms.  Increased p.o. intake.  - Monitor with PCP outpatient   # Mildly elevated TSH  Admit TSH 4.37 although free T4 is 1.40.  No PTA Synthroid/meds.  No convincing symptoms of hyperthyroidism, despite psych decompensation  - PCP recheck thyroid function in 10 -12 weeks.   # History of nicotine use   Patient reports he uses E-cig sometimes, vague about when.    - Cessation encouraged, NRT per primary team   # History of alcohol use disorder   Patient declines any recent use.  States he will drink when he gets money but cannot afford it.  LFTs are WNL.  JOSR negative.  - Continue thiamine multivitamin, folic acid supplements.  - Encourage cessation   # History of drug use  - Patient declines recent use - recent UDS is negative         SUBJECTIVE:      Ger says he thinks about FBI is after him and he thinks it is real, but he is less preoccupied with it. He denies hallucinations. Denies suicidal and homicidal thoughts. He understands that he needs to take non neuroleptic medications and oral Invega has been discontinued. He has no tongue tremor, no hand tremor  ,which improved on Cogentin, so it may be tongue akathisia and tremor instead of akathisia .He will go to groups.He says he has depression, but tolerates Zoloft, and it helps with depression and anxiety. He denies other medical problems. He relates well to staff and patients. He knows it is May 2025, does not know day and date.    From the past note:  I reviewed his admission from May 2023.  At that time he was admitted at [facility] as a voluntary patient, but he was under my commitment with [facility]ce.  He was diagnosed with schizoaffective disorder and he had agitation and verbal aggression, paranoid delusions, disorganized thoughts, and refusing medications.  The staff from the prison reported that he refused  medications and was getting more irritable, argumentative and agitated.  She described him as mean and making threats and throwing things.  He believed that medical providers were conspiring the government against him.  He was receiving special messages through the headphones.  After getting Invega Sustenna he is thought process was more organized and less tangential although he he eventually agreed to take long acting Invega Sustenna.  It says that Cogentin was decreased from 1 to 0.5 mg twice daily to minimize anticholinergic  burden.  It controlled tremor.  He was admitted to December 2022 when he had altered mental status, wandering the streets without gloves or socks.  Cogentin was increased for tremor, and Invega Sustenna IM given.  He was under commitment at that time    He was admitted to IP psychiatry at [facility] in June 2022.  911 was called because he was walking on the Friday.  He reported that his name is Ger Solares, that he was homeless and his life is in the eighth send that he was suicidal and that he had paranoid schizophrenia.  He reported hearing male and female voices.  He talked about hearing Tracab and walking the line like him.  At that time he was suicidal and reported [place] get hit by the car and die.He was living in  and he was followed by ACT team. At that time he started refusing Invega Sustena and insisted only on oral medications and decompensated on it.     Prior to that he was admitted inJuary 2022, May 2021,December 2020, April 2020    It says that In April 2020 he was admitted to [facility] he was on Haldol and Invega, including Invega Sustenna.  He was noncompliant with medications.  He believes that [facility] was after him.He was working with ACT team.  It was reported that he had baseline delusions.He was diagnosed with schizophrenia schizoaffective disorder chronic with acute exacerbation.    In  November 2019 he was admitted, diagnosed with EPS due to Haldol, discharged on oral Haldol, IM invega Sustena and Cogentine.    He had 4 admissions in 2019   He had 6 admissions in 2018  He had 2 admissions in 2016  He had 1 admission in 2013  He had 3 admissions in 2012            MEDICATIONS:     Current Facility-Administered Medications   Medication Dose Route Frequency Provider Last Rate Last Admin    benztropine (COGENTIN) tablet 1 mg  1 mg Oral BID Marilyn Jaffe MD   1 mg at 05/09/25 0814    folic acid (FOLVITE) tablet 1 mg  1 mg Oral Daily Marilyn Jaffe MD   1 mg at 05/09/25 0813    hydrocortisone (CORTAID) 1 % cream   Topical BID Marilyn Jaffe MD   Given at 05/09/25 0814    multivitamin w/minerals (THERA-VIT-M) tablet 1 tablet  1 tablet Oral Daily Marilyn aJffe MD   1 tablet at 05/09/25 0813    paliperidone (INVEGA SUSTENNA) injection CHRISTOS 234 mg  234 mg Intramuscular Q30 Days Marilyn Jaffe MD   234 mg at 05/06/25 1205    sertraline (ZOLOFT) tablet 50 mg  50 mg Oral Daily Marilyn Jaffe MD   50 mg at 05/09/25 0814    thiamine (B-1) tablet 100 mg  100 mg Oral Daily Marilyn Jaffe MD   100 mg at 05/09/25 0813     Current Facility-Administered Medications   Medication Dose Route Frequency Provider Last Rate Last Admin    acetaminophen (TYLENOL) tablet 650 mg  650 mg Oral Q4H PRN Marilyn Jaffe MD   650 mg at 05/05/25 0851    alum & mag hydroxide-simethicone (MAALOX) suspension 30 mL  30 mL Oral Q4H PRN Marilyn Jaffe MD        carboxymethylcellulose PF (REFRESH PLUS) 0.5 % ophthalmic solution 1 drop  1 drop Both Eyes TID PRN Marilyn Jaffe MD        cyclobenzaprine (FLEXERIL) tablet 10 mg  10 mg Oral Q8H PRN Michael Melendrez PA-C   10 mg at 04/30/25 1632    gabapentin (NEURONTIN) capsule 100 mg  100 mg Oral Q6H PRN Marilyn Jaffe MD        lidocaine (LMX4) cream   Topical Q4H PRN Marilyn Jaffe MD   Given at 05/06/25 1224    OLANZapine (zyPREXA) tablet 2.5 mg  2.5 mg Oral TID PRN Marilyn Jaffe MD  "       Or    OLANZapine (zyPREXA) injection 2.5 mg  2.5 mg Intramuscular TID PRN Marilyn Jaffe MD        senna-docusate (SENOKOT-S/PERICOLACE) 8.6-50 MG per tablet 1 tablet  1 tablet Oral BID PRN Marilyn Jaffe MD        traZODone (DESYREL) tablet 50 mg  50 mg Oral At Bedtime PRN Marilyn Jaffe MD   50 mg at 04/28/25 0122       Medication adherence: Yes with oral, refusing IM  Medication side effects: c/o of pain in injection site when he gets IM neuroleptic   Benefit: Symptom reduction         ROS:   As per history of present illness, otherwise reminder of review of systems is negative for: General, eyes, ears, nose, throat, neck, respiratory, cardiovascular, gastrointestinal, genitourinary, meniscal skeletal, neurological, hematological, dermatological and endocrine system.         MENTAL STATUS EXAM:   BP 93/60 (BP Location: Left arm)   Pulse 66   Temp 97.7  F (36.5  C) (Temporal)   Resp 16   Ht 1.753 m (5' 9\")   Wt 88.7 kg (195 lb 8.8 oz)   SpO2 95%   BMI 28.88 kg/m      Appearance:fair hygiene  Orientation:to self, place, partially in time   Speech:fluent  Language ability: intact  Thought process: concrete  Thought content: positive for paranoid delusions, denies hallucinations  Associations: Connected  Suicidal Ideation: denies   Homicidal Ideation:denies   Mood:  depressed  Affect: constricted   Intellectual functioning:average  Fund of Knowledge: consistent with education and experience   Attention/Concentration: decreased from baseline, but improving since admission  Memory: improving    Psychomotor Behavior: calm  Muscle Strength and Tone: no atrophy or involuntary movement  Gait and Station: steady  Insight and judgement:inadequate          LABS:   personally reviewed.   Lab Results   Component Value Date     04/21/2025     04/20/2025     02/22/2024     04/05/2020     12/10/2019     10/14/2019    CO2 30 04/21/2025    CO2 29 04/20/2025    CO2 30 02/22/2024    " "CO2 27 04/05/2020    CO2 34 12/10/2019    CO2 28 10/14/2019    BUN 11.6 04/21/2025    BUN 12.4 04/20/2025    BUN 14.6 02/22/2024    BUN 16 04/05/2020    BUN 15 12/10/2019    BUN 12 10/14/2019     No results found for: \"CKTOTAL\", \"CKMB\", \"TROPONINI\"  Lab Results   Component Value Date    WBC 5.4 04/21/2025    WBC 6.4 04/20/2025    WBC 5.8 02/22/2024    WBC 7.2 04/05/2020    WBC 5.9 12/10/2019    WBC 6.7 10/14/2019    HGB 14.0 04/21/2025    HGB 14.0 04/20/2025    HGB 13.7 02/22/2024    HGB 15.7 04/05/2020    HGB 15.6 12/10/2019    HGB 15.3 10/14/2019    HCT 41.1 04/21/2025    HCT 40.7 04/20/2025    HCT 41.1 02/22/2024    HCT 44.6 04/05/2020    HCT 45.3 12/10/2019    HCT 42.9 10/14/2019    MCV 92 04/21/2025    MCV 92 04/20/2025    MCV 92 02/22/2024    MCV 91 04/05/2020    MCV 93 12/10/2019    MCV 89 10/14/2019     04/21/2025     04/20/2025     02/22/2024     04/05/2020     12/10/2019     10/14/2019     Lab Results   Component Value Date    CHOL 148 04/19/2019    CHOL 158 04/01/2012    TRIG 136 04/19/2019    TRIG 107 04/01/2012    HDL 32 04/19/2019    HDL 29 04/01/2012      Latest Reference Range & Units 04/20/25 21:30 04/21/25 11:34   Sodium 135 - 145 mmol/L  136   Potassium 3.4 - 5.3 mmol/L  4.4   Chloride 98 - 107 mmol/L  100   Carbon Dioxide (CO2) 22 - 29 mmol/L  30 (H)   Urea Nitrogen 8.0 - 23.0 mg/dL  11.6   Creatinine 0.67 - 1.17 mg/dL  0.79   GFR Estimate >60 mL/min/1.73m2  >90   Calcium 8.8 - 10.4 mg/dL  8.7 (L)   Anion Gap 7 - 15 mmol/L  6 (L)   Magnesium 1.7 - 2.3 mg/dL 2.0    Phosphorus 2.5 - 4.5 mg/dL 3.0    Albumin 3.5 - 5.2 g/dL 3.9 4.0   Protein Total 6.4 - 8.3 g/dL 5.8 (L) 5.9 (L)   Alkaline Phosphatase 40 - 150 U/L 88 76   ALT 0 - 70 U/L 11 12   AST 0 - 45 U/L 23 18   Bilirubin Direct 0.00 - 0.30 mg/dL 0.21    Bilirubin Total <=1.2 mg/dL 0.7 0.6   Glucose 70 - 99 mg/dL  102 (H)   T4 Free 0.90 - 1.70 ng/dL 1.40    Troponin T, High Sensitivity <=22 ng/L 8    TSH " 0.30 - 4.20 uIU/mL 4.37 (H)       Latest Reference Range & Units 04/21/25 11:34   FIO2  0   Ph Venous 7.32 - 7.43  7.42   PCO2 Venous 40 - 50 mm Hg 48   PO2 Venous 25 - 47 mm Hg 43   O2 Sat, Venous 70.0 - 75.0 % 81.8 (H)   Bicarbonate Venous 21 - 28 mmol/L 31 (H)   Base Excess Venous -3.0 - 3.0 mmol/L 5.5 (H)   Oxyhemoglobin Venous 70 - 75 % 80 (H)   WBC 4.0 - 11.0 10e3/uL 5.4   Hemoglobin 13.3 - 17.7 g/dL 14.0   Hematocrit 40.0 - 53.0 % 41.1   Platelet Count 150 - 450 10e3/uL 170   RBC Count 4.40 - 5.90 10e6/uL 4.46   MCV 78 - 100 fL 92   MCH 26.5 - 33.0 pg 31.4   MCHC 31.5 - 36.5 g/dL 34.1   RDW 10.0 - 15.0 % 11.9      Latest Reference Range & Units 04/21/25 09:57   Color Urine Colorless, Straw, Light Yellow, Yellow  Light Yellow   Appearance Urine Clear  Clear   Glucose Urine Negative mg/dL Negative   Bilirubin Urine Negative  Negative   Ketones Urine Negative mg/dL Negative   Specific Gravity Urine 1.003 - 1.035  1.009   pH Urine 5.0 - 7.0  6.5   Protein Albumin Urine Negative mg/dL Negative   Urobilinogen mg/dL Normal mg/dL Normal   Nitrite Urine Negative  Negative   Blood Urine Negative  Negative   Leukocyte Esterase Urine Negative  Negative   Amphetamine Qual Urine Screen Negative  Screen Negative   Fentanyl Qual Urine Screen Negative  Screen Negative   Cocaine Urine Screen Negative  Screen Negative   Benzodiazepine Urine Screen Negative  Screen Negative   Opiates Qualitative Urine Screen Negative  Screen Negative   PCP Urine Screen Negative  Screen Negative   Cannabinoids Qual Urine Screen Negative  Screen Negative   Barbiturates Qual Urine Screen Negative  Screen Negative       CT HEAD WO IV CONT, CT TRAUMA CERVICAL SCREEN T4-C1   LOCATION: [facility]   DATE/TIME: 2/24/2023 1:49 AM   INDICATION: GCS 14 or 15 - head injury and age over 64 years.   COMPARISON: 12/15/2020. 12/29/2022.   TECHNIQUE:   1) Routine CT Head without IV contrast. Multiplanar reformats. Dose reduction techniques were used.   2)  Routine CT Cervical Spine without IV contrast. Multiplanar reformats. Dose reduction techniques were used.   FINDINGS:   HEAD CT:   INTRACRANIAL CONTENTS: No intracranial hemorrhage, extraaxial collection, or mass effect.  No CT evidence of acute infarct. Normal parenchymal attenuation. Mild generalized volume loss. No hydrocephalus.        EKG 12-lead, tracing only 4/21/2025          Component  Ref Range & Units 4/21/25  9:06 AM 4/20/25  9:37 PM    Systolic Blood Pressure  mmHg      Diastolic Blood Pressure  mmHg      Ventricular Rate  BPM 72 69    Atrial Rate  BPM 72 249    WY Interval  ms 180     QRS Duration  ms 152 158    QT  ms 420 438    QTc  ms 459 469    P Axis  degrees 47 122    R AXIS  degrees -73 -76    T Axis  degrees 49 56    Interpretation ECG Sinus rhythm with Premature atrial complexes in a pattern of bigeminy  Right bundle branch block  Left anterior fascicular block  ** Bifascicular block **  Abnormal ECG  When compared with ECG of 20-Apr-2025 21:37,  Sinus rhythm has replaced Atrial fibrillation  Confirmed by MD REMY, ARI ([id number]) on 4/21/2025 11:07:20 AM               DIAGNOSIS:     Schizophrenia schizoaffective chronic with acute exacerbation   Extrapyramidal symptoms  Skin irritation    Patient Active Problem List   Diagnosis    Schizoaffective disorder, bipolar type (H)    Schizophrenia (H)    Alcohol abuse    Dyslipidemia    Gastroesophageal reflux disease without esophagitis    Hypothyroidism due to acquired atrophy of thyroid    Tobacco use disorder    Schizoaffective disorder (H)    Schizophrenia, schizoaffective, chronic with acute exacerbation (H)    Atrial flutter, unspecified type (H)    Aortic root dilatation    Mood changes    Paranoid schizophrenia, chronic condition with acute exacerbation (H)    Tardive dyskinesia          PLAN:   Ger is  76 y/o male with chronic persistent mental illness. He has been on neuroleptics for many years. He was diagnosed with schizophrenia,  schizoaffective disorder.  He is also diagnosed with EPS and cognitive decline.  He has had multiple psychiatric hospitalizations, commitments with [facility] order due to noncompliance with treatment.  Per Epic report there are more than 20 psychiatric admissions since 2012.   He had previous admissions due to not wanting to take Invega Sustenna and wanting to take only oral medications and during that time he decompensated and Invega Sustenna was restarted.  He has been on it for at least  6 years. He has impaired insight and judgement. He says he does not need medication to begin with and he does not want IM Sustena because FBI will have something on him.   He had   final commitment hearing on May 1 st. The court faxed commitment order and [facility] neuroleptic order, so IM Invega given on 5/6/25. He has mood component in his symptomatology which fits schizophrenia schizoaffective disorder.   He agrees  with the following recommendations:    Medications:   Invega Sustena 234 mg IM on 5/6/2025, then every 28 days   Zoloft 50 mg qam for depression  Xylocaine cream to injection site to numb the skin and decrease the pain  Cogentin 1 mg bid for EPS  Trazodone 50 mg nightly as needed for sleep  Multivitamins 1 pill daily  Thiamine 100 mg daily  Folic acid 1 mg daily  Tylenol 650 mg every 4 hours as needed for pain  Maalox 30 mg every 4 as needed for indigestion  Refresh Plus ophthalmic solution 0.5% 1 drop to both eyes 3 times daily as needed for dry eyes  Flexeril 10 mg 3 times daily as needed for muscle spasms  Neurontin 100 mg every 6 hours as needed for anxiety  Zyprexa 2.5 mg 3 times daily as needed for agitation  Senna docusate 1 pill twice daily as needed for constipation  We discussed side effects, benefits and alternative treatments and patient agrees with with oral , but refuses IM Invega.   will collect collateral information and make outpatient referrals  Staff to provide emotional support and  redirect as needed  Patient encouraged to attend groups  Lab results: Reviewed personally  Consultation: medicine consult completed     Risk Assessment: going through commitment due to non compliance with tx which led to psychotic break    Coordination of Care:   Patient seen, medical record reviewed, care coordinated with the team.    This document is created with the help of Dragon dictation system.  All grammatical/typing errors or context distortion are unintentional and inherent to software.    Marilyn Jaffe MD        Re-Certification I certify that the inpatient psychiatric facility services furnished since the previous certification were, and continue to be, medically necessary for, either, treatment which could reasonably be expected to improve the patient s condition or diagnostic study and that the hospital records indicate that the services furnished were, either, intensive treatment services, admission and related services necessary for diagnostic study, or equivalent services.     I certify that the patient continues to need, on a daily basis, active treatment furnished directly by or requiring the supervision of inpatient psychiatric facility personnel.   I estimate TBD days of hospitalization is necessary for proper treatment of the patient. My plans for post-hospital care for this patient are : Medications, appointments     Marilyn Jaffe MD

## 2025-05-09 NOTE — PLAN OF CARE
Problem: Sleep Disturbance  Goal: Adequate Sleep/Rest  Outcome: Progressing   Goal Outcome Evaluation:          Received asleep, using the headphone to listen to music for comfort.Pt has a no roommate order due to being Psychotic, paranoid and delusional, on observed this shift.No behavioral or safety concerns tonight.All precautions in place.     Slept for 9 hours

## 2025-05-09 NOTE — PLAN OF CARE
Patient was mostly isolative in the room, sleeping or listening to music on headphones. He denied pain, SI, SIB, and all other mental health symptoms and contracted for safety. He ate about 25 % of his supper with adequate fluid intake. He was complaint with schedule medications, and his vital signs were stable.    Problem: Psychotic Signs/Symptoms  Goal: Improved Mood Symptoms (Psychotic Signs/Symptoms)  Outcome: Progressing  Intervention: Optimize Emotion and Mood  Recent Flowsheet Documentation  Taken 5/8/2025 3081 by Dina Vora, RN  Supportive Measures: active listening utilized   Goal Outcome Evaluation:    Plan of Care Reviewed With: patient

## 2025-05-09 NOTE — PLAN OF CARE
"  Problem: Psychotic Signs/Symptoms  Goal: Improved Behavioral Control (Psychotic Signs/Symptoms)  Outcome: Progressing   Goal Outcome Evaluation:    Plan of Care Reviewed With: patient      Patient has been calm, isolative and withdrawn. He reluctantly took morning medications stating \"They gave me an injection, I shouldn't be taking this medication\". Pt  did take all of his scheduled medications with encouragement. Pt denies anxiety, depression and SI/SIB.                       "

## 2025-05-09 NOTE — PLAN OF CARE
Team Note Due:  Thursday    Assessment/Intervention/Current Symtoms and Care Coordination:  Chart review and met with team, discussed pt progress, symptomology, and response to treatment.  Discussed the discharge plan and any potential impediments to discharge.    Pt admitted for eloping group home, medication non-compliance, and paranoid/delusional thinking. Petition for commitment was started in the ED. Pt arrived to the unit on a court hold. Pt is now under commitment with [facility]. Pt started an [facility] this week. Plan is for pt to return to his group next week Wednesday.    Writer called and spoke to MACKENZIE Naqvi at pt's . Confirmed discharge for Wednesday and that [facility]. She also confirmed that they don't need to prescriptions filled, just sent to the pharmacy. She also stated time of the day didn't matter. Writer stated I would follow up Tuesday of next week to confirm everything was set for discharge.     Writer emailed [facility] team  to inform her of plan for discharge on Wednesday. Inquired if the ACT team wanted to do transportation.    Writer updated pt with plan for discharge next week.    Discharge Plan or Goal:  To be determined     Barriers to Discharge:  Patient requires further psychiatric stabilization due to current symptomology and medication management with possible adjustments    Referral Status:  To be determined      Legal Status:  Committed MI with Bryce (ITP)  [facility]: [place]  File Number: [id number]  Start Date: 5/2/2025  ITP medications: Invega, Zyprexa, Abilify, and Prolixin    Contacts (include PEG status):  [facility] Mann: ACT team    [phone]   [email or web address]    Pam Randle, DNP, APRN   [phone]  [email or web address]  ACT team psychiatry provider    Group Home: [facility]  RN: Donya Naqvi Phone: [phone]  Main line: [phone]     Upcoming  Meetings and Dates/Important Information and next steps:  Call [facility] on Tuesday 5/13 to confirm discharge and set up time  PD to [facility] and COS to [facility]

## 2025-05-10 PROCEDURE — 124N000002 HC R&B MH UMMC

## 2025-05-10 PROCEDURE — 250N000013 HC RX MED GY IP 250 OP 250 PS 637: Performed by: PSYCHIATRY & NEUROLOGY

## 2025-05-10 RX ADMIN — HYDROCORTISONE: 1 CREAM TOPICAL at 08:25

## 2025-05-10 RX ADMIN — BENZTROPINE MESYLATE 1 MG: 1 TABLET ORAL at 21:03

## 2025-05-10 RX ADMIN — THIAMINE HCL TAB 100 MG 100 MG: 100 TAB at 08:22

## 2025-05-10 RX ADMIN — FOLIC ACID 1 MG: 1 TABLET ORAL at 08:22

## 2025-05-10 RX ADMIN — BENZTROPINE MESYLATE 1 MG: 1 TABLET ORAL at 08:22

## 2025-05-10 RX ADMIN — SERTRALINE HYDROCHLORIDE 50 MG: 25 TABLET ORAL at 08:22

## 2025-05-10 RX ADMIN — Medication 1 TABLET: at 08:22

## 2025-05-10 ASSESSMENT — ACTIVITIES OF DAILY LIVING (ADL)
ORAL_HYGIENE: INDEPENDENT
ADLS_ACUITY_SCORE: 41
DRESS: INDEPENDENT;SCRUBS (BEHAVIORAL HEALTH)
ADLS_ACUITY_SCORE: 41
HYGIENE/GROOMING: INDEPENDENT
ADLS_ACUITY_SCORE: 41
LAUNDRY: UNABLE TO COMPLETE
ADLS_ACUITY_SCORE: 41
HYGIENE/GROOMING: INDEPENDENT
LAUNDRY: UNABLE TO COMPLETE
ORAL_HYGIENE: INDEPENDENT
ADLS_ACUITY_SCORE: 41
DRESS: INDEPENDENT;SCRUBS (BEHAVIORAL HEALTH)

## 2025-05-10 NOTE — PLAN OF CARE
Problem: Psychotic Signs/Symptoms  Goal: Improved Behavioral Control (Psychotic Signs/Symptoms)  Outcome: Progressing     Problem: Depressive Signs/Symptoms  Goal: Optimized Energy Level (Depressive Signs/Symptoms)  Outcome: Progressing  Intervention: Optimize Energy Level    Goal Outcome Evaluation:    Plan of Care Reviewed With: patient      Patient has been isolative and withdrawn in the room but visible at the unit for meals.  Flat affect with a calm mood, endorsed anxiety and depression saying it's just there, denied SI/HI/SIB and hallucinations.  Ate 100% of his meals with good fluid intake, was pleasant, cooperative, and medication compliant.

## 2025-05-10 NOTE — PLAN OF CARE
Problem: Psychotic Signs/Symptoms  Goal: Improved Sleep (Psychotic Signs/Symptoms)  Outcome: Progressing   Goal Outcome Evaluation:               Slept well for 8.75 hours  Pt utilizing the headphone to listen to music as a form of relaxation.Pt has a no roommate order due to disorganized behavior, Psychosis and delusional thoughts. No behavioral or safety concerns tonight

## 2025-05-10 NOTE — PLAN OF CARE
Patient presents with a flat affect and a calm mood, but withdrawn to self. He denied pain and all mental health symptoms, including anxiety, depression, SI/SIB, and hallucinations. He contracted for safety. His blood pressure was 93/60 at the start of the shift, and with encouragement to increase fluid intake, the recheck was  100/65. He ate 100 % of his supper and was compliant with his medications.       Problem: Depressive Signs/Symptoms  Goal: Improved Mood Symptoms (Depressive Signs/Symptoms)  Intervention: Promote Mood Improvement  Recent Flowsheet Documentation  Taken 5/9/2025 1638 by Dina Vora RN  Supportive Measures: relaxation techniques promoted  Trust Relationship/Rapport:   care explained   choices provided   emotional support provided   empathic listening provided   questions answered   questions encouraged   reassurance provided   thoughts/feelings acknowledged     Problem: Psychotic Signs/Symptoms  Goal: Improved Mood Symptoms (Psychotic Signs/Symptoms)  Intervention: Optimize Emotion and Mood  Recent Flowsheet Documentation  Taken 5/9/2025 1638 by Dina Vora RN  Supportive Measures: relaxation techniques promoted   Goal Outcome Evaluation:    Plan of Care Reviewed With: patient

## 2025-05-11 LAB
ANION GAP SERPL CALCULATED.3IONS-SCNC: 12 MMOL/L (ref 7–15)
ATRIAL RATE - MUSE: 59 BPM
BUN SERPL-MCNC: 16.3 MG/DL (ref 8–23)
CALCIUM SERPL-MCNC: 9.5 MG/DL (ref 8.8–10.4)
CHLORIDE SERPL-SCNC: 98 MMOL/L (ref 98–107)
CREAT SERPL-MCNC: 0.82 MG/DL (ref 0.67–1.17)
DIASTOLIC BLOOD PRESSURE - MUSE: NORMAL MMHG
EGFRCR SERPLBLD CKD-EPI 2021: >90 ML/MIN/1.73M2
GLUCOSE SERPL-MCNC: 97 MG/DL (ref 70–99)
HCO3 SERPL-SCNC: 29 MMOL/L (ref 22–29)
HOLD SPECIMEN: NORMAL
INTERPRETATION ECG - MUSE: NORMAL
MAGNESIUM SERPL-MCNC: 2 MG/DL (ref 1.7–2.3)
P AXIS - MUSE: 62 DEGREES
POTASSIUM SERPL-SCNC: 4.1 MMOL/L (ref 3.4–5.3)
PR INTERVAL - MUSE: 178 MS
QRS DURATION - MUSE: 154 MS
QT - MUSE: 444 MS
QTC - MUSE: 439 MS
R AXIS - MUSE: -73 DEGREES
SODIUM SERPL-SCNC: 139 MMOL/L (ref 135–145)
SYSTOLIC BLOOD PRESSURE - MUSE: NORMAL MMHG
T AXIS - MUSE: 12 DEGREES
VENTRICULAR RATE- MUSE: 59 BPM

## 2025-05-11 PROCEDURE — 83735 ASSAY OF MAGNESIUM: CPT

## 2025-05-11 PROCEDURE — 36415 COLL VENOUS BLD VENIPUNCTURE: CPT

## 2025-05-11 PROCEDURE — 93010 ELECTROCARDIOGRAM REPORT: CPT | Performed by: INTERNAL MEDICINE

## 2025-05-11 PROCEDURE — 250N000013 HC RX MED GY IP 250 OP 250 PS 637: Performed by: PSYCHIATRY & NEUROLOGY

## 2025-05-11 PROCEDURE — 97150 GROUP THERAPEUTIC PROCEDURES: CPT | Mod: GO

## 2025-05-11 PROCEDURE — 99231 SBSQ HOSP IP/OBS SF/LOW 25: CPT

## 2025-05-11 PROCEDURE — 124N000002 HC R&B MH UMMC

## 2025-05-11 PROCEDURE — 80048 BASIC METABOLIC PNL TOTAL CA: CPT

## 2025-05-11 RX ADMIN — THIAMINE HCL TAB 100 MG 100 MG: 100 TAB at 08:55

## 2025-05-11 RX ADMIN — FOLIC ACID 1 MG: 1 TABLET ORAL at 08:55

## 2025-05-11 RX ADMIN — Medication 1 TABLET: at 08:55

## 2025-05-11 RX ADMIN — BENZTROPINE MESYLATE 1 MG: 1 TABLET ORAL at 21:00

## 2025-05-11 RX ADMIN — SERTRALINE HYDROCHLORIDE 50 MG: 25 TABLET ORAL at 08:54

## 2025-05-11 RX ADMIN — BENZTROPINE MESYLATE 1 MG: 1 TABLET ORAL at 08:55

## 2025-05-11 ASSESSMENT — ACTIVITIES OF DAILY LIVING (ADL)
ADLS_ACUITY_SCORE: 41
LAUNDRY: UNABLE TO COMPLETE
ADLS_ACUITY_SCORE: 41
HYGIENE/GROOMING: INDEPENDENT
ADLS_ACUITY_SCORE: 41
ORAL_HYGIENE: INDEPENDENT
ADLS_ACUITY_SCORE: 41
HYGIENE/GROOMING: INDEPENDENT
ADLS_ACUITY_SCORE: 41
DRESS: INDEPENDENT
LAUNDRY: UNABLE TO COMPLETE
ADLS_ACUITY_SCORE: 41
ADLS_ACUITY_SCORE: 41
DRESS: SCRUBS (BEHAVIORAL HEALTH);INDEPENDENT
ORAL_HYGIENE: INDEPENDENT
ADLS_ACUITY_SCORE: 41

## 2025-05-11 NOTE — PLAN OF CARE
Patient was isolated to the room and out for meals. Patient reports feeling better and ready to go home on Wednesday. He endorsed both anxiety and depression as low. He denied pain, SI/HI/SIB, hallucinations, and contracted for safety. Vital signs were WNL. He ate 100 % of his supper.    Problem: Psychotic Signs/Symptoms  Goal: Improved Mood Symptoms (Psychotic Signs/Symptoms)  Intervention: Optimize Emotion and Mood  Recent Flowsheet Documentation  Taken 5/10/2025 8953 by Dina Vora, RN  Supportive Measures:   active listening utilized   verbalization of feelings encouraged   Goal Outcome Evaluation:    Plan of Care Reviewed With: patient

## 2025-05-11 NOTE — PLAN OF CARE
Rehab Group    Start time: 1015  End time: 1115  Patient time total: 45 minutes    attended partial group    #5 attended   Group Type: OT Clinic   Group Topic Covered: cognitive activities, coping skills, healthy leisure time, problem solving, and social skills   Group Session Detail:OT: Education on healthy activity engagement and creative hands-on endeavor (OT clinic) to increase concentration, focus, attention to task/detail, decision making, problem solving, frustration tolerance, task follow through, coping with stress, healthy leisure engagement, creative expression, and social engagement    Patient Response/Contribution:  cooperative with task, safe use of materials/supplies, and actively engaged   Patient Detail:pt calm, somewhat flat. Pt does brighten some with social engagement. Pt chose to engage in familiar cognitive task. Pt was provided with set up of supplies. Pt demonstrated excellent recall for task instructions. Pt worked in a slow and intentional manner throughout group engagement. Pt mainly quiet, does not engage with peers though will respond when others initiate.      [id number] OT Group (2 or more in attendance)      Patient Active Problem List   Diagnosis    Schizoaffective disorder, bipolar type (H)    Schizophrenia (H)    Alcohol abuse    Dyslipidemia    Gastroesophageal reflux disease without esophagitis    Hypothyroidism due to acquired atrophy of thyroid    Tobacco use disorder    Schizoaffective disorder (H)    Schizophrenia, schizoaffective, chronic with acute exacerbation (H)    Atrial flutter, unspecified type (H)    Aortic root dilatation    Mood changes    Paranoid schizophrenia, chronic condition with acute exacerbation (H)    Tardive dyskinesia    Skin irritation

## 2025-05-11 NOTE — PLAN OF CARE
Problem: Psychotic Signs/Symptoms  Goal: Improved Behavioral Control (Psychotic Signs/Symptoms)  Outcome: Progressing   Goal Outcome Evaluation:    Plan of Care Reviewed With: patient      Patient has been calm and mostly isolative to his room. He came out for meals and to attend some of the group activities. Pt was noted to have low pulse in the 30's, he denies any symptoms of bradycardia. Internal medicine was notified, EKG and lab work ordered and completed. Pt's pulse and BP improved with recheck. Pt denies anxiety, depression and SI/SIB.

## 2025-05-11 NOTE — PLAN OF CARE
Problem: Psychotic Signs/Symptoms  Goal: Improved Sleep (Psychotic Signs/Symptoms)  Outcome: Progressing   Goal Outcome Evaluation:       Received asleep, breathing unlabored and using the headphone to listen to music for comfort.  Pt has a no roommate order due to disorganized behavior, Psychosis and delusional thoughts. No behavioral or safety concerns tonight   Slept for 7 hours

## 2025-05-11 NOTE — PROGRESS NOTES
Brief Medicine Note:    In brief, Ger Bashir is a 75 year old male admitted on 4/23/2025. He has a past medical history of schizophrenia, heart failure with preserved EF, alcohol use disorder, anxiety, depression, substance use admitted on 4/24/2025 to  for decompensated schizophrenia. He was recently admitted to [facility] after he eloped from group home with psychosis and was found at gas station stating that he was a fugitive. Internal medicine was routinely consulted for medical comanagement.     # Asymptomatic bradycardia   # Sinus arrhythmia  # Known and chronic right bundle branch block, left anterior fascicular block   # Possible history A-fib   # History of chronic HFpEF, grade 1 diastolic dysfunction   Nursing with concerns that patient's heart rate was in the 30s with sitting would increase to the 70s on standing.  Blood pressure within normal limits.  EKG completed on 5/11 with this bradycardia with ventricular rates in at 59 with marked sinus arrhythmia and known right bundle rudy block and left anterior fascicular block.  As compared to the EKG from April 21, 2025, there are no PAC and no pattern of bigeminy. Overall, currently denies chest pain, shortness of breath, dizziness, lightheadedness.  He reports that his heart rate is usually low.  On exam I had to auscultate the heart rate for 1 minute given how soft it was.  - Trend BMP, magnesium to assess lytes- WNL  - Patient for follow-up with PCP and establish care with cardiology team outpatient  - Continue to monitor for symptoms of chest pain, shortness of breath, dizziness, lightheadedness if patient experiences these would get an EKG stat and intact the medicine team for further instructions.  - Medicine will sign off      GABRIELLE Mcdaniel, CNP   Internal Medicine [facility] Hospitalist  [facility]  [phone]  Securely message with the Vocera Web Console*  Text page via [employer] Paging/Directory*  Text page via [employer]  Messaging System*    *Please check [facility] for appropriate on-call    provider prior to paging.  This chart documentation was completed with Dragon voice-recognition software. Even though reviewed, this chart may still contain some grammatical, spelling, and word errors. Please contact the author for any questions or clarifications.

## 2025-05-12 PROCEDURE — 124N000002 HC R&B MH UMMC

## 2025-05-12 PROCEDURE — 99232 SBSQ HOSP IP/OBS MODERATE 35: CPT | Performed by: PSYCHIATRY & NEUROLOGY

## 2025-05-12 PROCEDURE — 250N000013 HC RX MED GY IP 250 OP 250 PS 637: Performed by: PSYCHIATRY & NEUROLOGY

## 2025-05-12 RX ADMIN — HYDROCORTISONE: 1 CREAM TOPICAL at 20:00

## 2025-05-12 RX ADMIN — BENZTROPINE MESYLATE 1 MG: 1 TABLET ORAL at 08:19

## 2025-05-12 RX ADMIN — SERTRALINE HYDROCHLORIDE 50 MG: 25 TABLET ORAL at 08:18

## 2025-05-12 RX ADMIN — THIAMINE HCL TAB 100 MG 100 MG: 100 TAB at 08:19

## 2025-05-12 RX ADMIN — Medication 1 TABLET: at 08:19

## 2025-05-12 RX ADMIN — BENZTROPINE MESYLATE 1 MG: 1 TABLET ORAL at 19:59

## 2025-05-12 RX ADMIN — FOLIC ACID 1 MG: 1 TABLET ORAL at 08:19

## 2025-05-12 ASSESSMENT — ACTIVITIES OF DAILY LIVING (ADL)
ADLS_ACUITY_SCORE: 41
HYGIENE/GROOMING: INDEPENDENT
ADLS_ACUITY_SCORE: 41
ADLS_ACUITY_SCORE: 41
LAUNDRY: UNABLE TO COMPLETE
ADLS_ACUITY_SCORE: 41
LAUNDRY: UNABLE TO COMPLETE
ADLS_ACUITY_SCORE: 41
HYGIENE/GROOMING: INDEPENDENT
ADLS_ACUITY_SCORE: 41
DRESS: INDEPENDENT
DRESS: INDEPENDENT;SCRUBS (BEHAVIORAL HEALTH)
ORAL_HYGIENE: INDEPENDENT
ADLS_ACUITY_SCORE: 41
ADLS_ACUITY_SCORE: 41
ORAL_HYGIENE: INDEPENDENT
ADLS_ACUITY_SCORE: 41
ADLS_ACUITY_SCORE: 41

## 2025-05-12 NOTE — PLAN OF CARE
Team Note Due:  Thursday    Assessment/Intervention/Current Symtoms and Care Coordination:  Chart review and met with team, discussed pt progress, symptomology, and response to treatment.  Discussed the discharge plan and any potential impediments to discharge.    Pt admitted for eloping group home, medication non-compliance, and paranoid/delusional thinking. Petition for commitment was started in the ED. Pt arrived to the unit on a court hold. Pt is now under commitment with Bry[facility]. Pt started an CARRASCO this week. Plan is for pt to return to his group Wednesday.    Writer met with pt and reviewed and signed PD. Writer sent copy to pt's  Guerda.     Discharge Plan or Goal:  To be determined     Barriers to Discharge:  Patient requires further psychiatric stabilization due to current symptomology and medication management with possible adjustments    Referral Status:  To be determined      Legal Status:  Committed MI with [facility] (ITP)  [facility]  File Number: [id number]  Start Date: 5/2/2025  ITP medications: Invega, Zyprexa, Abilify, and Prolixin    Contacts (include PEG status):  Guerda Mann: ACT team    [phone]   [email or web address]    Pam Randle, DNP, APRN   [phone]  [email or web address]  ACT team psychiatry provider    Group Home: [facility]  RN: Donya Naqvi Phone: [phone]  Main line: [phone]     Upcoming Meetings and Dates/Important Information and next steps:  Call Donya on Tuesday 5/13 to confirm discharge and set up time  PD to Guerda CLAYTON and COS to [facility]

## 2025-05-12 NOTE — DISCHARGE INSTRUCTIONS
Behavioral Discharge Planning and Instructions    Summary: You were admitted on 4/23/2025 due to paranoid thoughts, delusions, and suicide ideation. You were treated by Marilyn Jaffe MD and discharged on 5/14/2025 from [facility] to Group Home    Main Diagnosis: Schizophrenia schizoaffective chronic with acute exacerbation     Commitment: You were dually committed to the [facility] [place] [facility] on 5/2/2025 and you are being discharged on a Provisional Discharge Agreement which shall remain in effect for the duration of the Commitment which expires on 10/31/2025. Wu: You are also court ordered to take the medications that the doctor ordered for you.     Health Care Follow-up:   No appointments were scheduled with you due to you having an ACT team. Guerda and your ACT team will coordinate your upcoming appointments.  Your discharge summary and after visit summary will be sent to your ACT team and your group home upon discharge.    Patient Navigation Hub:   [facility] work to be your point-of-contact for trustworthy and compassionate care from Inpatient services to [facility] Programmatic Care. We will provide resources and communication to help guide you into programmatic care. Ultimately, our goal is to be the one-stop-shop of communication, coordination, and support for your journey to programmatic care.    Phone: [phone]    Information will be faxed to your outpatient providers to ensure a healthy continuity of care for you.     Attend all scheduled appointments with your outpatient providers. Call at least 24 hours in advance if you need to reschedule an appointment to ensure continued access to your outpatient providers.     Major Treatments, Procedures and Findings:  You were provided with: a psychiatric assessment, assessed for medical stability, medication evaluation and/or management, group therapy, individual  therapy, and milieu management    Symptoms to Report: feeling more aggressive, increased confusion, losing more sleep, mood getting worse, or thoughts of suicide    Early warning signs can include: increased depression or anxiety sleep disturbances increased thoughts or behaviors of suicide or self-harm  increased unusual thinking, such as paranoia or hearing voices    Safety and Wellness:  Take all medicines as directed.  Make no changes unless your doctor suggests them.      Follow treatment recommendations.  Refrain from alcohol and non-prescribed drugs.  Ask your support system to help you reduce your access to items that could harm yourself or others. If there is a concern for safety, call 911.    Resources:   Mental Health Crisis Resources  Throughout [place]: call **CRISIS (**931819)  Crisis Text Line: is available for free, 24/7 by texting [place] to 911370  Suicide Awareness Voices of Education (SAVE) (www.save.org): 368-003-EXGN (7491)  The National Suicide Prevention Lifeline is now: 988 Suicide and Crisis Lifeline. Call 988 anytime.  National [place] on Mental Illness (www.[place].felicia.org): 742.651.2440 or 340-646-2341.  Ywgx4gozg: text the word LIFE to 91290 for immediate support and crisis intervention  Mental Health Consumer/Survivor Network of [place] (www.mhcsn.net): 935.708.4756 or 745-582-9604  Mental Health Association of [place] (www.mentalhealth.org): 771.198.7591 or 454-134-8601  Peer Support Connection [place] Warmline ([facility]) 1-580.846.2376 Available from 5pm - 9am (7 days a week/365 days a year)  [facility] 1-910.605.5461 Community Outreach for Psych Emergencies      General Medication Instructions:   See your medication sheet(s) for instructions.   Take all medicines as directed.  Make no changes unless your doctor suggests them.   Go to all your doctor visits.  Be sure to have all your required lab tests. This way, your medicines can be refilled on time.  Do not use any drugs not prescribed by your  doctor.  Avoid alcohol.    Advance Directives:   Scanned document on file with [employer]?    Is document scanned?    Honoring Choices Your Rights Handout:    Was more information offered?      The Treatment team has appreciated the opportunity to work with you. If you have any questions or concerns about your recent admission, you can contact the unit which can receive your call 24 hours a day, 7 days a week. They will be able to get in touch with a Provider if needed. The unit number is [phone] .

## 2025-05-12 NOTE — PROGRESS NOTES
PSYCHIATRY PROGRESS NOTE         DATE OF SERVICE:   5/12/2025         CHIEF COMPLAINT:      Response to medications,has a down day          OBJECTIVE:     Nursing reports : slept  9 hours, takes medications, isolates in his room     reports working on outpatient referrals, under commitment with [facility]    Medicine consult by REBECA Person on 4/24/2025  # Acute decompensated schizophrenia  # Eloped from care facility, noncompliance with medication.  # Anxiety, depression.  Eloped from group home 4/16, found to gas station 4/20 endorsing he is a fugitive and try and turn himself in.  Per report third elopement from group home. Was admitted to [facility] internal medicine 4/20. Now admitted to  for treatment of decompensated schizophrenia. PTA on trazodone, Cogentin, IM Invega sustenna.   - As managed per psychiatry   # Chronic intermittent chest pain  # Possible history A-fib  # Chronic bifascicular block, L anterior fascicular block, R bundle branch block,   # History of chronic HFpEF, grade 1 diastolic dysfunction  Of note, has complained of chronic intermittent chest pain sx> 1yr prior.  States symptoms are regular.  Does not have any as needed nitro per chart review. Recent ECG reviewed, NSR on arrival (with artifact) bifascicular block LVH, LAD.  However chart review indicates history of A-fib, no PTA DOAC.  History HFpEF most recent EF 55-60%.   - Follow up with PCP and establish care with cardiology team outpatient   # Incidental aortic root/aortic dilation  Aortic root 4.3 cm and ascending aorta dilation 4 cm.  No previous comparison.  - Outpatient follow-up TTE yearly monitoring, good BP control.  - Age-appropriate health maintenance on PCP visit.  # Hyponatremia, resolved  Presented with sodium of 131, given patient eloped likely related to poor oral intake.  Creatinine within normal limits.  Now sodium improved.  - Follow up with PCP for ongoing monitoring   # Mild hypocalcemia,  resolved  Minimal hypocalcemia at 8.6, albumin is 3.94 oh.  Not clinically relevant/causing above symptoms.  Increased p.o. intake.  - Monitor with PCP outpatient   # Mildly elevated TSH  Admit TSH 4.37 although free T4 is 1.40.  No PTA Synthroid/meds.  No convincing symptoms of hyperthyroidism, despite psych decompensation  - PCP recheck thyroid function in 10 -12 weeks.   # History of nicotine use   Patient reports he uses E-cig sometimes, vague about when.    - Cessation encouraged, NRT per primary team   # History of alcohol use disorder   Patient declines any recent use.  States he will drink when he gets money but cannot afford it.  LFTs are WNL.  JOSR negative.  - Continue thiamine multivitamin, folic acid supplements.  - Encourage cessation   # History of drug use  - Patient declines recent use - recent UDS is negative         SUBJECTIVE:      Ger had asymptomatic low puls yeasterday of 34 bpm and /69.  Medicine consultant saw him and ordered EKG and labs.  His blood pressure was 96/62 and pulse 52 bpm after being rechecked.  He was encouraged to increase fluid intake.  His pulse is 50 today, BP 92 /63.  He denies shortness of breath or chest pain now.  I really encouraged him to increase fluid intake.  I do this on a daily basis because he did have lower blood pressure in the past when he was not drinking enough fluid.  He says he will do that, but he really does not follow with that.  He denies thoughts of hurting self or others.  He denies hallucinations.  He continues to have delusions about FBI being after him, but he says he always thinks about that and it does not bother him so much now.  He says that he felt better yesterday and today he has come down today.  He tells me not to worry about that.  He says that he is fine with discharge this week.  We are planning middle of the week if no worsening of his symptoms.  He had Invega Sustenna injection last week, under court order.  I encouraged him  to attend groups, but he says he feels tired and he prefers to stay in his room today.He has hand tremor and lip tremor. Those movement were not visible after Cogentin was started, but I notice it today again.    From the past note:  I reviewed his admission from May 2023.  At that time he was admitted at [facility] as a voluntary patient, but he was under my commitment with [facility].  He was diagnosed with schizoaffective disorder and he had agitation and verbal aggression, paranoid delusions, disorganized thoughts, and refusing medications.  The staff from the senior living reported that he refused medications and was getting more irritable, argumentative and agitated.  She described him as mean and making threats and throwing things.  He believed that medical providers were conspiring the government against him.  He was receiving special messages through the headphones.  After getting Invega Sustenna he is thought process was more organized and less tangential although he he eventually agreed to take long acting Invega Sustenna.  It says that Cogentin was decreased from 1 to 0.5 mg twice daily to minimize anticholinergic  burden.  It controlled tremor.  He was admitted to December 2022 when he had altered mental status, wandering the streets without gloves or socks.  Cogentin was increased for tremor, and Invega Sustenna IM given.  He was under commitment at that time    He was admitted to IP psychiatry at [facility] in June 2022.  911 was called because he was walking on the Friday.  He reported that his name is Ger Solares, that he was homeless and his life is in the eighth send that he was suicidal and that he had paranoid schizophrenia.  He reported hearing male and female voices.  He talked about hearing [employer] and walking the line like him.  At that time he was suicidal and reported [place] get hit by the car and die.He was living in  and  he was followed by ACT team. At that time he started refusing Invega Sustena and insisted only on oral medications and decompensated on it.     Prior to that he was admitted inJanuary 2022, May 2021,December 2020, April 2020    It says that In April 2020 he was admitted to [facility] he was on Haldol and Invega, including Invega Sustenna.  He was noncompliant with medications.  He believes that [employer] was after him.He was working with ACT team.  It was reported that he had baseline delusions.He was diagnosed with schizophrenia schizoaffective disorder chronic with acute exacerbation.    In November 2019 he was admitted, diagnosed with EPS due to Haldol, discharged on oral Haldol, IM invega Sustena and Cogentine.    He had 4 admissions in 2019   He had 6 admissions in 2018  He had 2 admissions in 2016  He had 1 admission in 2013  He had 3 admissions in 2012            MEDICATIONS:     Current Facility-Administered Medications   Medication Dose Route Frequency Provider Last Rate Last Admin    benztropine (COGENTIN) tablet 1 mg  1 mg Oral BID Marilyn Jaffe MD   1 mg at 05/12/25 0819    folic acid (FOLVITE) tablet 1 mg  1 mg Oral Daily Marilyn Jaffe MD   1 mg at 05/12/25 0819    hydrocortisone (CORTAID) 1 % cream   Topical BID Marilyn Jaffe MD   Given at 05/10/25 0825    multivitamin w/minerals (THERA-VIT-M) tablet 1 tablet  1 tablet Oral Daily Marilyn Jaffe MD   1 tablet at 05/12/25 0819    paliperidone (INVEGA SUSTENNA) injection CHRISTOS 234 mg  234 mg Intramuscular Q30 Days Marilyn Jaffe MD   234 mg at 05/06/25 1205    sertraline (ZOLOFT) tablet 50 mg  50 mg Oral Daily Marilyn Jaffe MD   50 mg at 05/12/25 0818    thiamine (B-1) tablet 100 mg  100 mg Oral Daily Marilyn Jaffe MD   100 mg at 05/12/25 0819     Current Facility-Administered Medications   Medication Dose Route Frequency Provider Last Rate Last Admin    acetaminophen (TYLENOL) tablet 650 mg  650 mg Oral Q4H PRN Marilyn Jaffe MD   650 mg  "at 05/05/25 0851    alum & mag hydroxide-simethicone (MAALOX) suspension 30 mL  30 mL Oral Q4H PRN Marilyn Jaffe MD        carboxymethylcellulose PF (REFRESH PLUS) 0.5 % ophthalmic solution 1 drop  1 drop Both Eyes TID PRN Marilyn Jaffe MD        cyclobenzaprine (FLEXERIL) tablet 10 mg  10 mg Oral Q8H PRN Michael Melendrez PA-C   10 mg at 04/30/25 1632    gabapentin (NEURONTIN) capsule 100 mg  100 mg Oral Q6H PRN Marilyn Jaffe MD        lidocaine (LMX4) cream   Topical Q4H PRN Marilyn Jaffe MD   Given at 05/06/25 1224    OLANZapine (zyPREXA) tablet 2.5 mg  2.5 mg Oral TID PRN Marilyn Jaffe MD        Or    OLANZapine (zyPREXA) injection 2.5 mg  2.5 mg Intramuscular TID PRN Marilyn Jaffe MD        senna-docusate (SENOKOT-S/PERICOLACE) 8.6-50 MG per tablet 1 tablet  1 tablet Oral BID PRN Marilyn Jaffe MD        traZODone (DESYREL) tablet 50 mg  50 mg Oral At Bedtime PRN Marilyn Jaffe MD   50 mg at 04/28/25 0122       Medication adherence: Yes with oral, refusing IM  Medication side effects: c/o of pain in injection site when he gets IM neuroleptic   Benefit: Symptom reduction         ROS:   As per history of present illness, otherwise reminder of review of systems is negative for: General, eyes, ears, nose, throat, neck, respiratory, cardiovascular, gastrointestinal, genitourinary, meniscal skeletal, neurological, hematological, dermatological and endocrine system.         MENTAL STATUS EXAM:   BP 92/63 (BP Location: Right arm, Patient Position: Sitting, Cuff Size: Adult Regular)   Pulse 50   Temp 97.1  F (36.2  C) (Temporal)   Resp 16   Ht 1.753 m (5' 9\")   Wt 89.8 kg (197 lb 15.6 oz)   SpO2 96%   BMI 29.24 kg/m      Appearance:fair hygiene  Orientation:to self, place, partially in time   Speech:fluent  Language ability: intact  Thought process: concrete  Thought content: positive for paranoid delusions, denies hallucinations  Associations: Connected  Suicidal Ideation: denies   Homicidal " "Ideation:denies   Mood:  depressed  Affect: constricted   Intellectual functioning:average  Fund of Knowledge: consistent with education and experience   Attention/Concentration: decreased from baseline, but improving since admission  Memory: improving    Psychomotor Behavior: calm  Muscle Strength and Tone: no atrophy or involuntary movement  Gait and Station: steady  Insight and judgement:inadequate          LABS:   personally reviewed.   Lab Results   Component Value Date     04/21/2025     04/20/2025     02/22/2024     04/05/2020     12/10/2019     10/14/2019    CO2 30 04/21/2025    CO2 29 04/20/2025    CO2 30 02/22/2024    CO2 27 04/05/2020    CO2 34 12/10/2019    CO2 28 10/14/2019    BUN 11.6 04/21/2025    BUN 12.4 04/20/2025    BUN 14.6 02/22/2024    BUN 16 04/05/2020    BUN 15 12/10/2019    BUN 12 10/14/2019     No results found for: \"CKTOTAL\", \"CKMB\", \"TROPONINI\"  Lab Results   Component Value Date    WBC 5.4 04/21/2025    WBC 6.4 04/20/2025    WBC 5.8 02/22/2024    WBC 7.2 04/05/2020    WBC 5.9 12/10/2019    WBC 6.7 10/14/2019    HGB 14.0 04/21/2025    HGB 14.0 04/20/2025    HGB 13.7 02/22/2024    HGB 15.7 04/05/2020    HGB 15.6 12/10/2019    HGB 15.3 10/14/2019    HCT 41.1 04/21/2025    HCT 40.7 04/20/2025    HCT 41.1 02/22/2024    HCT 44.6 04/05/2020    HCT 45.3 12/10/2019    HCT 42.9 10/14/2019    MCV 92 04/21/2025    MCV 92 04/20/2025    MCV 92 02/22/2024    MCV 91 04/05/2020    MCV 93 12/10/2019    MCV 89 10/14/2019     04/21/2025     04/20/2025     02/22/2024     04/05/2020     12/10/2019     10/14/2019     Lab Results   Component Value Date    CHOL 148 04/19/2019    CHOL 158 04/01/2012    TRIG 136 04/19/2019    TRIG 107 04/01/2012    HDL 32 04/19/2019    HDL 29 04/01/2012      Latest Reference Range & Units 04/20/25 21:30 04/21/25 11:34   Sodium 135 - 145 mmol/L  136   Potassium 3.4 - 5.3 mmol/L  4.4   Chloride 98 - 107 " mmol/L  100   Carbon Dioxide (CO2) 22 - 29 mmol/L  30 (H)   Urea Nitrogen 8.0 - 23.0 mg/dL  11.6   Creatinine 0.67 - 1.17 mg/dL  0.79   GFR Estimate >60 mL/min/1.73m2  >90   Calcium 8.8 - 10.4 mg/dL  8.7 (L)   Anion Gap 7 - 15 mmol/L  6 (L)   Magnesium 1.7 - 2.3 mg/dL 2.0    Phosphorus 2.5 - 4.5 mg/dL 3.0    Albumin 3.5 - 5.2 g/dL 3.9 4.0   Protein Total 6.4 - 8.3 g/dL 5.8 (L) 5.9 (L)   Alkaline Phosphatase 40 - 150 U/L 88 76   ALT 0 - 70 U/L 11 12   AST 0 - 45 U/L 23 18   Bilirubin Direct 0.00 - 0.30 mg/dL 0.21    Bilirubin Total <=1.2 mg/dL 0.7 0.6   Glucose 70 - 99 mg/dL  102 (H)   T4 Free 0.90 - 1.70 ng/dL 1.40    Troponin T, High Sensitivity <=22 ng/L 8    TSH 0.30 - 4.20 uIU/mL 4.37 (H)       Latest Reference Range & Units 04/21/25 11:34   FIO2  0   Ph Venous 7.32 - 7.43  7.42   PCO2 Venous 40 - 50 mm Hg 48   PO2 Venous 25 - 47 mm Hg 43   O2 Sat, Venous 70.0 - 75.0 % 81.8 (H)   Bicarbonate Venous 21 - 28 mmol/L 31 (H)   Base Excess Venous -3.0 - 3.0 mmol/L 5.5 (H)   Oxyhemoglobin Venous 70 - 75 % 80 (H)   WBC 4.0 - 11.0 10e3/uL 5.4   Hemoglobin 13.3 - 17.7 g/dL 14.0   Hematocrit 40.0 - 53.0 % 41.1   Platelet Count 150 - 450 10e3/uL 170   RBC Count 4.40 - 5.90 10e6/uL 4.46   MCV 78 - 100 fL 92   MCH 26.5 - 33.0 pg 31.4   MCHC 31.5 - 36.5 g/dL 34.1   RDW 10.0 - 15.0 % 11.9      Latest Reference Range & Units 04/21/25 09:57   Color Urine Colorless, Straw, Light Yellow, Yellow  Light Yellow   Appearance Urine Clear  Clear   Glucose Urine Negative mg/dL Negative   Bilirubin Urine Negative  Negative   Ketones Urine Negative mg/dL Negative   Specific Gravity Urine 1.003 - 1.035  1.009   pH Urine 5.0 - 7.0  6.5   Protein Albumin Urine Negative mg/dL Negative   Urobilinogen mg/dL Normal mg/dL Normal   Nitrite Urine Negative  Negative   Blood Urine Negative  Negative   Leukocyte Esterase Urine Negative  Negative   Amphetamine Qual Urine Screen Negative  Screen Negative   Fentanyl Qual Urine Screen Negative  Screen  Negative   Cocaine Urine Screen Negative  Screen Negative   Benzodiazepine Urine Screen Negative  Screen Negative   Opiates Qualitative Urine Screen Negative  Screen Negative   PCP Urine Screen Negative  Screen Negative   Cannabinoids Qual Urine Screen Negative  Screen Negative   Barbiturates Qual Urine Screen Negative  Screen Negative       CT HEAD WO IV CONT, CT TRAUMA CERVICAL SCREEN T4-C1   LOCATION: [facility]   DATE/TIME: 2/24/2023 1:49 AM   INDICATION: GCS 14 or 15 - head injury and age over 64 years.   COMPARISON: 12/15/2020. 12/29/2022.   TECHNIQUE:   1) Routine CT Head without IV contrast. Multiplanar reformats. Dose reduction techniques were used.   2) Routine CT Cervical Spine without IV contrast. Multiplanar reformats. Dose reduction techniques were used.   FINDINGS:   HEAD CT:   INTRACRANIAL CONTENTS: No intracranial hemorrhage, extraaxial collection, or mass effect.  No CT evidence of acute infarct. Normal parenchymal attenuation. Mild generalized volume loss. No hydrocephalus.        EKG 12-lead, tracing only 4/21/2025          Component  Ref Range & Units 4/21/25  9:06 AM 4/20/25  9:37 PM    Systolic Blood Pressure  mmHg      Diastolic Blood Pressure  mmHg      Ventricular Rate  BPM 72 69    Atrial Rate  BPM 72 249    MN Interval  ms 180     QRS Duration  ms 152 158    QT  ms 420 438    QTc  ms 459 469    P Axis  degrees 47 122    R AXIS  degrees -73 -76    T Axis  degrees 49 56    Interpretation ECG Sinus rhythm with Premature atrial complexes in a pattern of bigeminy  Right bundle branch block  Left anterior fascicular block  ** Bifascicular block **  Abnormal ECG  When compared with ECG of 20-Apr-2025 21:37,  Sinus rhythm has replaced Atrial fibrillation  Confirmed by MD REMY, ARI ([id number]) on 4/21/2025 11:07:20 AM               DIAGNOSIS:     Schizophrenia schizoaffective chronic with acute exacerbation   Extrapyramidal symptoms  Skin irritation    Patient Active Problem List   Diagnosis     Schizoaffective disorder, bipolar type (H)    Schizophrenia (H)    Alcohol abuse    Dyslipidemia    Gastroesophageal reflux disease without esophagitis    Hypothyroidism due to acquired atrophy of thyroid    Tobacco use disorder    Schizoaffective disorder (H)    Schizophrenia, schizoaffective, chronic with acute exacerbation (H)    Atrial flutter, unspecified type (H)    Aortic root dilatation    Mood changes    Paranoid schizophrenia, chronic condition with acute exacerbation (H)    Tardive dyskinesia    Skin irritation          PLAN:   Jack is  74 y/o male with chronic persistent mental illness. He has been on neuroleptics for many years. He was diagnosed with schizophrenia, schizoaffective disorder.  He is also diagnosed with EPS and cognitive decline.  He has had multiple psychiatric hospitalizations, commitments with [facility] order due to noncompliance with treatment.  Per Epic report there are more than 20 psychiatric admissions since 2012.   He had previous admissions due to not wanting to take Invega Sustenna and wanting to take only oral medications and during that time he decompensated and Invega Sustenna was restarted.  He has been on it for at least  6 years. He has impaired insight and judgement. He says he does not need medication to begin with and he does not want IM Sustena because FBI will have something on him.   He had   final commitment hearing on May 1 st. The court faxed commitment order and [facility] neuroleptic order, so IM Invega given on 5/6/25. He has mood component in his symptomatology which fits schizophrenia schizoaffective disorder. Will plan discharge this week if no worsening of symptoms.  He agrees  with the following recommendations:    Medications:   Invega Sustena 234 mg IM on 5/6/2025, then every 28 days   Zoloft 50 mg qam for depression  Xylocaine cream to injection site to numb the skin and decrease the pain  Cogentin 1 mg bid for EPS  Trazodone 50 mg nightly as needed for  sleep  Multivitamins 1 pill daily  Thiamine 100 mg daily  Folic acid 1 mg daily  Tylenol 650 mg every 4 hours as needed for pain  Maalox 30 mg every 4 as needed for indigestion  Refresh Plus ophthalmic solution 0.5% 1 drop to both eyes 3 times daily as needed for dry eyes  Flexeril 10 mg 3 times daily as needed for muscle spasms  Neurontin 100 mg every 6 hours as needed for anxiety  Zyprexa 2.5 mg 3 times daily as needed for agitation  Senna docusate 1 pill twice daily as needed for constipation  We discussed side effects, benefits and alternative treatments and patient agrees with with oral , but refuses IM Invega.   will collect collateral information and make outpatient referrals  Staff to provide emotional support and redirect as needed  Patient encouraged to attend groups  Lab results: Reviewed personally  Consultation: medicine consult completed     Risk Assessment: going through commitment due to non compliance with tx which led to psychotic break    Coordination of Care:   Patient seen, medical record reviewed, care coordinated with the team.    This document is created with the help of Dragon dictation system.  All grammatical/typing errors or context distortion are unintentional and inherent to software.    Marilyn Jaffe MD        Re-Certification I certify that the inpatient psychiatric facility services furnished since the previous certification were, and continue to be, medically necessary for, either, treatment which could reasonably be expected to improve the patient s condition or diagnostic study and that the hospital records indicate that the services furnished were, either, intensive treatment services, admission and related services necessary for diagnostic study, or equivalent services.     I certify that the patient continues to need, on a daily basis, active treatment furnished directly by or requiring the supervision of inpatient psychiatric facility personnel.   I estimate TBD  days of hospitalization is necessary for proper treatment of the patient. My plans for post-hospital care for this patient are : Medications, appointments     Marilyn Jaffe MD

## 2025-05-12 NOTE — PLAN OF CARE
Patient presents with a calm mood, withdrawn, and isolated in his room. He came out for meals. Patient denied any pain or mental health symptoms and contracted for safety. Patient's pulse was 34 bpm and /69 at the start of the shift, and denied any symptoms of bradycardia. Internal medicine was updated during the day shift for bradycardia, whereby EKG and lab were completed. Patient's pulse and BP were 52 bpm and 96/62 when rechecked. Patient was encouraged to increase fluid intake.      Problem: Psychotic Signs/Symptoms  Goal: Optimal Cognitive Function (Psychotic Signs/Symptoms)  Intervention: Support and Promote Cognitive Ability  Recent Flowsheet Documentation  Taken 5/11/2025 1635 by Dina Vora, RN  Reorientation Measures: reorientation provided  Communication Support Strategies:   actively observed nonverbal cues   nonconfrontational techniques utilized   one-step directions provided     Goal Outcome Evaluation:    Plan of Care Reviewed With: patient

## 2025-05-12 NOTE — PLAN OF CARE
Problem: Depressive Signs/Symptoms  Goal: Optimized Energy Level (Depressive Signs/Symptoms)  Outcome: Progressing   Goal Outcome Evaluation:    Plan of Care Reviewed With: patient      Patient has been isolative and in room all shift. He refused to come out for meals,refused vitals in the morning. Pt was agreeable to have vitals assessed in the afternoon but continued to refuse to come out for meals. Staff offered meal be brought to pt's room but he declined stating he will not eat. Pt declined to answer mental health assessment questions, appeared sad, flat and withdrawn. Pt took all morning scheduled medications, denied pain. Pt was encouraged to attend group activities and utilize positive coping skills.

## 2025-05-12 NOTE — PLAN OF CARE
Problem: Psychotic Signs/Symptoms  Goal: Improved Sleep (Psychotic Signs/Symptoms)  Outcome: Progressing   Goal Outcome Evaluation:                  Slept well for   9 hours  Made use of the headphone to listen to music as a form of relaxation..Pt has a no roommate order due to disorganized behavior, Psychosis and delusional thoughts. No behavioral or safety concerns tonight.

## 2025-05-12 NOTE — PLAN OF CARE
BEH IP Unit Acuity Rating Score (UARS)  Patient is given one point for every criteria they meet.    CRITERIA SCORING   On a 72 hour hold, court hold, committed, stay of commitment, or revocation. 1    Patient LOS on BEH unit exceeds 20 days. 0  LOS: 19   Patient under guardianship, 55+, otherwise medically complex, or under age 11. 1   Suicide ideation without relief of precipitating factors. 0   Current plan for suicide. 0   Current plan for homicide. 0   Imminent risk or actual attempt to seriously harm another without relief of factors precipitating the attempt. 0   Severe dysfunction in daily living (ex: complete neglect for self care, extreme disruption in vegetative function, extreme deterioration in social interactions). 1   Recent (last 7 days) or current physical aggression in the ED or on unit. 0   Restraints or seclusion episode in past 72 hours. 0   Recent (last 7 days) or current verbal aggression, agitation, yelling, etc., while in the ED or unit. 0   Active psychosis. 1   Need for constant or near constant redirection (from leaving, from others, etc).  0   Intrusive or disruptive behaviors. 0   Patient requires 3 or more hours of individualized nursing care per 8-hour shift (i.e. for ADLs, meds, therapeutic interventions). 0   TOTAL 4

## 2025-05-12 NOTE — PLAN OF CARE
Problem: Adult Behavioral Health Plan of Care  Goal: Plan of Care Review  Flowsheets  Taken 5/12/2025 1705  Plan of Care Reviewed With: patient  Overall Patient Progress: no change  Patient Agreement with Plan of Care: agrees    Patient was isolative and withdrawn. Awake, resting in bed most of the time with his head set on. Goes out of his bedroom during meal time and when requesting for things. He denied having mental health symptoms. He denied pain when asked. Ate only the soup with saltines and drank the whole bottle of Ensure at supper. Med compliant. Requested for his HS meds early and were given at 1959. Ate HS snacks. Then went back to his bedroom resting for the rest of the evening. Pulse checked this shift was 54 then re-check was 62.

## 2025-05-13 PROCEDURE — 99232 SBSQ HOSP IP/OBS MODERATE 35: CPT | Performed by: PSYCHIATRY & NEUROLOGY

## 2025-05-13 PROCEDURE — 250N000013 HC RX MED GY IP 250 OP 250 PS 637: Performed by: PSYCHIATRY & NEUROLOGY

## 2025-05-13 PROCEDURE — 124N000002 HC R&B MH UMMC

## 2025-05-13 RX ORDER — BENZTROPINE MESYLATE 1 MG/1
1 TABLET ORAL 2 TIMES DAILY
Qty: 60 TABLET | Refills: 0 | Status: SHIPPED | OUTPATIENT
Start: 2025-05-13

## 2025-05-13 RX ORDER — LANOLIN ALCOHOL/MO/W.PET/CERES
100 CREAM (GRAM) TOPICAL DAILY
Qty: 30 TABLET | Refills: 0 | Status: SHIPPED | OUTPATIENT
Start: 2025-05-13

## 2025-05-13 RX ORDER — FOLIC ACID 1 MG/1
1 TABLET ORAL DAILY
Qty: 30 TABLET | Refills: 0 | Status: SHIPPED | OUTPATIENT
Start: 2025-05-13

## 2025-05-13 RX ORDER — ACETAMINOPHEN 325 MG/1
650 TABLET ORAL EVERY 4 HOURS PRN
Qty: 30 TABLET | Refills: 0 | Status: SHIPPED | OUTPATIENT
Start: 2025-05-13

## 2025-05-13 RX ORDER — CARBOXYMETHYLCELLULOSE SODIUM 5 MG/ML
1 SOLUTION/ DROPS OPHTHALMIC 3 TIMES DAILY PRN
Qty: 70 EACH | Refills: 0 | Status: SHIPPED | OUTPATIENT
Start: 2025-05-13

## 2025-05-13 RX ORDER — BENZOCAINE/MENTHOL 6 MG-10 MG
LOZENGE MUCOUS MEMBRANE 2 TIMES DAILY
Qty: 14 G | Refills: 0 | Status: SHIPPED | OUTPATIENT
Start: 2025-05-13 | End: 2025-05-20

## 2025-05-13 RX ORDER — MULTIPLE VITAMINS W/ MINERALS TAB 9MG-400MCG
1 TAB ORAL DAILY
Qty: 30 TABLET | Refills: 0 | Status: SHIPPED | OUTPATIENT
Start: 2025-05-13

## 2025-05-13 RX ORDER — TRAZODONE HYDROCHLORIDE 50 MG/1
50 TABLET ORAL
Qty: 45 TABLET | Refills: 0 | Status: SHIPPED | OUTPATIENT
Start: 2025-05-13

## 2025-05-13 RX ORDER — CYCLOBENZAPRINE HCL 10 MG
10 TABLET ORAL EVERY 8 HOURS PRN
Qty: 60 TABLET | Refills: 0 | Status: SHIPPED | OUTPATIENT
Start: 2025-05-13

## 2025-05-13 RX ADMIN — SERTRALINE HYDROCHLORIDE 50 MG: 25 TABLET ORAL at 07:58

## 2025-05-13 RX ADMIN — BENZTROPINE MESYLATE 1 MG: 1 TABLET ORAL at 07:58

## 2025-05-13 RX ADMIN — FOLIC ACID 1 MG: 1 TABLET ORAL at 07:58

## 2025-05-13 RX ADMIN — BENZTROPINE MESYLATE 1 MG: 1 TABLET ORAL at 21:18

## 2025-05-13 RX ADMIN — THIAMINE HCL TAB 100 MG 100 MG: 100 TAB at 07:58

## 2025-05-13 RX ADMIN — Medication 1 TABLET: at 07:58

## 2025-05-13 ASSESSMENT — ACTIVITIES OF DAILY LIVING (ADL)
ADLS_ACUITY_SCORE: 41
ORAL_HYGIENE: INDEPENDENT
ADLS_ACUITY_SCORE: 41
DRESS: INDEPENDENT
ADLS_ACUITY_SCORE: 41
LAUNDRY: UNABLE TO COMPLETE
HYGIENE/GROOMING: INDEPENDENT
ADLS_ACUITY_SCORE: 41

## 2025-05-13 NOTE — PLAN OF CARE
BEH IP Unit Acuity Rating Score (UARS)  Patient is given one point for every criteria they meet.    CRITERIA SCORING   On a 72 hour hold, court hold, committed, stay of commitment, or revocation. 1    Patient LOS on BEH unit exceeds 20 days. 0  LOS: 20   Patient under guardianship, 55+, otherwise medically complex, or under age 11. 1   Suicide ideation without relief of precipitating factors. 0   Current plan for suicide. 0   Current plan for homicide. 0   Imminent risk or actual attempt to seriously harm another without relief of factors precipitating the attempt. 0   Severe dysfunction in daily living (ex: complete neglect for self care, extreme disruption in vegetative function, extreme deterioration in social interactions). 0   Recent (last 7 days) or current physical aggression in the ED or on unit. 0   Restraints or seclusion episode in past 72 hours. 0   Recent (last 7 days) or current verbal aggression, agitation, yelling, etc., while in the ED or unit. 0   Active psychosis. 1   Need for constant or near constant redirection (from leaving, from others, etc).  0   Intrusive or disruptive behaviors. 0   Patient requires 3 or more hours of individualized nursing care per 8-hour shift (i.e. for ADLs, meds, therapeutic interventions). 0   TOTAL 3

## 2025-05-13 NOTE — PLAN OF CARE
Problem: Psychotic Signs/Symptoms  Goal: Improved Sleep (Psychotic Signs/Symptoms)  Outcome: Progressing   Goal Outcome Evaluation:         Received asleep, breathing unlabored and using the headphone to listen to music for comfort.  Pt has a no roommate order due to disorganized behavior, Psychosis and delusional thoughts. No behavioral or safety concerns tonight   Slept for 8 hours

## 2025-05-13 NOTE — PLAN OF CARE
Problem: Psychotic Signs/Symptoms  Goal: Improved Behavioral Control (Psychotic Signs/Symptoms)  Outcome: Progressing   Goal Outcome Evaluation:    Plan of Care Reviewed With: patient      Patient has been mostly isolative to room, did come out for meals, medications and to attend some of the unit activities. Pt denies anxiety, depression, hallucinations and SI/SIB, pt states he is looking forward to discharging tomorrow.

## 2025-05-13 NOTE — PROVIDER NOTIFICATION
05/12/25 1636   Vital Signs   Temp 97.9  F (36.6  C)   Temp src Temporal   Pulse 54   Pulse Rate Source Monitor   /60   Oxygen Therapy   SpO2 92 %   O2 Device None (Room air)   Cardiac (VS)   Pulse Rate & Regularity bradycardic

## 2025-05-13 NOTE — PROGRESS NOTES
PSYCHIATRY PROGRESS NOTE         DATE OF SERVICE:   5/13/2025         CHIEF COMPLAINT:      Pt says he feels better today, less tremor and involuntary movements,preparations for discharge tomorrow           OBJECTIVE:     Nursing reports : slept  8 hours, takes medications,spends most of the time in the room,but also some time in the lounge      reports working on outpatient referrals, under commitment with [facility]    Medicine consult by REBECA Person on 4/24/2025  # Acute decompensated schizophrenia  # Eloped from care facility, noncompliance with medication.  # Anxiety, depression.  Eloped from group home 4/16, found to gas station 4/20 endorsing he is a fugitive and try and turn himself in.  Per report third elopement from group home. Was admitted to [facility] internal medicine 4/20. Now admitted to  for treatment of decompensated schizophrenia. PTA on trazodone, Cogentin, IM Invega sustenna.   - As managed per psychiatry   # Chronic intermittent chest pain  # Possible history A-fib  # Chronic bifascicular block, L anterior fascicular block, R bundle branch block,   # History of chronic HFpEF, grade 1 diastolic dysfunction  Of note, has complained of chronic intermittent chest pain sx> 1yr prior.  States symptoms are regular.  Does not have any as needed nitro per chart review. Recent ECG reviewed, NSR on arrival (with artifact) bifascicular block LVH, LAD.  However chart review indicates history of A-fib, no PTA DOAC.  History HFpEF most recent EF 55-60%.   - Follow up with PCP and establish care with cardiology team outpatient   # Incidental aortic root/aortic dilation  Aortic root 4.3 cm and ascending aorta dilation 4 cm.  No previous comparison.  - Outpatient follow-up TTE yearly monitoring, good BP control.  - Age-appropriate health maintenance on PCP visit.  # Hyponatremia, resolved  Presented with sodium of 131, given patient eloped likely related to poor oral intake.  Creatinine within  normal limits.  Now sodium improved.  - Follow up with PCP for ongoing monitoring   # Mild hypocalcemia, resolved  Minimal hypocalcemia at 8.6, albumin is 3.94 oh.  Not clinically relevant/causing above symptoms.  Increased p.o. intake.  - Monitor with PCP outpatient   # Mildly elevated TSH  Admit TSH 4.37 although free T4 is 1.40.  No PTA Synthroid/meds.  No convincing symptoms of hyperthyroidism, despite psych decompensation  - PCP recheck thyroid function in 10 -12 weeks.   # History of nicotine use   Patient reports he uses E-cig sometimes, vague about when.    - Cessation encouraged, NRT per primary team   # History of alcohol use disorder   Patient declines any recent use.  States he will drink when he gets money but cannot afford it.  LFTs are WNL.  JOSR negative.  - Continue thiamine multivitamin, folic acid supplements.  - Encourage cessation   # History of drug use  - Patient declines recent use - recent UDS is negative         SUBJECTIVE:      Ger says that he feels better today.  He has less orofacial involuntary movements and tremor.  He looks more relaxed.  He denies thoughts of hurting self or others.  Denies hallucinations.  He continues to have paranoid thoughts about the FBI being after him, but he says it does not bother him so much.  He says it is always present, but sometimes he can tolerate it better than other times.  He is able to tolerate it better when he takes injectable Invega Sustenna.  He slept through the night.  Appetite is good.  Energy not at baseline yet.  Concentration better today, depends on the day, was more distracted yesterday.  He says that he has improved during this hospitalization.  He will continue to take injectable Invega Sustenna.  He says it does not hurt at the injection site, and he did not hurt so much when he got it, because the nurse numbed the skin with lidocaine.  I encouraged him to drink fluids.  I remind him of medications having low blood pressures a side  effect can put him at risk for falls and being dehydrated can decrease low blood pressure and put him at risk for falls.  He says he will drink more fluid.  He says he goes to the bathroom regularly.  He says he is ready for discharge tomorrow.  He says he plans to shave his beard when he gets there and to eat what he likes. He says he is ok .He is alert and oriented to day, month and year , not to date, oriented to self and place.  talked with ACT team.  Will need to know if he will need taxi or the team member would be able to pick him up.  He says he is okay with it.    From the past note:  I reviewed his admission from May 2023.  At that time he was admitted at [facility] as a voluntary patient, but he was under my commitment with [facility].  He was diagnosed with schizoaffective disorder and he had agitation and verbal aggression, paranoid delusions, disorganized thoughts, and refusing medications.  The staff from the half-way reported that he refused medications and was getting more irritable, argumentative and agitated.  She described him as mean and making threats and throwing things.  He believed that medical providers were conspiring the government against him.  He was receiving special messages through the headphones.  After getting Invega Sustenna he is thought process was more organized and less tangential although he he eventually agreed to take long acting Invega Sustenna.  It says that Cogentin was decreased from 1 to 0.5 mg twice daily to minimize anticholinergic  burden.  It controlled tremor.  He was admitted to December 2022 when he had altered mental status, wandering the streets without gloves or socks.  Cogentin was increased for tremor, and Invega Sustenna IM given.  He was under commitment at that time    He was admitted to IP psychiatry at [facility] in June 2022.  911 was called because he was walking on the Friday.  He reported that his name is Ger Solares, that  he was homeless and his life is in the eighth send that he was suicidal and that he had paranoid schizophrenia.  He reported hearing male and female voices.  He talked about hearing Augustin Ordonez and walking the line like him.  At that time he was suicidal and reported [place] get hit by the car and die.He was living in  and he was followed by ACT team. At that time he started refusing Invega Sustena and insisted only on oral medications and decompensated on it.     Prior to that he was admitted inJanuary 2022, May 2021,December 2020, April 2020    It says that In April 2020 he was admitted to [facility] he was on Haldol and Invega, including Invega Sustenna.  He was noncompliant with medications.  He believes that [facility] was after him.He was working with ACT team.  It was reported that he had baseline delusions.He was diagnosed with schizophrenia schizoaffective disorder chronic with acute exacerbation.    In November 2019 he was admitted, diagnosed with EPS due to Haldol, discharged on oral Haldol, IM invega Sustena and Cogentine.    He had 4 admissions in 2019   He had 6 admissions in 2018  He had 2 admissions in 2016  He had 1 admission in 2013  He had 3 admissions in 2012            MEDICATIONS:     Current Facility-Administered Medications   Medication Dose Route Frequency Provider Last Rate Last Admin    benztropine (COGENTIN) tablet 1 mg  1 mg Oral BID Marilyn Jaffe MD   1 mg at 05/13/25 0758    folic acid (FOLVITE) tablet 1 mg  1 mg Oral Daily Marilyn Jaffe MD   1 mg at 05/13/25 0758    hydrocortisone (CORTAID) 1 % cream   Topical BID Marilyn Jaffe MD   Given at 05/12/25 2000    multivitamin w/minerals (THERA-VIT-M) tablet 1 tablet  1 tablet Oral Daily Marilyn Jaffe MD   1 tablet at 05/13/25 0758    paliperidone (INVEGA SUSTENNA) injection CHRISTOS 234 mg  234 mg Intramuscular Q30 Days Marilyn Jaffe MD   234 mg at 05/06/25 1205     sertraline (ZOLOFT) tablet 50 mg  50 mg Oral Daily Marilyn Jaffe MD   50 mg at 05/13/25 0758    thiamine (B-1) tablet 100 mg  100 mg Oral Daily Marilyn Jaffe MD   100 mg at 05/13/25 0758     Current Facility-Administered Medications   Medication Dose Route Frequency Provider Last Rate Last Admin    acetaminophen (TYLENOL) tablet 650 mg  650 mg Oral Q4H PRN Marilyn Jaffe MD   650 mg at 05/05/25 0851    alum & mag hydroxide-simethicone (MAALOX) suspension 30 mL  30 mL Oral Q4H PRN Marilyn Jaffe MD        carboxymethylcellulose PF (REFRESH PLUS) 0.5 % ophthalmic solution 1 drop  1 drop Both Eyes TID PRN Marilyn Jaffe MD        cyclobenzaprine (FLEXERIL) tablet 10 mg  10 mg Oral Q8H PRN Michael Melendrez PA-C   10 mg at 04/30/25 1632    gabapentin (NEURONTIN) capsule 100 mg  100 mg Oral Q6H PRN Marilyn Jaffe MD        lidocaine (LMX4) cream   Topical Q4H PRN Marilyn Jaffe MD   Given at 05/06/25 1224    OLANZapine (zyPREXA) tablet 2.5 mg  2.5 mg Oral TID PRN Marilyn Jaffe MD        Or    OLANZapine (zyPREXA) injection 2.5 mg  2.5 mg Intramuscular TID PRN Marilyn Jaffe MD        senna-docusate (SENOKOT-S/PERICOLACE) 8.6-50 MG per tablet 1 tablet  1 tablet Oral BID PRN Marilyn Jaffe MD        traZODone (DESYREL) tablet 50 mg  50 mg Oral At Bedtime PRN Marilyn Jaffe MD   50 mg at 04/28/25 0122       Medication adherence: Yes with oral, refusing IM  Medication side effects: c/o of pain in injection site when he gets IM neuroleptic , improved with lidocaine skin numbing cream improved tremor and orofacial movements after Cogentin was ordered..  Benefit: Symptom reduction         ROS:   As per history of present illness, otherwise reminder of review of systems is negative for: General, eyes, ears, nose, throat, neck, respiratory, cardiovascular, gastrointestinal, genitourinary, meniscal skeletal, neurological, hematological, dermatological and endocrine system.         MENTAL STATUS EXAM:   BP 98/64 (BP  "Location: Right arm, Patient Position: Sitting, Cuff Size: Adult Regular)   Pulse 73   Temp 97.3  F (36.3  C) (Temporal)   Resp 16   Ht 1.753 m (5' 9\")   Wt 90.1 kg (198 lb 10.2 oz)   SpO2 95%   BMI 29.33 kg/m      Appearance:fair hygiene  Orientation:to self, place, partially in time   Speech:fluent  Language ability: intact  Thought process: concrete  Thought content: improving  chronic paranoid delusions, denies hallucinations  Associations: Connected  Suicidal Ideation: denies   Homicidal Ideation:denies   Mood:  depressed  Affect: neutral   Intellectual functioning:average  Fund of Knowledge: consistent with education and experience   Attention/Concentration: decreased from baseline, but improving since admission  Memory: improving    Psychomotor Behavior: calm  Muscle Strength and Tone: no atrophy , improved orofacial  involuntary movements and hand tremor   Gait and Station: steady  Insight and judgement:inadequate          LABS:   personally reviewed.   Lab Results   Component Value Date     04/21/2025     04/20/2025     02/22/2024     04/05/2020     12/10/2019     10/14/2019    CO2 30 04/21/2025    CO2 29 04/20/2025    CO2 30 02/22/2024    CO2 27 04/05/2020    CO2 34 12/10/2019    CO2 28 10/14/2019    BUN 11.6 04/21/2025    BUN 12.4 04/20/2025    BUN 14.6 02/22/2024    BUN 16 04/05/2020    BUN 15 12/10/2019    BUN 12 10/14/2019     No results found for: \"CKTOTAL\", \"CKMB\", \"TROPONINI\"  Lab Results   Component Value Date    WBC 5.4 04/21/2025    WBC 6.4 04/20/2025    WBC 5.8 02/22/2024    WBC 7.2 04/05/2020    WBC 5.9 12/10/2019    WBC 6.7 10/14/2019    HGB 14.0 04/21/2025    HGB 14.0 04/20/2025    HGB 13.7 02/22/2024    HGB 15.7 04/05/2020    HGB 15.6 12/10/2019    HGB 15.3 10/14/2019    HCT 41.1 04/21/2025    HCT 40.7 04/20/2025    HCT 41.1 02/22/2024    HCT 44.6 04/05/2020    HCT 45.3 12/10/2019    HCT 42.9 10/14/2019    MCV 92 04/21/2025    MCV 92 04/20/2025    " MCV 92 02/22/2024    MCV 91 04/05/2020    MCV 93 12/10/2019    MCV 89 10/14/2019     04/21/2025     04/20/2025     02/22/2024     04/05/2020     12/10/2019     10/14/2019     Lab Results   Component Value Date    CHOL 148 04/19/2019    CHOL 158 04/01/2012    TRIG 136 04/19/2019    TRIG 107 04/01/2012    HDL 32 04/19/2019    HDL 29 04/01/2012      Latest Reference Range & Units 04/20/25 21:30 04/21/25 11:34   Sodium 135 - 145 mmol/L  136   Potassium 3.4 - 5.3 mmol/L  4.4   Chloride 98 - 107 mmol/L  100   Carbon Dioxide (CO2) 22 - 29 mmol/L  30 (H)   Urea Nitrogen 8.0 - 23.0 mg/dL  11.6   Creatinine 0.67 - 1.17 mg/dL  0.79   GFR Estimate >60 mL/min/1.73m2  >90   Calcium 8.8 - 10.4 mg/dL  8.7 (L)   Anion Gap 7 - 15 mmol/L  6 (L)   Magnesium 1.7 - 2.3 mg/dL 2.0    Phosphorus 2.5 - 4.5 mg/dL 3.0    Albumin 3.5 - 5.2 g/dL 3.9 4.0   Protein Total 6.4 - 8.3 g/dL 5.8 (L) 5.9 (L)   Alkaline Phosphatase 40 - 150 U/L 88 76   ALT 0 - 70 U/L 11 12   AST 0 - 45 U/L 23 18   Bilirubin Direct 0.00 - 0.30 mg/dL 0.21    Bilirubin Total <=1.2 mg/dL 0.7 0.6   Glucose 70 - 99 mg/dL  102 (H)   T4 Free 0.90 - 1.70 ng/dL 1.40    Troponin T, High Sensitivity <=22 ng/L 8    TSH 0.30 - 4.20 uIU/mL 4.37 (H)       Latest Reference Range & Units 04/21/25 11:34   FIO2  0   Ph Venous 7.32 - 7.43  7.42   PCO2 Venous 40 - 50 mm Hg 48   PO2 Venous 25 - 47 mm Hg 43   O2 Sat, Venous 70.0 - 75.0 % 81.8 (H)   Bicarbonate Venous 21 - 28 mmol/L 31 (H)   Base Excess Venous -3.0 - 3.0 mmol/L 5.5 (H)   Oxyhemoglobin Venous 70 - 75 % 80 (H)   WBC 4.0 - 11.0 10e3/uL 5.4   Hemoglobin 13.3 - 17.7 g/dL 14.0   Hematocrit 40.0 - 53.0 % 41.1   Platelet Count 150 - 450 10e3/uL 170   RBC Count 4.40 - 5.90 10e6/uL 4.46   MCV 78 - 100 fL 92   MCH 26.5 - 33.0 pg 31.4   MCHC 31.5 - 36.5 g/dL 34.1   RDW 10.0 - 15.0 % 11.9      Latest Reference Range & Units 04/21/25 09:57   Color Urine Colorless, Straw, Light Yellow, Yellow  Light  Yellow   Appearance Urine Clear  Clear   Glucose Urine Negative mg/dL Negative   Bilirubin Urine Negative  Negative   Ketones Urine Negative mg/dL Negative   Specific Gravity Urine 1.003 - 1.035  1.009   pH Urine 5.0 - 7.0  6.5   Protein Albumin Urine Negative mg/dL Negative   Urobilinogen mg/dL Normal mg/dL Normal   Nitrite Urine Negative  Negative   Blood Urine Negative  Negative   Leukocyte Esterase Urine Negative  Negative   Amphetamine Qual Urine Screen Negative  Screen Negative   Fentanyl Qual Urine Screen Negative  Screen Negative   Cocaine Urine Screen Negative  Screen Negative   Benzodiazepine Urine Screen Negative  Screen Negative   Opiates Qualitative Urine Screen Negative  Screen Negative   PCP Urine Screen Negative  Screen Negative   Cannabinoids Qual Urine Screen Negative  Screen Negative   Barbiturates Qual Urine Screen Negative  Screen Negative       CT HEAD WO IV CONT, CT TRAUMA CERVICAL SCREEN T4-C1   LOCATION: [facility]   DATE/TIME: 2/24/2023 1:49 AM   INDICATION: GCS 14 or 15 - head injury and age over 64 years.   COMPARISON: 12/15/2020. 12/29/2022.   TECHNIQUE:   1) Routine CT Head without IV contrast. Multiplanar reformats. Dose reduction techniques were used.   2) Routine CT Cervical Spine without IV contrast. Multiplanar reformats. Dose reduction techniques were used.   FINDINGS:   HEAD CT:   INTRACRANIAL CONTENTS: No intracranial hemorrhage, extraaxial collection, or mass effect.  No CT evidence of acute infarct. Normal parenchymal attenuation. Mild generalized volume loss. No hydrocephalus.        EKG 12-lead, tracing only 4/21/2025          Component  Ref Range & Units 4/21/25  9:06 AM 4/20/25  9:37 PM    Systolic Blood Pressure  mmHg      Diastolic Blood Pressure  mmHg      Ventricular Rate  BPM 72 69    Atrial Rate  BPM 72 249    MD Interval  ms 180     QRS Duration  ms 152 158    QT  ms 420 438    QTc  ms 459 469    P Axis  degrees 47 122    R AXIS  degrees -73 -76    T  Axis  degrees 49 56    Interpretation ECG Sinus rhythm with Premature atrial complexes in a pattern of bigeminy  Right bundle branch block  Left anterior fascicular block  ** Bifascicular block **  Abnormal ECG  When compared with ECG of 20-Apr-2025 21:37,  Sinus rhythm has replaced Atrial fibrillation  Confirmed by MD REMY, ARI ([id number]) on 4/21/2025 11:07:20 AM               DIAGNOSIS:     Schizophrenia schizoaffective chronic with acute exacerbation   Extrapyramidal symptoms  Skin irritation    Patient Active Problem List   Diagnosis    Schizoaffective disorder, bipolar type (H)    Schizophrenia (H)    Alcohol abuse    Dyslipidemia    Gastroesophageal reflux disease without esophagitis    Hypothyroidism due to acquired atrophy of thyroid    Tobacco use disorder    Schizoaffective disorder (H)    Schizophrenia, schizoaffective, chronic with acute exacerbation (H)    Atrial flutter, unspecified type (H)    Aortic root dilatation    Mood changes    Paranoid schizophrenia, chronic condition with acute exacerbation (H)    Tardive dyskinesia    Skin irritation          PLAN:   Ger is  76 y/o male with chronic persistent mental illness. He has been on neuroleptics for many years. He was diagnosed with schizophrenia, schizoaffective disorder.  He is also diagnosed with EPS and cognitive decline.  He has had multiple psychiatric hospitalizations, commitments with [facility] order due to noncompliance with treatment.  Per Epic report there are more than 20 psychiatric admissions since 2012.   He had previous admissions due to not wanting to take Invega Sustenna and wanting to take only oral medications and during that time he decompensated and Invega Sustenna was restarted.  He has been on it for at least  6 years. He has impaired insight and judgement. He says he does not need medication to begin with and he does not want IM Sustena because FBI will have something on him.   He had   final commitment hearing on May 1 st. The  court faxed commitment order and [facility] neuroleptic order, so IM Invega given on 5/6/25. He has mood component in his symptomatology which fits schizophrenia schizoaffective disorder. Will plan discharge tomorrow  if no worsening of symptoms.  He agrees  with the following recommendations:    Medications:   Invega Sustena 234 mg IM on 5/6/2025, then every 28 days   Zoloft 50 mg qam for depression  Xylocaine cream to injection site to numb the skin and decrease the pain  Cogentin 1 mg bid for EPS  Trazodone 50 mg nightly as needed for sleep  Multivitamins 1 pill daily  Thiamine 100 mg daily  Folic acid 1 mg daily  Tylenol 650 mg every 4 hours as needed for pain  Maalox 30 mg every 4 as needed for indigestion  Refresh Plus ophthalmic solution 0.5% 1 drop to both eyes 3 times daily as needed for dry eyes  Flexeril 10 mg 3 times daily as needed for muscle spasms  Neurontin 100 mg every 6 hours as needed for anxiety  Zyprexa 2.5 mg 3 times daily as needed for agitation  Senna docusate 1 pill twice daily as needed for constipation  We discussed side effects, benefits and alternative treatments and patient agrees with with oral , but refuses IM Invega.   will collect collateral information and make outpatient referrals  Staff to provide emotional support and redirect as needed  Patient encouraged to attend groups  Lab results: Reviewed personally  Consultation: medicine consult completed     Risk Assessment: going through commitment due to non compliance with tx which led to psychotic break    Coordination of Care:   Patient seen, medical record reviewed, care coordinated with the team.    This document is created with the help of Dragon dictation system.  All grammatical/typing errors or context distortion are unintentional and inherent to software.    Marilyn Jaffe MD        Re-Certification I certify that the inpatient psychiatric facility services furnished since the previous certification were, and  continue to be, medically necessary for, either, treatment which could reasonably be expected to improve the patient s condition or diagnostic study and that the hospital records indicate that the services furnished were, either, intensive treatment services, admission and related services necessary for diagnostic study, or equivalent services.     I certify that the patient continues to need, on a daily basis, active treatment furnished directly by or requiring the supervision of inpatient psychiatric facility personnel.   I estimate TBD days of hospitalization is necessary for proper treatment of the patient. My plans for post-hospital care for this patient are : Medications, appointments     Marilyn Jaffe MD

## 2025-05-13 NOTE — PLAN OF CARE
Goal Outcome Evaluation:    Problem: Psychotic Signs/Symptoms  Goal: Improved Behavioral Control (Psychotic Signs/Symptoms)  Outcome: Progressing     Pt presents calm, cooperative, and pleasant. Pt denies SI/HI/SIB, hallucinations, depression, and anxiety. Pt is isolative and withdrawn to self, and spent much of shift in room, however did spend some time pacing in rocha with headphones on.    Pt did not report concerns with bowel/bladder. Pt denies pain. Pt denies need for PRN medications.  VSS, medication compliant, behaviorally in control throughout shift.

## 2025-05-13 NOTE — PLAN OF CARE
HPI     KENNEDY:11/2019  Glasses? Yes   Contacts? no  H/o eye surgery, injections or laser: cat sx OU   H/o eye injury: no  Known eye conditions? Dry eye OU   Family h/o eye conditions? no  Eye gtts?systane OU PRN     (-) Flashes (-) Floaters (-) Mucous   (-) Tearing (-) Itching (+) Burning OU   (-) Headaches (-) Eye Pain/discomfort (+) Irritation OU    (+) Redness OU (-) Double vision (-) Blurry vision    Diabetic? (-)  A1c?  (Hemoglobin A1C       Date                     Value               Ref Range             Status                06/19/2019               5.5                 4.0 - 5.6 %           Final              Comment:    ADA Screening Guidelines:  5.7-6.4%  Consistent with   prediabetes  >or=6.5%  Consistent with diabetes  High levels of fetal   hemoglobin interfere with the HbA1C  assay. Heterozygous hemoglobin   variants (HbS, HgC, etc)do  not significantly interfere with this assay.     However, presence of multiple variants may affect accuracy.         02/21/2019               5.5                 4.0 - 5.6 %           Final              Comment:    ADA Screening Guidelines:  5.7-6.4%  Consistent with   prediabetes  >or=6.5%  Consistent with diabetes  High levels of fetal   hemoglobin interfere with the HbA1C  assay. Heterozygous hemoglobin   variants (HbS, HgC, etc)do  not significantly interfere with this assay.     However, presence of multiple variants may affect accuracy.         04/15/2015               5.6                 4.5 - 6.2 %           Final            ----------)        Last edited by Cheyenne Toscano on 12/3/2019  1:40 PM. (History)            Assessment /Plan     For exam results, see Encounter Report.    Myopia with astigmatism and presbyopia, bilateral    Pseudophakia    OAG (open angle glaucoma) suspect, high risk, bilateral    Dermatochalasis of both upper eyelids      1. SRx released to patient. Patient educated on lens options. Normal ocular health. RTC 1 year for routine exam.  Team Note Due:  Thursday    Assessment/Intervention/Current Symtoms and Care Coordination:  Chart review and met with team, discussed pt progress, symptomology, and response to treatment.  Discussed the discharge plan and any potential impediments to discharge.    Pt admitted for eloping group home, medication non-compliance, and paranoid/delusional thinking. Petition for commitment was started in the ED. Pt arrived to the unit on a court hold. Pt is now under commitment with [facility]. Pt started an CARRASCO this week. Plan is for pt to return to his group Wednesday.    Writer received email from [facility] pt's ACT team  stating the ACT team therapist, Chris will  pt tomorrow around 4-430pm. Writer confirmed that will work.    Writer checked in and updated pt on discharge plan for tomorrow.    Writer called and spoke to RAHUL Naqvi RN, and let her know ACT team was picking pt up around 4pm tomorrow for discharge.    Discharge Plan or Goal:  To be determined     Barriers to Discharge:  Patient requires further psychiatric stabilization due to current symptomology and medication management with possible adjustments    Referral Status:  To be determined      Legal Status:  Committed MI with [facility] (ITP)  [facility]: [facility]  File Number: [id number]  Start Date: 5/2/2025  ITP medications: Invega, Zyprexa, Abilify, and Prolixin    Contacts (include PEG status):  Guerda Mann: ACT team    [phone]   [email or web address]    Pam Randle, [facility], APRN   [phone]  [email or web address]  ACT team psychiatry provider    Group Home: [facility]  RN: Donya Naqvi Phone: [phone]  Main line: [phone]     Upcoming Meetings and Dates/Important Information and next steps:  Ask Dr. Jaffe to complete discharge summary ASAP  PD and COS to [facility]       2. Good result.   3.  Glaucoma (type and duration)    Suspect - low tension glaucoma suspect               folowed by [facility] for years as a suspect               First HVF   11/2019               First photos   3/2016               Treatment / Drops started   none           Family history    ???        Glaucoma meds   None         H/O adverse rxn to glaucoma drops    None         LASERS    None         GLAUCOMA SURGERIES    None        OTHER EYE SURGERIES    none        CDR    0.8/0.8        Tbase    12-16        Tmax    16/16         Ttarget   ???            HVF  1 test  2019 to  2019  -  SAD,  od // SAD  Os(( ? If associated with the medulated [employer] od)         Gonio    ??        CCT    ???        OCT    1 test 2019 to 2019 - RNFL - Dec  TS/NI/NS od // wnl  os        HRT    ?? test 20?? to 20?? - MR - ??? od // ?? os        Disc photos    ??     Pt is followed by Dr Liriano and has a 9 mo f/u. No tx indicated at this time.   4. Monitor.

## 2025-05-14 VITALS
HEART RATE: 79 BPM | SYSTOLIC BLOOD PRESSURE: 97 MMHG | OXYGEN SATURATION: 98 % | DIASTOLIC BLOOD PRESSURE: 64 MMHG | BODY MASS INDEX: 29.42 KG/M2 | TEMPERATURE: 97.9 F | RESPIRATION RATE: 16 BRPM | HEIGHT: 69 IN | WEIGHT: 198.63 LBS

## 2025-05-14 PROCEDURE — 97150 GROUP THERAPEUTIC PROCEDURES: CPT | Mod: GO

## 2025-05-14 PROCEDURE — 99239 HOSP IP/OBS DSCHRG MGMT >30: CPT | Performed by: PSYCHIATRY & NEUROLOGY

## 2025-05-14 PROCEDURE — 250N000013 HC RX MED GY IP 250 OP 250 PS 637: Performed by: PSYCHIATRY & NEUROLOGY

## 2025-05-14 RX ADMIN — THIAMINE HCL TAB 100 MG 100 MG: 100 TAB at 08:45

## 2025-05-14 RX ADMIN — FOLIC ACID 1 MG: 1 TABLET ORAL at 08:45

## 2025-05-14 RX ADMIN — Medication 1 TABLET: at 08:45

## 2025-05-14 RX ADMIN — SERTRALINE HYDROCHLORIDE 50 MG: 25 TABLET ORAL at 08:45

## 2025-05-14 RX ADMIN — BENZTROPINE MESYLATE 1 MG: 1 TABLET ORAL at 08:45

## 2025-05-14 ASSESSMENT — ACTIVITIES OF DAILY LIVING (ADL)
ADLS_ACUITY_SCORE: 41
ADLS_ACUITY_SCORE: 41
LAUNDRY: UNABLE TO COMPLETE
ADLS_ACUITY_SCORE: 41
DRESS: INDEPENDENT
ORAL_HYGIENE: INDEPENDENT
ADLS_ACUITY_SCORE: 41
HYGIENE/GROOMING: INDEPENDENT

## 2025-05-14 NOTE — CONSULTS
"[place] Cognitive Assessment (MoCA) version 8.1  The MoCA is a rapid screening instrument for mild cognitive dysfunction. It assesses different cognitive domains: attention and concentration, executive functions, memory, language, visuoconstructional skills, conceptual thinking, calculations, and orientation. Memory Index Score (MIS)- able to recall words either spontaneously, with category cue, or multiple choice cues. Higher score reflective of spontaneous recall.    Patient Score:    Visuospatial / Executive Function Score: 5/5   Naming Score: 3/3  Attention Score: 6/6  Language Score: 1/3  Abstraction Score: 2/2  Delayed Recall Score: 4/5  Memory Index Score (MIS): 13/15  Orientation Score: 4/6  Point added for 12 years or fewer of formal education: 0   Total Score: 25/30    Score Interpretation:  One point is added for those who have 12 years or fewer of formal education. A final total score of 26 and above is considered normal. Less than 26 indicates cognitive impairment. The MoCA is a screen, not a functional performance test.    Pt agreeable to complete MoCA while seated on EOB in his room. Pt was provided with education on use of cognitive screening tools. Pt did require some encouragement to complete task. Pt reported \"this is too hard\" though with further questioning and reassurance pt completed task.  "

## 2025-05-14 NOTE — PLAN OF CARE
Problem: Depressive Signs/Symptoms  Goal: Optimized Energy Level (Depressive Signs/Symptoms)  Outcome: Adequate for Care Transition   Goal Outcome Evaluation:    Discharge:     Pt voices readiness for discharge. Denies any mental health concerns. AVS reviewed including follow up with ACT team, medications, and community resources. All personal belongings returned. Picked up by group home staff and discharged home approximately 1730.

## 2025-05-14 NOTE — DISCHARGE SUMMARY
DISCHARGE SUMMARY    Ger Bashir     YOB: 1950   Date of admission: 4/23/2025     Date of discharge: 5/14/2025  Date of service: 5/14/2025    Identifications:  Ger is 76 y/o male With schizophrenia schizoaffective disorder.  He is a resident of a group home.  He was admitted for paranoid delusions, refusal to take IM Invega Sustena, elopement from .   For details of history and physical on admission please refer to  my   admission dictation.      Hospital Course:  Patient was admitted to psychiatric unit for safety, stabilization and medication management.    From this admission:  According to ER provider Dr Chang's note from 4/20/2025 patient was found at the gas station after missing from the group home for 4 days.  He was not taking his prescribed medications.  He reported chest pain, he reported getting tremulous when not taking his medications.  He reported suicidal thoughts, no thoughts of hurting others.  EMS reported that he was trying to turn himself in as a fugitive at the gas station.     According to Dr. Rdz's psychiatric consult from 4/21/2025 patient had poor compliance with psychiatric medications.  He says that he has diagnosis of schizophrenia.  He eloped from his group home when he stopped taking medications.  He reported delusions of persecution.  He reported that his [place] committed crimes and that [facility] is looking.  He reported that the nurse took his singling voice so he could not sing.  He was put on 72-hour hold.  Petition for commitment with [facility] neuroleptic treatment was filed and patient was seen, commitment supported and he was put on court hold and he was waiting for [facility] geriatric psychiatric unit.    Prior to admission he was on Invega Sustenna 234 mg monthly.  He missed the dose on the op basis  and he refused to take it in the hospital.  Invega 6 mg daily was ordered.  Patient was evaluated by the same provider on April 22, 2025.  He reported  paranoid delusions.He was continued on Invega 6 mg daily.  He continued to refuse injectable Invega.  He was then evaluated on April 23.  He reported depression.  It says that he had paucity of thoughts.  He reported feeling hopeless, helpless and worthless.  It says that he denied suicidal thoughts and thoughts of hurting others and hallucinations and that he had delusions about FBI wanting him to run away from home.  He was transferred to geriatric psychiatric unit on 4/23/2025.    From past admission:  I reviewed his admission from May 2023.  At that time he was admitted at [facility] as a voluntary patient, but he was under my commitment with [facility].  He was diagnosed with schizoaffective disorder and he had agitation and verbal aggression, paranoid delusions, disorganized thoughts, and refusing medications.  The staff from the [facility] reported that he refused medications and was getting more irritable, argumentative and agitated.  She described him as mean and making threats and throwing things.  He believed that medical providers were conspiring the government against him.  He was receiving special messages through the headphones.  After getting Invega Sustenna he is thought process was more organized and less tangential although he he eventually agreed to take long acting Invega Sustenna.  It says that Cogentin was decreased from 1 to 0.5 mg twice daily to minimize anticholinergic  burden.  It controlled tremor.  He was admitted to December 2022 when he had altered mental status, wandering the streets without gloves or socks.  Cogentin was increased for tremor, and Invega Sustenna IM given.  He was under commitment at that time     He was admitted to IP psychiatry at [facility] in June 2022.  911 was called because he was walking on the Friday.  He reported that his name is Ger Solares, that he was homeless and his life is in the eighth send that he was suicidal and that he had paranoid  "schizophrenia.  He reported hearing male and female voices.  He talked about hearing Augustin Ordonez and walking the line like him.  At that time he was suicidal and reported [place] get hit by the car and die.He was living in  and he was followed by ACT team. At that time he started refusing Invega Sustena and insisted only on oral medications and decompensated on it.      Prior to that he was admitted inJanuary 2022, May 2021,December 2020, April 2020     It says that In April 2020 he was admitted to [facility] he was on Haldol and Invega, including Invega Sustenna.  He was noncompliant with medications.  He believes that [facility] was after him.He was working with ACT team.  It was reported that he had baseline delusions.He was diagnosed with schizophrenia schizoaffective disorder chronic with acute exacerbation.     In November 2019 he was admitted, diagnosed with EPS due to Haldol, discharged on oral Haldol, IM invega Sustena and Cogentine.     He had 4 admissions in 2019   He had 6 admissions in 2018  He had 2 admissions in 2016  He had 1 admission in 2013  He had 3 admissions in 2012        During the  admission interview with me on 4/24/2025 patient says that he came to the hospital because he needs help.  He says that he was going to give up because FBI was after him.  He said that \"they were plotting and they were associated with him  and they were threatening to kill him.\"  He has suicidal thoughts, but he says he does not have courage to do that.  No thoughts of hurting others.  He denies hallucinations.  He had decreased sleep, normal appetite, decreased energy and decreased concentration.  He perseverates on FBI being after him.  He says that he has been on many medications in the past.  He thinks that his mental illness started about 35 years ago when he started thinking that FBI was after him.  He remembers that he was on Haldol for many years. "  He says he was on many other medications but he cannot remember the name.  He says that the medication that his provider gives him now helps him, but he says he will take only pill.  He says that he does not want to take injections because it hurts for several days after he gets the injection.  He was due for injection on April 18.  He says that he left  group home and will found at the gas  station, because he did not want to get injection.  He says again that he will take oral pill and he agrees to take it today.   I will give him oral Invega until [facility] has been granted.  He started taking Invega Sustenna at least 2 years ago.  I can see that in October 2023 he was on it.  I asked him to sign release of information for his outpatient psychiatrist Cailin Suárez, that I can coordinate care with her.  He gets agitated when the pharmacist who works in the team with me starts sneezing and coughing.  He does not want to talk while she is in the room, so she had to leave.  He then says that coughing and sneezing upsets him.  I remind him that we have options that she could leave the room and she did.  He likes to stay in his room and listen to the music.  He says he will go out for meals, but he refuses to go to groups.  He is anxious about where he is going to go after discharge.  He denies other medical problems.  He denies using alcohol and drugs.  He cannot remember when he had alcoholic drink last time.  According to the chart he had problems with homelessness in the past.  But he has been living in a group home for some time.  I reviewed the records out of this admission and it says that there is history of alcohol abuse.  In 2023 it also says that he was under commitment.  There was concern about Alzheimer disease.  He had several years of difficulties with short-term memory.  Outpatient psychiatric team was concern for neurocognitive impairment contributing to behavioral changes.  It says that he did not  have memory difficulties during admission he declined slums.  He had head CT in February 2023 and it showed mild generalized volume loss which was similar to finding in 2016.    We discussed diagnosis and treatment plan.  He refused to take injectable Invega Sustenna.  Request for commitment with [facility] was filed with court and granted.  He understood that he had to take medication once it was court ordered.  He continued to have paranoid delusions about the FBI.  He would get agitated at times because of it.  He used earphones to distract himself from paranoid thoughts.  At times he had auditory hallucinations.  At the beginning he had suicidal thoughts, but then they resolved.  He denied homicidal thoughts.  He went to the [facility] for the meals, spent most of the time in his room.  From time to time he attended groups.  He agreed with starting Zoloft for depression.  He ha hand tremor ands tongue tremor.  She asked for Austedo or Ingrezza for tardive dyskinesia.  It is not on the hospital formulary pharmacy list.  He said that he used Cogentin and it helped him, so I ordered Cogentin 1 mg twice daily.  I informed him that it is not used for tardive dyskinesia.  He started showing improvement on Cogentin.  He had less tongue movement and from time to time tremor was not visible at all.  It seemed that the tremor was getting worse when he was anxious about something and also he had involuntary movement of his chin.  It happened especially when he was supposed to get injection of Invega Sustenna.  She said that he would rather take oral medications.  I remind him that he had multiple times attempts attempts to take oral medications in the past, but it led to worsening of his symptoms and hospitalizations.  I ask him what was the problem with Invega Sustenna injection and he said that it was hurting when the nurse was giving it to him.  I told him that we will use assured him that our nurse can make it less painful by  using Xylocaine cream to numb his skin and he excepted it.  He got injection in May 6.  He said it hurt less then before.  He was relaxed after the injection.  He went to groups.  We discussed importance of taking injection because it provides steady-state the medication in his bloodstream and he does not have to worry that he may forget medication and then become more symptomatic.  He agrees to take it.  He signed release information for his outpatient provider Cailin Suárez, and I coordinated care with her today.  I informed her of starting the Zoloft, patient having Invega Sustenna on May 6, on using Cogentin for involuntary movements.  She says that she ordered Cogentin in the past, and it did not help with involuntary movements.  She says that she offered him Ingrezza and Austedo, but he refused it.  I informed her that he wanted it here in the hospital but because it is not on the formulary, I could not start it, but he may be willing to do it on the outpatient basis and she could get preauthorization.  I informed her that he had a EKG.  He agreed with MoCA testing today and it was 25 out of 30, slightly below normal.  She was concerned about memory problem and need for placement to memory unit in the future.  She works with ACT team and she sees them regularly and she will proceed with it if she thinks it is necessary.  She will monitor his glucose and lipids on Invega Sustenna and involuntary movements.    During my interview  with me today, Ger denies suicidal and homicidal ideas, denies hallucinations.  He says that he always has  thoughts that FBI is after him, but he says it does not bother him so much now.  He slept through the night.  Appetite is reasonable.  Energy fluctuates.  Concentration improving.  Depression is better.  Involuntary movements improved.  I informed him that his outpatient psychiatrist will work with him on obtaining preauthorization for Ingrezza or Austedo.  He says that  Cogentin works.  I informed him again that Cogentin is not authorized for tar dive dyskinesia.  I also remind him that I noticed more involuntary movements when he gets anxious and he agrees with that.  He denies any medical problems.  He wants to return back to group home.  He says that he wants to shave his beard, eat good food and he wants to listen to the music.  He is looking forward to discharge and he says that he feels safe. His skin irritation improved on Cortisone 1 % cream. He denies any medical problems.    Patient progress toward goals:   Improved and safe for discharge.    CONSULTATIONS:  Medicine consult by REBECA Person on 4/24/2025  # Acute decompensated schizophrenia  # Eloped from care facility, noncompliance with medication.  # Anxiety, depression.  Eloped from group home 4/16, found to gas station 4/20 endorsing he is a fugitive and try and turn himself in.  Per report third elopement from group home. Was admitted to [facility] internal medicine 4/20. Now admitted to  for treatment of decompensated schizophrenia. PTA on trazodone, Cogentin, IM Invega sustenna.   - As managed per psychiatry   # Chronic intermittent chest pain  # Possible history A-fib  # Chronic bifascicular block, L anterior fascicular block, R bundle branch block,   # History of chronic HFpEF, grade 1 diastolic dysfunction  Of note, has complained of chronic intermittent chest pain sx> 1yr prior.  States symptoms are regular.  Does not have any as needed nitro per chart review. Recent ECG reviewed, NSR on arrival (with artifact) bifascicular block LVH, LAD.  However chart review indicates history of A-fib, no PTA DOAC.  History HFpEF most recent EF 55-60%.   - Follow up with PCP and establish care with cardiology team outpatient   # Incidental aortic root/aortic dilation  Aortic root 4.3 cm and ascending aorta dilation 4 cm.  No previous comparison.  - Outpatient follow-up TTE yearly monitoring, good BP control.  -  "Age-appropriate health maintenance on PCP visit.  # Hyponatremia, resolved  Presented with sodium of 131, given patient eloped likely related to poor oral intake.  Creatinine within normal limits.  Now sodium improved.  - Follow up with PCP for ongoing monitoring   # Mild hypocalcemia, resolved  Minimal hypocalcemia at 8.6, albumin is 3.94 oh.  Not clinically relevant/causing above symptoms.  Increased p.o. intake.  - Monitor with PCP outpatient   # Mildly elevated TSH  Admit TSH 4.37 although free T4 is 1.40.  No PTA Synthroid/meds.  No convincing symptoms of hyperthyroidism, despite psych decompensation  - PCP recheck thyroid function in 10 -12 weeks.   # History of nicotine use   Patient reports he uses E-cig sometimes, vague about when.    - Cessation encouraged, NRT per primary team   # History of alcohol use disorder   Patient declines any recent use.  States he will drink when he gets money but cannot afford it.  LFTs are WNL.  JOSR negative.  - Continue thiamine multivitamin, folic acid supplements.  - Encourage cessation   # History of drug use  - Patient declines recent use - recent UDS is negative      MoCa testin by Dora Banegas OTR 5/14/2025  Patient Score:    Visuospatial / Executive Function Score: 5/5   Naming Score: 3/3  Attention Score: 6/6  Language Score: 1/3  Abstraction Score: 2/2  Delayed Recall Score: 4/5  Memory Index Score (MIS): 13/15  Orientation Score: 4/6  Point added for 12 years or fewer of formal education: 0   Total Score: 25/30  Less than 26 indicates cognitive impairment. The MoCA is a screen, not a functional performance test.       Vital Signs:   /72   Pulse 62   Temp 97.4  F (36.3  C) (Oral)   Resp 16   Ht 1.753 m (5' 9\")   Wt 90.1 kg (198 lb 10.2 oz)   SpO2 96%   BMI 29.33 kg/m       Mental status examination on discharge day :  General:adequate hygiene, cooperative  Orientation: to self, place and month and year, not day and date  Speech:normal in rate and " tone  Language:intact  Thought process:concrete  Thought content:decreased paranoid delusions about FBI, but they are chronic , denies hallucinations  Suicidal thoughts:denies   Homicidal thoughts:denies   Associations:connected  Affect:better modulated   Mood:improved depression   Attention and concentration:improved  Memory:intact for the interview   Fund of knowledge:consistent with education and experience   Intellectual functioning:average  Gait:steady  Psychomotor activity:calm, no agitation  Muscle strength and tone: involntary tongue and chin tremor and hand tremor, worse when anxious   Insight and judgement:limited, under commitment with [facility]    Review of Systems:  As per history of present illness, otherwise reminder of   review of of systems is negative for: General, eyes, ears, nose, throat, neck, respiratory, cardiovascular, gastrointestinal, genitourinary, musculoskeletal, neurological, hematological, dermatological and endocrine system.    Laboratory results: Personally reviewed   Lab Results   Component Value Date    WBC 5.4 04/21/2025    HGB 14.0 04/21/2025    HCT 41.1 04/21/2025     04/21/2025    CHOL 152 04/26/2025    TRIG 81 04/26/2025    HDL 48 04/26/2025    ALT 12 04/21/2025    AST 18 04/21/2025     05/11/2025    BUN 16.3 05/11/2025    CO2 29 05/11/2025    TSH 4.37 (H) 04/20/2025      Latest Reference Range & Units 04/21/25 11:34   Sodium 135 - 145 mmol/L 136   Potassium 3.4 - 5.3 mmol/L 4.4   Chloride 98 - 107 mmol/L 100   Carbon Dioxide (CO2) 22 - 29 mmol/L 30 (H)   Urea Nitrogen 8.0 - 23.0 mg/dL 11.6   Creatinine 0.67 - 1.17 mg/dL 0.79   GFR Estimate >60 mL/min/1.73m2 >90   Calcium 8.8 - 10.4 mg/dL 8.7 (L)   Anion Gap 7 - 15 mmol/L 6 (L)   Albumin 3.5 - 5.2 g/dL 4.0   Protein Total 6.4 - 8.3 g/dL 5.9 (L)   Alkaline Phosphatase 40 - 150 U/L 76   ALT 0 - 70 U/L 12   AST 0 - 45 U/L 18   Bilirubin Total <=1.2 mg/dL 0.6   Glucose 70 - 99 mg/dL 102 (H)        Latest Reference Range  & Units 05/11/25 12:30   Sodium 135 - 145 mmol/L 139   Potassium 3.4 - 5.3 mmol/L 4.1   Chloride 98 - 107 mmol/L 98   Carbon Dioxide (CO2) 22 - 29 mmol/L 29   Urea Nitrogen 8.0 - 23.0 mg/dL 16.3   Creatinine 0.67 - 1.17 mg/dL 0.82   GFR Estimate >60 mL/min/1.73m2 >90   Calcium 8.8 - 10.4 mg/dL 9.5   Anion Gap 7 - 15 mmol/L 12   Magnesium 1.7 - 2.3 mg/dL 2.0      Latest Reference Range & Units 04/26/25 07:53 05/11/25 12:30   Cholesterol <200 mg/dL 152    Glucose 70 - 99 mg/dL  97   HDL Cholesterol >=40 mg/dL 48    Estimated Average Glucose <117 mg/dL 100    Hemoglobin A1C <5.7 % 5.1    LDL Cholesterol Calculated <100 mg/dL 88    Non HDL Cholesterol <130 mg/dL 104    Triglycerides <150 mg/dL 81        EKG 12-lead, complete 5/11/2025          Component  Ref Range & Units (hover) 5/11/25 10:50 AM 4/21/25  9:06 AM    Systolic Blood Pressure      Diastolic Blood Pressure      Ventricular Rate 59 72    Atrial Rate 59 72    AR Interval 178 180    QRS Duration 154 152     420    QTc 439 459    P Axis 62 47    R AXIS -73 -73    T Axis 12 49    Interpretation ECG Sinus bradycardia with PACs  Right bundle branch block  Left anterior fascicular block  ** Bifascicular block **  Abnormal ECG  When compared with ECG of 21-Apr-2025 09:06,  Premature atrial complexes are no longer Present  Confirmed by MD DANDY, SREEDHAR ([id number]) on 5/11/2025 2:25:38 PM         CT HEAD WO IV CONT, CT TRAUMA CERVICAL SCREEN T4-C1   LOCATION: [facility]   DATE/TIME: 2/24/2023 1:49 AM   INDICATION: GCS 14 or 15 - head injury and age over 64 years.   COMPARISON: 12/15/2020. 12/29/2022.   TECHNIQUE:   1) Routine CT Head without IV contrast. Multiplanar reformats. Dose reduction techniques were used.   2) Routine CT Cervical Spine without IV contrast. Multiplanar reformats. Dose reduction techniques were used.   FINDINGS:   HEAD CT:   INTRACRANIAL CONTENTS: No intracranial hemorrhage, extraaxial collection, or mass effect.  No CT evidence of acute  infarct. Normal parenchymal attenuation. Mild generalized volume loss. No hydrocephalus.        DISCHARGE DIAGNOSIS    Schizophrenia ,schizoaffective disorder chronic with acute exacerbation  Tardive dyskinesia  Insomnia due to to other mental disorder   Skin irritation    Patient Active Problem List   Diagnosis    Schizoaffective disorder, bipolar type (H)    Schizophrenia (H)    Alcohol abuse    Dyslipidemia    Gastroesophageal reflux disease without esophagitis    Hypothyroidism due to acquired atrophy of thyroid    Tobacco use disorder    Schizoaffective disorder (H)    Schizophrenia, schizoaffective, chronic with acute exacerbation (H)    Atrial flutter, unspecified type (H)    Aortic root dilatation    Mood changes    Paranoid schizophrenia, chronic condition with acute exacerbation (H)    Tardive dyskinesia    Skin irritation       DISCHARGE PLAN    Patient will be discharged to .  He will continue to work with ACT team.  Patient will take medications as prescribed. Patient will not adjust or stop taking medications without talking to providers.Emphasized importance of compliance with treatment for optimal response.   made outpatient appointments.  Patient will call providers with any problems between 2 visits. Emphasized importance of communication with providers.  Patient will go to the emergency room if not feeling safe, not able to function in the community,or if suicidal, homicidal or psychotic.  Patient will see his non psychiatric providers per their recommendation.  Patient will watch diet and exercise as tolerated.   Patient will abstain from drugs and alcohol.  Patient will not drive or operate heavy machinery, if sedated on medications or under influence of any substance.  We discussed diagnosis, prognosis, differential diagnosis and side effects and benefits of medications and alternative treatments and patient agrees.      Discharge Medications:       Review of your medicines         START taking        Dose / Directions   cyclobenzaprine 10 MG tablet  Commonly known as: FLEXERIL  Used for: Muscle spasm      Dose: 10 mg  Take 1 tablet (10 mg) by mouth every 8 hours as needed for muscle spasms.  Quantity: 60 tablet  Refills: 0     hydrocortisone 1 % external cream  Commonly known as: CORTAID      Apply topically 2 times daily for 7 days.  Quantity: 14 g  Refills: 0     sertraline 50 MG tablet  Commonly known as: ZOLOFT  Used for: Schizophrenia, schizoaffective, chronic with acute exacerbation (H)      Dose: 50 mg  Take 1 tablet (50 mg) by mouth daily.  Quantity: 30 tablet  Refills: 0            CONTINUE these medicines which may have CHANGED, or have new prescriptions. If we are uncertain of the size of tablets/capsules you have at home, strength may be listed as something that might have changed.        Dose / Directions   acetaminophen 325 MG tablet  Commonly known as: TYLENOL  This may have changed:   medication strength  how much to take  when to take this  reasons to take this  additional instructions  Used for: Pain      Dose: 650 mg  Take 2 tablets (650 mg) by mouth every 4 hours as needed for fever or mild pain (mild to moderate pain and/or fever).  Quantity: 30 tablet  Refills: 0     paliperidone 234 MG/1.5ML [place]  Commonly known as: INVEGA SUSTENNA  This may have changed:   when to take this  additional instructions  Used for: Schizophrenia, schizoaffective, chronic with acute exacerbation (H)      Dose: 234 mg  Start taking on: June 5, 2025  Inject 1.5 mLs (234 mg) into the muscle every 30 days.  Refills: 0     traZODone 50 MG tablet  Commonly known as: DESYREL  This may have changed:   how much to take  reasons to take this  Used for: Insomnia due to other mental disorder      Dose: 50 mg  Take 1 tablet (50 mg) by mouth nightly as needed for sleep (may repeat after 60 minutes).  Quantity: 45 tablet  Refills: 0            CONTINUE these medicines which have NOT CHANGED        Dose  / Directions   benztropine 1 MG tablet  Commonly known as: COGENTIN  Used for: Paranoid schizophrenia (H)      Dose: 1 mg  Take 1 tablet (1 mg) by mouth 2 times daily.  Quantity: 60 tablet  Refills: 0     carboxymethylcellulose PF 0.5 % ophthalmic solution  Commonly known as: carboxymethylcellulose sodium  Used for: Dry eyes      Dose: 1 drop  Place 1 drop into both eyes 3 times daily as needed for dry eyes.  Quantity: 70 each  Refills: 0     folic acid 1 MG tablet  Commonly known as: FOLVITE  Used for: Alcohol abuse      Dose: 1 mg  Take 1 tablet (1 mg) by mouth daily.  Quantity: 30 tablet  Refills: 0     melatonin 3 MG tablet      Dose: 3 mg  Take 3 mg by mouth at bedtime.  Refills: 0     multivitamin w/minerals tablet  Used for: Alcohol abuse      Dose: 1 tablet  Take 1 tablet by mouth daily.  Quantity: 30 tablet  Refills: 0     thiamine 100 MG tablet  Commonly known as: B-1  Used for: Alcohol abuse      Dose: 100 mg  Take 1 tablet (100 mg) by mouth daily.  Quantity: 30 tablet  Refills: 0               Where to get your medicines        These medications [facility] to [facility] - [place], [place] - [id number] [place] [address] [address]  [id number] [place] [address] [address] [facility] [address]      Phone: [phone]   acetaminophen 325 MG tablet  benztropine 1 MG tablet  carboxymethylcellulose PF 0.5 % ophthalmic solution  cyclobenzaprine 10 MG tablet  folic acid 1 MG tablet  hydrocortisone 1 % external cream  multivitamin w/minerals tablet  sertraline 50 MG tablet  thiamine 100 MG tablet  traZODone 50 MG tablet           Diet: regular    Exercise: activity as tolerated    Condition at Discharge: stable    Coordination of Care:  Patient seen, chart reviewed, care coordinated with the team.    Time spent:  50 minutes spent on this discharge with more than 50% of time spent on coordination of care with staff on the unit, team meeting, reviewing medical record, educating patient about diagnosis, differential diagnosis, prognosis,  side effects and benefits of medications and alternative treatment, diet, exercise, abstinence from drugs and alcohol.Providing supportive therapy about above issues,coordinating care with OP psychiatrist  Cailin Casas , entering orders and preparing documentation for discharge.    This note was created with the help of Dragon dictation system.  All grammatical/typing errors or context distortion are unintentional and inherent to software.    Marilyn Jaffe MD      5/14/2025  8:36 AM

## 2025-05-14 NOTE — PLAN OF CARE
BEH IP Unit Acuity Rating Score (UARS)  Patient is given one point for every criteria they meet.    CRITERIA SCORING   On a 72 hour hold, court hold, committed, stay of commitment, or revocation. 1    Patient LOS on BEH unit exceeds 20 days. 1  LOS: 21   Patient under guardianship, 55+, otherwise medically complex, or under age 11. 1   Suicide ideation without relief of precipitating factors. 0   Current plan for suicide. 0   Current plan for homicide. 0   Imminent risk or actual attempt to seriously harm another without relief of factors precipitating the attempt. 0   Severe dysfunction in daily living (ex: complete neglect for self care, extreme disruption in vegetative function, extreme deterioration in social interactions). 0   Recent (last 7 days) or current physical aggression in the ED or on unit. 0   Restraints or seclusion episode in past 72 hours. 0   Recent (last 7 days) or current verbal aggression, agitation, yelling, etc., while in the ED or unit. 0   Active psychosis. 1   Need for constant or near constant redirection (from leaving, from others, etc).  0   Intrusive or disruptive behaviors. 0   Patient requires 3 or more hours of individualized nursing care per 8-hour shift (i.e. for ADLs, meds, therapeutic interventions). 0   TOTAL 4

## 2025-05-14 NOTE — PLAN OF CARE
Team Note Due:  Thursday    Assessment/Intervention/Current Symtoms and Care Coordination:  Chart review and met with team, discussed pt progress, symptomology, and response to treatment.  Discussed the discharge plan and any potential impediments to discharge.    Pt admitted for eloping group home, medication non-compliance, and paranoid/delusional thinking. Petition for commitment was started in the ED. Pt arrived to the unit on a court hold. Pt is now under commitment with [facility]. Pt started an CARRASCO this week. Plan is for pt to return to his group Wednesday.    Pt reported being ready to discharge today.    Writer faxed PD and COS to [facility].     Discharge Plan or Goal:  To be determined     Barriers to Discharge:  Patient requires further psychiatric stabilization due to current symptomology and medication management with possible adjustments    Referral Status:  To be determined      Legal Status:  Committed MI with [facility] (ITP)  County: [place]  File Number: [id number]  Start Date: 5/2/2025  ITP medications: Invega, Zyprexa, Abilify, and Prolixin    Contacts (include PEG status):  Guerda Mann: ACT team    [phone]   [email or web address]    Pam Randle, DNP, APRN   [phone]  [email or web address]  ACT team psychiatry provider    Group Home: [facility]  RN: Donya Naqvi Phone: [phone]  Main line: [phone]     Upcoming Meetings and Dates/Important Information and next steps:

## 2025-05-14 NOTE — PLAN OF CARE
Problem: Sleep Disturbance  Goal: Adequate Sleep/Rest  Outcome: Progressing   Goal Outcome Evaluation:       Pt sleeping comfortably, no s/s, safety checks completed , pt remains on commitment. No s/s of discomfort.

## 2025-05-14 NOTE — PLAN OF CARE
Problem: Psychotic Signs/Symptoms  Goal: Improved Behavioral Control (Psychotic Signs/Symptoms)  Outcome: Progressing   Goal Outcome Evaluation:    Plan of Care Reviewed With: patient      Patient has been calm, isolative and withdrawn. He reports readiness to discharge this evening, denies anxiety, depression and SI/SIB. He came out for meals, took medications as prescribed, denies side effects.

## 2025-05-14 NOTE — PLAN OF CARE
Rehab Group    Start time: 1015  End time: 1200  Patient time total: 50 minutes    attended partial group    #5 attended   Group Type: OT Clinic   Group Topic Covered: cognitive activities, coping skills, healthy leisure time, problem solving, and social skills   Group Session Detail:OT: Education on healthy activity engagement and creative hands-on endeavor (OT clinic) to increase concentration, focus, attention to task/detail, decision making, problem solving, frustration tolerance, task follow through, coping with stress, healthy leisure engagement, creative expression, and social engagement    Patient Response/Contribution:  cooperative with task and actively engaged   Patient Detail:pt polite and appropriate during check in and interactions with staff when others initiated. Pt chose to engage in familiar cognitive task. Pt was provided with set up of materials. Pt worked quietly for duration of engagement. Pt worked in a neat and intentional manner throughout      [id number] OT Group (2 or more in attendance)      Patient Active Problem List   Diagnosis    Schizoaffective disorder, bipolar type (H)    Schizophrenia (H)    Alcohol abuse    Dyslipidemia    Gastroesophageal reflux disease without esophagitis    Hypothyroidism due to acquired atrophy of thyroid    Tobacco use disorder    Schizoaffective disorder (H)    Schizophrenia, schizoaffective, chronic with acute exacerbation (H)    Atrial flutter, unspecified type (H)    Aortic root dilatation    Mood changes    Paranoid schizophrenia, chronic condition with acute exacerbation (H)    Tardive dyskinesia    Skin irritation     F2

## 2025-05-14 NOTE — PLAN OF CARE
7 pm -11:30 PM  Problem: Psychotic Signs/Symptoms  Goal: Improved Behavioral Control (Psychotic Signs/Symptoms)  Outcome: Progressing  Flowsheets (Taken 5/13/2025 2247)  Mutually Determined Action Steps (Improved Behavioral Control): verbalizes gratifying activity   Goal Outcome Evaluation:         RN Assessment:  SI/Self harm:  Denied  Aggression/agitation/HI:  Denied  AVH:    Sleep:   PRN Med: No PRNs administered this shift  Medication AE:   Physical Complaints/Issues:  I & O: eating and drinking well  LBM:   ADLs: independent  Visits:   Vitals:    COVID 19 Assessment:    Milieu Participation:  Pt denies having any safety concerns including thoughts of harming self or others. Pt denies auditory and visual hallucinations and takes his medications as prescribed. No medication side effects reported or observed. Denies any physical discomfort. Pt denies having any problem with eating or sleeping. No acute concerns. Plan of care ongoing

## 2025-05-15 ENCOUNTER — PATIENT OUTREACH (OUTPATIENT)
Dept: CARE COORDINATION | Facility: CLINIC | Age: 75
End: 2025-05-15
Payer: COMMERCIAL

## 2025-05-15 NOTE — PROGRESS NOTES
[facility]    Background: Transitional Care Management program identified per system criteria and reviewed by [facility] team for possible outreach.    Assessment: Upon chart review, [facility] Team member will not proceed with patient outreach related to this episode of Transitional Care Management program due to reason below:    Patient has discharged to a Memory Care, Long-term Care, Assisted Living or Group Home where patient is receiving on-site support with their daily cares, including support with hospital follow up plan.    Plan: Transitional Care Management episode addressed appropriately per reason noted above.      GEOVANNI Bunch  [facility], [facility]    *Connected Care Resource Team does NOT follow patient ongoing. Referrals are identified based on internal discharge reports and the outreach is to ensure patient has an understanding of their discharge instructions.